# Patient Record
Sex: FEMALE | Race: WHITE | Employment: OTHER | URBAN - METROPOLITAN AREA
[De-identification: names, ages, dates, MRNs, and addresses within clinical notes are randomized per-mention and may not be internally consistent; named-entity substitution may affect disease eponyms.]

---

## 2018-04-06 ENCOUNTER — TELEPHONE (OUTPATIENT)
Dept: INTERNAL MEDICINE CLINIC | Age: 68
End: 2018-04-06

## 2018-04-06 NOTE — TELEPHONE ENCOUNTER
----- Message from Kevan Mejia sent at 4/6/2018  2:23 PM EDT -----  Regarding: /Telephone   Dina Friday, sister is requesting a call back in reference to a conversation with \"Dr. Cooper Kothari" regarding scheduling a sooner appt.  P [phone]

## 2018-04-14 ENCOUNTER — HOSPITAL ENCOUNTER (EMERGENCY)
Age: 68
Discharge: HOME | End: 2018-04-14
Attending: EMERGENCY MEDICINE
Payer: MEDICARE

## 2018-04-14 ENCOUNTER — APPOINTMENT (OUTPATIENT)
Dept: CT IMAGING | Age: 68
End: 2018-04-14
Attending: EMERGENCY MEDICINE
Payer: MEDICARE

## 2018-04-14 VITALS
WEIGHT: 240 LBS | TEMPERATURE: 98.1 F | DIASTOLIC BLOOD PRESSURE: 51 MMHG | SYSTOLIC BLOOD PRESSURE: 150 MMHG | HEIGHT: 64 IN | RESPIRATION RATE: 17 BRPM | BODY MASS INDEX: 40.97 KG/M2 | HEART RATE: 77 BPM | OXYGEN SATURATION: 94 %

## 2018-04-14 DIAGNOSIS — F43.81 GRIEF REACTION WITH PROLONGED BEREAVEMENT: Primary | ICD-10-CM

## 2018-04-14 LAB
ALBUMIN SERPL-MCNC: 3.5 G/DL (ref 3.5–5)
ALBUMIN/GLOB SERPL: 0.9 {RATIO} (ref 1.1–2.2)
ALP SERPL-CCNC: 99 U/L (ref 45–117)
ALT SERPL-CCNC: 24 U/L (ref 12–78)
ANION GAP SERPL CALC-SCNC: 7 MMOL/L (ref 5–15)
AST SERPL-CCNC: 15 U/L (ref 15–37)
BASOPHILS # BLD: 0 K/UL (ref 0–0.1)
BASOPHILS NFR BLD: 0 % (ref 0–1)
BILIRUB SERPL-MCNC: 0.6 MG/DL (ref 0.2–1)
BUN SERPL-MCNC: 10 MG/DL (ref 6–20)
BUN/CREAT SERPL: 12 (ref 12–20)
CALCIUM SERPL-MCNC: 9.8 MG/DL (ref 8.5–10.1)
CHLORIDE SERPL-SCNC: 104 MMOL/L (ref 97–108)
CO2 SERPL-SCNC: 29 MMOL/L (ref 21–32)
CREAT SERPL-MCNC: 0.82 MG/DL (ref 0.55–1.02)
DIFFERENTIAL METHOD BLD: ABNORMAL
EOSINOPHIL # BLD: 0.5 K/UL (ref 0–0.4)
EOSINOPHIL NFR BLD: 6 % (ref 0–7)
ERYTHROCYTE [DISTWIDTH] IN BLOOD BY AUTOMATED COUNT: 12.3 % (ref 11.5–14.5)
GLOBULIN SER CALC-MCNC: 3.8 G/DL (ref 2–4)
GLUCOSE SERPL-MCNC: 98 MG/DL (ref 65–100)
HCT VFR BLD AUTO: 42.1 % (ref 35–47)
HGB BLD-MCNC: 14.3 G/DL (ref 11.5–16)
IMM GRANULOCYTES # BLD: 0 K/UL (ref 0–0.04)
IMM GRANULOCYTES NFR BLD AUTO: 0 % (ref 0–0.5)
LYMPHOCYTES # BLD: 3.2 K/UL (ref 0.8–3.5)
LYMPHOCYTES NFR BLD: 34 % (ref 12–49)
MCH RBC QN AUTO: 32.4 PG (ref 26–34)
MCHC RBC AUTO-ENTMCNC: 34 G/DL (ref 30–36.5)
MCV RBC AUTO: 95.2 FL (ref 80–99)
MONOCYTES # BLD: 0.7 K/UL (ref 0–1)
MONOCYTES NFR BLD: 8 % (ref 5–13)
NEUTS SEG # BLD: 5 K/UL (ref 1.8–8)
NEUTS SEG NFR BLD: 52 % (ref 32–75)
NRBC # BLD: 0 K/UL (ref 0–0.01)
NRBC BLD-RTO: 0 PER 100 WBC
PLATELET # BLD AUTO: 249 K/UL (ref 150–400)
PMV BLD AUTO: 11 FL (ref 8.9–12.9)
POTASSIUM SERPL-SCNC: 4 MMOL/L (ref 3.5–5.1)
PROT SERPL-MCNC: 7.3 G/DL (ref 6.4–8.2)
RBC # BLD AUTO: 4.42 M/UL (ref 3.8–5.2)
SODIUM SERPL-SCNC: 140 MMOL/L (ref 136–145)
WBC # BLD AUTO: 9.5 K/UL (ref 3.6–11)

## 2018-04-14 PROCEDURE — 74011636320 HC RX REV CODE- 636/320: Performed by: RADIOLOGY

## 2018-04-14 PROCEDURE — 36415 COLL VENOUS BLD VENIPUNCTURE: CPT | Performed by: EMERGENCY MEDICINE

## 2018-04-14 PROCEDURE — 80053 COMPREHEN METABOLIC PANEL: CPT | Performed by: EMERGENCY MEDICINE

## 2018-04-14 PROCEDURE — 99285 EMERGENCY DEPT VISIT HI MDM: CPT

## 2018-04-14 PROCEDURE — 85025 COMPLETE CBC W/AUTO DIFF WBC: CPT | Performed by: EMERGENCY MEDICINE

## 2018-04-14 PROCEDURE — 71275 CT ANGIOGRAPHY CHEST: CPT

## 2018-04-14 RX ORDER — LEVOTHYROXINE SODIUM 112 UG/1
112 TABLET ORAL
COMMUNITY
End: 2018-07-02 | Stop reason: SDUPTHER

## 2018-04-14 RX ORDER — IBUPROFEN 200 MG
400 TABLET ORAL
COMMUNITY
End: 2018-07-02 | Stop reason: ALTCHOICE

## 2018-04-14 RX ORDER — METOPROLOL SUCCINATE 50 MG/1
50 TABLET, EXTENDED RELEASE ORAL DAILY
COMMUNITY
End: 2018-07-02 | Stop reason: SDUPTHER

## 2018-04-14 RX ADMIN — IOPAMIDOL 100 ML: 755 INJECTION, SOLUTION INTRAVENOUS at 14:26

## 2018-04-14 NOTE — DISCHARGE INSTRUCTIONS
Grief (Actual/Anticipated): Care Instructions  Your Care Instructions    Grief is your emotional reaction to a major loss. The words \"sorrow\" and \"heartache\" often are used to describe feelings of grief. You feel grief when you lose a beloved person, pet, place, or thing. It is also natural to feel grief when you lose a valued way of life, such as a job, marriage, or good health. You may begin to grieve before a loss occurs. You may grieve for a loved one who is sick and dying. Children and adults often feel the pain of loss before a big move or divorce. This type of grief helps you get ready for a loss. Grief is different for each person. There is no \"normal\" or \"expected\" period of time for grieving. Some people adjust to their loss within a couple of months. Others may take 2 years or longer, especially if their lives were changed a lot or if the loss was sudden and shocking. Grieving can cause problems such as headaches, loss of appetite, and trouble with thinking or sleeping. You may withdraw from friends and family and behave in ways that are unusual for you. Grief may cause you to question your beliefs and views about life. Grief is natural and does not require medical treatment. But if you have trouble sleeping, it may help to take sleeping pills for a short time. It may help to talk with people who have been through or are going through similar losses. You may also want to talk to a counselor about your feelings. Talking about your loss, sharing your cares and concerns, and getting support from others are important parts of healthy grieving. Follow-up care is a key part of your treatment and safety. Be sure to make and go to all appointments, and call your doctor if you are having problems. It's also a good idea to know your test results and keep a list of the medicines you take. How can you care for yourself at home? · Get enough sleep. Your mind helps make sense of your life while you sleep. Missing sleep can lead to illness and make it harder for you to deal with your grief. · Eat healthy foods. Try to avoid eating only foods that give you comfort. Ask someone to join you for a meal if you do not like eating alone. Consider taking a multivitamin every day. · Get some exercise every day. Even a walk can help you deal with your grief. Other exercises, such as yoga, can also help you manage stress. · Comfort yourself. Take time to look at photos or use special items that make you feel better. · Stay involved in your life. Do not withdraw from the activities you enjoy. People you know at work, [employer]ist, clubs, or other groups can help you get through your period of grief. · Think about joining a support group to help you deal with your grief. There are many support groups to help people recover from grief. When should you call for help? Call 911 anytime you think you may need emergency care. For example, call if:  ? · You feel you cannot stop from hurting yourself or someone else. ? Watch closely for changes in your health, and be sure to contact your doctor if:  ? · You think you may be depressed. ? · You do not get better as expected. Where can you learn more? Go to http://alexus-kunal.info/. Enter H249 in the search box to learn more about \"Grief (Actual/Anticipated): Care Instructions. \"  Current as of: September 24, 2016  Content Version: 11.4  © 0336-3471 [employer] Care instructions adapted under license by [employer] (which disclaims liability or warranty for this information). If you have questions about a medical condition or this instruction, always ask your healthcare professional. [place] [address] any warranty or liability for your use of this information. Adjustment Disorder: Care Instructions  Your Care Instructions    Adjustment disorder means that you have emotional or behavioral problems because of stress. But your response to the stress is far more severe than a normal response. It is severe enough to affect your work or social life and may cause depression and physical pains and problems. Events that may cause this response can include a divorce, money problems, or starting school or a new job. It might be anything that causes some stress. This disorder is most often a short-term problem. It happens within 3 months of the stressful event or change. If the response lasts longer than 6 months after the event ends, you may have a more serious disorder. Follow-up care is a key part of your treatment and safety. Be sure to make and go to all appointments, and call your doctor if you are having problems. It's also a good idea to know your test results and keep a list of the medicines you take. How can you care for yourself at home? · Go to all counseling sessions. Do not skip any because you are feeling better. · If your doctor prescribed medicines, take them exactly as prescribed. Call your doctor if you think you are having a problem with your medicine. You will get more details on the specific medicines your doctor prescribes. · Discuss the causes of your stress with a good friend or family member. Or you can join a support group for people with similar problems. Talking to others sometimes relieves stress. · Get at least 30 minutes of exercise on most days of the week. Walking is a good choice. You also may want to do other activities, such as running, swimming, cycling, or playing tennis or team sports. Relaxation techniques  Do relaxation exercises 10 to 20 minutes a day. You can play soothing, relaxing music while you do them, if you wish. · Tell others in your house that you are going to do your relaxation exercises. Ask them not to disturb you. · Find a comfortable, quiet place. · Lie down on your back, or sit with your back straight. · Focus on your breathing. Make it slow and steady.   · Breathe in through your nose. Breathe out through either your nose or mouth. · Breathe deeply, filling up the area between your navel and your rib cage. Breathe so that your belly goes up and down. · Do not hold your breath. · Breathe like this for 5 to 10 minutes. Notice the feeling of calmness throughout your whole body. As you continue to breathe slowly and deeply, relax by doing these next steps for another 5 to 10 minutes:  · Tighten and relax each muscle group in your body. Start at your toes, and work your way up to your head. · Imagine your muscle groups relaxing and getting heavy. · Empty your mind of all thoughts. · Let yourself relax more and more deeply. · Be aware of the state of calmness that surrounds you. · When your relaxation time is over, you can bring yourself back to alertness by moving your fingers and toes. Then move your hands and feet. And then move your entire body. Sometimes people fall asleep during relaxation. But they most often wake up soon. · Always give yourself time to return to full alertness before you drive a car. Wait to do anything that might cause an accident if you are not fully alert. Never play a relaxation tape while you drive a car. When should you call for help? Call 911 anytime you think you may need emergency care. For example, call if:  ? · You feel you cannot stop from hurting yourself or someone else. Keep the numbers for these national suicide hotlines: 0-139-439-TALK (5-277.749.8437) and 7-587-PJGIOXT (2-635.883.1849). If you or someone you know talks about suicide or feeling hopeless, get help right away. ? Watch closely for changes in your health, and be sure to contact your doctor if:  ? · You have new anxiety, or your anxiety gets worse. ? · You have been feeling sad, depressed, or hopeless or have lost interest in things that you usually enjoy. ? · You do not get better as expected. Where can you learn more?   Go to http://alfonso.info/. Enter [id number] [id number] in the search box to learn more about \"Adjustment Disorder: Care Instructions. \"  Current as of: July 26, 2016  Content Version: 11.4  © 3030-7838 [employer]wise, [facility]. Care instructions adapted under license by "[employer] (which disclaims liability or warranty for this information). If you have questions about a medical condition or this instruction, always ask your healthcare professional. [place] [address] any warranty or liability for your use of this information.

## 2018-04-14 NOTE — ED PROVIDER NOTES
HPI Comments: Pt reports she has been having difficulty getting out of bed and doing activities. She states she has been under stress d/t the passing of her  3 months ago and moving from [place] 2 weeks ago. Pt notes she fell 4 months ago while visiting her  in the hospital, had normal CT w/ normal PCP and neurology follow up, and was dx w/ concussion. She also c/o dizziness described as \"vertigo\" and nausea for 3 months She states her BP may be high because she forgot to take her medication last night. Social Hx: former smoker    Patient is a 69 y.o. female presenting with dizziness. The history is provided by the patient. Dizziness   This is a recurrent problem. The current episode started more than 1 week ago. Primary symptoms include disorientation (feels \"foggy\"). Primary symptoms comment: dizziness described as \"worsening vertigo\". Associated symptoms include shortness of breath. Pertinent negatives include no chest pain, no vomiting, no headaches and no nausea. Associated symptoms comments: Decreased activity. Associated medical issues comments: post-concussion syndrome after a fall in a hospital 12/2017. Note written by Teersa Torre, as dictated by Tia Wolf MD 1:27 PM    Past Medical History:   Diagnosis Date    Hypertension     Thyroid disease        Past Surgical History:   Procedure Laterality Date    HX CHOLECYSTECTOMY      HX KNEE ARTHROSCOPY      HX TONSIL AND ADENOIDECTOMY      HX TUBAL LIGATION           History reviewed. No pertinent family history. Social History     Social History    Marital status: N/A     Spouse name: N/A    Number of children: N/A    Years of education: N/A     Occupational History    Not on file.      Social History Main Topics    Smoking status: Former Smoker     Packs/day: 0.25     Years: 20.00    Smokeless tobacco: Never Used    Alcohol use 1.8 - 2.4 oz/week     3 - 4 Glasses of wine per week    Drug use: No    Sexual activity: Not on file     Other Topics Concern    Not on file     Social History Narrative    No narrative on file         ALLERGIES: Sulfa (sulfonamide antibiotics)    Review of Systems   Constitutional: Positive for activity change. Negative for chills and fever. HENT: Negative for ear pain and sore throat. Eyes: Negative for photophobia and pain. Respiratory: Positive for shortness of breath. Negative for chest tightness. Cardiovascular: Negative for chest pain and leg swelling. Gastrointestinal: Negative for abdominal pain, nausea and vomiting. Genitourinary: Negative for dysuria and flank pain. Musculoskeletal: Negative for back pain and neck pain. Skin: Negative for rash and wound. Neurological: Positive for dizziness. Negative for light-headedness and headaches. Psychiatric/Behavioral: The patient is nervous/anxious. All other systems reviewed and are negative. Vitals:    04/14/18 1325 04/14/18 1330 04/14/18 1338   BP: 182/56 151/83    Pulse: 76 73    Resp: 16 13    Temp: 98.1 °F (36.7 °C)     SpO2: 92% 91% 93%   Weight: 108.9 kg (240 lb)     Height: 5' 4\" (1.626 m)              Physical Exam   Constitutional: She is oriented to person, place, and time. She appears well-developed and well-nourished. No distress. HENT:   Head: Normocephalic and atraumatic. Eyes: Conjunctivae and EOM are normal. Pupils are equal, round, and reactive to light. Neck: Normal range of motion. Cardiovascular: Normal rate, regular rhythm, normal heart sounds and intact distal pulses. No murmur heard. Pulmonary/Chest: Effort normal and breath sounds normal. No stridor. No respiratory distress. She has no wheezes. Abdominal: Soft. Bowel sounds are normal. There is no tenderness. Musculoskeletal: Normal range of motion. She exhibits no edema or tenderness. Neurological: She is alert and oriented to person, place, and time. No cranial nerve deficit. Skin: Skin is warm and dry. She is not diaphoretic. Psychiatric: Her speech is not rapid and/or pressured. She is not agitated and not aggressive. She exhibits a depressed mood. She expresses no homicidal and no suicidal ideation. Tearful at times   Nursing note and vitals reviewed. MDM  Number of Diagnoses or Management Options  Grief reaction with prolonged bereavement:   Diagnosis management comments: Patient with feelings of not feeling right - hypoxia on RA - but no reason for it (CTA neg, labs neg)  Patient able to walk in the ED and the hypoxia resolved. Suspect most of patient's issues are due to prolonged grief reaction from death of her  and trauma of moving and having to quit her job       Amount and/or Complexity of Data Reviewed  Clinical lab tests: ordered and reviewed  Tests in the radiology section of CPT®: ordered and reviewed          ED Course       Procedures    CONSULT NOTE:  3:18 PM Linda Novoa MD spoke with Nona Carolina for [facility]. Discussed available diagnostic tests and clinical findings. Brenda Garcia will fax over resources for grief counseling. Results:    Labs:  Recent Results (from the past 24 hour(s))   CBC WITH AUTOMATED DIFF    Collection Time: 04/14/18  1:26 PM   Result Value Ref Range    WBC 9.5 3.6 - 11.0 K/uL    RBC 4.42 3.80 - 5.20 M/uL    HGB 14.3 11.5 - 16.0 g/dL    HCT 42.1 35.0 - 47.0 %    MCV 95.2 80.0 - 99.0 FL    MCH 32.4 26.0 - 34.0 PG    MCHC 34.0 30.0 - 36.5 g/dL    RDW 12.3 11.5 - 14.5 %    PLATELET 858 969 - 320 K/uL    MPV 11.0 8.9 - 12.9 FL    NRBC 0.0 0  WBC    ABSOLUTE NRBC 0.00 0.00 - 0.01 K/uL    NEUTROPHILS 52 32 - 75 %    LYMPHOCYTES 34 12 - 49 %    MONOCYTES 8 5 - 13 %    EOSINOPHILS 6 0 - 7 %    BASOPHILS 0 0 - 1 %    IMMATURE GRANULOCYTES 0 0.0 - 0.5 %    ABS. NEUTROPHILS 5.0 1.8 - 8.0 K/UL    ABS. LYMPHOCYTES 3.2 0.8 - 3.5 K/UL    ABS. MONOCYTES 0.7 0.0 - 1.0 K/UL    ABS. EOSINOPHILS 0.5 (H) 0.0 - 0.4 K/UL    ABS.  BASOPHILS 0.0 0.0 - 0.1 K/UL    ABS. IMM. GRANS. 0.0 0.00 - 0.04 K/UL    DF AUTOMATED     METABOLIC PANEL, COMPREHENSIVE    Collection Time: 04/14/18  1:26 PM   Result Value Ref Range    Sodium 140 136 - 145 mmol/L    Potassium 4.0 3.5 - 5.1 mmol/L    Chloride 104 97 - 108 mmol/L    CO2 29 21 - 32 mmol/L    Anion gap 7 5 - 15 mmol/L    Glucose 98 65 - 100 mg/dL    BUN 10 6 - 20 MG/DL    Creatinine 0.82 0.55 - 1.02 MG/DL    BUN/Creatinine ratio 12 12 - 20      GFR est AA >60 >60 ml/min/1.73m2    GFR est non-AA >60 >60 ml/min/1.73m2    Calcium 9.8 8.5 - 10.1 MG/DL    Bilirubin, total 0.6 0.2 - 1.0 MG/DL    ALT (SGPT) 24 12 - 78 U/L    AST (SGOT) 15 15 - 37 U/L    Alk. phosphatase 99 45 - 117 U/L    Protein, total 7.3 6.4 - 8.2 g/dL    Albumin 3.5 3.5 - 5.0 g/dL    Globulin 3.8 2.0 - 4.0 g/dL    A-G Ratio 0.9 (L) 1.1 - 2.2         Imaging:  Cta Chest W Or W Wo Cont    Result Date: 4/14/2018  EXAM: CT ANGIOGRAPHY CHEST INDICATION: Chest pain. Hypoxia and fatigue - eval for PE vs other process. COMPARISON: None. CONTRAST: 100 mL of Isovue-370. TECHNIQUE: Precontrast  images were obtained to localize the volume for acquisition. Multislice helical CT arteriography was performed from the diaphragm to the thoracic inlet during uneventful rapid bolus intravenous contrast administration. Lung and soft tissue windows were generated. Coronal and sagittal  reformatted images were also generated and 3D post processing consisting of coronal maximum intensity projection images was performed. CT dose reduction was achieved through use of a standardized protocol tailored for this examination and automatic exposure control for dose modulation. FINDINGS: There is some respiratory motion artifact on the images. LOWER NECK: The visualized portions of the thyroid and structures of the lower neck are within normal limits. LUNGS: The lungs are clear of mass, nodule, airspace disease or edema. PLEURA: There is no pleural effusion or pneumothorax. PULMONARY ARTERIES: The pulmonary arteries are well enhanced and no pulmonary emboli are identified. AORTA: The aorta enhances normally without evidence of aneurysm or dissection. MEDIASTINUM: There is no mediastinal or hilar adenopathy or mass. BONES AND SOFT TISSUES: The bones and soft tissues of the chest wall are within normal limits. UPPER ABDOMEN: There are clips in the gallbladder fossa and the gallbladder is absent. There is no biliary duct dilatation. The visualized portions of the upper abdominal organs are otherwise within normal limits. IMPRESSION: Normal CT arteriogram of the chest. No pulmonary embolus.

## 2018-04-14 NOTE — ED TRIAGE NOTES
Dizziness and nausea for 3 months. Since a fall December 23, 2017 and loss of  January this year. Per sister \"her head just isn't right, simple things she can't seem to get done\" Poor concentration and little desire to get up and move about for ADL's. Patient is tearful and concerned that something is wrong physically with her.

## 2018-04-25 ENCOUNTER — OFFICE VISIT (OUTPATIENT)
Dept: INTERNAL MEDICINE CLINIC | Age: 68
End: 2018-04-25

## 2018-04-25 VITALS
BODY MASS INDEX: 42.16 KG/M2 | TEMPERATURE: 98.5 F | RESPIRATION RATE: 18 BRPM | WEIGHT: 245.6 LBS | HEART RATE: 73 BPM | DIASTOLIC BLOOD PRESSURE: 83 MMHG | OXYGEN SATURATION: 95 % | SYSTOLIC BLOOD PRESSURE: 137 MMHG

## 2018-04-25 DIAGNOSIS — F07.81 POSTCONCUSSION SYNDROME: Primary | ICD-10-CM

## 2018-04-25 DIAGNOSIS — Z87.898 HISTORY OF SYNCOPE: ICD-10-CM

## 2018-04-25 DIAGNOSIS — I10 BENIGN ESSENTIAL HTN: ICD-10-CM

## 2018-04-25 DIAGNOSIS — F43.21 GRIEF REACTION: ICD-10-CM

## 2018-04-25 DIAGNOSIS — Z12.31 VISIT FOR SCREENING MAMMOGRAM: ICD-10-CM

## 2018-04-25 DIAGNOSIS — S09.90XS INJURY OF HEAD, SEQUELA: ICD-10-CM

## 2018-04-25 DIAGNOSIS — R41.89 COGNITIVE CHANGE: ICD-10-CM

## 2018-04-25 DIAGNOSIS — R26.9 IMPAIRED GAIT: ICD-10-CM

## 2018-04-25 DIAGNOSIS — E03.9 ACQUIRED HYPOTHYROIDISM: ICD-10-CM

## 2018-04-25 PROBLEM — E66.01 OBESITY, MORBID (HCC): Status: ACTIVE | Noted: 2018-04-25

## 2018-04-25 NOTE — MR AVS SNAPSHOT
[facility] 
 
 
 [address] [place] [address] [facility] 
[phone] Patient: Naa Higginbotham MRN: [id number] UWL:[id number] Visit Information Date & Time Provider Department Dept. Phone Encounter #  
 4/25/2018  3:30 PM Sarahi Lamb MD [facility] [address] [facility] [id number] Upcoming Health Maintenance Date Due Hepatitis C Screening 1950 DTaP/Tdap/Td series (1 - Tdap) 11/20/1969 BREAST CANCER SCRN MAMMOGRAM 11/20/1998 FOBT Q 1 YEAR AGE 50-75 11/20/1998 ZOSTER VACCINE AGE 60> 9/20/2008 GLAUCOMA SCREENING Q2Y 11/20/2013 Bone Densitometry (Dexa) Screening 11/20/2013 Pneumococcal 65+ Low/Medium Risk (1 of 2 - PCV13) 11/20/2013 Influenza Age 5 to Adult 8/1/2017 MEDICARE YEARLY EXAM 4/14/2018 Allergies as of 4/25/2018  Review Complete On: 4/14/2018 By: Swati Love, RT, R Severity Noted Reaction Type Reactions Sulfa (Sulfonamide Antibiotics)  04/14/2018   Intolerance Hives Not allergic at all. Anup Hillman \"yeast infection\" Current Immunizations  Never Reviewed No immunizations on file. Not reviewed this visit You Were Diagnosed With   
  
 Codes Comments Postconcussion syndrome    -  Primary ICD-10-CM: F07.81 ICD-9-CM: 310.2 Injury of head, sequela     ICD-10-CM: S09.90XS ICD-9-CM: 908.9 History of syncope     ICD-10-CM: Z87.898 ICD-9-CM: V15.89 Cognitive change     ICD-10-CM: R41.89 ICD-9-CM: 799.59 Grief reaction     ICD-10-CM: F43.20 ICD-9-CM: 309.0 Impaired gait     ICD-10-CM: R26.9 ICD-9-CM: 150. 2 Acquired hypothyroidism     ICD-10-CM: E03.9 ICD-9-CM: 244.9 Benign essential HTN     ICD-10-CM: I10 
ICD-9-CM: 401.1 Visit for screening mammogram     ICD-10-CM: Z12.31 
ICD-9-CM: V76.12 Vitals  BP Pulse Temp Resp Weight(growth percentile) SpO2  
 137/83 (BP 1 Location: Left arm, BP Patient Position: Sitting) 73 98.5 °F (36.9 °C) (Oral) 18 245 lb 9.6 oz (111.4 kg) 95% BMI OB Status Smoking Status 42.16 kg/m2 Postmenopausal Former Smoker BMI and BSA Data Body Mass Index Body Surface Area  
 42.16 kg/m 2 2.24 m 2 Your Updated Medication List  
  
   
This list is accurate as of 4/25/18  4:34 PM.  Always use your most recent med list.  
  
  
  
  
 ibuprofen 200 mg tablet Commonly known as:  MOTRIN Take 400 mg by mouth every six (6) hours as needed for Pain. Indications: Pain  
  
 levothyroxine 112 mcg tablet Commonly known as:  SYNTHROID Take 112 mcg by mouth Daily (before breakfast). metoprolol succinate 50 mg XL tablet Commonly known as:  TOPROL-XL Take 50 mg by mouth daily. We Performed the Following REFERRAL TO BEHAVIORAL HEALTH [REF15 Custom] To-Do List   
 Around 04/25/2018 Imaging:  [facility] MAMMO BI SCREENING INCL CAD Referral Information Referral ID Referred By Referred To  
  
 [id number] Cintia PATTERSON [address] [facility] [address] [place] [address] Phone: [phone] Fax: [phone] Visits Status Start Date End Date 1 Authorized 4/25/18 4/25/19 If your referral has a status of pending review or denied, additional information will be sent to support the outcome of this decision. Introducing hospitals & [facility] SERVICES! [facility] introduces [employer]ge patient portal. Now you can access parts of your medical record, email your doctor's office, and request medication refills online. 1. In your internet browser, go to [email or web address] 2. Click on the First Time User? Click Here link in the Sign In box. You will see the New Member Sign Up page. 3. Enter your Pl[employer]ge Access Code exactly as it appears below. You will not need to use this code after youve completed the sign-up process.  If you do not sign up before the expiration date, you must request a new code. · [employer] Access Code: [email or web address] Expires: 7/13/2018  1:15 PM 
 
4. Enter the last four digits of your Social Security Number (xxxx) and Date of Birth (mm/dd/yyyy) as indicated and click Submit. You will be taken to the next sign-up page. 5. Create a [employer] ID. This will be your [employer] login ID and cannot be changed, so think of one that is secure and easy to remember. 6. Create a [employer] password. You can change your password at any time. 7. Enter your Password Reset Question and Answer. This can be used at a later time if you forget your password. 8. Enter your e-mail address. You will receive e-mail notification when new information is available in [address] 9. Click Sign Up. You can now view and download portions of your medical record. 10. Click the Download Summary menu link to download a portable copy of your medical information. If you have questions, please visit the Frequently Asked Questions section of the [employer] website. Remember, [employer] is NOT to be used for urgent needs. For medical emergencies, dial 911. Now available from your iPhone and Android! Please provide this summary of care documentation to your next provider. Your primary care clinician is listed as Luis Romano. If you have any questions after today's visit, please call [phone].
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
## 2018-05-04 ENCOUNTER — HOSPITAL ENCOUNTER (OUTPATIENT)
Dept: MAMMOGRAPHY | Age: 68
Discharge: HOME | End: 2018-05-04
Attending: INTERNAL MEDICINE
Payer: MEDICARE

## 2018-05-04 DIAGNOSIS — Z12.31 VISIT FOR SCREENING MAMMOGRAM: ICD-10-CM

## 2018-05-04 PROCEDURE — 77067 SCR MAMMO BI INCL CAD: CPT

## 2018-05-07 ENCOUNTER — OFFICE VISIT (OUTPATIENT)
Dept: INTERNAL MEDICINE CLINIC | Age: 68
End: 2018-05-07

## 2018-05-07 DIAGNOSIS — F43.23 ADJUSTMENT DISORDER WITH MIXED ANXIETY AND DEPRESSED MOOD: Primary | ICD-10-CM

## 2018-05-23 NOTE — PROGRESS NOTES
Behavioral Health Note    This patient was referred to the [address] [place] [address] by Dr. Armand Vang for help with management of grief and depression. Met with pt. for initial session of 60 minutes to establish contact, to assess symptoms and mental status, identify health behavior goals, and develop plan of care based on readiness to change. Diagnosis: Adjustment Disorder with Anxiety and Depressed Mood    Symptoms and mental status: Pt appeared stated age, dressed appropriately, and was pleasant and cooperative throughout the interview. Pt made good eye contact; thoughts were clear and goal-oriented and there was no evidence of disturbances in thought process or content or perceptual disturbances. Mood and affect were mildly depressed/anxious. Insight and judgment were good. Rachael Clock was tearful on and off throughout the session as she discussed the death of her  earlier this year. He had been ill for several years with cirrhosis. She also experienced a concussion this year and has struggled with some cognitive changes since then. She reports that she had focused so much on her  over the past few years that she had not taken care of her own health. She has begun to do this; had a mammogram done, established with a PCP and has a colonoscopy set up. The scores on the Pt. Stress Questionnaire were: PHQ-9: 18 (significant depressive sx); ANNA-7: 9(moderate anxiety); AUDIT: 5 (no abuse of alcohol); and 2/4 on the PTSD scale (related to her s death). She stated that she was not interested in taking medications and will work on making lifestyle changes to lessen sx. She rated her current pain level 4/10 (knee pain). Health Behavior Goals: To take better care of herself    Social Support: Her sister, cats, and a grief support group     Intervention:   The following CBT interventions were reviewed: self-monitoring of self-defeating thoughts, relaxation training, and increasing physical activity as tolerated. Plan: Pt. agreed to follow the treatment plan outlined above and will return in 4 weeks. [facility] will coordinate with PCP for plan of care.

## 2018-05-29 ENCOUNTER — OFFICE VISIT (OUTPATIENT)
Dept: INTERNAL MEDICINE CLINIC | Age: 68
End: 2018-05-29

## 2018-05-29 DIAGNOSIS — F43.23 ADJUSTMENT DISORDER WITH MIXED ANXIETY AND DEPRESSED MOOD: Primary | ICD-10-CM

## 2018-06-04 ENCOUNTER — HOSPITAL ENCOUNTER (OUTPATIENT)
Dept: MAMMOGRAPHY | Age: 68
Discharge: HOME | End: 2018-06-04
Attending: INTERNAL MEDICINE

## 2018-06-04 DIAGNOSIS — R92.8 ABNORMAL MAMMOGRAM: ICD-10-CM

## 2018-06-26 ENCOUNTER — OFFICE VISIT (OUTPATIENT)
Dept: INTERNAL MEDICINE CLINIC | Age: 68
End: 2018-06-26

## 2018-06-26 DIAGNOSIS — F43.23 ADJUSTMENT DISORDER WITH MIXED ANXIETY AND DEPRESSED MOOD: Primary | ICD-10-CM

## 2018-06-28 ENCOUNTER — TELEPHONE (OUTPATIENT)
Dept: INTERNAL MEDICINE CLINIC | Age: 68
End: 2018-06-28

## 2018-06-28 NOTE — TELEPHONE ENCOUNTER
Sandra lawrence/ Dr Kincaid Dry office request a return call regarding if patient was referred  to them by Dr ELIUD VELASQUEZ. Patient scheduled an appointment with them but what she scheduled herself for they do not do so Sandra Johansen needs to verify if Dr [facility] recommended their services for the patient.  Dr Merritt Shows contact Sandra Johansen [phone]

## 2018-06-29 NOTE — PROGRESS NOTES
Behavioral Health Progress Note    [facility] 11:45[facility] for a total of 45 minutes. Psychotropic medication changes: None    Diagnosis: Adjustment Disorder with Anxiety and Depressed Mood    Description of session/progress/interventions: Eugene Massey reported that she continues to feel as if she is doing better every day. She still has occasional periods of tearfulness, which she was reassured was a normal part of grieving at this point. She feels she is struggling with procrastination and has enlisted her sister to help with this. She has been a bit more involved in caring for her mother and brother. She was given an NSAID for arthritis and says that it has helped her sleep better, so she did not start taking Melatonin. She feels her fogginess is lifting some. She plans on getting involved with an exercise group at [employer] and was given information on [facility]. Assessment/Plan: Symptoms continue to improve. Working to establish additional support for herself and increase her self-care. To return in about a month.

## 2018-07-02 ENCOUNTER — OFFICE VISIT (OUTPATIENT)
Dept: INTERNAL MEDICINE CLINIC | Age: 68
End: 2018-07-02

## 2018-07-02 VITALS
HEART RATE: 70 BPM | HEIGHT: 64 IN | WEIGHT: 248 LBS | SYSTOLIC BLOOD PRESSURE: 128 MMHG | RESPIRATION RATE: 18 BRPM | DIASTOLIC BLOOD PRESSURE: 70 MMHG | BODY MASS INDEX: 42.34 KG/M2 | OXYGEN SATURATION: 95 % | TEMPERATURE: 98.2 F

## 2018-07-02 DIAGNOSIS — F07.81 POSTCONCUSSION SYNDROME: ICD-10-CM

## 2018-07-02 DIAGNOSIS — Z00.00 MEDICARE ANNUAL WELLNESS VISIT, SUBSEQUENT: ICD-10-CM

## 2018-07-02 DIAGNOSIS — I10 BENIGN ESSENTIAL HTN: ICD-10-CM

## 2018-07-02 DIAGNOSIS — Z71.89 ADVANCED CARE PLANNING/COUNSELING DISCUSSION: ICD-10-CM

## 2018-07-02 DIAGNOSIS — E66.01 OBESITY, MORBID (HCC): ICD-10-CM

## 2018-07-02 DIAGNOSIS — Z00.00 MEDICARE ANNUAL WELLNESS VISIT, INITIAL: Primary | ICD-10-CM

## 2018-07-02 DIAGNOSIS — Z11.59 ENCOUNTER FOR HEPATITIS C SCREENING TEST FOR LOW RISK PATIENT: ICD-10-CM

## 2018-07-02 DIAGNOSIS — Z12.11 COLON CANCER SCREENING: ICD-10-CM

## 2018-07-02 DIAGNOSIS — E03.9 ACQUIRED HYPOTHYROIDISM: ICD-10-CM

## 2018-07-02 DIAGNOSIS — Z78.0 ASYMPTOMATIC MENOPAUSAL STATE: ICD-10-CM

## 2018-07-02 RX ORDER — LEVOTHYROXINE SODIUM 112 UG/1
112 TABLET ORAL
Qty: 30 TAB | Refills: 3 | Status: SHIPPED | OUTPATIENT
Start: 2018-07-02 | End: 2018-07-15 | Stop reason: SDUPTHER

## 2018-07-02 RX ORDER — LEVOTHYROXINE SODIUM 112 UG/1
112 TABLET ORAL
Qty: 30 TAB | Refills: 0 | Status: SHIPPED | OUTPATIENT
Start: 2018-07-02 | End: 2018-07-02 | Stop reason: SDUPTHER

## 2018-07-02 RX ORDER — MELOXICAM 15 MG/1
15 TABLET ORAL DAILY
Status: ON HOLD | COMMUNITY
Start: 2018-06-12 | End: 2018-09-19

## 2018-07-02 RX ORDER — METOPROLOL SUCCINATE 50 MG/1
50 TABLET, EXTENDED RELEASE ORAL DAILY
Qty: 30 TAB | Refills: 3 | Status: SHIPPED | OUTPATIENT
Start: 2018-07-02 | End: 2018-10-29 | Stop reason: SDUPTHER

## 2018-07-02 RX ORDER — METOPROLOL SUCCINATE 50 MG/1
50 TABLET, EXTENDED RELEASE ORAL DAILY
Qty: 30 TAB | Refills: 0 | Status: SHIPPED | OUTPATIENT
Start: 2018-07-02 | End: 2018-07-02 | Stop reason: SDUPTHER

## 2018-07-02 NOTE — PROGRESS NOTES
HISTORY OF PRESENT ILLNESS    Chief Complaint   Patient presents with    Medication Refill    Labs     Requesting labs d/t time since last set of lab    Weight Management     Unable to successfully lose weight     Annual Wellness Visit       Presents for follow-up. She is with her sister, Job Bethea.      Post-concussion syndrome. Grief reaction. She feels ongoing \"fogginess\" at times. Feels mentally fatigued easily  She is driving locally. Melanie Shaw for counseling which she found very helpful. Saw Dr. Danley Gaucher for right knee. Given mobic. She declined injection since he did not warn her. Pain is improving some. She wants to see Dr. Nirav Bradford now. Reports some GERD at night. Mild. Hypertension  Hypertension ROS: taking medications as instructed, no medication side effects noted, no TIA's, no chest pain on exertion, no dyspnea on exertion, no swelling of ankles     reports that she has quit smoking. She has a 5.00 pack-year smoking history. She has never used smokeless tobacco.    reports that she drinks about 1.8 - 2.4 oz of alcohol per week    BP Readings from Last 2 Encounters:   07/02/18 143/66   04/25/18 137/83     Hypothyroidism follow-up  Reports fatigue  denies heat/cold intolerance, bowel/skin changes or CVS symptoms, losing hair, feeling excessive energy, tremulousness, palpitations. Thyroid medication has been unchanged since last medication check and labs. No results found for: TSH, TSH2, TSH3, TSHP, TSHELE, TT3, T3U, T3UP, FRT3, FT3, FT4, FT4P, T4, T4P, FT4T, TT7, TSHEXT  Wt Readings from Last 3 Encounters:   07/02/18 248 lb (112.5 kg)   04/25/18 245 lb 9.6 oz (111.4 kg)   04/14/18 240 lb (108.9 kg)         Review of Systems   All other systems reviewed and are negative, except as noted in HPI    Past Medical and Surgical History   has a past medical history of Benign essential HTN; Grief reaction; Head injury; History of panic attacks; Hypothyroidism; Impaired gait;  Postconcussion syndrome (02/2018); and Right knee pain. has a past surgical history that includes hx cholecystectomy; hx tonsil and adenoidectomy; hx knee arthroscopy (Right, 1978); hx tubal ligation; and hx colonoscopy (11/24/2009). reports that she has quit smoking. She has a 5.00 pack-year smoking history. She has never used smokeless tobacco. She reports that she drinks about 1.8 - 2.4 oz of alcohol per week  She reports that she does not use illicit drugs. family history is not on file. Physical Exam   Nursing note and vitals reviewed. Blood pressure 143/66, pulse 70, temperature 98.2 °F (36.8 °C), resp. rate 18, height 5' 4\" (1.626 m), weight 248 lb (112.5 kg), SpO2 95 %. Constitutional:  No distress. Eyes: Conjunctivae are normal.   Ears:  Hearing grossly intact  Cardiovascular: Normal rate. regular rhythm, no murmurs or gallops  No edema  Pulmonary/Chest: Effort normal.   CTAB  Musculoskeletal: moves all 4 extremities   Neurological: Alert and oriented to person, place, and time. Skin: No rash noted. Psychiatric: Normal mood and affect. Behavior is normal.     ASSESSMENT and PLAN  Diagnoses and all orders for this visit:    1. Medicare annual wellness visit, subsequent  Physician Addendum  I personally saw and examined the patient. I have discussed and reviewed note with Nurse Navigator and agree with the Nurse Navigator's findings and recommendations for this Wellness Exam.  I was present during the key portions of separately billed procedures. Orders written and signed by me as per chart. Abram Koch MD    2. Asymptomatic menopausal state  -     DEXA BONE DENSITY STUDY AXIAL; Future    3. Advanced care planning/counseling discussion    4. Colon cancer screening overdue for 3 year colonoscopy follow-up. Asymptomatic-   -     [facility]    5. Encounter for hepatitis C screening test for low risk patient  -     HCV AB W/RFLX TO ULICES    6. Acquired hypothyroidism  Difficulty losing weight.   Check thyroid. Increase dose if at all possible. Stress is also contributing to weight gain.  -     TSH 3RD GENERATION  -     T4, FREE  -     levothyroxine (SYNTHROID) 112 mcg tablet; Take 1 Tab by mouth Daily (before breakfast). 7. Benign essential HTN- Controlled on current regimen. Continue current medications as written in chart.  -     LIPID PANEL  -     CBC WITH AUTOMATED DIFF  -     METABOLIC PANEL, COMPREHENSIVE  -     metoprolol succinate (TOPROL-XL) 50 mg XL tablet; Take 1 Tab by mouth daily. 8. Obesity, morbid ([address]  The patient is asked to make an attempt to improve diet and exercise patterns to aid in medical management of this problem. 9.  Postconcussive syndrome. At this point, symptoms appear to be relatively mild and manageable. She is not doing well with higher level executive function and her confidence is not ideal, but she is overall improving. Appreciate counseling recommendations. Remain off of medication for now. Mood and anxiety component may benefit from an SSRI in my opinion. She is also dealing with her mother with progressive dementia. There are no Patient Instructions on file for this visit.    lab results and schedule of future lab studies reviewed with patient  reviewed medications and side effects in detail    Return to clinic for further evaluation if new symptoms develop    Follow-up Disposition: Not on File    Current Outpatient Prescriptions   Medication Sig    meloxicam (MOBIC) 15 mg tablet Take 15 mg by mouth daily.  metoprolol succinate (TOPROL-XL) 50 mg XL tablet Take 1 Tab by mouth daily.  levothyroxine (SYNTHROID) 112 mcg tablet Take 1 Tab by mouth Daily (before breakfast). No current facility-administered medications for this visit.

## 2018-07-02 NOTE — ACP (ADVANCE CARE PLANNING)
Advance Care Planning (ACP) Provider Note - Comprehensive     Date of ACP Conversation: 07/02/18  Persons included in Conversation:  patient  Length of ACP Conversation in minutes:  <16 minutes (Non-Billable)    Authorized Decision Maker (if patient is incapable of making informed decisions): This person is:  Healthcare Agent/Medical Power of  under Advance Directive          General ACP for ALL Patients with Decision Making Capacity:   Importance of advance care planning, including choosing a healthcare agent to communicate patient's healthcare decisions if patient lost the ability to make decisions, such as after a sudden illness or accident  Understanding of the healthcare agent role was assessed and information provided  Exploration of values, goals, and preferences if recovery is not expected, even with continued medical treatment in the event of: Imminent death  Severe, permanent brain injury    Review of Existing Advance Directive:  not availble     For Serious or Chronic Illness:  Understanding of medical condition    Understanding of CPR, goals and expected outcomes, benefits and burdens discussed. Information on CPR success rates provided (e.g. for CPR in hospital, survival to d/c at two weeks is 22%, for chronically ill or elderly/frail survival is less than 3%); Individual asked to communicate understanding of information in his/her own words.   Explored fears and concerns regarding CPR or possible outcomes    Interventions Provided:  Recommended completion of Advance Directive form after review of ACP materials and conversation with prospective healthcare agent   Recommended communicating the plan and making copies for the healthcare agent, personal physician, and others as appropriate (e.g., health system)

## 2018-07-02 NOTE — MR AVS SNAPSHOT
[address] [facility] [address] 
 
 
 [address] [facility] [facility] 
[phone] Patient: Katrina Toledo MRN: [id number] Visit Information Date & Time Provider Department Dept. Phone Encounter #  
 7/2/2018  3:45 PM Tyler Mc MD [facility] [address] [facility] [id number] Your Appointments 1/14/2019  8:40 AM  
New Patient with John Paul Hill PsyD [address] [facility] ([facility] Appt Note: new pt  ref by Dr Abby Gutierrez her psychiatrist  [phone], or (c) [phone] , for for post concussion after fall 12/23/2017, vs grief, with memory issues, [facility] [address] [facility] [address] [id number] [phone]  
  
   
 [facility] [address] [id number] [address] [place] Upcoming Health Maintenance Date Due Hepatitis C Screening 1950 DTaP/Tdap/Td series (1 - Tdap) 11/20/1969 ZOSTER VACCINE AGE 60> 9/20/2008 COLONOSCOPY 11/24/2012 GLAUCOMA SCREENING Q2Y 11/20/2013 Bone Densitometry (Dexa) Screening 11/20/2013 Pneumococcal 65+ Low/Medium Risk (1 of 2 - PCV13) 11/20/2013 Influenza Age 5 to Adult 8/1/2018 MEDICARE YEARLY EXAM 7/3/2019 BREAST CANCER SCRN MAMMOGRAM 5/4/2020 Allergies as of 7/2/2018  Review Complete On: 7/2/2018 By: Tyler cM MD  
  
 Severity Noted Reaction Type Reactions Sulfa (Sulfonamide Antibiotics)  04/14/2018   Intolerance Hives Not allergic at all. Johana Figueroa \"yeast infection\" Current Immunizations  Reviewed on 7/2/2018 No immunizations on file. Reviewed by Tyler Mc MD on 7/2/2018 at  4:42 PM  
You Were Diagnosed With   
  
 Codes Comments Asymptomatic menopausal state    -  Primary ICD-10-CM: Z78.0 ICD-9-CM: V49.81 Advanced care planning/counseling discussion     ICD-10-CM: Z71.89 ICD-9-CM: V65.49 Colon cancer screening     ICD-10-CM: Z12.11 ICD-9-CM: V76.51 Encounter for hepatitis C screening test for low risk patient     ICD-10-CM: Z11.59 
ICD-9-CM: V73.89 Acquired hypothyroidism     ICD-10-CM: E03.9 ICD-9-CM: 244.9 Benign essential HTN     ICD-10-CM: I10 
ICD-9-CM: 401.1 Obesity, morbid ([address]     ICD-10-CM: E66.01 
ICD-9-CM: 278.01 Medicare annual wellness visit, subsequent     ICD-10-CM: Z00.00 ICD-9-CM: V70.0 Vitals BP Pulse Temp Resp Height(growth percentile) Weight(growth percentile) 128/70 (BP 1 Location: Left arm, BP Patient Position: Sitting) 70 98.2 °F (36.8 °C) 18 5' 4\" (1.626 m) 248 lb (112.5 kg) SpO2 BMI OB Status Smoking Status 95% 42.57 kg/m2 Postmenopausal Former Smoker Vitals History BMI and BSA Data Body Mass Index Body Surface Area 42.57 kg/m 2 2.25 m 2 Preferred Pharmacy Pharmacy Name Phone [id number], [address] ([place]) [phone] Your Updated Medication List  
  
   
This list is accurate as of 7/2/18  4:51 PM.  Always use your most recent med list.  
  
  
  
  
 levothyroxine 112 mcg tablet Commonly known as:  SYNTHROID Take 1 Tab by mouth Daily (before breakfast). meloxicam 15 mg tablet Commonly known as:  MOBIC Take 15 mg by mouth daily. metoprolol succinate 50 mg XL tablet Commonly known as:  TOPROL-XL Take 1 Tab by mouth daily. Prescriptions Sent to Pharmacy Refills  
 metoprolol succinate (TOPROL-XL) 50 mg XL tablet 3 Sig: Take 1 Tab by mouth daily. Class: Normal  
 Pharmacy: [id number] [address] , [address] ([place]) Ph #: [phone] Route: Oral  
 levothyroxine (SYNTHROID) 112 mcg tablet 3 Sig: Take 1 Tab by mouth Daily (before breakfast).   
 Class: Normal  
 Pharmacy: [id number]- [address], [address] ([place]) Ph #: [phone] Route: Oral  
  
We Performed the Following CBC WITH AUTOMATED DIFF [13452 CPT(R)] HCV AB W/RFLX TO ULICES [02064 CPT(R)] LIPID PANEL [29976 CPT(R)] METABOLIC PANEL, COMPREHENSIVE [43879 CPT(R)] REFERRAL TO GASTROENTEROLOGY [ARO70 Custom] T4, FREE [id number] CPT(R)] TSH 3RD GENERATION [29882 CPT(R)] To-Do List   
 07/02/2018 Imaging:  DEXA BONE DENSITY STUDY AXIAL Referral Information Referral ID Referred By Referred To  
  
 [id number] [address]  [place], [address] [facility] Visits Status Start Date End Date 1 New Request 7/2/18 7/2/19 If your referral has a status of pending review or denied, additional information will be sent to support the outcome of this decision. Introducing [facility]! Dear Ron Snider: 
Thank you for requesting a [facility] account. Our records indicate that you already have an active [employer] account. You can access your account anytime at [email or web address] Did you know that you can access your hospital and ER discharge instructions at any time in [employer]? You can also review all of your test results from your hospital stay or ER visit. Additional Information If you have questions, please visit the Frequently Asked Questions section of the [employer] website at [email or web address] Remember, [employer] is NOT to be used for urgent needs. For medical emergencies, dial 911. Now available from your iPhone and Android! Please provide this summary of care documentation to your next provider. Your primary care clinician is listed as Iban Chandra. If you have any questions after today's visit, please call [phone].
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
## 2018-07-03 NOTE — PATIENT INSTRUCTIONS
Medicare Part B Preventive Services Guidelines/Limitations Date last completed and Frequency Due Date   Bone Mass Measurement  (age 72 & older, biennial) Requires diagnosis related to osteoporosis or estrogen deficiency. Biennial benefit unless patient has history of long-term glucocorticoid tx or baseline is needed because initial test was by other method Completed greater than 5 years ago    Recommended every 2 years As recommended by your PCP or Specialist     Cardiovascular Screening Blood Tests (every 5 years)  Total cholesterol, HDL, Triglycerides Order as a panel if possible As recommended by your PCP or Specialist    As recommended by your PCP As recommended by your PCP or Specialist   Colorectal Cancer Screening  -Fecal occult blood test (annual)  -Flexible sigmoidoscopy (5y)  -Screening colonoscopy (10y)  -Barium Enema Age 49-80; After age [de-identified] if history of abnormal results Completed 11/24/2009     Recommended every 5 to 10 years  As recommended by your PCP or Specialist     Counseling to Prevent Tobacco Use (up to 8 sessions per year)  - Counseling greater than 3 and up to 10 minutes  - Counseling greater than 10 minutes Patients must be asymptomatic of tobacco-related conditions to receive as preventive service N/A N/A   Diabetes Screening Tests (at least every 3 years, Medicare covers annually or at 6-month intervals for prediabetic patients)    Fasting blood sugar (FBS) or glucose tolerance test (GTT) Patient must be diagnosed with one of the following:  -Hypertension, Dyslipidemia, obesity, previous impaired FBS or GTT  Or any two of the following: overweight, FH of diabetes, age ? 72, history of gestational diabetes, birth of baby weighing more than 9 pounds Completed 4/2018    Recommended every 3 years for non-diabetics     As recommended by your PCP or Specialist     Glaucoma Screening (no USPSTF recommendation) Diabetes mellitus, family history, , age 48 or over,  American, age 72 or over Completed a few years ago    Recommended annually As recommended by your PCP or Specialist   Seasonal Influenza Vaccination (annually)  Completed 10/2017    Recommended Annually Due Fall 2018   TDAP Vaccination  As recommended by your PCP or Specialist    Recommended every 10 years As recommended by your PCP or Specialist   Zoster (Shingles) Vaccination Covered by Medicare Part D through the pharmacy- PCP provides prescription As recommended by your PCP or Specialist    Recommended once over age 48  As recommended by your PCP or Specialist   Pneumococcal Vaccination (once after 72)  Pneumo 23- 7/2014  Recommended once over the age of 72    Prevnar 15- 2/2018 Recommended once over the age of 72 Complete        Complete   Screening Mammography (biennial age 54-69) Annually (age 36 or over) Completed 5/2018   As recommended by your PCP or Specialist     Screening Pap Tests and Pelvic Examination (up to age 79 and after 79 if unknown history or abnormal study last 8 years) Every 25 months except high risk As recommended by your PCP or Specialist   As recommended by your PCP or Specialist     Ultrasound Screening for Abdominal Aortic Aneurysm (AAA) (once) Patient must be referred through IPPE and not have had a screening for abdominal aortic aneurysm before under Medicare. Limited to patients who meet one of the following criteria:  - Men who are 73-68 years old and have smoked more than 100 cigarettes in their lifetime.  -Anyone with a FH of AAA  -Anyone recommended for screening by USPSTF Not indicated unless recommended by PCP   Not indicated unless recommended by PCP     Family Practice Management 2011    Please bring a copy of your completed advance medical directive to the office so it may be added to your medical record. Thank you. If you have any questions or concerns please feel free to contact me at [phone].   It was a pleasure meeting you today and participating in your healthcare. Jamal Freed RN        Medicare Wellness Visit, Female    The best way to live healthy is to have a lifestyle where you eat a well-balanced diet, exercise regularly, limit alcohol use, and quit all forms of tobacco/nicotine, if applicable. Regular preventive services are another way to keep healthy. Preventive services (vaccines, screening tests, monitoring & exams) can help personalize your care plan, which helps you manage your own care. Screening tests can find health problems at the earliest stages, when they are easiest to treat. [address] [facility] follows the current, evidence-based guidelines published by the [facility] ([facility]) when recommending preventive services for our patients. Because we follow these guidelines, sometimes recommendations change over time as research supports it. (For example, mammograms used to be recommended annually. Even though Medicare will still pay for an annual mammogram, the newer guidelines recommend a mammogram every two years for women of average risk.)    Of course, you and your provider may decide to screen more often for some diseases, based on your risk and co-morbidities (chronic disease you are already diagnosed with). Preventive services for you include:    - Medicare offers their members a free annual wellness visit, which is time for you and your primary care provider to discuss and plan for your preventive service needs. Take advantage of this benefit every year!    -All people over age 72 should receive the recommended pneumonia vaccines. Current USPSTF guidelines recommend a series of two vaccines for the best pneumonia protection.     -All adults should have a yearly flu vaccine and a tetanus vaccine every 10 years. All adults age 61 years should receive a shingles vaccine once in their lifetime.      -A bone mass density test is recommended when a woman turns 65 to screen for osteoporosis. This test is only recommended once as a screening. Some providers will use this same test as a disease monitoring tool if you already have osteoporosis. -All adults age 38-68 years who are overweight should have a diabetes screening test once every three years.     -Other screening tests & preventive services for persons with diabetes include: an eye exam to screen for diabetic retinopathy, a kidney function test, a foot exam, and stricter control over your cholesterol.     -Cardiovascular screening for adults with routine risk involves an electrocardiogram (ECG) at intervals determined by the provider.     -Colorectal cancer screenings should be done for adults age 54-65 years with normal risk. There are a number of acceptable methods of screening for this type of cancer. Each test has its own benefits and drawbacks. Discuss with your provider what is most appropriate for you during your annual wellness visit. The different tests include: colonoscopy (considered the best screening method), a fecal occult blood test, a fecal DNA test, and sigmoidoscopy. -Breast cancer screenings are recommended every other year for women of normal risk age 54-69 years.     -Cervical cancer screenings for women over age 72 are only recommended with certain risk factors.     -All adults born between [facility] should be screened once for Hepatitis C.      Here is a list of your current Health Maintenance items (your personalized list of preventive services) with a due date:  Health Maintenance Due   Topic Date Due    Hepatitis C Test  1950    DTaP/Tdap/Td  (1 - Tdap) 11/20/1969    Shingles Vaccine  09/20/2008    Colonoscopy  11/24/2012    Glaucoma Screening   11/20/2013    Bone Mineral Density   11/20/2013

## 2018-07-14 LAB
ALBUMIN SERPL-MCNC: 4.1 G/DL (ref 3.6–4.8)
ALBUMIN/GLOB SERPL: 1.7 {RATIO} (ref 1.2–2.2)
ALP SERPL-CCNC: 95 IU/L (ref 39–117)
ALT SERPL-CCNC: 19 IU/L (ref 0–32)
AST SERPL-CCNC: 19 IU/L (ref 0–40)
BASOPHILS # BLD AUTO: 0 X10E3/UL (ref 0–0.2)
BASOPHILS NFR BLD AUTO: 0 %
BILIRUB SERPL-MCNC: 0.6 MG/DL (ref 0–1.2)
BUN SERPL-MCNC: 12 MG/DL (ref 8–27)
BUN/CREAT SERPL: 16 (ref 12–28)
CALCIUM SERPL-MCNC: 9.6 MG/DL (ref 8.7–10.3)
CHLORIDE SERPL-SCNC: 104 MMOL/L (ref 96–106)
CHOLEST SERPL-MCNC: 167 MG/DL (ref 100–199)
CO2 SERPL-SCNC: 25 MMOL/L (ref 20–29)
CREAT SERPL-MCNC: 0.77 MG/DL (ref 0.57–1)
EOSINOPHIL # BLD AUTO: 0.5 X10E3/UL (ref 0–0.4)
EOSINOPHIL NFR BLD AUTO: 7 %
ERYTHROCYTE [DISTWIDTH] IN BLOOD BY AUTOMATED COUNT: 13.2 % (ref 12.3–15.4)
GLOBULIN SER CALC-MCNC: 2.4 G/DL (ref 1.5–4.5)
GLUCOSE SERPL-MCNC: 107 MG/DL (ref 65–99)
HCT VFR BLD AUTO: 42.9 % (ref 34–46.6)
HCV AB S/CO SERPL IA: <0.1 S/CO RATIO (ref 0–0.9)
HCV AB SERPL QL IA: NORMAL
HDLC SERPL-MCNC: 29 MG/DL
HGB BLD-MCNC: 14.6 G/DL (ref 11.1–15.9)
IMM GRANULOCYTES # BLD: 0 X10E3/UL (ref 0–0.1)
IMM GRANULOCYTES NFR BLD: 0 %
INTERPRETATION, 910389: NORMAL
LDLC SERPL CALC-MCNC: 115 MG/DL (ref 0–99)
LYMPHOCYTES # BLD AUTO: 2.3 X10E3/UL (ref 0.7–3.1)
LYMPHOCYTES NFR BLD AUTO: 31 %
MCH RBC QN AUTO: 32.7 PG (ref 26.6–33)
MCHC RBC AUTO-ENTMCNC: 34 G/DL (ref 31.5–35.7)
MCV RBC AUTO: 96 FL (ref 79–97)
MONOCYTES # BLD AUTO: 0.4 X10E3/UL (ref 0.1–0.9)
MONOCYTES NFR BLD AUTO: 6 %
NEUTROPHILS # BLD AUTO: 4.2 X10E3/UL (ref 1.4–7)
NEUTROPHILS NFR BLD AUTO: 56 %
PLATELET # BLD AUTO: 245 X10E3/UL (ref 150–379)
POTASSIUM SERPL-SCNC: 4.4 MMOL/L (ref 3.5–5.2)
PROT SERPL-MCNC: 6.5 G/DL (ref 6–8.5)
RBC # BLD AUTO: 4.46 X10E6/UL (ref 3.77–5.28)
SODIUM SERPL-SCNC: 142 MMOL/L (ref 134–144)
T4 FREE SERPL-MCNC: 1.19 NG/DL (ref 0.82–1.77)
TRIGL SERPL-MCNC: 115 MG/DL (ref 0–149)
TSH SERPL DL<=0.005 MIU/L-ACNC: 8.85 UIU/ML (ref 0.45–4.5)
VLDLC SERPL CALC-MCNC: 23 MG/DL (ref 5–40)
WBC # BLD AUTO: 7.4 X10E3/UL (ref 3.4–10.8)

## 2018-07-15 DIAGNOSIS — E03.9 ACQUIRED HYPOTHYROIDISM: ICD-10-CM

## 2018-07-15 RX ORDER — LEVOTHYROXINE SODIUM 125 UG/1
125 TABLET ORAL
Qty: 30 TAB | Refills: 3 | Status: SHIPPED | OUTPATIENT
Start: 2018-07-15 | End: 2018-10-29 | Stop reason: SDUPTHER

## 2018-07-24 ENCOUNTER — OFFICE VISIT (OUTPATIENT)
Dept: INTERNAL MEDICINE CLINIC | Age: 68
End: 2018-07-24

## 2018-07-24 DIAGNOSIS — F43.23 ADJUSTMENT DISORDER WITH MIXED ANXIETY AND DEPRESSED MOOD: Primary | ICD-10-CM

## 2018-07-24 NOTE — PROGRESS NOTES
Behavioral Health Progress Note    This patient was seen by the [facility] from 11am to 11:45am for a total of 45 minutes. Psychotropic medication changes: None    Diagnosis: Adjustment Disorder with Anxiety and Depressed Mood    Description of session/progress/interventions: Mariann Kumar reported that she was feeling much better, that she was not crying nearly as often and that she is making some very slow progress on sorting through the boxes in her apartment. She still has concerns about lacking motivation, having difficulty prioritizing and making decisions, and trouble starting and finishing tasks in a timely manner (paying bills). She is worried that she will not return to her previous level of functioning and does not know if this is due to grief, post-concussion syndrome, or the beginning of dementia (her biggest fear, as her mother has dementia). She has made an appt with a neuropsychologist, as suggested, for assessment of these concerns. She has had more connections with friends (mostly over the phone) and still helps to care for her mother and goes out with her sister. She reports sleeping well. Assessment/Plan: Mood is improving some as is her interest in doing things but she still struggles in other areas. We discussed simple strategies to help her stay focused and on task. She does not want to follow-up with a grief support group at this time. To return in about a month.

## 2018-08-15 ENCOUNTER — TELEPHONE (OUTPATIENT)
Dept: INTERNAL MEDICINE CLINIC | Age: 68
End: 2018-08-15

## 2018-08-15 NOTE — TELEPHONE ENCOUNTER
I spoke with pt's pharmacy, per the pharmacy tech as of now only [address] [facility] is being recalled which is [facility]'t use that company for the medication, they use [employer]. As of now, Josetahmina Fields is not being recalled. Pt was informed and was thankful for the call.

## 2018-08-15 NOTE — TELEPHONE ENCOUNTER
Patient needs to speak with nurse regarding the recall on her levothyroxine (SYNTHROID) 125 mcg tablet.  Was just told on the news this am. Her no is [phone]

## 2018-08-28 ENCOUNTER — OFFICE VISIT (OUTPATIENT)
Dept: INTERNAL MEDICINE CLINIC | Age: 68
End: 2018-08-28

## 2018-08-28 DIAGNOSIS — F43.23 ADJUSTMENT DISORDER WITH MIXED ANXIETY AND DEPRESSED MOOD: Primary | ICD-10-CM

## 2018-08-28 NOTE — PROGRESS NOTES
Behavioral Health Progress Note    This patient was seen by the [facility] from 11am to 11:45am for a total of 45 minutes. Psychotropic medication changes: None    Diagnosis: Adjustment disorder with anxiety and depressed mood    Description of session/progress/interventions: Mauricio Cline reported that she was feeling a bit better regarding her grief. She did become tearful during the session as she talked about how Mayelin Bal was starting to deteriorate more rapidly this time last year and that the next 5 months will bring back a lot of hard memories. She has continued to stay pretty motivated (going through more boxes) and her mood is generally good. She is still having trouble with feeling like her mind is clouded or not as sharp as she used to be and is looking forward to getting the psych testing done to help give her more information about her cognitive functioning. She stated that she plans to begin going to the exercise room in her apartment building. She was interested in looking into [facility] and was given information about groups near her. Assessment/Plan: Mood improved; still anxious about cognitive changes. She will plan on returning in about a month. [facility] will coordinate care with PCP.

## 2018-09-13 RX ORDER — DICLOFENAC POTASSIUM 50 MG/1
50 TABLET, FILM COATED ORAL 3 TIMES DAILY
COMMUNITY
End: 2019-01-28

## 2018-09-13 NOTE — PERIOP NOTES
[address]  Endoscopy Preprocedure Instructions      1. On the day of your surgery, please report to registration located on the 2nd floor of the  [facility]. yes    2. You must have a responsible adult to drive you to the hospital, stay at the hospital during your procedure and drive you home. If they leave your procedure will not be started (no exceptions). yes    3. Do not have anything to eat or drink (including water, gum, mints, coffee, and juice) after midnight. This does not apply to the medications you were instructed to take by your physician. yesIf you are currently taking Plavix, Coumadin, Aspirin, or other blood-thinning agents, contact your physician for special instructions. no,    4. If you are having a procedure that requires bowel prep: We recommend that you have only clear liquids the day before your procedure and begin your bowel prep by 5:00 pm.  You may continue to drink clear liquids until midnight. If for any reason you are not able to complete your prep please notify your physician immediately. yes    5. Have a list of all current medications, including vitamins, herbal supplements and any other over the counter medications. yes    6. If you wear glasses, contacts, dentures and/or hearing aids, they may be removed prior to procedure, please bring a case to store them in. yes    7. You should understand that if you do not follow these instructions your procedure may be cancelled. If your physical condition changes (I.e. fever, cold or flu) please contact your doctor as soon as possible. 8. It is important that you be on time.   If for any reason you are unable to keep your appointment please call )- the day of or your physicians office prior to your scheduled procedure
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
## 2018-09-19 ENCOUNTER — HOSPITAL ENCOUNTER (OUTPATIENT)
Age: 68
Setting detail: OUTPATIENT SURGERY
Discharge: HOME | End: 2018-09-19
Attending: INTERNAL MEDICINE | Admitting: INTERNAL MEDICINE
Payer: MEDICARE

## 2018-09-19 ENCOUNTER — ANESTHESIA EVENT (OUTPATIENT)
Dept: ENDOSCOPY | Age: 68
End: 2018-09-19
Payer: MEDICARE

## 2018-09-19 ENCOUNTER — ANESTHESIA (OUTPATIENT)
Dept: ENDOSCOPY | Age: 68
End: 2018-09-19
Payer: MEDICARE

## 2018-09-19 VITALS
HEIGHT: 64 IN | DIASTOLIC BLOOD PRESSURE: 63 MMHG | TEMPERATURE: 97.5 F | WEIGHT: 240 LBS | RESPIRATION RATE: 18 BRPM | BODY MASS INDEX: 40.97 KG/M2 | HEART RATE: 72 BPM | OXYGEN SATURATION: 97 % | SYSTOLIC BLOOD PRESSURE: 132 MMHG

## 2018-09-19 PROCEDURE — 76040000019: Performed by: INTERNAL MEDICINE

## 2018-09-19 PROCEDURE — 76060000031 HC ANESTHESIA FIRST 0.5 HR: Performed by: INTERNAL MEDICINE

## 2018-09-19 PROCEDURE — 74011250636 HC RX REV CODE- 250/636

## 2018-09-19 RX ORDER — SODIUM CHLORIDE 9 MG/ML
50 INJECTION, SOLUTION INTRAVENOUS CONTINUOUS
Status: DISCONTINUED | OUTPATIENT
Start: 2018-09-19 | End: 2018-09-19 | Stop reason: HOSPADM

## 2018-09-19 RX ORDER — MIDAZOLAM HYDROCHLORIDE 1 MG/ML
.25-5 INJECTION, SOLUTION INTRAMUSCULAR; INTRAVENOUS
Status: DISCONTINUED | OUTPATIENT
Start: 2018-09-19 | End: 2018-09-19 | Stop reason: HOSPADM

## 2018-09-19 RX ORDER — LIDOCAINE HYDROCHLORIDE 20 MG/ML
INJECTION, SOLUTION EPIDURAL; INFILTRATION; INTRACAUDAL; PERINEURAL AS NEEDED
Status: DISCONTINUED | OUTPATIENT
Start: 2018-09-19 | End: 2018-09-19 | Stop reason: HOSPADM

## 2018-09-19 RX ORDER — SODIUM CHLORIDE 9 MG/ML
INJECTION, SOLUTION INTRAVENOUS
Status: DISCONTINUED | OUTPATIENT
Start: 2018-09-19 | End: 2018-09-19 | Stop reason: HOSPADM

## 2018-09-19 RX ORDER — ATROPINE SULFATE 0.1 MG/ML
0.5 INJECTION INTRAVENOUS
Status: DISCONTINUED | OUTPATIENT
Start: 2018-09-19 | End: 2018-09-19 | Stop reason: HOSPADM

## 2018-09-19 RX ORDER — DEXTROMETHORPHAN/PSEUDOEPHED 2.5-7.5/.8
1.2 DROPS ORAL
Status: DISCONTINUED | OUTPATIENT
Start: 2018-09-19 | End: 2018-09-19 | Stop reason: HOSPADM

## 2018-09-19 RX ORDER — PROPOFOL 10 MG/ML
INJECTION, EMULSION INTRAVENOUS
Status: DISCONTINUED | OUTPATIENT
Start: 2018-09-19 | End: 2018-09-19 | Stop reason: HOSPADM

## 2018-09-19 RX ORDER — FLUMAZENIL 0.1 MG/ML
0.2 INJECTION INTRAVENOUS
Status: DISCONTINUED | OUTPATIENT
Start: 2018-09-19 | End: 2018-09-19 | Stop reason: HOSPADM

## 2018-09-19 RX ORDER — NALOXONE HYDROCHLORIDE 0.4 MG/ML
0.4 INJECTION, SOLUTION INTRAMUSCULAR; INTRAVENOUS; SUBCUTANEOUS
Status: DISCONTINUED | OUTPATIENT
Start: 2018-09-19 | End: 2018-09-19 | Stop reason: HOSPADM

## 2018-09-19 RX ORDER — PROPOFOL 10 MG/ML
INJECTION, EMULSION INTRAVENOUS AS NEEDED
Status: DISCONTINUED | OUTPATIENT
Start: 2018-09-19 | End: 2018-09-19 | Stop reason: HOSPADM

## 2018-09-19 RX ORDER — EPINEPHRINE 0.1 MG/ML
1 INJECTION INTRACARDIAC; INTRAVENOUS
Status: DISCONTINUED | OUTPATIENT
Start: 2018-09-19 | End: 2018-09-19 | Stop reason: HOSPADM

## 2018-09-19 RX ADMIN — SODIUM CHLORIDE: 9 INJECTION, SOLUTION INTRAVENOUS at 08:00

## 2018-09-19 RX ADMIN — PROPOFOL 60 MG: 10 INJECTION, EMULSION INTRAVENOUS at 08:19

## 2018-09-19 RX ADMIN — LIDOCAINE HYDROCHLORIDE 40 MG: 20 INJECTION, SOLUTION EPIDURAL; INFILTRATION; INTRACAUDAL; PERINEURAL at 08:19

## 2018-09-19 RX ADMIN — PROPOFOL: 10 INJECTION, EMULSION INTRAVENOUS at 08:30

## 2018-09-19 RX ADMIN — PROPOFOL 110 MCG/KG/MIN: 10 INJECTION, EMULSION INTRAVENOUS at 08:19

## 2018-09-19 NOTE — PROCEDURES
[address] [facility]  [address], [address] [facility]  [phone]                   Colonoscopy Operative Report      Indications:    Personal history of colon polyps (screening only)     :  Kana Garnett MD    Referring Provider: Doris Nichols MD    Sedation:  MAC anesthesia Propofol    Procedure Details:  After informed consent was obtained with all risks and benefits of procedure explained and preoperative exam completed, the patient was taken to the endoscopy suite and placed in the left lateral decubitus position. Upon sequential sedation as per above, a digital rectal exam was performed  And was normal.  The Olympus videocolonoscope  was inserted in the rectum and carefully advanced to the cecum, which was identified by the ileocecal valve and appendiceal orifice, terminal ileum. The quality of preparation was excellent. The colonoscope was slowly withdrawn with careful evaluation between folds. Retroflexion in the rectum was performed and was normal..     Findings:   Rectum: Grade 1 internal hemorrhoid(s); Sigmoid:     - Diverticulosis  Descending Colon:     - Diverticulosis  Transverse Colon: normal  Ascending Colon: normal  Cecum: normal  Terminal Ileum: normal    Interventions:  none    Specimen Removed:  none    Complications: None. EBL:  None. Recommendations:   -Repeat colonoscopy in 5 years.  -High fiber diet.  -Follow up with primary care physician.     -Resume normal medication(s)     Discharge Disposition:  Home in the company of a  when able to ambulate.     Kana Garnett MD  9/19/2018  8:39 AM

## 2018-09-19 NOTE — ANESTHESIA PREPROCEDURE EVALUATION
Anesthetic History No history of anesthetic complications Review of Systems / Medical History Patient summary reviewed, nursing notes reviewed and pertinent labs reviewed Pulmonary Within defined limits Neuro/Psych Within defined limits Cardiovascular Within defined limits Hypertension Exercise tolerance: >4 METS 
  
GI/Hepatic/Renal 
Within defined limits Endo/Other Within defined limits Hypothyroidism Morbid obesity Other Findings Physical Exam 
 
Airway Mallampati: II 
 
Neck ROM: normal range of motion Mouth opening: Normal 
 
 Cardiovascular Regular rate and rhythm,  S1 and S2 normal,  no murmur, click, rub, or gallop Rhythm: regular Rate: normal 
 
 
 
 Dental 
No notable dental hx Pulmonary Breath sounds clear to auscultation Abdominal 
GI exam deferred Other Findings Anesthetic Plan ASA: 3 Anesthesia type: MAC Induction: Intravenous Anesthetic plan and risks discussed with: Patient

## 2018-09-19 NOTE — IP AVS SNAPSHOT
Yamile Romeo 
 
 
 [address] 
[phone] Patient: Edith Stephens MRN: [id number] CZ About your hospitalization You were admitted on:  2018 You last received care in the:  [facility] ENDOSCOPY You were discharged on:  2018 Why you were hospitalized Your primary diagnosis was:  Not on File Follow-up Information None Your Scheduled Appointments 2018  1:00 PM EDT COUNSELING with Sravan Durbin, CNS [facility] ([facility]) [address] [place] [address] 100[address] [facility]  
[phone] Discharge Orders None A check eleazar indicates which time of day the medication should be taken. My Medications CONTINUE taking these medications Instructions Each Dose to Equal  
 Morning Noon Evening Bedtime  
 diclofenac potassium 50 mg tablet Commonly known as:  CATAFLAM  
   
Your last dose was: Your next dose is: Take 50 mg by mouth three (3) times daily. 50 mg  
    
   
   
   
  
 levothyroxine 125 mcg tablet Commonly known as:  SYNTHROID Your last dose was: Your next dose is: Take 1 Tab by mouth Daily (before breakfast). 125 mcg  
    
   
   
   
  
 metoprolol succinate 50 mg XL tablet Commonly known as:  TOPROL-XL Your last dose was: Your next dose is: Take 1 Tab by mouth daily. 50 mg Discharge Instructions [address] [facility] [address] 
[phone] Edith Stephens [id number] 
1950 COLON DISCHARGE INSTRUCTIONS DISCOMFORT: 
Redness at IV site- apply warm compress to area; if redness or soreness persist- contact your physician There may be a slight amount of blood passed from the rectum Gaseous discomfort- walking, belching will help relieve any discomfort You may not operate a vehicle for 12 hours You may not  engage in an occupation involving machinery or appliances for rest of today You may not  drink alcoholic beverages for at least 12 hours Avoid making any critical decisions for at least 24 hour DIET: 
 High fiber diet.  however -  remember your colon is empty and a heavy meal will produce gas. Avoid these foods:  vegetables, fried / greasy foods, carbonated drinks for today ACTIVITY: It is recommended that you spend the remainder of the day resting -  avoid any strenuous activity. CALL M.D. ANY SIGN OF: Increasing pain, nausea, vomiting Abdominal distension (swelling) New increased bleeding (oral or rectal) Fever (chills) Pain in chest area Bloody discharge from nose or mouth Shortness of breath You may resume your medications Post procedure diagnosis: diverticulosis( left side) and hemorrhoids Follow-up Instructions: 
 
If a specimen was collected, you will receive a letter with the result by mail within two  weeks. Depending on the result this letter will specify your follow up colonoscopy date. Please call us for any questions or concerns Courtney Contreras [id number] 
1950 DISCHARGE SUMMARY from Nurse The following personal items collected during your admission are returned to you:  
Dental Appliance: Dental Appliances: None Vision: Visual Aid: None Hearing Aid:   
Jewelry:   
Clothing:   
Other Valuables:   
Valuables sent to safe:   
 
ACO Transitions of Care [facility] offers a voluntary care coordination program to provide high quality service and care to [facility] fee-for-service beneficiaries.   
 
Imani Celeste was designed to help you enhance your health and well-being through the following services: ? Transitions of Care  support for individuals who are transitioning from one care setting to another (example: Hospital to home). ? Chronic and Complex Care Coordination  support for individuals and caregivers of those with serious or chronic illnesses or with more than one chronic (ongoing) condition and those who take a number of different medications. If you meet specific medical criteria, a [facility] [address] may call you directly to coordinate your care with your primary care physician and your other care providers. For questions about the [facility] programs, please, contact your physicians office. For general questions or additional information about Accountable Care Organizations: 
Please visit www.medicare.gov/acos. html or call 1-800-MEDICARE (8-900.552.3574) TTY users should call 3-986.622.3357. Introducing [facility] & HEALTH SERVICES! Dear Fan Ortega: 
Thank you for requesting a Folloyu account. Our records indicate that you already have an active Folloyu account. You can access your account anytime at [email or web address] Did you know that you can access your hospital and ER discharge instructions at any time in Folloyu? You can also review all of your test results from your hospital stay or ER visit. Additional Information If you have questions, please visit the Frequently Asked Questions section of the Folloyu website at [email or web address] Remember, Folloyu is NOT to be used for urgent needs. For medical emergencies, dial 911. Now available from your iPhone and Android! Introducing Zane Maddox As a [employer] patient, I wanted to make you aware of our electronic visit tool called Zane Maddox. [employer] 24/7 allows you to connect within minutes with a medical provider 24 hours a day, seven days a week via a mobile device or tablet or logging into a secure website from your computer. You can access Broadcast.mobi from anywhere in the [place]. A virtual visit might be right for you when you have a simple condition and feel like you just dont want to get out of bed, or cant get away from work for an appointment, when your regular [facility] provider is not available (evenings, weekends or holidays), or when youre out of town and need minor care. Electronic visits cost only $49 and if the JoséPENRITH 24/24h00 provider determines a prescription is needed to treat your condition, one can be electronically transmitted to a nearby pharmacy*. Please take a moment to enroll today if you have not already done so. The enrollment process is free and takes just a few minutes. To enroll, please download the ShopAdvisor christa to your tablet or phone, or visit [email or web address] to enroll on your computer. And, as an [address] [place] [address] patient with a Newslines account, the results of your visits will be scanned into your electronic medical record and your primary care provider will be able to view the scanned results. We urge you to continue to see your regular [facility] provider for your ongoing medical care. And while your primary care provider may not be the one available when you seek a Zane Sqor Sportslalitfin virtual visit, the peace of mind you get from getting a real diagnosis real time can be priceless. For more information on Halobandlalitfin, view our Frequently Asked Questions (FAQs) at [email or web address]. Sincerely, 
 
Hema Kraft MD 
Chief Medical Officer Mamadou Castillo *:  certain medications cannot be prescribed via Halobandnifin Providers Seen During Your Hospitalization Provider Specialty Primary office phone Mandy Damian MD Gastroenterology [phone] Your Primary Care Physician (PCP) Primary Care Physician Office Phone Office Fax Beverly Dior [phone] You are allergic to the following Allergen Reactions Sulfa (Sulfonamide Antibiotics) Hives Not allergic at all. Vickie Barrio Vickie Barrio \"yeast infection\" Recent Documentation Height Weight BMI OB Status Smoking Status 1.626 m 108.9 kg 41.2 kg/m2 Postmenopausal Former Smoker Emergency Contacts Name Discharge Info Relation Home Work Mobile [facility] ALLIANCE DISCHARGE CAREGIVER [3] Sister [23] [phone] Cici Zhao CAREGIVER [3] Daughter [21] [phone] Patient Belongings The following personal items are in your possession at time of discharge: 
  Dental Appliances: None  Visual Aid: None Please provide this summary of care documentation to your next provider. Signatures-by signing, you are acknowledging that this After Visit Summary has been reviewed with you and you have received a copy. Patient Signature:  ____________________________________________________________ Date:  ____________________________________________________________  
  
[facility] Provider Signature:  ____________________________________________________________ Date:  ____________________________________________________________

## 2018-09-19 NOTE — ROUTINE PROCESS
Noemi Mcburney  1950  [id number]    Situation:  Verbal report received from: Graham kirby  Procedure: Procedure(s):  COLONOSCOPY    Background:    Preoperative diagnosis: SCREENING   Postoperative diagnosis: Diverticulosis    :  Dr. Gala Portillo  Assistant(s): Endoscopy Technician-1: Poornima Dukes  Endoscopy RN-1: Nathalia Osborn RN    Specimens: * No specimens in log *  H. Pylori  no    Assessment:  Intra-procedure medications     Anesthesia gave intra-procedure sedation and medications, see anesthesia flow sheet yes    Intravenous fluids: NS@ KVO     Vital signs stable     Abdominal assessment: round and soft     Recommendation:  Discharge patient per MD order.   Family or Friend   Permission to share finding with family or friend yes

## 2018-09-19 NOTE — DISCHARGE INSTRUCTIONS
[address] [facility] [address]  [address] [facility], [address] [facility]  [phone]                   Noemi Montgomerympestela  [id number]  1950    COLON DISCHARGE INSTRUCTIONS    DISCOMFORT:  Redness at IV site- apply warm compress to area; if redness or soreness persist- contact your physician  There may be a slight amount of blood passed from the rectum  Gaseous discomfort- walking, belching will help relieve any discomfort  You may not operate a vehicle for 12 hours  You may not  engage in an occupation involving machinery or appliances for rest of today  You may not  drink alcoholic beverages for at least 12 hours  Avoid making any critical decisions for at least 24 hour    DIET:   High fiber diet. - however -  remember your colon is empty and a heavy meal will produce gas. Avoid these foods:  vegetables, fried / greasy foods, carbonated drinks for today     ACTIVITY:  It is recommended that you spend the remainder of the day resting -  avoid any strenuous activity. CALL M.D. ANY SIGN OF:   Increasing pain, nausea, vomiting  Abdominal distension (swelling)  New increased bleeding (oral or rectal)  Fever (chills)  Pain in chest area  Bloody discharge from nose or mouth  Shortness of breath    You may resume your medications    Post procedure diagnosis: diverticulosis( left side) and hemorrhoids    Follow-up Instructions:    If a specimen was collected, you will receive a letter with the result by mail within two  weeks. Depending on the result this letter will specify your follow up colonoscopy date.       Please call us for any questions or concerns                     Noemi Mcclarence  [id number]  1950        DISCHARGE SUMMARY from Nurse    The following personal items collected during your admission are returned to you:   Dental Appliance: Dental Appliances: None  Vision: Visual Aid: None  Hearing Aid:    Jewelry:    Clothing:    Other Valuables:    Valuables sent to safe:

## 2018-09-19 NOTE — PERIOP NOTES
Patient tolerated procedure without problems. Abdomen soft and patient arousable and voices no complaints Report received from CRNA, see anesthesia note. Patient transported to endoscopy recovery area.   Endoscope was pre-cleaned at bedside immediately following procedure by Juana Oakes

## 2018-09-25 ENCOUNTER — OFFICE VISIT (OUTPATIENT)
Dept: INTERNAL MEDICINE CLINIC | Age: 68
End: 2018-09-25

## 2018-09-25 DIAGNOSIS — F43.23 ADJUSTMENT DISORDER WITH MIXED ANXIETY AND DEPRESSED MOOD: Primary | ICD-10-CM

## 2018-09-26 RX ORDER — OMEPRAZOLE 20 MG/1
20 CAPSULE, DELAYED RELEASE ORAL DAILY
Qty: 30 CAP | Refills: 3 | Status: SHIPPED | OUTPATIENT
Start: 2018-09-26 | End: 2018-12-26 | Stop reason: SDUPTHER

## 2018-10-01 ENCOUNTER — HOSPITAL ENCOUNTER (OUTPATIENT)
Dept: MAMMOGRAPHY | Age: 68
Discharge: HOME | End: 2018-10-01
Attending: INTERNAL MEDICINE
Payer: MEDICARE

## 2018-10-01 DIAGNOSIS — Z78.0 ASYMPTOMATIC MENOPAUSAL STATE: ICD-10-CM

## 2018-10-01 PROCEDURE — 77080 DXA BONE DENSITY AXIAL: CPT

## 2018-10-16 ENCOUNTER — HOSPITAL ENCOUNTER (OUTPATIENT)
Dept: LAB | Age: 68
Discharge: HOME | End: 2018-10-16
Payer: MEDICARE

## 2018-10-16 ENCOUNTER — OFFICE VISIT (OUTPATIENT)
Dept: INTERNAL MEDICINE CLINIC | Age: 68
End: 2018-10-16

## 2018-10-16 VITALS
WEIGHT: 249 LBS | TEMPERATURE: 98.4 F | DIASTOLIC BLOOD PRESSURE: 70 MMHG | HEIGHT: 64 IN | BODY MASS INDEX: 42.51 KG/M2 | OXYGEN SATURATION: 95 % | RESPIRATION RATE: 18 BRPM | SYSTOLIC BLOOD PRESSURE: 128 MMHG | HEART RATE: 72 BPM

## 2018-10-16 DIAGNOSIS — Z23 ENCOUNTER FOR IMMUNIZATION: ICD-10-CM

## 2018-10-16 DIAGNOSIS — M85.831 OSTEOPENIA OF RIGHT FOREARM: ICD-10-CM

## 2018-10-16 DIAGNOSIS — E03.9 ACQUIRED HYPOTHYROIDISM: Primary | ICD-10-CM

## 2018-10-16 DIAGNOSIS — F40.243 FEAR OF FLYING: ICD-10-CM

## 2018-10-16 DIAGNOSIS — I10 BENIGN ESSENTIAL HTN: ICD-10-CM

## 2018-10-16 PROBLEM — M85.80 OSTEOPENIA: Status: ACTIVE | Noted: 2018-10-01

## 2018-10-16 PROCEDURE — 84443 ASSAY THYROID STIM HORMONE: CPT

## 2018-10-16 PROCEDURE — 84439 ASSAY OF FREE THYROXINE: CPT

## 2018-10-16 PROCEDURE — 36415 COLL VENOUS BLD VENIPUNCTURE: CPT

## 2018-10-16 RX ORDER — ALPRAZOLAM 0.25 MG/1
0.25 TABLET ORAL
Qty: 12 TAB | Refills: 0 | Status: SHIPPED | OUTPATIENT
Start: 2018-10-16 | End: 2022-01-14 | Stop reason: ALTCHOICE
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
## 2018-10-16 NOTE — PROGRESS NOTES
HISTORY OF PRESENT ILLNESS Chief Complaint Patient presents with  Thyroid Problem Presents for follow-up Reports mild sore throat and cough. She sold her  's car yesterday and this was sad for her to do. Was tearful last night. Is caring for her mother with dementia, along with helping her sister with this. Feels somewhat depressed, but she is doing somewhat better. Declines taking any medicines for depression and anxiety. Has neuropsych testing scheduled in 2 weeks. She is still concerned about if she has early dementia, but admits memory and thoughts are improving some since her head injury 2017 Seeing Kar Resendez for counseling and is grateful. Hypothyroidism follow-up Reports fatigue 
denies heat/cold intolerance, bowel/skin changes or CVS symptoms, losing hair, feeling excessive energy, tremulousness, palpitations. Thyroid medication has been increased since last medication check and labs. Lab Results Component Value Date/Time TSH 8.850 (H) 2018 09:27 AM  
 T4, Free 1.19 2018 09:27 AM  
 
Wt Readings from Last 3 Encounters:  
10/16/18 249 lb (112.9 kg) 18 240 lb (108.9 kg) 18 248 lb (112.5 kg) Hypertension Hypertension ROS: taking medications as instructed, no medication side effects noted, no TIA's, no chest pain on exertion, no dyspnea on exertion, no swelling of ankles     reports that she has quit smoking. She has a 5.00 pack-year smoking history. She has never used smokeless tobacco.    reports that she drinks about 1.8 - 2.4 oz of alcohol per week BP Readings from Last 2 Encounters:  
10/16/18 128/70  
18 132/63 Review of Systems All other systems reviewed and are negative, except as noted in HPI Past Medical and Surgical History 
 has a past medical history of Benign essential HTN; Grief reaction; Head injury; History of panic attacks; Hypothyroidism;  Impaired gait; Postconcussion syndrome (02/2018); and Right knee pain. has a past surgical history that includes hx cholecystectomy; hx tonsil and adenoidectomy; hx knee arthroscopy (Right, 1978); hx tubal ligation; hx colonoscopy (11/24/2009); colonoscopy,diagnostic (9/19/2018); and colonoscopy (N/A, 9/19/2018). reports that she has quit smoking. She has a 5.00 pack-year smoking history. She has never used smokeless tobacco. She reports that she drinks about 1.8 - 2.4 oz of alcohol per week  She reports that she does not use illicit drugs. family history is not on file. Physical Exam  
Nursing note and vitals reviewed. Blood pressure 151/78, pulse 72, temperature 98.4 °F (36.9 °C), temperature source Oral, resp. rate 18, height 5' 4\" (1.626 m), weight 249 lb (112.9 kg), SpO2 95 %. Constitutional:  No distress. Eyes: Conjunctivae are normal.  
Ears:  Hearing grossly intact Cardiovascular: Normal rate. regular rhythm, no murmurs or gallops No edema Pulmonary/Chest: Effort normal.   CTAB Musculoskeletal: moves all 4 extremities Neurological: Alert and oriented to person, place, and time. Skin: No rash noted. Psychiatric: Normal mood and affect. Behavior is normal.  
 
ASSESSMENT and PLAN Diagnoses and all orders for this visit: 
 
1. Acquired hypothyroidism Unclear control. On increased dose. Has gained weight, but may be unrelated. -     T4, FREE 
-     TSH 3RD GENERATION 2. Benign essential HTN Controlled on current regimen. Continue current medications as written in chart. 3. Encounter for immunization -     [place] IMMUNIZ ADMIN,1 SINGLE/COMB VAC/TOXOID 
-     Influenza Vaccine Inactivated (IIV) (FLUAD), Subunit, Adjuvanted, IM (36219) 4. Osteopenia of right forearm Reviewed her DEXA from this month. Other sites are normal.  Continue vitamin D. Repeat in a couple of years. 5. Fear of flying -     ALPRAZolam (XANAX) 0.25 mg tablet;  Take 1 Tab by mouth two (2) times daily as needed (for flying). Max Daily Amount: 0.5 mg. 
 
 
 
lab results and schedule of future lab studies reviewed with patient 
reviewed medications and side effects in detail Return to clinic for further evaluation if new symptoms develop Follow-up Disposition: Not on File Current Outpatient Prescriptions Medication Sig  ALPRAZolam (XANAX) 0.25 mg tablet Take 1 Tab by mouth two (2) times daily as needed (for flying). Max Daily Amount: 0.5 mg.  
 omeprazole (PRILOSEC) 20 mg capsule Take 1 Cap by mouth daily.  diclofenac potassium (CATAFLAM) 50 mg tablet Take 50 mg by mouth three (3) times daily.  levothyroxine (SYNTHROID) 125 mcg tablet Take 1 Tab by mouth Daily (before breakfast).  metoprolol succinate (TOPROL-XL) 50 mg XL tablet Take 1 Tab by mouth daily. No current facility-administered medications for this visit.

## 2018-10-17 ENCOUNTER — TELEPHONE (OUTPATIENT)
Dept: INTERNAL MEDICINE CLINIC | Age: 68
End: 2018-10-17

## 2018-10-17 LAB
T4 FREE SERPL-MCNC: 1.73 NG/DL (ref 0.82–1.77)
TSH SERPL DL<=0.005 MIU/L-ACNC: 0.69 UIU/ML (ref 0.45–4.5)

## 2018-10-17 NOTE — TELEPHONE ENCOUNTER
Pt was in yesterday and received flu shot for over 65 and she calling to ask if there are side effects as she has urinated all night long and now has headache.   Please call her at [phone]

## 2018-10-17 NOTE — TELEPHONE ENCOUNTER
Advised pt urinating all night has nothing to do with Flu Vaccine, Headche maybe, advised ttylenol, if frequency continues may need to test Urine, no other symptoms.

## 2018-10-30 ENCOUNTER — OFFICE VISIT (OUTPATIENT)
Dept: INTERNAL MEDICINE CLINIC | Age: 68
End: 2018-10-30

## 2018-10-30 DIAGNOSIS — F43.23 ADJUSTMENT DISORDER WITH MIXED ANXIETY AND DEPRESSED MOOD: Primary | ICD-10-CM

## 2018-11-01 ENCOUNTER — DOCUMENTATION ONLY (OUTPATIENT)
Dept: INTERNAL MEDICINE CLINIC | Age: 68
End: 2018-11-01

## 2018-11-01 ENCOUNTER — OFFICE VISIT (OUTPATIENT)
Dept: INTERNAL MEDICINE CLINIC | Age: 68
End: 2018-11-01

## 2018-11-01 VITALS
HEIGHT: 64 IN | WEIGHT: 247.8 LBS | RESPIRATION RATE: 14 BRPM | HEART RATE: 75 BPM | DIASTOLIC BLOOD PRESSURE: 71 MMHG | TEMPERATURE: 98.3 F | SYSTOLIC BLOOD PRESSURE: 130 MMHG | OXYGEN SATURATION: 96 % | BODY MASS INDEX: 42.3 KG/M2

## 2018-11-01 DIAGNOSIS — H65.02 ACUTE SEROUS OTITIS MEDIA OF LEFT EAR, RECURRENCE NOT SPECIFIED: ICD-10-CM

## 2018-11-01 DIAGNOSIS — R05.9 COUGH: ICD-10-CM

## 2018-11-01 DIAGNOSIS — J01.00 ACUTE MAXILLARY SINUSITIS, RECURRENCE NOT SPECIFIED: Primary | ICD-10-CM

## 2018-11-01 RX ORDER — GUAIFENESIN 600 MG/1
600 TABLET, EXTENDED RELEASE ORAL 2 TIMES DAILY
Qty: 20 TAB | Refills: 0
Start: 2018-11-01 | End: 2018-11-13 | Stop reason: ALTCHOICE

## 2018-11-01 RX ORDER — AZITHROMYCIN 250 MG/1
250 TABLET, FILM COATED ORAL SEE ADMIN INSTRUCTIONS
Qty: 6 TAB | Refills: 0 | Status: SHIPPED | OUTPATIENT
Start: 2018-11-01 | End: 2018-11-06

## 2018-11-01 RX ORDER — BENZONATATE 200 MG/1
200 CAPSULE ORAL
Qty: 30 CAP | Refills: 0 | Status: SHIPPED | OUTPATIENT
Start: 2018-11-01 | End: 2018-11-08

## 2018-11-01 NOTE — PATIENT INSTRUCTIONS
Middle Ear Fluid: Care Instructions  Your Care Instructions    Fluid often builds up inside the ear during a cold or allergies. Usually the fluid drains away, but sometimes a small tube in the ear, called the eustachian tube, stays blocked for months. Symptoms of fluid buildup may include:  · Popping, ringing, or a feeling of fullness or pressure in the ear. · Trouble hearing. · Balance problems and dizziness. In most cases, you can treat yourself at home. Follow-up care is a key part of your treatment and safety. Be sure to make and go to all appointments, and call your doctor if you are having problems. It's also a good idea to know your test results and keep a list of the medicines you take. How can you care for yourself at home? · In most cases, the fluid clears up within a few months without treatment. You may need more tests if the fluid does not clear up after 3 months. · If your doctor prescribed antibiotics, take them as directed. Do not stop taking them just because you feel better. You need to take the full course of antibiotics. When should you call for help? Call your doctor now or seek immediate medical care if:    · You have symptoms of infection, such as:  ? Increased pain, swelling, warmth, or redness. ? Pus draining from the area. ? A fever.    Watch closely for changes in your health, and be sure to contact your doctor if:    · You notice changes in hearing.     · You do not get better as expected. Where can you learn more? Go to http://alexus-kunal.info/. Enter C129 in the search box to learn more about \"Middle Ear Fluid: Care Instructions. \"  Current as of: March 28, 2018  Content Version: 11.8  © 1521-7849 [employer]. Care instructions adapted under license by [employer] (which disclaims liability or warranty for this information).  If you have questions about a medical condition or this instruction, always ask your healthcare prince. [address] any warranty or liability for your use of this information. Sinusitis: Care Instructions  Your Care Instructions    Sinusitis is an infection of the lining of the sinus cavities in your head. Sinusitis often follows a cold. It causes pain and pressure in your head and face. In most cases, sinusitis gets better on its own in 1 to 2 weeks. But some mild symptoms may last for several weeks. Sometimes antibiotics are needed. Follow-up care is a key part of your treatment and safety. Be sure to make and go to all appointments, and call your doctor if you are having problems. It's also a good idea to know your test results and keep a list of the medicines you take. How can you care for yourself at home? · Take an over-the-counter pain medicine, such as acetaminophen (Tylenol), ibuprofen (Advil, Motrin), or naproxen (Aleve). Read and follow all instructions on the label. · If the doctor prescribed antibiotics, take them as directed. Do not stop taking them just because you feel better. You need to take the full course of antibiotics. · Be careful when taking over-the-counter cold or flu medicines and Tylenol at the same time. Many of these medicines have acetaminophen, which is Tylenol. Read the labels to make sure that you are not taking more than the recommended dose. Too much acetaminophen (Tylenol) can be harmful. · Breathe warm, moist air from a steamy shower, a hot bath, or a sink filled with hot water. Avoid cold, dry air. Using a humidifier in your home may help. Follow the directions for cleaning the machine. · Use saline (saltwater) nasal washes to help keep your nasal passages open and wash out mucus and bacteria. You can buy saline nose drops at a grocery store or drugstore. Or you can make your own at home by adding 1 teaspoon of salt and 1 teaspoon of baking soda to 2 cups of distilled water.  If you make your own, fill a bulb syringe with the solution, insert the tip into your nostril, and squeeze gently. [place] your nose. · Put a hot, wet towel or a warm gel pack on your face 3 or 4 times a day for 5 to 10 minutes each time. · Try a decongestant nasal spray like oxymetazoline (Afrin). Do not use it for more than 3 days in a row. Using it for more than 3 days can make your congestion worse. When should you call for help? Call your doctor now or seek immediate medical care if:    · You have new or worse swelling or redness in your face or around your eyes.     · You have a new or higher fever.    Watch closely for changes in your health, and be sure to contact your doctor if:    · You have new or worse facial pain.     · The mucus from your nose becomes thicker (like pus) or has new blood in it.     · You are not getting better as expected. Where can you learn more? Go to http://alexus-kunal.info/. Enter S018 in the search box to learn more about \"Sinusitis: Care Instructions. \"  Current as of: March 28, 2018  Content Version: 11.8  © 4350-7226 [employer]. Care instructions adapted under license by [employer] (which disclaims liability or warranty for this information). If you have questions about a medical condition or this instruction, always ask your healthcare professional. [address] any warranty or liability for your use of this information.

## 2018-11-01 NOTE — PROGRESS NOTES
HISTORY OF PRESENT ILLNESS  Maciej Powell is a 69 y.o. female. HPI  Upper respiratory illness:  Maciej Powell presents with complaints of congestion, sore throat, post nasal drip, dry cough, headache, generalized sinus pain, bilateral ear fullness, hot and cold spells and yellow nasal discharge for 2 weeks. no nausea and no vomiting . she has not had  myalgias. Symptoms are moderate. Over-the-counter remedies including cough drops   has been used with poor relief of symptoms. Drinking plenty of fluids: yes  Asthma?:  no  non-smoker  Contacts with similar infections: yes         Review of Systems   Constitutional: Positive for fever and malaise/fatigue. Negative for chills. HENT: Positive for congestion, ear pain, sinus pain and sore throat. Respiratory: Positive for cough. Negative for sputum production and shortness of breath. Cardiovascular: Negative for chest pain and palpitations. Gastrointestinal: Negative for nausea and vomiting. Genitourinary: Negative for dysuria and frequency. Musculoskeletal: Negative for myalgias. /71 (BP 1 Location: Left arm, BP Patient Position: Sitting)   Pulse 75   Temp 98.3 °F (36.8 °C) (Oral)   Resp 14   Ht 5' 4\" (1.626 m)   Wt 247 lb 12.8 oz (112.4 kg) Comment: per pt  SpO2 96%   BMI 42.53 kg/m²   Physical Exam   Constitutional: She is oriented to person, place, and time. She appears well-developed and well-nourished. HENT:   Head: Normocephalic and atraumatic. Right Ear: External ear normal. Tympanic membrane is injected. A middle ear effusion is present. Left Ear: External ear normal.   Nose: Mucosal edema present. Right sinus exhibits maxillary sinus tenderness. Left sinus exhibits maxillary sinus tenderness. Mouth/Throat: Posterior oropharyngeal erythema present. No posterior oropharyngeal edema. Neck: Normal range of motion. Neck supple. No thyromegaly present. Cardiovascular: Normal rate and regular rhythm. Pulmonary/Chest: Effort normal and breath sounds normal. She has no wheezes. She has no rales. Lymphadenopathy:     She has no cervical adenopathy. Neurological: She is alert and oriented to person, place, and time. Psychiatric: She has a normal mood and affect. Her behavior is normal.   Nursing note and vitals reviewed. ASSESSMENT and PLAN  Diagnoses and all orders for this visit:    1. Acute maxillary sinusitis, recurrence not specified  -     azithromycin (ZITHROMAX) 250 mg tablet; Take 1 Tab by mouth See Admin Instructions for 5 days.  -     guaiFENesin ER (MUCINEX) 600 mg ER tablet; Take 1 Tab by mouth two (2) times a day. 2. Acute serous otitis media of left ear, recurrence not specified  -     azithromycin (ZITHROMAX) 250 mg tablet; Take 1 Tab by mouth See Admin Instructions for 5 days.  -     guaiFENesin ER (MUCINEX) 600 mg ER tablet; Take 1 Tab by mouth two (2) times a day. 3. Cough  -     benzonatate (TESSALON) 200 mg capsule; Take 1 Cap by mouth three (3) times daily as needed for Cough for up to 7 days.       reviewed diet, exercise and weight control  reviewed medications and side effects in detail

## 2018-11-01 NOTE — PROGRESS NOTES
Pt states pharmacy hasn't received her medications yet, verbal orders have been sent to the pharmacy.

## 2018-11-13 ENCOUNTER — OFFICE VISIT (OUTPATIENT)
Dept: OBGYN CLINIC | Age: 68
End: 2018-11-13

## 2018-11-13 VITALS — BODY MASS INDEX: 42.51 KG/M2 | HEIGHT: 64 IN | WEIGHT: 249 LBS

## 2018-11-13 DIAGNOSIS — Z12.4 ROUTINE CERVICAL SMEAR: ICD-10-CM

## 2018-11-13 DIAGNOSIS — Z01.419 ENCOUNTER FOR GYNECOLOGICAL EXAMINATION (GENERAL) (ROUTINE) WITHOUT ABNORMAL FINDINGS: Primary | ICD-10-CM
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
## 2018-11-13 NOTE — PATIENT INSTRUCTIONS

## 2018-11-15 LAB
CYTOLOGIST CVX/VAG CYTO: ABNORMAL
CYTOLOGY CVX/VAG DOC THIN PREP: ABNORMAL
CYTOLOGY HISTORY:: ABNORMAL
DX ICD CODE: ABNORMAL
DX ICD CODE: ABNORMAL
LABCORP, 190119: ABNORMAL
Lab: ABNORMAL
OTHER STN SPEC: ABNORMAL
PATH REPORT.FINAL DX SPEC: ABNORMAL
PATHOLOGIST CVX/VAG CYTO: ABNORMAL
STAT OF ADQ CVX/VAG CYTO-IMP: ABNORMAL

## 2018-11-16 NOTE — PROGRESS NOTES
Patient advised of MD reviewed labs and recommendations and verbalized understanding. Patient placed on the schedule to be seen for the colposcopy on 11/26/18 at 2:20PM    Patient states she is having to take xanax to fly on airplane. Patient states she can get xanax from pcp to take prior to the procedure. Patient advised to go to our website for additional information regarding the procedure. Patient verbalized understanding.

## 2018-11-21 LAB
HPV I/H RISK 1 DNA CVX QL PROBE+SIG AMP: NEGATIVE
SPECIMEN STATUS REPORT, ROLRST: NORMAL

## 2018-11-26 ENCOUNTER — OFFICE VISIT (OUTPATIENT)
Dept: OBGYN CLINIC | Age: 68
End: 2018-11-26

## 2018-11-26 ENCOUNTER — HOSPITAL ENCOUNTER (OUTPATIENT)
Dept: LAB | Age: 68
Discharge: HOME | End: 2018-11-26

## 2018-11-26 VITALS — WEIGHT: 252.4 LBS | HEIGHT: 64 IN | BODY MASS INDEX: 43.09 KG/M2

## 2018-11-26 DIAGNOSIS — R87.610 ATYPICAL SQUAMOUS CELLS OF UNDETERMINED SIGNIFICANCE (ASCUS) ON PAPANICOLAOU SMEAR OF CERVIX: Primary | ICD-10-CM

## 2018-11-26 NOTE — PROGRESS NOTES
[facility]UL[facility]  OFFICE PROCEDURE PROGRESS NOTE        Chart reviewed for the following:   Ana Luisa Max LPN, have reviewed the History, Physical and updated the Allergic reactions for [address] performed immediately prior to start of procedure:   Shari RECIO LPN, have performed the following reviews on Vickie Murry prior to the start of the procedure:            * Patient was identified by name and date of birth   * Agreement on procedure being performed was verified  * Risks and Benefits explained to the patient  * Procedure site verified and marked as necessary  * Patient was positioned for comfort  * Consent was signed and verified     Time: 2:29 PM        Date of procedure: 11/26/2018    Procedure performed by:  Elina Aranda MD    How tolerated by patient: tolerated the procedure well with no complications    Post Procedural Pain Scale: 2 - Hurts Little Bit    Comments: none    Procedure Note: Colposcopy    Vickie Murry is a No obstetric history on file. ,  77 y.o. female [facility] in [place] whose No LMP recorded. Patient is postmenopausal.  was on . She presents for colposcopy for evaluation of a cervical abnormality with a pap smear abnormality consisting of ASCUS, can't rule out HGSIL. HPV is negative. After being presented with the risks, benefits and alternatives has she signed a consent for the procedure. She states that she understands the need for the procedure and has no further questions. She was informed that she may experience discomfort. Procedure:  She was positioned in the dorsal lithotomy position and a speculum was inserted into the vagina. Dilute acetic acid was applied to the cervix. The colposcope was used to visualize the cervix. Procedure: The transformation zone was completely visualized. Findings: This colposcopy was satisfactory. Biopsies were taken from the cervix in all 4 quadrants. Monsels was applied to the cervix. Endocervical currettage: An endocervical curettage was performed. Findings:The procedure was notable for normal epithelium on the cervix. Post Procedure Status: The patient tolerated the procedure well with minimal discomfort.      A: ASCUS cannot r/o HG, HPV neg  P: needs LEEP in OR if indicated - very anxious and does not tolerate procedure well

## 2018-11-26 NOTE — PATIENT INSTRUCTIONS
Colposcopy: What to Expect at [address] may feel some soreness in your vagina for a day or two if you had a biopsy. Some vaginal bleeding or discharge is normal for up to a week after a biopsy. The discharge may be dark-colored if a solution was put on your cervix. You can use a sanitary pad for the bleeding. It may take a week or two for you to get the test results. This care sheet gives you a general idea about how long it will take for you to recover. But each person recovers at a different pace. Follow the steps below to feel better as quickly as possible. How can you care for yourself at home? Activity    · You can return to work and most daily activities right after the test.   Exercise    · Do not exercise for 1 day after the test.   Medicines    · Your doctor will tell you if and when you can restart your medicines. He or she will also give you instructions about taking any new medicines.     · If you take blood thinners, such as warfarin (Coumadin), clopidogrel (Plavix), or aspirin, be sure to talk to your doctor. He or she will tell you if and when to start taking those medicines again. Make sure that you understand exactly what your doctor wants you to do.     · Take an over-the-counter pain medicine, such as acetaminophen (Tylenol), ibuprofen (Advil, Motrin), or naproxen (Aleve). Be safe with medicines. Read and follow all instructions on the label. Do not take two or more pain medicines at the same time unless the doctor told you to. Many pain medicines have acetaminophen, which is Tylenol. Too much acetaminophen (Tylenol) can be harmful. Other instructions    · Use a pad if you have some bleeding.     · Do not douche, have sexual intercourse, or use tampons for 1 week if you had a biopsy. This will allow time for your cervix to heal.     · You can take a bath or shower anytime after the test.   Follow-up care is a key part of your treatment and safety.  Be sure to make and go to all appointments, and call your doctor if you are having problems. It's also a good idea to know your test results and keep a list of the medicines you take. When should you call for help? Call your doctor now or seek immediate medical care if:    · You have severe vaginal bleeding. This means that you are soaking through your usual pads or tampons each hour for 2 or more hours.     · You have pain that does not get better after you take pain medicine.     · You have signs of infection, such as:  ? Increased pain. ? Bad-smelling vaginal discharge. ? A fever.    Watch closely for any changes in your health, and be sure to contact your doctor if:    · You have questions or concerns. Where can you learn more? Go to http://alexus-kunal.info/. Enter M523 in the search box to learn more about \"Colposcopy: What to Expect at Home. \"  Current as of: March 28, 2018  Content Version: 11.8  © 0941-8222 [employer]. Care instructions adapted under license by [employer] (which disclaims liability or warranty for this information). If you have questions about a medical condition or this instruction, always ask your healthcare professional. [address] any warranty or liability for your use of this information.

## 2018-11-27 ENCOUNTER — OFFICE VISIT (OUTPATIENT)
Dept: INTERNAL MEDICINE CLINIC | Age: 68
End: 2018-11-27

## 2018-11-27 DIAGNOSIS — F43.23 ADJUSTMENT DISORDER WITH MIXED ANXIETY AND DEPRESSED MOOD: Primary | ICD-10-CM

## 2018-12-08 ENCOUNTER — APPOINTMENT (OUTPATIENT)
Dept: CT IMAGING | Age: 68
End: 2018-12-08
Attending: EMERGENCY MEDICINE
Payer: MEDICARE

## 2018-12-08 ENCOUNTER — HOSPITAL ENCOUNTER (EMERGENCY)
Age: 68
Discharge: HOME | End: 2018-12-08
Attending: EMERGENCY MEDICINE
Payer: MEDICARE

## 2018-12-08 VITALS
HEART RATE: 70 BPM | TEMPERATURE: 97.8 F | OXYGEN SATURATION: 94 % | RESPIRATION RATE: 22 BRPM | BODY MASS INDEX: 43.02 KG/M2 | SYSTOLIC BLOOD PRESSURE: 157 MMHG | HEIGHT: 64 IN | WEIGHT: 252 LBS | DIASTOLIC BLOOD PRESSURE: 71 MMHG

## 2018-12-08 DIAGNOSIS — R19.7 DIARRHEA, UNSPECIFIED TYPE: ICD-10-CM

## 2018-12-08 DIAGNOSIS — N20.0 KIDNEY STONE: ICD-10-CM

## 2018-12-08 DIAGNOSIS — D25.9 UTERINE LEIOMYOMA, UNSPECIFIED LOCATION: ICD-10-CM

## 2018-12-08 DIAGNOSIS — R10.2 PELVIC PAIN: Primary | ICD-10-CM

## 2018-12-08 LAB
ALBUMIN SERPL-MCNC: 3.1 G/DL (ref 3.5–5)
ALBUMIN/GLOB SERPL: 0.9 {RATIO} (ref 1.1–2.2)
ALP SERPL-CCNC: 101 U/L (ref 45–117)
ALT SERPL-CCNC: 31 U/L (ref 12–78)
ANION GAP SERPL CALC-SCNC: 9 MMOL/L (ref 5–15)
APPEARANCE UR: ABNORMAL
AST SERPL-CCNC: 21 U/L (ref 15–37)
BACTERIA URNS QL MICRO: ABNORMAL /HPF
BASOPHILS # BLD: 0 K/UL (ref 0–0.1)
BASOPHILS NFR BLD: 0 % (ref 0–1)
BILIRUB SERPL-MCNC: 0.6 MG/DL (ref 0.2–1)
BILIRUB UR QL: NEGATIVE
BUN SERPL-MCNC: 18 MG/DL (ref 6–20)
BUN/CREAT SERPL: 18 (ref 12–20)
CALCIUM SERPL-MCNC: 9 MG/DL (ref 8.5–10.1)
CHLORIDE SERPL-SCNC: 104 MMOL/L (ref 97–108)
CO2 SERPL-SCNC: 28 MMOL/L (ref 21–32)
COLOR UR: ABNORMAL
CREAT SERPL-MCNC: 1 MG/DL (ref 0.55–1.02)
DIFFERENTIAL METHOD BLD: ABNORMAL
EOSINOPHIL # BLD: 0.5 K/UL (ref 0–0.4)
EOSINOPHIL NFR BLD: 7 % (ref 0–7)
EPITH CASTS URNS QL MICRO: ABNORMAL /LPF
ERYTHROCYTE [DISTWIDTH] IN BLOOD BY AUTOMATED COUNT: 12.6 % (ref 11.5–14.5)
GLOBULIN SER CALC-MCNC: 3.5 G/DL (ref 2–4)
GLUCOSE SERPL-MCNC: 133 MG/DL (ref 65–100)
GLUCOSE UR STRIP.AUTO-MCNC: NEGATIVE MG/DL
HCT VFR BLD AUTO: 38.9 % (ref 35–47)
HGB BLD-MCNC: 13.1 G/DL (ref 11.5–16)
HGB UR QL STRIP: ABNORMAL
HYALINE CASTS URNS QL MICRO: ABNORMAL /LPF (ref 0–5)
IMM GRANULOCYTES # BLD: 0 K/UL (ref 0–0.04)
IMM GRANULOCYTES NFR BLD AUTO: 0 % (ref 0–0.5)
KETONES UR QL STRIP.AUTO: NEGATIVE MG/DL
LEUKOCYTE ESTERASE UR QL STRIP.AUTO: ABNORMAL
LYMPHOCYTES # BLD: 1.3 K/UL (ref 0.8–3.5)
LYMPHOCYTES NFR BLD: 19 % (ref 12–49)
MCH RBC QN AUTO: 32.3 PG (ref 26–34)
MCHC RBC AUTO-ENTMCNC: 33.7 G/DL (ref 30–36.5)
MCV RBC AUTO: 96 FL (ref 80–99)
MONOCYTES # BLD: 0.5 K/UL (ref 0–1)
MONOCYTES NFR BLD: 7 % (ref 5–13)
MUCOUS THREADS URNS QL MICRO: ABNORMAL /LPF
NEUTS SEG # BLD: 4.8 K/UL (ref 1.8–8)
NEUTS SEG NFR BLD: 67 % (ref 32–75)
NITRITE UR QL STRIP.AUTO: NEGATIVE
NRBC # BLD: 0 K/UL (ref 0–0.01)
NRBC BLD-RTO: 0 PER 100 WBC
PH UR STRIP: 5 [PH] (ref 5–8)
PLATELET # BLD AUTO: 220 K/UL (ref 150–400)
PMV BLD AUTO: 10.8 FL (ref 8.9–12.9)
POTASSIUM SERPL-SCNC: 3.6 MMOL/L (ref 3.5–5.1)
PROT SERPL-MCNC: 6.6 G/DL (ref 6.4–8.2)
PROT UR STRIP-MCNC: NEGATIVE MG/DL
RBC # BLD AUTO: 4.05 M/UL (ref 3.8–5.2)
RBC #/AREA URNS HPF: ABNORMAL /HPF (ref 0–5)
SODIUM SERPL-SCNC: 141 MMOL/L (ref 136–145)
SP GR UR REFRACTOMETRY: 1.03 (ref 1–1.03)
UR CULT HOLD, URHOLD: NORMAL
UROBILINOGEN UR QL STRIP.AUTO: 1 EU/DL (ref 0.2–1)
WBC # BLD AUTO: 7.1 K/UL (ref 3.6–11)
WBC URNS QL MICRO: ABNORMAL /HPF (ref 0–4)

## 2018-12-08 PROCEDURE — 74011250637 HC RX REV CODE- 250/637: Performed by: EMERGENCY MEDICINE

## 2018-12-08 PROCEDURE — 74011636320 HC RX REV CODE- 636/320: Performed by: RADIOLOGY

## 2018-12-08 PROCEDURE — 36415 COLL VENOUS BLD VENIPUNCTURE: CPT

## 2018-12-08 PROCEDURE — 87086 URINE CULTURE/COLONY COUNT: CPT

## 2018-12-08 PROCEDURE — 74011250636 HC RX REV CODE- 250/636: Performed by: EMERGENCY MEDICINE

## 2018-12-08 PROCEDURE — 81001 URINALYSIS AUTO W/SCOPE: CPT

## 2018-12-08 PROCEDURE — 80053 COMPREHEN METABOLIC PANEL: CPT

## 2018-12-08 PROCEDURE — 96360 HYDRATION IV INFUSION INIT: CPT

## 2018-12-08 PROCEDURE — 85025 COMPLETE CBC W/AUTO DIFF WBC: CPT

## 2018-12-08 PROCEDURE — 74177 CT ABD & PELVIS W/CONTRAST: CPT

## 2018-12-08 PROCEDURE — 99284 EMERGENCY DEPT VISIT MOD MDM: CPT

## 2018-12-08 RX ORDER — DICYCLOMINE HYDROCHLORIDE 20 MG/1
20 TABLET ORAL
Status: COMPLETED | OUTPATIENT
Start: 2018-12-08 | End: 2018-12-08

## 2018-12-08 RX ORDER — DICYCLOMINE HYDROCHLORIDE 20 MG/1
20 TABLET ORAL
Qty: 12 TAB | Refills: 0 | Status: SHIPPED | OUTPATIENT
Start: 2018-12-08 | End: 2019-01-28

## 2018-12-08 RX ORDER — DICLOFENAC SODIUM 25 MG/1
50 TABLET, DELAYED RELEASE ORAL 2 TIMES DAILY
Status: DISCONTINUED | OUTPATIENT
Start: 2018-12-08 | End: 2018-12-08 | Stop reason: HOSPADM

## 2018-12-08 RX ADMIN — SODIUM CHLORIDE 500 ML: 900 INJECTION, SOLUTION INTRAVENOUS at 07:24

## 2018-12-08 RX ADMIN — IOPAMIDOL 100 ML: 755 INJECTION, SOLUTION INTRAVENOUS at 08:06

## 2018-12-08 RX ADMIN — DICLOFENAC SODIUM 50 MG: 25 TABLET, DELAYED RELEASE ORAL at 09:39

## 2018-12-08 RX ADMIN — DICYCLOMINE HYDROCHLORIDE 20 MG: 20 TABLET ORAL at 07:57

## 2018-12-08 NOTE — ED NOTES
Bedside shift change report given to Raisa Gamez  (oncoming nurse) by Alize Dixon  (offgoing nurse). Report included the following information ED Summary.

## 2018-12-08 NOTE — ED TRIAGE NOTES
Pt arrives via EMS c/o severe lower abdominal pain that began after a bowel movement at 0500. Pt denies N/V.

## 2018-12-08 NOTE — ED NOTES
The patient was given discharge instructions and questions were answered. Patient is in no apparent distress at time of discharge. Wheeled to [place] area by [facility] and assisted into vehicle. Sister present to drive her home.
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
## 2018-12-08 NOTE — DISCHARGE INSTRUCTIONS
We hope that we have addressed all of your medical concerns. The examination and treatment you received in the Emergency Department were for an emergent problem and were not intended as complete care. It is important that you follow up with your healthcare provider(s) for ongoing care. If your symptoms worsen or do not improve as expected, and you are unable to reach your usual health care provider(s), you should return to the Emergency Department. Today's healthcare is undergoing tremendous change, and patient satisfaction surveys are one of the many tools to assess the quality of medical care. You may receive a survey from the [employer] regarding your experience in the Emergency Department. I hope that your experience has been completely positive, particularly the medical care that I provided. As such, please participate in the survey; anything less than excellent does not meet my expectations or intentions. [facility] and 8 [facility] participate in nationally recognized quality of care measures. If your blood pressure is greater than 120/80, as reported below, we urge that you seek medical care to address the potential of high blood pressure, commonly known as hypertension. Hypertension can be hereditary or can be caused by certain medical conditions, pain, stress, or \"white coat syndrome. \"       Please make an appointment with your health care provider(s) for follow up of your Emergency Department visit. VITALS:   Patient Vitals for the past 8 hrs:   Temp Pulse Resp BP SpO2   12/08/18 0745 -- -- -- -- 94 %   12/08/18 0730 -- -- -- -- 95 %   12/08/18 0716 -- -- -- -- 97 %   12/08/18 0715 -- -- -- 157/71 --   12/08/18 0704 -- -- -- 176/69 97 %   12/08/18 0701 97.8 °F (36.6 °C) 70 22 (!) 202/74 96 %          Thank you for allowing us to provide you with medical care today.   We realize that you have many choices for your emergency care needs. Please choose us in the future for any continued health care needs. Bhupendra Mitchell MD    [facility], [facility].   Office: [phone]            Recent Results (from the past 24 hour(s))   CBC WITH AUTOMATED DIFF    Collection Time: 12/08/18  7:25 AM   Result Value Ref Range    WBC 7.1 3.6 - 11.0 K/uL    RBC 4.05 3.80 - 5.20 M/uL    HGB 13.1 11.5 - 16.0 g/dL    HCT 38.9 35.0 - 47.0 %    MCV 96.0 80.0 - 99.0 FL    MCH 32.3 26.0 - 34.0 PG    MCHC 33.7 30.0 - 36.5 g/dL    RDW 12.6 11.5 - 14.5 %    PLATELET 560 131 - 487 K/uL    MPV 10.8 8.9 - 12.9 FL    NRBC 0.0 0  WBC    ABSOLUTE NRBC 0.00 0.00 - 0.01 K/uL    NEUTROPHILS 67 32 - 75 %    LYMPHOCYTES 19 12 - 49 %    MONOCYTES 7 5 - 13 %    EOSINOPHILS 7 0 - 7 %    BASOPHILS 0 0 - 1 %    IMMATURE GRANULOCYTES 0 0.0 - 0.5 %    ABS. NEUTROPHILS 4.8 1.8 - 8.0 K/UL    ABS. LYMPHOCYTES 1.3 0.8 - 3.5 K/UL    ABS. MONOCYTES 0.5 0.0 - 1.0 K/UL    ABS. EOSINOPHILS 0.5 (H) 0.0 - 0.4 K/UL    ABS. BASOPHILS 0.0 0.0 - 0.1 K/UL    ABS. IMM. GRANS. 0.0 0.00 - 0.04 K/UL    DF AUTOMATED     METABOLIC PANEL, COMPREHENSIVE    Collection Time: 12/08/18  7:25 AM   Result Value Ref Range    Sodium 141 136 - 145 mmol/L    Potassium 3.6 3.5 - 5.1 mmol/L    Chloride 104 97 - 108 mmol/L    CO2 28 21 - 32 mmol/L    Anion gap 9 5 - 15 mmol/L    Glucose 133 (H) 65 - 100 mg/dL    BUN 18 6 - 20 MG/DL    Creatinine 1.00 0.55 - 1.02 MG/DL    BUN/Creatinine ratio 18 12 - 20      GFR est AA >60 >60 ml/min/1.73m2    GFR est non-AA 55 (L) >60 ml/min/1.73m2    Calcium 9.0 8.5 - 10.1 MG/DL    Bilirubin, total 0.6 0.2 - 1.0 MG/DL    ALT (SGPT) 31 12 - 78 U/L    AST (SGOT) 21 15 - 37 U/L    Alk.  phosphatase 101 45 - 117 U/L    Protein, total 6.6 6.4 - 8.2 g/dL    Albumin 3.1 (L) 3.5 - 5.0 g/dL    Globulin 3.5 2.0 - 4.0 g/dL    A-G Ratio 0.9 (L) 1.1 - 2.2     URINALYSIS W/MICROSCOPIC    Collection Time: 12/08/18  7:25 AM   Result Value Ref Range Color DARK YELLOW      Appearance CLOUDY (A) CLEAR      Specific gravity 1.026 1.003 - 1.030      pH (UA) 5.0 5.0 - 8.0      Protein NEGATIVE  NEG mg/dL    Glucose NEGATIVE  NEG mg/dL    Ketone NEGATIVE  NEG mg/dL    Bilirubin NEGATIVE  NEG      Blood LARGE (A) NEG      Urobilinogen 1.0 0.2 - 1.0 EU/dL    Nitrites NEGATIVE  NEG      Leukocyte Esterase SMALL (A) NEG      WBC 5-10 0 - 4 /hpf    RBC 20-50 0 - 5 /hpf    Epithelial cells MANY (A) FEW /lpf    Bacteria 1+ (A) NEG /hpf    Mucus 2+ (A) NEG /lpf    Hyaline cast 0-2 0 - 5 /lpf   URINE CULTURE HOLD SAMPLE    Collection Time: 12/08/18  7:25 AM   Result Value Ref Range    Urine culture hold        URINE ON HOLD IN MICROBIOLOGY DEPT FOR 3 DAYS. IF UNPRESERVED URINE IS SUBMITTED, IT CANNOT BE USED FOR ADDITIONAL TESTING AFTER 24 HRS, RECOLLECTION WILL BE REQUIRED. Ct Abd Pelv W Cont    Result Date: 12/8/2018  EXAM:  CT ABD PELV W CONT INDICATION: lower abd pain recent colposcopy some diarrhea patient states pain began after a bowel movement at 0500 hours. COMPARISON: CT angiography chest 4/14/2018 CONTRAST:  100 mL of Isovue-370. TECHNIQUE: Following the uneventful intravenous administration of contrast, thin axial images were obtained through the abdomen and pelvis. Coronal and sagittal reconstructions were generated. Oral contrast was not administered. CT dose reduction was achieved through use of a standardized protocol tailored for this examination and automatic exposure control for dose modulation. FINDINGS: LUNG BASES: Minimal lingular and bilateral lower lobe atelectasis or scar. Tiny patchy opacity less than 5 mm in the right lower lobe of uncertain if any significance. It is stable since 4/14/2018. INCIDENTALLY IMAGED HEART AND MEDIASTINUM: Unremarkable. LIVER: No mass or biliary dilatation. The liver is hypodense. GALLBLADDER: Surgically absent. SPLEEN: No mass. PANCREAS: No mass or ductal dilatation. ADRENALS: Unremarkable.  KIDNEYS: Single nonobstructing tiny intrarenal calculus on the left. Small left renal cortical cyst. STOMACH: Unremarkable. SMALL BOWEL: No dilatation or wall thickening. COLON: Innumerable diverticula, heaviest in the sigmoid colon, without associated acute inflammatory change. APPENDIX: Unremarkable. PERITONEUM: No ascites or pneumoperitoneum. RETROPERITONEUM: No lymphadenopathy or aortic aneurysm. REPRODUCTIVE ORGANS: Mild heterogeneity of enhancement suggesting leiomyomata. URINARY BLADDER: Not well assessed, completely decompressed. BONES: No destructive bone lesion. ADDITIONAL COMMENTS: Umbilical hernia, small, containing fat only. IMPRESSION: 1. No acute abdominal or pelvic abnormality. 2. Hepatic steatosis or other diffuse process. 3. Tiny patchy opacity in the right lower lobe, stable since prior study 4/14/2018. 4. Nonobstructing left intrarenal calculus. 5. Diverticulosis without diverticulitis. 6. Other incidental and postoperative changes. Kidney Stone: Care Instructions  Your Care Instructions    Kidney stones are formed when salts, minerals, and other substances normally found in the urine clump together. They can be as small as grains of sand or, rarely, as large as golf balls. While the stone is traveling through the ureter, which is the tube that carries urine from the kidney to the bladder, you will probably feel pain. The pain may be mild or very severe. You may also have some blood in your urine. As soon as the stone reaches the bladder, any intense pain should go away. If a stone is too large to pass on its own, you may need a medical procedure to help you pass the stone. The doctor has checked you carefully, but problems can develop later. If you notice any problems or new symptoms, get medical treatment right away. Follow-up care is a key part of your treatment and safety. Be sure to make and go to all appointments, and call your doctor if you are having problems.  It's also a good idea to know your test results and keep a list of the medicines you take. How can you care for yourself at home? · Drink plenty of fluids, enough so that your urine is light yellow or clear like water. If you have kidney, heart, or liver disease and have to limit fluids, talk with your doctor before you increase the amount of fluids you drink. · Take pain medicines exactly as directed. Call your doctor if you think you are having a problem with your medicine. ? If the doctor gave you a prescription medicine for pain, take it as prescribed. ? If you are not taking a prescription pain medicine, ask your doctor if you can take an over-the-counter medicine. Read and follow all instructions on the label. · Your doctor may ask you to strain your urine so that you can collect your kidney stone when it passes. You can use a kitchen strainer or a tea strainer to catch the stone. Store it in a plastic bag until you see your doctor again. Preventing future kidney stones  Some changes in your diet may help prevent kidney stones. Depending on the cause of your stones, your doctor may recommend that you:  · Drink plenty of fluids, enough so that your urine is light yellow or clear like water. If you have kidney, heart, or liver disease and have to limit fluids, talk with your doctor before you increase the amount of fluids you drink. · Limit coffee, tea, and alcohol. Also avoid grapefruit juice. · Do not take more than the recommended daily dose of vitamins C and D.  · Avoid antacids such as Gaviscon, Maalox, Mylanta, or Tums. · Limit the amount of salt (sodium) in your diet. · Eat a balanced diet that is not too high in protein. · Limit foods that are high in a substance called oxalate, which can cause kidney stones. These foods include dark green vegetables, rhubarb, chocolate, wheat bran, nuts, cranberries, and beans. When should you call for help?   Call your doctor now or seek immediate medical care if:    · You cannot keep down fluids.     · Your pain gets worse.     · You have a fever or chills.     · You have new or worse pain in your back just below your rib cage (the flank area).     · You have new or more blood in your urine.    Watch closely for changes in your health, and be sure to contact your doctor if:    · You do not get better as expected. Where can you learn more? Go to http://alexus-kunal.info/. Enter H765 in the search box to learn more about \"Kidney Stone: Care Instructions. \"  Current as of: March 15, 2018  Content Version: 11.8  © 7012-7434 [employer]. Care instructions adapted under license by [employer] (which disclaims liability or warranty for this information). If you have questions about a medical condition or this instruction, always ask your healthcare professional. [address] any warranty or liability for your use of this information. Pelvic Pain: Care Instructions  Your Care Instructions    Pelvic pain, or pain in the lower belly, can have many causes. Often pelvic pain is not serious and gets better in a few days. If your pain continues or gets worse, you may need tests and treatment. Tell your doctor about any new symptoms. These may be signs of a serious problem. Follow-up care is a key part of your treatment and safety. Be sure to make and go to all appointments, and call your doctor if you are having problems. It's also a good idea to know your test results and keep a list of the medicines you take. How can you care for yourself at home? · Rest until you feel better. Lie down, and raise your legs by placing a pillow under your knees. · Drink plenty of fluids. You may find that small, frequent sips are easier on your stomach than if you drink a lot at once. Avoid drinks with carbonation or caffeine, such as soda pop, tea, or coffee. · Try eating several small meals instead of 2 or 3 large ones.  Eat mild foods, such as rice, dry toast or crackers, bananas, and applesauce. Avoid fatty and spicy foods, other fruits, and alcohol until 48 hours after your symptoms have gone away. · Take an over-the-counter pain medicine, such as acetaminophen (Tylenol), ibuprofen (Advil, Motrin), or naproxen (Aleve). Read and follow all instructions on the label. · Do not take two or more pain medicines at the same time unless the doctor told you to. Many pain medicines have acetaminophen, which is Tylenol. Too much acetaminophen (Tylenol) can be harmful. · You can put a heating pad, a warm cloth, or moist heat on your belly to relieve pain. When should you call for help? Call your doctor now or seek immediate medical care if:    · You have a new or higher fever.     · You have unusual vaginal bleeding.     · You have new or worse belly or pelvic pain.     · You have vaginal discharge that has increased in amount or smells bad.    Watch closely for changes in your health, and be sure to contact your doctor if:    · You do not get better as expected. Where can you learn more? Go to http://alexus-kunal.info/. Enter [phone] in the search box to learn more about \"Pelvic Pain: Care Instructions. \"  Current as of: May 15, 2018  Content Version: 11.8  © 4054-1807 [employer]. Care instructions adapted under license by [employer] (which disclaims liability or warranty for this information). If you have questions about a medical condition or this instruction, always ask your healthcare professional. [place] [address] any warranty or liability for your use of this information. Uterine Fibroids: Care Instructions  Your Care Instructions    Uterine fibroids are growths in the uterus. Fibroids aren't cancer. Doctors don't know what causes fibroids. Fibroids are very common in women during their childbearing years.   Fibroids can grow on the inside of the uterus, in the muscle wall of the uterus, or near the outside wall of the uterus. In some women, fibroids cause painful cramps and heavy periods. In these cases, taking anti-inflammatory medicines, birth control pills, or using an intrauterine device (IUD) often helps decrease symptoms. Sometimes surgery is needed to treat fibroids. But if you are near menopause, you may want to wait and see if your symptoms get better. Most fibroids shrink and go away after menopause, when your menstrual periods stop completely. Follow-up care is a key part of your treatment and safety. Be sure to make and go to all appointments, and call your doctor if you are having problems. It's also a good idea to know your test results and keep a list of the medicines you take. How can you care for yourself at home? · If your doctor gave you medicine, take it as exactly as prescribed. Be safe with medicines. Call your doctor if you think you are having a problem with your medicine. · Take anti-inflammatory medicines for pain. These include ibuprofen (Advil, Motrin) and naproxen (Aleve). Read and follow all instructions on the label. · Use heat, such as a hot water bottle or a heating pad set on low, or a warm bath to relax tense muscles and relieve cramping. Put a thin cloth between the heating pad and your skin. Never go to sleep with a heating pad on. · Lie down and put a pillow under your knees. Or, lie on your side and bring your knees up to your chest. These positions may help relieve belly pain or pressure. · Keep track of how many sanitary pads or tampons you use each day. · Get at least 30 minutes of exercise on most days of the week. Walking is a good choice. You also may want to do other activities, such as running, swimming, cycling, or playing tennis or team sports. · If you bleed longer than usual or have heavy bleeding, take a daily multivitamin with iron. When should you call for help?   Call your doctor now or seek immediate medical care if:    · You have severe vaginal bleeding.     · You have new or worse belly or pelvic pain.    Watch closely for changes in your health, and be sure to contact your doctor if:    · You have unusual vaginal bleeding.     · You do not get better as expected. Where can you learn more? Go to http://alexus-kunal.info/. Enter B121 in the search box to learn more about \"Uterine Fibroids: Care Instructions. \"  Current as of: May 15, 2018  Content Version: 11.8  © 4914-3399 [employer]. Care instructions adapted under license by [employer] (which disclaims liability or warranty for this information). If you have questions about a medical condition or this instruction, always ask your healthcare professional. [address] any warranty or liability for your use of this information. Diarrhea: Care Instructions  Your Care Instructions    Diarrhea is loose, watery stools (bowel movements). The exact cause is often hard to find. Sometimes diarrhea is your body's way of getting rid of what caused an upset stomach. Viruses, food poisoning, and many medicines can cause diarrhea. Some people get diarrhea in response to emotional stress, anxiety, or certain foods. Almost everyone has diarrhea now and then. It usually isn't serious, and your stools will return to normal soon. The important thing to do is replace the fluids you have lost, so you can prevent dehydration. The doctor has checked you carefully, but problems can develop later. If you notice any problems or new symptoms, get medical treatment right away. Follow-up care is a key part of your treatment and safety. Be sure to make and go to all appointments, and call your doctor if you are having problems. It's also a good idea to know your test results and keep a list of the medicines you take. How can you care for yourself at home?   · Watch for signs of dehydration, which means your body has lost too much water. Dehydration is a serious condition and should be treated right away. Signs of dehydration are:  ? Increasing thirst and dry eyes and mouth. ? Feeling faint or lightheaded. ? Darker urine, and a smaller amount of urine than normal.  · To prevent dehydration, drink plenty of fluids, enough so that your urine is light yellow or clear like water. Choose water and other caffeine-free clear liquids until you feel better. If you have kidney, heart, or liver disease and have to limit fluids, talk with your doctor before you increase the amount of fluids you drink. · Begin eating small amounts of mild foods the next day, if you feel like it. ? Try yogurt that has live cultures of Lactobacillus. (Check the label.)  ? Avoid spicy foods, fruits, alcohol, and caffeine until 48 hours after all symptoms are gone. ? Avoid chewing gum that contains sorbitol. ? Avoid dairy products (except for yogurt with Lactobacillus) while you have diarrhea and for 3 days after symptoms are gone. · The doctor may recommend that you take over-the-counter medicine, such as loperamide (Imodium), if you still have diarrhea after 6 hours. Read and follow all instructions on the label. Do not use this medicine if you have bloody diarrhea, a high fever, or other signs of serious illness. Call your doctor if you think you are having a problem with your medicine. When should you call for help? Call 911 anytime you think you may need emergency care. For example, call if:    · You passed out (lost consciousness).     · Your stools are maroon or very bloody.    Call your doctor now or seek immediate medical care if:    · You are dizzy or lightheaded, or you feel like you may faint.     · Your stools are black and look like tar, or they have streaks of blood.     · You have new or worse belly pain.     · You have symptoms of dehydration, such as:  ? Dry eyes and a dry mouth. ? Passing only a little dark urine. ?  Feeling thirstier than usual.     · You have a new or higher fever.    Watch closely for changes in your health, and be sure to contact your doctor if:    · Your diarrhea is getting worse.     · You see pus in the diarrhea.     · You are not getting better after 2 days (48 hours). Where can you learn more? Go to http://alexus-kunal.info/. Enter G300 in the search box to learn more about \"Diarrhea: Care Instructions. \"  Current as of: November 20, 2017  Content Version: 11.8  © 9198-0791 [employer]. Care instructions adapted under license by [employer] (which disclaims liability or warranty for this information). If you have questions about a medical condition or this instruction, always ask your healthcare professional. [address] any warranty or liability for your use of this information.

## 2018-12-09 LAB
BACTERIA SPEC CULT: NORMAL
CC UR VC: NORMAL
SERVICE CMNT-IMP: NORMAL

## 2018-12-10 NOTE — PROGRESS NOTES
Pt notified of colpo results through [employer]. Updated psh with results and tickled pap and colpo.

## 2018-12-21 NOTE — PROGRESS NOTES
11/27/18   Behavioral Health Progress Note    This patient was seen by the JAKOB ROBINS [facility] from 2pm to 2:30pm for a total of 30 minutes. Psychotropic medication changes: None    Diagnosis: Adjustment Disorder with Anxiety and Depressed Mood    Description of session/progress/interventions: Quique Jimenez discussed getting the results of her neuropsych testing and having mixed feelings. She was encouraged to learn that it does not look like she has any cognitive deficits related to her concussion and that her memory and processing concerns are likely more related to depression and anxiety. She was tearful off and on as she discussed the loss of her  and getting through his birthday. She stated that she is not crying as often as she had been and realizes that she is just lonely. She discussed going to the fitness center in her building, which she was encouraged to do and she committed to going there this week with her sister to make sure she can safely use the stationary bike. She is still overeating but is also working on this. Assessment/Plan: Cont to be actively grieving although distress is lessening. She has good support here and is staying involved with family. Will follow up to support improved self-care (physical activity and improved diet).

## 2019-01-07 ENCOUNTER — OFFICE VISIT (OUTPATIENT)
Dept: OBGYN CLINIC | Age: 69
End: 2019-01-07

## 2019-01-07 VITALS
WEIGHT: 248 LBS | HEIGHT: 64 IN | DIASTOLIC BLOOD PRESSURE: 80 MMHG | BODY MASS INDEX: 42.34 KG/M2 | SYSTOLIC BLOOD PRESSURE: 120 MMHG

## 2019-01-07 DIAGNOSIS — R10.2 PELVIC PAIN: Primary | ICD-10-CM

## 2019-01-07 DIAGNOSIS — N95.0 POSTMENOPAUSAL BLEEDING: ICD-10-CM
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
## 2019-01-08 ENCOUNTER — OFFICE VISIT (OUTPATIENT)
Dept: INTERNAL MEDICINE CLINIC | Age: 69
End: 2019-01-08

## 2019-01-08 DIAGNOSIS — N95.0 POSTMENOPAUSAL BLEEDING: Primary | ICD-10-CM

## 2019-01-08 DIAGNOSIS — F43.23 ADJUSTMENT DISORDER WITH MIXED ANXIETY AND DEPRESSED MOOD: Primary | ICD-10-CM

## 2019-01-28 ENCOUNTER — HOSPITAL ENCOUNTER (OUTPATIENT)
Dept: PREADMISSION TESTING | Age: 69
Discharge: HOME | End: 2019-01-28
Payer: MEDICARE

## 2019-01-28 VITALS
HEIGHT: 64 IN | DIASTOLIC BLOOD PRESSURE: 77 MMHG | HEART RATE: 70 BPM | TEMPERATURE: 98 F | WEIGHT: 250.6 LBS | OXYGEN SATURATION: 95 % | SYSTOLIC BLOOD PRESSURE: 121 MMHG | RESPIRATION RATE: 16 BRPM | BODY MASS INDEX: 42.78 KG/M2

## 2019-01-28 DIAGNOSIS — N95.0 POSTMENOPAUSAL BLEEDING: ICD-10-CM

## 2019-01-28 LAB
ABO + RH BLD: NORMAL
ATRIAL RATE: 70 BPM
BLOOD GROUP ANTIBODIES SERPL: NORMAL
CALCULATED P AXIS, ECG09: 58 DEGREES
CALCULATED R AXIS, ECG10: 58 DEGREES
CALCULATED T AXIS, ECG11: 55 DEGREES
DIAGNOSIS, 93000: NORMAL
ERYTHROCYTE [DISTWIDTH] IN BLOOD BY AUTOMATED COUNT: 12.8 % (ref 11.5–14.5)
HCT VFR BLD AUTO: 41.2 % (ref 35–47)
HGB BLD-MCNC: 13.9 G/DL (ref 11.5–16)
MCH RBC QN AUTO: 32.3 PG (ref 26–34)
MCHC RBC AUTO-ENTMCNC: 33.7 G/DL (ref 30–36.5)
MCV RBC AUTO: 95.8 FL (ref 80–99)
NRBC # BLD: 0 K/UL (ref 0–0.01)
NRBC BLD-RTO: 0 PER 100 WBC
P-R INTERVAL, ECG05: 188 MS
PLATELET # BLD AUTO: 220 K/UL (ref 150–400)
PMV BLD AUTO: 11.4 FL (ref 8.9–12.9)
Q-T INTERVAL, ECG07: 404 MS
QRS DURATION, ECG06: 84 MS
QTC CALCULATION (BEZET), ECG08: 436 MS
RBC # BLD AUTO: 4.3 M/UL (ref 3.8–5.2)
SPECIMEN EXP DATE BLD: NORMAL
VENTRICULAR RATE, ECG03: 70 BPM
WBC # BLD AUTO: 7.8 K/UL (ref 3.6–11)

## 2019-01-28 PROCEDURE — 85027 COMPLETE CBC AUTOMATED: CPT

## 2019-01-28 PROCEDURE — 93005 ELECTROCARDIOGRAM TRACING: CPT

## 2019-01-28 PROCEDURE — 36415 COLL VENOUS BLD VENIPUNCTURE: CPT

## 2019-01-28 PROCEDURE — 86900 BLOOD TYPING SEROLOGIC ABO: CPT

## 2019-01-28 RX ORDER — IBUPROFEN 200 MG
400 TABLET ORAL AS NEEDED
COMMUNITY
End: 2021-09-23

## 2019-01-28 RX ORDER — DICLOFENAC POTASSIUM 50 MG/1
50 TABLET, FILM COATED ORAL
COMMUNITY
End: 2021-09-23 | Stop reason: ALTCHOICE

## 2019-01-28 NOTE — PERIOP NOTES
[address] [facility] [address], [address] [facility]                            MAIN OR                                  [phone]   MAIN PRE OP                          [phone]                                                                                AMBULATORY PRE OP          [phone]  PRE-ADMISSION TESTING    [phone]     Surgery Date:   2/4/2019 Monday      Is surgery arrival time given by surgeon? YES  If NO, [facility] staff will call you between 3 and 7pm the day before your surgery with your arrival time. (If your surgery is on a Monday, we will call you the Friday before.)    Call [phone] after 7pm Monday-Friday if you did not receive your arrival time. INSTRUCTIONS BEFORE YOUR SURGERY   When You  Arrive   Arrive at the [address] [facility] on the day of your surgery  Have your insurance card, photo ID, and any copayment (if needed)     Food   and   Drink   NO food or drink after midnight the night before surgery    This means NO water, gum, mints, coffee, juice, etc.  No alcohol (beer, wine, liquor) 24 hours before and after surgery     Medications to   TAKE   Morning of Surgery   MEDICATIONS TO TAKE THE MORNING OF SURGERY WITH A SIP OF WATER:    Levothyroxine, Omeprazole     Medications  To  STOP      7 days before surgery    Non-Steroidal anti-inflammatory Drugs (NSAID's): for example, Ibuprofen (Advil, Motrin), Naproxen (Aleve)   Aspirin, if taking for pain    Herbal supplements, vitamins, and fish oil  Other: Diclofenac    (Pain medications not listed above, including Tylenol may be taken)   Blood  Thinners    If you take  Aspirin, Plavix, Coumadin, or any blood-thinning or anti-blood clot medicine, talk to the doctor who prescribed the medications for pre-operative instructions.      Bathing Clothing  Jewelry  Valuables       If you shower the morning of surgery, please do not apply anything to your skin (lotions, powders, deodorant, or makeup, especially mascara)   Follow all special bath instructions (for total joint replacement, spine and bowel surgeries)   Do not shave or trim anywhere 24 hours before surgery   Wear your hair loose or down; no pony-tails, buns, or metal hair clips   Wear loose, comfortable, clean clothes   Wear glasses instead of contacts   Leave money, valuables, and jewelry, including body piercings, at home     Going Home       or Spending the Night    SAME-DAY SURGERY: You must have a responsible adult drive you home and stay with you 24 hours after surgery   ADMITS: If your doctor is keeping you into the hospital after surgery, leave personal belongings/luggage in your car until you have a hospital room number. Hospital discharge time is 12 noon  Drivers must be here before 12 noon unless you are told differently   Special Instructions Free  parking 7am-5pm         Follow all instructions so your surgery wont be cancelled. Please, be on time. If a situation occurs and you are delayed the day of surgery, call [phone]. If your physical condition changes (like a fever, cold, flu, etc.) call your surgeon. The patient was contacted  in person. The patient verbalizes understanding of all instructions and does not  need reinforcement.

## 2019-02-01 ENCOUNTER — ANESTHESIA EVENT (OUTPATIENT)
Dept: SURGERY | Age: 69
End: 2019-02-01
Payer: MEDICARE

## 2019-02-04 ENCOUNTER — HOSPITAL ENCOUNTER (OUTPATIENT)
Age: 69
Setting detail: OUTPATIENT SURGERY
Discharge: HOME | End: 2019-02-04
Attending: OBSTETRICS & GYNECOLOGY | Admitting: OBSTETRICS & GYNECOLOGY
Payer: MEDICARE

## 2019-02-04 ENCOUNTER — ANESTHESIA (OUTPATIENT)
Dept: SURGERY | Age: 69
End: 2019-02-04
Payer: MEDICARE

## 2019-02-04 VITALS
HEIGHT: 64 IN | RESPIRATION RATE: 14 BRPM | WEIGHT: 250.66 LBS | DIASTOLIC BLOOD PRESSURE: 65 MMHG | OXYGEN SATURATION: 92 % | BODY MASS INDEX: 42.79 KG/M2 | SYSTOLIC BLOOD PRESSURE: 166 MMHG | HEART RATE: 85 BPM | TEMPERATURE: 97.8 F

## 2019-02-04 DIAGNOSIS — N95.0 POSTMENOPAUSAL BLEEDING: ICD-10-CM

## 2019-02-04 PROCEDURE — 74011250636 HC RX REV CODE- 250/636

## 2019-02-04 PROCEDURE — 77030026438 HC STYL ET INTUB CARD -A: Performed by: ANESTHESIOLOGY

## 2019-02-04 PROCEDURE — 88305 TISSUE EXAM BY PATHOLOGIST: CPT

## 2019-02-04 PROCEDURE — 77030032490 HC SLV COMPR SCD KNE COVD -B: Performed by: OBSTETRICS & GYNECOLOGY

## 2019-02-04 PROCEDURE — 76210000024 HC REC RM PH II 2.5 TO 3 HR: Performed by: OBSTETRICS & GYNECOLOGY

## 2019-02-04 PROCEDURE — 74011000250 HC RX REV CODE- 250

## 2019-02-04 PROCEDURE — 76010000149 HC OR TIME 1 TO 1.5 HR: Performed by: OBSTETRICS & GYNECOLOGY

## 2019-02-04 PROCEDURE — 77030013079 HC BLNKT BAIR HGGR 3M -A: Performed by: ANESTHESIOLOGY

## 2019-02-04 PROCEDURE — 77030033136 HC TBNG INFLO AQUILEX ST HOLO -C: Performed by: OBSTETRICS & GYNECOLOGY

## 2019-02-04 PROCEDURE — 77030033137 HC TBNG OUTFLO AQUILEX ST HOLO -B: Performed by: OBSTETRICS & GYNECOLOGY

## 2019-02-04 PROCEDURE — 77030005537 HC CATH URETH BARD -A: Performed by: OBSTETRICS & GYNECOLOGY

## 2019-02-04 PROCEDURE — 74011250636 HC RX REV CODE- 250/636: Performed by: ANESTHESIOLOGY

## 2019-02-04 PROCEDURE — 77030018836 HC SOL IRR NACL ICUM -A: Performed by: OBSTETRICS & GYNECOLOGY

## 2019-02-04 PROCEDURE — 76060000033 HC ANESTHESIA 1 TO 1.5 HR: Performed by: OBSTETRICS & GYNECOLOGY

## 2019-02-04 PROCEDURE — 77030033650 HC DEV TISS RMVL MYOSUR HOLO -F: Performed by: OBSTETRICS & GYNECOLOGY

## 2019-02-04 PROCEDURE — 77030020782 HC GWN BAIR PAWS FLX 3M -B

## 2019-02-04 PROCEDURE — 77030008684 HC TU ET CUF COVD -B: Performed by: ANESTHESIOLOGY

## 2019-02-04 PROCEDURE — 76210000016 HC OR PH I REC 1 TO 1.5 HR: Performed by: OBSTETRICS & GYNECOLOGY

## 2019-02-04 RX ORDER — ONDANSETRON 2 MG/ML
INJECTION INTRAMUSCULAR; INTRAVENOUS AS NEEDED
Status: DISCONTINUED | OUTPATIENT
Start: 2019-02-04 | End: 2019-02-04 | Stop reason: HOSPADM

## 2019-02-04 RX ORDER — DIPHENHYDRAMINE HYDROCHLORIDE 50 MG/ML
12.5 INJECTION, SOLUTION INTRAMUSCULAR; INTRAVENOUS AS NEEDED
Status: DISCONTINUED | OUTPATIENT
Start: 2019-02-04 | End: 2019-02-04 | Stop reason: HOSPADM

## 2019-02-04 RX ORDER — ONDANSETRON 2 MG/ML
4 INJECTION INTRAMUSCULAR; INTRAVENOUS AS NEEDED
Status: DISCONTINUED | OUTPATIENT
Start: 2019-02-04 | End: 2019-02-04 | Stop reason: HOSPADM

## 2019-02-04 RX ORDER — SODIUM CHLORIDE, SODIUM LACTATE, POTASSIUM CHLORIDE, CALCIUM CHLORIDE 600; 310; 30; 20 MG/100ML; MG/100ML; MG/100ML; MG/100ML
125 INJECTION, SOLUTION INTRAVENOUS CONTINUOUS
Status: DISCONTINUED | OUTPATIENT
Start: 2019-02-04 | End: 2019-02-04 | Stop reason: HOSPADM

## 2019-02-04 RX ORDER — FENTANYL CITRATE 50 UG/ML
INJECTION, SOLUTION INTRAMUSCULAR; INTRAVENOUS AS NEEDED
Status: DISCONTINUED | OUTPATIENT
Start: 2019-02-04 | End: 2019-02-04 | Stop reason: HOSPADM

## 2019-02-04 RX ORDER — LIDOCAINE HYDROCHLORIDE 10 MG/ML
0.1 INJECTION, SOLUTION EPIDURAL; INFILTRATION; INTRACAUDAL; PERINEURAL AS NEEDED
Status: DISCONTINUED | OUTPATIENT
Start: 2019-02-04 | End: 2019-02-04 | Stop reason: HOSPADM

## 2019-02-04 RX ORDER — DEXAMETHASONE SODIUM PHOSPHATE 4 MG/ML
INJECTION, SOLUTION INTRA-ARTICULAR; INTRALESIONAL; INTRAMUSCULAR; INTRAVENOUS; SOFT TISSUE AS NEEDED
Status: DISCONTINUED | OUTPATIENT
Start: 2019-02-04 | End: 2019-02-04 | Stop reason: HOSPADM

## 2019-02-04 RX ORDER — MIDAZOLAM HYDROCHLORIDE 1 MG/ML
INJECTION, SOLUTION INTRAMUSCULAR; INTRAVENOUS AS NEEDED
Status: DISCONTINUED | OUTPATIENT
Start: 2019-02-04 | End: 2019-02-04 | Stop reason: HOSPADM

## 2019-02-04 RX ORDER — ROCURONIUM BROMIDE 10 MG/ML
INJECTION, SOLUTION INTRAVENOUS AS NEEDED
Status: DISCONTINUED | OUTPATIENT
Start: 2019-02-04 | End: 2019-02-04 | Stop reason: HOSPADM

## 2019-02-04 RX ORDER — SUCCINYLCHOLINE CHLORIDE 20 MG/ML
INJECTION INTRAMUSCULAR; INTRAVENOUS AS NEEDED
Status: DISCONTINUED | OUTPATIENT
Start: 2019-02-04 | End: 2019-02-04 | Stop reason: HOSPADM

## 2019-02-04 RX ORDER — SODIUM CHLORIDE, SODIUM LACTATE, POTASSIUM CHLORIDE, CALCIUM CHLORIDE 600; 310; 30; 20 MG/100ML; MG/100ML; MG/100ML; MG/100ML
150 INJECTION, SOLUTION INTRAVENOUS CONTINUOUS
Status: DISCONTINUED | OUTPATIENT
Start: 2019-02-04 | End: 2019-02-04 | Stop reason: HOSPADM

## 2019-02-04 RX ORDER — PROPOFOL 10 MG/ML
INJECTION, EMULSION INTRAVENOUS AS NEEDED
Status: DISCONTINUED | OUTPATIENT
Start: 2019-02-04 | End: 2019-02-04 | Stop reason: HOSPADM

## 2019-02-04 RX ORDER — LIDOCAINE HYDROCHLORIDE 20 MG/ML
INJECTION, SOLUTION EPIDURAL; INFILTRATION; INTRACAUDAL; PERINEURAL AS NEEDED
Status: DISCONTINUED | OUTPATIENT
Start: 2019-02-04 | End: 2019-02-04 | Stop reason: HOSPADM

## 2019-02-04 RX ORDER — HYDROMORPHONE HYDROCHLORIDE 2 MG/ML
INJECTION, SOLUTION INTRAMUSCULAR; INTRAVENOUS; SUBCUTANEOUS AS NEEDED
Status: DISCONTINUED | OUTPATIENT
Start: 2019-02-04 | End: 2019-02-04 | Stop reason: HOSPADM

## 2019-02-04 RX ORDER — HYDROMORPHONE HYDROCHLORIDE 2 MG/ML
0.5 INJECTION, SOLUTION INTRAMUSCULAR; INTRAVENOUS; SUBCUTANEOUS
Status: DISPENSED | OUTPATIENT
Start: 2019-02-04 | End: 2019-02-04

## 2019-02-04 RX ORDER — EPHEDRINE SULFATE 50 MG/ML
INJECTION, SOLUTION INTRAVENOUS AS NEEDED
Status: DISCONTINUED | OUTPATIENT
Start: 2019-02-04 | End: 2019-02-04 | Stop reason: HOSPADM

## 2019-02-04 RX ORDER — NALOXONE HYDROCHLORIDE 0.4 MG/ML
0.04 INJECTION, SOLUTION INTRAMUSCULAR; INTRAVENOUS; SUBCUTANEOUS
Status: COMPLETED | OUTPATIENT
Start: 2019-02-04 | End: 2019-02-04

## 2019-02-04 RX ORDER — KETOROLAC TROMETHAMINE 30 MG/ML
INJECTION, SOLUTION INTRAMUSCULAR; INTRAVENOUS AS NEEDED
Status: DISCONTINUED | OUTPATIENT
Start: 2019-02-04 | End: 2019-02-04 | Stop reason: HOSPADM

## 2019-02-04 RX ORDER — HYDROMORPHONE HYDROCHLORIDE 1 MG/ML
INJECTION, SOLUTION INTRAMUSCULAR; INTRAVENOUS; SUBCUTANEOUS
Status: DISCONTINUED
Start: 2019-02-04 | End: 2019-02-04 | Stop reason: HOSPADM

## 2019-02-04 RX ORDER — ALBUTEROL SULFATE 0.83 MG/ML
2.5 SOLUTION RESPIRATORY (INHALATION) AS NEEDED
Status: DISCONTINUED | OUTPATIENT
Start: 2019-02-04 | End: 2019-02-04 | Stop reason: HOSPADM

## 2019-02-04 RX ADMIN — ONDANSETRON 4 MG: 2 INJECTION INTRAMUSCULAR; INTRAVENOUS at 12:40

## 2019-02-04 RX ADMIN — HYDROMORPHONE HYDROCHLORIDE 0.5 MG: 2 INJECTION INTRAMUSCULAR; INTRAVENOUS; SUBCUTANEOUS at 10:37

## 2019-02-04 RX ADMIN — ROCURONIUM BROMIDE 5 MG: 10 INJECTION, SOLUTION INTRAVENOUS at 09:14

## 2019-02-04 RX ADMIN — PROPOFOL 150 MG: 10 INJECTION, EMULSION INTRAVENOUS at 09:14

## 2019-02-04 RX ADMIN — EPHEDRINE SULFATE 5 MG: 50 INJECTION, SOLUTION INTRAVENOUS at 09:41

## 2019-02-04 RX ADMIN — NALOXONE HYDROCHLORIDE 0.04 MG: 0.4 INJECTION, SOLUTION INTRAMUSCULAR; INTRAVENOUS; SUBCUTANEOUS at 13:27

## 2019-02-04 RX ADMIN — LIDOCAINE HYDROCHLORIDE 50 MG: 20 INJECTION, SOLUTION EPIDURAL; INFILTRATION; INTRACAUDAL; PERINEURAL at 09:14

## 2019-02-04 RX ADMIN — FENTANYL CITRATE 25 MCG: 50 INJECTION, SOLUTION INTRAMUSCULAR; INTRAVENOUS at 09:21

## 2019-02-04 RX ADMIN — FENTANYL CITRATE 75 MCG: 50 INJECTION, SOLUTION INTRAMUSCULAR; INTRAVENOUS at 09:14

## 2019-02-04 RX ADMIN — NALOXONE HYDROCHLORIDE 0.04 MG: 0.4 INJECTION, SOLUTION INTRAMUSCULAR; INTRAVENOUS; SUBCUTANEOUS at 13:42

## 2019-02-04 RX ADMIN — MIDAZOLAM HYDROCHLORIDE 2 MG: 1 INJECTION, SOLUTION INTRAMUSCULAR; INTRAVENOUS at 09:07

## 2019-02-04 RX ADMIN — ONDANSETRON 4 MG: 2 INJECTION INTRAMUSCULAR; INTRAVENOUS at 09:23

## 2019-02-04 RX ADMIN — HYDROMORPHONE HYDROCHLORIDE 0.5 MG: 2 INJECTION INTRAMUSCULAR; INTRAVENOUS; SUBCUTANEOUS at 10:31

## 2019-02-04 RX ADMIN — HYDROMORPHONE HYDROCHLORIDE 1 MG: 2 INJECTION, SOLUTION INTRAMUSCULAR; INTRAVENOUS; SUBCUTANEOUS at 10:17

## 2019-02-04 RX ADMIN — SODIUM CHLORIDE, SODIUM LACTATE, POTASSIUM CHLORIDE, AND CALCIUM CHLORIDE 150 ML/HR: 600; 310; 30; 20 INJECTION, SOLUTION INTRAVENOUS at 07:44

## 2019-02-04 RX ADMIN — KETOROLAC TROMETHAMINE 30 MG: 30 INJECTION, SOLUTION INTRAMUSCULAR; INTRAVENOUS at 09:58

## 2019-02-04 RX ADMIN — SUCCINYLCHOLINE CHLORIDE 100 MG: 20 INJECTION INTRAMUSCULAR; INTRAVENOUS at 09:14

## 2019-02-04 RX ADMIN — SODIUM CHLORIDE, SODIUM LACTATE, POTASSIUM CHLORIDE, AND CALCIUM CHLORIDE 125 ML/HR: 600; 310; 30; 20 INJECTION, SOLUTION INTRAVENOUS at 11:50

## 2019-02-04 RX ADMIN — EPHEDRINE SULFATE 10 MG: 50 INJECTION, SOLUTION INTRAVENOUS at 09:56

## 2019-02-04 RX ADMIN — DEXAMETHASONE SODIUM PHOSPHATE 4 MG: 4 INJECTION, SOLUTION INTRA-ARTICULAR; INTRALESIONAL; INTRAMUSCULAR; INTRAVENOUS; SOFT TISSUE at 09:22

## 2019-02-04 RX ADMIN — EPHEDRINE SULFATE 5 MG: 50 INJECTION, SOLUTION INTRAVENOUS at 09:32

## 2019-02-04 RX ADMIN — HYDROMORPHONE HYDROCHLORIDE 0.5 MG: 2 INJECTION INTRAMUSCULAR; INTRAVENOUS; SUBCUTANEOUS at 10:42

## 2019-02-04 NOTE — OP NOTES
[address] [facility] REPORT    Citlali Murray  MR#: [id number]  : 1950  ACCOUNT #: [de-identified]   DATE OF SERVICE: 2019    PROCEDURES PERFORMED:  Hysteroscopy, dilatation and curettage and a cervical curettage using MyoSure. PREOPERATIVE DIAGNOSES:  Postmenopausal bleeding with thickened endometrium. POSTOPERATIVE DIAGNOSES:  Abnormal endometrial cavity with abundant tissue. SURGEON:  Mario Morfin MD    ANESTHESIA:  General.    ESTIMATED BLOOD LOSS:  50 mL    SPECIMENS REMOVED:  Endometrial and endocervical curettings    COMPLICATIONS:  None. IMPLANTS:  None. ASSISTANT:  None. PROCEDURE IN DETAIL:  After proper patient identification and consent were obtained, the patient was taken to the operating room where after such induction of general anesthesia, she was placed in the dorsal lithotomy position, prepped and draped in the usual sterile fashion. Examination revealed a normal uterus. A speculum was placed in the vagina. The anterior lip of the cervix was grasped with single tooth tenaculum. The cervix was dilated with a Hegar dilator in order to admit the hysteroscope. The cervix was hydrodissected with normal saline until the endometrial cavity was entered. Distention of the cavity revealed an abnormal cavity with an abundance of irregular spider web looking tissue throughout the cavity. Both tubal ostia were identified. The MyoSure device was then introduced and all of the tissue was removed with the Reach device. The hysteroscope and the monitor were then removed and endocervical curettage was performed and sent for pathology. At the end of the procedure, pad, needle and sponge counts were correct x2. Hemostasis was noted. The patient was awakened from anesthesia and went to recovery in stable condition.       MD NIEVES Tejada / RAUDEL  D: 2019 10:49     T: 2019 16:24  JOB #: [id number]

## 2019-02-04 NOTE — ANESTHESIA PREPROCEDURE EVALUATION
Anesthetic History No history of anesthetic complications Review of Systems / Medical History Patient summary reviewed, nursing notes reviewed and pertinent labs reviewed Pulmonary Within defined limits Neuro/Psych Psychiatric history (anxiety) Comments: Fall 1 year ago causing some memory problems Cardiovascular Within defined limits Hypertension: well controlled Exercise tolerance: >4 METS 
  
GI/Hepatic/Renal 
  
GERD: well controlled Endo/Other Hypothyroidism: well controlled Morbid obesity and arthritis Other Findings Physical Exam 
 
Airway Mallampati: I Neck ROM: normal range of motion Mouth opening: Normal 
 
 Cardiovascular Regular rate and rhythm,  S1 and S2 normal,  no murmur, click, rub, or gallop Rhythm: regular Rate: normal 
 
 
 
 Dental 
 
Dentition: Caps/crowns Pulmonary Breath sounds clear to auscultation Abdominal 
GI exam deferred Other Findings Anesthetic Plan ASA: 3 Anesthesia type: general 
 
 
 
 
Induction: Intravenous Anesthetic plan and risks discussed with: Patient
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
## 2019-02-04 NOTE — ANESTHESIA POSTPROCEDURE EVALUATION
Procedure(s): HYSTEROSCOPY, DILATATION AND CURETTAGE WITH MYOSURE. Anesthesia Post Evaluation Multimodal analgesia: multimodal analgesia used between 6 hours prior to anesthesia start to PACU discharge Patient location during evaluation: bedside Patient participation: complete - patient participated Level of consciousness: awake Pain management: adequate Airway patency: patent Anesthetic complications: no 
Cardiovascular status: acceptable Respiratory status: acceptable Hydration status: acceptable Visit Vitals /78 Pulse 88 Temp 36.4 °C (97.5 °F) Resp 11 Ht 5' 4\" (1.626 m) Wt 113.7 kg (250 lb 10.6 oz) SpO2 93% BMI 43.03 kg/m²

## 2019-02-04 NOTE — DISCHARGE INSTRUCTIONS
POSTOPERATIVE INSTRUCTIONS  FOR D&C, HYSTEROSCOPY & NOVASURE      A D&C is a minor procedure. Your recovery from each one of these procedures should be relatively quick and uneventful. There are a few points that we ask you to remember:       1. Absolutely no intercourse, douching or tampon use for two weeks. 2. You can expect to have some vaginal bleeding or bloody vaginal discharge for the    next 2 to 4 weeks. 3. You may take tub baths after one week and showers at any time. 4. You may resume normal everyday activities as you feel able and return to work    within 2-5 days after surgery. 5. Notify us if you experience:     a.  heavy vaginal bleeding or foul vaginal discharge    b.  temperature of 101° or greater    c.  severe pelvic or vaginal pain      6. Please call the office today at [phone] to schedule your checkup appointment    in 4 weeks. Above all, please notify us of a problem if it arises before we see you again. In an emergency, you may contact a doctor 24 hours a day at [phone]. DISCHARGE SUMMARY from your Nurse    The following personal items collected during your admission are returned to you:   Dental Appliance: Dental Appliances: None  Vision: Visual Aid: Glasses(reading- not with patient )  Hearing Aid:    Jewelry: Jewelry: None  Clothing: Clothing: Other (comment)(street clothing placed in bag and into locker)  Other Valuables: Other Valuables: Cane(cane given to pt sister)  Valuables sent to safe:      PATIENT INSTRUCTIONS:    After general anesthesia or intravenous sedation, for 24 hours or while taking prescription Narcotics:  · Limit your activities  · Do not drive and operate hazardous machinery  · Do not make important personal or business decisions  · Do  not drink alcoholic beverages  · If you have not urinated within 8 hours after discharge, please contact your surgeon on call.     Report the following to your surgeon:  · Excessive pain, swelling, redness or odor of or around the surgical area  · Temperature over 100.5  · Nausea and vomiting lasting longer than 4 hours or if unable to take medications  · Any signs of decreased circulation or nerve impairment to extremity: change in color, persistent  numbness, tingling, coldness or increase pain  · Any questions    COUGH AND DEEP BREATHE    Breathing deep and coughing are very important exercises to do after surgery. Deep breathing and coughing open the little air tubes and air sacks in your lungs. You take deep breaths every day. You may not even notice - it is just something you do when you sigh or yawn. It is a natural exercise you do to keep these air passages open. After surgery, take deep breaths and cough, on purpose. Coughing and deep breathing help prevent bronchitis and pneumonia after surgery. If you had chest or belly surgery, use a pillow as a \"hug chandrakant\" and hold it tightly to your chest or belly when you cough. DIRECTIONS:  6. Take 10 to 15 slow deep breaths every hour while awake. 7. Breathe in deeply, and hold it for 2 seconds. 8. Exhale slowly through puckered lips, like blowing up a balloon. 9. After every 4th or 5th deep breath, hug your pillow to your chest or belly and give a hard, deep cough. Yes, it will probably hurt. But doing this exercise is very important part of healing after surgery. Take your pain medicine to help you do this exercise without too much pain. IF YOU HAVE BEEN DIAGNOSED WITH SLEEP APNEA, PLEASE USE YOUR SLEEP APNEA DEVICE OR CPAP MACHINE WHEN YOU INTEND TO NAP AFTER TAKING PAIN MEDICATION. Ankle Pumps    Ankle pumps increase the circulation of oxygenated blood to your lower extremities and decrease your risk for circulation problems such as blood clots.  They also stretch the muscles, tendons and ligaments in your foot and ankle, and prevent joint contracture in the ankle and foot, especially after surgeries on the legs. It is important to do ankle pump exercises regularly after surgery because immobility increases your risk for developing a blood clot. Your doctor may also have you take an Aspirin for the next few days as well. If your doctor did not ask you to take an Aspirin, consult with him before starting Aspirin therapy on your own. Slowly point your foot forward, feeling the muscles on the top of your lower leg stretch, and hold this position for 5 seconds. Next, pull your foot back toward you as far as possible, stretching the calf muscles, and hold that position for 5 seconds. Repeat with the other foot. Perform 10 repetitions every hour while awake for both ankles if possible (down and then up with the foot once is one repetition). You should feel gentle stretching of the muscles in your lower leg when doing this exercise. If you feel pain, or your range of motion is limited, don't  Push too hard. Only go the limit your joint and muscles will let you go. If you have increasing pain, progressively worsening leg warmth or swelling, STOP the exercise and call your doctor. Below is information about the medications your doctor is prescribing after your visit:    Other information in your discharge envelope:  []     PRESCRIPTIONS  []     PHYSICAL THERAPY PRESCRIPTION  []     APPOINTMENT CARDS  []     Regional Anesthesia Pamphlet for block or block with On-Q Catheter from Anesthesia Service  []     Medical device information sheets/pamphlets from their    []     School/work excuse note. []     /parent work excuse note. These are general instructions for a healthy lifestyle:    *  Please give a list of your current medications to your Primary Care Provider.   *  Please update this list whenever your medications are discontinued, doses are      changed, or new medications (including over-the-counter products) are added.  *  Please carry medication information at all times in case of emergency situations. About Smoking  No smoking / No tobacco products / Avoid exposure to second hand smoke    Surgeon General's Warning:  Quitting smoking now greatly reduces serious risk to your health. Obesity, smoking, and sedentary lifestyle greatly increases your risk for illness and disease. A healthy diet, regular physical exercise & weight monitoring are important for maintaining a healthy lifestyle. Congestive Heart Failure  You may be retaining fluid if you have a history of heart failure or if you experience any of the following symptoms:  Weight gain of 3 pounds or more overnight or 5 pounds in a week, increased swelling in our hands or feet or shortness of breath while lying flat in bed. Please call your doctor as soon as you notice any of these symptoms; do not wait until your next office visit. Recognize signs and symptoms of STROKE:  F - face looks uneven  A - arms unable to move or move even  S - speech slurred or non-existent  T - time-call 911 as soon as signs and symptoms begin-DO NOT go         Back to bed or wait to see if you get better-TIME IS BRAIN. Warning signs of HEART ATTACK  Call 911 if you have these symptoms    · Chest discomfort. Most heart attacks involve discomfort in the center of the chest that lasts more than a few minutes, or that goes away and comes back. It can feel like uncomfortable pressure, squeezing, fullness, or pain. · Discomfort in other areas of the upper body. Symptoms can include pain or discomfort in one or both        Arms, the back, neck, jaw, or stomach. ·  Shortness of breath with or without chest discomfort. · Other signs may include breaking out in a cold sweat, nausea, or lightheadedness    Don't wait more than five minutes to call 911 - MINUTES MATTER! Fast action can save your life.   Calling 911 is almost always the fastest way to get lifesaving treatment. Emergency Medical Services staff can begin treatment when they arrive - up to an hour sooner than if someone gets to the hospital by car.

## 2019-02-04 NOTE — PERIOP NOTES
Patients oxygen saturation 84 on RA- placed patient on 4 Liters Oxygen, patients O2 93-94 on 4 Liters Oxygen. Placed patient on LR. Patient very sleepy. Will continue to monitor patient in phase 2.  
 
1311: Patient very drowsy. On 2.5 Liters Oxygen with oxygen saturation 92-94%. 1318: Sister in to see patient. Dr. Cheryle Venegas in to evaluate patient [phone]. Orders for Narcan to be given. Will continue to monitor patient. 1331: 0.04 mg Narcan given as ordered 1339:  Patient very drowsy still. Dr. Cheryle Venegas in to see patient. Orders to give second dose of Narcan. 1343:  Second dose 0.04mg Narcan given 1410:  Patient awake and up to the bathroom. Patient safe to be discharged.

## 2019-02-04 NOTE — BRIEF OP NOTE
BRIEF OPERATIVE NOTE Date of Procedure: 2/4/2019 Preoperative Diagnosis: POST MENOPAUSAL BLEEDING Postoperative Diagnosis: POST MENOPAUSAL BLEEDING Procedure(s): HYSTEROSCOPY, DILATATION AND CURETTAGE WITH Sanna Vang Surgeon(s) and Role: 
   Julieth Guan MD - Primary Surgical Assistant: none Surgical Staff: 
Circ-1: Juventino Acuña RN Scrub Tech-1: [facility] Time In Time Out Incision Start 1710 Incision Close 1000 Anesthesia: General  
Estimated Blood Loss: 50cc Specimens:  
ID Type Source Tests Collected by Time Destination 1 : endometrial currettings Preservative Endometrial  Sunny Peter MD 2/4/2019 8423 Pathology 2 : Endocervical currettings Preservative Cervical  Sunny Peter MD 2/4/2019 6011 Pathology Findings: abnormal cavity with weblike tissue throughout Complications: none Implants: * No implants in log *

## 2019-02-07 ENCOUNTER — TELEPHONE (OUTPATIENT)
Dept: OBGYN CLINIC | Age: 69
End: 2019-02-07

## 2019-02-07 NOTE — TELEPHONE ENCOUNTER
Patient of [address] [place] [address], just had hysteroscopy D&C w/Myosure on 2/4/19. She is calling because she got the results of her pathology and had a question about how long to wait for her follow-up. They told her at post op 4 weeks but you did make a note that a medication would be needed due to significant overgrowth.

## 2019-02-12 ENCOUNTER — OFFICE VISIT (OUTPATIENT)
Dept: INTERNAL MEDICINE CLINIC | Age: 69
End: 2019-02-12

## 2019-02-12 DIAGNOSIS — F43.23 ADJUSTMENT DISORDER WITH MIXED ANXIETY AND DEPRESSED MOOD: Primary | ICD-10-CM

## 2019-03-11 ENCOUNTER — OFFICE VISIT (OUTPATIENT)
Dept: OBGYN CLINIC | Age: 69
End: 2019-03-11

## 2019-03-11 VITALS
SYSTOLIC BLOOD PRESSURE: 118 MMHG | BODY MASS INDEX: 42.51 KG/M2 | DIASTOLIC BLOOD PRESSURE: 68 MMHG | HEIGHT: 64 IN | WEIGHT: 249 LBS

## 2019-03-11 DIAGNOSIS — Z91.89 AT HIGH RISK FOR PAIN FROM PROCEDURE: ICD-10-CM

## 2019-03-11 DIAGNOSIS — N85.01 COMPLEX ENDOMETRIAL HYPERPLASIA WITHOUT ATYPIA: Primary | ICD-10-CM

## 2019-03-11 RX ORDER — OXYCODONE AND ACETAMINOPHEN 5; 325 MG/1; MG/1
1 TABLET ORAL ONCE
Qty: 1 TAB | Refills: 0 | Status: SHIPPED | OUTPATIENT
Start: 2019-03-11 | End: 2019-03-11

## 2019-03-11 RX ORDER — MEGESTROL ACETATE 40 MG/1
40 TABLET ORAL DAILY
Qty: 90 TAB | Refills: 1 | Status: SHIPPED | OUTPATIENT
Start: 2019-03-11 | End: 2019-10-11

## 2019-03-11 NOTE — PROGRESS NOTES
Postop Evaluation  Ryan Morel is a 77 y.o. female returns for a routine post-operative follow-up visit after undergoing the following: H/S, D&C, & ECC which was done 4 weeks ago. Her pathology results revealed:  1. Endometrium, curettage:   Focal complex hyperplasia without atypia arising in a background of disordered proliferative pattern endometrium. Fragments of endometrial polyp. 2. Endocervix, curettage:   Fragments of unremarkable endocervical mucosa. Since the patient's surgery, she has had typical postoperative discomfort but no significant symptoms or problems since the surgery. The patient's incision is healing well with no significant drainage. She states since the procedure, she has returned to full daily activities, ambulating, and not lifting or exercising.   PHYSICAL EXAMINATION    Gastrointestinal  · Abdominal Examination: abdomen non-tender to palpation, incision/s healing well, normal bowel sounds, no masses present  · Liver and spleen: no hepatomegaly present, spleen not palpable  · Hernias: no hernias identified    Genitourinary  · External Genitalia: normal appearance for age, no discharge present, no tenderness present, no inflammatory lesions present, no masses present, no atrophy present  · Vagina: normal vaginal vault without central or paravaginal defects, no discharge present, no inflammatory lesions present, no masses present  · Bladder: non-tender to palpation  · Urethra: appears normal  · Cervix: normal   · Uterus: normal size, shape and consistency  · Adnexa: no adnexal tenderness present, no adnexal masses present  · Perineum: perineum within normal limits, no evidence of trauma, no rashes or skin lesions present  Skin  · General Inspection: no rash, no lesions identified    Neurologic/Psychiatric  · Mental Status:  · Orientation: grossly oriented to person, place and time  · Mood and Affect: mood normal, affect appropriate    Assessment:  Complex hyperplasia without atypia    Plan:  Repeat bx in 6 months  Percocet x 1 prior to procedure  Megace 40mg daily

## 2019-03-11 NOTE — PROGRESS NOTES
Behavioral Health Progress Note    This patient was seen by the [facility] from 2pm to 2:45pm for a total of 45 minutes. Psychotropic medication changes: None    Diagnosis: Adjustment Disorder with Anxiety and Depressed Mood    Description of session/progress/interventions: Rachael Marcelino reported that she was in the ED last month for possible kidney stones and then she went out to [facility] for 2 weeks to visit with her daughter. She had a very good time and felt that her mood was good while out there although she feels like she still struggled with memory issues. She is following through with medical appointments and trying to do a better job with her diet. She has not yet started using the exercise bike in her apartment complex. She cont to struggle with sadness related to her s death and with getting his possessions sold. Her sister is still very supportive. Assessment/Plan: Mood about the same; will cont to provide support prn.

## 2019-03-12 ENCOUNTER — OFFICE VISIT (OUTPATIENT)
Dept: INTERNAL MEDICINE CLINIC | Age: 69
End: 2019-03-12

## 2019-03-12 DIAGNOSIS — F43.23 ADJUSTMENT DISORDER WITH MIXED ANXIETY AND DEPRESSED MOOD: Primary | ICD-10-CM

## 2019-03-16 NOTE — PROGRESS NOTES
Behavioral Health Progress Note    This patient was seen by the [facility] from 2pm to 2:45pm for a total of 45 minutes. Psychotropic medication changes: None    Diagnosis: Adjustment Disorder with Anxiety and Depressed Mood    Description of session/progress/interventions: Rob Heard reported that it had been a hard month in that the anniversary of her s death has passed. She has cried more this past month but reports feeling better overall. She feels that the fogginess she has complained of over the past year has improved and that she feels [employer]. Her daughter and granddaughter visited, which was also something that boosted her mood. She has not started exercising and we discussed how doing so would help her mood and her cognitive function. She stated that she would ask her sister to help her with this. Assessment/Plan: Mood and cognitive functioning improving slowly. Will con to offer support and coordinate with PCP. She plans to go back to Dr. Migel Holly for a follow- up neuropsych eval to compare to her first one. Will return in about a month.

## 2019-03-26 RX ORDER — OMEPRAZOLE 20 MG/1
CAPSULE, DELAYED RELEASE ORAL
Qty: 90 CAP | Refills: 0 | Status: SHIPPED | OUTPATIENT
Start: 2019-03-26 | End: 2019-06-22 | Stop reason: SDUPTHER

## 2019-04-09 ENCOUNTER — OFFICE VISIT (OUTPATIENT)
Dept: INTERNAL MEDICINE CLINIC | Age: 69
End: 2019-04-09

## 2019-04-09 DIAGNOSIS — F43.23 ADJUSTMENT DISORDER WITH MIXED ANXIETY AND DEPRESSED MOOD: Primary | ICD-10-CM

## 2019-04-16 NOTE — PROGRESS NOTES
Behavioral Health Progress Note   This patient was seen by the [facility] from 2pm to 2:45pm for a total of 45 minutes. Psychotropic medication changes: None    Diagnosis: Adjustment Disorder with Anxiety and Depressed Mood    Description of session/progress/interventions: Severiano Villafuerte was more tearful during the session. She stated that she started some new medication and wasnt sure if her lower mood was due to this if or if she was feeling more depressed. We discussed that she has been feeling down for a while but she is adamant about not wanting to take an antidepressant. We discussed that it might be useful for her to start seeing a therapist in the community that she can see more frequently and she agreed. She has made some progress in cleaning her house but is still struggling with motivation. She is feeling lonely but is struggling with how to meet people. She stated that she used to enjoy [employer] but did not want anything too [id number] [facility]; we discussed the [facility] in [facility] and she said that she would read up on it and consider attending. She was given the names and contact information for a couple of therapists and she said she would call them. Assessment/Plan: Severiano Villafuerte will see another therapist and will return to see [facility] to wrap up. Will coordinate with PCP.

## 2019-04-22 ENCOUNTER — TELEPHONE (OUTPATIENT)
Dept: OBGYN CLINIC | Age: 69
End: 2019-04-22

## 2019-04-22 NOTE — TELEPHONE ENCOUNTER
Call received at 520am    77year old patient last seen in the office on 3/11/19  H/S, D&C, & ECC       Patient has been having light bleeding with only one pad per day. Patient reports light bleeding since the procedure. Patient reports cramping yesterday at 5 on the pain scale of 1-10 and no discomfort today. Patient reports the spotting continues. Patient is scheduled to be seen on 4/25/19  Patient wondering if she should stop the Megace thinking that might be causing the bleeding.     Please advise

## 2019-04-25 ENCOUNTER — OFFICE VISIT (OUTPATIENT)
Dept: OBGYN CLINIC | Age: 69
End: 2019-04-25

## 2019-04-25 VITALS
DIASTOLIC BLOOD PRESSURE: 80 MMHG | WEIGHT: 249 LBS | SYSTOLIC BLOOD PRESSURE: 142 MMHG | BODY MASS INDEX: 42.51 KG/M2 | HEIGHT: 64 IN

## 2019-04-25 DIAGNOSIS — N95.0 POSTMENOPAUSAL BLEEDING: Primary | ICD-10-CM
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
## 2019-04-25 NOTE — PATIENT INSTRUCTIONS

## 2019-04-26 ENCOUNTER — APPOINTMENT (OUTPATIENT)
Dept: CT IMAGING | Age: 69
End: 2019-04-26
Attending: STUDENT IN AN ORGANIZED HEALTH CARE EDUCATION/TRAINING PROGRAM
Payer: MEDICARE

## 2019-04-26 ENCOUNTER — HOSPITAL ENCOUNTER (EMERGENCY)
Age: 69
Discharge: HOME | End: 2019-04-26
Attending: STUDENT IN AN ORGANIZED HEALTH CARE EDUCATION/TRAINING PROGRAM
Payer: MEDICARE

## 2019-04-26 VITALS
BODY MASS INDEX: 42.51 KG/M2 | WEIGHT: 249 LBS | OXYGEN SATURATION: 96 % | TEMPERATURE: 98.2 F | HEIGHT: 64 IN | SYSTOLIC BLOOD PRESSURE: 175 MMHG | HEART RATE: 88 BPM | DIASTOLIC BLOOD PRESSURE: 86 MMHG | RESPIRATION RATE: 18 BRPM

## 2019-04-26 DIAGNOSIS — N20.0 KIDNEY STONE: Primary | ICD-10-CM

## 2019-04-26 LAB
ALBUMIN SERPL-MCNC: 3.5 G/DL (ref 3.5–5)
ALBUMIN/GLOB SERPL: 1 {RATIO} (ref 1.1–2.2)
ALP SERPL-CCNC: 96 U/L (ref 45–117)
ALT SERPL-CCNC: 20 U/L (ref 12–78)
ANION GAP SERPL CALC-SCNC: 6 MMOL/L (ref 5–15)
APPEARANCE UR: ABNORMAL
AST SERPL-CCNC: 10 U/L (ref 15–37)
BACTERIA URNS QL MICRO: ABNORMAL /HPF
BASOPHILS # BLD: 0 K/UL (ref 0–0.1)
BASOPHILS NFR BLD: 0 % (ref 0–1)
BILIRUB SERPL-MCNC: 0.6 MG/DL (ref 0.2–1)
BILIRUB UR QL: NEGATIVE
BUN SERPL-MCNC: 12 MG/DL (ref 6–20)
BUN/CREAT SERPL: 14 (ref 12–20)
CALCIUM SERPL-MCNC: 9.2 MG/DL (ref 8.5–10.1)
CHLORIDE SERPL-SCNC: 110 MMOL/L (ref 97–108)
CO2 SERPL-SCNC: 25 MMOL/L (ref 21–32)
COLOR UR: ABNORMAL
CREAT SERPL-MCNC: 0.85 MG/DL (ref 0.55–1.02)
DIFFERENTIAL METHOD BLD: NORMAL
EOSINOPHIL # BLD: 0.4 K/UL (ref 0–0.4)
EOSINOPHIL NFR BLD: 5 % (ref 0–7)
EPITH CASTS URNS QL MICRO: ABNORMAL /LPF
ERYTHROCYTE [DISTWIDTH] IN BLOOD BY AUTOMATED COUNT: 12 % (ref 11.5–14.5)
GLOBULIN SER CALC-MCNC: 3.5 G/DL (ref 2–4)
GLUCOSE SERPL-MCNC: 119 MG/DL (ref 65–100)
GLUCOSE UR STRIP.AUTO-MCNC: NEGATIVE MG/DL
HCT VFR BLD AUTO: 41.6 % (ref 35–47)
HGB BLD-MCNC: 14.4 G/DL (ref 11.5–16)
HGB UR QL STRIP: ABNORMAL
IMM GRANULOCYTES # BLD AUTO: 0 K/UL (ref 0–0.04)
IMM GRANULOCYTES NFR BLD AUTO: 0 % (ref 0–0.5)
KETONES UR QL STRIP.AUTO: NEGATIVE MG/DL
LEUKOCYTE ESTERASE UR QL STRIP.AUTO: ABNORMAL
LYMPHOCYTES # BLD: 2.9 K/UL (ref 0.8–3.5)
LYMPHOCYTES NFR BLD: 36 % (ref 12–49)
MCH RBC QN AUTO: 32.7 PG (ref 26–34)
MCHC RBC AUTO-ENTMCNC: 34.6 G/DL (ref 30–36.5)
MCV RBC AUTO: 94.3 FL (ref 80–99)
MONOCYTES # BLD: 0.5 K/UL (ref 0–1)
MONOCYTES NFR BLD: 7 % (ref 5–13)
MUCOUS THREADS URNS QL MICRO: ABNORMAL /LPF
NEUTS SEG # BLD: 4.1 K/UL (ref 1.8–8)
NEUTS SEG NFR BLD: 52 % (ref 32–75)
NITRITE UR QL STRIP.AUTO: NEGATIVE
NRBC # BLD: 0 K/UL (ref 0–0.01)
NRBC BLD-RTO: 0 PER 100 WBC
PH UR STRIP: 5 [PH] (ref 5–8)
PLATELET # BLD AUTO: 237 K/UL (ref 150–400)
PMV BLD AUTO: 10.9 FL (ref 8.9–12.9)
POTASSIUM SERPL-SCNC: 4 MMOL/L (ref 3.5–5.1)
PROT SERPL-MCNC: 7 G/DL (ref 6.4–8.2)
PROT UR STRIP-MCNC: NEGATIVE MG/DL
RBC # BLD AUTO: 4.41 M/UL (ref 3.8–5.2)
RBC #/AREA URNS HPF: ABNORMAL /HPF (ref 0–5)
SODIUM SERPL-SCNC: 141 MMOL/L (ref 136–145)
SP GR UR REFRACTOMETRY: 1.02 (ref 1–1.03)
UR CULT HOLD, URHOLD: NORMAL
UROBILINOGEN UR QL STRIP.AUTO: 0.2 EU/DL (ref 0.2–1)
WBC # BLD AUTO: 8 K/UL (ref 3.6–11)
WBC URNS QL MICRO: ABNORMAL /HPF (ref 0–4)

## 2019-04-26 PROCEDURE — 36415 COLL VENOUS BLD VENIPUNCTURE: CPT

## 2019-04-26 PROCEDURE — 81001 URINALYSIS AUTO W/SCOPE: CPT

## 2019-04-26 PROCEDURE — 80053 COMPREHEN METABOLIC PANEL: CPT

## 2019-04-26 PROCEDURE — 74176 CT ABD & PELVIS W/O CONTRAST: CPT

## 2019-04-26 PROCEDURE — 85025 COMPLETE CBC W/AUTO DIFF WBC: CPT

## 2019-04-26 PROCEDURE — 99283 EMERGENCY DEPT VISIT LOW MDM: CPT

## 2019-04-26 RX ORDER — KETOROLAC TROMETHAMINE 10 MG/1
10 TABLET, FILM COATED ORAL
Qty: 12 TAB | Refills: 0 | Status: SHIPPED | OUTPATIENT
Start: 2019-04-26 | End: 2019-04-29

## 2019-04-26 RX ORDER — ONDANSETRON 4 MG/1
4 TABLET, ORALLY DISINTEGRATING ORAL
Qty: 12 TAB | Refills: 0 | Status: SHIPPED | OUTPATIENT
Start: 2019-04-26 | End: 2019-04-29

## 2019-04-26 NOTE — DISCHARGE INSTRUCTIONS
Patient Education        Kidney Stone: Care Instructions  Your Care Instructions    Kidney stones are formed when salts, minerals, and other substances normally found in the urine clump together. They can be as small as grains of sand or, rarely, as large as golf balls. While the stone is traveling through the ureter, which is the tube that carries urine from the kidney to the bladder, you will probably feel pain. The pain may be mild or very severe. You may also have some blood in your urine. As soon as the stone reaches the bladder, any intense pain should go away. If a stone is too large to pass on its own, you may need a medical procedure to help you pass the stone. The doctor has checked you carefully, but problems can develop later. If you notice any problems or new symptoms, get medical treatment right away. Follow-up care is a key part of your treatment and safety. Be sure to make and go to all appointments, and call your doctor if you are having problems. It's also a good idea to know your test results and keep a list of the medicines you take. How can you care for yourself at home? · Drink plenty of fluids, enough so that your urine is light yellow or clear like water. If you have kidney, heart, or liver disease and have to limit fluids, talk with your doctor before you increase the amount of fluids you drink. · Take pain medicines exactly as directed. Call your doctor if you think you are having a problem with your medicine. ? If the doctor gave you a prescription medicine for pain, take it as prescribed. ? If you are not taking a prescription pain medicine, ask your doctor if you can take an over-the-counter medicine. Read and follow all instructions on the label. · Your doctor may ask you to strain your urine so that you can collect your kidney stone when it passes. You can use a kitchen strainer or a tea strainer to catch the stone.  Store it in a plastic bag until you see your doctor again.  Preventing future kidney stones  Some changes in your diet may help prevent kidney stones. Depending on the cause of your stones, your doctor may recommend that you:  · Drink plenty of fluids, enough so that your urine is light yellow or clear like water. If you have kidney, heart, or liver disease and have to limit fluids, talk with your doctor before you increase the amount of fluids you drink. · Limit coffee, tea, and alcohol. Also avoid grapefruit juice. · Do not take more than the recommended daily dose of vitamins C and D.  · Avoid antacids such as Gaviscon, Maalox, Mylanta, or Tums. · Limit the amount of salt (sodium) in your diet. · Eat a balanced diet that is not too high in protein. · Limit foods that are high in a substance called oxalate, which can cause kidney stones. These foods include dark green vegetables, rhubarb, chocolate, wheat bran, nuts, cranberries, and beans. When should you call for help? Call your doctor now or seek immediate medical care if:    · You cannot keep down fluids.     · Your pain gets worse.     · You have a fever or chills.     · You have new or worse pain in your back just below your rib cage (the flank area).     · You have new or more blood in your urine.    Watch closely for changes in your health, and be sure to contact your doctor if:    · You do not get better as expected. Where can you learn more? Go to http://alexus-kunal.info/. Enter V193 in the search box to learn more about \"Kidney Stone: Care Instructions. \"  Current as of: March 14, 2018  Content Version: 11.9  © 7520-4453 [employer]. Care instructions adapted under license by [employer] (which disclaims liability or warranty for this information).  If you have questions about a medical condition or this instruction, always ask your healthcare professional. [address] any warranty or liability for your use of this information.
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
## 2019-04-26 NOTE — ED TRIAGE NOTES
Patient c/o sudden pain to left \" ovary\" area. Patient having vaginal bleeding, had been taking medication \" to stop it\".

## 2019-05-08 ENCOUNTER — HOSPITAL ENCOUNTER (OUTPATIENT)
Dept: CT IMAGING | Age: 69
Discharge: HOME | End: 2019-05-08
Attending: ORTHOPAEDIC SURGERY
Payer: MEDICARE

## 2019-05-08 DIAGNOSIS — M17.11 OSTEOARTHRITIS OF RIGHT KNEE: ICD-10-CM

## 2019-05-08 DIAGNOSIS — M25.561 RIGHT KNEE PAIN: ICD-10-CM

## 2019-05-08 DIAGNOSIS — I10 BENIGN ESSENTIAL HTN: ICD-10-CM

## 2019-05-08 DIAGNOSIS — E03.9 ACQUIRED HYPOTHYROIDISM: ICD-10-CM

## 2019-05-08 PROCEDURE — 73700 CT LOWER EXTREMITY W/O DYE: CPT

## 2019-05-08 RX ORDER — METOPROLOL SUCCINATE 50 MG/1
TABLET, EXTENDED RELEASE ORAL
Qty: 30 TAB | Refills: 5 | Status: SHIPPED | OUTPATIENT
Start: 2019-05-08 | End: 2019-10-11

## 2019-05-08 RX ORDER — LEVOTHYROXINE SODIUM 125 UG/1
TABLET ORAL
Qty: 30 TAB | Refills: 5 | Status: SHIPPED | OUTPATIENT
Start: 2019-05-08 | End: 2019-11-09 | Stop reason: SDUPTHER

## 2019-05-14 ENCOUNTER — OFFICE VISIT (OUTPATIENT)
Dept: INTERNAL MEDICINE CLINIC | Age: 69
End: 2019-05-14

## 2019-05-14 DIAGNOSIS — F43.23 ADJUSTMENT DISORDER WITH MIXED ANXIETY AND DEPRESSED MOOD: Primary | ICD-10-CM

## 2019-06-11 ENCOUNTER — OFFICE VISIT (OUTPATIENT)
Dept: INTERNAL MEDICINE CLINIC | Age: 69
End: 2019-06-11

## 2019-06-11 DIAGNOSIS — F43.23 ADJUSTMENT DISORDER WITH MIXED ANXIETY AND DEPRESSED MOOD: Primary | ICD-10-CM

## 2019-07-01 ENCOUNTER — OFFICE VISIT (OUTPATIENT)
Dept: INTERNAL MEDICINE CLINIC | Age: 69
End: 2019-07-01

## 2019-07-01 VITALS
OXYGEN SATURATION: 96 % | TEMPERATURE: 98.2 F | BODY MASS INDEX: 42.55 KG/M2 | HEIGHT: 64 IN | WEIGHT: 249.2 LBS | HEART RATE: 71 BPM | DIASTOLIC BLOOD PRESSURE: 82 MMHG | RESPIRATION RATE: 18 BRPM | SYSTOLIC BLOOD PRESSURE: 135 MMHG

## 2019-07-01 DIAGNOSIS — F43.21 GRIEF REACTION: ICD-10-CM

## 2019-07-01 DIAGNOSIS — E66.01 OBESITY, MORBID (HCC): ICD-10-CM

## 2019-07-01 DIAGNOSIS — Z87.828 HISTORY OF HEAD INJURY: ICD-10-CM

## 2019-07-01 DIAGNOSIS — R51.9 FRONTAL HEADACHE: Primary | ICD-10-CM

## 2019-07-01 DIAGNOSIS — Z86.69 HISTORY OF MIGRAINE: ICD-10-CM

## 2019-07-01 DIAGNOSIS — I10 BENIGN ESSENTIAL HTN: ICD-10-CM

## 2019-07-01 NOTE — PROGRESS NOTES
HISTORY OF PRESENT ILLNESS    Chief Complaint   Patient presents with    Headache     behind the eyes, often/ from fall?  Sweats     night time? from uterine lining? Presents for follow-up    Reports headaches behind right eye/right frontal region for over 2-3 months. Reports they feel like ocular migraines. Mild associated nausea. She fell 11/2017 and had head injury with concussion. She hit the left side of her head. Depression/anxiety. Saw Jolly Ibrahim for counseling,last 1 month ago. Saw Dr. Mukund Enriquez for neuropsych testing. Night sweats. Taking megace for endometrial issues per gyn. Review of Systems   All other systems reviewed and are negative, except as noted in HPI    Past Medical and Surgical History   has a past medical history of Abnormal Pap smear of cervix (11/2018), Benign essential HTN, Complex endometrial hyperplasia without atypia (02/2019), Grief reaction, Head injury (12/23/2017), History of panic attacks, Hypothyroidism, Impaired gait, Osteopenia (10/2018), Postconcussion syndrome (02/2018), and Right knee pain. has a past surgical history that includes hx cholecystectomy (1991); hx tonsil and adenoidectomy (1955); hx knee arthroscopy (Right, 1978); hx tubal ligation (1984); hx colonoscopy (11/24/2009); colonoscopy (N/A, 9/19/2018); hx colposcopy (11/26/2018); and hx dilation and curettage (02/2019). reports that she quit smoking about 9 years ago. She has a 5.00 pack-year smoking history. She has never used smokeless tobacco. She reports that she drinks about 4.2 oz of alcohol per week. She reports that she does not use drugs. family history includes Diabetes in her mother and sister; Heart Disease in her sister; Thyroid Disease in her mother and sister. Physical Exam   Nursing note and vitals reviewed. Blood pressure 135/82, pulse 71, temperature 98.2 °F (36.8 °C), temperature source Oral, resp.  rate 18, height 5' 4\" (1.626 m), weight 249 lb 3.2 oz (113 kg), SpO2 96 %. Constitutional:  No distress. Eyes: Conjunctivae are normal.   Ears:  Hearing grossly intact  Cardiovascular: Normal rate. regular rhythm, no murmurs or gallops  No edema  Pulmonary/Chest: Effort normal.   CTAB  Musculoskeletal: moves all 4 extremities   Neurological: Alert and oriented to person, place, and time. Skin: No rash noted. Psychiatric: Normal mood and affect. Behavior is normal.     ASSESSMENT and PLAN  Diagnoses and all orders for this visit:    1. Frontal headache- new daily R frontal/posterior orbital headaches. May be ocular migraine, but need to r/o mass/hematoma  -     CT HEAD W CONT; Future    2. History of migraine  -     CT HEAD W CONT; Future    3. Benign essential HTN- Controlled on current regimen. Continue current medications as written in chart. 4. Obesity, morbid ([address]  The patient is asked to make an attempt to improve diet and exercise patterns to aid in medical management of this problem    5. Grief reaction    6. History of head injury  -     CT HEAD W CONT; Future            lab results and schedule of future lab studies reviewed with patient  reviewed medications and side effects in detail    Return to clinic for further evaluation if new symptoms develop        Current Outpatient Medications   Medication Sig    omeprazole (PRILOSEC) 20 mg capsule TAKE 1 CAPSULE BY MOUTH EVERY DAY    metoprolol succinate (TOPROL-XL) 50 mg XL tablet TAKE 1 TABLET BY MOUTH EVERY DAY    levothyroxine (SYNTHROID) 125 mcg tablet TAKE 1 TABLET BY MOUTH EVERY DAY BEFORE BREAKFAST    megestrol (MEGACE) 40 mg tablet Take 1 Tab by mouth daily.  diclofenac potassium (CATAFLAM) 50 mg tablet Take 50 mg by mouth two (2) times a day.  MULTIVITAMIN PO Take 1 Tab by mouth daily.  ibuprofen (MOTRIN) 200 mg tablet Take 400 mg by mouth as needed for Pain.  ALPRAZolam (XANAX) 0.25 mg tablet Take 1 Tab by mouth two (2) times daily as needed (for flying).  Max Daily Amount: 0.5 mg. No current facility-administered medications for this visit. Discussed the patient's BMI with her. The BMI follow up plan is as follows:     dietary management education, guidance, and counseling  encourage exercise  monitor weight  prescribed dietary intake    An After Visit Summary was printed and given to the patient.

## 2019-07-01 NOTE — PATIENT INSTRUCTIONS
Body Mass Index: Care Instructions Your Care Instructions Body mass index (BMI) can help you see if your weight is raising your risk for health problems. It uses a formula to compare how much you weigh with how tall you are. · A BMI lower than 18.5 is considered underweight. · A BMI between 18.5 and 24.9 is considered healthy. · A BMI between 25 and 29.9 is considered overweight. A BMI of 30 or higher is considered obese. If your BMI is in the normal range, it means that you have a lower risk for weight-related health problems. If your BMI is in the overweight or obese range, you may be at increased risk for weight-related health problems, such as high blood pressure, heart disease, stroke, arthritis or joint pain, and diabetes. If your BMI is in the underweight range, you may be at increased risk for health problems such as fatigue, lower protection (immunity) against illness, muscle loss, bone loss, hair loss, and hormone problems. BMI is just one measure of your risk for weight-related health problems. You may be at higher risk for health problems if you are not active, you eat an unhealthy diet, or you drink too much alcohol or use tobacco products. Follow-up care is a key part of your treatment and safety. Be sure to make and go to all appointments, and call your doctor if you are having problems. It's also a good idea to know your test results and keep a list of the medicines you take. How can you care for yourself at home? · Practice healthy eating habits. This includes eating plenty of fruits, vegetables, whole grains, lean protein, and low-fat dairy. · If your doctor recommends it, get more exercise. Walking is a good choice. Bit by bit, increase the amount you walk every day. Try for at least 30 minutes on most days of the week. · Do not smoke. Smoking can increase your risk for health problems.  If you need help quitting, talk to your doctor about stop-smoking programs and medicines. These can increase your chances of quitting for good. · Limit alcohol to 2 drinks a day for men and 1 drink a day for women. Too much alcohol can cause health problems. If you have a BMI higher than 25 · Your doctor may do other tests to check your risk for weight-related health problems. This may include measuring the distance around your waist. A waist measurement of more than 40 inches in men or 35 inches in women can increase the risk of weight-related health problems. · Talk with your doctor about steps you can take to stay healthy or improve your health. You may need to make lifestyle changes to lose weight and stay healthy, such as changing your diet and getting regular exercise. If you have a BMI lower than 18.5 · Your doctor may do other tests to check your risk for health problems. · Talk with your doctor about steps you can take to stay healthy or improve your health. You may need to make lifestyle changes to gain or maintain weight and stay healthy, such as getting more healthy foods in your diet and doing exercises to build muscle. Where can you learn more? Go to http://alexus-kunal.info/. Enter S176 in the search box to learn more about \"Body Mass Index: Care Instructions. \" Current as of: October 13, 2016 Content Version: 11.4 © 5628-3201 [employer]. Care instructions adapted under license by [employer] (which disclaims liability or warranty for this information). If you have questions about a medical condition or this instruction, always ask your healthcare professional. [address] any warranty or liability for your use of this information.

## 2019-07-11 ENCOUNTER — HOSPITAL ENCOUNTER (OUTPATIENT)
Dept: CT IMAGING | Age: 69
Discharge: HOME | End: 2019-07-11
Attending: INTERNAL MEDICINE
Payer: MEDICARE

## 2019-07-11 DIAGNOSIS — R51.9 FRONTAL HEADACHE: ICD-10-CM

## 2019-07-11 DIAGNOSIS — Z86.69 HISTORY OF MIGRAINE: ICD-10-CM

## 2019-07-11 DIAGNOSIS — Z87.828 HISTORY OF HEAD INJURY: ICD-10-CM

## 2019-07-11 PROCEDURE — 70450 CT HEAD/BRAIN W/O DYE: CPT

## 2019-07-18 ENCOUNTER — HOSPITAL ENCOUNTER (OUTPATIENT)
Dept: MAMMOGRAPHY | Age: 69
Discharge: HOME | End: 2019-07-18
Attending: INTERNAL MEDICINE
Payer: MEDICARE

## 2019-07-18 DIAGNOSIS — Z12.39 BREAST SCREENING, UNSPECIFIED: ICD-10-CM

## 2019-07-18 PROCEDURE — 77067 SCR MAMMO BI INCL CAD: CPT

## 2019-07-19 NOTE — ED NOTES
Patient given resource list for grief counseling, bereavement groups available through [facility] by Dr. Gracia Stanton. Patient already has an appointment set up to get established with a PCP. Patient has the phone number for [facility] and voices understanding she can call [phone] and or  return to the ER if she has symptoms that are of concern to her. Dr. Gracia Stanton is preparing discharge instructions.
Room air saturations documented. Ambulation to bathroom using cane and standby. Pulse ox 92-94% afterward. No shortness of breath reported or observed.
no

## 2019-09-03 ENCOUNTER — TELEPHONE (OUTPATIENT)
Dept: OBGYN CLINIC | Age: 69
End: 2019-09-03

## 2019-09-03 DIAGNOSIS — F41.9 ANXIETY: Primary | ICD-10-CM

## 2019-09-03 NOTE — TELEPHONE ENCOUNTER
Message left at 12:06PM     77year old patient last seen in the office on 4/25/19. Patient seen in by her urologist and is being referred back to Gyn due to mass noted on CT. Patient is requesting an appointment to discuss next steps. Patient provided with office fax number to be able to fax recent CT results. Patient placed on the schedule to be seen on 9/5/19 at 1:00PM.    ?  Does patient need vaginal ultrasound      Please advise

## 2019-09-04 RX ORDER — DIAZEPAM 5 MG/1
TABLET ORAL
Qty: 1 TAB | Refills: 0 | OUTPATIENT
Start: 2019-09-04 | End: 2019-09-05

## 2019-09-04 NOTE — TELEPHONE ENCOUNTER
Patient advised of MD recommendations and and was placed on the schedule for SIS on 9/10/19 at 2:30PM    Prescription for valium 5 mg take as leaving the house prior to SIS on 9/10/19. At 2:30PM phoned in as per verbal order of Dr. Malina Putnam. Patient advised that she will need a . Patient still would like to keep the appointment tomorrow to discuss concerns with  MD. Patient verbalized understanding that she will still need to have the procedure.

## 2019-09-05 ENCOUNTER — OFFICE VISIT (OUTPATIENT)
Dept: OBGYN CLINIC | Age: 69
End: 2019-09-05

## 2019-09-05 VITALS
HEIGHT: 64 IN | DIASTOLIC BLOOD PRESSURE: 69 MMHG | WEIGHT: 250 LBS | BODY MASS INDEX: 42.68 KG/M2 | SYSTOLIC BLOOD PRESSURE: 145 MMHG

## 2019-09-05 DIAGNOSIS — N85.8 UTERINE MASS: Primary | ICD-10-CM

## 2019-09-05 RX ORDER — DIAZEPAM 10 MG/1
10 TABLET ORAL ONCE
Qty: 1 TAB | Refills: 0 | Status: SHIPPED | OUTPATIENT
Start: 2019-09-05 | End: 2019-09-05

## 2019-09-10 ENCOUNTER — OFFICE VISIT (OUTPATIENT)
Dept: OBGYN CLINIC | Age: 69
End: 2019-09-10

## 2019-09-10 ENCOUNTER — HOSPITAL ENCOUNTER (OUTPATIENT)
Dept: LAB | Age: 69
Discharge: HOME | End: 2019-09-10

## 2019-09-10 DIAGNOSIS — R93.89 THICKENED ENDOMETRIUM: Primary | ICD-10-CM

## 2019-09-10 NOTE — PROGRESS NOTES
[facility]  OFFICE PROCEDURE PROGRESS NOTE        Chart reviewed for the following:   Briana Zafar LPN, have reviewed the History, Physical and updated the Allergic reactions for [address] performed immediately prior to start of procedure:   Fernando RECIO LPN, have performed the following reviews on Fabrice Denson prior to the start of the procedure:            * Patient was identified by name and date of birth   * Agreement on procedure being performed was verified  * Risks and Benefits explained to the patient  * Procedure site verified and marked as necessary  * Patient was positioned for comfort  * Consent was signed and verified     Time: 1500      Date of procedure: 9/10/2019    Procedure performed by:  Nilo Alonzo MD    How tolerated by patient: tolerated the procedure well with no complications    Post Procedural Pain Scale: 2 - Hurts Little Bit    Comments: none    SONOHYSTEROGRAPHY    Fabrice Denson is a No obstetric history on file. ,  77 y.o. female [facility] whose No LMP recorded. Patient is postmenopausal.  was on . , presents for a sonohysterography. The indications for this procedure were reviewed with the patient. The procedure was explained in detail and all questions were answered. Procedure: The patient was placed in the lithotomy position. A graves speculum was introduced into the vagina and the cervix was visualized. The cervix was prepped with iodine solution. A Cook's Hysterography catheter was then introduced into the uterine cavity and the speculum was removed. Sterile sonohysterography with 3D Reconstruction was performed. The endometrial cavity was distended with sterile saline. The findings are as follows: abnormal cavity with multiple areas of irregularity  The patient tolerated the procedure well without complication, and was discharged to home.      Procedure note: Endometrial biopsy    Fabrice Denson is a No obstetric history on file. ,  77 y.o. female [facility] No LMP recorded. Patient is postmenopausal.  The patient has a history of The encounter diagnosis was PMB (postmenopausal bleeding). and presents for an endometrial biopsy. Indications:   After the indications, risks, benefits, and alternatives to performing an endometrial biopsy were explained to the patient, her questions were answered and informed consent was obtained. Procedure: The patient was placed on the table in the dorsal lithotomy position. A bimanual exam showed the uterus to be anterior. The uterus was not enlarged. A speculum was placed in the vagina. The cervix was visualized and prepped with zephrin. A tenaculum was  placed on the anterior lip of the cervix for traction. It was not necessary to dilate the cervix. A pipelle was passed through the endocervical canal without difficulty. The uterus was sounded to 8 cm's. A moderate amount of tissue was returned. This tissue was placed in formalin and sent to pathology. It was felt that an adequate sample was obtained. The patient tolerated the procedure well and she reported mild cramping. The tenaculum and speculum were removed. Post Procedural Status: The patient was observed for 10 minutes after the procedure. She had mild cramping at the time of discharge. There were no complications. The patient was discharged in stable condition. A: abnormal SIS  On megace  S/p hyst D&C with myosure 2/2019   Path:    Endometrium, curettage:   Focal complex hyperplasia without atypia arising in a background of disordered proliferative pattern endometrium. Fragments of endometrial polyp. 2. Endocervix, curettage:   Fragments of unremarkable endocervical mucosa. P: await path - likely needs gyn onc referral  Would be unusual to develop cancer 6 months after Myosure D&C on Megace.

## 2019-09-16 ENCOUNTER — TELEPHONE (OUTPATIENT)
Dept: OBGYN CLINIC | Age: 69
End: 2019-09-16

## 2019-09-16 DIAGNOSIS — N95.0 POST-MENOPAUSAL BLEEDING: Primary | ICD-10-CM

## 2019-09-16 DIAGNOSIS — R93.89 THICKENED ENDOMETRIUM: ICD-10-CM

## 2019-09-16 NOTE — TELEPHONE ENCOUNTER
Patient of [address] [place] [address] Rd. Asking to speak with Karl Woods to discuss visit she is supposed to be setting up with Dr. Valentina Callejas.

## 2019-09-18 NOTE — PROGRESS NOTES
Added to chart. Referred to . Appointment scheduled for 9/25. Pt notified of lab results and appointment information. She verbalized understanding.

## 2019-09-25 ENCOUNTER — OFFICE VISIT (OUTPATIENT)
Dept: GYNECOLOGY | Age: 69
End: 2019-09-25

## 2019-09-25 VITALS
BODY MASS INDEX: 42.72 KG/M2 | WEIGHT: 250.2 LBS | HEART RATE: 74 BPM | SYSTOLIC BLOOD PRESSURE: 159 MMHG | HEIGHT: 64 IN | DIASTOLIC BLOOD PRESSURE: 97 MMHG

## 2019-09-25 DIAGNOSIS — R93.89 THICKENED ENDOMETRIUM: ICD-10-CM

## 2019-09-25 DIAGNOSIS — N85.8 UTERINE MASS: Primary | ICD-10-CM

## 2019-09-25 DIAGNOSIS — I10 BENIGN ESSENTIAL HTN: ICD-10-CM

## 2019-09-25 DIAGNOSIS — S09.90XS INJURY OF HEAD, SEQUELA: ICD-10-CM

## 2019-09-25 DIAGNOSIS — R61 DIAPHORESIS: ICD-10-CM

## 2019-09-25 DIAGNOSIS — E66.01 OBESITY, MORBID (HCC): ICD-10-CM

## 2019-09-25 RX ORDER — LORAZEPAM 2 MG/1
2 TABLET ORAL
Qty: 1 TAB | Refills: 0 | Status: SHIPPED | OUTPATIENT
Start: 2019-09-25 | End: 2019-09-26

## 2019-09-25 NOTE — LETTER
9/29/2019 7:36 PM 
 
Patient:  India Duron YOB: 1950 Date of Visit: 9/25/2019 Miki Emanuel MD 
[address] [facility] [address] [facility] [address] [id number] VIA In Basket Dear Miki Emanuel MD, Thank you for referring Ms. Jos Lynn to [address] [place] [address] for evaluation and treatment. Below are the relevant portions of my assessment and plan of care. Ms. India Duron is a  77 y.o. female with a history of PMB and complex hyperplasia without atypia. As you know she has been on Megace with treatment effect; however, she recently had a CT with [facility] which demonstrates concerns for a uterine mass. Unfortunately her CT was non-contrasted which is poor at evaluating gynecologic structures. See my note for my full discussion with the patient. In short, I have recommended a pelvic MRI for further evaluation. As well, I have recommended Cardiology referral and clearance prior to any surgical intervention as she has some concerning symptoms of diaphoresis, dizziness, and lightheadedness. She will return to our office after her MRI. Thank you very much for your referral of Ms. Jos Lynn. If you have questions, please do not hesitate to call me. I look forward to following Ms. Ro along with you and will keep you updated as to her progress.   
 
 
 
 
Sincerely, 
 
 
Maeve Whitman MD

## 2019-09-25 NOTE — PROGRESS NOTES
New Patient, Referred by Dr. Kar Newman, thickened endometrium, pt states she had a small amount of spotting yesterday along with some lower abdominal cramping    Initial blood pressure reading 165/82, repeat blood pressure reading 159/97      1. Have you been to the ER, urgent care clinic since your last visit? Hospitalized since your last visit?  no    2. Have you seen or consulted any other health care providers outside of the [address] [place] [address] since your last visit? Include any pap smears or colon screening.    no

## 2019-09-25 NOTE — Clinical Note
9/29/19 Patient: Ashley Medina YOB: 1950 Date of Visit: 9/25/2019 No Recipients Dear No Recipients, Thank you for referring Ms. Arturo Schwab to [facility] for evaluation. My notes for this consultation are attached. If you have questions, please do not hesitate to call me. I look forward to following your patient along with you.  
 
 
Sincerely, 
 
Anup Sanchez MD

## 2019-09-27 ENCOUNTER — HOSPITAL ENCOUNTER (OUTPATIENT)
Dept: MRI IMAGING | Age: 69
Discharge: HOME | End: 2019-09-27
Attending: OBSTETRICS & GYNECOLOGY
Payer: MEDICARE

## 2019-09-27 VITALS — WEIGHT: 250 LBS | BODY MASS INDEX: 42.91 KG/M2

## 2019-09-27 DIAGNOSIS — N85.8 UTERINE MASS: ICD-10-CM

## 2019-09-27 PROCEDURE — A9575 INJ GADOTERATE MEGLUMI 0.1ML: HCPCS | Performed by: RADIOLOGY

## 2019-09-27 PROCEDURE — 72197 MRI PELVIS W/O & W/DYE: CPT

## 2019-09-27 PROCEDURE — 74011250636 HC RX REV CODE- 250/636: Performed by: RADIOLOGY

## 2019-09-27 RX ORDER — GADOTERATE MEGLUMINE 376.9 MG/ML
20 INJECTION INTRAVENOUS
Status: COMPLETED | OUTPATIENT
Start: 2019-09-27 | End: 2019-09-27

## 2019-09-27 RX ADMIN — GADOTERATE MEGLUMINE 20 ML: 376.9 INJECTION INTRAVENOUS at 16:09

## 2019-09-29 PROBLEM — R93.89 THICKENED ENDOMETRIUM: Status: ACTIVE | Noted: 2019-09-29
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
## 2019-10-04 ENCOUNTER — OFFICE VISIT (OUTPATIENT)
Dept: CARDIOLOGY CLINIC | Age: 69
End: 2019-10-04

## 2019-10-04 VITALS
DIASTOLIC BLOOD PRESSURE: 60 MMHG | BODY MASS INDEX: 42.85 KG/M2 | OXYGEN SATURATION: 96 % | HEIGHT: 64 IN | HEART RATE: 73 BPM | SYSTOLIC BLOOD PRESSURE: 138 MMHG | WEIGHT: 251 LBS

## 2019-10-04 DIAGNOSIS — R53.1 WEAKNESS: Primary | ICD-10-CM

## 2019-10-04 DIAGNOSIS — R42 DIZZINESS: ICD-10-CM

## 2019-10-04 DIAGNOSIS — Z01.810 PRE-OPERATIVE CARDIOVASCULAR EXAMINATION: ICD-10-CM

## 2019-10-04 DIAGNOSIS — Z00.00 ANNUAL PHYSICAL EXAM: ICD-10-CM
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
## 2019-10-04 NOTE — PROGRESS NOTES
Chief Complaint   Patient presents with    Palpitations    Hypertension    Dizziness    New Patient     Visit Vitals  /60 (BP 1 Location: Right arm, BP Patient Position: Sitting)   Pulse 73   Ht 5' 4\" (1.626 m)   Wt 251 lb (113.9 kg)   SpO2 96%   BMI 43.08 kg/m²     Chest pain denied  SOB see below  Dizziness denied  Swelling denied  Falls in past year denied  Recent hospital visit denied  Refills denied    Had spotting last year; then kidney stones. May or may not be mass. Cardiac clearance for surgery  Problems with sweating, panic attacks hx;   Does get cold, clammy sweat at times. Post concussion syndrome. Weakness reported.

## 2019-10-07 ENCOUNTER — OFFICE VISIT (OUTPATIENT)
Dept: INTERNAL MEDICINE CLINIC | Age: 69
End: 2019-10-07

## 2019-10-07 ENCOUNTER — HOSPITAL ENCOUNTER (OUTPATIENT)
Dept: LAB | Age: 69
Discharge: HOME | End: 2019-10-07
Payer: MEDICARE

## 2019-10-07 VITALS
WEIGHT: 253.8 LBS | RESPIRATION RATE: 14 BRPM | DIASTOLIC BLOOD PRESSURE: 81 MMHG | HEART RATE: 80 BPM | TEMPERATURE: 98.3 F | HEIGHT: 64 IN | BODY MASS INDEX: 43.33 KG/M2 | SYSTOLIC BLOOD PRESSURE: 142 MMHG | OXYGEN SATURATION: 96 %

## 2019-10-07 DIAGNOSIS — R30.0 DYSURIA: ICD-10-CM

## 2019-10-07 DIAGNOSIS — R35.0 URINARY FREQUENCY: Primary | ICD-10-CM

## 2019-10-07 DIAGNOSIS — N85.8 UTERINE MASS: ICD-10-CM

## 2019-10-07 LAB
BILIRUB UR QL STRIP: ABNORMAL
GLUCOSE UR-MCNC: NEGATIVE MG/DL
KETONES P FAST UR STRIP-MCNC: ABNORMAL MG/DL
PH UR STRIP: 6 [PH] (ref 4.6–8)
PROT UR QL STRIP: NEGATIVE
SP GR UR STRIP: 1.03 (ref 1–1.03)
UA UROBILINOGEN AMB POC: ABNORMAL (ref 0.2–1)
URINALYSIS CLARITY POC: CLEAR
URINALYSIS COLOR POC: YELLOW
URINE BLOOD POC: ABNORMAL
URINE LEUKOCYTES POC: NEGATIVE
URINE NITRITES POC: POSITIVE

## 2019-10-07 PROCEDURE — 87186 SC STD MICRODIL/AGAR DIL: CPT

## 2019-10-07 PROCEDURE — 87086 URINE CULTURE/COLONY COUNT: CPT

## 2019-10-07 RX ORDER — LORAZEPAM 2 MG/1
TABLET ORAL
Refills: 0 | COMMUNITY
Start: 2019-09-26 | End: 2019-10-11

## 2019-10-07 RX ORDER — NITROFURANTOIN 25; 75 MG/1; MG/1
100 CAPSULE ORAL 2 TIMES DAILY
Qty: 14 CAP | Refills: 0 | Status: SHIPPED | OUTPATIENT
Start: 2019-10-07 | End: 2019-11-15 | Stop reason: ALTCHOICE

## 2019-10-07 NOTE — PROGRESS NOTES
HISTORY OF PRESENT ILLNESS  Lucas Barron is a 77 y.o. female. HPI  Subjective:     Lucas Barron is a 77 y.o. female who complains of dysuria, frequency, foul smelling urine, burning with urination for 3 days. Patient also complains of lower pelvic pressure and some lower back pain. Patient denies fever and vaginal discharge. Patient does not have a history of recurrent UTI. Patient does not have a history of pyelonephritis. Has had recent urology work up for microscopic hematuria and Abd/Pelvis CT in 9/2019 was negative for renal/ureteral calculi but showed large mass in uterus. She is being worked up by [employer] and [facility] and is the process of having Cardiac clearance for possible upcoming GYN surgery. Patient Active Problem List   Diagnosis Code    Obesity, morbid ([facility] [address] E66.01    Postconcussion syndrome F07.81    Impaired gait R26.9    Hypothyroidism E03.9    Benign essential HTN I10    Grief reaction F43.21    Head injury S09.90XA    Advanced care planning/counseling discussion Z71.89    Osteopenia M85.80    Thickened endometrium R93.89     Past Surgical History:   Procedure Laterality Date    COLONOSCOPY N/A 9/19/2018    COLONOSCOPY performed by Lissa Robins MD at [address] [facility] HX COLONOSCOPY  11/24/2009    normal.  hx of polyps. rec 3 year f/u    HX COLPOSCOPY  11/26/2018    neg path    HX DILATION AND CURETTAGE  02/2019    H/S, D+C and ECC==path showed focal complex hyperplasia without atypia.   Dr. Anne Salas ARTHROSCOPY Right 1978    HX TONSIL AND ADENOIDECTOMY  1955    HX TUBAL LIGATION  1984     Social History     Socioeconomic History    Marital status: SINGLE     Spouse name: Not on file    Number of children: 1    Years of education: Not on file    Highest education level: Not on file   Occupational History    Not on file   Social Needs    Financial resource strain: Not on file    Food insecurity:     Worry: Not on file     Inability: Not on file    Transportation needs:     Medical: Not on file     Non-medical: Not on file   Tobacco Use    Smoking status: Former Smoker     Packs/day: 0.25     Years: 20.00     Pack years: 5.00     Last attempt to quit: 2010     Years since quittin.7    Smokeless tobacco: Never Used    Tobacco comment: Patient reports smoking socially-not daily   Substance and Sexual Activity    Alcohol use: Yes     Alcohol/week: 7.0 standard drinks     Types: 7 Glasses of wine per week    Drug use: No    Sexual activity: Not Currently     Partners: Male     Birth control/protection: None   Lifestyle    Physical activity:     Days per week: Not on file     Minutes per session: Not on file    Stress: Not on file   Relationships    Social connections:     Talks on phone: Not on file     Gets together: Not on file     Attends [employer] service: Not on file     Active member of club or organization: Not on file     Attends meetings of clubs or organizations: Not on file     Relationship status: Not on file    Intimate partner violence:     Fear of current or ex partner: Not on file     Emotionally abused: Not on file     Physically abused: Not on file     Forced sexual activity: Not on file   Other Topics Concern    Not on file   Social History Narrative    1 daughter, 2 grandkids in [facility]     Family History   Problem Relation Age of Onset    Diabetes Mother     Thyroid Disease Mother     Diabetes Sister     Heart Disease Sister     Thyroid Disease Sister      Current Outpatient Medications   Medication Sig    nitrofurantoin, macrocrystal-monohydrate, (MACROBID) 100 mg capsule Take 1 Cap by mouth two (2) times a day.     omeprazole (PRILOSEC) 20 mg capsule TAKE 1 CAPSULE BY MOUTH EVERY DAY    metoprolol succinate (TOPROL-XL) 50 mg XL tablet TAKE 1 TABLET BY MOUTH EVERY DAY    levothyroxine (SYNTHROID) 125 mcg tablet TAKE 1 TABLET BY MOUTH EVERY DAY BEFORE BREAKFAST    megestrol (MEGACE) 40 mg tablet Take 1 Tab by mouth daily.  diclofenac potassium (CATAFLAM) 50 mg tablet Take 50 mg by mouth daily.  ibuprofen (MOTRIN) 200 mg tablet Take 400 mg by mouth as needed for Pain.  LORazepam (ATIVAN) 2 mg tablet TAKE 1 TABLET BY MOUTH EVERY 6 HOURS AS NEEDED FOR ANXIETY FOR UP TO 1 DAY. MAX DAILY AMOUNT: 8 MG    MULTIVITAMIN PO Take 1 Tab by mouth daily.  ALPRAZolam (XANAX) 0.25 mg tablet Take 1 Tab by mouth two (2) times daily as needed (for flying). Max Daily Amount: 0.5 mg. No current facility-administered medications for this visit. Allergies   Allergen Reactions    Sulfa (Sulfonamide Antibiotics) Hives     Not allergic at all. Hieu Rook Hieu Rook \"yeast infection\"      Immunization History   Administered Date(s) Administered    Influenza High Dose Vaccine PF 10/15/2017    Influenza Vaccine (Tri) Adjuvanted 10/16/2018    Pneumococcal Conjugate (PCV-13) 02/09/2018    Pneumococcal Polysaccharide (PPSV-23) 07/28/2014         Review of Systems   Constitutional: Positive for malaise/fatigue. Negative for chills and fever. HENT: Negative for congestion and sore throat. Respiratory: Negative for cough. Cardiovascular: Negative for chest pain. Gastrointestinal: Positive for abdominal pain. Negative for constipation, diarrhea, nausea and vomiting. Genitourinary: Positive for dysuria, frequency and urgency. Negative for flank pain and hematuria. Musculoskeletal: Positive for back pain. Neurological: Negative for dizziness and headaches. Psychiatric/Behavioral: The patient is nervous/anxious. Physical Exam   Constitutional: She is oriented to person, place, and time. She appears well-developed and well-nourished. HENT:   Head: Normocephalic and atraumatic. Neck: Normal range of motion. Neck supple. No thyromegaly present. Cardiovascular: Normal rate and regular rhythm. Pulmonary/Chest: Effort normal and breath sounds normal.   Abdominal: Soft.  There is tenderness in the suprapubic area. There is no rebound, no guarding and no CVA tenderness. Lymphadenopathy:     She has no cervical adenopathy. Neurological: She is alert and oriented to person, place, and time. Psychiatric: Her speech is normal. Her mood appears anxious. Nursing note and vitals reviewed. Results for orders placed or performed in visit on 10/07/19   AMB POC URINALYSIS DIP STICK AUTO W/O MICRO     Status: Abnormal   Result Value Ref Range Status    Color (UA POC) Yellow  Final    Clarity (UA POC) Clear  Final    Glucose (UA POC) Negative Negative Final    Bilirubin (UA POC) 1+ Negative Final    Ketones (UA POC) Trace Negative Final    Specific gravity (UA POC) 1.030 1.001 - 1.035 Final    Blood (UA POC) 3+ Negative Final    pH (UA POC) 6.0 4.6 - 8.0 Final    Protein (UA POC) Negative Negative Final    Urobilinogen (UA POC) 0.2 mg/dL 0.2 - 1 Final    Nitrites (UA POC) Positive Negative Final    Leukocyte esterase (UA POC) Negative Negative Final       ASSESSMENT and PLAN  Diagnoses and all orders for this visit:    1. Urinary frequency  -     AMB POC URINALYSIS DIP STICK AUTO W/O MICRO  -     CULTURE, URINE    2. Dysuria  -     CULTURE, URINE  -     nitrofurantoin, macrocrystal-monohydrate, (MACROBID) 100 mg capsule; Take 1 Cap by mouth two (2) times a day. 3. Uterine mass - currently undergoing work up for evaluation of mass; she is anticipating surgery and is undergoing cardiac testing for risk assessment.     4. Microscopic hematuria -- has had recent work up with Urology without significant urological findings        lab results and schedule of future lab studies reviewed with patient  reviewed diet, exercise and weight control  reviewed medications and side effects in detail

## 2019-10-07 NOTE — PATIENT INSTRUCTIONS
Painful Urination (Dysuria): Care Instructions  Your Care Instructions  Burning pain with urination (dysuria) is a common symptom of a urinary tract infection or other urinary problems. The bladder may become inflamed. This can cause pain when the bladder fills and empties. You may also feel pain if the tube that carries urine from the bladder to the outside of the body (urethra) gets irritated or infected. Sexually transmitted infections (STIs) also may cause pain when you urinate. Sometimes the pain can be caused by things other than an infection. The urethra can be irritated by soaps, perfumes, or foreign objects in the urethra. Kidney stones can cause pain when they pass through the urethra. The cause may be hard to find. You may need tests. Treatment for painful urination depends on the cause. Follow-up care is a key part of your treatment and safety. Be sure to make and go to all appointments, and call your doctor if you are having problems. It's also a good idea to know your test results and keep a list of the medicines you take. How can you care for yourself at home? · Drink extra water for the next day or two. This will help make the urine less concentrated. (If you have kidney, heart, or liver disease and have to limit fluids, talk with your doctor before you increase the amount of fluids you drink.)  · Avoid drinks that are carbonated or have caffeine. They can irritate the bladder. · Urinate often. Try to empty your bladder each time. For women:  · Urinate right after you have sex. · After going to the bathroom, wipe from front to back. · Avoid douches, bubble baths, and feminine hygiene sprays. And avoid other feminine hygiene products that have deodorants. When should you call for help? Call your doctor now or seek immediate medical care if:    · You have new symptoms, such as fever, nausea, or vomiting.     · You have new or worse symptoms of a urinary problem. For example:  ?  You have blood or pus in your urine. ? You have chills or body aches. ? It hurts worse to urinate. ? You have groin or belly pain. ? You have pain in your back just below your rib cage (the flank area).    Watch closely for changes in your health, and be sure to contact your doctor if you have any problems. Where can you learn more? Go to http://alexus-kunal.info/. Enter T242 in the search box to learn more about \"Painful Urination (Dysuria): Care Instructions. \"  Current as of: December 19, 2018  Content Version: 12.2  © 8794-8686 [employer]. Care instructions adapted under license by [employer] (which disclaims liability or warranty for this information). If you have questions about a medical condition or this instruction, always ask your healthcare professional. [place] [address] any warranty or liability for your use of this information. Urinary Tract Infection in Women: Care Instructions  Your Care Instructions    A urinary tract infection, or UTI, is a general term for an infection anywhere between the kidneys and the urethra (where urine comes out). Most UTIs are bladder infections. They often cause pain or burning when you urinate. UTIs are caused by bacteria and can be cured with antibiotics. Be sure to complete your treatment so that the infection goes away. Follow-up care is a key part of your treatment and safety. Be sure to make and go to all appointments, and call your doctor if you are having problems. It's also a good idea to know your test results and keep a list of the medicines you take. How can you care for yourself at home? · Take your antibiotics as directed. Do not stop taking them just because you feel better. You need to take the full course of antibiotics. · Drink extra water and other fluids for the next day or two. This may help wash out the bacteria that are causing the infection.  (If you have kidney, heart, or liver disease and have to limit fluids, talk with your doctor before you increase your fluid intake.)  · Avoid drinks that are carbonated or have caffeine. They can irritate the bladder. · Urinate often. Try to empty your bladder each time. · To relieve pain, take a hot bath or lay a heating pad set on low over your lower belly or genital area. Never go to sleep with a heating pad in place. To prevent UTIs  · Drink plenty of water each day. This helps you urinate often, which clears bacteria from your system. (If you have kidney, heart, or liver disease and have to limit fluids, talk with your doctor before you increase your fluid intake.)  · Urinate when you need to. · Urinate right after you have sex. · Change sanitary pads often. · Avoid douches, bubble baths, feminine hygiene sprays, and other feminine hygiene products that have deodorants. · After going to the bathroom, wipe from front to back. When should you call for help? Call your doctor now or seek immediate medical care if:    · Symptoms such as fever, chills, nausea, or vomiting get worse or appear for the first time.     · You have new pain in your back just below your rib cage. This is called flank pain.     · There is new blood or pus in your urine.     · You have any problems with your antibiotic medicine.    Watch closely for changes in your health, and be sure to contact your doctor if:    · You are not getting better after taking an antibiotic for 2 days.     · Your symptoms go away but then come back. Where can you learn more? Go to http://alexus-kunal.info/. Enter A223 in the search box to learn more about \"Urinary Tract Infection in Women: Care Instructions. \"  Current as of: December 19, 2018  Content Version: 12.2  © 0549-2737 [employer]. Care instructions adapted under license by [employer] (which disclaims liability or warranty for this information).  If you have questions about a medical condition or this instruction, always ask your healthcare professional. [place] [address] any warranty or liability for your use of this information.

## 2019-10-09 ENCOUNTER — TELEPHONE (OUTPATIENT)
Dept: CARDIOLOGY CLINIC | Age: 69
End: 2019-10-09

## 2019-10-09 ENCOUNTER — OFFICE VISIT (OUTPATIENT)
Dept: GYNECOLOGY | Age: 69
End: 2019-10-09

## 2019-10-09 ENCOUNTER — HOSPITAL ENCOUNTER (OUTPATIENT)
Dept: NUCLEAR MEDICINE | Age: 69
Discharge: HOME | End: 2019-10-09
Attending: SPECIALIST
Payer: MEDICARE

## 2019-10-09 ENCOUNTER — HOSPITAL ENCOUNTER (OUTPATIENT)
Dept: NON INVASIVE DIAGNOSTICS | Age: 69
Discharge: HOME | End: 2019-10-09
Attending: SPECIALIST
Payer: MEDICARE

## 2019-10-09 VITALS
HEART RATE: 75 BPM | DIASTOLIC BLOOD PRESSURE: 71 MMHG | SYSTOLIC BLOOD PRESSURE: 110 MMHG | BODY MASS INDEX: 42.85 KG/M2 | HEIGHT: 64 IN | WEIGHT: 251 LBS

## 2019-10-09 VITALS
HEIGHT: 64 IN | SYSTOLIC BLOOD PRESSURE: 157 MMHG | WEIGHT: 253 LBS | DIASTOLIC BLOOD PRESSURE: 95 MMHG | BODY MASS INDEX: 43.19 KG/M2

## 2019-10-09 DIAGNOSIS — E66.01 OBESITY, MORBID (HCC): ICD-10-CM

## 2019-10-09 DIAGNOSIS — N94.89 ENDOMETRIAL MASS: ICD-10-CM

## 2019-10-09 DIAGNOSIS — R93.89 THICKENED ENDOMETRIUM: Primary | ICD-10-CM

## 2019-10-09 DIAGNOSIS — I10 BENIGN ESSENTIAL HTN: ICD-10-CM

## 2019-10-09 DIAGNOSIS — Z01.810 PRE-OPERATIVE CARDIOVASCULAR EXAMINATION: ICD-10-CM

## 2019-10-09 DIAGNOSIS — R42 DIZZINESS: ICD-10-CM

## 2019-10-09 LAB
STRESS BASELINE DIAS BP: 75 MMHG
STRESS BASELINE HR: 68 BPM
STRESS BASELINE SYS BP: 179 MMHG
STRESS ESTIMATED WORKLOAD: 1 METS
STRESS EXERCISE DUR MIN: NORMAL
STRESS PEAK DIAS BP: 75 MMHG
STRESS PEAK SYS BP: 179 MMHG
STRESS PERCENT HR ACHIEVED: 58 %
STRESS POST PEAK HR: 87 BPM
STRESS RATE PRESSURE PRODUCT: NORMAL BPM*MMHG
STRESS ST DEPRESSION: 0 MM
STRESS ST ELEVATION: 0 MM
STRESS TARGET HR: 150 BPM

## 2019-10-09 PROCEDURE — 74011250636 HC RX REV CODE- 250/636: Performed by: INTERNAL MEDICINE

## 2019-10-09 PROCEDURE — 93017 CV STRESS TEST TRACING ONLY: CPT

## 2019-10-09 RX ADMIN — REGADENOSON 0.4 MG: 0.08 INJECTION, SOLUTION INTRAVENOUS at 09:00

## 2019-10-09 NOTE — PROGRESS NOTES
MRI results, pt report lower abdominal pressure, she was diagnosed with UTI and treated with Macrobid, nuclear test done today and results are in CC    1. Have you been to the ER, urgent care clinic since your last visit? Hospitalized since your last visit?  no    2. Have you seen or consulted any other health care providers outside of the [address] [place] [address] since your last visit? Include any pap smears or colon screening.    no
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
## 2019-10-09 NOTE — LETTER
10/10/19 Patient: Vidhya Araujo YOB: 1950 Date of Visit: 10/9/2019 Andrew Lazo MD 
[facility] [address] [facility] [id number] VIA In Basket Dear Andrew Lazo MD, Thank you for referring Ms. Ines Harris to [facility] for evaluation. My notes for this consultation are attached. I wanted to send you an update. I ordered an MRI and she does indeed have an endometrial mass. I have scheduled her for definitive hysterectomy with frozen pathology and staging if indicated. I have posted her case on October 17th, 2019. If you have questions, please do not hesitate to call me. I look forward to following your patient along with you.  
 
 
Sincerely, 
 
Miquel Edge MD

## 2019-10-10 PROBLEM — N94.89 ENDOMETRIAL MASS: Status: ACTIVE | Noted: 2019-10-10

## 2019-10-10 LAB — BACTERIA UR CULT: ABNORMAL

## 2019-10-11 ENCOUNTER — HOSPITAL ENCOUNTER (OUTPATIENT)
Dept: PREADMISSION TESTING | Age: 69
Discharge: HOME | End: 2019-10-11
Payer: MEDICARE

## 2019-10-11 ENCOUNTER — HOSPITAL ENCOUNTER (OUTPATIENT)
Dept: GENERAL RADIOLOGY | Age: 69
Discharge: HOME | End: 2019-10-11
Attending: OBSTETRICS & GYNECOLOGY
Payer: MEDICARE

## 2019-10-11 VITALS
WEIGHT: 251.38 LBS | HEART RATE: 74 BPM | DIASTOLIC BLOOD PRESSURE: 75 MMHG | BODY MASS INDEX: 42.91 KG/M2 | TEMPERATURE: 97.9 F | HEIGHT: 64 IN | SYSTOLIC BLOOD PRESSURE: 112 MMHG | RESPIRATION RATE: 18 BRPM

## 2019-10-11 LAB
ABO + RH BLD: NORMAL
ALBUMIN SERPL-MCNC: 3.8 G/DL (ref 3.5–5)
ALBUMIN/GLOB SERPL: 1.3 {RATIO} (ref 1.1–2.2)
ALP SERPL-CCNC: 96 U/L (ref 45–117)
ALT SERPL-CCNC: 27 U/L (ref 12–78)
ANION GAP SERPL CALC-SCNC: 8 MMOL/L (ref 5–15)
AST SERPL-CCNC: 17 U/L (ref 15–37)
BASOPHILS # BLD: 0 K/UL (ref 0–0.1)
BASOPHILS NFR BLD: 0 % (ref 0–1)
BILIRUB SERPL-MCNC: 0.9 MG/DL (ref 0.2–1)
BLOOD GROUP ANTIBODIES SERPL: NORMAL
BUN SERPL-MCNC: 13 MG/DL (ref 6–20)
BUN/CREAT SERPL: 12 (ref 12–20)
CALCIUM SERPL-MCNC: 9.6 MG/DL (ref 8.5–10.1)
CHLORIDE SERPL-SCNC: 108 MMOL/L (ref 97–108)
CO2 SERPL-SCNC: 25 MMOL/L (ref 21–32)
CREAT SERPL-MCNC: 1.12 MG/DL (ref 0.55–1.02)
DIFFERENTIAL METHOD BLD: ABNORMAL
EOSINOPHIL # BLD: 0.3 K/UL (ref 0–0.4)
EOSINOPHIL NFR BLD: 4 % (ref 0–7)
ERYTHROCYTE [DISTWIDTH] IN BLOOD BY AUTOMATED COUNT: 12.1 % (ref 11.5–14.5)
EST. AVERAGE GLUCOSE BLD GHB EST-MCNC: 108 MG/DL
GLOBULIN SER CALC-MCNC: 3 G/DL (ref 2–4)
GLUCOSE SERPL-MCNC: 93 MG/DL (ref 65–100)
HBA1C MFR BLD: 5.4 % (ref 4.2–6.3)
HCT VFR BLD AUTO: 43 % (ref 35–47)
HGB BLD-MCNC: 14.5 G/DL (ref 11.5–16)
IMM GRANULOCYTES # BLD AUTO: 0 K/UL (ref 0–0.04)
IMM GRANULOCYTES NFR BLD AUTO: 0 % (ref 0–0.5)
LYMPHOCYTES # BLD: 2.2 K/UL (ref 0.8–3.5)
LYMPHOCYTES NFR BLD: 31 % (ref 12–49)
MCH RBC QN AUTO: 33.5 PG (ref 26–34)
MCHC RBC AUTO-ENTMCNC: 33.7 G/DL (ref 30–36.5)
MCV RBC AUTO: 99.3 FL (ref 80–99)
MONOCYTES # BLD: 0.5 K/UL (ref 0–1)
MONOCYTES NFR BLD: 8 % (ref 5–13)
NEUTS SEG # BLD: 3.9 K/UL (ref 1.8–8)
NEUTS SEG NFR BLD: 57 % (ref 32–75)
NRBC # BLD: 0 K/UL (ref 0–0.01)
NRBC BLD-RTO: 0 PER 100 WBC
PLATELET # BLD AUTO: 238 K/UL (ref 150–400)
PMV BLD AUTO: 11.6 FL (ref 8.9–12.9)
POTASSIUM SERPL-SCNC: 4.2 MMOL/L (ref 3.5–5.1)
PROT SERPL-MCNC: 6.8 G/DL (ref 6.4–8.2)
RBC # BLD AUTO: 4.33 M/UL (ref 3.8–5.2)
SODIUM SERPL-SCNC: 141 MMOL/L (ref 136–145)
SPECIMEN EXP DATE BLD: NORMAL
WBC # BLD AUTO: 6.9 K/UL (ref 3.6–11)

## 2019-10-11 PROCEDURE — 85025 COMPLETE CBC W/AUTO DIFF WBC: CPT

## 2019-10-11 PROCEDURE — 71046 X-RAY EXAM CHEST 2 VIEWS: CPT

## 2019-10-11 PROCEDURE — 83036 HEMOGLOBIN GLYCOSYLATED A1C: CPT

## 2019-10-11 PROCEDURE — 80053 COMPREHEN METABOLIC PANEL: CPT

## 2019-10-11 PROCEDURE — 86900 BLOOD TYPING SEROLOGIC ABO: CPT

## 2019-10-11 PROCEDURE — 36415 COLL VENOUS BLD VENIPUNCTURE: CPT

## 2019-10-11 RX ORDER — METOPROLOL SUCCINATE 50 MG/1
50 TABLET, EXTENDED RELEASE ORAL
COMMUNITY
End: 2020-05-08 | Stop reason: SDUPTHER

## 2019-10-11 RX ORDER — BISMUTH SUBSALICYLATE 262 MG
1 TABLET,CHEWABLE ORAL
COMMUNITY
End: 2022-08-12 | Stop reason: ALTCHOICE

## 2019-10-11 NOTE — PERIOP NOTES
Preoperative instructions reviewed with patient. Patient given  2 bottles of CHG soap. Instructions reviewed on use of CHG soap. Patient given SSI infection FAQS sheet,   Patient was given the opportunity to ask questions on the information provided. Information given to have chest x-ray completed.

## 2019-10-14 ENCOUNTER — TELEPHONE (OUTPATIENT)
Dept: GYNECOLOGY | Age: 69
End: 2019-10-14

## 2019-10-16 ENCOUNTER — ANESTHESIA EVENT (OUTPATIENT)
Dept: SURGERY | Age: 69
End: 2019-10-16
Payer: MEDICARE

## 2019-10-17 ENCOUNTER — ANESTHESIA (OUTPATIENT)
Dept: SURGERY | Age: 69
End: 2019-10-17
Payer: MEDICARE

## 2019-10-17 ENCOUNTER — HOSPITAL ENCOUNTER (OUTPATIENT)
Age: 69
Setting detail: OBSERVATION
Discharge: HOME | End: 2019-10-18
Attending: OBSTETRICS & GYNECOLOGY | Admitting: OBSTETRICS & GYNECOLOGY
Payer: MEDICARE

## 2019-10-17 DIAGNOSIS — G89.18 POSTOPERATIVE PAIN: Primary | ICD-10-CM

## 2019-10-17 PROBLEM — N85.8 UTERINE MASS: Status: ACTIVE | Noted: 2019-10-17

## 2019-10-17 PROCEDURE — 74011250637 HC RX REV CODE- 250/637: Performed by: ANESTHESIOLOGY

## 2019-10-17 PROCEDURE — 77030018778 HC MANIP UTER VCAR CNMD -B: Performed by: OBSTETRICS & GYNECOLOGY

## 2019-10-17 PROCEDURE — 77030040361 HC SLV COMPR DVT MDII -B: Performed by: OBSTETRICS & GYNECOLOGY

## 2019-10-17 PROCEDURE — 88307 TISSUE EXAM BY PATHOLOGIST: CPT

## 2019-10-17 PROCEDURE — 77030031492 HC PRT ACC BLNT AIRSEAL CNMD -B: Performed by: OBSTETRICS & GYNECOLOGY

## 2019-10-17 PROCEDURE — 77030013079 HC BLNKT BAIR HGGR 3M -A: Performed by: ANESTHESIOLOGY

## 2019-10-17 PROCEDURE — 77030018836 HC SOL IRR NACL ICUM -A: Performed by: OBSTETRICS & GYNECOLOGY

## 2019-10-17 PROCEDURE — 77030020703 HC SEAL CANN DISP INTU -B: Performed by: OBSTETRICS & GYNECOLOGY

## 2019-10-17 PROCEDURE — 74011250636 HC RX REV CODE- 250/636: Performed by: NURSE ANESTHETIST, CERTIFIED REGISTERED

## 2019-10-17 PROCEDURE — 74011000254 HC RX REV CODE- 254: Performed by: OBSTETRICS & GYNECOLOGY

## 2019-10-17 PROCEDURE — 77030020263 HC SOL INJ SOD CL0.9% LFCR 1000ML: Performed by: OBSTETRICS & GYNECOLOGY

## 2019-10-17 PROCEDURE — 74011000250 HC RX REV CODE- 250: Performed by: OBSTETRICS & GYNECOLOGY

## 2019-10-17 PROCEDURE — 76060000037 HC ANESTHESIA 3 TO 3.5 HR: Performed by: OBSTETRICS & GYNECOLOGY

## 2019-10-17 PROCEDURE — 76210000016 HC OR PH I REC 1 TO 1.5 HR: Performed by: OBSTETRICS & GYNECOLOGY

## 2019-10-17 PROCEDURE — 99218 HC RM OBSERVATION: CPT

## 2019-10-17 PROCEDURE — 77030016151 HC PROTCTR LNS DFOG COVD -B: Performed by: OBSTETRICS & GYNECOLOGY

## 2019-10-17 PROCEDURE — 74011000258 HC RX REV CODE- 258: Performed by: OBSTETRICS & GYNECOLOGY

## 2019-10-17 PROCEDURE — 77030026438 HC STYL ET INTUB CARD -A: Performed by: ANESTHESIOLOGY

## 2019-10-17 PROCEDURE — 74011250637 HC RX REV CODE- 250/637: Performed by: OBSTETRICS & GYNECOLOGY

## 2019-10-17 PROCEDURE — 77030022704 HC SUT VLOC COVD -B: Performed by: OBSTETRICS & GYNECOLOGY

## 2019-10-17 PROCEDURE — 77030002933 HC SUT MCRYL J&J -A: Performed by: OBSTETRICS & GYNECOLOGY

## 2019-10-17 PROCEDURE — 77030039266 HC ADH SKN EXOFIN S2SG -A: Performed by: OBSTETRICS & GYNECOLOGY

## 2019-10-17 PROCEDURE — 74011000250 HC RX REV CODE- 250: Performed by: NURSE ANESTHETIST, CERTIFIED REGISTERED

## 2019-10-17 PROCEDURE — 77030032060 HC PWDR HEMSTAT ARISTA ASRB 3GM BARD -C: Performed by: OBSTETRICS & GYNECOLOGY

## 2019-10-17 PROCEDURE — 77030008684 HC TU ET CUF COVD -B: Performed by: ANESTHESIOLOGY

## 2019-10-17 PROCEDURE — 77030008756 HC TU IRR SUC STRY -B: Performed by: OBSTETRICS & GYNECOLOGY

## 2019-10-17 PROCEDURE — 88342 IMHCHEM/IMCYTCHM 1ST ANTB: CPT

## 2019-10-17 PROCEDURE — 77030018846 HC SOL IRR STRL H20 ICUM -A: Performed by: OBSTETRICS & GYNECOLOGY

## 2019-10-17 PROCEDURE — 77030003666 HC NDL SPINAL BD -A: Performed by: OBSTETRICS & GYNECOLOGY

## 2019-10-17 PROCEDURE — 74011250636 HC RX REV CODE- 250/636: Performed by: ANESTHESIOLOGY

## 2019-10-17 PROCEDURE — 88331 PATH CONSLTJ SURG 1 BLK 1SPC: CPT

## 2019-10-17 PROCEDURE — 76010000877 HC OR TIME 2.5 TO 3HR INTENSV - TIER 2: Performed by: OBSTETRICS & GYNECOLOGY

## 2019-10-17 PROCEDURE — 88112 CYTOPATH CELL ENHANCE TECH: CPT

## 2019-10-17 PROCEDURE — 77030040830 HC CATH URETH FOL MDII -A: Performed by: OBSTETRICS & GYNECOLOGY

## 2019-10-17 PROCEDURE — 77030035277 HC OBTRTR BLDELSS DISP INTU -B: Performed by: OBSTETRICS & GYNECOLOGY

## 2019-10-17 PROCEDURE — 74011250636 HC RX REV CODE- 250/636: Performed by: OBSTETRICS & GYNECOLOGY

## 2019-10-17 PROCEDURE — 77030011640 HC PAD GRND REM COVD -A: Performed by: OBSTETRICS & GYNECOLOGY

## 2019-10-17 RX ORDER — DIPHENHYDRAMINE HYDROCHLORIDE 50 MG/ML
12.5 INJECTION, SOLUTION INTRAMUSCULAR; INTRAVENOUS
Status: DISCONTINUED | OUTPATIENT
Start: 2019-10-17 | End: 2019-10-18 | Stop reason: HOSPADM

## 2019-10-17 RX ORDER — GLYCOPYRROLATE 0.2 MG/ML
INJECTION INTRAMUSCULAR; INTRAVENOUS AS NEEDED
Status: DISCONTINUED | OUTPATIENT
Start: 2019-10-17 | End: 2019-10-17 | Stop reason: HOSPADM

## 2019-10-17 RX ORDER — SODIUM CHLORIDE 0.9 % (FLUSH) 0.9 %
5-40 SYRINGE (ML) INJECTION EVERY 8 HOURS
Status: DISCONTINUED | OUTPATIENT
Start: 2019-10-17 | End: 2019-10-17 | Stop reason: HOSPADM

## 2019-10-17 RX ORDER — SODIUM CHLORIDE 9 MG/ML
75 INJECTION, SOLUTION INTRAVENOUS CONTINUOUS
Status: DISCONTINUED | OUTPATIENT
Start: 2019-10-17 | End: 2019-10-18 | Stop reason: HOSPADM

## 2019-10-17 RX ORDER — OXYCODONE AND ACETAMINOPHEN 5; 325 MG/1; MG/1
1 TABLET ORAL AS NEEDED
Status: DISCONTINUED | OUTPATIENT
Start: 2019-10-17 | End: 2019-10-17 | Stop reason: HOSPADM

## 2019-10-17 RX ORDER — FAMOTIDINE 10 MG/ML
20 INJECTION INTRAVENOUS EVERY 12 HOURS
Status: DISCONTINUED | OUTPATIENT
Start: 2019-10-17 | End: 2019-10-18 | Stop reason: HOSPADM

## 2019-10-17 RX ORDER — SODIUM CHLORIDE 9 MG/ML
25 INJECTION, SOLUTION INTRAVENOUS CONTINUOUS
Status: DISCONTINUED | OUTPATIENT
Start: 2019-10-17 | End: 2019-10-17 | Stop reason: HOSPADM

## 2019-10-17 RX ORDER — PROPOFOL 10 MG/ML
INJECTION, EMULSION INTRAVENOUS AS NEEDED
Status: DISCONTINUED | OUTPATIENT
Start: 2019-10-17 | End: 2019-10-17 | Stop reason: HOSPADM

## 2019-10-17 RX ORDER — SODIUM CHLORIDE, SODIUM LACTATE, POTASSIUM CHLORIDE, CALCIUM CHLORIDE 600; 310; 30; 20 MG/100ML; MG/100ML; MG/100ML; MG/100ML
INJECTION, SOLUTION INTRAVENOUS
Status: DISCONTINUED | OUTPATIENT
Start: 2019-10-17 | End: 2019-10-17 | Stop reason: HOSPADM

## 2019-10-17 RX ORDER — FENTANYL CITRATE 50 UG/ML
INJECTION, SOLUTION INTRAMUSCULAR; INTRAVENOUS AS NEEDED
Status: DISCONTINUED | OUTPATIENT
Start: 2019-10-17 | End: 2019-10-17 | Stop reason: HOSPADM

## 2019-10-17 RX ORDER — HYDROMORPHONE HYDROCHLORIDE 2 MG/ML
INJECTION, SOLUTION INTRAMUSCULAR; INTRAVENOUS; SUBCUTANEOUS AS NEEDED
Status: DISCONTINUED | OUTPATIENT
Start: 2019-10-17 | End: 2019-10-17 | Stop reason: HOSPADM

## 2019-10-17 RX ORDER — SODIUM CHLORIDE 0.9 % (FLUSH) 0.9 %
5-40 SYRINGE (ML) INJECTION AS NEEDED
Status: DISCONTINUED | OUTPATIENT
Start: 2019-10-17 | End: 2019-10-17 | Stop reason: HOSPADM

## 2019-10-17 RX ORDER — ACETAMINOPHEN 325 MG/1
650 TABLET ORAL ONCE
Status: COMPLETED | OUTPATIENT
Start: 2019-10-17 | End: 2019-10-17

## 2019-10-17 RX ORDER — ONDANSETRON 2 MG/ML
4 INJECTION INTRAMUSCULAR; INTRAVENOUS
Status: DISCONTINUED | OUTPATIENT
Start: 2019-10-17 | End: 2019-10-18 | Stop reason: HOSPADM

## 2019-10-17 RX ORDER — DOCUSATE SODIUM 100 MG/1
100 CAPSULE, LIQUID FILLED ORAL DAILY
Status: DISCONTINUED | OUTPATIENT
Start: 2019-10-18 | End: 2019-10-18 | Stop reason: HOSPADM

## 2019-10-17 RX ORDER — NEOSTIGMINE METHYLSULFATE 1 MG/ML
INJECTION INTRAVENOUS AS NEEDED
Status: DISCONTINUED | OUTPATIENT
Start: 2019-10-17 | End: 2019-10-17 | Stop reason: HOSPADM

## 2019-10-17 RX ORDER — SILVER NITRATE 38.21; 12.74 MG/1; MG/1
STICK TOPICAL AS NEEDED
Status: DISCONTINUED | OUTPATIENT
Start: 2019-10-17 | End: 2019-10-17 | Stop reason: HOSPADM

## 2019-10-17 RX ORDER — LEVOTHYROXINE SODIUM 125 UG/1
125 TABLET ORAL
Status: DISCONTINUED | OUTPATIENT
Start: 2019-10-18 | End: 2019-10-18 | Stop reason: HOSPADM

## 2019-10-17 RX ORDER — MIDAZOLAM HYDROCHLORIDE 1 MG/ML
INJECTION, SOLUTION INTRAMUSCULAR; INTRAVENOUS AS NEEDED
Status: DISCONTINUED | OUTPATIENT
Start: 2019-10-17 | End: 2019-10-17 | Stop reason: HOSPADM

## 2019-10-17 RX ORDER — SUCCINYLCHOLINE CHLORIDE 20 MG/ML
INJECTION INTRAMUSCULAR; INTRAVENOUS AS NEEDED
Status: DISCONTINUED | OUTPATIENT
Start: 2019-10-17 | End: 2019-10-17 | Stop reason: HOSPADM

## 2019-10-17 RX ORDER — INDOCYANINE GREEN AND WATER 25 MG
KIT INJECTION AS NEEDED
Status: DISCONTINUED | OUTPATIENT
Start: 2019-10-17 | End: 2019-10-17 | Stop reason: HOSPADM

## 2019-10-17 RX ORDER — FENTANYL CITRATE 50 UG/ML
25 INJECTION, SOLUTION INTRAMUSCULAR; INTRAVENOUS
Status: DISCONTINUED | OUTPATIENT
Start: 2019-10-17 | End: 2019-10-17 | Stop reason: HOSPADM

## 2019-10-17 RX ORDER — LORAZEPAM 2 MG/ML
1 INJECTION INTRAMUSCULAR
Status: DISCONTINUED | OUTPATIENT
Start: 2019-10-17 | End: 2019-10-18 | Stop reason: HOSPADM

## 2019-10-17 RX ORDER — KETAMINE HYDROCHLORIDE 10 MG/ML
INJECTION, SOLUTION INTRAMUSCULAR; INTRAVENOUS AS NEEDED
Status: DISCONTINUED | OUTPATIENT
Start: 2019-10-17 | End: 2019-10-17 | Stop reason: HOSPADM

## 2019-10-17 RX ORDER — HYDROMORPHONE HYDROCHLORIDE 1 MG/ML
0.2 INJECTION, SOLUTION INTRAMUSCULAR; INTRAVENOUS; SUBCUTANEOUS
Status: DISCONTINUED | OUTPATIENT
Start: 2019-10-17 | End: 2019-10-17 | Stop reason: HOSPADM

## 2019-10-17 RX ORDER — SODIUM CHLORIDE, SODIUM LACTATE, POTASSIUM CHLORIDE, CALCIUM CHLORIDE 600; 310; 30; 20 MG/100ML; MG/100ML; MG/100ML; MG/100ML
125 INJECTION, SOLUTION INTRAVENOUS CONTINUOUS
Status: DISCONTINUED | OUTPATIENT
Start: 2019-10-17 | End: 2019-10-17 | Stop reason: HOSPADM

## 2019-10-17 RX ORDER — FENTANYL CITRATE 50 UG/ML
50 INJECTION, SOLUTION INTRAMUSCULAR; INTRAVENOUS AS NEEDED
Status: DISCONTINUED | OUTPATIENT
Start: 2019-10-17 | End: 2019-10-17 | Stop reason: HOSPADM

## 2019-10-17 RX ORDER — KETOROLAC TROMETHAMINE 30 MG/ML
INJECTION, SOLUTION INTRAMUSCULAR; INTRAVENOUS AS NEEDED
Status: DISCONTINUED | OUTPATIENT
Start: 2019-10-17 | End: 2019-10-17 | Stop reason: HOSPADM

## 2019-10-17 RX ORDER — DIPHENHYDRAMINE HYDROCHLORIDE 50 MG/ML
12.5 INJECTION, SOLUTION INTRAMUSCULAR; INTRAVENOUS AS NEEDED
Status: DISCONTINUED | OUTPATIENT
Start: 2019-10-17 | End: 2019-10-17 | Stop reason: HOSPADM

## 2019-10-17 RX ORDER — HYDROMORPHONE HYDROCHLORIDE 1 MG/ML
1 INJECTION, SOLUTION INTRAMUSCULAR; INTRAVENOUS; SUBCUTANEOUS
Status: DISCONTINUED | OUTPATIENT
Start: 2019-10-17 | End: 2019-10-18 | Stop reason: HOSPADM

## 2019-10-17 RX ORDER — METOPROLOL SUCCINATE 50 MG/1
50 TABLET, EXTENDED RELEASE ORAL
Status: DISCONTINUED | OUTPATIENT
Start: 2019-10-17 | End: 2019-10-18 | Stop reason: HOSPADM

## 2019-10-17 RX ORDER — ONDANSETRON 2 MG/ML
INJECTION INTRAMUSCULAR; INTRAVENOUS AS NEEDED
Status: DISCONTINUED | OUTPATIENT
Start: 2019-10-17 | End: 2019-10-17 | Stop reason: HOSPADM

## 2019-10-17 RX ORDER — HEPARIN SODIUM 5000 [USP'U]/ML
5000 INJECTION, SOLUTION INTRAVENOUS; SUBCUTANEOUS ONCE
Status: COMPLETED | OUTPATIENT
Start: 2019-10-17 | End: 2019-10-17

## 2019-10-17 RX ORDER — MORPHINE SULFATE 10 MG/ML
2 INJECTION, SOLUTION INTRAMUSCULAR; INTRAVENOUS
Status: DISCONTINUED | OUTPATIENT
Start: 2019-10-17 | End: 2019-10-17 | Stop reason: HOSPADM

## 2019-10-17 RX ORDER — SODIUM CHLORIDE 0.9 % (FLUSH) 0.9 %
5-40 SYRINGE (ML) INJECTION AS NEEDED
Status: DISCONTINUED | OUTPATIENT
Start: 2019-10-17 | End: 2019-10-18 | Stop reason: HOSPADM

## 2019-10-17 RX ORDER — SODIUM CHLORIDE 0.9 % (FLUSH) 0.9 %
5-40 SYRINGE (ML) INJECTION EVERY 8 HOURS
Status: DISCONTINUED | OUTPATIENT
Start: 2019-10-17 | End: 2019-10-18 | Stop reason: HOSPADM

## 2019-10-17 RX ORDER — ACETAMINOPHEN 325 MG/1
650 TABLET ORAL
Status: DISCONTINUED | OUTPATIENT
Start: 2019-10-17 | End: 2019-10-18 | Stop reason: HOSPADM

## 2019-10-17 RX ORDER — DEXAMETHASONE SODIUM PHOSPHATE 4 MG/ML
INJECTION, SOLUTION INTRA-ARTICULAR; INTRALESIONAL; INTRAMUSCULAR; INTRAVENOUS; SOFT TISSUE AS NEEDED
Status: DISCONTINUED | OUTPATIENT
Start: 2019-10-17 | End: 2019-10-17 | Stop reason: HOSPADM

## 2019-10-17 RX ORDER — LIDOCAINE HYDROCHLORIDE 20 MG/ML
INJECTION, SOLUTION EPIDURAL; INFILTRATION; INTRACAUDAL; PERINEURAL AS NEEDED
Status: DISCONTINUED | OUTPATIENT
Start: 2019-10-17 | End: 2019-10-17 | Stop reason: HOSPADM

## 2019-10-17 RX ORDER — MIDAZOLAM HYDROCHLORIDE 1 MG/ML
0.5 INJECTION, SOLUTION INTRAMUSCULAR; INTRAVENOUS
Status: DISCONTINUED | OUTPATIENT
Start: 2019-10-17 | End: 2019-10-17 | Stop reason: HOSPADM

## 2019-10-17 RX ORDER — ONDANSETRON 2 MG/ML
4 INJECTION INTRAMUSCULAR; INTRAVENOUS AS NEEDED
Status: DISCONTINUED | OUTPATIENT
Start: 2019-10-17 | End: 2019-10-17 | Stop reason: HOSPADM

## 2019-10-17 RX ORDER — LABETALOL HYDROCHLORIDE 5 MG/ML
INJECTION, SOLUTION INTRAVENOUS AS NEEDED
Status: DISCONTINUED | OUTPATIENT
Start: 2019-10-17 | End: 2019-10-17 | Stop reason: HOSPADM

## 2019-10-17 RX ORDER — MIDAZOLAM HYDROCHLORIDE 1 MG/ML
1 INJECTION, SOLUTION INTRAMUSCULAR; INTRAVENOUS AS NEEDED
Status: DISCONTINUED | OUTPATIENT
Start: 2019-10-17 | End: 2019-10-17 | Stop reason: HOSPADM

## 2019-10-17 RX ORDER — BUPIVACAINE HYDROCHLORIDE 5 MG/ML
INJECTION, SOLUTION EPIDURAL; INTRACAUDAL AS NEEDED
Status: DISCONTINUED | OUTPATIENT
Start: 2019-10-17 | End: 2019-10-17 | Stop reason: HOSPADM

## 2019-10-17 RX ORDER — LIDOCAINE HYDROCHLORIDE 10 MG/ML
0.1 INJECTION, SOLUTION EPIDURAL; INFILTRATION; INTRACAUDAL; PERINEURAL AS NEEDED
Status: DISCONTINUED | OUTPATIENT
Start: 2019-10-17 | End: 2019-10-17 | Stop reason: HOSPADM

## 2019-10-17 RX ORDER — OXYCODONE HYDROCHLORIDE 5 MG/1
5 TABLET ORAL
Status: DISCONTINUED | OUTPATIENT
Start: 2019-10-17 | End: 2019-10-18 | Stop reason: HOSPADM

## 2019-10-17 RX ORDER — ROCURONIUM BROMIDE 10 MG/ML
INJECTION, SOLUTION INTRAVENOUS AS NEEDED
Status: DISCONTINUED | OUTPATIENT
Start: 2019-10-17 | End: 2019-10-17 | Stop reason: HOSPADM

## 2019-10-17 RX ADMIN — FENTANYL CITRATE 50 MCG: 50 INJECTION, SOLUTION INTRAMUSCULAR; INTRAVENOUS at 09:18

## 2019-10-17 RX ADMIN — GLYCOPYRROLATE 0.4 MG: 0.2 INJECTION, SOLUTION INTRAMUSCULAR; INTRAVENOUS at 10:12

## 2019-10-17 RX ADMIN — PROPOFOL 150 MG: 10 INJECTION, EMULSION INTRAVENOUS at 08:04

## 2019-10-17 RX ADMIN — ONDANSETRON 4 MG: 2 INJECTION INTRAMUSCULAR; INTRAVENOUS at 11:14

## 2019-10-17 RX ADMIN — MIDAZOLAM 1 MG: 1 INJECTION INTRAMUSCULAR; INTRAVENOUS at 07:53

## 2019-10-17 RX ADMIN — HYDROMORPHONE HYDROCHLORIDE 1 MG: 1 INJECTION, SOLUTION INTRAMUSCULAR; INTRAVENOUS; SUBCUTANEOUS at 12:50

## 2019-10-17 RX ADMIN — ACETAMINOPHEN 650 MG: 325 TABLET, FILM COATED ORAL at 07:39

## 2019-10-17 RX ADMIN — HEPARIN SODIUM 5000 UNITS: 5000 INJECTION INTRAVENOUS; SUBCUTANEOUS at 07:38

## 2019-10-17 RX ADMIN — FENTANYL CITRATE 25 MCG: 50 INJECTION, SOLUTION INTRAMUSCULAR; INTRAVENOUS at 11:14

## 2019-10-17 RX ADMIN — FENTANYL CITRATE 50 MCG: 50 INJECTION, SOLUTION INTRAMUSCULAR; INTRAVENOUS at 08:04

## 2019-10-17 RX ADMIN — Medication 10 ML: at 14:00

## 2019-10-17 RX ADMIN — ROCURONIUM BROMIDE 10 MG: 10 SOLUTION INTRAVENOUS at 09:00

## 2019-10-17 RX ADMIN — FENTANYL CITRATE 50 MCG: 50 INJECTION, SOLUTION INTRAMUSCULAR; INTRAVENOUS at 10:29

## 2019-10-17 RX ADMIN — FAMOTIDINE 20 MG: 10 INJECTION, SOLUTION INTRAVENOUS at 21:14

## 2019-10-17 RX ADMIN — MEPERIDINE HYDROCHLORIDE 12.5 MG: 25 INJECTION INTRAMUSCULAR; INTRAVENOUS; SUBCUTANEOUS at 11:14

## 2019-10-17 RX ADMIN — FENTANYL CITRATE 25 MCG: 50 INJECTION, SOLUTION INTRAMUSCULAR; INTRAVENOUS at 10:54

## 2019-10-17 RX ADMIN — SUCCINYLCHOLINE CHLORIDE 140 MG: 20 INJECTION, SOLUTION INTRAMUSCULAR; INTRAVENOUS at 08:05

## 2019-10-17 RX ADMIN — HYDROMORPHONE HYDROCHLORIDE 0.25 MG: 2 INJECTION, SOLUTION INTRAMUSCULAR; INTRAVENOUS; SUBCUTANEOUS at 10:09

## 2019-10-17 RX ADMIN — LIDOCAINE HYDROCHLORIDE 100 MG: 20 INJECTION, SOLUTION EPIDURAL; INFILTRATION; INTRACAUDAL; PERINEURAL at 08:04

## 2019-10-17 RX ADMIN — KETOROLAC TROMETHAMINE 30 MG: 30 INJECTION, SOLUTION INTRAMUSCULAR; INTRAVENOUS at 10:19

## 2019-10-17 RX ADMIN — LABETALOL HYDROCHLORIDE 5 MG: 5 INJECTION INTRAVENOUS at 10:45

## 2019-10-17 RX ADMIN — FENTANYL CITRATE 25 MCG: 50 INJECTION, SOLUTION INTRAMUSCULAR; INTRAVENOUS at 10:57

## 2019-10-17 RX ADMIN — CEFOTETAN DISODIUM 2 G: 2 INJECTION, POWDER, FOR SOLUTION INTRAMUSCULAR; INTRAVENOUS at 08:09

## 2019-10-17 RX ADMIN — LABETALOL HYDROCHLORIDE 5 MG: 5 INJECTION INTRAVENOUS at 10:55

## 2019-10-17 RX ADMIN — FENTANYL CITRATE 25 MCG: 50 INJECTION, SOLUTION INTRAMUSCULAR; INTRAVENOUS at 10:51

## 2019-10-17 RX ADMIN — HYDROMORPHONE HYDROCHLORIDE 0.25 MG: 2 INJECTION, SOLUTION INTRAMUSCULAR; INTRAVENOUS; SUBCUTANEOUS at 10:18

## 2019-10-17 RX ADMIN — Medication 10 ML: at 21:14

## 2019-10-17 RX ADMIN — ROCURONIUM BROMIDE 25 MG: 10 SOLUTION INTRAVENOUS at 08:18

## 2019-10-17 RX ADMIN — FAMOTIDINE 20 MG: 10 INJECTION, SOLUTION INTRAVENOUS at 15:06

## 2019-10-17 RX ADMIN — FENTANYL CITRATE 25 MCG: 50 INJECTION, SOLUTION INTRAMUSCULAR; INTRAVENOUS at 11:48

## 2019-10-17 RX ADMIN — DEXAMETHASONE SODIUM PHOSPHATE 6 MG: 4 INJECTION, SOLUTION INTRAMUSCULAR; INTRAVENOUS at 08:14

## 2019-10-17 RX ADMIN — FENTANYL CITRATE 50 MCG: 50 INJECTION, SOLUTION INTRAMUSCULAR; INTRAVENOUS at 09:50

## 2019-10-17 RX ADMIN — KETAMINE HYDROCHLORIDE 25 MG: 10 INJECTION, SOLUTION INTRAMUSCULAR; INTRAVENOUS at 08:26

## 2019-10-17 RX ADMIN — HYDROMORPHONE HYDROCHLORIDE 0.25 MG: 2 INJECTION, SOLUTION INTRAMUSCULAR; INTRAVENOUS; SUBCUTANEOUS at 09:18

## 2019-10-17 RX ADMIN — ONDANSETRON HYDROCHLORIDE 4 MG: 2 INJECTION, SOLUTION INTRAMUSCULAR; INTRAVENOUS at 09:55

## 2019-10-17 RX ADMIN — FENTANYL CITRATE 50 MCG: 50 INJECTION, SOLUTION INTRAMUSCULAR; INTRAVENOUS at 09:01

## 2019-10-17 RX ADMIN — SODIUM CHLORIDE, POTASSIUM CHLORIDE, SODIUM LACTATE AND CALCIUM CHLORIDE: 600; 310; 30; 20 INJECTION, SOLUTION INTRAVENOUS at 10:21

## 2019-10-17 RX ADMIN — HYDROMORPHONE HYDROCHLORIDE 0.25 MG: 2 INJECTION, SOLUTION INTRAMUSCULAR; INTRAVENOUS; SUBCUTANEOUS at 08:34

## 2019-10-17 RX ADMIN — NEOSTIGMINE METHYLSULFATE 3 MG: 1 INJECTION, SOLUTION INTRAMUSCULAR; INTRAVENOUS; SUBCUTANEOUS at 10:13

## 2019-10-17 RX ADMIN — LABETALOL HYDROCHLORIDE 5 MG: 5 INJECTION INTRAVENOUS at 10:51

## 2019-10-17 RX ADMIN — FENTANYL CITRATE 25 MCG: 50 INJECTION, SOLUTION INTRAMUSCULAR; INTRAVENOUS at 10:47

## 2019-10-17 RX ADMIN — METOPROLOL SUCCINATE 50 MG: 50 TABLET, EXTENDED RELEASE ORAL at 21:14

## 2019-10-17 RX ADMIN — ROCURONIUM BROMIDE 5 MG: 10 SOLUTION INTRAVENOUS at 08:04

## 2019-10-17 RX ADMIN — LABETALOL HYDROCHLORIDE 5 MG: 5 INJECTION INTRAVENOUS at 10:42

## 2019-10-17 RX ADMIN — SODIUM CHLORIDE 75 ML/HR: 900 INJECTION, SOLUTION INTRAVENOUS at 11:15

## 2019-10-17 RX ADMIN — OXYCODONE HYDROCHLORIDE 5 MG: 5 TABLET ORAL at 21:14

## 2019-10-17 RX ADMIN — SODIUM CHLORIDE, SODIUM LACTATE, POTASSIUM CHLORIDE, AND CALCIUM CHLORIDE 125 ML/HR: 600; 310; 30; 20 INJECTION, SOLUTION INTRAVENOUS at 07:50

## 2019-10-17 RX ADMIN — SODIUM CHLORIDE, POTASSIUM CHLORIDE, SODIUM LACTATE AND CALCIUM CHLORIDE: 600; 310; 30; 20 INJECTION, SOLUTION INTRAVENOUS at 07:51

## 2019-10-17 RX ADMIN — MIDAZOLAM 1 MG: 1 INJECTION INTRAMUSCULAR; INTRAVENOUS at 08:00

## 2019-10-17 NOTE — OP NOTES
Gynecologic Oncology Operative Report    Corinna Atkinson  10/17/2019    Pre-operative dx:  1) Uterine mass; 2) Morbid obesity, BMI 43.1; 3) PMB     Post-operative dx:  1) Uterine mass; 2) Morbid obesity, BMI 43.1; 3) PMB     Procedure:    Robotic-assisted total laparoscopic hysterectomy,   Resection of mass,  Bilateral salpingo-oophorectomy,   Bilateral pelvic sentinel lymph node mapping and biopsy     Surgeon:  Richar Orozco MD     Assistant:  Floyd Garcia     Anesthesia:  GETA     EBL:  <25cc    Antibiotics: Cefotetan 2grams IV    VTE Prophylaxis: SCDs     Complications:  None    Implants: None     Specimens:  uterus, cervix, bilateral fallopian tubes and ovaries, uterine mass, pelvic washings, bilateral sentinel pelvic lymph nodes      Operative indications:  77 y.o. female with 4.5cm uterine mass and postmenopausal bleeding. Operative findings: On laparoscopic survey, the uterus and bilateral tubes and ovaries are normal appearing. Prior tubal ligation. Some thin adhesions of the rectosigmoid colon to the left pelvic sidewall and para-colic gutter. Some evidence of diverticular disease. Normal appearing posterior culdesac, liver edge, diaphragm, omentum, and small bowel. Significant adiposity throughout abdomen. Right sentinel lymph node mapping developed towards a lymph node along the external iliac vessels, but did not involve the actual node. Left sentinel lymph node was identified along the proximal external iliac vessels. Frozen pathology of the uterine mass consistent with progesterone effect and no evidence of tumor. Operative report: After the risks, benefits, indications, and alternatives of the procedure were discussed with the patient and informed consent was obtained, the patient was taken to the operating room. She was positively identified, administered general anesthesia, and then placed in the dorsal lithotomy position in [address] [place] [address]. An exam under anesthesia was performed.  She was then prepped and draped in the usual fashion. A zhang catheter was inserted. An open-sided speculum was used to visualize the cervix. The cervix was grasped with a single-tooth tenaculum and then progressively dilated using cervical dilators. Indocyanine green was then injected at 3 and 9 o'clock on each side of the cervix in preparation for sentinel lymph node mapping. I then placed a V-Care uterine manipulator without difficulty. Attention was then turned to laparoscopy. A horizontal 8-mm skin incision was made at Gonzalez's point in the left upper quadrant. Using an Optiview technique, an 8-mm AirSeal port was placed using a 5-mm laparoscopic scope. Insufflation was applied and intraperitoneal placement was confirmed via direct visualization with the laparoscopic. The abdomen was then insufflated to a pressure of 15 mm Hg. Attention was then turned to placement of the robotic trocars. Under direct visualization, 8-mm DaVinci trocars were placed: one above the umbilicus, one each in both the right and left lateral quadrants, and one in the right upper quadrant. These were placed approximately 8 cm apart. Positioning of the trocars in the abdominal wall were then adjusted to locate the point of articulation at the level of the fascia. The patient was then placed in steep Trendelenberg position and the bowel was displaced into the upper abdomen. The camera port was then docked and the camera was inserted into the #2 port and advanced. The targeting mechanism was then used to allow for better localization of the other three arms. These trocars were then docked with the [employer]. A fenestrated bipolar grasper was placed in arm #1, monopolar scissors placed in arm #3, and a Prograsp placed in arm #4. Pelvic washings were obtained. Attention was then turned to the robotic console and proceeding with hysterectomy and pelvic lymph node mapping.  Bilateral round ligaments were cauterized and divided with the monopolar scissors and the bilateral pelvic side-wall retroperitoneum entered and developed. Bilateral ureters were identified and preserved. Bilateral iliac vessels, superior vesicle artery, and obturator nerve were identified and preserved. [place] lymph node mapping identified the above sentinel lymph nodes. These were marked for removal latera. Bilateral infundibulopelvic ligaments were skeletonized, then sealed with bipolar electrocautery and divided with monopolar scissors. Thin adhesions of the rectosigmoid colon to the left pelvic side-wall were taken down sharply with robotic scissors. Dissection was continued inferiorly along the broad ligament and the bladder dissected off the lower uterine segment and cervix. Bilateral uterine arteries were skeletonized, then cauterized with bipolar electrocautery and divided using monopolar scissors. A circumferential colpotomy was then carried along the V-care. The specimen of uterus, cervix, and bilateral tubes/ovaries was then removed via the vagina and sent for frozen pathology. The vaginal colpotomy was closed with 0-Vicryl V-lock suture using a david-suture cut in arm #3. Frozen pathology demonstrated a uterine mass with progesterone effect with evidence of malignancy. However, given the presence and the mass, I still had concerns for a possible underlying endometrial malignancy. Upon further thought, I decided to remove the sentinel lymph nodes just in case final pathology demonstrated a possible malignancy. Bilateral sentinel lymph nodes were skeletonized and removed and sent for permanent pathology. The pelvis was irrigated and made hemostatic. All planes of dissection were confirmed hemostatic. Jagjit was placed along all dissection planes. The insufflation was released prior to removal of all port sites. All skin incisions were closed with 4-0 monocryl subcuticular stitch. Skin glue was applied to all skin incisions.  The vagina was then inspected and the vaginal cuff was intact and there were no lacerations along the vaginal canal. There was a 1st degree laceration at the vaginal introitus at 6 o'clock, for which silver nitrate was applied. The patient was taken out of stirrups, awakened from anesthesia, and taken to the recovery room in stable condition. The patient tolerated the procedure well. All instrument, sponge, and needle counts were correct.         Marli Carvajal MD  10/17/2019  10:26 AM

## 2019-10-17 NOTE — ROUTINE PROCESS
Patient: Clara Varghese MRN: [id number]  SSN: [id number]   YOB: 1950  Age: 77 y.o. Sex: female     Patient is status post Procedure(s):  ROBOTIC ASSISTED TOTAL LAPAROSCOPIC HYSTERECTOMY, BILATERAL SALPINGO OOPHORECTOMY, RESECTION OF MASS, PELVIC SENTINEL LYMPH NODE MAPPING AND BIOPSY. Surgeon(s) and Role:     * Melba Victoria MD - Primary    Local/Dose/Irrigation:  SEE MAR                  Peripheral IV 10/17/19 Left Antecubital (Active)   Site Assessment Clean, dry, & intact 10/17/2019  7:50 AM   Phlebitis Assessment 0 10/17/2019  7:50 AM   Infiltration Assessment 0 10/17/2019  7:50 AM   Dressing Status Clean, dry, & intact; New 10/17/2019  7:50 AM   Dressing Type Transparent;Tape 10/17/2019  7:50 AM   Hub Color/Line Status Pink 10/17/2019  7:50 AM            Airway - Endotracheal Tube 10/17/19 Oral (Active)                   Dressing/Packing:  Wound Abdomen 5 TROCAR SITES-Dressing Type: Topical skin adhesive/glue (10/17/19 1013)  Wound Vagina-Dressing Type: Arelis-pad (10/17/19 1013)    Splint/Cast:  ]    Other:  NA

## 2019-10-17 NOTE — DISCHARGE INSTRUCTIONS
[address] [facility] [address], [address]  P [phone]  F [phone]     [facility]      Dear MsKimberly Bella Young,      Please review your instructions with your nurse and ask any questions so you have all the information you need to recover well at home. If you do not feel you have everything you need to succeed and be safe after you leave the hospital, please discuss these concerns with your nurse. As always, call for any questions at home. Your doctor: Taylor Rock MD   Diagnosis: ENDOMETRIAL MASS  Procedure: Procedure(s):  ROBOTIC ASSISTED TOTAL LAPAROSCOPIC HYSTERECTOMY, BILATERAL SALPINGO OOPHORECTOMY, RESECTION OF MASS, PELVIC SENTINEL LYMPH NODE MAPPING AND BIOPSY  Date of Discharge: 10/18/19     Take Home Medications     See Discharge Medication Review provided to you by your nurse. If you did not receive one, request this prior to your discharge. · It is important that you take your medications as they are prescribed. · Keep your medications in the bottles provided by the pharmacist and keep a list of the medication names, dosages, times to be taken and what they are for in your wallet. · Do not take other medications without consulting your doctor. · If you are prescribed enoxaparin (Lovenox) and are taking a baby aspirin daily, please hold this medication until your course of enoxaparin is completed. · If you no longer need your prescribed pain medication, you may take Tylenol or Aleve alone. · You should take a daily gentle stool softener such as a colace pill or dulcolax suppository for constipation as this is not uncommon after surgery and/or while on pain medication. If not prescribed, this can be found over the counter. If constipation persists for >24 hours you should take a dose of Milk of Magnesia. Call if your constipation continues. Diet    · Stay hydrated and drink fluids such as gatorade and water. This will also help prevent constipation and dehydration. Limit somewhat any usual caffeine intake of beverages such as soda, tea and coffee as this may serve to dehydrate you. · For the first 2-3 days keep a low fiber diet avoiding raw vegetables or fruits with skin. A diet consisting of soup, cereal, yogurt, eggs, fish, Boost/Ensure. Avoid fatty/greasy foods. · If nauseated, keep your diet limited to liquids and call if this persists. Activity    · If possible, have someone with you at all times until you feel stable. · Gradually increase your activity each day. There are generally no restrictions on walking, climbing stairs or riding in a car. Try to walk at least 4 times per day. · Showers are okay. If you have an incision, no tub bathing/swimming for two weeks. · No driving for 2 week and/or while on pain medication. · There are no lifting restrictions if you did not have surgery. · If you had surgery, the following restrictions are in place:  1. If you had a laparoscopic procedure, no lifting greater than 25 lbs for 3 weeks. 2. If you had an open procedure, no heavy lifting greater than 15 lbs for 8 weeks. 3. If you had a hysterectomy, nothing per vagina for 6-8 weeks. 4. If you had any type of vaginal procedure, you may experience vaginal spotting. Should the bleeding require more that 4 pads a day please call our office. Incision    · You should expect some discomfort in the area of your incision, particularly as you increase your activity. If you notice an area of increasing redness or new drainage, please call your doctor. · Staples are generally removed in 10-14 days. · Many incisions will have buried absorbable sutures, which do not need to be removed and are covered by protective glue. This will come off over time.       Causes For Concern    If any of the following occur, please call our office and speak with the Nurse/aid who will help you with your problem or ask the doctor to call you.  Problems with the incision, including increasing pain, swelling, redness or drainage.  Inability to pass urine    Increasing abdominal pain despite medication   Persisting nausea or vomiting.  Fever or chills and a temperature >101F   Constipation (no bowel movement for three days).  Diarrhea (more than three watery stools within 24 hours).  Excessive vaginal or wound bleeding.  If after hours and you cannot reach an on-call physician, call 911. Follow-Up    Call [phone] to schedule an appointment with Marli Carvajal MD  in 6 weeks. Information obtained by :  I understand that if any problems occur once I am at home I am to contact my physician. I understand and acknowledge receipt of the instructions indicated above.                                                                                                                                            Physician's or R.N.'s Signature                                                                  Date/Time                                                                                                                                              Patient or Representative Signature                                                          Date/Time

## 2019-10-17 NOTE — PROGRESS NOTES
TRANSFER - IN REPORT:    Verbal report received from [facility] [address] RN(name) on [employer]  being received from [employer]) for routine post - op      Report consisted of patients Situation, Background, Assessment and   Recommendations(SBAR). Information from the following report(s) SBAR, Kardex, OR Summary, Procedure Summary, Intake/Output, MAR, Recent Results and Med Rec Status was reviewed with the receiving nurse. Opportunity for questions and clarification was provided. Assessment completed upon patients arrival to unit and care assumed.

## 2019-10-17 NOTE — INTERVAL H&P NOTE
H&P Update:  Corinna Atkinson was seen and examined. History and physical has been reviewed. The patient has been examined.  There have been no significant clinical changes since the completion of the originally dated History and Physical.    Minh Levin MD

## 2019-10-17 NOTE — PROGRESS NOTES
DR. David Franco into see pt. Doctor was told of out put for the last 4 hours no new orders received.

## 2019-10-17 NOTE — ANESTHESIA POSTPROCEDURE EVALUATION
Procedure(s):  ROBOTIC ASSISTED TOTAL LAPAROSCOPIC HYSTERECTOMY, BILATERAL SALPINGO OOPHORECTOMY, RESECTION OF MASS, PELVIC SENTINEL LYMPH NODE MAPPING AND BIOPSY. general    Anesthesia Post Evaluation        Patient location during evaluation: PACU  Patient participation: complete - patient participated  Level of consciousness: awake  Pain management: adequate  Airway patency: patent  Anesthetic complications: no  Cardiovascular status: hemodynamically stable  Respiratory status: acceptable  Hydration status: acceptable  Comments: I have seen and evaluated the patient. The patient is ready for PACU discharge. [address], DO                         Vitals Value Taken Time   /85 10/17/2019 11:15 AM   Temp 36.5 °C (97.7 °F) 10/17/2019 11:03 AM   Pulse 83 10/17/2019 11:21 AM   Resp 11 10/17/2019 11:21 AM   SpO2 96 % 10/17/2019 11:21 AM   Vitals shown include unvalidated device data.

## 2019-10-17 NOTE — BRIEF OP NOTE
BRIEF OPERATIVE NOTE    Date of Procedure: 10/17/2019   Preoperative Diagnosis: ENDOMETRIAL MASS  Postoperative Diagnosis: ENDOMETRIAL MASS    Procedure(s):  ROBOTIC ASSISTED TOTAL LAPAROSCOPIC HYSTERECTOMY, BILATERAL SALPINGO OOPHORECTOMY, RESECTION OF MASS, PELVIC SENTINEL LYMPH NODE MAPPING AND BIOPSY  Surgeon(s) and Role:     * Arely Cadena MD - Primary         Surgical Assistant: Floyd Garcia    Surgical Staff:  Circ-1: Samantha Pompa RN  Scrub Tech-1: Judie Vaz  Surg Asst-1: Min LARSON  Event Time In Time Out   Incision Start 0461    Incision Close 1020      Anesthesia: General   Estimated Blood Loss: <25cc  Specimens:   ID Type Source Tests Collected by Time Destination   1 : UTERUS, CERVIX, BILATERAL FALLOPIAN TUBES AND OVARIES Frozen Section Uterus  Arely Cadena MD 10/17/2019 4250 Pathology   2 : RIGHT SENTINEL PELVIC LYMPH NODE Fresh Lymph Node  Arely Cadena MD 10/17/2019 7014 Pathology   3 : RIGHT SENTINEL PELVIC LYMPH NODE Fresh Lymph Node  Arely Cadena MD 10/17/2019 0844 Pathology   1 : pelvic washings Fresh Pelvis  Arely Cadena MD 10/17/2019 0857 Cytology      Findings: On laparoscopic survey, the uterus and bilateral tubes and ovaries are normal appearing. Prior tubal ligation. Some thin adhesions of the rectosigmoid colon to the left pelvic sidewall and para-colic gutter. Some evidence of diverticular disease. Normal appearing posterior culdesac, liver edge, diaphragm, omentum, and small bowel. Significant adiposity throughout abdomen. Right sentinel lymph node mapping developed towards a lymph node along the external iliac vessels, but did not involve the actual node. Left sentinel lymph node was identified along the proximal external iliac vessels. Frozen pathology of the uterine mass consistent with progesterone effect and no evidence of tumor.     Complications: none  Implants: * No implants in log *      Minh Levin MD

## 2019-10-18 VITALS
SYSTOLIC BLOOD PRESSURE: 139 MMHG | TEMPERATURE: 98.2 F | HEIGHT: 64 IN | DIASTOLIC BLOOD PRESSURE: 70 MMHG | OXYGEN SATURATION: 92 % | BODY MASS INDEX: 42.91 KG/M2 | WEIGHT: 251.38 LBS | HEART RATE: 79 BPM | RESPIRATION RATE: 18 BRPM

## 2019-10-18 LAB
ANION GAP SERPL CALC-SCNC: 8 MMOL/L (ref 5–15)
BUN SERPL-MCNC: 10 MG/DL (ref 6–20)
BUN/CREAT SERPL: 9 (ref 12–20)
CALCIUM SERPL-MCNC: 8.8 MG/DL (ref 8.5–10.1)
CHLORIDE SERPL-SCNC: 108 MMOL/L (ref 97–108)
CO2 SERPL-SCNC: 22 MMOL/L (ref 21–32)
CREAT SERPL-MCNC: 1.11 MG/DL (ref 0.55–1.02)
ERYTHROCYTE [DISTWIDTH] IN BLOOD BY AUTOMATED COUNT: 12 % (ref 11.5–14.5)
GLUCOSE SERPL-MCNC: 150 MG/DL (ref 65–100)
HCT VFR BLD AUTO: 39.3 % (ref 35–47)
HGB BLD-MCNC: 13.2 G/DL (ref 11.5–16)
MCH RBC QN AUTO: 32.7 PG (ref 26–34)
MCHC RBC AUTO-ENTMCNC: 33.6 G/DL (ref 30–36.5)
MCV RBC AUTO: 97.3 FL (ref 80–99)
NRBC # BLD: 0 K/UL (ref 0–0.01)
NRBC BLD-RTO: 0 PER 100 WBC
PLATELET # BLD AUTO: 237 K/UL (ref 150–400)
PMV BLD AUTO: 11.3 FL (ref 8.9–12.9)
POTASSIUM SERPL-SCNC: 4.3 MMOL/L (ref 3.5–5.1)
RBC # BLD AUTO: 4.04 M/UL (ref 3.8–5.2)
SODIUM SERPL-SCNC: 138 MMOL/L (ref 136–145)
WBC # BLD AUTO: 12.2 K/UL (ref 3.6–11)

## 2019-10-18 PROCEDURE — 85027 COMPLETE CBC AUTOMATED: CPT

## 2019-10-18 PROCEDURE — 74011250636 HC RX REV CODE- 250/636: Performed by: OBSTETRICS & GYNECOLOGY

## 2019-10-18 PROCEDURE — 99218 HC RM OBSERVATION: CPT

## 2019-10-18 PROCEDURE — 90686 IIV4 VACC NO PRSV 0.5 ML IM: CPT | Performed by: OBSTETRICS & GYNECOLOGY

## 2019-10-18 PROCEDURE — 90471 IMMUNIZATION ADMIN: CPT

## 2019-10-18 PROCEDURE — 80048 BASIC METABOLIC PNL TOTAL CA: CPT

## 2019-10-18 PROCEDURE — 36415 COLL VENOUS BLD VENIPUNCTURE: CPT

## 2019-10-18 PROCEDURE — 74011250637 HC RX REV CODE- 250/637: Performed by: OBSTETRICS & GYNECOLOGY

## 2019-10-18 RX ORDER — ONDANSETRON HYDROCHLORIDE 8 MG/1
8 TABLET, FILM COATED ORAL
Qty: 10 TAB | Refills: 0 | Status: SHIPPED | OUTPATIENT
Start: 2019-10-18 | End: 2019-11-15 | Stop reason: ALTCHOICE

## 2019-10-18 RX ORDER — OXYCODONE HYDROCHLORIDE 5 MG/1
5 TABLET ORAL
Qty: 15 TAB | Refills: 0 | Status: SHIPPED | OUTPATIENT
Start: 2019-10-18 | End: 2019-10-25

## 2019-10-18 RX ORDER — DOCUSATE SODIUM 100 MG/1
100 CAPSULE, LIQUID FILLED ORAL 2 TIMES DAILY
Qty: 60 CAP | Refills: 0 | Status: SHIPPED | OUTPATIENT
Start: 2019-10-18 | End: 2020-01-16 | Stop reason: ALTCHOICE

## 2019-10-18 RX ADMIN — INFLUENZA VIRUS VACCINE 0.5 ML: 15; 15; 15; 15 SUSPENSION INTRAMUSCULAR at 09:31

## 2019-10-18 RX ADMIN — Medication 10 ML: at 06:17

## 2019-10-18 RX ADMIN — DOCUSATE SODIUM 100 MG: 100 CAPSULE, LIQUID FILLED ORAL at 08:58

## 2019-10-18 RX ADMIN — LEVOTHYROXINE SODIUM 125 MCG: 125 TABLET ORAL at 06:17

## 2019-10-18 RX ADMIN — ACETAMINOPHEN 650 MG: 325 TABLET, FILM COATED ORAL at 05:57

## 2019-10-18 NOTE — ROUTINE PROCESS
The following appointments have been successfully scheduled:    Date/time Friday, October 25, 2019 11:20 AM  Patient  Rina Paredes 1950 73[address] [facility] #[id number]  Department [facility] OFFICE-[place]  Appointment type Transitional Care  Provider Lokesh Mcallister

## 2019-10-18 NOTE — PROGRESS NOTES
Bedside and Verbal shift change report given to Yaquelin Rodrigues RN (oncoming nurse) by Wendy Villafuerte  (offgoing nurse). Report included the following information SBAR, Kardex, Procedure Summary, Intake/Output, MAR and Recent Results. MD aware pt. Pulled IV out on accident, ok with leaving out. Pt to be discharged today. 9089- I have reviewed discharge instructions with the patient and sister. The patient and sister verbalized understanding. Patient given copy of discharge instructions and prescriptions. Denies any further questions at this time.

## 2019-10-18 NOTE — PROGRESS NOTES
Bedside and Verbal shift change report given to LEANN Frank RN (oncoming nurse) by Hannah Gannon RN (offgoing nurse). Report included the following information SBAR, Kardex, Procedure Summary, Intake/Output, MAR, Accordion and Recent Results.

## 2019-10-18 NOTE — PROGRESS NOTES
[facility]  [address] [facility], [address], [address]  P [phone]  F [phone]       Patient Name: Christian Benites Date: 10/17/2019   OR Date: 10/17/2019       Visit Date: 10/18/2019        SUBJECTIVE:    POD #1 s/p RA-TLH/BSO/SLND. Doing well. Forte out, awaiting void. Ambulating    OBJECTIVE:    Patient Vitals for the past 24 hrs:   Temp Pulse Resp BP SpO2   10/18/19 0400 98.2 °F (36.8 °C) 90 18 149/72 97 %   10/18/19 0000 97.7 °F (36.5 °C) 96 18 168/68 96 %   10/17/19 2000 97.7 °F (36.5 °C) 96 16 169/68 96 %   10/17/19 1540 98.1 °F (36.7 °C) 88 16 147/73 95 %   10/17/19 1438 98 °F (36.7 °C) 93 16 162/80 96 %   10/17/19 1337 97.5 °F (36.4 °C) 90 16 166/86 98 %   10/17/19 1235 97.7 °F (36.5 °C) 87 14 (!) 170/93 96 %   10/17/19 1200 -- 88 13 (!) 171/92 100 %   10/17/19 1150 98 °F (36.7 °C) -- -- -- --   10/17/19 1145 -- 82 13 129/76 99 %   10/17/19 1120 -- 83 12 -- 97 %   10/17/19 1115 -- 85 16 175/85 97 %   10/17/19 1110 -- 88 13 -- 98 %   10/17/19 1105 -- 82 12 168/69 100 %   10/17/19 1103 97.7 °F (36.5 °C) 81 12 157/82 99 %   10/17/19 1100 -- 82 11 173/74 99 %   10/17/19 1058 -- -- -- 157/82 --       Date 10/17/19 0700 - 10/18/19 0659 10/18/19 0700 - 10/19/19 0659   Shift 0700-1859 1900-0659 24 Hour Total 8536-9176 3553-4604 24 Hour Total   INTAKE   P.O.  720 720        P. O.  720 720      I. V.(mL/kg/hr) 1300(1) 1256.3(0.9) 2556.3(0.9)        Volume (lactated Ringers infusion) 1300  1300        Volume (0.9% sodium chloride infusion)  1256.3 1256.3      Shift Total(mL/kg) 1300(11.4) 1976.3(17.3) 3276. 3(28.7)      OUTPUT   Urine(mL/kg/hr) 155(0.1) 1000(0.7) 1155(0.4)        Urine Output 30  30        Urine Output (mL) ([REMOVED] Urinary Catheter 10/17/19) 125 1000 1125      Blood 20  20        Estimated Blood Loss 20  20      Shift Total(mL/kg) 175(1.5) 1000(8.8) 1175(10.3)      NET 1125 976.3 2101.3      Weight (kg) 114 114 114 114 114 114       Physical Exam General:  alert, cooperative, no distress     Cardiac:  Regular rate and rhythm        Lungs:  clear to auscultation bilaterally  Abdomen:  soft, non-tender, without masses or organomegaly       Wound:  clean, dry, intact with skin glue   Extremity: extremities normal, atraumatic, no cyanosis or edema    Date Review  Lab Results   Component Value Date/Time    WBC 12.2 (H) 10/18/2019 06:32 AM    ABS. NEUTROPHILS 3.9 10/11/2019 03:16 PM    HGB 13.2 10/18/2019 06:32 AM    HCT 39.3 10/18/2019 06:32 AM    MCV 97.3 10/18/2019 06:32 AM    MCH 32.7 10/18/2019 06:32 AM    PLATELET 836 22/37/5696 06:32 AM     Lab Results   Component Value Date/Time    Sodium 138 10/18/2019 06:32 AM    Potassium 4.3 10/18/2019 06:32 AM    Chloride 108 10/18/2019 06:32 AM    CO2 22 10/18/2019 06:32 AM    Glucose 150 (H) 10/18/2019 06:32 AM    BUN 10 10/18/2019 06:32 AM    Creatinine 1.11 (H) 10/18/2019 06:32 AM    Calcium 8.8 10/18/2019 06:32 AM    Albumin 3.8 10/11/2019 03:16 PM    Bilirubin, total 0.9 10/11/2019 03:16 PM    AST (SGOT) 17 10/11/2019 03:16 PM    ALT (SGPT) 27 10/11/2019 03:16 PM    Alk. phosphatase 96 10/11/2019 03:16 PM     IMPRESSION/PLAN:    1 Day Post-Op Procedure(s):  ROBOTIC ASSISTED TOTAL LAPAROSCOPIC HYSTERECTOMY, BILATERAL SALPINGO OOPHORECTOMY, RESECTION OF MASS, PELVIC SENTINEL LYMPH NODE MAPPING AND BIOPSY for ENDOMETRIAL MASS    Oncologic:  Frozen pathology benign. Will follow-up final pathology given presence of mass   Heme/CV:  CBC appropriate. VSS WNL   Renal:  BMP appropriate. Good UOP. Forte out. Awaiting void       FEN/GI:  ADAT   ID/Wound:  C/D/I   PPX:  SCDs, IS, ambulation   Dispostion:  Doing well postoperatively. Awaiting void trial. Otherwise meeting goals for discharge. F/u in 6 weeks for postoperative visit.          Costa Pineda MD

## 2019-10-24 ENCOUNTER — TELEPHONE (OUTPATIENT)
Dept: GYNECOLOGY | Age: 69
End: 2019-10-24

## 2019-10-24 NOTE — TELEPHONE ENCOUNTER
I called Ms. Esquivel Brightly to discuss her surgical pathology as per below. No evidence of pre-malignant or malignant conditions. Patient is recovering well from surgery. She will make a follow-up appointment in 4-6 weeks. She was thankful for our call. Sebas Diaz MD          * * *FINAL PATHOLOGIC DIAGNOSIS* * *     1. Uterus, cervix, bilateral ovaries and fallopian tubes, hysterectomy and  salpingo-oophorectomy:       Cervix:       Chronic endocervicitis  Uterus:       Adenomyosis       Abundant endometrial tissue with atrophic glands and  pseudo-decidualized stroma, consistent with      progestin effect       Focal features suggestive of endometrial polyp       Negative for hyperplasia or malignancy  Ovaries:       Physiologic changes  Fallopian tubes:       No specific pathologic diagnosis    2. [place] lymph node, right pelvic, excision:       One lymph node, negative for metastatic carcinoma (0/1)  Three levels and a cytokeratin stain are negative    3.  [place] lymph node, left pelvic, excision:       One lymph node, negative for metastatic carcinoma (0/1)  Three levels and a cytokeratin stain are negative

## 2019-10-29 DIAGNOSIS — I10 BENIGN ESSENTIAL HTN: ICD-10-CM

## 2019-10-29 RX ORDER — METOPROLOL SUCCINATE 50 MG/1
50 TABLET, EXTENDED RELEASE ORAL DAILY
Qty: 30 TAB | Refills: 5 | Status: SHIPPED | OUTPATIENT
Start: 2019-10-29 | End: 2019-11-15 | Stop reason: ALTCHOICE

## 2019-10-30 ENCOUNTER — TELEPHONE (OUTPATIENT)
Dept: OBGYN CLINIC | Age: 69
End: 2019-10-30

## 2019-10-30 NOTE — TELEPHONE ENCOUNTER
77year old patient last seen in the office on 9/10/19. Patient had surgery on   10/17/19 Procedure:      Robotic-assisted total laparoscopic hysterectomy,   Resection of mass,  Bilateral salpingo-oophorectomy,   Bilateral pelvic sentinel lymph node mapping and biopsy     ? Ok to refuse requested medication ?  Need for post surgery    Please advise

## 2019-10-31 RX ORDER — MEGESTROL ACETATE 40 MG/1
TABLET ORAL
Qty: 30 TAB | Refills: 0 | OUTPATIENT
Start: 2019-10-31

## 2019-11-07 ENCOUNTER — TELEPHONE (OUTPATIENT)
Dept: GYNECOLOGY | Age: 69
End: 2019-11-07

## 2019-11-07 NOTE — TELEPHONE ENCOUNTER
Pt , she states she is having \"pressure and burping\", she had surgery on 10/17/19. She is having regular bowel movements, no nausea or vomiting, no fever or chills.   Recommended to try Beano or gas-x, avoid gas forming foods such as broccoli and beans, it not any better by tomorrow call me back, pt verbalized understanding

## 2019-11-09 DIAGNOSIS — E03.9 ACQUIRED HYPOTHYROIDISM: ICD-10-CM

## 2019-11-10 RX ORDER — LEVOTHYROXINE SODIUM 125 UG/1
TABLET ORAL
Qty: 30 TAB | Refills: 5 | Status: SHIPPED | OUTPATIENT
Start: 2019-11-10 | End: 2020-03-09 | Stop reason: SDUPTHER

## 2019-11-15 ENCOUNTER — OFFICE VISIT (OUTPATIENT)
Dept: INTERNAL MEDICINE CLINIC | Age: 69
End: 2019-11-15

## 2019-11-15 ENCOUNTER — HOSPITAL ENCOUNTER (OUTPATIENT)
Dept: LAB | Age: 69
Discharge: HOME | End: 2019-11-15

## 2019-11-15 VITALS
HEIGHT: 64 IN | TEMPERATURE: 97.9 F | WEIGHT: 251 LBS | BODY MASS INDEX: 42.85 KG/M2 | RESPIRATION RATE: 18 BRPM | OXYGEN SATURATION: 97 % | DIASTOLIC BLOOD PRESSURE: 84 MMHG | SYSTOLIC BLOOD PRESSURE: 135 MMHG | HEART RATE: 79 BPM

## 2019-11-15 DIAGNOSIS — Z90.79 S/P TAH-BSO: ICD-10-CM

## 2019-11-15 DIAGNOSIS — Z13.0 SCREENING FOR IRON DEFICIENCY ANEMIA: ICD-10-CM

## 2019-11-15 DIAGNOSIS — Z90.710 S/P TAH-BSO: ICD-10-CM

## 2019-11-15 DIAGNOSIS — Z90.722 S/P TAH-BSO: ICD-10-CM

## 2019-11-15 DIAGNOSIS — E03.9 ACQUIRED HYPOTHYROIDISM: ICD-10-CM

## 2019-11-15 DIAGNOSIS — L65.9 HAIR LOSS DISORDER: ICD-10-CM

## 2019-11-15 DIAGNOSIS — F41.9 ANXIETY: ICD-10-CM

## 2019-11-15 DIAGNOSIS — Z00.00 MEDICARE ANNUAL WELLNESS VISIT, SUBSEQUENT: Primary | ICD-10-CM

## 2019-11-15 DIAGNOSIS — I10 BENIGN ESSENTIAL HTN: ICD-10-CM

## 2019-11-15 DIAGNOSIS — Z00.00 MEDICARE ANNUAL WELLNESS VISIT, SUBSEQUENT: ICD-10-CM

## 2019-11-15 PROBLEM — R93.89 THICKENED ENDOMETRIUM: Status: ACTIVE | Noted: 2019-09-29

## 2019-11-15 LAB
CHOLEST SERPL-MCNC: 176 MG/DL
ERYTHROCYTE [DISTWIDTH] IN BLOOD BY AUTOMATED COUNT: 12.2 % (ref 11.5–14.5)
FERRITIN SERPL-MCNC: 273 NG/ML (ref 26–388)
HCT VFR BLD AUTO: 43.3 % (ref 35–47)
HDLC SERPL-MCNC: 21 MG/DL
HDLC SERPL: 8.4 {RATIO} (ref 0–5)
HGB BLD-MCNC: 14.4 G/DL (ref 11.5–16)
IRON SATN MFR SERPL: 28 % (ref 20–50)
IRON SERPL-MCNC: 87 UG/DL (ref 35–150)
LDLC SERPL CALC-MCNC: 114.4 MG/DL (ref 0–100)
LIPID PROFILE,FLP: ABNORMAL
MCH RBC QN AUTO: 32.7 PG (ref 26–34)
MCHC RBC AUTO-ENTMCNC: 33.3 G/DL (ref 30–36.5)
MCV RBC AUTO: 98.4 FL (ref 80–99)
NRBC # BLD: 0 K/UL (ref 0–0.01)
NRBC BLD-RTO: 0 PER 100 WBC
PLATELET # BLD AUTO: 242 K/UL (ref 150–400)
PMV BLD AUTO: 11.7 FL (ref 8.9–12.9)
RBC # BLD AUTO: 4.4 M/UL (ref 3.8–5.2)
T4 FREE SERPL-MCNC: 1.4 NG/DL (ref 0.8–1.5)
TIBC SERPL-MCNC: 314 UG/DL (ref 250–450)
TRIGL SERPL-MCNC: 203 MG/DL (ref ?–150)
TSH SERPL DL<=0.05 MIU/L-ACNC: 0.34 UIU/ML (ref 0.36–3.74)
VLDLC SERPL CALC-MCNC: 40.6 MG/DL
WBC # BLD AUTO: 8.7 K/UL (ref 3.6–11)

## 2019-11-27 ENCOUNTER — OFFICE VISIT (OUTPATIENT)
Dept: GYNECOLOGY | Age: 69
End: 2019-11-27

## 2019-11-27 VITALS
HEART RATE: 75 BPM | SYSTOLIC BLOOD PRESSURE: 131 MMHG | BODY MASS INDEX: 42.61 KG/M2 | WEIGHT: 249.6 LBS | DIASTOLIC BLOOD PRESSURE: 73 MMHG | HEIGHT: 64 IN

## 2019-11-27 DIAGNOSIS — Z09 POSTOPERATIVE EXAMINATION: ICD-10-CM

## 2019-11-27 DIAGNOSIS — Z90.721 S/P HYSTERECTOMY WITH OOPHORECTOMY: Primary | ICD-10-CM

## 2019-11-27 DIAGNOSIS — Z90.710 S/P HYSTERECTOMY WITH OOPHORECTOMY: Primary | ICD-10-CM

## 2019-11-27 NOTE — PROGRESS NOTES
Post op Visit, surgery was on 10/17/19, Initial blood pressure reading 146/111, repeat blood pressure reading 131/73    1. Have you been to the ER, urgent care clinic since your last visit? Hospitalized since your last visit?  no    2. Have you seen or consulted any other health care providers outside of the [facility] since your last visit? Include any pap smears or colon screening.    no
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
## 2019-11-29 NOTE — PROGRESS NOTES
Behavioral Health Progress Note    This patient was seen by the [facility] from 1pm to 1:45pm for a total of 45 minutes. Psychotropic medication changes: None    Diagnosis: Adjustment Disorder with Anxiety and Depressed Mood    Description of session/progress/interventions: Karissa France reported that she feels she is doing better, that she feels as if she has turned a corner. She does not feel stuck in her grief and has a bit more energy; she has cleaned up her house and put things in storage. Her mothers dmentia is worsening and she cont to help her sister with her mothers care. Assessment/Plan: She plans to follow up with a therapist in the community if she feels she needs ongoing support. She will cont to see her PCP regularly and to focus on losing weight through improved nutrition and increase in physical activity.
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
## 2019-12-16 RX ORDER — OMEPRAZOLE 20 MG/1
CAPSULE, DELAYED RELEASE ORAL
Qty: 90 CAP | Refills: 1 | Status: SHIPPED | OUTPATIENT
Start: 2019-12-16 | End: 2020-06-21

## 2020-01-03 ENCOUNTER — OFFICE VISIT (OUTPATIENT)
Dept: INTERNAL MEDICINE CLINIC | Age: 70
End: 2020-01-03

## 2020-01-03 VITALS
HEART RATE: 73 BPM | RESPIRATION RATE: 18 BRPM | SYSTOLIC BLOOD PRESSURE: 139 MMHG | WEIGHT: 252.13 LBS | HEIGHT: 64 IN | OXYGEN SATURATION: 96 % | DIASTOLIC BLOOD PRESSURE: 85 MMHG | TEMPERATURE: 98.6 F | BODY MASS INDEX: 43.04 KG/M2

## 2020-01-03 DIAGNOSIS — J06.9 VIRAL UPPER RESPIRATORY TRACT INFECTION: Primary | ICD-10-CM

## 2020-01-03 NOTE — PROGRESS NOTES
Assessment and Plan   Diagnoses and all orders for this visit:    1. Viral upper respiratory tract infection  Symptoms improving over the last 24 hours. Expect continued improvement. Instructed to give us a call if she seems to be worsening. Benefits, risks, possible drug interactions, and side effects of all new medications were reviewed with the patient. Pt verbalized understanding. Return to clinic: As needed    Joselyn Mao MD  [facility]  1/3/2020    No future appointments. Subjective   Chief Complaint   Aleisha Burgos is a 68 y.o. female     Presents to clinic for malaise and fatigue since Thanksgiving. She had a hysterectomy back in October and started having malaise and fatigue around Thanksgiving. Had an episode of vomiting on Christmas. Has had some sinus pressure and congestion, sore throat, feeling like her eyes are tired. Also felt like her tongue was swollen with some whiteness. Reports minimal cough. Denies any shortness of breath or chest pain. Denies current fever or chills. Reports however that her symptoms have been improving over the past day but wanted to keep her appointment just to make sure everything was okay. Review of Systems   Constitutional: Negative for chills and fever. Respiratory: Negative for cough and shortness of breath. Cardiovascular: Negative for chest pain. Objective   Vitals:       Visit Vitals  /85 (BP 1 Location: Left arm, BP Patient Position: Sitting)   Pulse 73   Temp 98.6 °F (37 °C) (Oral)   Resp 18   Ht 5' 4\" (1.626 m)   Wt 252 lb 2 oz (114.4 kg)   SpO2 96%   BMI 43.28 kg/m²        Physical Exam  Constitutional:       Appearance: Normal appearance.    HENT:      Right Ear: Tympanic membrane, ear canal and external ear normal.      Left Ear: Tympanic membrane, ear canal and external ear normal.      Nose: Nose normal.      Mouth/Throat:      Mouth: Mucous membranes are moist.      Pharynx: No posterior oropharyngeal erythema. Eyes:      Conjunctiva/sclera: Conjunctivae normal.   Cardiovascular:      Rate and Rhythm: Normal rate. Heart sounds: No murmur. No friction rub. No gallop. Pulmonary:      Effort: Pulmonary effort is normal. No respiratory distress. Breath sounds: No wheezing, rhonchi or rales. Lymphadenopathy:      Cervical: No cervical adenopathy. Neurological:      Mental Status: She is alert.           Voice recognition software is utilized and this note may contain transcription errors

## 2020-01-16 ENCOUNTER — OFFICE VISIT (OUTPATIENT)
Dept: INTERNAL MEDICINE CLINIC | Age: 70
End: 2020-01-16

## 2020-01-16 VITALS
HEART RATE: 85 BPM | DIASTOLIC BLOOD PRESSURE: 80 MMHG | RESPIRATION RATE: 14 BRPM | HEIGHT: 64 IN | BODY MASS INDEX: 42.75 KG/M2 | SYSTOLIC BLOOD PRESSURE: 132 MMHG | OXYGEN SATURATION: 96 % | TEMPERATURE: 98.7 F | WEIGHT: 250.4 LBS

## 2020-01-16 DIAGNOSIS — R41.89 COGNITIVE CHANGE: ICD-10-CM

## 2020-01-16 DIAGNOSIS — F43.23 ADJUSTMENT DISORDER WITH MIXED ANXIETY AND DEPRESSED MOOD: ICD-10-CM

## 2020-01-16 DIAGNOSIS — H93.8X2 EAR FULLNESS, LEFT: ICD-10-CM

## 2020-01-16 DIAGNOSIS — F07.81 POSTCONCUSSION SYNDROME: Primary | ICD-10-CM

## 2020-01-18 NOTE — PROGRESS NOTES
HISTORY OF PRESENT ILLNESS    Chief Complaint   Patient presents with    Memory Loss    Concussion     Dec 2017    Ear Fullness     left ear        Presents for follow-up  History of concussion from head injury December 2017 after falling. Then had significant severe depression and adjustment reaction symptoms after the loss of her  December 2018. Varney Merlin for counseling. Claribel Be for neuropsych testing over 1 year ago. She requests repeat testing. Reports that she is intermittently forgetting things still and is concerning to her. Feels that her mood is reasonably  stable but still remains somewhat anxious a lot of the time. She is taking minimal medication with very rare use of Xanax. Hypothyroidism follow-up  Reports  fatigue  denies heat/cold intolerance, bowel/skin changes or CVS symptoms, losing hair, feeling excessive energy, tremulousness, palpitations. Thyroid medication has been unchanged since last medication check and labs. 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    has a past surgical history that includes hx tonsil and adenoidectomy (1955); hx colonoscopy (11/24/2009); colonoscopy (N/A, 9/19/2018); hx colposcopy (11/26/2018); hx tubal ligation (1984); hx dilation and curettage (02/2019); hx cholecystectomy (1991); hx tonsillectomy; hx cataract removal (Bilateral); hx knee arthroscopy (Right, 1978); hx above knee amputation; and hx haroldo and bso (10/13/2019). reports that she quit smoking about 10 years ago. She has a 5.00 pack-year smoking history. She has never used smokeless tobacco. She reports current alcohol use of about 7.0 standard drinks of alcohol per week. She reports that she does not use drugs. family history includes Dementia in her mother; Diabetes in her mother and sister; Heart Disease in her sister; No Known Problems in her daughter; Panic disorder in her brother and sister; Thyroid Disease in her mother and sister. Physical Exam   Nursing note and vitals reviewed. Blood pressure 132/80, pulse 85, temperature 98.7 °F (37.1 °C), temperature source Oral, resp. rate 14, height 5' 4\" (1.626 m), weight 250 lb 6.4 oz (113.6 kg), SpO2 96 %. Constitutional:  No distress. Eyes: Conjunctivae are normal.   Ears:  Hearing grossly intact  Cardiovascular: Normal rate. regular rhythm, no murmurs or gallops  No edema  Pulmonary/Chest: Effort normal.   CTAB  Musculoskeletal: moves all 4 extremities   Neurological: Alert and oriented to person, place, and time. Skin: No rash noted. Psychiatric: Normal mood and affect. Behavior is normal.     Diagnoses and all orders for this visit:    1. Postconcussion syndrome  2. Cognitive change  Symptoms are relatively stable, but persistent. She has concerns about dementia given family history. Would benefit from repeat testing with Dr. Joe Moore. -     REFERRAL TO NEUROPSYCHOLOGY    3. Ear fullness, left  Likely some degree of mild eustachian tube dysfunction.   Exam is normal.    4. Adjustment disorder with mixed anxiety and depressed mood  Stable, has supportive family                  lab results and schedule of future lab studies reviewed with patient  reviewed medications and side effects in detail    Return to clinic for further evaluation if new symptoms develop        Current Outpatient Medications   Medication Sig    omeprazole (PRILOSEC) 20 mg capsule TAKE 1 CAPSULE BY MOUTH EVERY DAY    levothyroxine (SYNTHROID) 125 mcg tablet TAKE 1 TABLET BY MOUTH EVERY DAY BEFORE BREAKFAST    multivitamin (ONE A DAY) tablet Take 1 Tab by mouth every morning.  metoprolol succinate (TOPROL-XL) 50 mg XL tablet Take 50 mg by mouth nightly.  diclofenac potassium (CATAFLAM) 50 mg tablet Take 50 mg by mouth every morning.  ibuprofen (MOTRIN) 200 mg tablet Take 400 mg by mouth as needed for Pain.  ALPRAZolam (XANAX) 0.25 mg tablet Take 1 Tab by mouth two (2) times daily as needed (for flying). Max Daily Amount: 0.5 mg. No current facility-administered medications for this visit. Discussed the patient's BMI with her. The BMI follow up plan is as follows:     dietary management education, guidance, and counseling  encourage exercise  monitor weight  prescribed dietary intake    An After Visit Summary was printed and given to the patient.

## 2020-03-09 ENCOUNTER — TELEPHONE (OUTPATIENT)
Dept: INTERNAL MEDICINE CLINIC | Age: 70
End: 2020-03-09

## 2020-03-09 DIAGNOSIS — E03.9 ACQUIRED HYPOTHYROIDISM: ICD-10-CM

## 2020-03-09 RX ORDER — LEVOTHYROXINE SODIUM 125 UG/1
TABLET ORAL
Qty: 90 TAB | Refills: 1 | Status: SHIPPED | OUTPATIENT
Start: 2020-03-09 | End: 2020-08-10

## 2020-03-09 NOTE — TELEPHONE ENCOUNTER
Pt states all her meds are a 90-day supply except for Levothyroxine which is a 30-day supply. She would like to have a 90-day supply of Levothyroxine moving forward. Thanks.

## 2020-06-21 RX ORDER — OMEPRAZOLE 20 MG/1
CAPSULE, DELAYED RELEASE ORAL
Qty: 90 CAP | Refills: 1 | Status: SHIPPED | OUTPATIENT
Start: 2020-06-21 | End: 2020-12-30

## 2020-08-10 DIAGNOSIS — E03.9 ACQUIRED HYPOTHYROIDISM: ICD-10-CM

## 2020-08-10 RX ORDER — LEVOTHYROXINE SODIUM 125 UG/1
TABLET ORAL
Qty: 90 TAB | Refills: 1 | Status: SHIPPED | OUTPATIENT
Start: 2020-08-10 | End: 2021-03-18

## 2020-09-14 ENCOUNTER — VIRTUAL VISIT (OUTPATIENT)
Dept: INTERNAL MEDICINE CLINIC | Age: 70
End: 2020-09-14
Payer: MEDICARE

## 2020-09-14 DIAGNOSIS — I10 BENIGN ESSENTIAL HTN: Primary | ICD-10-CM

## 2020-09-14 DIAGNOSIS — E03.9 ACQUIRED HYPOTHYROIDISM: ICD-10-CM

## 2020-09-14 DIAGNOSIS — N39.0 URINARY TRACT INFECTION WITHOUT HEMATURIA, SITE UNSPECIFIED: ICD-10-CM

## 2020-09-14 PROCEDURE — G9899 SCRN MAM PERF RSLTS DOC: HCPCS | Performed by: INTERNAL MEDICINE

## 2020-09-14 PROCEDURE — 1101F PT FALLS ASSESS-DOCD LE1/YR: CPT | Performed by: INTERNAL MEDICINE

## 2020-09-14 PROCEDURE — G8536 NO DOC ELDER MAL SCRN: HCPCS | Performed by: INTERNAL MEDICINE

## 2020-09-14 PROCEDURE — G8399 PT W/DXA RESULTS DOCUMENT: HCPCS | Performed by: INTERNAL MEDICINE

## 2020-09-14 PROCEDURE — 99214 OFFICE O/P EST MOD 30 MIN: CPT | Performed by: INTERNAL MEDICINE

## 2020-09-14 PROCEDURE — 1090F PRES/ABSN URINE INCON ASSESS: CPT | Performed by: INTERNAL MEDICINE

## 2020-09-14 PROCEDURE — G8417 CALC BMI ABV UP PARAM F/U: HCPCS | Performed by: INTERNAL MEDICINE

## 2020-09-14 PROCEDURE — G8756 NO BP MEASURE DOC: HCPCS | Performed by: INTERNAL MEDICINE

## 2020-09-14 PROCEDURE — G8432 DEP SCR NOT DOC, RNG: HCPCS | Performed by: INTERNAL MEDICINE

## 2020-09-14 PROCEDURE — G8427 DOCREV CUR MEDS BY ELIG CLIN: HCPCS | Performed by: INTERNAL MEDICINE

## 2020-09-14 PROCEDURE — 3017F COLORECTAL CA SCREEN DOC REV: CPT | Performed by: INTERNAL MEDICINE

## 2020-09-14 RX ORDER — NITROFURANTOIN 25; 75 MG/1; MG/1
100 CAPSULE ORAL 2 TIMES DAILY
Qty: 14 CAP | Refills: 0 | Status: SHIPPED | OUTPATIENT
Start: 2020-09-14 | End: 2020-12-10 | Stop reason: ALTCHOICE

## 2020-09-14 NOTE — PROGRESS NOTES
HISTORY OF PRESENT ILLNESS  Tessa Cooper is a 68 y.o. female who is present, aware, and consenting for real-time synchronous virtual video visit through [employer]. HPI  UTI: Pt reports that starting on Friday she began experiencing urgency and dysuria. Pt notes that she started drinking a lot of fluids. She states that she feels the need to wear a pad due to her urinary urgency. Denies abd pain or diarrhea. Confirms fullness and pressure in lower abd. Denies vomiting or fever. Hypertension ROS: taking medications as instructed, no medication side effects noted, no TIA's, no chest pain on exertion, no dyspnea on exertion, no swelling of ankles. New concerns: Continues on metoprolol. hypothyroidism. Lab Results   Component Value Date/Time    TSH 0.34 (L) 11/15/2019 04:14 PM     Thyroid ROS: denies fatigue, weight changes, heat/cold intolerance, bowel/skin changes or CVS symptoms. Continues on Synthroid. Review of Systems   Genitourinary: Positive for dysuria and urgency. All other systems reviewed and are negative. Physical Exam  Vitals signs reviewed. Constitutional:       General: She is not in acute distress. Appearance: Normal appearance. She is not ill-appearing, toxic-appearing or diaphoretic. HENT:      Right Ear: Hearing normal.      Left Ear: Hearing normal.      Nose: Nose normal.      Mouth/Throat:      Mouth: Mucous membranes are moist.      Pharynx: Oropharynx is clear. Eyes:      Conjunctiva/sclera: Conjunctivae normal.   Neck:      Musculoskeletal: Normal range of motion. Pulmonary:      Effort: No respiratory distress. Breath sounds: Normal air entry. Musculoskeletal: Normal range of motion. Skin:     General: Skin is warm and dry. Neurological:      General: No focal deficit present. Mental Status: She is alert and oriented to person, place, and time. Mental status is at baseline.    Psychiatric:         Mood and Affect: Mood normal.         Behavior: Behavior normal.         Thought Content: Thought content normal.         Judgment: Judgment normal.         ASSESSMENT and PLAN  Diagnoses and all orders for this visit:    1. Benign essential HTN  BP is at goal. I do not recommend any change in medications. 2. Urinary tract infection without hematuria, site unspecified  Pt presents with dysuria and urgency. Rx Macrobid and directed pt to comply BID for 7 days. Informed pt that if her sxs worsen, then I want to see her in the office. Will continue to monitor for improvements or changes. 3. Acquired hypothyroidism  Thyroid stable. I do not recommend a change in medications. Lab results and schedule of future lab studies reviewed with patient. Reviewed diet, exercise and weight control. Written by Gautam Cullen, as dictated by Tad Kirby MD.     Current diagnosis and concerns discussed with pt at length. Understands risks and benefits or current treatment plan and medications and accepts the treatment and medication with any possible risks. Pt asks appropriate questions which were answered. Pt instructed to call with any concerns or problems.

## 2020-09-29 ENCOUNTER — HOSPITAL ENCOUNTER (OUTPATIENT)
Dept: MAMMOGRAPHY | Age: 70
Discharge: HOME | End: 2020-09-29
Attending: OBSTETRICS & GYNECOLOGY
Payer: MEDICARE

## 2020-09-29 DIAGNOSIS — Z12.31 SCREENING MAMMOGRAM FOR HIGH-RISK PATIENT: ICD-10-CM

## 2020-09-29 PROCEDURE — 77067 SCR MAMMO BI INCL CAD: CPT

## 2020-10-20 ENCOUNTER — TELEPHONE (OUTPATIENT)
Dept: INTERNAL MEDICINE CLINIC | Age: 70
End: 2020-10-20

## 2020-10-20 DIAGNOSIS — H91.93 BILATERAL HEARING LOSS, UNSPECIFIED HEARING LOSS TYPE: Primary | ICD-10-CM

## 2020-10-20 NOTE — TELEPHONE ENCOUNTER
Per patient, she has a hearing test scheduled 11/11 @ 945 am with Dayanara Brower , Dr Le Reynoso and needs an order sent to their office, fx # [phone] atn : Dean Pedraza , manager

## 2020-10-22 NOTE — TELEPHONE ENCOUNTER
Called patient to let her know that a referral for her hearing test was sent over via fax to location patient provided.

## 2020-11-08 RX ORDER — METOPROLOL SUCCINATE 50 MG/1
TABLET, EXTENDED RELEASE ORAL
Qty: 90 TAB | Refills: 1 | Status: SHIPPED | OUTPATIENT
Start: 2020-11-08 | End: 2021-05-04

## 2020-12-10 ENCOUNTER — OFFICE VISIT (OUTPATIENT)
Dept: INTERNAL MEDICINE CLINIC | Age: 70
End: 2020-12-10
Payer: MEDICARE

## 2020-12-10 VITALS
HEART RATE: 69 BPM | SYSTOLIC BLOOD PRESSURE: 130 MMHG | BODY MASS INDEX: 43.57 KG/M2 | TEMPERATURE: 97.8 F | WEIGHT: 255.2 LBS | HEIGHT: 64 IN | OXYGEN SATURATION: 94 % | DIASTOLIC BLOOD PRESSURE: 78 MMHG | RESPIRATION RATE: 14 BRPM

## 2020-12-10 DIAGNOSIS — Z00.00 MEDICARE ANNUAL WELLNESS VISIT, SUBSEQUENT: Primary | ICD-10-CM

## 2020-12-10 DIAGNOSIS — I10 BENIGN ESSENTIAL HTN: ICD-10-CM

## 2020-12-10 DIAGNOSIS — E03.9 ACQUIRED HYPOTHYROIDISM: ICD-10-CM

## 2020-12-10 PROCEDURE — G0463 HOSPITAL OUTPT CLINIC VISIT: HCPCS | Performed by: INTERNAL MEDICINE

## 2020-12-10 PROCEDURE — 1101F PT FALLS ASSESS-DOCD LE1/YR: CPT | Performed by: INTERNAL MEDICINE

## 2020-12-10 PROCEDURE — G8417 CALC BMI ABV UP PARAM F/U: HCPCS | Performed by: INTERNAL MEDICINE

## 2020-12-10 PROCEDURE — G9899 SCRN MAM PERF RSLTS DOC: HCPCS | Performed by: INTERNAL MEDICINE

## 2020-12-10 PROCEDURE — G8752 SYS BP LESS 140: HCPCS | Performed by: INTERNAL MEDICINE

## 2020-12-10 PROCEDURE — G0439 PPPS, SUBSEQ VISIT: HCPCS | Performed by: INTERNAL MEDICINE

## 2020-12-10 PROCEDURE — 99214 OFFICE O/P EST MOD 30 MIN: CPT | Performed by: INTERNAL MEDICINE

## 2020-12-10 PROCEDURE — 1090F PRES/ABSN URINE INCON ASSESS: CPT | Performed by: INTERNAL MEDICINE

## 2020-12-10 PROCEDURE — G8536 NO DOC ELDER MAL SCRN: HCPCS | Performed by: INTERNAL MEDICINE

## 2020-12-10 PROCEDURE — G8432 DEP SCR NOT DOC, RNG: HCPCS | Performed by: INTERNAL MEDICINE

## 2020-12-10 PROCEDURE — G8754 DIAS BP LESS 90: HCPCS | Performed by: INTERNAL MEDICINE

## 2020-12-10 PROCEDURE — 3017F COLORECTAL CA SCREEN DOC REV: CPT | Performed by: INTERNAL MEDICINE

## 2020-12-10 PROCEDURE — G8427 DOCREV CUR MEDS BY ELIG CLIN: HCPCS | Performed by: INTERNAL MEDICINE

## 2020-12-10 PROCEDURE — G8399 PT W/DXA RESULTS DOCUMENT: HCPCS | Performed by: INTERNAL MEDICINE

## 2020-12-10 RX ORDER — KETOCONAZOLE 20 MG/G
CREAM TOPICAL AS NEEDED
COMMUNITY
Start: 2020-10-05 | End: 2022-08-12 | Stop reason: ALTCHOICE

## 2020-12-10 RX ORDER — DICLOFENAC SODIUM 75 MG/1
TABLET, DELAYED RELEASE ORAL
COMMUNITY
Start: 2020-10-11 | End: 2020-12-10 | Stop reason: ALTCHOICE

## 2020-12-10 RX ORDER — ZOSTER VACCINE RECOMBINANT, ADJUVANTED 50 MCG/0.5
0.5 KIT INTRAMUSCULAR ONCE
Qty: 0.5 ML | Refills: 1
Start: 2020-12-10 | End: 2020-12-10
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
## 2020-12-11 LAB
ALBUMIN SERPL-MCNC: 4 G/DL (ref 3.5–5)
ALBUMIN/GLOB SERPL: 1.2 {RATIO} (ref 1.1–2.2)
ALP SERPL-CCNC: 133 U/L (ref 45–117)
ALT SERPL-CCNC: 33 U/L (ref 12–78)
ANION GAP SERPL CALC-SCNC: 7 MMOL/L (ref 5–15)
AST SERPL-CCNC: 20 U/L (ref 15–37)
BILIRUB SERPL-MCNC: 0.9 MG/DL (ref 0.2–1)
BUN SERPL-MCNC: 22 MG/DL (ref 6–20)
BUN/CREAT SERPL: 21 (ref 12–20)
CALCIUM SERPL-MCNC: 9.8 MG/DL (ref 8.5–10.1)
CHLORIDE SERPL-SCNC: 104 MMOL/L (ref 97–108)
CHOLEST SERPL-MCNC: 193 MG/DL
CO2 SERPL-SCNC: 29 MMOL/L (ref 21–32)
CREAT SERPL-MCNC: 1.03 MG/DL (ref 0.55–1.02)
ERYTHROCYTE [DISTWIDTH] IN BLOOD BY AUTOMATED COUNT: 12.7 % (ref 11.5–14.5)
GLOBULIN SER CALC-MCNC: 3.3 G/DL (ref 2–4)
GLUCOSE SERPL-MCNC: 109 MG/DL (ref 65–100)
HCT VFR BLD AUTO: 48.9 % (ref 35–47)
HDLC SERPL-MCNC: 36 MG/DL
HDLC SERPL: 5.4 {RATIO} (ref 0–5)
HGB BLD-MCNC: 15.8 G/DL (ref 11.5–16)
LDLC SERPL CALC-MCNC: 136 MG/DL (ref 0–100)
LIPID PROFILE,FLP: ABNORMAL
MCH RBC QN AUTO: 32.5 PG (ref 26–34)
MCHC RBC AUTO-ENTMCNC: 32.3 G/DL (ref 30–36.5)
MCV RBC AUTO: 100.6 FL (ref 80–99)
NRBC # BLD: 0 K/UL (ref 0–0.01)
NRBC BLD-RTO: 0 PER 100 WBC
PLATELET # BLD AUTO: 296 K/UL (ref 150–400)
PMV BLD AUTO: 11.3 FL (ref 8.9–12.9)
POTASSIUM SERPL-SCNC: 4.4 MMOL/L (ref 3.5–5.1)
PROT SERPL-MCNC: 7.3 G/DL (ref 6.4–8.2)
RBC # BLD AUTO: 4.86 M/UL (ref 3.8–5.2)
SODIUM SERPL-SCNC: 140 MMOL/L (ref 136–145)
T4 FREE SERPL-MCNC: 1.5 NG/DL (ref 0.8–1.5)
TRIGL SERPL-MCNC: 105 MG/DL (ref ?–150)
TSH SERPL DL<=0.05 MIU/L-ACNC: 0.92 UIU/ML (ref 0.36–3.74)
VLDLC SERPL CALC-MCNC: 21 MG/DL
WBC # BLD AUTO: 10.7 K/UL (ref 3.6–11)

## 2020-12-22 ENCOUNTER — OFFICE VISIT (OUTPATIENT)
Dept: INTERNAL MEDICINE CLINIC | Age: 70
End: 2020-12-22
Payer: MEDICARE

## 2020-12-22 VITALS
HEIGHT: 64 IN | WEIGHT: 256.6 LBS | SYSTOLIC BLOOD PRESSURE: 142 MMHG | BODY MASS INDEX: 43.81 KG/M2 | RESPIRATION RATE: 16 BRPM | OXYGEN SATURATION: 96 % | HEART RATE: 68 BPM | TEMPERATURE: 98 F | DIASTOLIC BLOOD PRESSURE: 79 MMHG

## 2020-12-22 DIAGNOSIS — R30.0 DYSURIA: Primary | ICD-10-CM

## 2020-12-22 DIAGNOSIS — I10 BENIGN ESSENTIAL HTN: ICD-10-CM

## 2020-12-22 LAB
BILIRUB UR QL STRIP: NEGATIVE
GLUCOSE UR-MCNC: NEGATIVE MG/DL
KETONES P FAST UR STRIP-MCNC: NEGATIVE MG/DL
PH UR STRIP: 5.5 [PH] (ref 4.6–8)
PROT UR QL STRIP: NEGATIVE
SP GR UR STRIP: 1.03 (ref 1–1.03)
UA UROBILINOGEN AMB POC: NORMAL (ref 0.2–1)
URINALYSIS CLARITY POC: CLEAR
URINALYSIS COLOR POC: YELLOW
URINE BLOOD POC: NEGATIVE
URINE LEUKOCYTES POC: NORMAL
URINE NITRITES POC: POSITIVE

## 2020-12-22 PROCEDURE — 1090F PRES/ABSN URINE INCON ASSESS: CPT | Performed by: INTERNAL MEDICINE

## 2020-12-22 PROCEDURE — G8427 DOCREV CUR MEDS BY ELIG CLIN: HCPCS | Performed by: INTERNAL MEDICINE

## 2020-12-22 PROCEDURE — 99214 OFFICE O/P EST MOD 30 MIN: CPT | Performed by: INTERNAL MEDICINE

## 2020-12-22 PROCEDURE — G8753 SYS BP > OR = 140: HCPCS | Performed by: INTERNAL MEDICINE

## 2020-12-22 PROCEDURE — 1101F PT FALLS ASSESS-DOCD LE1/YR: CPT | Performed by: INTERNAL MEDICINE

## 2020-12-22 PROCEDURE — G0463 HOSPITAL OUTPT CLINIC VISIT: HCPCS | Performed by: INTERNAL MEDICINE

## 2020-12-22 PROCEDURE — 81001 URINALYSIS AUTO W/SCOPE: CPT | Performed by: INTERNAL MEDICINE

## 2020-12-22 PROCEDURE — G8417 CALC BMI ABV UP PARAM F/U: HCPCS | Performed by: INTERNAL MEDICINE

## 2020-12-22 PROCEDURE — G8536 NO DOC ELDER MAL SCRN: HCPCS | Performed by: INTERNAL MEDICINE

## 2020-12-22 PROCEDURE — 3017F COLORECTAL CA SCREEN DOC REV: CPT | Performed by: INTERNAL MEDICINE

## 2020-12-22 PROCEDURE — G8399 PT W/DXA RESULTS DOCUMENT: HCPCS | Performed by: INTERNAL MEDICINE

## 2020-12-22 PROCEDURE — G8754 DIAS BP LESS 90: HCPCS | Performed by: INTERNAL MEDICINE

## 2020-12-22 PROCEDURE — G9899 SCRN MAM PERF RSLTS DOC: HCPCS | Performed by: INTERNAL MEDICINE

## 2020-12-22 PROCEDURE — G8432 DEP SCR NOT DOC, RNG: HCPCS | Performed by: INTERNAL MEDICINE

## 2020-12-22 RX ORDER — NITROFURANTOIN 25; 75 MG/1; MG/1
100 CAPSULE ORAL 2 TIMES DAILY
Qty: 14 CAP | Refills: 0 | Status: SHIPPED | OUTPATIENT
Start: 2020-12-22 | End: 2021-05-17 | Stop reason: ALTCHOICE

## 2020-12-22 NOTE — PROGRESS NOTES
HISTORY OF PRESENT ILLNESS  Max Mckeon is a 65 y.o. female. HPI  UTI: Patient reports that 3 days ago she started experiencing urgency and dysuria. She has been having to wear a pad due to urgency. I last saw her on 09/14/2020 for a virtual visit where the patient was c/o similar symptoms and was prescribed Macrobid. She notes that the [address] worked well in resolving her UTI. Hypertension ROS: taking medications as instructed, no medication side effects noted, no TIA's, no chest pain on exertion, no dyspnea on exertion, no swelling of ankles. New concerns: BP in office today is 142/79. She continues on metoprolol. Review of Systems   Genitourinary: Positive for dysuria and urgency. All other systems reviewed and are negative. Physical Exam  Vitals signs and nursing note reviewed. Constitutional:       Appearance: Normal appearance. She is normal weight. HENT:      Head: Normocephalic and atraumatic. Right Ear: Tympanic membrane, ear canal and external ear normal.      Left Ear: Tympanic membrane, ear canal and external ear normal.      Nose: Nose normal.      Mouth/Throat:      Mouth: Mucous membranes are dry. Pharynx: Oropharynx is clear. Neck:      Musculoskeletal: Normal range of motion and neck supple. Cardiovascular:      Rate and Rhythm: Normal rate and regular rhythm. Pulses: Normal pulses. Heart sounds: Normal heart sounds. Pulmonary:      Effort: Pulmonary effort is normal.      Breath sounds: Normal breath sounds. Abdominal:      General: Abdomen is flat. Palpations: Abdomen is soft. Musculoskeletal: Normal range of motion. Comments: Ambulates with a cane   Skin:     General: Skin is warm and dry. Neurological:      General: No focal deficit present. Mental Status: She is alert and oriented to person, place, and time. Mental status is at baseline.    Psychiatric:         Mood and Affect: Mood normal.         Behavior: Behavior normal.         Thought Content: Thought content normal.         Judgment: Judgment normal.       ASSESSMENT and PLAN  Diagnoses and all orders for this visit:    1. Dysuria  Patient presents today c/o urinary urgency and dysuria x3 days. In office urinalysis is positive for nitrites and leukocytes indicative of a UTI. Prescribed Macrobid 100mg. Will continue to monitor for improvements or changes. -     AMB POC URINALYSIS DIP STICK AUTO W/ MICRO  -     CULTURE, URINE; Future  -     nitrofurantoin, macrocrystal-monohydrate, (MACROBID) 100 mg capsule; Take 1 Cap by mouth two (2) times a day. 2. Benign essential HTN  BP is at goal. I do not recommend any change in medications. Lab results and schedule of future lab studies reviewed with patient. Reviewed diet, exercise and weight control. Written by Meryle Sluder, as dictated by Sera Ennis MD.     Current diagnosis and concerns discussed with pt at length. Understands risks and benefits or current treatment plan and medications and accepts the treatment and medication with any possible risks. Pt asks appropriate questions which were answered. Pt instructed to call with any concerns or problems.

## 2020-12-25 LAB
BACTERIA SPEC CULT: ABNORMAL
CC UR VC: ABNORMAL
SERVICE CMNT-IMP: ABNORMAL

## 2020-12-30 RX ORDER — OMEPRAZOLE 20 MG/1
CAPSULE, DELAYED RELEASE ORAL
Qty: 90 CAP | Refills: 1 | Status: SHIPPED | OUTPATIENT
Start: 2020-12-30 | End: 2021-06-29

## 2021-03-18 DIAGNOSIS — E03.9 ACQUIRED HYPOTHYROIDISM: ICD-10-CM

## 2021-03-18 RX ORDER — LEVOTHYROXINE SODIUM 125 UG/1
TABLET ORAL
Qty: 90 TAB | Refills: 1 | Status: SHIPPED | OUTPATIENT
Start: 2021-03-18 | End: 2021-09-26

## 2021-05-04 RX ORDER — METOPROLOL SUCCINATE 50 MG/1
TABLET, EXTENDED RELEASE ORAL
Qty: 90 TAB | Refills: 1 | Status: SHIPPED | OUTPATIENT
Start: 2021-05-04 | End: 2021-11-03

## 2021-05-17 ENCOUNTER — OFFICE VISIT (OUTPATIENT)
Dept: INTERNAL MEDICINE CLINIC | Age: 71
End: 2021-05-17
Payer: MEDICARE

## 2021-05-17 VITALS
WEIGHT: 265.6 LBS | SYSTOLIC BLOOD PRESSURE: 152 MMHG | BODY MASS INDEX: 45.34 KG/M2 | HEART RATE: 81 BPM | DIASTOLIC BLOOD PRESSURE: 80 MMHG | HEIGHT: 64 IN | RESPIRATION RATE: 14 BRPM | TEMPERATURE: 97 F | OXYGEN SATURATION: 98 %

## 2021-05-17 DIAGNOSIS — R74.8 ELEVATED SERUM ALKALINE PHOSPHATASE LEVEL: ICD-10-CM

## 2021-05-17 DIAGNOSIS — E03.9 ACQUIRED HYPOTHYROIDISM: ICD-10-CM

## 2021-05-17 DIAGNOSIS — F43.21 GRIEF REACTION: ICD-10-CM

## 2021-05-17 DIAGNOSIS — F43.23 ADJUSTMENT DISORDER WITH MIXED ANXIETY AND DEPRESSED MOOD: ICD-10-CM

## 2021-05-17 DIAGNOSIS — M25.50 ARTHRALGIA, UNSPECIFIED JOINT: ICD-10-CM

## 2021-05-17 DIAGNOSIS — R35.0 URINARY FREQUENCY: ICD-10-CM

## 2021-05-17 DIAGNOSIS — R53.83 FATIGUE, UNSPECIFIED TYPE: Primary | ICD-10-CM

## 2021-05-17 LAB
COMMENT, HOLDF: NORMAL
SAMPLES BEING HELD,HOLD: NORMAL

## 2021-05-17 PROCEDURE — 3017F COLORECTAL CA SCREEN DOC REV: CPT | Performed by: NURSE PRACTITIONER

## 2021-05-17 PROCEDURE — G9899 SCRN MAM PERF RSLTS DOC: HCPCS | Performed by: NURSE PRACTITIONER

## 2021-05-17 PROCEDURE — G8432 DEP SCR NOT DOC, RNG: HCPCS | Performed by: NURSE PRACTITIONER

## 2021-05-17 PROCEDURE — G8427 DOCREV CUR MEDS BY ELIG CLIN: HCPCS | Performed by: NURSE PRACTITIONER

## 2021-05-17 PROCEDURE — 1101F PT FALLS ASSESS-DOCD LE1/YR: CPT | Performed by: NURSE PRACTITIONER

## 2021-05-17 PROCEDURE — G8753 SYS BP > OR = 140: HCPCS | Performed by: NURSE PRACTITIONER

## 2021-05-17 PROCEDURE — 99214 OFFICE O/P EST MOD 30 MIN: CPT | Performed by: NURSE PRACTITIONER

## 2021-05-17 PROCEDURE — G8399 PT W/DXA RESULTS DOCUMENT: HCPCS | Performed by: NURSE PRACTITIONER

## 2021-05-17 PROCEDURE — G8417 CALC BMI ABV UP PARAM F/U: HCPCS | Performed by: NURSE PRACTITIONER

## 2021-05-17 PROCEDURE — G0463 HOSPITAL OUTPT CLINIC VISIT: HCPCS | Performed by: NURSE PRACTITIONER

## 2021-05-17 PROCEDURE — 1090F PRES/ABSN URINE INCON ASSESS: CPT | Performed by: NURSE PRACTITIONER

## 2021-05-17 PROCEDURE — G8536 NO DOC ELDER MAL SCRN: HCPCS | Performed by: NURSE PRACTITIONER

## 2021-05-17 PROCEDURE — G8754 DIAS BP LESS 90: HCPCS | Performed by: NURSE PRACTITIONER

## 2021-05-17 RX ORDER — FLUOXETINE 10 MG/1
10 CAPSULE ORAL DAILY
Qty: 30 CAP | Refills: 2 | Status: SHIPPED | OUTPATIENT
Start: 2021-05-17 | End: 2021-06-07

## 2021-05-17 NOTE — PATIENT INSTRUCTIONS
Fatigue: Care Instructions Your Care Instructions Fatigue is a feeling of tiredness, exhaustion, or lack of energy. You may feel fatigue because of too much or not enough activity. It can also come from stress, lack of sleep, boredom, and poor diet. Many medical problems, such as viral infections, can cause fatigue. Emotional problems, especially depression, are often the cause of fatigue. Fatigue is most often a symptom of another problem. Treatment for fatigue depends on the cause. For example, if you have fatigue because you have a certain health problem, treating this problem also treats your fatigue. If depression or anxiety is the cause, treatment may help. Follow-up care is a key part of your treatment and safety. Be sure to make and go to all appointments, and call your doctor if you are having problems. It's also a good idea to know your test results and keep a list of the medicines you take. How can you care for yourself at home? · Get regular exercise. But don't overdo it. Go back and forth between rest and exercise. · Get plenty of rest. 
· Eat a healthy diet. Do not skip meals, especially breakfast. 
· Reduce your use of caffeine, tobacco, and alcohol. Caffeine is most often found in coffee, tea, cola drinks, and chocolate. · Limit medicines that can cause fatigue. This includes tranquilizers and cold and allergy medicines. When should you call for help? Watch closely for changes in your health, and be sure to contact your doctor if: 
  · You have new symptoms such as fever or a rash.  
  · Your fatigue gets worse.  
  · You have been feeling down, depressed, or hopeless. Or you may have lost interest in things that you usually enjoy.  
  · You are not getting better as expected. Where can you learn more? Go to http://www.gray.com/ Enter H262 in the search box to learn more about \"Fatigue: Care Instructions. \" Current as of: February 26, 2020               Content Version: 12.8 © 2006-2021 [employer]. Care instructions adapted under license by [employer] (which disclaims liability or warranty for this information). If you have questions about a medical condition or this instruction, always ask your healthcare professional. [address] any warranty or liability for your use of this information. Grief (Actual/Anticipated): Care Instructions Your Care Instructions Grief is your emotional reaction to a major loss. The words \"sorrow\" and \"heartache\" often are used to describe feelings of grief. You feel grief when you lose a beloved person, pet, place, or thing. It is also natural to feel grief when you lose a valued way of life, such as a job, marriage, or good health. You may begin to grieve before a loss occurs. You may grieve for a loved one who is sick and dying. Children and adults often feel the pain of loss before a big move or divorce. This type of grief helps you get ready for a loss. Grief is different for each person. There is no \"normal\" or \"expected\" period of time for grieving. Some people adjust to their loss within a couple of months. Others may take 2 years or longer, especially if their lives were changed a lot or if the loss was sudden and shocking. Grieving can cause problems such as headaches, loss of appetite, and trouble with thinking or sleeping. You may withdraw from friends and family and behave in ways that are unusual for you. Grief may cause you to question your beliefs and views about life. Grief is natural and does not require medical treatment. But if you have trouble sleeping, it may help to take sleeping pills for a short time. It may help to talk with people who have been through or are going through similar losses. You may also want to talk to a counselor about your feelings.  Talking about your loss, sharing your cares and concerns, and getting support from others are important parts of healthy grieving. Follow-up care is a key part of your treatment and safety. Be sure to make and go to all appointments, and call your doctor if you are having problems. It's also a good idea to know your test results and keep a list of the medicines you take. How can you care for yourself at home? · Get enough sleep. Your mind helps make sense of your life while you sleep. Missing sleep can lead to illness and make it harder for you to deal with your grief. · Eat healthy foods. Try to avoid eating only foods that give you comfort. Ask someone to join you for a meal if you do not like eating alone. Consider taking a multivitamin every day. · Get some exercise every day. Even a walk can help you deal with your grief. Other exercises, such as yoga, can also help you manage stress. · Comfort yourself. Take time to look at photos or use special items that make you feel better. · Stay involved in your life. Do not withdraw from the activities you enjoy. People you know at work, Shinto, clubs, or other groups can help you get through your period of grief. · Think about joining a support group to help you deal with your grief. There are many support groups to help people recover from grief. When should you call for help? Call 911 anytime you think you may need emergency care. For example, call if: 
  · You feel you cannot stop from hurting yourself or someone else. Watch closely for changes in your health, and be sure to contact your doctor if: 
  · You think you may be depressed.  
  · You do not get better as expected. Where can you learn more? Go to http://www.gray.com/ Enter H249 in the search box to learn more about \"Grief (Actual/Anticipated): Care Instructions. \" Current as of: July 17, 2020               Content Version: 12.8 © 1853-3449 [employer].   
Care instructions adapted under license by [employer] (which disclaims liability or warranty for this information). If you have questions about a medical condition or this instruction, always ask your healthcare professional. [address] any warranty or liability for your use of this information. Adjustment Disorder: Care Instructions Your Care Instructions Adjustment disorder means that you have emotional or behavioral problems because of stress. But your response to the stress is far more severe than a normal response. It is severe enough to affect your work or social life and may cause depression and physical pains and problems. Events that may cause this response can include a divorce, money problems, or starting school or a new job. It might be anything that causes some stress. This disorder is most often a short-term problem. It happens within 3 months of the stressful event or change. If the response lasts longer than 6 months after the event ends, you may have a more serious disorder. Follow-up care is a key part of your treatment and safety. Be sure to make and go to all appointments, and call your doctor if you are having problems. It's also a good idea to know your test results and keep a list of the medicines you take. How can you care for yourself at home? · Go to all counseling sessions. Do not skip any because you are feeling better. · If your doctor prescribed medicines, take them exactly as prescribed. Call your doctor if you think you are having a problem with your medicine. You will get more details on the specific medicines your doctor prescribes. · Discuss the causes of your stress with a good friend or family member. Or you can join a support group for people with similar problems. Talking to others sometimes relieves stress. · Get at least 30 minutes of exercise on most days of the week. Walking is a good choice.  You also may want to do other activities, such as running, swimming, cycling, or playing tennis or team sports. Relaxation techniques Do relaxation exercises 10 to 20 minutes a day. You can play soothing, relaxing music while you do them, if you wish. · Tell others in your house that you are going to do your relaxation exercises. Ask them not to disturb you. · Find a comfortable, quiet place. · Lie down on your back, or sit with your back straight. · Focus on your breathing. Make it slow and steady. · Breathe in through your nose. Breathe out through either your nose or mouth. · Breathe deeply, filling up the area between your navel and your rib cage. Breathe so that your belly goes up and down. · Do not hold your breath. · Breathe like this for 5 to 10 minutes. Notice the feeling of calmness throughout your whole body. As you continue to breathe slowly and deeply, relax by doing these next steps for another 5 to 10 minutes: · Tighten and relax each muscle group in your body. Start at your toes, and work your way up to your head. · Imagine your muscle groups relaxing and getting heavy. · Empty your mind of all thoughts. · Let yourself relax more and more deeply. · Be aware of the state of calmness that surrounds you. · When your relaxation time is over, you can bring yourself back to alertness by moving your fingers and toes. Then move your hands and feet. And then move your entire body. Sometimes people fall asleep during relaxation. But they most often wake up soon. · Always give yourself time to return to full alertness before you drive a car. Wait to do anything that might cause an accident if you are not fully alert. Never play a relaxation tape while you drive a car. When should you call for help? Call 911 anytime you think you may need emergency care. For example, call if: 
  · You feel you cannot stop from hurting yourself or someone else. Keep the numbers for these national suicide hotlines: 5-382-499-TALK (1-108.808.9544) and 3-463-ZJCLWPA (3-132.287.4017).  If you or someone you know talks about suicide or feeling hopeless, get help right away. Watch closely for changes in your health, and be sure to contact your doctor if: 
  · You have new anxiety, or your anxiety gets worse.  
  · You have been feeling sad, depressed, or hopeless or have lost interest in things that you usually enjoy.  
  · You do not get better as expected. Where can you learn more? Go to http://www.gray.com/ Enter [id number] [id number] in the search box to learn more about \"Adjustment Disorder: Care Instructions. \" Current as of: August 31, 2020               Content Version: 12.8 © 1363-8754 [employer]. Care instructions adapted under license by [employer] (which disclaims liability or warranty for this information). If you have questions about a medical condition or this instruction, always ask your healthcare professional. [address] any warranty or liability for your use of this information.

## 2021-05-18 ENCOUNTER — DOCUMENTATION ONLY (OUTPATIENT)
Dept: INTERNAL MEDICINE CLINIC | Age: 71
End: 2021-05-18

## 2021-05-18 LAB
ALBUMIN SERPL-MCNC: 3.7 G/DL (ref 3.5–5)
ALBUMIN/GLOB SERPL: 1 {RATIO} (ref 1.1–2.2)
ALP SERPL-CCNC: 127 U/L (ref 45–117)
ALT SERPL-CCNC: 31 U/L (ref 12–78)
AMORPH CRY URNS QL MICRO: ABNORMAL
ANION GAP SERPL CALC-SCNC: 6 MMOL/L (ref 5–15)
APPEARANCE UR: ABNORMAL
AST SERPL-CCNC: 19 U/L (ref 15–37)
BACTERIA URNS QL MICRO: ABNORMAL /HPF
BASOPHILS # BLD: 0 K/UL (ref 0–0.1)
BASOPHILS NFR BLD: 0 % (ref 0–1)
BILIRUB SERPL-MCNC: 0.8 MG/DL (ref 0.2–1)
BILIRUB UR QL: NEGATIVE
BUN SERPL-MCNC: 13 MG/DL (ref 6–20)
BUN/CREAT SERPL: 16 (ref 12–20)
CALCIUM SERPL-MCNC: 9.9 MG/DL (ref 8.5–10.1)
CHLORIDE SERPL-SCNC: 104 MMOL/L (ref 97–108)
CK SERPL-CCNC: 40 U/L (ref 26–192)
CO2 SERPL-SCNC: 29 MMOL/L (ref 21–32)
COLOR UR: ABNORMAL
CREAT SERPL-MCNC: 0.81 MG/DL (ref 0.55–1.02)
CRP SERPL-MCNC: 2.77 MG/DL (ref 0–0.6)
DIFFERENTIAL METHOD BLD: NORMAL
EOSINOPHIL # BLD: 0.3 K/UL (ref 0–0.4)
EOSINOPHIL NFR BLD: 3 % (ref 0–7)
EPITH CASTS URNS QL MICRO: ABNORMAL /LPF
ERYTHROCYTE [DISTWIDTH] IN BLOOD BY AUTOMATED COUNT: 12.1 % (ref 11.5–14.5)
ERYTHROCYTE [SEDIMENTATION RATE] IN BLOOD: 27 MM/HR (ref 0–30)
GLOBULIN SER CALC-MCNC: 3.7 G/DL (ref 2–4)
GLUCOSE SERPL-MCNC: 105 MG/DL (ref 65–100)
GLUCOSE UR STRIP.AUTO-MCNC: NEGATIVE MG/DL
HCT VFR BLD AUTO: 46.1 % (ref 35–47)
HGB BLD-MCNC: 15.4 G/DL (ref 11.5–16)
HGB UR QL STRIP: NEGATIVE
IMM GRANULOCYTES # BLD AUTO: 0 K/UL (ref 0–0.04)
IMM GRANULOCYTES NFR BLD AUTO: 0 % (ref 0–0.5)
KETONES UR QL STRIP.AUTO: NEGATIVE MG/DL
LEUKOCYTE ESTERASE UR QL STRIP.AUTO: NEGATIVE
LYMPHOCYTES # BLD: 2.1 K/UL (ref 0.8–3.5)
LYMPHOCYTES NFR BLD: 22 % (ref 12–49)
MCH RBC QN AUTO: 33 PG (ref 26–34)
MCHC RBC AUTO-ENTMCNC: 33.4 G/DL (ref 30–36.5)
MCV RBC AUTO: 98.7 FL (ref 80–99)
MONOCYTES # BLD: 0.7 K/UL (ref 0–1)
MONOCYTES NFR BLD: 8 % (ref 5–13)
NEUTS SEG # BLD: 6.1 K/UL (ref 1.8–8)
NEUTS SEG NFR BLD: 67 % (ref 32–75)
NITRITE UR QL STRIP.AUTO: NEGATIVE
NRBC # BLD: 0 K/UL (ref 0–0.01)
NRBC BLD-RTO: 0 PER 100 WBC
PH UR STRIP: 5 [PH] (ref 5–8)
PLATELET # BLD AUTO: 269 K/UL (ref 150–400)
PMV BLD AUTO: 12 FL (ref 8.9–12.9)
POTASSIUM SERPL-SCNC: 4.3 MMOL/L (ref 3.5–5.1)
PROT SERPL-MCNC: 7.4 G/DL (ref 6.4–8.2)
PROT UR STRIP-MCNC: NEGATIVE MG/DL
RBC # BLD AUTO: 4.67 M/UL (ref 3.8–5.2)
RBC #/AREA URNS HPF: ABNORMAL /HPF (ref 0–5)
SODIUM SERPL-SCNC: 139 MMOL/L (ref 136–145)
SP GR UR REFRACTOMETRY: >1.03 (ref 1–1.03)
T4 FREE SERPL-MCNC: 1.4 NG/DL (ref 0.8–1.5)
TSH SERPL DL<=0.05 MIU/L-ACNC: 1.01 UIU/ML (ref 0.36–3.74)
UA: UC IF INDICATED,UAUC: ABNORMAL
UROBILINOGEN UR QL STRIP.AUTO: 0.2 EU/DL (ref 0.2–1)
WBC # BLD AUTO: 9.2 K/UL (ref 3.6–11)
WBC URNS QL MICRO: ABNORMAL /HPF (ref 0–4)

## 2021-05-18 NOTE — PROGRESS NOTES
VIIS Covid Vaccine Official Report printed & sent to central scanning. Covid Vaccine administration dates 2/28/21 & 3/21/21 [facility] verified.

## 2021-05-18 NOTE — PROGRESS NOTES
Scarlett Herrera (: 1950) is a 65 y.o. female, established patient, here for evaluation of the following chief complaint(s):  Fatigue (Has been going on for two weeks - Elbows and knees are feeling achey - loss of energy )       ASSESSMENT/PLAN:  Below is the assessment and plan developed based on review of pertinent history, physical exam, labs, studies, and medications. 1. Fatigue, unspecified type  -     CBC WITH AUTOMATED DIFF; Future  -     METABOLIC PANEL, COMPREHENSIVE; Future    2. Arthralgia, unspecified joint -- stiffness in knees and elbows that improves with movement; most likely osteoarthritis but will rule out inflammatory issues. -     CK; Future  -     SED RATE (ESR); Future  -     C REACTIVE PROTEIN, QT; Future  -     RA + CCP ABS; Future    3. Adjustment disorder with mixed anxiety and depressed mood -- discussed relation of stress and depression to physical symptoms. Will start low dose Prozac 10 mg daily and assess response in 1 month. -     FLUoxetine (PROzac) 10 mg capsule; Take 1 Cap by mouth daily. , Normal, Disp-30 Cap, R-2    4. Grief reaction  -     FLUoxetine (PROzac) 10 mg capsule; Take 1 Cap by mouth daily. , Normal, Disp-30 Cap, R-2    5. Urinary frequency -- will check for infection  -     URINALYSIS W/ REFLEX CULTURE; Future    6. Acquired hypothyroidism   -     TSH 3RD GENERATION; Future  -     T4, FREE; Future    7. Elevated serum alkaline phosphatase level -- recheck levels  -     METABOLIC PANEL, COMPREHENSIVE; Future        SUBJECTIVE/OBJECTIVE:  HPI    Patient of Dr ELIUD VELASQUEZ who presents accompanied by her sister, Margaux Bhakta with complaints of persistent fatigue, urinary frequency, stiffness in bilateral elbows and knees that has been ongoing for the past several weeks. Has been having frequent crying spells after loss of her mother on 3/21 and notes that she has not been able to sleep and when she does, she has bad dreams. Has been needing to nap during daytime. Notes some watery diarrhea on occasion as well. Her sister Sandy Crenshaw has noted that she has been more isolated and seems to be depressed ever since she lost her  several years ago. She has been resistant to taking medication for depression due to fear of becoming dependent on medication. Denies harmful thoughts towards self or others. Review of Systems   Constitutional: Positive for activity change, appetite change and fatigue. Negative for chills and fever. Respiratory: Negative for cough and shortness of breath. Cardiovascular: Negative for chest pain and leg swelling. Gastrointestinal: Positive for abdominal pain and diarrhea. Negative for blood in stool and vomiting. Genitourinary: Positive for frequency. Negative for dysuria and pelvic pain. Musculoskeletal: Positive for arthralgias. Back pain: bilateral elbows, bilateral knees. Skin: Negative for rash and wound. Neurological: Negative for syncope, weakness and headaches. Psychiatric/Behavioral: Positive for decreased concentration, dysphoric mood and sleep disturbance. Negative for suicidal ideas. The patient is nervous/anxious. BP (!) 152/80 (BP 1 Location: Left upper arm, BP Patient Position: Sitting, BP Cuff Size: Adult)   Pulse 81   Temp 97 °F (36.1 °C) (Oral)   Resp 14   Ht 5' 4\" (1.626 m)   Wt 265 lb 9.6 oz (120.5 kg)   SpO2 98%   BMI 45.59 kg/m²   Physical Exam  Vitals signs and nursing note reviewed. Constitutional:       General: She is not in acute distress. Appearance: Normal appearance. She is obese. HENT:      Head: Normocephalic and atraumatic. Right Ear: Tympanic membrane, ear canal and external ear normal.      Left Ear: Tympanic membrane, ear canal and external ear normal.      Nose: Nose normal.      Mouth/Throat:      Mouth: Mucous membranes are moist.      Pharynx: Oropharynx is clear. Eyes:      Extraocular Movements: Extraocular movements intact.       Conjunctiva/sclera: Conjunctivae normal.      Pupils: Pupils are equal, round, and reactive to light. Neck:      Musculoskeletal: Normal range of motion and neck supple. Cardiovascular:      Rate and Rhythm: Normal rate and regular rhythm. Pulses: Normal pulses. Heart sounds: Normal heart sounds. Pulmonary:      Effort: Pulmonary effort is normal.      Breath sounds: Normal breath sounds. Abdominal:      General: Bowel sounds are normal. There is distension. Palpations: Abdomen is soft. Tenderness: There is no abdominal tenderness. Musculoskeletal:      Right elbow: She exhibits normal range of motion and no swelling. Tenderness found. Left elbow: She exhibits normal range of motion, no swelling and no effusion. Tenderness found. Right knee: She exhibits bony tenderness. She exhibits no swelling and no erythema. Tenderness found. Left knee: She exhibits bony tenderness. She exhibits no swelling and no erythema. Skin:     General: Skin is warm and dry. Findings: No rash. Neurological:      General: No focal deficit present. Mental Status: She is alert and oriented to person, place, and time. Psychiatric:         Attention and Perception: Attention and perception normal.         Mood and Affect: Mood is depressed. Affect is tearful. Speech: Speech normal.         Behavior: Behavior is cooperative. Cognition and Memory: Cognition and memory normal.           On this date 05/17/2021 I have spent 35 minutes reviewing previous notes, test results and face to face with the patient discussing the diagnosis and importance of compliance with the treatment plan as well as documenting on the day of the visit. An electronic signature was used to authenticate this note.   -- Blanca Miguel NP

## 2021-05-19 LAB
ANA SER QL: NEGATIVE
CCP IGA+IGG SERPL IA-ACNC: 3 UNITS (ref 0–19)
RHEUMATOID FACT SERPL-ACNC: <10 IU/ML (ref 0–13.9)

## 2021-05-20 ENCOUNTER — PATIENT MESSAGE (OUTPATIENT)
Dept: INTERNAL MEDICINE CLINIC | Age: 71
End: 2021-05-20

## 2021-05-20 NOTE — TELEPHONE ENCOUNTER
From: Harry Raymundo NP  To: Jennifer Mittal  Sent: 5/20/2021 8:25 AM EDT  Subject: labs    The testing for Rheumatoid arthritis and autoimmune disorders is negative. The C reactive protein that was slightly elevated is very non-specific and it is not very high so not worrisome at this time. Dr [facility] reviewed your labs as well and there is nothing to be alarmed about. Please email or call if you have any questions.   Take care,  Moni Crowe

## 2021-06-07 ENCOUNTER — OFFICE VISIT (OUTPATIENT)
Dept: INTERNAL MEDICINE CLINIC | Age: 71
End: 2021-06-07
Payer: MEDICARE

## 2021-06-07 ENCOUNTER — TELEPHONE (OUTPATIENT)
Dept: INTERNAL MEDICINE CLINIC | Age: 71
End: 2021-06-07

## 2021-06-07 VITALS
RESPIRATION RATE: 13 BRPM | BODY MASS INDEX: 45.07 KG/M2 | WEIGHT: 264 LBS | HEIGHT: 64 IN | TEMPERATURE: 98.1 F | OXYGEN SATURATION: 96 % | SYSTOLIC BLOOD PRESSURE: 142 MMHG | HEART RATE: 71 BPM | DIASTOLIC BLOOD PRESSURE: 81 MMHG

## 2021-06-07 DIAGNOSIS — F43.21 GRIEF REACTION: ICD-10-CM

## 2021-06-07 DIAGNOSIS — F43.23 ADJUSTMENT DISORDER WITH MIXED ANXIETY AND DEPRESSED MOOD: Primary | ICD-10-CM

## 2021-06-07 DIAGNOSIS — R53.82 CHRONIC FATIGUE: ICD-10-CM

## 2021-06-07 PROCEDURE — G8427 DOCREV CUR MEDS BY ELIG CLIN: HCPCS | Performed by: INTERNAL MEDICINE

## 2021-06-07 PROCEDURE — G9899 SCRN MAM PERF RSLTS DOC: HCPCS | Performed by: INTERNAL MEDICINE

## 2021-06-07 PROCEDURE — 1090F PRES/ABSN URINE INCON ASSESS: CPT | Performed by: INTERNAL MEDICINE

## 2021-06-07 PROCEDURE — G8510 SCR DEP NEG, NO PLAN REQD: HCPCS | Performed by: INTERNAL MEDICINE

## 2021-06-07 PROCEDURE — 3017F COLORECTAL CA SCREEN DOC REV: CPT | Performed by: INTERNAL MEDICINE

## 2021-06-07 PROCEDURE — G8754 DIAS BP LESS 90: HCPCS | Performed by: INTERNAL MEDICINE

## 2021-06-07 PROCEDURE — G8399 PT W/DXA RESULTS DOCUMENT: HCPCS | Performed by: INTERNAL MEDICINE

## 2021-06-07 PROCEDURE — G8417 CALC BMI ABV UP PARAM F/U: HCPCS | Performed by: INTERNAL MEDICINE

## 2021-06-07 PROCEDURE — G8753 SYS BP > OR = 140: HCPCS | Performed by: INTERNAL MEDICINE

## 2021-06-07 PROCEDURE — 99213 OFFICE O/P EST LOW 20 MIN: CPT | Performed by: INTERNAL MEDICINE

## 2021-06-07 PROCEDURE — 1101F PT FALLS ASSESS-DOCD LE1/YR: CPT | Performed by: INTERNAL MEDICINE

## 2021-06-07 PROCEDURE — G8536 NO DOC ELDER MAL SCRN: HCPCS | Performed by: INTERNAL MEDICINE

## 2021-06-07 PROCEDURE — G0463 HOSPITAL OUTPT CLINIC VISIT: HCPCS | Performed by: INTERNAL MEDICINE

## 2021-06-07 RX ORDER — FLUOXETINE HYDROCHLORIDE 20 MG/1
20 CAPSULE ORAL DAILY
Qty: 30 CAPSULE | Refills: 1 | Status: SHIPPED | OUTPATIENT
Start: 2021-06-07 | End: 2021-09-23 | Stop reason: ALTCHOICE

## 2021-06-07 NOTE — TELEPHONE ENCOUNTER
----- Message from [facility] sent at 6/7/2021  9:37 AM EDT -----  Regarding: Dr. Almas Cheema Message/Vendor Calls    Caller's first and last name: Pt      Reason for call: Pt needing to reschedule for this week after coorespondence with  [facility], but there are no slots available , can pt be squeezed in anywhere since pt needs to have her sister with her and her sister goes out of town next week?       Callback required yes/no and why: Yes      Best contact number(s): [phone]      Details to clarify the request: [address] [place] [address]

## 2021-06-07 NOTE — TELEPHONE ENCOUNTER
PSR returned patient's call, appointment with PCP scheduled for today 6.7.2021. Patient was very thankful for return call and same day office visit with PCP.       Hannah Power MRN: [id number]    Date: 6/7/2021 Status: [facility]    Time: 3:20 PM Length: 20 [id number]   Visit Type: ROUTINE CARE [[id number]] Copay: $0.00    Provider: Kit Garcia MD Department: [facility]    Referral #:   Referral Status:      Referring Provider:   Patient Type:      Notes: medication eval Ov - tt  R/S 6.7.2021, okay per nurse

## 2021-06-08 NOTE — PROGRESS NOTES
HISTORY OF PRESENT ILLNESS    Chief Complaint   Patient presents with    Medication Evaluation    Hypertension    LOW BACK PAIN     No issues with urination. Presents for follow-up  She was seen on May 17 by Debbryan Cuevas for multiple symptoms. She is seen again today with her sister, Alejandro Perales.    Patient has reported a 6-8 week history of variable pains of bilateral elbows, lower back, intermittent urinary urgency, intermittent loose stools, ongoing fatigue. Feels generally weak. Extensive blood work was performed on May 17 including LIU which was negative, normal CBC, normal electrolytes, normal renal function and hepatic function, normal thyroid functions, normal urinalysis, CK normal, sed rate normal 27, C-reactive protein mildly elevated 2.77, rheumatoid antibodies normal.    Patient reports depression. She has been significantly depressed and was tearful at her visit on May 17. She has been living in [place] but her daughter and grandkids in [facility] will be visiting soon. Sadly, her mother passed away on March 21. Her  passed away on January 22, 2018. Does not always sleep well. Appetite is poor. She was started on Prozac 10 mg daily 3 weeks ago. She was very reluctant to take medication due to fear of medication side effects and says that she is very sensitive to medication. Since starting Prozac, she has been less tearful and a bit less anxious. That said, she says that her fatigue is relatively severe at times. Moved medication to nighttime and it may help some. Feels that mood may overall be a bit better. She denies any suicidality. She is trying to remain close with her sister.     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 has a past surgical history that includes hx tonsil and adenoidectomy (1955); hx colonoscopy (11/24/2009); colonoscopy (N/A, 9/19/2018); hx colposcopy (11/26/2018); hx tubal ligation (1984); hx dilation and curettage (02/2019); hx cholecystectomy (1991); hx tonsillectomy; hx cataract removal (Bilateral); hx knee arthroscopy (Right, 1978); hx above knee amputation; and hx haroldo and bso (10/13/2019). reports that she quit smoking about 11 years ago. She has a 5.00 pack-year smoking history. She has never used smokeless tobacco. She reports current alcohol use of about 7.0 standard drinks of alcohol per week. She reports that she does not use drugs. family history includes Dementia in her mother; Diabetes in her mother and sister; Heart Disease in her sister; No Known Problems in her daughter; Panic disorder in her brother and sister; Thyroid Disease in her mother and sister. Physical Exam   Nursing note and vitals reviewed. Blood pressure (!) 142/81, pulse 71, temperature 98.1 °F (36.7 °C), temperature source Oral, resp. rate 13, height 5' 4\" (1.626 m), weight 264 lb (119.7 kg), SpO2 96 %. Constitutional:  No distress. Eyes: Conjunctivae are normal.   Ears:  Hearing grossly intact  Cardiovascular: Normal rate. regular rhythm, no murmurs or gallops  No edema  Pulmonary/Chest: Effort normal.   CTAB  Musculoskeletal: moves all 4 extremities   Neurological: Alert and oriented to person, place, and time. Skin: No visible rash noted. Psychiatric: Normal mood and affect. Behavior is normal.     ASSESSMENT and PLAN  Diagnoses and all orders for this visit:    1. Adjustment disorder with mixed anxiety and depressed mood  2. Chronic fatigue  3. Grief reaction  -     FLUoxetine (PROzac) 20 mg capsule; Take 1 Capsule by mouth daily. Increased 6/16/21  Significant depressive symptoms, fatigue, decreased appetite, severe anxiety. Overall she is doing a bit better on Prozac for 3 weeks. She reports some fatigue, but this was present prior to starting Prozac. Her sister and I counseled her that she is grieving the loss of her mother and still grieving the loss of her . Needs social support. Depression and anxiety are likely causing some somatic symptoms although she does have likely some degree of arthritis and intermittent loose stools or IBS which are certainly not psychologic issues. Recommend increasing Prozac to a more therapeutic dose when refill is due next week. Side effects discussed. Could consider adding Wellbutrin as another option. Cymbalta also may be a good option given somatization. Target symptoms are anxiety, panic, worry, depressed mood. lab results and schedule of future lab studies reviewed with patient  reviewed medications and side effects in detail    Return to clinic for further evaluation if new symptoms develop        Current Outpatient Medications   Medication Sig    FLUoxetine (PROzac) 20 mg capsule Take 1 Capsule by mouth daily. Increased 6/16/21    metoprolol succinate (TOPROL-XL) 50 mg XL tablet TAKE 1 TABLET BY MOUTH EVERY DAY AT NIGHT    levothyroxine (SYNTHROID) 125 mcg tablet TAKE 1 TABLET BY MOUTH EVERY DAY BEFORE BREAKFAST    omeprazole (PRILOSEC) 20 mg capsule TAKE 1 CAPSULE BY MOUTH EVERY DAY    ketoconazole (NIZORAL) 2 % topical cream APPLY TO AFFECTED AREA TWICE A DAY EXTERNALLY 14 DAYS    multivitamin (ONE A DAY) tablet Take 1 Tab by mouth every morning.  diclofenac potassium (CATAFLAM) 50 mg tablet Take 50 mg by mouth every morning.  ibuprofen (MOTRIN) 200 mg tablet Take 400 mg by mouth as needed for Pain.     ALPRAZolam (XANAX) 0.25 mg tablet Take 1 Tab by mouth two (2) times daily as needed (for flying). Max Daily Amount: 0.5 mg. No current facility-administered medications for this visit.

## 2021-06-29 RX ORDER — OMEPRAZOLE 20 MG/1
CAPSULE, DELAYED RELEASE ORAL
Qty: 90 CAPSULE | Refills: 1 | Status: SHIPPED | OUTPATIENT
Start: 2021-06-29 | End: 2021-12-23

## 2021-09-17 ENCOUNTER — TELEPHONE (OUTPATIENT)
Dept: INTERNAL MEDICINE CLINIC | Age: 71
End: 2021-09-17

## 2021-09-17 NOTE — TELEPHONE ENCOUNTER
Spoke with pt and advised her to try and avoid a cough suppressant due to coughing helping with getting extra mucus out of the body in hopes to not progress to pneumonia. I advised she take something like mucinex to help dry her out. Also informed her to quarantine for up to 14 days and then get a repeat covid pcr test to insure she isn't still positive before going back to normal daily routines. Informed pt to go to the ER only if her symptoms become worse and to monitor her O2, pt has pulse ox at home. Pt states understanding and has VV with Dr Tj Acevedo next week.

## 2021-09-17 NOTE — TELEPHONE ENCOUNTER
Patient called to say she was diagnosed with COVID and was wondering if we could call in those cough pills she has had them before she thinks prescribed from Raf[facility]was. Please call patient back when you have a chance.

## 2021-09-23 ENCOUNTER — VIRTUAL VISIT (OUTPATIENT)
Dept: INTERNAL MEDICINE CLINIC | Age: 71
End: 2021-09-23
Payer: MEDICARE

## 2021-09-23 DIAGNOSIS — R52 PAIN: ICD-10-CM

## 2021-09-23 DIAGNOSIS — E66.01 MORBID OBESITY WITH BMI OF 45.0-49.9, ADULT (HCC): ICD-10-CM

## 2021-09-23 DIAGNOSIS — U07.1 COVID-19: Primary | ICD-10-CM

## 2021-09-23 DIAGNOSIS — R63.0 DECREASED APPETITE: ICD-10-CM

## 2021-09-23 DIAGNOSIS — R19.5 LOOSE STOOLS: ICD-10-CM

## 2021-09-23 PROCEDURE — G0463 HOSPITAL OUTPT CLINIC VISIT: HCPCS | Performed by: INTERNAL MEDICINE

## 2021-09-23 PROCEDURE — G8399 PT W/DXA RESULTS DOCUMENT: HCPCS | Performed by: INTERNAL MEDICINE

## 2021-09-23 PROCEDURE — 1101F PT FALLS ASSESS-DOCD LE1/YR: CPT | Performed by: INTERNAL MEDICINE

## 2021-09-23 PROCEDURE — G9899 SCRN MAM PERF RSLTS DOC: HCPCS | Performed by: INTERNAL MEDICINE

## 2021-09-23 PROCEDURE — G8510 SCR DEP NEG, NO PLAN REQD: HCPCS | Performed by: INTERNAL MEDICINE

## 2021-09-23 PROCEDURE — G8536 NO DOC ELDER MAL SCRN: HCPCS | Performed by: INTERNAL MEDICINE

## 2021-09-23 PROCEDURE — G8427 DOCREV CUR MEDS BY ELIG CLIN: HCPCS | Performed by: INTERNAL MEDICINE

## 2021-09-23 PROCEDURE — G8756 NO BP MEASURE DOC: HCPCS | Performed by: INTERNAL MEDICINE

## 2021-09-23 PROCEDURE — G8417 CALC BMI ABV UP PARAM F/U: HCPCS | Performed by: INTERNAL MEDICINE

## 2021-09-23 PROCEDURE — 99213 OFFICE O/P EST LOW 20 MIN: CPT | Performed by: INTERNAL MEDICINE

## 2021-09-23 PROCEDURE — 1090F PRES/ABSN URINE INCON ASSESS: CPT | Performed by: INTERNAL MEDICINE

## 2021-09-23 PROCEDURE — 3017F COLORECTAL CA SCREEN DOC REV: CPT | Performed by: INTERNAL MEDICINE

## 2021-09-23 RX ORDER — IBUPROFEN 200 MG
600 TABLET ORAL
Qty: 30 TABLET | Refills: 1
Start: 2021-09-23 | End: 2022-02-21 | Stop reason: ALTCHOICE

## 2021-09-23 RX ORDER — IBUPROFEN 200 MG
400 TABLET ORAL
Qty: 60 TABLET | Refills: 2
Start: 2021-09-23 | End: 2021-09-23 | Stop reason: ALTCHOICE

## 2021-09-23 RX ORDER — DICLOFENAC SODIUM 50 MG/1
50 TABLET, DELAYED RELEASE ORAL
Qty: 30 TABLET | Refills: 0
Start: 2021-09-23 | End: 2022-08-12 | Stop reason: ALTCHOICE

## 2021-09-23 RX ORDER — ACETAMINOPHEN 500 MG
500 TABLET ORAL
Qty: 90 TABLET | Refills: 2
Start: 2021-09-23 | End: 2021-11-12 | Stop reason: ALTCHOICE
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
## 2021-09-24 DIAGNOSIS — E03.9 ACQUIRED HYPOTHYROIDISM: ICD-10-CM

## 2021-09-26 RX ORDER — LEVOTHYROXINE SODIUM 125 UG/1
TABLET ORAL
Qty: 90 TABLET | Refills: 1 | Status: SHIPPED | OUTPATIENT
Start: 2021-09-26 | End: 2022-02-22

## 2021-11-03 RX ORDER — METOPROLOL SUCCINATE 50 MG/1
TABLET, EXTENDED RELEASE ORAL
Qty: 90 TABLET | Refills: 1 | Status: SHIPPED | OUTPATIENT
Start: 2021-11-03 | End: 2022-04-13

## 2021-11-08 ENCOUNTER — TELEPHONE (OUTPATIENT)
Dept: INTERNAL MEDICINE CLINIC | Age: 71
End: 2021-11-08

## 2021-11-08 ENCOUNTER — NURSE TRIAGE (OUTPATIENT)
Dept: OTHER | Facility: CLINIC | Age: 71
End: 2021-11-08

## 2021-11-08 NOTE — TELEPHONE ENCOUNTER
Received call from Hank Lee at [facility] with [employer] Complaint. Brief description of triage: Reports feeling tired and taking naps since COVID diagnosis in September, states COVID symptoms are gone except feeling tired and taking naps, which is unusual for her. Triage indicates for patient to be seen in the office within 3 days. Triage RN advised patient that if they cannot be seen in the office within 3 days to go to an [facility] [address] Care advice provided, patient verbalizes understanding; denies any other questions or concerns; instructed to call back for any new or worsening symptoms. Writer provided warm transfer to [place] at [facility] for appointment scheduling. Attention Provider: Thank you for allowing me to participate in the care of your patient. The patient was connected to triage in response to information provided to the [facility]. Please do not respond through this encounter as the response is not directed to a shared pool. Reason for Disposition   Fatigue (i.e., tires easily, decreased energy) and persists > 1 week    Answer Assessment - Initial Assessment Questions  1. DESCRIPTION: \"Describe how you are feeling. \"      Tired, taking naps which is unusual for her    2. SEVERITY: \"How bad is it? \"  \"Can you stand and walk? \"    - MILD - Feels weak or tired, but does not interfere with work, school or normal activities    - [facility] to stand and walk; weakness interferes with work, school, or normal activities    - SEVERE - Unable to stand or walk   Mild    3. ONSET:  \"When did the weakness begin? \"      September    4. CAUSE: \"What do you think is causing the weakness? \"  COVID diagnosis in September 2021    5. MEDICINES: Jenean Laura you recently started a new medicine or had a change in the amount of a medicine? \"      Denies    6. OTHER SYMPTOMS: \"Do you have any other symptoms? \" (e.g., chest pain, fever, cough, SOB, vomiting, diarrhea, bleeding, other areas of pain)  Nausea    7. PREGNANCY: \"Is there any chance you are pregnant? \" \"When was your last menstrual period? \"      n/a    Protocols used: WEAKNESS (GENERALIZED) AND FATIGUE-ADULT-[place]

## 2021-11-08 NOTE — TELEPHONE ENCOUNTER
----- Message from Park Karyn sent at 11/8/2021  2:55 PM EST -----  Subject: Appointment Request    Reason for Call: Semi-Urgent Return from RN Triage    QUESTIONS  Type of Appointment? Established Patient  Reason for appointment request? Available appointments did not meet   patient need  Additional Information for Provider? pt nurse triaged to be seen inoffice   in 3 days for fatigue   ---------------------------------------------------------------------------  --------------  CALL BACK INFO  What is the best way for the office to contact you? OK to leave message on   voicemail  Preferred Call Back Phone Number? [phone]  ---------------------------------------------------------------------------  --------------  SCRIPT ANSWERS  Patient needs to be seen within 3 days? Yes   Nurse Name? Gaurav Collins  Have you been diagnosed with, awaiting test results for, or told that you   are suspected of having COVID-19 (Coronavirus)? (If patient has tested   negative or was tested as a requirement for work, school, or travel and   not based on symptoms, answer no)? Yes  Did your symptoms begin within the past 14 days or was your positive test   result within the past 14 days? No  Within the past two weeks have you developed any of the following symptoms   (answer no if symptoms have been present longer than 2 weeks or began   more than 2 weeks ago)? Fever or Chills, Cough, Shortness of breath or   difficulty breathing, Loss of taste or smell, Sore throat, Nasal   congestion, Sneezing or runny nose, Fatigue or generalized body aches   (answer no if pain is specific to a body part e.g. back pain), Diarrhea,   Headache? No  Have you had close contact with someone with COVID-19 in the last 14 days? No  (Service Expert  click yes below to proceed with [employer] As Usual   Scheduling)?  Yes

## 2021-11-08 NOTE — TELEPHONE ENCOUNTER
----- Message from Dee Solomon sent at 11/8/2021  2:55 PM EST -----  Subject: Appointment Request    Reason for Call: Semi-Urgent Return from RN Triage    QUESTIONS  Type of Appointment? Established Patient  Reason for appointment request? Available appointments did not meet   patient need  Additional Information for Provider? pt nurse triaged to be seen inoffice   in 3 days for fatigue   ---------------------------------------------------------------------------  --------------  CALL BACK INFO  What is the best way for the office to contact you? OK to leave message on   voicemail  Preferred Call Back Phone Number? [phone]  ---------------------------------------------------------------------------  --------------  SCRIPT ANSWERS  Patient needs to be seen within 3 days? Yes   Nurse Name? Lilliana Montana  Have you been diagnosed with, awaiting test results for, or told that you   are suspected of having COVID-19 (Coronavirus)? (If patient has tested   negative or was tested as a requirement for work, school, or travel and   not based on symptoms, answer no)? Yes  Did your symptoms begin within the past 14 days or was your positive test   result within the past 14 days? No  Within the past two weeks have you developed any of the following symptoms   (answer no if symptoms have been present longer than 2 weeks or began   more than 2 weeks ago)? Fever or Chills, Cough, Shortness of breath or   difficulty breathing, Loss of taste or smell, Sore throat, Nasal   congestion, Sneezing or runny nose, Fatigue or generalized body aches   (answer no if pain is specific to a body part e.g. back pain), Diarrhea,   Headache? No  Have you had close contact with someone with COVID-19 in the last 14 days? No  (Service Expert  click yes below to proceed with [employer] As Usual   Scheduling)?  Yes

## 2021-11-12 ENCOUNTER — OFFICE VISIT (OUTPATIENT)
Dept: INTERNAL MEDICINE CLINIC | Age: 71
End: 2021-11-12
Payer: MEDICARE

## 2021-11-12 VITALS
RESPIRATION RATE: 16 BRPM | OXYGEN SATURATION: 95 % | SYSTOLIC BLOOD PRESSURE: 139 MMHG | DIASTOLIC BLOOD PRESSURE: 79 MMHG | HEART RATE: 83 BPM | TEMPERATURE: 97 F | BODY MASS INDEX: 43.57 KG/M2 | WEIGHT: 255.2 LBS | HEIGHT: 64 IN

## 2021-11-12 DIAGNOSIS — Z86.16 HISTORY OF COVID-19: ICD-10-CM

## 2021-11-12 DIAGNOSIS — R53.83 FATIGUE, UNSPECIFIED TYPE: Primary | ICD-10-CM

## 2021-11-12 DIAGNOSIS — M26.609 TMJ DYSFUNCTION: ICD-10-CM

## 2021-11-12 DIAGNOSIS — R53.1 WEAKNESS: ICD-10-CM

## 2021-11-12 DIAGNOSIS — E03.9 ACQUIRED HYPOTHYROIDISM: ICD-10-CM

## 2021-11-12 PROCEDURE — G8427 DOCREV CUR MEDS BY ELIG CLIN: HCPCS | Performed by: NURSE PRACTITIONER

## 2021-11-12 PROCEDURE — G8432 DEP SCR NOT DOC, RNG: HCPCS | Performed by: NURSE PRACTITIONER

## 2021-11-12 PROCEDURE — G9899 SCRN MAM PERF RSLTS DOC: HCPCS | Performed by: NURSE PRACTITIONER

## 2021-11-12 PROCEDURE — 1101F PT FALLS ASSESS-DOCD LE1/YR: CPT | Performed by: NURSE PRACTITIONER

## 2021-11-12 PROCEDURE — 99214 OFFICE O/P EST MOD 30 MIN: CPT | Performed by: NURSE PRACTITIONER

## 2021-11-12 PROCEDURE — G8752 SYS BP LESS 140: HCPCS | Performed by: NURSE PRACTITIONER

## 2021-11-12 PROCEDURE — G8417 CALC BMI ABV UP PARAM F/U: HCPCS | Performed by: NURSE PRACTITIONER

## 2021-11-12 PROCEDURE — G8536 NO DOC ELDER MAL SCRN: HCPCS | Performed by: NURSE PRACTITIONER

## 2021-11-12 PROCEDURE — G8399 PT W/DXA RESULTS DOCUMENT: HCPCS | Performed by: NURSE PRACTITIONER

## 2021-11-12 PROCEDURE — G0463 HOSPITAL OUTPT CLINIC VISIT: HCPCS | Performed by: NURSE PRACTITIONER

## 2021-11-12 PROCEDURE — G8754 DIAS BP LESS 90: HCPCS | Performed by: NURSE PRACTITIONER

## 2021-11-12 PROCEDURE — 3017F COLORECTAL CA SCREEN DOC REV: CPT | Performed by: NURSE PRACTITIONER

## 2021-11-12 PROCEDURE — 1090F PRES/ABSN URINE INCON ASSESS: CPT | Performed by: NURSE PRACTITIONER

## 2021-11-12 NOTE — PATIENT INSTRUCTIONS
Fatigue: Care Instructions  Your Care Instructions     Fatigue is a feeling of tiredness, exhaustion, or lack of energy. You may feel fatigue because of too much or not enough activity. It can also come from stress, lack of sleep, boredom, and poor diet. Many medical problems, such as viral infections, can cause fatigue. Emotional problems, especially depression, are often the cause of fatigue. Fatigue is most often a symptom of another problem. Treatment for fatigue depends on the cause. For example, if you have fatigue because you have a certain health problem, treating this problem also treats your fatigue. If depression or anxiety is the cause, treatment may help. Follow-up care is a key part of your treatment and safety. Be sure to make and go to all appointments, and call your doctor if you are having problems. It's also a good idea to know your test results and keep a list of the medicines you take. How can you care for yourself at home? · Get regular exercise. But don't overdo it. Go back and forth between rest and exercise. · Get plenty of rest.  · Eat a healthy diet. Do not skip meals, especially breakfast.  · Reduce your use of caffeine, tobacco, and alcohol. Caffeine is most often found in coffee, tea, cola drinks, and chocolate. · Limit medicines that can cause fatigue. This includes tranquilizers and cold and allergy medicines. When should you call for help? Watch closely for changes in your health, and be sure to contact your doctor if:    · You have new symptoms such as fever or a rash.     · Your fatigue gets worse.     · You have been feeling down, depressed, or hopeless. Or you may have lost interest in things that you usually enjoy.     · You are not getting better as expected. Where can you learn more? Go to http://www.gray.com/  Enter [id number] in the search box to learn more about \"Fatigue: Care Instructions. \"  Current as of: July 1, 2021               Content Version: 13.0  © 2006-2021 [employer]. Care instructions adapted under license by [employer] (which disclaims liability or warranty for this information). If you have questions about a medical condition or this instruction, always ask your healthcare professional. [address] any warranty or liability for your use of this information. Weakness: Care Instructions  Your Care Instructions     Weakness is a lack of physical or muscle strength. You may feel that you need to make extra effort to move your arms, legs, or other muscles. Generalized weakness means that you feel weak in most areas of your body. Another type of weakness may affect just one muscle or group of muscles. You may feel weak and tired after you have done too much activity, such as taking an extra-long hike. This is not a serious problem. It often goes away on its own. Feeling weak can also be caused by medical conditions like thyroid problems, depression, or a virus. Sometimes the cause can be serious. Your doctor may want to do more tests to try to find the cause of the weakness. The doctor has checked you carefully, but problems can develop later. If you notice any problems or new symptoms, get medical treatment right away. Follow-up care is a key part of your treatment and safety. Be sure to make and go to all appointments, and call your doctor if you are having problems. It's also a good idea to know your test results and keep a list of the medicines you take. How can you care for yourself at home? · Rest when you feel tired. · Be safe with medicines. If your doctor prescribed medicine, take it exactly as prescribed. Call your doctor if you think you are having a problem with your medicine. You will get more details on the specific medicines your doctor prescribes. · Do not skip meals. Eating a balanced diet may increase your energy level.   · Get some physical activity every day, but do not get too tired. When should you call for help? Call your doctor now or seek immediate medical care if:    · You have new or worse weakness.     · You are dizzy or lightheaded, or you feel like you may faint. Watch closely for changes in your health, and be sure to contact your doctor if:    · You do not get better as expected. Where can you learn more? Go to http://www.gray.com/  Enter V492 in the search box to learn more about \"Weakness: Care Instructions. \"  Current as of: July 1, 2021               Content Version: 13.0  © 4501-6161 [employer]. Care instructions adapted under license by [employer] (which disclaims liability or warranty for this information). If you have questions about a medical condition or this instruction, always ask your healthcare professional. [address] any warranty or liability for your use of this information. Hypothyroidism: Care Instructions  Your Care Instructions     When you have hypothyroidism, your body doesn't make enough thyroid hormone. This hormone helps your body use energy. If your thyroid level is low, you may feel tired, be constipated, have an increase in your blood pressure, or have dry skin or memory problems. You may also get cold easily, even when it is warm. Women with low thyroid levels may have heavy menstrual periods. A blood test to find your thyroid-stimulating hormone (TSH) level is used to check for hypothyroidism. A high TSH level may mean that you have it. The treatment for hypothyroidism is thyroid hormone pills. You should start to feel better in 1 to 2 weeks. Most people need treatment for the rest of their lives. You will need regular visits with your doctor to make sure you are doing well and that you have the right dose of medicine. Follow-up care is a key part of your treatment and safety.  Be sure to make and go to all appointments, and call your doctor if you are having problems. It's also a good idea to know your test results and keep a list of the medicines you take. How can you care for yourself at home? · Take your thyroid hormone medicine exactly as prescribed. Call your doctor if you think you are having a problem with your medicine. Most people do not have side effects if they take the right amount of medicine regularly. ? Take the medicine 30 minutes before breakfast, and do not take it with calcium, vitamins, or iron. ? Do not take extra doses of your thyroid medicine. It will not help you get better any faster, and it may cause side effects. ? If you forget to take a dose, do NOT take a double dose of medicine. Take your usual dose the next day. · Tell your doctor about all prescription, herbal, or over-the-counter products you take. · Take care of yourself. Eat a healthy diet, get enough sleep, and get regular exercise. When should you call for help? Call 911 anytime you think you may need emergency care. For example, call if:    · You passed out (lost consciousness).     · You have severe trouble breathing.     · You have a very slow heartbeat (less than 60 beats a minute).     · You have a low body temperature (95°F or below). Call your doctor now or seek immediate medical care if:    · You feel tired, sluggish, or weak.     · You have trouble remembering things or concentrating.     · You do not begin to feel better 2 weeks after starting your medicine. Watch closely for changes in your health, and be sure to contact your doctor if you have any problems. Where can you learn more? Go to http://www.gray.com/  Enter A802 in the search box to learn more about \"Hypothyroidism: Care Instructions. \"  Current as of: December 2, 2020               Content Version: 13.0  © 5892-5647 [employer].    Care instructions adapted under license by [employer] (which disclaims liability or warranty for this information). If you have questions about a medical condition or this instruction, always ask your healthcare professional. [address] any warranty or liability for your use of this information.

## 2021-11-13 LAB
ALBUMIN SERPL-MCNC: 3.5 G/DL (ref 3.5–5)
ALBUMIN/GLOB SERPL: 1 {RATIO} (ref 1.1–2.2)
ALP SERPL-CCNC: 109 U/L (ref 45–117)
ALT SERPL-CCNC: 37 U/L (ref 12–78)
ANION GAP SERPL CALC-SCNC: 3 MMOL/L (ref 5–15)
AST SERPL-CCNC: 25 U/L (ref 15–37)
BASOPHILS # BLD: 0 K/UL (ref 0–0.1)
BASOPHILS NFR BLD: 0 % (ref 0–1)
BILIRUB SERPL-MCNC: 0.8 MG/DL (ref 0.2–1)
BUN SERPL-MCNC: 15 MG/DL (ref 6–20)
BUN/CREAT SERPL: 17 (ref 12–20)
CALCIUM SERPL-MCNC: 9.8 MG/DL (ref 8.5–10.1)
CHLORIDE SERPL-SCNC: 107 MMOL/L (ref 97–108)
CO2 SERPL-SCNC: 29 MMOL/L (ref 21–32)
CREAT SERPL-MCNC: 0.9 MG/DL (ref 0.55–1.02)
DIFFERENTIAL METHOD BLD: ABNORMAL
EOSINOPHIL # BLD: 0.5 K/UL (ref 0–0.4)
EOSINOPHIL NFR BLD: 6 % (ref 0–7)
ERYTHROCYTE [DISTWIDTH] IN BLOOD BY AUTOMATED COUNT: 13.3 % (ref 11.5–14.5)
GLOBULIN SER CALC-MCNC: 3.4 G/DL (ref 2–4)
GLUCOSE SERPL-MCNC: 103 MG/DL (ref 65–100)
HCT VFR BLD AUTO: 43.4 % (ref 35–47)
HGB BLD-MCNC: 14.3 G/DL (ref 11.5–16)
IMM GRANULOCYTES # BLD AUTO: 0 K/UL (ref 0–0.04)
IMM GRANULOCYTES NFR BLD AUTO: 0 % (ref 0–0.5)
LYMPHOCYTES # BLD: 2.3 K/UL (ref 0.8–3.5)
LYMPHOCYTES NFR BLD: 29 % (ref 12–49)
MAGNESIUM SERPL-MCNC: 2.2 MG/DL (ref 1.6–2.4)
MCH RBC QN AUTO: 33.1 PG (ref 26–34)
MCHC RBC AUTO-ENTMCNC: 32.9 G/DL (ref 30–36.5)
MCV RBC AUTO: 100.5 FL (ref 80–99)
MONOCYTES # BLD: 0.6 K/UL (ref 0–1)
MONOCYTES NFR BLD: 7 % (ref 5–13)
NEUTS SEG # BLD: 4.5 K/UL (ref 1.8–8)
NEUTS SEG NFR BLD: 58 % (ref 32–75)
NRBC # BLD: 0 K/UL (ref 0–0.01)
NRBC BLD-RTO: 0 PER 100 WBC
PLATELET # BLD AUTO: 258 K/UL (ref 150–400)
PMV BLD AUTO: 11.9 FL (ref 8.9–12.9)
POTASSIUM SERPL-SCNC: 4.4 MMOL/L (ref 3.5–5.1)
PROT SERPL-MCNC: 6.9 G/DL (ref 6.4–8.2)
RBC # BLD AUTO: 4.32 M/UL (ref 3.8–5.2)
SODIUM SERPL-SCNC: 139 MMOL/L (ref 136–145)
T4 FREE SERPL-MCNC: 1.5 NG/DL (ref 0.8–1.5)
TSH SERPL DL<=0.05 MIU/L-ACNC: 0.61 UIU/ML (ref 0.36–3.74)
WBC # BLD AUTO: 7.9 K/UL (ref 3.6–11)

## 2021-11-15 NOTE — PROGRESS NOTES
Petros Higgins (: 1950) is a 65 y.o. female, established patient, here for evaluation of the following chief complaint(s):  Follow-up (fatigue, weakness)       ASSESSMENT/PLAN:  Below is the assessment and plan developed based on review of pertinent history, physical exam, labs, studies, and medications. 1. Fatigue, unspecified type -- suspect that her fatigue, weakness is most likely due to her recent bout with Covid-19 infection in September.  -     CBC WITH AUTOMATED DIFF; Future  -     METABOLIC PANEL, COMPREHENSIVE; Future  -     TSH 3RD GENERATION; Future  -     T4, FREE; Future  -     MAGNESIUM; Future    2. Acquired hypothyroidism -- will check levels  -     TSH 3RD GENERATION; Future  -     T4, FREE; Future    3. Weakness  -     MAGNESIUM; Future    4. History of COVID-19  -- discussion with patient and her sister regarding prolonged recovery period in some patients with Covid 19 infection. Encouraged to stay well hydrated and have frequent rest periods. 5. TMJ dysfunction -- encouraged her to see her dentist for a possible bite block or she can see TMJ specialist at [place] [address] TMJ      SUBJECTIVE/OBJECTIVE:  HPI    Patient of Dr Delaney Wilcox who is accompanied by her sister, Rey Oliveira presents with complaints of overall fatigue, weakness for the past few months. She was very ill with Covid-19 infection in 2021 and feels like it has taken her a long time to recover from this. Her sister, brother-in-law were both ill at that time as well. Finds that she is exhausted after doing small tasks at home and has to stop and rest.  Denies chest discomfort or shortness of breath and has a O2 sat monitor at home which has been reading normal.  Denies any further fevers, chills, night sweats, nausea, vomiting, diarrhea. Her appetite has gradually returned to more normal levels. She has lost 9 lbs since her last visit in 2021. SUBJECTIVE: Petros Higgins is a 65 y.o. female here for follow up of hypothyroidism. Lab Results   Component Value Date/Time    TSH 0.61 11/12/2021 11:57 AM     Thyroid ROS: fatigue, feeling slow and weight loss. Reports she has tightness and tenderness to bilateral TMJ areas and believes that she most likely grinds her teeth; she is aware of clenching her jaw when anxious. Has concerns about Temporal arteritis which her mother had but has not noted any pain, warmth, prominent blood vessels at temple area. Patient Active Problem List   Diagnosis Code    Obesity, morbid ([facility] [address] E66.01    Postconcussion syndrome F07.81    Impaired gait R26.9    Hypothyroidism E03.9    Benign essential HTN I10    Grief reaction F43.21    Head injury S09.90XA    Advanced care planning/counseling discussion Z71.89    Osteopenia M85.80    Thickened endometrium R93.89    S/P hysterectomy with oophorectomy Z90.710, Z90.721     Past Surgical History:   Procedure Laterality Date    COLONOSCOPY N/A 9/19/2018    COLONOSCOPY performed by Sheri Pan MD at [address] [place] [address]      HX CATARACT REMOVAL Bilateral     HX CHOLECYSTECTOMY  1991    HX COLONOSCOPY  11/24/2009    normal.  hx of polyps. rec 3 year f/u    HX COLPOSCOPY  11/26/2018    neg path    HX DILATION AND CURETTAGE  02/2019    H/S, D+C and ECC==path showed focal complex hyperplasia without atypia. Dr. Juanjo Liriano HX NURYS AND BSO  10/13/2019    Dr. Laury Davidson.   benign    HX TONSIL AND ADENOIDECTOMY  1955    HX TONSILLECTOMY      HX TUBAL LIGATION  1984     Social History     Socioeconomic History    Marital status:      Spouse name: Not on file    Number of children: 1    Years of education: Not on file    Highest education level: Not on file   Occupational History    Not on file   Tobacco Use    Smoking status: Former Smoker     Packs/day: 0.25     Years: 20.00     Pack years: 5.00     Quit date: 2010     Years since quittin.8    Smokeless tobacco: Never Used    Tobacco comment: Patient reports smoking socially-not daily   Substance and Sexual Activity    Alcohol use: Yes     Alcohol/week: 7.0 standard drinks     Types: 7 Glasses of wine per week    Drug use: No    Sexual activity: Not Currently     Partners: Male     Birth control/protection: None   Other Topics Concern    Not on file   Social History Narrative    1 daughter, 2 grandkids in [address] Strain:     Difficulty of Paying Living Expenses: Not on file   Food Insecurity:     Worried About Running Out of Food in the Last Year: Not on file    Alia of Food in the Last Year: Not on file   Transportation Needs:     Lack of Transportation (Medical): Not on file    Lack of Transportation (Non-Medical):  Not on file   Physical Activity:     Days of Exercise per Week: Not on file    Minutes of Exercise per Session: Not on file   Stress:     Feeling of Stress : Not on file   Social Connections:     Frequency of Communication with Friends and Family: Not on file    Frequency of Social Gatherings with Friends and Family: Not on file    Attends Nondenominational Services: Not on file    Active Member of Clubs or Organizations: Not on file    Attends Club or Organization Meetings: Not on file    Marital Status: Not on file   Intimate Partner Violence:     Fear of Current or Ex-Partner: Not on file    Emotionally Abused: Not on file    Physically Abused: Not on file    Sexually Abused: Not on file   Housing Stability:     Unable to Pay for Housing in the Last Year: Not on file    Number of [place] in the Last Year: Not on file    Unstable Housing in the Last Year: Not on file     Family History   Problem Relation Age of Onset    Diabetes Mother     Thyroid Disease Mother     Dementia Mother     Diabetes Sister     Heart Disease Sister     Thyroid Disease Sister     Panic disorder Sister    Wanda Panic disorder Brother     No Known Problems Daughter     Anesth Problems Neg Hx      Current Outpatient Medications   Medication Sig    metoprolol succinate (TOPROL-XL) 50 mg XL tablet TAKE 1 TABLET BY MOUTH EVERY DAY AT NIGHT    levothyroxine (SYNTHROID) 125 mcg tablet TAKE 1 TABLET BY MOUTH EVERY DAY BEFORE BREAKFAST    ibuprofen (MOTRIN) 200 mg tablet Take 3 Tablets by mouth nightly.  diclofenac EC (VOLTAREN) 50 mg EC tablet Take 1 Tablet by mouth every morning.  omeprazole (PRILOSEC) 20 mg capsule TAKE 1 CAPSULE BY MOUTH EVERY DAY    multivitamin (ONE A DAY) tablet Take 1 Tab by mouth every morning.  ALPRAZolam (XANAX) 0.25 mg tablet Take 1 Tab by mouth two (2) times daily as needed (for flying). Max Daily Amount: 0.5 mg.    ketoconazole (NIZORAL) 2 % topical cream APPLY TO AFFECTED AREA TWICE A DAY EXTERNALLY 14 DAYS (Patient not taking: Reported on 11/12/2021)     No current facility-administered medications for this visit. Allergies   Allergen Reactions    Sulfa (Sulfonamide Antibiotics) Hives     Not allergic at all. [place] \"yeast infection\"      Immunization History   Administered Date(s) Administered    COVID-19, PFIZER, MRNA, LNP-S, PF, 30MCG/0.3ML DOSE 02/28/2021, 03/21/2021    Influenza High Dose Vaccine PF 10/15/2017, 11/01/2021    Influenza Vaccine [facility]) PF (>6 Mo Flulaval, Fluarix, and >3 Yrs Afluria, Fluzone 24159) 10/18/2019    Influenza Vaccine (Tri) Adjuvanted (>65 Yrs FLUAD TRI 41051) 10/16/2018    Pneumococcal Conjugate (PCV-13) 02/09/2018    Pneumococcal Polysaccharide (PPSV-23) 07/28/2014    Zoster Recombinant 12/10/2020, 05/17/2021       Review of Systems   Constitutional: Positive for fatigue. Negative for chills and fever. HENT: Negative for congestion and postnasal drip. Respiratory: Negative for cough and shortness of breath. Cardiovascular: Negative for chest pain. Gastrointestinal: Negative for abdominal pain.    Genitourinary: Negative for frequency and urgency. Skin: Negative for rash. Neurological: Positive for weakness. Negative for syncope, speech difficulty, light-headedness and headaches. Psychiatric/Behavioral: The patient is nervous/anxious. /79 (BP 1 Location: Left upper arm, BP Patient Position: Sitting, BP Cuff Size: Adult)   Pulse 83   Temp 97 °F (36.1 °C) (Temporal)   Resp 16   Ht 5' 4\" (1.626 m)   Wt 255 lb 3.2 oz (115.8 kg)   SpO2 95%   BMI 43.80 kg/m²   Physical Exam  Vitals and nursing note reviewed. Constitutional:       General: She is not in acute distress. Appearance: Normal appearance. She is obese. HENT:      Head: Normocephalic and atraumatic. Right Ear: Tympanic membrane, ear canal and external ear normal.      Left Ear: Tympanic membrane, ear canal and external ear normal.      Nose: Nose normal.      Mouth/Throat:      Mouth: Mucous membranes are moist.      Pharynx: Oropharynx is clear. Cardiovascular:      Rate and Rhythm: Normal rate and regular rhythm. Pulmonary:      Effort: Pulmonary effort is normal.      Breath sounds: Normal breath sounds. No wheezing or rhonchi. Abdominal:      General: Bowel sounds are normal.      Palpations: Abdomen is soft. Tenderness: There is no abdominal tenderness. Musculoskeletal:      Cervical back: Normal range of motion and neck supple. Skin:     General: Skin is warm and dry. Neurological:      General: No focal deficit present. Mental Status: She is alert and oriented to person, place, and time. Psychiatric:         Attention and Perception: Attention and perception normal.         Mood and Affect: Mood is anxious. Speech: Speech normal.           On this date 11/12/2021 I have spent 35 minutes reviewing previous notes, test results and face to face with the patient discussing the diagnosis and importance of compliance with the treatment plan as well as documenting on the day of the visit.     An electronic signature was used to authenticate this note.   -- Becky Montalvo NP

## 2021-11-19 ENCOUNTER — TRANSCRIBE ORDER (OUTPATIENT)
Dept: SCHEDULING | Age: 71
End: 2021-11-19

## 2021-11-19 DIAGNOSIS — Z12.31 VISIT FOR SCREENING MAMMOGRAM: Primary | ICD-10-CM

## 2021-12-23 RX ORDER — OMEPRAZOLE 20 MG/1
CAPSULE, DELAYED RELEASE ORAL
Qty: 90 CAPSULE | Refills: 1 | Status: SHIPPED | OUTPATIENT
Start: 2021-12-23 | End: 2022-04-28 | Stop reason: SDUPTHER

## 2022-01-14 DIAGNOSIS — R05.9 COUGH: Primary | ICD-10-CM

## 2022-01-14 RX ORDER — CODEINE PHOSPHATE AND GUAIFENESIN 10; 100 MG/5ML; MG/5ML
5 SOLUTION ORAL
Qty: 110 ML | Refills: 0 | Status: SHIPPED | OUTPATIENT
Start: 2022-01-14 | End: 2022-01-21

## 2022-02-21 ENCOUNTER — OFFICE VISIT (OUTPATIENT)
Dept: INTERNAL MEDICINE CLINIC | Age: 72
End: 2022-02-21
Payer: MEDICARE

## 2022-02-21 VITALS
BODY MASS INDEX: 43.29 KG/M2 | TEMPERATURE: 98 F | SYSTOLIC BLOOD PRESSURE: 138 MMHG | WEIGHT: 253.6 LBS | RESPIRATION RATE: 14 BRPM | OXYGEN SATURATION: 96 % | DIASTOLIC BLOOD PRESSURE: 81 MMHG | HEART RATE: 72 BPM | HEIGHT: 64 IN

## 2022-02-21 DIAGNOSIS — R25.1 EPISODES OF TREMBLING: ICD-10-CM

## 2022-02-21 DIAGNOSIS — N32.81 OAB (OVERACTIVE BLADDER): ICD-10-CM

## 2022-02-21 DIAGNOSIS — Z00.00 MEDICARE ANNUAL WELLNESS VISIT, SUBSEQUENT: Primary | ICD-10-CM

## 2022-02-21 DIAGNOSIS — E03.9 ACQUIRED HYPOTHYROIDISM: ICD-10-CM

## 2022-02-21 DIAGNOSIS — R35.0 URINARY FREQUENCY: ICD-10-CM

## 2022-02-21 DIAGNOSIS — R73.01 IFG (IMPAIRED FASTING GLUCOSE): ICD-10-CM

## 2022-02-21 DIAGNOSIS — I10 BENIGN ESSENTIAL HTN: ICD-10-CM

## 2022-02-21 DIAGNOSIS — E66.01 OBESITY, CLASS III, BMI 40-49.9 (MORBID OBESITY) (HCC): ICD-10-CM

## 2022-02-21 DIAGNOSIS — M17.10 KNEE ARTHROPATHY: ICD-10-CM

## 2022-02-21 LAB
ANION GAP SERPL CALC-SCNC: 5 MMOL/L (ref 5–15)
BILIRUB UR QL STRIP: NEGATIVE
BUN SERPL-MCNC: 13 MG/DL (ref 6–20)
BUN/CREAT SERPL: 15 (ref 12–20)
CALCIUM SERPL-MCNC: 9.7 MG/DL (ref 8.5–10.1)
CHLORIDE SERPL-SCNC: 107 MMOL/L (ref 97–108)
CO2 SERPL-SCNC: 29 MMOL/L (ref 21–32)
CREAT SERPL-MCNC: 0.84 MG/DL (ref 0.55–1.02)
EST. AVERAGE GLUCOSE BLD GHB EST-MCNC: 111 MG/DL
GLUCOSE SERPL-MCNC: 123 MG/DL (ref 65–100)
GLUCOSE UR-MCNC: NEGATIVE MG/DL
HBA1C MFR BLD: 5.5 % (ref 4–5.6)
KETONES P FAST UR STRIP-MCNC: NEGATIVE MG/DL
PH UR STRIP: 5.5 [PH] (ref 4.6–8)
POTASSIUM SERPL-SCNC: 4.3 MMOL/L (ref 3.5–5.1)
PROT UR QL STRIP: NEGATIVE
SODIUM SERPL-SCNC: 141 MMOL/L (ref 136–145)
SP GR UR STRIP: 1.03 (ref 1–1.03)
T4 FREE SERPL-MCNC: 1.5 NG/DL (ref 0.8–1.5)
TSH SERPL DL<=0.05 MIU/L-ACNC: 0.5 UIU/ML (ref 0.36–3.74)
UA UROBILINOGEN AMB POC: NORMAL (ref 0.2–1)
URINALYSIS CLARITY POC: NORMAL
URINALYSIS COLOR POC: NORMAL
URINE BLOOD POC: NEGATIVE
URINE LEUKOCYTES POC: NEGATIVE
URINE NITRITES POC: NEGATIVE

## 2022-02-21 PROCEDURE — 81003 URINALYSIS AUTO W/O SCOPE: CPT | Performed by: INTERNAL MEDICINE

## 2022-02-21 PROCEDURE — G0463 HOSPITAL OUTPT CLINIC VISIT: HCPCS | Performed by: INTERNAL MEDICINE

## 2022-02-21 PROCEDURE — 99214 OFFICE O/P EST MOD 30 MIN: CPT | Performed by: INTERNAL MEDICINE

## 2022-02-21 PROCEDURE — G8536 NO DOC ELDER MAL SCRN: HCPCS | Performed by: INTERNAL MEDICINE

## 2022-02-21 PROCEDURE — G8427 DOCREV CUR MEDS BY ELIG CLIN: HCPCS | Performed by: INTERNAL MEDICINE

## 2022-02-21 PROCEDURE — G8510 SCR DEP NEG, NO PLAN REQD: HCPCS | Performed by: INTERNAL MEDICINE

## 2022-02-21 PROCEDURE — 1101F PT FALLS ASSESS-DOCD LE1/YR: CPT | Performed by: INTERNAL MEDICINE

## 2022-02-21 PROCEDURE — 3017F COLORECTAL CA SCREEN DOC REV: CPT | Performed by: INTERNAL MEDICINE

## 2022-02-21 PROCEDURE — G0439 PPPS, SUBSEQ VISIT: HCPCS | Performed by: INTERNAL MEDICINE

## 2022-02-21 PROCEDURE — G8754 DIAS BP LESS 90: HCPCS | Performed by: INTERNAL MEDICINE

## 2022-02-21 PROCEDURE — 1090F PRES/ABSN URINE INCON ASSESS: CPT | Performed by: INTERNAL MEDICINE

## 2022-02-21 PROCEDURE — G8752 SYS BP LESS 140: HCPCS | Performed by: INTERNAL MEDICINE

## 2022-02-21 PROCEDURE — G8417 CALC BMI ABV UP PARAM F/U: HCPCS | Performed by: INTERNAL MEDICINE

## 2022-02-21 PROCEDURE — G8399 PT W/DXA RESULTS DOCUMENT: HCPCS | Performed by: INTERNAL MEDICINE

## 2022-02-21 PROCEDURE — G9899 SCRN MAM PERF RSLTS DOC: HCPCS | Performed by: INTERNAL MEDICINE

## 2022-02-21 RX ORDER — IBUPROFEN 200 MG
400 TABLET ORAL AS NEEDED
COMMUNITY
End: 2022-08-12 | Stop reason: ALTCHOICE

## 2022-02-21 RX ORDER — TOLTERODINE TARTRATE 1 MG/1
1 TABLET, EXTENDED RELEASE ORAL 2 TIMES DAILY
Qty: 60 TABLET | Refills: 2 | Status: SHIPPED | OUTPATIENT
Start: 2022-02-21 | End: 2022-08-03
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
## 2022-02-22 DIAGNOSIS — E03.9 ACQUIRED HYPOTHYROIDISM: ICD-10-CM

## 2022-02-22 RX ORDER — LEVOTHYROXINE SODIUM 125 UG/1
TABLET ORAL
Qty: 90 TABLET | Refills: 1
Start: 2022-02-22 | End: 2022-03-24

## 2022-02-23 LAB
BACTERIA SPEC CULT: NORMAL
SERVICE CMNT-IMP: NORMAL

## 2022-03-18 PROBLEM — M85.80 OSTEOPENIA: Status: ACTIVE | Noted: 2018-10-01

## 2022-03-18 PROBLEM — R93.89 THICKENED ENDOMETRIUM: Status: ACTIVE | Noted: 2019-09-29

## 2022-03-19 PROBLEM — Z71.89 ADVANCED CARE PLANNING/COUNSELING DISCUSSION: Status: ACTIVE | Noted: 2018-07-02

## 2022-03-19 PROBLEM — Z90.721 S/P HYSTERECTOMY WITH OOPHORECTOMY: Status: ACTIVE | Noted: 2019-11-27

## 2022-03-19 PROBLEM — E66.01 OBESITY, MORBID (HCC): Status: ACTIVE | Noted: 2018-04-25

## 2022-03-19 PROBLEM — Z90.710 S/P HYSTERECTOMY WITH OOPHORECTOMY: Status: ACTIVE | Noted: 2019-11-27

## 2022-03-20 PROBLEM — F07.81 POSTCONCUSSION SYNDROME: Status: ACTIVE | Noted: 2018-02-01

## 2022-03-24 DIAGNOSIS — E03.9 ACQUIRED HYPOTHYROIDISM: ICD-10-CM

## 2022-03-24 RX ORDER — LEVOTHYROXINE SODIUM 125 UG/1
TABLET ORAL
Qty: 90 TABLET | Refills: 1 | Status: SHIPPED | OUTPATIENT
Start: 2022-03-24 | End: 2022-04-28 | Stop reason: SDUPTHER

## 2022-03-25 ENCOUNTER — HOSPITAL ENCOUNTER (OUTPATIENT)
Dept: MAMMOGRAPHY | Age: 72
Discharge: HOME | End: 2022-03-25
Attending: OBSTETRICS & GYNECOLOGY
Payer: MEDICARE

## 2022-03-25 DIAGNOSIS — Z12.31 VISIT FOR SCREENING MAMMOGRAM: ICD-10-CM

## 2022-03-25 PROCEDURE — 77067 SCR MAMMO BI INCL CAD: CPT

## 2022-03-28 ENCOUNTER — TELEPHONE (OUTPATIENT)
Dept: INTERNAL MEDICINE CLINIC | Age: 72
End: 2022-03-28

## 2022-04-13 RX ORDER — METOPROLOL SUCCINATE 50 MG/1
TABLET, EXTENDED RELEASE ORAL
Qty: 90 TABLET | Refills: 1 | Status: SHIPPED | OUTPATIENT
Start: 2022-04-13 | End: 2022-04-28 | Stop reason: SDUPTHER

## 2022-04-13 NOTE — PROGRESS NOTES
Behavioral Health Progress Note    This patient was seen by the [facility] from 2pm to 2:45pm for a total of 45 minutes. Psychotropic medication changes: None    Diagnosis: Adjustment Disorder with Anxiety and Depressed Mood    Description of session/progress/interventions: Leora Coyle stated that she felt that her grief was cont to improve and that she feels her sadness is temporary and not out of the ordinary. She had neuropsych testing done and is anxious for the results. She stated that she worries that she didnt do really well as she had a bad cold at the time. She is making slow progress on unpacking boxes but at times feels overwhelmed. We discussed again the importance of being more physically active to help with both physical and mental health. [facility] apt. Her sister cont to provide support. Assessment/Plan: Moving through grief process; anxious about results of psych testing; will review with her when copy of results is received. To return in about a month. 

## 2022-04-28 DIAGNOSIS — I10 BENIGN ESSENTIAL HTN: Primary | ICD-10-CM

## 2022-04-28 DIAGNOSIS — E03.9 ACQUIRED HYPOTHYROIDISM: ICD-10-CM

## 2022-04-28 RX ORDER — LEVOTHYROXINE SODIUM 125 UG/1
TABLET ORAL
Qty: 90 TABLET | Refills: 1 | Status: SHIPPED | OUTPATIENT
Start: 2022-04-28 | End: 2022-05-03 | Stop reason: SDUPTHER

## 2022-04-28 RX ORDER — OMEPRAZOLE 20 MG/1
20 CAPSULE, DELAYED RELEASE ORAL DAILY
Qty: 90 CAPSULE | Refills: 1 | Status: SHIPPED | OUTPATIENT
Start: 2022-04-28 | End: 2022-05-03 | Stop reason: SDUPTHER

## 2022-04-28 RX ORDER — METOPROLOL SUCCINATE 50 MG/1
50 TABLET, EXTENDED RELEASE ORAL
Qty: 90 TABLET | Refills: 1 | Status: SHIPPED | OUTPATIENT
Start: 2022-04-28 | End: 2022-05-03 | Stop reason: SDUPTHER

## 2022-04-28 NOTE — TELEPHONE ENCOUNTER
Spoke with patient and she requests that the 3 medications be sent to [facility] order pharmacy. Grateful for the call.

## 2022-05-03 DIAGNOSIS — E03.9 ACQUIRED HYPOTHYROIDISM: ICD-10-CM

## 2022-05-03 DIAGNOSIS — I10 BENIGN ESSENTIAL HTN: ICD-10-CM

## 2022-05-03 RX ORDER — LEVOTHYROXINE SODIUM 125 UG/1
TABLET ORAL
Qty: 90 TABLET | Refills: 1 | Status: SHIPPED | OUTPATIENT
Start: 2022-05-03

## 2022-05-03 RX ORDER — METOPROLOL SUCCINATE 50 MG/1
50 TABLET, EXTENDED RELEASE ORAL
Qty: 90 TABLET | Refills: 1 | Status: SHIPPED | OUTPATIENT
Start: 2022-05-03 | End: 2022-08-03

## 2022-05-03 RX ORDER — OMEPRAZOLE 20 MG/1
20 CAPSULE, DELAYED RELEASE ORAL DAILY
Qty: 90 CAPSULE | Refills: 1 | Status: SHIPPED | OUTPATIENT
Start: 2022-05-03 | End: 2022-06-19

## 2022-06-19 RX ORDER — OMEPRAZOLE 20 MG/1
CAPSULE, DELAYED RELEASE ORAL
Qty: 90 CAPSULE | Refills: 1 | Status: SHIPPED | OUTPATIENT
Start: 2022-06-19 | End: 2022-09-22 | Stop reason: ALTCHOICE

## 2022-07-07 ENCOUNTER — TELEPHONE (OUTPATIENT)
Dept: INTERNAL MEDICINE CLINIC | Age: 72
End: 2022-07-07

## 2022-07-07 DIAGNOSIS — K60.4 RECTAL FISTULA: Primary | ICD-10-CM

## 2022-07-07 NOTE — TELEPHONE ENCOUNTER
Typically urologists choose a colorectal specialist that they like to work with for the sort of surgeries. I am surprised that I am being asked, but I put in a referral for Dr. Alfredo Malhotra at [facility]. There are multiple other good doctors in that practice as well.

## 2022-07-07 NOTE — TELEPHONE ENCOUNTER
Per patient, Urologist Dr. Amber Nichols found a fistula and states she needs to be referred to a colon and rectal surgeon for surgery    Patient would like to discuss surgery recommendation with PCP or the nurse [phone]

## 2022-07-08 NOTE — TELEPHONE ENCOUNTER
T/C to patient to update on referral for Dr. Caryle Hauser Surgeon for a rectal fistula. Patient can call to schedule appt.  LVM          Dr. Merle Leon, [address] [facility]  [facility]  [facility] [address] [id number]  [phone]  [phone]

## 2022-07-19 ENCOUNTER — TELEPHONE (OUTPATIENT)
Dept: INTERNAL MEDICINE CLINIC | Age: 72
End: 2022-07-19

## 2022-07-19 NOTE — TELEPHONE ENCOUNTER
Spoke with patient and scheduled for a pre-op appt for upcoming robotic surgery on 8/16/22 with Dr. John Paul Napoles for 8/12/22 at 1:20 PM.  Advised to bring an updated list of all her current medications. Grateful for the call.

## 2022-08-03 ENCOUNTER — HOSPITAL ENCOUNTER (OUTPATIENT)
Dept: GENERAL RADIOLOGY | Age: 72
Discharge: HOME | End: 2022-08-03
Attending: COLON & RECTAL SURGERY
Payer: MEDICARE

## 2022-08-03 ENCOUNTER — HOSPITAL ENCOUNTER (OUTPATIENT)
Dept: PREADMISSION TESTING | Age: 72
Discharge: HOME | End: 2022-08-03
Payer: MEDICARE

## 2022-08-03 VITALS
DIASTOLIC BLOOD PRESSURE: 72 MMHG | BODY MASS INDEX: 44.53 KG/M2 | RESPIRATION RATE: 16 BRPM | HEIGHT: 63 IN | HEART RATE: 79 BPM | OXYGEN SATURATION: 95 % | TEMPERATURE: 96.6 F | SYSTOLIC BLOOD PRESSURE: 140 MMHG | WEIGHT: 251.3 LBS

## 2022-08-03 LAB
ABO + RH BLD: NORMAL
ALBUMIN SERPL-MCNC: 3.7 G/DL (ref 3.5–5)
ALBUMIN/GLOB SERPL: 0.9 {RATIO} (ref 1.1–2.2)
ALP SERPL-CCNC: 124 U/L (ref 45–117)
ALT SERPL-CCNC: 26 U/L (ref 12–78)
ANION GAP SERPL CALC-SCNC: 6 MMOL/L (ref 5–15)
AST SERPL-CCNC: 20 U/L (ref 15–37)
BILIRUB SERPL-MCNC: 0.7 MG/DL (ref 0.2–1)
BLOOD GROUP ANTIBODIES SERPL: NORMAL
BUN SERPL-MCNC: 15 MG/DL (ref 6–20)
BUN/CREAT SERPL: 16 (ref 12–20)
CALCIUM SERPL-MCNC: 10 MG/DL (ref 8.5–10.1)
CHLORIDE SERPL-SCNC: 105 MMOL/L (ref 97–108)
CO2 SERPL-SCNC: 30 MMOL/L (ref 21–32)
CREAT SERPL-MCNC: 0.94 MG/DL (ref 0.55–1.02)
ERYTHROCYTE [DISTWIDTH] IN BLOOD BY AUTOMATED COUNT: 12.4 % (ref 11.5–14.5)
EST. AVERAGE GLUCOSE BLD GHB EST-MCNC: 114 MG/DL
GLOBULIN SER CALC-MCNC: 4 G/DL (ref 2–4)
GLUCOSE SERPL-MCNC: 103 MG/DL (ref 65–100)
HBA1C MFR BLD: 5.6 % (ref 4–5.6)
HCT VFR BLD AUTO: 44.9 % (ref 35–47)
HGB BLD-MCNC: 15.3 G/DL (ref 11.5–16)
MCH RBC QN AUTO: 33 PG (ref 26–34)
MCHC RBC AUTO-ENTMCNC: 34.1 G/DL (ref 30–36.5)
MCV RBC AUTO: 97 FL (ref 80–99)
NRBC # BLD: 0 K/UL (ref 0–0.01)
NRBC BLD-RTO: 0 PER 100 WBC
PLATELET # BLD AUTO: 278 K/UL (ref 150–400)
PMV BLD AUTO: 11.3 FL (ref 8.9–12.9)
POTASSIUM SERPL-SCNC: 4.6 MMOL/L (ref 3.5–5.1)
PROT SERPL-MCNC: 7.7 G/DL (ref 6.4–8.2)
RBC # BLD AUTO: 4.63 M/UL (ref 3.8–5.2)
SODIUM SERPL-SCNC: 141 MMOL/L (ref 136–145)
SPECIMEN EXP DATE BLD: NORMAL
WBC # BLD AUTO: 8.1 K/UL (ref 3.6–11)

## 2022-08-03 PROCEDURE — 83036 HEMOGLOBIN GLYCOSYLATED A1C: CPT

## 2022-08-03 PROCEDURE — 36415 COLL VENOUS BLD VENIPUNCTURE: CPT

## 2022-08-03 PROCEDURE — 80053 COMPREHEN METABOLIC PANEL: CPT

## 2022-08-03 PROCEDURE — 93005 ELECTROCARDIOGRAM TRACING: CPT

## 2022-08-03 PROCEDURE — 85027 COMPLETE CBC AUTOMATED: CPT

## 2022-08-03 PROCEDURE — 71046 X-RAY EXAM CHEST 2 VIEWS: CPT

## 2022-08-03 PROCEDURE — 86900 BLOOD TYPING SEROLOGIC ABO: CPT

## 2022-08-03 RX ORDER — VIBEGRON 75 MG/1
TABLET, FILM COATED ORAL EVERY MORNING
COMMUNITY

## 2022-08-03 RX ORDER — CIPROFLOXACIN 500 MG/1
500 TABLET ORAL 2 TIMES DAILY
COMMUNITY
End: 2022-08-30

## 2022-08-03 RX ORDER — ESTRADIOL 0.1 MG/G
2 CREAM VAGINAL 2 TIMES WEEKLY
COMMUNITY
End: 2022-08-12 | Stop reason: ALTCHOICE

## 2022-08-03 RX ORDER — METOPROLOL SUCCINATE 50 MG/1
50 TABLET, EXTENDED RELEASE ORAL EVERY MORNING
COMMUNITY

## 2022-08-03 NOTE — PERIOP NOTES
[address] [address] [facility]   MAIN OR                                  [phone]   MAIN PRE OP                          [phone]                                                                                AMBULATORY PRE OP          [phone]  PRE-ADMISSION TESTING    [phone]     Surgery Date:  8/16 Tuesday       Is surgery arrival time given by surgeon? NO  If NO, [facility] staff will call you between 3 and 7pm the day before your surgery with your arrival time. (If your surgery is on a Monday, we will call you the Friday before.)    Call [phone] after 7pm Monday-Friday if you did not receive this call. INSTRUCTIONS BEFORE YOUR SURGERY   When You  Arrive Arrive at the [address]nd [address] [facility] on the day of your surgery  Have your insurance card, photo ID, and any copayment (if needed)   Food   and   Drink NO food or drink after midnight the night before surgery    This means NO water, gum, mints, coffee, juice, etc.  No alcohol (beer, wine, liquor) 24 hours before and after surgery   Medications to   TAKE   Morning of Surgery MEDICATIONS TO TAKE THE MORNING OF SURGERY WITH A SIP OF WATER:   Synthroid   Metoprolol  Omeprazole    Medications  To  STOP      7 days before surgery Non-Steroidal anti-inflammatory Drugs (NSAID's): for example, Ibuprofen (Advil, Motrin), Naproxen (Aleve)  Aspirin, if taking for pain   Herbal supplements, vitamins, and fish oil  Other: Diclofenac, Ibuprofen, Multivitamin   (Pain medications not listed above, including Tylenol may be taken)   Blood  Thinners If you take  Aspirin, Plavix, Coumadin, or any blood-thinning or anti-blood clot medicine, talk to the doctor who prescribed the medications for pre-operative instructions.    Bathing Clothing  Jewelry  Valuables     If you shower the morning of surgery, please do not apply anything to your skin (lotions, powders, deodorant, or makeup, especially maddy)  Follow Chlorhex[employer] body wash instructions provided to you during PAT appointment. Begin 3 days prior to surgery. Do not shave or trim anywhere 24 hours before surgery  Wear your hair loose or down; no pony-tails, buns, or metal hair clips  Wear loose, comfortable, clean clothes  Wear glasses instead of contacts  Leave money, valuables, and jewelry, including body piercings, at home  If you were given an [employer], bring it on day of surgery. Going Home - or Spending the Night SAME-DAY SURGERY: You must have a responsible adult drive you home and stay with you 24 hours after surgery  ADMITS: If your doctor is keeping you in the hospital after surgery, leave personal belongings/luggage in your car until you have a hospital room number. Hospital discharge time is 12 noon  Drivers must be here before 12 noon unless you are told differently   Special Instructions        Follow all instructions so your surgery wont be cancelled. Please, be on time. If a situation occurs and you are delayed the day of surgery, call [phone]     If your physical condition changes (like a fever, cold, flu, etc.) call your surgeon. Home medication(s) reviewed and verified verbally with list during PAT appointment. The patient was contacted in person. The patient verbalizes understanding of all instructions and does not need reinforcement.

## 2022-08-03 NOTE — PERIOP NOTES
ERAS protocol and handbooks reviewed with patient. Handbooks given. Bowel prep reviewed. Supplements reviewed. All questions answered. Pre-Operative Nutrition Score        Has the patient had an unplanned weight loss of 10%  of body weight or more in the last 6 months? No = 0    Has the patient shala eating less than 50% of their normal diet in the preceding week? No = 0    Patient instructed on Non-Diabetic pre-operative nutrition plan to start 5 days prior to surgery. Patient given 10 shakes and 3 pre-surgery drinks. Opportunity given for questions and all questions answered.

## 2022-08-04 NOTE — PERIOP NOTES
Patient seen yesterday for Pre-Admission Testing for upcoming surgery with Dr. Bridget Carolina. Orders received from Dr. Bridget Carolina for PAT state cardiac clearance will need to be obtained prior to surgery. Patient told her primary nurse Belkys she did not have a cardiologist and does not have an appointment for cardiac clearance. Message left with Dr. Francisco Andrews office notifying them of this. DOS: 8/16/2022.    1035: Spoke with Darlene Kathleen in Dr. Francisco Andrews office and cardiac clearance is not needed.

## 2022-08-04 NOTE — PERIOP NOTES
Preoperative Nutrition Screen (GLENROY)   Patient's Age: 66 y.o. Patient's BMI: Estimated body mass index is 44.52 kg/m² as calculated from the following:    Height as of this encounter: 5' 3\" (1.6 m). Weight as of this encounter: 114 kg (251 lb 4.8 oz). If the answer to any of the following is Yes, then recommend prescribe Oral Nutrition Supplements (ONS) for at least 7 days prior to surgery and/or order referral to dietitian for further assessment and nutrition therapy. 1. Does the patient have a documented serum albumin less than 3.0 within the last 90 days? No = 0        2. Is patient's BMI less than 18.5 (or less than 20 if age over 72)? No = 0      3. Has the patient had an unplanned weight loss of 10% of body weight or more in the last 6 months? No = 0   4. Has the patient been eating less than 50% of their normal diet in the preceding week? No = 0   GLENROY Score (number of Yes responses), 0-4 0     Plan:   No changes made to patient's pre-op nutrition plan.     Electronically signed by Wallis Holstein, RN on 8/4/22 at 11:51 AM

## 2022-08-05 LAB
ATRIAL RATE: 75 BPM
CALCULATED P AXIS, ECG09: 55 DEGREES
CALCULATED R AXIS, ECG10: 53 DEGREES
CALCULATED T AXIS, ECG11: 54 DEGREES
DIAGNOSIS, 93000: NORMAL
P-R INTERVAL, ECG05: 190 MS
Q-T INTERVAL, ECG07: 390 MS
QRS DURATION, ECG06: 88 MS
QTC CALCULATION (BEZET), ECG08: 435 MS
VENTRICULAR RATE, ECG03: 75 BPM

## 2022-08-12 ENCOUNTER — OFFICE VISIT (OUTPATIENT)
Dept: INTERNAL MEDICINE CLINIC | Age: 72
End: 2022-08-12
Payer: MEDICARE

## 2022-08-12 VITALS
HEART RATE: 79 BPM | DIASTOLIC BLOOD PRESSURE: 88 MMHG | HEIGHT: 63 IN | OXYGEN SATURATION: 94 % | WEIGHT: 151 LBS | TEMPERATURE: 98.1 F | RESPIRATION RATE: 14 BRPM | SYSTOLIC BLOOD PRESSURE: 117 MMHG | BODY MASS INDEX: 26.75 KG/M2

## 2022-08-12 DIAGNOSIS — N32.1 COLOVESICAL FISTULA: Primary | ICD-10-CM

## 2022-08-12 DIAGNOSIS — I10 BENIGN ESSENTIAL HTN: ICD-10-CM

## 2022-08-12 DIAGNOSIS — N39.0 RECURRENT UTI: ICD-10-CM

## 2022-08-12 PROCEDURE — G8536 NO DOC ELDER MAL SCRN: HCPCS | Performed by: INTERNAL MEDICINE

## 2022-08-12 PROCEDURE — G8752 SYS BP LESS 140: HCPCS | Performed by: INTERNAL MEDICINE

## 2022-08-12 PROCEDURE — 3017F COLORECTAL CA SCREEN DOC REV: CPT | Performed by: INTERNAL MEDICINE

## 2022-08-12 PROCEDURE — G9899 SCRN MAM PERF RSLTS DOC: HCPCS | Performed by: INTERNAL MEDICINE

## 2022-08-12 PROCEDURE — G8417 CALC BMI ABV UP PARAM F/U: HCPCS | Performed by: INTERNAL MEDICINE

## 2022-08-12 PROCEDURE — 99213 OFFICE O/P EST LOW 20 MIN: CPT | Performed by: INTERNAL MEDICINE

## 2022-08-12 PROCEDURE — G8399 PT W/DXA RESULTS DOCUMENT: HCPCS | Performed by: INTERNAL MEDICINE

## 2022-08-12 PROCEDURE — 1101F PT FALLS ASSESS-DOCD LE1/YR: CPT | Performed by: INTERNAL MEDICINE

## 2022-08-12 PROCEDURE — G8427 DOCREV CUR MEDS BY ELIG CLIN: HCPCS | Performed by: INTERNAL MEDICINE

## 2022-08-12 PROCEDURE — G8754 DIAS BP LESS 90: HCPCS | Performed by: INTERNAL MEDICINE

## 2022-08-12 PROCEDURE — G8510 SCR DEP NEG, NO PLAN REQD: HCPCS | Performed by: INTERNAL MEDICINE

## 2022-08-12 PROCEDURE — 1090F PRES/ABSN URINE INCON ASSESS: CPT | Performed by: INTERNAL MEDICINE

## 2022-08-12 PROCEDURE — G0463 HOSPITAL OUTPT CLINIC VISIT: HCPCS | Performed by: INTERNAL MEDICINE
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
## 2022-08-15 ENCOUNTER — ANESTHESIA EVENT (OUTPATIENT)
Dept: SURGERY | Age: 72
DRG: 329 | End: 2022-08-15
Payer: MEDICARE

## 2022-08-16 ENCOUNTER — HOSPITAL ENCOUNTER (INPATIENT)
Age: 72
LOS: 14 days | Discharge: NURSING, REHAB OR LONG-TERM CARE | DRG: 329 | End: 2022-08-30
Attending: COLON & RECTAL SURGERY | Admitting: COLON & RECTAL SURGERY
Payer: MEDICARE

## 2022-08-16 ENCOUNTER — ANESTHESIA (OUTPATIENT)
Dept: SURGERY | Age: 72
DRG: 329 | End: 2022-08-16
Payer: MEDICARE

## 2022-08-16 PROBLEM — N32.1 COLOVESICAL FISTULA: Status: ACTIVE | Noted: 2022-08-16

## 2022-08-16 LAB
APPEARANCE UR: ABNORMAL
BACTERIA URNS QL MICRO: NEGATIVE /HPF
BILIRUB UR QL: NEGATIVE
COLOR UR: ABNORMAL
EPITH CASTS URNS QL MICRO: ABNORMAL /LPF
GLUCOSE UR STRIP.AUTO-MCNC: NEGATIVE MG/DL
HGB UR QL STRIP: ABNORMAL
KETONES UR QL STRIP.AUTO: 15 MG/DL
LEUKOCYTE ESTERASE UR QL STRIP.AUTO: ABNORMAL
NITRITE UR QL STRIP.AUTO: NEGATIVE
PH UR STRIP: 5 [PH] (ref 5–8)
PROT UR STRIP-MCNC: 30 MG/DL
RBC #/AREA URNS HPF: ABNORMAL /HPF (ref 0–5)
SP GR UR REFRACTOMETRY: 1.02 (ref 1–1.03)
UROBILINOGEN UR QL STRIP.AUTO: 0.2 EU/DL (ref 0.2–1)
WBC URNS QL MICRO: ABNORMAL /HPF (ref 0–4)

## 2022-08-16 PROCEDURE — 74011250636 HC RX REV CODE- 250/636: Performed by: ANESTHESIOLOGY

## 2022-08-16 PROCEDURE — 77030036731 HC STPLR ENDOSC J&J -F: Performed by: COLON & RECTAL SURGERY

## 2022-08-16 PROCEDURE — C1765 ADHESION BARRIER: HCPCS | Performed by: COLON & RECTAL SURGERY

## 2022-08-16 PROCEDURE — 77030002986 HC SUT PROL J&J -A: Performed by: COLON & RECTAL SURGERY

## 2022-08-16 PROCEDURE — 76010000888 HC OR TIME 8 TO 8.5HR INTENSV - TIER 2: Performed by: COLON & RECTAL SURGERY

## 2022-08-16 PROCEDURE — 77030035277 HC OBTRTR BLDELSS DISP INTU -B: Performed by: COLON & RECTAL SURGERY

## 2022-08-16 PROCEDURE — 77030003028 HC SUT VCRL J&J -A: Performed by: COLON & RECTAL SURGERY

## 2022-08-16 PROCEDURE — BT1D1ZZ FLUOROSCOPY OF RIGHT KIDNEY, URETER AND BLADDER USING LOW OSMOLAR CONTRAST: ICD-10-PCS | Performed by: UROLOGY

## 2022-08-16 PROCEDURE — 2709999900 HC NON-CHARGEABLE SUPPLY: Performed by: COLON & RECTAL SURGERY

## 2022-08-16 PROCEDURE — 77030032523 HC RELD STPL PK ENDORS INTU -C: Performed by: COLON & RECTAL SURGERY

## 2022-08-16 PROCEDURE — 77030010507 HC ADH SKN DERMBND J&J -B: Performed by: COLON & RECTAL SURGERY

## 2022-08-16 PROCEDURE — 77030026438 HC STYL ET INTUB CARD -A: Performed by: ANESTHESIOLOGY

## 2022-08-16 PROCEDURE — 77030002996 HC SUT SLK J&J -A: Performed by: COLON & RECTAL SURGERY

## 2022-08-16 PROCEDURE — 77030011264 HC ELECTRD BLD EXT COVD -A: Performed by: COLON & RECTAL SURGERY

## 2022-08-16 PROCEDURE — 77030041523 HC SEALNT FIBRN VITASEAL J&J -E: Performed by: COLON & RECTAL SURGERY

## 2022-08-16 PROCEDURE — 74011000250 HC RX REV CODE- 250

## 2022-08-16 PROCEDURE — 77030009978 HC RELD STPLR TCR J&J -B: Performed by: COLON & RECTAL SURGERY

## 2022-08-16 PROCEDURE — 77030008602 HC TRCR ENDOSC EPATH J&J -B: Performed by: COLON & RECTAL SURGERY

## 2022-08-16 PROCEDURE — 74011000250 HC RX REV CODE- 250: Performed by: COLON & RECTAL SURGERY

## 2022-08-16 PROCEDURE — 74011250636 HC RX REV CODE- 250/636

## 2022-08-16 PROCEDURE — P9045 ALBUMIN (HUMAN), 5%, 250 ML: HCPCS

## 2022-08-16 PROCEDURE — 8E0W0CZ ROBOTIC ASSISTED PROCEDURE OF TRUNK REGION, OPEN APPROACH: ICD-10-PCS | Performed by: COLON & RECTAL SURGERY

## 2022-08-16 PROCEDURE — 77030008684 HC TU ET CUF COVD -B: Performed by: ANESTHESIOLOGY

## 2022-08-16 PROCEDURE — 77030009527 HC GEL PRT SYS AMR -E: Performed by: COLON & RECTAL SURGERY

## 2022-08-16 PROCEDURE — 77030040923 HC STPLR ENDOSC ECHELON J&J -E: Performed by: COLON & RECTAL SURGERY

## 2022-08-16 PROCEDURE — 77030007866 HC KT SPN ANES BBMI -B

## 2022-08-16 PROCEDURE — 77030002933 HC SUT MCRYL J&J -A: Performed by: COLON & RECTAL SURGERY

## 2022-08-16 PROCEDURE — 77030040922 HC BLNKT HYPOTHRM STRY -A

## 2022-08-16 PROCEDURE — 77030035489 HC REDUCR CANN ENDOWR INTU -C: Performed by: COLON & RECTAL SURGERY

## 2022-08-16 PROCEDURE — C1769 GUIDE WIRE: HCPCS | Performed by: COLON & RECTAL SURGERY

## 2022-08-16 PROCEDURE — 77030008771 HC TU NG SALEM SUMP -A: Performed by: ANESTHESIOLOGY

## 2022-08-16 PROCEDURE — 77030035278 HC STPLR SEAL ENDOWR INTU -B: Performed by: COLON & RECTAL SURGERY

## 2022-08-16 PROCEDURE — 88307 TISSUE EXAM BY PATHOLOGIST: CPT

## 2022-08-16 PROCEDURE — 0T788DZ DILATION OF BILATERAL URETERS WITH INTRALUMINAL DEVICE, VIA NATURAL OR ARTIFICIAL OPENING ENDOSCOPIC: ICD-10-PCS | Performed by: UROLOGY

## 2022-08-16 PROCEDURE — 77030031492 HC PRT ACC BLNT AIRSEAL CNMD -B: Performed by: COLON & RECTAL SURGERY

## 2022-08-16 PROCEDURE — 77030031139 HC SUT VCRL2 J&J -A: Performed by: COLON & RECTAL SURGERY

## 2022-08-16 PROCEDURE — 77030032522 HC SHT STPL PK ENDOWR INTU -B: Performed by: COLON & RECTAL SURGERY

## 2022-08-16 PROCEDURE — 77030013076 HC PCH OST BAG COLO -A: Performed by: COLON & RECTAL SURGERY

## 2022-08-16 PROCEDURE — 77030025625 HC DEV TISS SEAL J&J -F: Performed by: COLON & RECTAL SURGERY

## 2022-08-16 PROCEDURE — 76210000006 HC OR PH I REC 0.5 TO 1 HR: Performed by: COLON & RECTAL SURGERY

## 2022-08-16 PROCEDURE — 74011250636 HC RX REV CODE- 250/636: Performed by: COLON & RECTAL SURGERY

## 2022-08-16 PROCEDURE — 77030040830 HC CATH URETH FOL MDII -A: Performed by: COLON & RECTAL SURGERY

## 2022-08-16 PROCEDURE — 87086 URINE CULTURE/COLONY COUNT: CPT

## 2022-08-16 PROCEDURE — 0DTN0ZZ RESECTION OF SIGMOID COLON, OPEN APPROACH: ICD-10-PCS | Performed by: COLON & RECTAL SURGERY

## 2022-08-16 PROCEDURE — 74011250637 HC RX REV CODE- 250/637: Performed by: COLON & RECTAL SURGERY

## 2022-08-16 PROCEDURE — 74011000250 HC RX REV CODE- 250: Performed by: ANESTHESIOLOGY

## 2022-08-16 PROCEDURE — 77030037032 HC INSRT SCIS CLICKLLINE DISP STOR -B: Performed by: COLON & RECTAL SURGERY

## 2022-08-16 PROCEDURE — 76060000047 HC ANESTHESIA 8 TO 8.5 HR: Performed by: COLON & RECTAL SURGERY

## 2022-08-16 PROCEDURE — C1758 CATHETER, URETERAL: HCPCS | Performed by: COLON & RECTAL SURGERY

## 2022-08-16 PROCEDURE — 77030018673: Performed by: COLON & RECTAL SURGERY

## 2022-08-16 PROCEDURE — 77030002916 HC SUT ETHLN J&J -A: Performed by: COLON & RECTAL SURGERY

## 2022-08-16 PROCEDURE — 77030035279 HC SEAL VSL ENDOWR XI INTU -I2: Performed by: COLON & RECTAL SURGERY

## 2022-08-16 PROCEDURE — 77030034667 HC ACC PLATFRM ENDO GELPNT AMR -E: Performed by: COLON & RECTAL SURGERY

## 2022-08-16 PROCEDURE — 77030016151 HC PROTCTR LNS DFOG COVD -B: Performed by: COLON & RECTAL SURGERY

## 2022-08-16 PROCEDURE — 77030035236 HC SUT PDS STRATFX BARB J&J -B: Performed by: COLON & RECTAL SURGERY

## 2022-08-16 PROCEDURE — 74011000258 HC RX REV CODE- 258: Performed by: COLON & RECTAL SURGERY

## 2022-08-16 PROCEDURE — 77030020061 HC IV BLD WRMR ADMIN SET 3M -B: Performed by: ANESTHESIOLOGY

## 2022-08-16 PROCEDURE — 74011000254 HC RX REV CODE- 254: Performed by: COLON & RECTAL SURGERY

## 2022-08-16 PROCEDURE — 81001 URINALYSIS AUTO W/SCOPE: CPT

## 2022-08-16 PROCEDURE — 0DTG0ZZ RESECTION OF LEFT LARGE INTESTINE, OPEN APPROACH: ICD-10-PCS | Performed by: COLON & RECTAL SURGERY

## 2022-08-16 PROCEDURE — 77030040361 HC SLV COMPR DVT MDII -B

## 2022-08-16 PROCEDURE — 77030020703 HC SEAL CANN DISP INTU -B: Performed by: COLON & RECTAL SURGERY

## 2022-08-16 PROCEDURE — 0D1B4Z4 BYPASS ILEUM TO CUTANEOUS, PERCUTANEOUS ENDOSCOPIC APPROACH: ICD-10-PCS | Performed by: COLON & RECTAL SURGERY

## 2022-08-16 PROCEDURE — 65270000029 HC RM PRIVATE

## 2022-08-16 PROCEDURE — 77030013079 HC BLNKT BAIR HGGR 3M -A: Performed by: ANESTHESIOLOGY

## 2022-08-16 RX ORDER — ACETAMINOPHEN 500 MG
1000 TABLET ORAL EVERY 6 HOURS
Status: DISCONTINUED | OUTPATIENT
Start: 2022-08-16 | End: 2022-08-22

## 2022-08-16 RX ORDER — SODIUM CHLORIDE, SODIUM LACTATE, POTASSIUM CHLORIDE, CALCIUM CHLORIDE 600; 310; 30; 20 MG/100ML; MG/100ML; MG/100ML; MG/100ML
INJECTION, SOLUTION INTRAVENOUS
Status: DISCONTINUED | OUTPATIENT
Start: 2022-08-16 | End: 2022-08-16 | Stop reason: HOSPADM

## 2022-08-16 RX ORDER — EPHEDRINE SULFATE/0.9% NACL/PF 50 MG/5 ML
SYRINGE (ML) INTRAVENOUS AS NEEDED
Status: DISCONTINUED | OUTPATIENT
Start: 2022-08-16 | End: 2022-08-16 | Stop reason: HOSPADM

## 2022-08-16 RX ORDER — MAG HYDROX/ALUMINUM HYD/SIMETH 200-200-20
15 SUSPENSION, ORAL (FINAL DOSE FORM) ORAL
Status: DISCONTINUED | OUTPATIENT
Start: 2022-08-16 | End: 2022-08-30 | Stop reason: HOSPADM

## 2022-08-16 RX ORDER — DEXAMETHASONE SODIUM PHOSPHATE 4 MG/ML
INJECTION, SOLUTION INTRA-ARTICULAR; INTRALESIONAL; INTRAMUSCULAR; INTRAVENOUS; SOFT TISSUE AS NEEDED
Status: DISCONTINUED | OUTPATIENT
Start: 2022-08-16 | End: 2022-08-16 | Stop reason: HOSPADM

## 2022-08-16 RX ORDER — HYDRALAZINE HYDROCHLORIDE 20 MG/ML
10 INJECTION INTRAMUSCULAR; INTRAVENOUS
Status: DISCONTINUED | OUTPATIENT
Start: 2022-08-16 | End: 2022-08-30 | Stop reason: HOSPADM

## 2022-08-16 RX ORDER — DEXTROSE, SODIUM CHLORIDE, AND POTASSIUM CHLORIDE 5; .45; .15 G/100ML; G/100ML; G/100ML
75 INJECTION INTRAVENOUS CONTINUOUS
Status: DISPENSED | OUTPATIENT
Start: 2022-08-16 | End: 2022-08-23

## 2022-08-16 RX ORDER — IBUPROFEN 200 MG
200 TABLET ORAL
COMMUNITY
End: 2022-09-22 | Stop reason: ALTCHOICE

## 2022-08-16 RX ORDER — FLUMAZENIL 0.1 MG/ML
0.2 INJECTION INTRAVENOUS
Status: DISCONTINUED | OUTPATIENT
Start: 2022-08-16 | End: 2022-08-16

## 2022-08-16 RX ORDER — ONDANSETRON 2 MG/ML
4 INJECTION INTRAMUSCULAR; INTRAVENOUS
Status: DISCONTINUED | OUTPATIENT
Start: 2022-08-16 | End: 2022-08-17

## 2022-08-16 RX ORDER — MIDAZOLAM HYDROCHLORIDE 1 MG/ML
INJECTION, SOLUTION INTRAMUSCULAR; INTRAVENOUS AS NEEDED
Status: DISCONTINUED | OUTPATIENT
Start: 2022-08-16 | End: 2022-08-16 | Stop reason: HOSPADM

## 2022-08-16 RX ORDER — ALBUMIN HUMAN 50 G/1000ML
SOLUTION INTRAVENOUS
Status: DISCONTINUED | OUTPATIENT
Start: 2022-08-16 | End: 2022-08-16 | Stop reason: HOSPADM

## 2022-08-16 RX ORDER — ONDANSETRON 2 MG/ML
INJECTION INTRAMUSCULAR; INTRAVENOUS AS NEEDED
Status: DISCONTINUED | OUTPATIENT
Start: 2022-08-16 | End: 2022-08-16 | Stop reason: HOSPADM

## 2022-08-16 RX ORDER — DIAZEPAM 10 MG/2ML
5 INJECTION INTRAMUSCULAR
Status: DISCONTINUED | OUTPATIENT
Start: 2022-08-16 | End: 2022-08-17

## 2022-08-16 RX ORDER — BUPIVACAINE HYDROCHLORIDE 5 MG/ML
INJECTION, SOLUTION EPIDURAL; INTRACAUDAL AS NEEDED
Status: DISCONTINUED | OUTPATIENT
Start: 2022-08-16 | End: 2022-08-16

## 2022-08-16 RX ORDER — BUPIVACAINE HYDROCHLORIDE 5 MG/ML
INJECTION, SOLUTION EPIDURAL; INTRACAUDAL AS NEEDED
Status: DISCONTINUED | OUTPATIENT
Start: 2022-08-16 | End: 2022-08-16 | Stop reason: HOSPADM

## 2022-08-16 RX ORDER — KETOCONAZOLE 20 MG/G
CREAM TOPICAL AS NEEDED
COMMUNITY

## 2022-08-16 RX ORDER — LIDOCAINE HYDROCHLORIDE 10 MG/ML
0.1 INJECTION, SOLUTION EPIDURAL; INFILTRATION; INTRACAUDAL; PERINEURAL AS NEEDED
Status: DISCONTINUED | OUTPATIENT
Start: 2022-08-16 | End: 2022-08-16

## 2022-08-16 RX ORDER — OXYCODONE HYDROCHLORIDE 5 MG/1
10 TABLET ORAL
Status: DISCONTINUED | OUTPATIENT
Start: 2022-08-16 | End: 2022-08-17

## 2022-08-16 RX ORDER — LIDOCAINE HYDROCHLORIDE ANHYDROUS AND DEXTROSE MONOHYDRATE .8; 5 G/100ML; G/100ML
INJECTION, SOLUTION INTRAVENOUS
Status: DISCONTINUED | OUTPATIENT
Start: 2022-08-16 | End: 2022-08-16 | Stop reason: HOSPADM

## 2022-08-16 RX ORDER — METOPROLOL SUCCINATE 50 MG/1
50 TABLET, EXTENDED RELEASE ORAL EVERY MORNING
Status: DISCONTINUED | OUTPATIENT
Start: 2022-08-17 | End: 2022-08-26

## 2022-08-16 RX ORDER — KETAMINE HYDROCHLORIDE 10 MG/ML
INJECTION, SOLUTION INTRAMUSCULAR; INTRAVENOUS AS NEEDED
Status: DISCONTINUED | OUTPATIENT
Start: 2022-08-16 | End: 2022-08-16 | Stop reason: HOSPADM

## 2022-08-16 RX ORDER — SODIUM CHLORIDE 0.9 % (FLUSH) 0.9 %
5-40 SYRINGE (ML) INJECTION AS NEEDED
Status: DISCONTINUED | OUTPATIENT
Start: 2022-08-16 | End: 2022-08-30 | Stop reason: HOSPADM

## 2022-08-16 RX ORDER — HYDROMORPHONE HYDROCHLORIDE 1 MG/ML
.25-1 INJECTION, SOLUTION INTRAMUSCULAR; INTRAVENOUS; SUBCUTANEOUS
Status: DISCONTINUED | OUTPATIENT
Start: 2022-08-16 | End: 2022-08-16

## 2022-08-16 RX ORDER — FENTANYL CITRATE 50 UG/ML
INJECTION, SOLUTION INTRAMUSCULAR; INTRAVENOUS AS NEEDED
Status: DISCONTINUED | OUTPATIENT
Start: 2022-08-16 | End: 2022-08-16

## 2022-08-16 RX ORDER — ACETAMINOPHEN 500 MG
TABLET ORAL
Status: DISPENSED
Start: 2022-08-16 | End: 2022-08-17

## 2022-08-16 RX ORDER — DEXTROSE, SODIUM CHLORIDE, AND POTASSIUM CHLORIDE 5; .45; .15 G/100ML; G/100ML; G/100ML
INJECTION INTRAVENOUS
Status: DISPENSED
Start: 2022-08-16 | End: 2022-08-17

## 2022-08-16 RX ORDER — BISMUTH SUBSALICYLATE 262 MG
1 TABLET,CHEWABLE ORAL DAILY
COMMUNITY

## 2022-08-16 RX ORDER — ONDANSETRON 4 MG/1
4 TABLET, ORALLY DISINTEGRATING ORAL
Status: DISCONTINUED | OUTPATIENT
Start: 2022-08-16 | End: 2022-08-17

## 2022-08-16 RX ORDER — DIPHENHYDRAMINE HYDROCHLORIDE 50 MG/ML
12.5 INJECTION, SOLUTION INTRAMUSCULAR; INTRAVENOUS AS NEEDED
Status: DISCONTINUED | OUTPATIENT
Start: 2022-08-16 | End: 2022-08-16

## 2022-08-16 RX ORDER — OXYCODONE HYDROCHLORIDE 5 MG/1
5 TABLET ORAL
Status: DISCONTINUED | OUTPATIENT
Start: 2022-08-16 | End: 2022-08-17

## 2022-08-16 RX ORDER — LIDOCAINE HYDROCHLORIDE 20 MG/ML
INJECTION, SOLUTION EPIDURAL; INFILTRATION; INTRACAUDAL; PERINEURAL AS NEEDED
Status: DISCONTINUED | OUTPATIENT
Start: 2022-08-16 | End: 2022-08-16 | Stop reason: HOSPADM

## 2022-08-16 RX ORDER — MAGNESIUM SULFATE HEPTAHYDRATE 40 MG/ML
INJECTION, SOLUTION INTRAVENOUS AS NEEDED
Status: DISCONTINUED | OUTPATIENT
Start: 2022-08-16 | End: 2022-08-16 | Stop reason: HOSPADM

## 2022-08-16 RX ORDER — DEXMEDETOMIDINE HYDROCHLORIDE 100 UG/ML
INJECTION, SOLUTION INTRAVENOUS AS NEEDED
Status: DISCONTINUED | OUTPATIENT
Start: 2022-08-16 | End: 2022-08-16 | Stop reason: HOSPADM

## 2022-08-16 RX ORDER — ESTRADIOL 0.1 MG/G
2 CREAM VAGINAL DAILY
COMMUNITY

## 2022-08-16 RX ORDER — SUCCINYLCHOLINE CHLORIDE 20 MG/ML
INJECTION INTRAMUSCULAR; INTRAVENOUS AS NEEDED
Status: DISCONTINUED | OUTPATIENT
Start: 2022-08-16 | End: 2022-08-16 | Stop reason: HOSPADM

## 2022-08-16 RX ORDER — MORPHINE SULFATE 1 MG/ML
INJECTION, SOLUTION EPIDURAL; INTRATHECAL; INTRAVENOUS
Status: SHIPPED | OUTPATIENT
Start: 2022-08-16 | End: 2022-08-16

## 2022-08-16 RX ORDER — FAMOTIDINE 10 MG/ML
INJECTION INTRAVENOUS
Status: DISPENSED
Start: 2022-08-16 | End: 2022-08-17

## 2022-08-16 RX ORDER — SODIUM CHLORIDE, SODIUM LACTATE, POTASSIUM CHLORIDE, CALCIUM CHLORIDE 600; 310; 30; 20 MG/100ML; MG/100ML; MG/100ML; MG/100ML
125 INJECTION, SOLUTION INTRAVENOUS CONTINUOUS
Status: DISCONTINUED | OUTPATIENT
Start: 2022-08-16 | End: 2022-08-16

## 2022-08-16 RX ORDER — LEVOTHYROXINE SODIUM 125 UG/1
125 TABLET ORAL
Status: DISCONTINUED | OUTPATIENT
Start: 2022-08-17 | End: 2022-08-22

## 2022-08-16 RX ORDER — ROCURONIUM BROMIDE 10 MG/ML
INJECTION, SOLUTION INTRAVENOUS AS NEEDED
Status: DISCONTINUED | OUTPATIENT
Start: 2022-08-16 | End: 2022-08-16 | Stop reason: HOSPADM

## 2022-08-16 RX ORDER — PIPERACILLIN SODIUM, TAZOBACTAM SODIUM 3; .375 G/15ML; G/15ML
INJECTION, POWDER, LYOPHILIZED, FOR SOLUTION INTRAVENOUS
Status: DISPENSED
Start: 2022-08-16 | End: 2022-08-17

## 2022-08-16 RX ORDER — FENTANYL CITRATE 50 UG/ML
INJECTION, SOLUTION INTRAMUSCULAR; INTRAVENOUS AS NEEDED
Status: DISCONTINUED | OUTPATIENT
Start: 2022-08-16 | End: 2022-08-16 | Stop reason: HOSPADM

## 2022-08-16 RX ORDER — NALOXONE HYDROCHLORIDE 0.4 MG/ML
0.2 INJECTION, SOLUTION INTRAMUSCULAR; INTRAVENOUS; SUBCUTANEOUS
Status: DISCONTINUED | OUTPATIENT
Start: 2022-08-16 | End: 2022-08-30 | Stop reason: HOSPADM

## 2022-08-16 RX ORDER — SODIUM CHLORIDE 0.9 % (FLUSH) 0.9 %
5-40 SYRINGE (ML) INJECTION EVERY 8 HOURS
Status: DISCONTINUED | OUTPATIENT
Start: 2022-08-16 | End: 2022-08-30 | Stop reason: HOSPADM

## 2022-08-16 RX ORDER — DICLOFENAC SODIUM 50 MG/1
50 TABLET, DELAYED RELEASE ORAL 2 TIMES DAILY
COMMUNITY
End: 2022-09-22 | Stop reason: ALTCHOICE

## 2022-08-16 RX ORDER — ACETAMINOPHEN 500 MG
1000 TABLET ORAL ONCE
Status: COMPLETED | OUTPATIENT
Start: 2022-08-16 | End: 2022-08-16

## 2022-08-16 RX ORDER — LIDOCAINE HYDROCHLORIDE ANHYDROUS AND DEXTROSE MONOHYDRATE .8; 5 G/100ML; G/100ML
INJECTION, SOLUTION INTRAVENOUS
Status: DISCONTINUED | OUTPATIENT
Start: 2022-08-16 | End: 2022-08-16

## 2022-08-16 RX ORDER — HYDROMORPHONE HYDROCHLORIDE 1 MG/ML
0.5 INJECTION, SOLUTION INTRAMUSCULAR; INTRAVENOUS; SUBCUTANEOUS
Status: DISCONTINUED | OUTPATIENT
Start: 2022-08-16 | End: 2022-08-17

## 2022-08-16 RX ORDER — INDOCYANINE GREEN AND WATER 25 MG
KIT INJECTION AS NEEDED
Status: DISCONTINUED | OUTPATIENT
Start: 2022-08-16 | End: 2022-08-16 | Stop reason: HOSPADM

## 2022-08-16 RX ORDER — ENOXAPARIN SODIUM 100 MG/ML
40 INJECTION SUBCUTANEOUS DAILY
Status: DISCONTINUED | OUTPATIENT
Start: 2022-08-17 | End: 2022-08-19

## 2022-08-16 RX ORDER — PROPOFOL 10 MG/ML
INJECTION, EMULSION INTRAVENOUS AS NEEDED
Status: DISCONTINUED | OUTPATIENT
Start: 2022-08-16 | End: 2022-08-16 | Stop reason: HOSPADM

## 2022-08-16 RX ADMIN — DEXAMETHASONE SODIUM PHOSPHATE 8 MG: 4 INJECTION, SOLUTION INTRAMUSCULAR; INTRAVENOUS at 08:48

## 2022-08-16 RX ADMIN — Medication 10 MG: at 08:44

## 2022-08-16 RX ADMIN — SODIUM CHLORIDE, PRESERVATIVE FREE 20 MG: 5 INJECTION INTRAVENOUS at 18:40

## 2022-08-16 RX ADMIN — SUGAMMADEX 50 MG: 100 INJECTION, SOLUTION INTRAVENOUS at 15:45

## 2022-08-16 RX ADMIN — ROCURONIUM BROMIDE 10 MG: 10 INJECTION INTRAVENOUS at 13:08

## 2022-08-16 RX ADMIN — FENTANYL CITRATE 50 MCG: 50 INJECTION, SOLUTION INTRAMUSCULAR; INTRAVENOUS at 07:57

## 2022-08-16 RX ADMIN — NALOXEGOL OXALATE 25 MG: 12.5 TABLET, FILM COATED ORAL at 07:32

## 2022-08-16 RX ADMIN — ROCURONIUM BROMIDE 10 MG: 10 INJECTION INTRAVENOUS at 14:35

## 2022-08-16 RX ADMIN — PHENYLEPHRINE HYDROCHLORIDE 80 MCG: 10 INJECTION INTRAVENOUS at 14:58

## 2022-08-16 RX ADMIN — SODIUM CHLORIDE, POTASSIUM CHLORIDE, SODIUM LACTATE AND CALCIUM CHLORIDE: 600; 310; 30; 20 INJECTION, SOLUTION INTRAVENOUS at 07:44

## 2022-08-16 RX ADMIN — MIDAZOLAM HYDROCHLORIDE 0.5 MG: 2 INJECTION, SOLUTION INTRAMUSCULAR; INTRAVENOUS at 07:44

## 2022-08-16 RX ADMIN — SODIUM CHLORIDE, POTASSIUM CHLORIDE, SODIUM LACTATE AND CALCIUM CHLORIDE 125 ML/HR: 600; 310; 30; 20 INJECTION, SOLUTION INTRAVENOUS at 07:32

## 2022-08-16 RX ADMIN — LIDOCAINE HYDROCHLORIDE 120 MG: 20 INJECTION, SOLUTION EPIDURAL; INFILTRATION; INTRACAUDAL; PERINEURAL at 07:57

## 2022-08-16 RX ADMIN — PROPOFOL 30 MG: 10 INJECTION, EMULSION INTRAVENOUS at 10:02

## 2022-08-16 RX ADMIN — MORPHINE SULFATE 0.2 MG: 1 INJECTION, SOLUTION EPIDURAL; INTRATHECAL; INTRAVENOUS at 07:48

## 2022-08-16 RX ADMIN — Medication 10 MG: at 12:56

## 2022-08-16 RX ADMIN — CEFOXITIN SODIUM 2 G: 2 POWDER, FOR SOLUTION INTRAVENOUS at 10:01

## 2022-08-16 RX ADMIN — PROPOFOL 120 MG: 10 INJECTION, EMULSION INTRAVENOUS at 07:57

## 2022-08-16 RX ADMIN — MAGNESIUM SULFATE 2 G: 2 INJECTION INTRAVENOUS at 08:54

## 2022-08-16 RX ADMIN — INDOCYANINE GREEN AND WATER 7.5 MG: KIT at 12:54

## 2022-08-16 RX ADMIN — PIPERACILLIN AND TAZOBACTAM 3.38 G: 3; .375 INJECTION, POWDER, FOR SOLUTION INTRAVENOUS at 18:13

## 2022-08-16 RX ADMIN — DEXMEDETOMIDINE HCL 4 MCG: 100 INJECTION INTRAVENOUS at 11:02

## 2022-08-16 RX ADMIN — ROCURONIUM BROMIDE 40 MG: 10 INJECTION INTRAVENOUS at 08:30

## 2022-08-16 RX ADMIN — ALBUMIN (HUMAN) 250 ML: 12.5 SOLUTION INTRAVENOUS at 12:03

## 2022-08-16 RX ADMIN — ALBUMIN (HUMAN) 250 ML: 12.5 SOLUTION INTRAVENOUS at 10:27

## 2022-08-16 RX ADMIN — ACETAMINOPHEN 1000 MG: 500 TABLET ORAL at 07:32

## 2022-08-16 RX ADMIN — CEFOXITIN SODIUM 2 G: 2 POWDER, FOR SOLUTION INTRAVENOUS at 14:01

## 2022-08-16 RX ADMIN — PHENYLEPHRINE HYDROCHLORIDE 80 MCG: 10 INJECTION INTRAVENOUS at 08:14

## 2022-08-16 RX ADMIN — FENTANYL CITRATE 50 MCG: 50 INJECTION, SOLUTION INTRAMUSCULAR; INTRAVENOUS at 07:41

## 2022-08-16 RX ADMIN — SODIUM CHLORIDE, PRESERVATIVE FREE 10 ML: 5 INJECTION INTRAVENOUS at 22:00

## 2022-08-16 RX ADMIN — SODIUM CHLORIDE, SODIUM LACTATE, POTASSIUM CHLORIDE, CALCIUM CHLORIDE: 600; 310; 30; 20 INJECTION, SOLUTION INTRAVENOUS at 08:15

## 2022-08-16 RX ADMIN — HYDROMORPHONE HYDROCHLORIDE 1 MG: 1 INJECTION, SOLUTION INTRAMUSCULAR; INTRAVENOUS; SUBCUTANEOUS at 16:23

## 2022-08-16 RX ADMIN — PHENYLEPHRINE HYDROCHLORIDE 80 MCG: 10 INJECTION INTRAVENOUS at 08:09

## 2022-08-16 RX ADMIN — ROCURONIUM BROMIDE 10 MG: 10 INJECTION INTRAVENOUS at 11:16

## 2022-08-16 RX ADMIN — MIDAZOLAM HYDROCHLORIDE 0.5 MG: 2 INJECTION, SOLUTION INTRAMUSCULAR; INTRAVENOUS at 10:15

## 2022-08-16 RX ADMIN — SUCCINYLCHOLINE CHLORIDE 100 MG: 20 INJECTION, SOLUTION INTRAMUSCULAR; INTRAVENOUS at 07:57

## 2022-08-16 RX ADMIN — ONDANSETRON HYDROCHLORIDE 4 MG: 2 SOLUTION INTRAMUSCULAR; INTRAVENOUS at 08:48

## 2022-08-16 RX ADMIN — KETAMINE HYDROCHLORIDE 50 MG: 10 INJECTION INTRAMUSCULAR; INTRAVENOUS at 09:04

## 2022-08-16 RX ADMIN — POTASSIUM CHLORIDE, DEXTROSE MONOHYDRATE AND SODIUM CHLORIDE 100 ML/HR: 150; 5; 450 INJECTION, SOLUTION INTRAVENOUS at 18:13

## 2022-08-16 RX ADMIN — BUPIVACAINE HYDROCHLORIDE 0.5 ML: 5 INJECTION, SOLUTION EPIDURAL; INTRACAUDAL; PERINEURAL at 07:48

## 2022-08-16 RX ADMIN — MIDAZOLAM HYDROCHLORIDE 1 MG: 2 INJECTION, SOLUTION INTRAMUSCULAR; INTRAVENOUS at 07:41

## 2022-08-16 RX ADMIN — ONDANSETRON HYDROCHLORIDE 4 MG: 2 SOLUTION INTRAMUSCULAR; INTRAVENOUS at 15:14

## 2022-08-16 RX ADMIN — CEFOXITIN SODIUM 2 G: 2 POWDER, FOR SOLUTION INTRAVENOUS at 08:05

## 2022-08-16 RX ADMIN — LIDOCAINE HYDROCHLORIDE ANHYDROUS AND DEXTROSE MONOHYDRATE 2 MG/KG/HR: .8; 5 INJECTION, SOLUTION INTRAVENOUS at 08:04

## 2022-08-16 RX ADMIN — SUGAMMADEX 100 MG: 100 INJECTION, SOLUTION INTRAVENOUS at 15:54

## 2022-08-16 RX ADMIN — SUGAMMADEX 50 MG: 100 INJECTION, SOLUTION INTRAVENOUS at 15:42

## 2022-08-16 RX ADMIN — ROCURONIUM BROMIDE 10 MG: 10 INJECTION INTRAVENOUS at 12:22

## 2022-08-16 RX ADMIN — ACETAMINOPHEN 1000 MG: 500 TABLET ORAL at 18:13

## 2022-08-16 RX ADMIN — ROCURONIUM BROMIDE 10 MG: 10 INJECTION INTRAVENOUS at 07:57

## 2022-08-16 RX ADMIN — INDOCYANINE GREEN AND WATER 7.5 MG: KIT at 13:17

## 2022-08-16 RX ADMIN — DEXMEDETOMIDINE HCL 8 MCG: 100 INJECTION INTRAVENOUS at 10:04

## 2022-08-16 RX ADMIN — ROCURONIUM BROMIDE 10 MG: 10 INJECTION INTRAVENOUS at 10:01

## 2022-08-16 RX ADMIN — CEFOXITIN SODIUM 2 G: 2 POWDER, FOR SOLUTION INTRAVENOUS at 12:01

## 2022-08-16 RX ADMIN — OXYCODONE 5 MG: 5 TABLET ORAL at 21:19

## 2022-08-16 RX ADMIN — ALBUMIN (HUMAN) 250 ML: 12.5 SOLUTION INTRAVENOUS at 13:54

## 2022-08-16 NOTE — PROGRESS NOTES
LM and received call back from contact, surgeon and assist are closing, and surgeon will be out to speak with family in 30-45min.

## 2022-08-16 NOTE — OP NOTES
[facility]  OPERATIVE REPORT    Name:  Fernando Hopkins  MR#:  [id number]  :  1950  ACCOUNT #:  [de-identified]  DATE OF SERVICE:  2022    PREOPERATIVE DIAGNOSES:  Colovesical fistula, diverticulitis. POSTOPERATIVE DIAGNOSES:  Colovesical fistula, diverticulitis. PROCEDURES PERFORMED:  Cystoscopy, placement of bilateral ureteral stents. SURGEON:  Monalisa Young MD    ASSISTANT:  None. ANESTHESIA:  General.    COMPLICATIONS:  None. SPECIMENS REMOVED:  None. IMPLANTS:  None. ESTIMATED BLOOD LOSS:  None. PROCEDURE:  The patient was given anesthesia, prepped and draped in lithotomy. The 21 cystoscope was passed into the urethra. There was free floating debris within the bladder which was diffusely inflamed. The exact site of the fistula was not readily apparent. Both ureteral orifices were normal in position, and they were gently cannulated with open-ended catheters blindly. The ureteral catheters were then anchored to a 20-[place] Forte catheter with 0 silk ties and then contrast material was injected as per instructions, 3 mL bilaterally. The syringes were left connected so that more contrast can be delivered. The patient was then transferred over to colorectal care.       Khushi Wen MD      [id number]  D:  2022 8:29  T:  2022 13:40  JOB #:  [id number]

## 2022-08-16 NOTE — BRIEF OP NOTE
Brief Postoperative Note    Patient: Saida Shetty  YOB: 1950  MRN: [id number]    Date of Procedure: 8/16/2022     Pre-Op Diagnosis: COLOVESICAL FISTULA    Post-Op Diagnosis: same, sigmoid diverticulitis with colovesical fistula and abscess    Procedure(s):  ROBOTIC SPLENIC FLEXURE MOBILIZATION, ROBOTIC CONVERTED TO LAPAROSCOPY ASSIST, EXTENDED LEFT COLECTOMY WITH COLORECTAL ANASTOMOSIS AND DIVERTING LOOP ILEOSTOMY  CYSTOSCOPY WITH INSERTION BILATERAL URETERAL STENTS    Surgeon(s):  MD Corbin Guerra MD    Surgical Assistant: Surg Asst-1: Rosa Sizer    Anesthesia: General     Estimated Blood Loss (mL): 922FU    Complications: None    Specimens:   ID Type Source Tests Collected by Time Destination   1 : SIGMOID AND DESCENDING COLON WITH ANASTOMOTIC DONUTS Preservative Sigmoid  Corbin Cobos MD 8/16/2022 1307 Pathology   1 : URINE Urine Cystoscopic Urine URINE CX / Mandeep Lees MD 8/16/2022 8801 Microbiology        Implants: * No implants in log *    Drains:   Olvin-Kenny Drain 08/16/22 Abdomen (Active)   Site Assessment Clean, dry, & intact 08/16/22 1500   Dressing Status Clean, dry, & intact 08/16/22 1500   Status Charged;Draining 08/16/22 1500   Drainage Color Sanguinous 08/16/22 1500       Orogastric Tube 08/16/22 (Active)       Findings: inflamed sigmoid colon with abscess (pus pocket) associated with fistula    Electronically Signed by Larissa Sousa MD on 8/16/2022 at 3:50 PM

## 2022-08-16 NOTE — ANESTHESIA PREPROCEDURE EVALUATION
Anesthetic History   No history of anesthetic complications            Review of Systems / Medical History  Patient summary reviewed and pertinent labs reviewed    Pulmonary  Within defined limits                 Neuro/Psych         Psychiatric history (anxiety)    Comments: h/o memory problems Cardiovascular  Within defined limits  Hypertension              Exercise tolerance: >4 METS     GI/Hepatic/Renal     GERD: well controlled          Comments: Colovesical fistula from diverticulitis.  Had chronic UTI Endo/Other      Hypothyroidism: well controlled  Morbid obesity and arthritis     Other Findings            Physical Exam    Airway  Mallampati: I    Neck ROM: normal range of motion   Mouth opening: Normal     Cardiovascular  Regular rate and rhythm,  S1 and S2 normal,  no murmur, click, rub, or gallop  Rhythm: regular  Rate: normal         Dental    Dentition: Caps/crowns     Pulmonary  Breath sounds clear to auscultation               Abdominal  GI exam deferred       Other Findings            Anesthetic Plan    ASA: 3  Anesthesia type: general  ERAS; duramorph spinal    Post-op pain plan if not by surgeon: intrathecal opiates    Induction: Intravenous  Anesthetic plan and risks discussed with: Patient

## 2022-08-16 NOTE — PERIOP NOTES
TRANSFER - OUT REPORT:    Verbal report given to Merlene(name) on Peder Poisson  being transferred to [address] [place] [address](unit) for routine post - op       Report consisted of patients Situation, Background, Assessment and   Recommendations(SBAR). Information from the following report(s) SBAR, OR Summary, and MAR was reviewed with the receiving nurse. Lines:   Peripheral IV 08/16/22 Right Antecubital (Active)   Site Assessment Clean, dry, & intact 08/16/22 0708   Phlebitis Assessment 0 08/16/22 0708   Infiltration Assessment 0 08/16/22 0708   Dressing Status Clean, dry, & intact 08/16/22 0708   Dressing Type Transparent;Tape 08/16/22 0708   Hub Color/Line Status Pink 08/16/22 0708   Action Taken Open ports on tubing capped 08/16/22 0708   Alcohol Cap Used Yes 08/16/22 0708       Peripheral IV 08/16/22 Left Antecubital (Active)        Opportunity for questions and clarification was provided.       Patient transported with:   O2 @ 2 liters  Registered Nurse
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
## 2022-08-16 NOTE — PROGRESS NOTES
Per surgeon's instruction, spoke to patient's contact and let her know surgery with Dr. Fredrica Phalen is starting.

## 2022-08-16 NOTE — ANESTHESIA PROCEDURE NOTES
Spinal Block    Start time: 8/16/2022 7:41 AM  End time: 8/16/2022 7:48 AM  Performed by: Servando Richey CRNA  Authorized by: Priscilla Mckeon MD     Pre-procedure:   Indications: at surgeon's request and primary anesthetic  Preanesthetic Checklist: patient identified, risks and benefits discussed, anesthesia consent, site marked, patient being monitored, timeout performed and fire risk safety assessment completed and verbalized    Timeout Time: 07:41 EDT      Spinal Block:   Patient Position:  Seated  Prep Region:  Lumbar  Prep: chlorhexidine      Location:  L3-4  Technique:  Single shot  Local: morphine (PF) 1 mg/mL Intrathecal - Intrathecal   0.2 mg - 8/16/2022 7:48:00 AM    Med Admin Time: 8/16/2022 7:48 AM    Needle:   Needle Type:  Pencan  Needle Gauge:  25 G  Attempts:  1      Events: CSF confirmed, no blood with aspiration and no paresthesia        Assessment:  Insertion:  Uncomplicated  Patient tolerance:  Patient tolerated the procedure well with no immediate complications

## 2022-08-17 LAB
ALBUMIN SERPL-MCNC: 3.1 G/DL (ref 3.5–5)
ALBUMIN/GLOB SERPL: 0.9 {RATIO} (ref 1.1–2.2)
ALP SERPL-CCNC: 66 U/L (ref 45–117)
ALT SERPL-CCNC: 22 U/L (ref 12–78)
ANION GAP SERPL CALC-SCNC: 6 MMOL/L (ref 5–15)
AST SERPL-CCNC: 26 U/L (ref 15–37)
BACTERIA SPEC CULT: NORMAL
BASOPHILS # BLD: 0 K/UL (ref 0–0.1)
BASOPHILS NFR BLD: 0 % (ref 0–1)
BILIRUB SERPL-MCNC: 1.2 MG/DL (ref 0.2–1)
BUN SERPL-MCNC: 9 MG/DL (ref 6–20)
BUN/CREAT SERPL: 9 (ref 12–20)
CALCIUM SERPL-MCNC: 8.6 MG/DL (ref 8.5–10.1)
CHLORIDE SERPL-SCNC: 110 MMOL/L (ref 97–108)
CO2 SERPL-SCNC: 23 MMOL/L (ref 21–32)
COMMENT, HOLDF: NORMAL
CREAT SERPL-MCNC: 1.05 MG/DL (ref 0.55–1.02)
DIFFERENTIAL METHOD BLD: ABNORMAL
EOSINOPHIL # BLD: 0 K/UL (ref 0–0.4)
EOSINOPHIL NFR BLD: 0 % (ref 0–7)
ERYTHROCYTE [DISTWIDTH] IN BLOOD BY AUTOMATED COUNT: 12.2 % (ref 11.5–14.5)
GLOBULIN SER CALC-MCNC: 3.3 G/DL (ref 2–4)
GLUCOSE SERPL-MCNC: 157 MG/DL (ref 65–100)
HCT VFR BLD AUTO: 37 % (ref 35–47)
HGB BLD-MCNC: 12.4 G/DL (ref 11.5–16)
IMM GRANULOCYTES # BLD AUTO: 0.1 K/UL (ref 0–0.04)
IMM GRANULOCYTES NFR BLD AUTO: 0 % (ref 0–0.5)
LYMPHOCYTES # BLD: 1.1 K/UL (ref 0.8–3.5)
LYMPHOCYTES NFR BLD: 7 % (ref 12–49)
MAGNESIUM SERPL-MCNC: 2.5 MG/DL (ref 1.6–2.4)
MCH RBC QN AUTO: 32.6 PG (ref 26–34)
MCHC RBC AUTO-ENTMCNC: 33.5 G/DL (ref 30–36.5)
MCV RBC AUTO: 97.4 FL (ref 80–99)
MONOCYTES # BLD: 0.9 K/UL (ref 0–1)
MONOCYTES NFR BLD: 6 % (ref 5–13)
NEUTS SEG # BLD: 12.8 K/UL (ref 1.8–8)
NEUTS SEG NFR BLD: 86 % (ref 32–75)
NRBC # BLD: 0 K/UL (ref 0–0.01)
NRBC BLD-RTO: 0 PER 100 WBC
PHOSPHATE SERPL-MCNC: 1.8 MG/DL (ref 2.6–4.7)
PLATELET # BLD AUTO: 248 K/UL (ref 150–400)
PMV BLD AUTO: 11.5 FL (ref 8.9–12.9)
POTASSIUM SERPL-SCNC: 4.1 MMOL/L (ref 3.5–5.1)
PROT SERPL-MCNC: 6.4 G/DL (ref 6.4–8.2)
RBC # BLD AUTO: 3.8 M/UL (ref 3.8–5.2)
SAMPLES BEING HELD,HOLD: NORMAL
SERVICE CMNT-IMP: NORMAL
SODIUM SERPL-SCNC: 139 MMOL/L (ref 136–145)
WBC # BLD AUTO: 14.8 K/UL (ref 3.6–11)

## 2022-08-17 PROCEDURE — 74011250636 HC RX REV CODE- 250/636: Performed by: NURSE PRACTITIONER

## 2022-08-17 PROCEDURE — 74011000258 HC RX REV CODE- 258: Performed by: COLON & RECTAL SURGERY

## 2022-08-17 PROCEDURE — 74011250637 HC RX REV CODE- 250/637: Performed by: NURSE PRACTITIONER

## 2022-08-17 PROCEDURE — 74011250636 HC RX REV CODE- 250/636: Performed by: COLON & RECTAL SURGERY

## 2022-08-17 PROCEDURE — 65270000029 HC RM PRIVATE

## 2022-08-17 PROCEDURE — 80053 COMPREHEN METABOLIC PANEL: CPT

## 2022-08-17 PROCEDURE — 83735 ASSAY OF MAGNESIUM: CPT

## 2022-08-17 PROCEDURE — 2709999900 HC NON-CHARGEABLE SUPPLY

## 2022-08-17 PROCEDURE — 85025 COMPLETE CBC W/AUTO DIFF WBC: CPT

## 2022-08-17 PROCEDURE — 74011250637 HC RX REV CODE- 250/637: Performed by: COLON & RECTAL SURGERY

## 2022-08-17 PROCEDURE — 74011250637 HC RX REV CODE- 250/637: Performed by: HOSPITALIST

## 2022-08-17 PROCEDURE — 74011000250 HC RX REV CODE- 250: Performed by: NURSE PRACTITIONER

## 2022-08-17 PROCEDURE — 74011000250 HC RX REV CODE- 250: Performed by: COLON & RECTAL SURGERY

## 2022-08-17 PROCEDURE — 84100 ASSAY OF PHOSPHORUS: CPT

## 2022-08-17 RX ORDER — TRAMADOL HYDROCHLORIDE 50 MG/1
50 TABLET ORAL
Status: DISCONTINUED | OUTPATIENT
Start: 2022-08-17 | End: 2022-08-18

## 2022-08-17 RX ORDER — ONDANSETRON 2 MG/ML
4 INJECTION INTRAMUSCULAR; INTRAVENOUS EVERY 6 HOURS
Status: DISCONTINUED | OUTPATIENT
Start: 2022-08-17 | End: 2022-08-29

## 2022-08-17 RX ORDER — LORAZEPAM 2 MG/ML
0.5 INJECTION, SOLUTION INTRAMUSCULAR; INTRAVENOUS
Status: DISCONTINUED | OUTPATIENT
Start: 2022-08-17 | End: 2022-08-19

## 2022-08-17 RX ORDER — HYDROMORPHONE HYDROCHLORIDE 1 MG/ML
0.2 INJECTION, SOLUTION INTRAMUSCULAR; INTRAVENOUS; SUBCUTANEOUS
Status: DISCONTINUED | OUTPATIENT
Start: 2022-08-17 | End: 2022-08-19

## 2022-08-17 RX ORDER — GUAIFENESIN/DEXTROMETHORPHAN 100-10MG/5
10 SYRUP ORAL
Status: DISCONTINUED | OUTPATIENT
Start: 2022-08-17 | End: 2022-08-19

## 2022-08-17 RX ADMIN — PIPERACILLIN AND TAZOBACTAM 3.38 G: 3; .375 INJECTION, POWDER, FOR SOLUTION INTRAVENOUS at 10:19

## 2022-08-17 RX ADMIN — PIPERACILLIN AND TAZOBACTAM 3.38 G: 3; .375 INJECTION, POWDER, FOR SOLUTION INTRAVENOUS at 19:12

## 2022-08-17 RX ADMIN — HYDROMORPHONE HYDROCHLORIDE 0.5 MG: 1 INJECTION, SOLUTION INTRAMUSCULAR; INTRAVENOUS; SUBCUTANEOUS at 09:10

## 2022-08-17 RX ADMIN — HYDROMORPHONE HYDROCHLORIDE 0.2 MG: 1 INJECTION, SOLUTION INTRAMUSCULAR; INTRAVENOUS; SUBCUTANEOUS at 17:44

## 2022-08-17 RX ADMIN — ENOXAPARIN SODIUM 40 MG: 100 INJECTION SUBCUTANEOUS at 08:30

## 2022-08-17 RX ADMIN — ACETAMINOPHEN 1000 MG: 500 TABLET ORAL at 05:56

## 2022-08-17 RX ADMIN — ACETAMINOPHEN 1000 MG: 500 TABLET ORAL at 01:50

## 2022-08-17 RX ADMIN — POTASSIUM CHLORIDE, DEXTROSE MONOHYDRATE AND SODIUM CHLORIDE 125 ML/HR: 150; 5; 450 INJECTION, SOLUTION INTRAVENOUS at 20:18

## 2022-08-17 RX ADMIN — POTASSIUM PHOSPHATE, MONOBASIC POTASSIUM PHOSPHATE, DIBASIC: 224; 236 INJECTION, SOLUTION, CONCENTRATE INTRAVENOUS at 15:00

## 2022-08-17 RX ADMIN — LORAZEPAM 0.5 MG: 2 INJECTION, SOLUTION INTRAMUSCULAR; INTRAVENOUS at 15:36

## 2022-08-17 RX ADMIN — SODIUM CHLORIDE, PRESERVATIVE FREE 10 ML: 5 INJECTION INTRAVENOUS at 06:01

## 2022-08-17 RX ADMIN — NALOXEGOL OXALATE 25 MG: 25 TABLET, FILM COATED ORAL at 08:31

## 2022-08-17 RX ADMIN — GUAIFENESIN AND DEXTROMETHORPHAN 10 ML: 100; 10 SYRUP ORAL at 02:24

## 2022-08-17 RX ADMIN — ONDANSETRON HYDROCHLORIDE 4 MG: 2 SOLUTION INTRAMUSCULAR; INTRAVENOUS at 12:44

## 2022-08-17 RX ADMIN — HYDROMORPHONE HYDROCHLORIDE 0.2 MG: 1 INJECTION, SOLUTION INTRAMUSCULAR; INTRAVENOUS; SUBCUTANEOUS at 21:42

## 2022-08-17 RX ADMIN — ONDANSETRON 4 MG: 2 INJECTION INTRAMUSCULAR; INTRAVENOUS at 06:04

## 2022-08-17 RX ADMIN — OXYCODONE 5 MG: 5 TABLET ORAL at 05:58

## 2022-08-17 RX ADMIN — LORAZEPAM 0.5 MG: 2 INJECTION, SOLUTION INTRAMUSCULAR; INTRAVENOUS at 21:41

## 2022-08-17 RX ADMIN — SODIUM CHLORIDE 500 ML: 900 INJECTION, SOLUTION INTRAVENOUS at 15:00

## 2022-08-17 RX ADMIN — METOPROLOL SUCCINATE 50 MG: 50 TABLET, EXTENDED RELEASE ORAL at 08:31

## 2022-08-17 RX ADMIN — PIPERACILLIN AND TAZOBACTAM 3.38 G: 3; .375 INJECTION, POWDER, FOR SOLUTION INTRAVENOUS at 02:24

## 2022-08-17 RX ADMIN — ONDANSETRON HYDROCHLORIDE 4 MG: 2 SOLUTION INTRAMUSCULAR; INTRAVENOUS at 19:13

## 2022-08-17 RX ADMIN — SODIUM CHLORIDE, PRESERVATIVE FREE 10 ML: 5 INJECTION INTRAVENOUS at 21:43

## 2022-08-17 RX ADMIN — SODIUM CHLORIDE, PRESERVATIVE FREE 20 MG: 5 INJECTION INTRAVENOUS at 08:31

## 2022-08-17 RX ADMIN — TRAMADOL HYDROCHLORIDE 50 MG: 50 TABLET, COATED ORAL at 14:34

## 2022-08-17 RX ADMIN — LEVOTHYROXINE SODIUM 125 MCG: 0.12 TABLET ORAL at 05:58

## 2022-08-17 RX ADMIN — POTASSIUM CHLORIDE, DEXTROSE MONOHYDRATE AND SODIUM CHLORIDE 100 ML/HR: 150; 5; 450 INJECTION, SOLUTION INTRAVENOUS at 10:19

## 2022-08-17 NOTE — WOUND CARE
Ostomy Progress Note:  First day postoperative visit. Type of Ostomy;loop ileostomy  Location:[facility]  Date of Surgery:8/16  Surgeon: Claudell Mock    Treatments:  Pouch removed, stoma and peristomal skin cleaned and assessed, new pouch prepared and applied. Findings:  Current appliance:2 piece, some leakage, changed to 1 piece with ring  Stoma size:1 inch oval  Stoma color:dark red  Characteristics:flush to skin, with red rubber catheter  Peristomal skin:clean, no redness  Abdomen:distended, firm  Output:liquid green. Ioana [place] with vomiting    Teaching/subjects covered today:  Encouraged patient and sister to review handout given to patient/family and ask questions regarding material on next ostomy nurse visit. General anatomy and expectations of output  Normal and abnormal stoma characteristics & changes over time. Normal abnormal peristomal characteristics. When/how to clean stoma & peristomal skin. When/how to empty pouch  When/how to change appliance   When to notify nurse/Physician  Manipulated/practiced with clean pouch and pouch closure  Sister will be care giver at home, watched appliance change and will review materials given, will stay over night with sister and be here in AM for additional teaching  Recommendations:  Empty appliance when 1/3 full and PRN. Encourage patient/family to notify nurse and assist w/ pouch emptying to promote self care. Change appliance twice weekly and PRN for leaking ASAP. DO NOT REINFORCE LEAKS to avoid skin breakdown. Transition of Care:  Ostomy nurse to return and continue teaching tomorrow  Patient has learned vital skills but will likely need home health care for further teaching after discharge.     Fior Castillo RN  Wound

## 2022-08-17 NOTE — PROGRESS NOTES
Problem: Falls - Risk of  Goal: *Absence of Falls  Description: Document Leafgustavo Pink Fall Risk and appropriate interventions in the flowsheet.   Outcome: Progressing Towards Goal  Note: Fall Risk Interventions:  Mobility Interventions: Bed/chair exit alarm, Patient to call before getting OOB, Strengthening exercises (ROM-active/passive)         Medication Interventions: Bed/chair exit alarm, Patient to call before getting OOB, Teach patient to arise slowly    Elimination Interventions: Bed/chair exit alarm, Call light in reach, Patient to call for help with toileting needs              Problem: Patient Education: Go to Patient Education Activity  Goal: Patient/Family Education  Outcome: Progressing Towards Goal     Problem: Pain  Goal: *Control of Pain  Outcome: Progressing Towards Goal

## 2022-08-17 NOTE — PROGRESS NOTES
Bedside and Verbal shift change report given to Dahlia Cruz LPN (oncoming nurse) by Vlad Valdes RN (offgoing nurse). Report included the following information SBAR, Kardex, ED Summary, Intake/Output, MAR, and Recent Results.

## 2022-08-17 NOTE — PROGRESS NOTES
Patient reported coughing with inability expectorating and requested cough medicine, MD made aware and order received for guaifenesin, same given and patient stated same helped. Patient had one episode of projectile vomiting after complaining of pain, same greenish fluid. Hygienic needs met and patient made comfortable in bed.

## 2022-08-17 NOTE — PROGRESS NOTES
General Surgery Daily Progress Note    Patient: Shaji Whipple MRN: [id number]  SSN: [id number]    YOB: 1950  Age: 66 y.o. Sex: female      Admit Date: 8/16/2022    POD 1 Day Post-Op    Procedure: Procedure(s):  ROBOTIC SPLENIC FLEXURE MOBILIZATION, ROBOTIC CONVERTED TO LAPAROSCOPY ASSIST, EXTENDED LEFT COLECTOMY WITH COLORECTAL ANASTOMOSIS AND DIVERTING LOOP ILEOSTOMY  CYSTOSCOPY WITH INSERTION BILATERAL URETERAL STENTS    Subjective:     Pt sleeping/sedated in bed from pain medication. Arouses to stimulus, but immediately falls back asleep and cannot partake in meaningful conversation. Per RN and sister- pt has been n/v green bilious fluid since surgery. Sister states Claribel Remlap not working and pt having sig anxiety attacks. RN gave IV dilaudid dose earlier to help calm pt, n/v, pain. Pt has been unable to participate in care since due to drowsiness, but still having hiccups, belching, nausea. Has not been OOB since surgery. Unable to tolerate clears. Pt anxious about drains and zhang.       Current Facility-Administered Medications   Medication Dose Route Frequency    guaiFENesin-dextromethorphan (ROBITUSSIN DM) 100-10 mg/5 mL syrup 10 mL  10 mL Oral Q6H PRN    LORazepam (ATIVAN) 2 mg/mL injection 0.5 mg  0.5 mg IntraVENous Q4H PRN    HYDROmorphone (DILAUDID) syringe 0.2 mg  0.2 mg IntraVENous Q4H PRN    ondansetron (ZOFRAN) injection 4 mg  4 mg IntraVENous Q6H    naloxone (NARCAN) injection 0.2 mg  0.2 mg IntraVENous EVERY 2 MINUTES AS NEEDED    metoprolol succinate (TOPROL-XL) XL tablet 50 mg  50 mg Oral QAM    dextrose 5% - 0.45% NaCl with KCl 20 mEq/L infusion  100 mL/hr IntraVENous CONTINUOUS    sodium chloride (NS) flush 5-40 mL  5-40 mL IntraVENous Q8H    sodium chloride (NS) flush 5-40 mL  5-40 mL IntraVENous PRN    enoxaparin (LOVENOX) injection 40 mg  40 mg SubCUTAneous DAILY    naloxegoL (MOVANTIK) tablet 25 mg  25 mg Oral DAILY    acetaminophen (TYLENOL) tablet 1,000 mg  1,000 mg Oral Q6H    oxyCODONE IR (ROXICODONE) tablet 5 mg  5 mg Oral Q4H PRN    famotidine (PF) (PEPCID) 20 mg in 0.9% sodium chloride 10 mL injection  20 mg IntraVENous BID    alum-mag hydroxide-simeth (MYLANTA) oral suspension 15 mL  15 mL Oral Q6H PRN    hydrALAZINE (APRESOLINE) 20 mg/mL injection 10 mg  10 mg IntraVENous Q6H PRN    levothyroxine (SYNTHROID) tablet 125 mcg  125 mcg Oral 6am    piperacillin-tazobactam (ZOSYN) 3.375 g in 0.9% sodium chloride (MBP/ADV) 100 mL MBP  3.375 g IntraVENous Q8H        Objective:   No intake/output data recorded. 08/15 1901 - 08/17 0700  In: 3740 [I.V.:3590]  Out: 2200 [Urine:1500; Drains:455]  Patient Vitals for the past 8 hrs:   BP Temp Pulse Resp SpO2   08/17/22 1128 (!) 164/88 97.5 °F (36.4 °C) 81 16 94 %   08/17/22 0754 135/80 98.3 °F (36.8 °C) 93 16 94 %       Physical Exam:  General:  drowsy/sedated but arousable  Lungs: Unlabored  Heart:  RRR  Abdomen: Soft, obese appearing abdomen, non-tender, distended.  Incisions c/d/I, ANDRESSA drain serosang, ostomy with small amnt of liquid stool in bag, red rubber catheter visible in pink stoma yet stoma appears to be flush with skin   Extremities: Warm, moves all, no edema  Skin:  Warm and dry, no rash  Urology:           Zhang bag with tea colored urine- sediment present    Labs:   Recent Labs     08/17/22  0308   WBC 14.8*   HGB 12.4   HCT 37.0        Recent Labs     08/17/22  0308      K 4.1   *   CO2 23   *   BUN 9   CREA 1.05*   CA 8.6   MG 2.5*   PHOS 1.8*   ALB 3.1*   TBILI 1.2*   ALT 22       Assessment / Plan:       Procedure: Procedure(s):  ROBOTIC SPLENIC FLEXURE MOBILIZATION, ROBOTIC CONVERTED TO LAPAROSCOPY ASSIST, EXTENDED LEFT COLECTOMY WITH COLORECTAL ANASTOMOSIS AND DIVERTING LOOP ILEOSTOMY  CYSTOSCOPY WITH INSERTION BILATERAL URETERAL STENTS       PONV- will schedule zofran, dc valium and add prn ativan for PONV breakthrough and anxiety  NPO  Replete K Derick  Cr is up a bit- watch zhang closely- do not remove zhang  Continue IVF  Cont IV antibx  Pain- pt pain stable but cannot tolerate dose of iv dilaudid given. Will decrease dose and frequency. As soon as pt less sedated from pain medication she needs to be up and OOB into chair and walking. She must do IS hourly  Cont to work with ostomy RN for ostomy care and education        Starr Porter NP        Addendum:  Spoke with Case Management about [facility]- ostomy zhang care. Will give 500cc bolus of NS as well and inc IVF to 125/hr for suspected dehydration  DC oral oxy and add tramadol PO when able to tolerate  DC pepcid to avoid altered mental status    Discussed with Dr Alka Perez.   His addendum to follow  Starr Porter NP

## 2022-08-17 NOTE — ANESTHESIA POSTPROCEDURE EVALUATION
Procedure(s):  ROBOTIC SPLENIC FLEXURE MOBILIZATION, ROBOTIC CONVERTED TO LAPAROSCOPY ASSIST, EXTENDED LEFT COLECTOMY WITH COLORECTAL ANASTOMOSIS AND DIVERTING LOOP ILEOSTOMY  CYSTOSCOPY WITH INSERTION BILATERAL URETERAL STENTS.    general    Anesthesia Post Evaluation      Multimodal analgesia: multimodal analgesia not used between 6 hours prior to anesthesia start to PACU discharge  Patient location during evaluation: PACU  Patient participation: complete - patient participated  Level of consciousness: awake  Pain management: adequate  Airway patency: patent  Anesthetic complications: no  Cardiovascular status: acceptable, blood pressure returned to baseline and hemodynamically stable  Respiratory status: acceptable  Hydration status: acceptable  Post anesthesia nausea and vomiting:  controlled  Final Post Anesthesia Temperature Assessment:  Normothermia (36.0-37.5 degrees C)      INITIAL Post-op Vital signs:   Vitals Value Taken Time   /88 08/17/22 1128   Temp 36.4 °C (97.5 °F) 08/17/22 1128   Pulse 81 08/17/22 1128   Resp 16 08/17/22 1128   SpO2 94 % 08/17/22 1128

## 2022-08-17 NOTE — PROGRESS NOTES
8/17/2022  Case Management Note    3:51 PM  [facility] cannot staff the case but [facility] can accept patient. Orders sent and placed on AVS.    DORCAS Stewart    12:33 PM  Per Sonia CAMARENA patient will need [facility] for new ostomy care. Orders written with permission and referral sent to [facility] for review. Full assessment to follow as time allows.      DORCAS Stewart

## 2022-08-17 NOTE — OP NOTES
[facility] 77  OPERATIVE REPORT    Name:  Palak Cardenas  MR#:  [id number]  :  1950  ACCOUNT #:  [de-identified]  DATE OF SERVICE:  2022    PREOPERATIVE DIAGNOSIS:  Colovesical fistula secondary to diverticular disease. POSTOPERATIVE DIAGNOSES:  1. Colovesical fistula secondary to diverticular disease. 2.  Acute-on-chronic diverticulitis with colovesical fistula and abscess. PROCEDURES PERFORMED:  1.  Robotic splenic flexure mobilization. 2.  Robotic converted to laparoscopic extended left colectomy with transverse colon to rectal anastomosis and diverting loop ileostomy. SURGEON:  Baylee Edwards MD    ASSISTANT:  see record    ANESTHESIA:  GETA. COMPLICATIONS: none. SPECIMENS REMOVED:  1. Sigmoid colon. 2.  Descending colon with additional length of colon. 3.  Anastomotic doughnuts. IMPLANTS:  none. ESTIMATED BLOOD LOSS:  150. URINE OUTPUT:  Please see Anesthesia record. FLUIDS:  Please see Anesthesia record. INDICATION:  A very pleasant 68-year-old female with a history of recurrent urinary tract infections. She began to experience hematuria. She was seen by her [facility]. CT IVP showed findings consistent with a colovesical fistula. Therefore, she was kept on antibiotics and scheduled for surgery. FINDINGS:  There was an obvious colovesical fistula to the dome of the bladder. There was an acute abscess as I came across the pus pocket interposed between the colon and the bladder. Given how inflamed the bladder was, this precluded any reapproximation. Due to perfusion and length issues, I had it converted to laparoscopic hand-assist.  Ultimately, I was able to perform an anastomosis between the very distal transverse colon and upper rectum.   Due to the quality of the tissue, the acute findings of an abscess with a fistula, the fact that I could not close the bladder side of the fistula, I decided to protect this anastomosis with a diverting loop ileostomy. PROCEDURE:  The patient was brought to operating room and a standard pause was observed by the entire staff. The patient's name and procedure were clearly identified by all. Next, the patient was placed in the lithotomy position and padded according to standard protocol. She was prepped and draped in surgical fashion. A surgical pause was repeated. Please note that an Reche Banker was used as part of the prepping and draping process. Next, a small mini GelPort incision was made along the midline in the suprapubic area. Dissection proceeded down to the subcutaneous tissue until the fascia was encountered. The fascia was then incised. The peritoneum was then grasped between two clamps and incised. Appreciating no adhesions, small mini GelPort was then placed through this site. A trocar was placed through the small mini GelPort. Pneumoperitoneum was established. Next, robotic trocars were placed underneath camera visualization. The first robotic trocar was placed at the anterior axillary line on the right side at the level of the umbilicus. A second robotic trocar was placed at the mid rectus sheath in the right upper quadrant. A third robotic trocar was placed in left upper quadrant mid rectus sheath and a fourth robotic trocar was placed along the left anterior axillary line. An assistant port was then placed in the right upper quadrant using an 8-mm AirSeal device. Next, the patient was placed in Trendelenburg left side up position. The robot was then docked according to standard fashion. Next, I inspected the sigmoid colon. The sigmoid colon was draped over the dome of the bladder consistent with the fistula. The mesentery was thickened and foreshortened. A window was then created at the presacral space using the hot duane energy device. Next, I was able to identify the left ureter quite easily as it had ICG in it.   I sincerely appreciated Dr. Grossman placing bilateral ureteral stents prior to prepping and draping for my procedure. Please see his operative note for further details. Next, a window was created around the ELISA. Once more, I verified that the left ureter was away from the field of dissection. Using a robotic stapling vascular load, the ELISA was divided. Next, I mobilized a plane between the underlying left retroperitoneum and overlying left mesentery working my way over the left kidney. Next, I divided the lateral attachments to the left colon using the hot duane device. I mobilized the omentum off of the distal transverse colon. Renal colic ligament had already been divided by the medial to lateral dissection. The remaining splenocolic ligaments were divided using a combination of vessel sealer device and hot duane. When I felt like I had mobilized proximally, attention was now placed at the sigmoid colon. The remaining lateral attachments to the sigmoid colon working my way up towards the area of the fistula. There was a broad-based area where the sigmoid colon was draped over the bladder. The fistula and the overlying colon were then freed up from the overlying bladder using a combination of energy device and a mechanical dissection. I came across the pus pockets. The colon was also draped over the upper rectum. Eventually, I was able to free up the colon off the rectum. Next, a window was created around the upper rectum. Using a green load of the robotic stapler, I divided the upper rectum. The remaining mesentery was then divided using two loads of the vascular load of the robotic stapler. At this point, the robot was undocked. I prepared to deliver the specimen out through the mini GelPort. However, the specimen was so thickened and foreshortened, I had to extend this incision. Eventually, I was able to free the colon through this site. Next, a proximal limit of resection was identified.   However, this was at the descending colon. I was concerned regarding the perfusion at this section of colon. Therefore, I divided and the remaining mesentery was divided using Enseal energy device. In this manner, a sigmoid colectomy was performed. Next, I decided that I was going to have to mobilize further. At this point, I decided to convert to hand-assist laparoscopic. Incisions had already been extended to deliver the specimen as it was so thickened and inflamed. Therefore, it made sense to simply displace the mini GelPort to the site which what I did. At this point, a 5-mm trocar site was then placed underneath the camera visualization in the left lower quadrant. I began to mobilize the splenic flexure further and the transverse colon further by dividing the omental attachments. Also, I mobilized the dissection all the way up to the ligament of Treitz at the retroperitoneum. At this point, I felt like there was sufficient mobility. I delivered this up through the [employer] [address] [employer] [address] [employer] [address]. Prior to doing so, I performed Firefly. This showed decent perfusion to the mid descending colon. Therefore, I delivered this out through the [employer] [address]. I inspected the immobilized left colon. However, it appeared that there was poor quality of the remaining descending colon. Furthermore, there appeared to be a rent in the descending colon. Therefore, I resected above this rent. A 75-mm AMILCAR blue cartridge was used to amputate this additional length of colon. This will be sent with the first specimen which was sigmoid colon. At this point, it was quite obvious that I would not going to be able to reach the upper rectum. Therefore, I further mobilized laparoscopically using Enseal energy device. Additional omentum was freed up. Additional attachments to the left colon were freed up. I divided the IMV using the Enseal energy device. The Firefly was repeated. I was able to verify good perfusion.   The length of this transverse colon appeared to reach the pelvic brim without difficulty. At this point, I delivered the specimen out through the [facility]. In extracorporeal fashion, a colotomy was created on the antimesenteric border. An anvil with a spike attached to 29-mm anvil was delivered through this colotomy and advanced upstream.  Using a 75 mL AMILCAR stapler, blue cartridge, I divided the proximal descending colon/distal transverse colon. This will be sent with the other specimens. Next, two 2-0 Prolene pursestring sutures were placed around the anvil. Next, an operative assistant was placed between the patient's legs. Gloves were changed. The anvil was delivered into the abdominal cavity after removing the spike. I proceeded with the laparoscopic intracorporeal anastomosis. sizers were advanced in the end of the rectal stump. I did mobilize some adhesions around the rectal stump as it appeared that this would give the rectum a little bit more length and to offset any tension on the anastomosis. Next, a 29-mm stapler was then advanced to the end of the rectal stump staple line. This was then opened and  to the anvil, ensuring no twisting. I also made sure that there was no internal herniation of small bowel underneath this. The stapler was then secured shut and fired according to standard fashion. This was removed without difficulty. There were two intact doughnuts identified in the back table. I inspected anastomosis. This appeared to not have too much tension on it. Next, I inspected both limbs. The bowel appeared to be pink and well-perfused. An air leak test was then performed. I occluded upstream from the anastomosis. The air was instilled through a rigid proctoscope transanally. The anastomosis was distended. Saline was placed. I saw no air leak. This air was vented up. The air leak tests were repeated. The air was vented once more. The stapler was then removed.     Next, I inspected the bladder side of the fistula. This was a rather broad-based area. It was quite apparent that there, I was not going to be able to reapproximate this. Therefore, VISTASEAL was placed over this. A 19-Spanish drain was then placed through the 5 mm left lower quadrant trocar into the pelvis. Next, I had planned to bring up a diverting loop ileostomy given the degree of inflammation, quality of the bowel tissue, and the difficulty of the case. I identified the terminal ileum. Next, at the site in the right lower quadrant, a disc of skin and subcu tissue was excised over the rectus muscle. A plane of dissection was developed down to the rectus sheath. A cruciate incision was made in the anterior rectus sheath. Muscle-splitting dissection was developed into the abdominal cavity. Cruciate incision was made on the posterior sheath and peritoneum. The loop of small bowel was then identified to serve as the loop ileostomy. Prior to bringing up the loop ileostomy, I closed the 12-mm trocar site with robotic stapling ports from within the abdomen using 0 Vicryl figure-of-eight. Next, the loop of the terminal ileum was then delivered out through the right abdominal wall. A red rubber was placed around the loop ileostomy to prevent this from retracting back into the abdominal cavity. Next, all sponge counts and instrument counts were correct. All gown and gloves were changed. We closed using a separate back table. A six-pack of Seprafilm was placed in the viscera and around the loop ileostomy. The peritoneum was then closed using a 0 Vicryl running locking stitch. The fascia at the GelPort site was then closed using a #1 Stratafix. Subcu tissue was irrigated with saline. Subcu tissue was reapproximated with 3-0 Vicryl interrupted sutures. Irrigation was performed at all incisions. Next, all skin incisions were closed using a 4-0 Monocryl subcuticular stitch followed by Dermabond.   A 2-0 nylon drain stitch with Biopatch and Tegaderm were then placed over the drain. Last, loop ileostomy matured in appropriate fashion using 3-0 Vicryl.       Bertha Dunn MD      [id number]  D:  08/16/2022 15:20  T:  08/16/2022 22:11  JOB #:  [id number]  CC:  Patric Marshall MD

## 2022-08-18 ENCOUNTER — APPOINTMENT (OUTPATIENT)
Dept: GENERAL RADIOLOGY | Age: 72
DRG: 329 | End: 2022-08-18
Attending: NURSE PRACTITIONER
Payer: MEDICARE

## 2022-08-18 ENCOUNTER — APPOINTMENT (OUTPATIENT)
Dept: VASCULAR SURGERY | Age: 72
DRG: 329 | End: 2022-08-18
Attending: INTERNAL MEDICINE
Payer: MEDICARE

## 2022-08-18 ENCOUNTER — APPOINTMENT (OUTPATIENT)
Dept: CT IMAGING | Age: 72
DRG: 329 | End: 2022-08-18
Attending: INTERNAL MEDICINE
Payer: MEDICARE

## 2022-08-18 LAB
ALBUMIN SERPL-MCNC: 2.9 G/DL (ref 3.5–5)
ALBUMIN/GLOB SERPL: 0.7 {RATIO} (ref 1.1–2.2)
ALP SERPL-CCNC: 85 U/L (ref 45–117)
ALT SERPL-CCNC: 24 U/L (ref 12–78)
AMMONIA PLAS-SCNC: 14 UMOL/L
ANION GAP SERPL CALC-SCNC: 5 MMOL/L (ref 5–15)
ARTERIAL PATENCY WRIST A: YES
AST SERPL-CCNC: 25 U/L (ref 15–37)
BASE EXCESS BLDA CALC-SCNC: 0.4 MMOL/L
BASOPHILS # BLD: 0 K/UL (ref 0–0.1)
BASOPHILS NFR BLD: 0 % (ref 0–1)
BDY SITE: ABNORMAL
BILIRUB SERPL-MCNC: 1.5 MG/DL (ref 0.2–1)
BUN SERPL-MCNC: 10 MG/DL (ref 6–20)
BUN/CREAT SERPL: 8 (ref 12–20)
CALCIUM SERPL-MCNC: 8.8 MG/DL (ref 8.5–10.1)
CHLORIDE SERPL-SCNC: 107 MMOL/L (ref 97–108)
CO2 SERPL-SCNC: 25 MMOL/L (ref 21–32)
CREAT SERPL-MCNC: 1.27 MG/DL (ref 0.55–1.02)
DIFFERENTIAL METHOD BLD: ABNORMAL
EOSINOPHIL # BLD: 0 K/UL (ref 0–0.4)
EOSINOPHIL NFR BLD: 0 % (ref 0–7)
ERYTHROCYTE [DISTWIDTH] IN BLOOD BY AUTOMATED COUNT: 12.3 % (ref 11.5–14.5)
FIO2 ON VENT: 21 %
GAS FLOW.O2 O2 DELIVERY SYS: 0 L/MIN
GLOBULIN SER CALC-MCNC: 3.9 G/DL (ref 2–4)
GLUCOSE SERPL-MCNC: 126 MG/DL (ref 65–100)
HCO3 BLDA-SCNC: 23 MMOL/L (ref 22–26)
HCT VFR BLD AUTO: 38.2 % (ref 35–47)
HGB BLD-MCNC: 12.8 G/DL (ref 11.5–16)
IMM GRANULOCYTES # BLD AUTO: 0.1 K/UL (ref 0–0.04)
IMM GRANULOCYTES NFR BLD AUTO: 1 % (ref 0–0.5)
LYMPHOCYTES # BLD: 1.5 K/UL (ref 0.8–3.5)
LYMPHOCYTES NFR BLD: 9 % (ref 12–49)
MCH RBC QN AUTO: 33 PG (ref 26–34)
MCHC RBC AUTO-ENTMCNC: 33.5 G/DL (ref 30–36.5)
MCV RBC AUTO: 98.5 FL (ref 80–99)
MONOCYTES # BLD: 1 K/UL (ref 0–1)
MONOCYTES NFR BLD: 6 % (ref 5–13)
NEUTS SEG # BLD: 14.3 K/UL (ref 1.8–8)
NEUTS SEG NFR BLD: 84 % (ref 32–75)
NRBC # BLD: 0 K/UL (ref 0–0.01)
NRBC BLD-RTO: 0 PER 100 WBC
PCO2 BLDA: 33 MMHG (ref 35–45)
PH BLDA: 7.46 [PH] (ref 7.35–7.45)
PLATELET # BLD AUTO: 213 K/UL (ref 150–400)
PMV BLD AUTO: 11.3 FL (ref 8.9–12.9)
PO2 BLDA: 55 MMHG (ref 80–100)
POTASSIUM SERPL-SCNC: 4.4 MMOL/L (ref 3.5–5.1)
PROT SERPL-MCNC: 6.8 G/DL (ref 6.4–8.2)
RBC # BLD AUTO: 3.88 M/UL (ref 3.8–5.2)
SAO2 % BLD: 91 % (ref 92–97)
SAO2% DEVICE SAO2% SENSOR NAME: ABNORMAL
SODIUM SERPL-SCNC: 137 MMOL/L (ref 136–145)
SPECIMEN SITE: ABNORMAL
TSH SERPL DL<=0.05 MIU/L-ACNC: 1.6 UIU/ML (ref 0.36–3.74)
WBC # BLD AUTO: 16.9 K/UL (ref 3.6–11)

## 2022-08-18 PROCEDURE — 70450 CT HEAD/BRAIN W/O DYE: CPT

## 2022-08-18 PROCEDURE — 74011250636 HC RX REV CODE- 250/636: Performed by: NURSE PRACTITIONER

## 2022-08-18 PROCEDURE — 74011250637 HC RX REV CODE- 250/637: Performed by: INTERNAL MEDICINE

## 2022-08-18 PROCEDURE — C9113 INJ PANTOPRAZOLE SODIUM, VIA: HCPCS | Performed by: INTERNAL MEDICINE

## 2022-08-18 PROCEDURE — 74011000250 HC RX REV CODE- 250: Performed by: INTERNAL MEDICINE

## 2022-08-18 PROCEDURE — 74011000250 HC RX REV CODE- 250: Performed by: COLON & RECTAL SURGERY

## 2022-08-18 PROCEDURE — 85025 COMPLETE CBC W/AUTO DIFF WBC: CPT

## 2022-08-18 PROCEDURE — 80053 COMPREHEN METABOLIC PANEL: CPT

## 2022-08-18 PROCEDURE — 86738 MYCOPLASMA ANTIBODY: CPT

## 2022-08-18 PROCEDURE — 74011250636 HC RX REV CODE- 250/636: Performed by: COLON & RECTAL SURGERY

## 2022-08-18 PROCEDURE — 82140 ASSAY OF AMMONIA: CPT

## 2022-08-18 PROCEDURE — 71045 X-RAY EXAM CHEST 1 VIEW: CPT

## 2022-08-18 PROCEDURE — 74011250636 HC RX REV CODE- 250/636: Performed by: INTERNAL MEDICINE

## 2022-08-18 PROCEDURE — 36600 WITHDRAWAL OF ARTERIAL BLOOD: CPT

## 2022-08-18 PROCEDURE — 36415 COLL VENOUS BLD VENIPUNCTURE: CPT

## 2022-08-18 PROCEDURE — 65270000029 HC RM PRIVATE

## 2022-08-18 PROCEDURE — 82803 BLOOD GASES ANY COMBINATION: CPT

## 2022-08-18 PROCEDURE — 84443 ASSAY THYROID STIM HORMONE: CPT

## 2022-08-18 PROCEDURE — 74018 RADEX ABDOMEN 1 VIEW: CPT

## 2022-08-18 PROCEDURE — 77010033678 HC OXYGEN DAILY

## 2022-08-18 PROCEDURE — 74011000258 HC RX REV CODE- 258: Performed by: COLON & RECTAL SURGERY

## 2022-08-18 RX ORDER — CLONIDINE 0.1 MG/24H
1 PATCH, EXTENDED RELEASE TRANSDERMAL
Status: COMPLETED | OUTPATIENT
Start: 2022-08-18 | End: 2022-08-26

## 2022-08-18 RX ORDER — NALOXONE HYDROCHLORIDE 0.4 MG/ML
0.4 INJECTION, SOLUTION INTRAMUSCULAR; INTRAVENOUS; SUBCUTANEOUS ONCE
Status: ACTIVE | OUTPATIENT
Start: 2022-08-18 | End: 2022-08-18

## 2022-08-18 RX ORDER — IPRATROPIUM BROMIDE AND ALBUTEROL SULFATE 2.5; .5 MG/3ML; MG/3ML
3 SOLUTION RESPIRATORY (INHALATION)
Status: DISCONTINUED | OUTPATIENT
Start: 2022-08-18 | End: 2022-08-30 | Stop reason: HOSPADM

## 2022-08-18 RX ADMIN — HYDROMORPHONE HYDROCHLORIDE 0.2 MG: 1 INJECTION, SOLUTION INTRAMUSCULAR; INTRAVENOUS; SUBCUTANEOUS at 01:03

## 2022-08-18 RX ADMIN — ONDANSETRON HYDROCHLORIDE 4 MG: 2 SOLUTION INTRAMUSCULAR; INTRAVENOUS at 00:07

## 2022-08-18 RX ADMIN — HYDRALAZINE HYDROCHLORIDE 10 MG: 20 INJECTION INTRAMUSCULAR; INTRAVENOUS at 22:15

## 2022-08-18 RX ADMIN — ONDANSETRON HYDROCHLORIDE 4 MG: 2 SOLUTION INTRAMUSCULAR; INTRAVENOUS at 20:26

## 2022-08-18 RX ADMIN — PIPERACILLIN AND TAZOBACTAM 3.38 G: 3; .375 INJECTION, POWDER, FOR SOLUTION INTRAVENOUS at 20:26

## 2022-08-18 RX ADMIN — PIPERACILLIN AND TAZOBACTAM 3.38 G: 3; .375 INJECTION, POWDER, FOR SOLUTION INTRAVENOUS at 01:07

## 2022-08-18 RX ADMIN — SODIUM CHLORIDE, PRESERVATIVE FREE 40 MG: 5 INJECTION INTRAVENOUS at 16:50

## 2022-08-18 RX ADMIN — HYDROMORPHONE HYDROCHLORIDE 0.2 MG: 1 INJECTION, SOLUTION INTRAMUSCULAR; INTRAVENOUS; SUBCUTANEOUS at 17:26

## 2022-08-18 RX ADMIN — HYDROMORPHONE HYDROCHLORIDE 0.2 MG: 1 INJECTION, SOLUTION INTRAMUSCULAR; INTRAVENOUS; SUBCUTANEOUS at 06:12

## 2022-08-18 RX ADMIN — LORAZEPAM 0.5 MG: 2 INJECTION, SOLUTION INTRAMUSCULAR; INTRAVENOUS at 06:12

## 2022-08-18 RX ADMIN — LORAZEPAM 0.5 MG: 2 INJECTION, SOLUTION INTRAMUSCULAR; INTRAVENOUS at 01:03

## 2022-08-18 RX ADMIN — PIPERACILLIN AND TAZOBACTAM 3.38 G: 3; .375 INJECTION, POWDER, FOR SOLUTION INTRAVENOUS at 11:48

## 2022-08-18 RX ADMIN — SODIUM CHLORIDE, PRESERVATIVE FREE 10 ML: 5 INJECTION INTRAVENOUS at 06:12

## 2022-08-18 RX ADMIN — POTASSIUM CHLORIDE, DEXTROSE MONOHYDRATE AND SODIUM CHLORIDE 125 ML/HR: 150; 5; 450 INJECTION, SOLUTION INTRAVENOUS at 04:33

## 2022-08-18 RX ADMIN — SODIUM CHLORIDE, POTASSIUM CHLORIDE, SODIUM LACTATE AND CALCIUM CHLORIDE 1000 ML: 600; 310; 30; 20 INJECTION, SOLUTION INTRAVENOUS at 17:02

## 2022-08-18 RX ADMIN — NALOXONE HYDROCHLORIDE 0.2 MG: 0.4 INJECTION, SOLUTION INTRAMUSCULAR; INTRAVENOUS; SUBCUTANEOUS at 09:34

## 2022-08-18 RX ADMIN — SODIUM CHLORIDE, PRESERVATIVE FREE 40 MG: 5 INJECTION INTRAVENOUS at 22:03

## 2022-08-18 RX ADMIN — ONDANSETRON HYDROCHLORIDE 4 MG: 2 SOLUTION INTRAMUSCULAR; INTRAVENOUS at 06:12

## 2022-08-18 RX ADMIN — SODIUM CHLORIDE, PRESERVATIVE FREE 10 ML: 5 INJECTION INTRAVENOUS at 22:03

## 2022-08-18 NOTE — ROUTINE PROCESS
Bedside and Verbal shift change report given to ELIDA Romero (oncoming nurse) by Ramirez Luque (offgoing nurse). Report included the following information SBAR and Kardex. severe headache with aphasia

## 2022-08-18 NOTE — PROGRESS NOTES
Went in the room to assess patient and drop NG Tube. Patient was slouched in bed. Assisted with getting patient cleaned up and siting up in high fowlers position. Patient and sister are discussing NG Tube. Patient at this time doesn't want NG Tube again.  Informed Mali Stewart NP.

## 2022-08-18 NOTE — PROGRESS NOTES
CRS Note    Pt with multiple issues this morning - confused and sedated, likely a result of the ativan and dilaudid - she vomited multiple times and appeared to have aspirated based on xray. NGT was placed. ABx were started to cover aspiration pneumonia. She is conversant this afternoon - slightly confused.     PE  Abd soft, distended  Ileostomy pink and patent - some stool in the bag  Incisions c/d/I    PLAN  - abx for aspiration pneumonia  - NGT for decompression  - IVF  - stop ativan and decrease dilaudid  - recheck labs in AM

## 2022-08-18 NOTE — PROGRESS NOTES
2126- RRT called for profuse vomiting and respiratory distress. Primary RN placed pt on nasal cannula with improvement in work of breathing. NP Jeana at bedside, ordered CXR, KUB, and NGT placement. NGT placement unsuccessful x5 d/t pt emotional distress. 0.2 Narcan given d/t AMS after multiple doses of medications overnight with slight improvement in cognition. Plan to attempt NGT placement later.

## 2022-08-18 NOTE — PROGRESS NOTES
8/18/2022  12:56 PM    Pt discussed in IDR is continuing  to require medical management for POD # 2 ROBOTIC SPLENIC FLEXURE MOBILIZATION, ROBOTIC CONVERTED TO LAPAROSCOPY ASSIST, EXTENDED LEFT COLECTOMY WITH COLORECTAL ANASTOMOSIS AND DIVERTING LOOP ILEOSTOMY  CYSTOSCOPY WITH INSERTION BILATERAL URETERAL STENTS  Transition of Care Plan:                    RUR 8% Low Risk of Readmission/Green  LOS 2 Days   1. Medical management continues, RRT called for confusion/agitation, pt refusing NGT  2. POD#2 ROBOTIC SPLENIC FLEXURE MOBILIZATION, ROBOTIC CONVERTED TO LAPAROSCOPY ASSIST, EXTENDED LEFT COLECTOMY WITH COLORECTAL ANASTOMOSIS AND DIVERTING LOOP ILEOSTOMY  3. CYSTOSCOPY WITH INSERTION BILATERAL URETERAL STENTS  4. Wound Care following, pt accepted by [facility] for [facility] services, likely to need SNF at DC   5. Pt will need PT/OT evals aimee stable as likely to require SNF at DC   6. DC when stable dispo pending progress   7. Outpatient follow up Surgery, PCP  8.  Transport TBD pending progress  ANGELIKA Suarez

## 2022-08-18 NOTE — PROGRESS NOTES
Problem: Falls - Risk of  Goal: *Absence of Falls  8/17/2022 2359 by Ruby Montes De Oca RN  Outcome: Progressing Towards Goal  Note: Fall Risk Interventions:  Mobility Interventions: Bed/chair exit alarm, Communicate number of staff needed for ambulation/transfer, Patient to call before getting OOB         Medication Interventions: Bed/chair exit alarm    Elimination Interventions: Bed/chair exit alarm, Call light in reach           8/17/2022 2358 by Mack Ling RN  Outcome: Progressing Towards Goal  Note: Fall Risk Interventions:  Mobility Interventions: Bed/chair exit alarm, Communicate number of staff needed for ambulation/transfer, Patient to call before getting OOB         Medication Interventions: Bed/chair exit alarm    Elimination Interventions: Bed/chair exit alarm, Call light in reach              Problem: Patient Education: Go to Patient Education Activity  Goal: Patient/Family Education  8/17/2022 2359 by Mack Ling RN  Outcome: Progressing Towards Goal  8/17/2022 2358 by Mack Ling RN  Outcome: Progressing Towards Goal     Problem: Pain  Goal: *Control of Pain  8/17/2022 2359 by Mack Ling RN  Outcome: Progressing Towards Goal  8/17/2022 2358 by Mack Ling RN  Outcome: Progressing Towards Goal     Problem: Patient Education: Go to Patient Education Activity  Goal: Patient/Family Education  8/17/2022 2359 by Mack Ling RN  Outcome: Progressing Towards Goal  8/17/2022 2358 by Mack Ling RN  Outcome: Progressing Towards Goal     Problem: Patient Education: Go to Patient Education Activity  Goal: Patient/Family Education  Outcome: Progressing Towards Goal     Problem: Impaired Skin Integrity/Pressure Injury Treatment  Goal: *Improvement of Existing Pressure Injury  Outcome: Progressing Towards Goal  Goal: *Prevention of pressure injury  Outcome: Progressing Towards Goal  Note: Pressure Injury Interventions:  Sensory Interventions: Assess need for specialty bed    Moisture Interventions: Absorbent underpads, Apply protective barrier, creams and emollients, Assess need for specialty bed    Activity Interventions: Increase time out of bed, Pressure redistribution bed/mattress(bed type)    Mobility Interventions: HOB 30 degrees or less, Pressure redistribution bed/mattress (bed type)    Nutrition Interventions: Document food/fluid/supplement intake    Friction and Shear Interventions: HOB 30 degrees or less, Minimize layers, Sit at 90-degree angle, Foam dressings/transparent film/skin sealants                Problem: Anxiety  Goal: *Alleviation of anxiety  Outcome: Progressing Towards Goal  Goal: *Alleviation of anxiety (Palliative Care)  Outcome: Progressing Towards Goal     Problem: Patient Education: Go to Patient Education Activity  Goal: Patient/Family Education  Outcome: Progressing Towards Goal

## 2022-08-18 NOTE — PROGRESS NOTES
CRS Progress Note:    Chart checking pt in am prior to in person assessment  Noted pt was receiving IV ativan and IV dilaudid together over night  Discussed with am RN to not admin together. Discussed with CCL and RN manager as well. Went to pt room to check on pt and pt with AMS as well as upper resp crackles and gurgles, belching, lips stained with bile, abd sig distended, drain in place with serosang output, zhang in place. Sister at bedside states pt is \"out of it. \"       Stat KUB and Chest xray  Labs placed  RRT called  XRAY and RRT in room at same time. Films obtained. Quick read/impression by myself was sig gastric distention. While awaiting final CXR and KUB,  RRT Attempted multiple times to insert NGT with no success and sig vomiting during insertion as well as prior- pt still uncooperative and AMS. Narcan given  Chest Xray final read consistent with presentation in room- Worsening widespread interstitial and scattered patchy airspace opacities, most confluent in the right lung base. Findings may reflect edema, atypical  infection, and/or aspiration. Hospitalist consulted for help with management. Was in contact with Dr Tessie Nunn throughout entire process thus far. Will continue to closely monitor pt.   Indio Kim NP

## 2022-08-18 NOTE — PROGRESS NOTES
Checked back in on pt approx 1050    Pt slumped in bed  Care Tech and RN Manager assisted pt to sit upright  Aspiration precautions placed    Pt still somewhat confused and agitated. Argues frequently with sister who has been at bedside helping.     RN manager to help assist in another NGT attempt  Pt refuses  Sister tries to talk to pt and deescalate  Pt still refusing NGT    Awaiting labs and placement of cardiac monitor (remote tele)      Umer Florez NP

## 2022-08-18 NOTE — PROGRESS NOTES
Spiritual Care Assessment/Progress Note  [address] [place] [address] Dr      NAME: Syed Kim      MRN: [id number]  AGE: 66 y.o. SEX: female  Christian Affiliation: Advent   Language: English     8/18/2022     Total Time (in minutes): 48     Spiritual Assessment begun in [facility] 5M1 MED SURG 1 through conversation with:         [x]Patient        [x] Family    [] Friend(s)        Reason for Consult: Initial/Spiritual assessment, patient floor, Request by staff     Spiritual beliefs: (Please include comment if needed)     [x] Identifies with a darren tradition: Advent        [] Supported by a darren community:            [] Claims no spiritual orientation:           [] Seeking spiritual identity:                [] Adheres to an individual form of spirituality:           [] Not able to assess:                           Identified resources for coping:      [x] Prayer                               [] Music                  [] Guided Imagery     [x] Family/friends                 [] Pet visits     [] Devotional reading                         [] Unknown     [] Other:                                               Interventions offered during this visit: (See comments for more details)    Patient Interventions: Affirmation of emotions/emotional suffering, Coping skills reviewed/reinforced, Guided imagery, Initial/Spiritual assessment, patient floor, Life review/legacy, Normalization of emotional/spiritual concerns, Prayer (assurance of), Prayer (actual), Affirmation of darren     Family/Friend(s):  Affirmation of emotions/emotional suffering, Coping skills reviewed/reinforced, Normalization of emotional/spiritual concerns, Life review/legacy, Affirmation of darren     Plan of Care:     [x] Support spiritual and/or cultural needs    [] Support AMD and/or advance care planning process      [] Support grieving process   [] Coordinate Rites and/or Rituals    [] Coordination with community clergy   [] No spiritual needs identified at this time   [] Detailed Plan of Care below (See Comments)  [] Make referral to Music Therapy  [] Make referral to Pet Therapy     [] Make referral to Addiction services  [] Make referral to [facility]  [] Make referral to Spiritual Care Partner  [] No future visits requested        [] Contact Spiritual Care for further referrals     Comments:  collaborated with attending RN who requested visit. Visit for spiritual assessment in 505. Vincent Laguerre was lying in bed. She appeared to be having a lot of discomfort and pain. Her sister Portillo Yee was present and she shared that Vincent Laguerre need to take some test so she could not have any meds.  immediately began an anxiety containment exercise through guided imagery and deep breathing exercises which appeared to help Vincent Laguerre relax and ease her discomfort. Assessment was conducted with Vincent Laguerre sister Portillo Yee who shared about her sister being a  of 4 years and the recent death of their mother last year. Keya talked about how she felt her sister is still grieving both. And how recent health issues have not helped her situation. Vincent Laguerre has 1 child and 2 grandchildren. She identifies as Jew, but is not affiliated with a darren community. Chart reviewed. Provided anxiety containment through guided imagery and breathing exercises, empathic listening, ministry of presence, explored spiritual and emotional needs; facilitated life review/storytelling; and cultivated a relationship of care, compassion and support. Assured of continued chaplaincy support. Visited by: Larisa Romero.  Brenda Giordano, [address] [place] [address] Road paging Service [phone] ([phone])

## 2022-08-18 NOTE — WOUND CARE
Ostomy visit: in to check on Kike Bhakta post op day  # 2 ROBOTIC SPLENIC FLEXURE MOBILIZATION, ROBOTIC CONVERTED TO LAPAROSCOPY ASSIST, EXTENDED LEFT COLECTOMY WITH COLORECTAL ANASTOMOSIS AND DIVERTING LOOP ILEOSTOMY  CYSTOSCOPY WITH INSERTION BILATERAL URETERAL STENTS. Her sister is at beside and we have planned to work together tomorrow at 1300 to change appliance/continue teaching. Kike Bhakta is nauseated, has ileus with NG and green bilious output. Ostomy appliance is intact, scant amount of bilious output in appliance. Will continue teaching with patient and family tomorrow.   Ines Oliver, RN,CWON

## 2022-08-18 NOTE — PROGRESS NOTES
Bedside and Verbal shift change report given to miriam (oncoming nurse) by Wei Monson (offgoing nurse). Report included the following information SBAR, Kardex, Procedure Summary, Intake/Output, MAR, and Recent Results.
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
## 2022-08-18 NOTE — PROGRESS NOTES
Spiritual Care Assessment/Progress Note  [address]      NAME: Deborah Pierre      MRN: [id number]  AGE: 66 y.o. SEX: female  Rastafarian Affiliation: Shinto   Language: English     8/18/2022     Total Time (in minutes): 23     Spiritual Assessment begun in [facility] 5M1 MED SURG 1 through conversation with:         []Patient        [] Family    [] Friend(s)        Reason for Consult: Rapid response team     Spiritual beliefs: (Please include comment if needed)     [] Identifies with a darren tradition:         [] Supported by a darren community:            [] Claims no spiritual orientation:           [] Seeking spiritual identity:                [] Adheres to an individual form of spirituality:           [x] Not able to assess:                           Identified resources for coping:      [] Prayer                               [] Music                  [] Guided Imagery     [] Family/friends                 [] Pet visits     [] Devotional reading                         [x] Unknown     [] Other:                                                 Interventions offered during this visit: (See comments for more details)                Plan of Care:     [] Support spiritual and/or cultural needs    [] Support AMD and/or advance care planning process      [] Support grieving process   [] Coordinate Rites and/or Rituals    [] Coordination with community clergy   [] No spiritual needs identified at this time   [] Detailed Plan of Care below (See Comments)  [] Make referral to Music Therapy  [] Make referral to Pet Therapy     [] Make referral to Addiction services  [] Make referral to [facility]  [] Make referral to Spiritual Care Partner  [] No future visits requested        [] Contact Spiritual Care for further referrals     Comments: Pager Alert Response - Rapid Response in 505. No family present. Chart reviewed. Provided ministry of presence and collaborated with and supported staff.  Please contact Spiritual Care for any further referrals. Visited by: Ernst Dowd.  Cheryl Francisco, [address] [place] [address] paging Service [phone] ([phone])

## 2022-08-19 ENCOUNTER — APPOINTMENT (OUTPATIENT)
Dept: CT IMAGING | Age: 72
DRG: 329 | End: 2022-08-19
Attending: FAMILY MEDICINE
Payer: MEDICARE

## 2022-08-19 ENCOUNTER — APPOINTMENT (OUTPATIENT)
Dept: GENERAL RADIOLOGY | Age: 72
DRG: 329 | End: 2022-08-19
Attending: NURSE PRACTITIONER
Payer: MEDICARE

## 2022-08-19 ENCOUNTER — APPOINTMENT (OUTPATIENT)
Dept: VASCULAR SURGERY | Age: 72
DRG: 329 | End: 2022-08-19
Attending: INTERNAL MEDICINE
Payer: MEDICARE

## 2022-08-19 LAB
ALBUMIN SERPL-MCNC: 2.5 G/DL (ref 3.5–5)
ALBUMIN/GLOB SERPL: 0.6 {RATIO} (ref 1.1–2.2)
ALP SERPL-CCNC: 79 U/L (ref 45–117)
ALT SERPL-CCNC: 27 U/L (ref 12–78)
ANION GAP SERPL CALC-SCNC: 5 MMOL/L (ref 5–15)
AST SERPL-CCNC: 34 U/L (ref 15–37)
BASOPHILS # BLD: 0 K/UL (ref 0–0.1)
BASOPHILS NFR BLD: 0 % (ref 0–1)
BILIRUB SERPL-MCNC: 2 MG/DL (ref 0.2–1)
BUN SERPL-MCNC: 10 MG/DL (ref 6–20)
BUN/CREAT SERPL: 8 (ref 12–20)
CALCIUM SERPL-MCNC: 8.6 MG/DL (ref 8.5–10.1)
CHLORIDE SERPL-SCNC: 105 MMOL/L (ref 97–108)
CO2 SERPL-SCNC: 24 MMOL/L (ref 21–32)
CREAT SERPL-MCNC: 1.28 MG/DL (ref 0.55–1.02)
D DIMER PPP FEU-MCNC: 4.18 MG/L FEU (ref 0–0.65)
DIFFERENTIAL METHOD BLD: ABNORMAL
EOSINOPHIL # BLD: 0.2 K/UL (ref 0–0.4)
EOSINOPHIL NFR BLD: 1 % (ref 0–7)
ERYTHROCYTE [DISTWIDTH] IN BLOOD BY AUTOMATED COUNT: 12.3 % (ref 11.5–14.5)
GLOBULIN SER CALC-MCNC: 3.9 G/DL (ref 2–4)
GLUCOSE SERPL-MCNC: 132 MG/DL (ref 65–100)
HCT VFR BLD AUTO: 35 % (ref 35–47)
HGB BLD-MCNC: 12.2 G/DL (ref 11.5–16)
IMM GRANULOCYTES # BLD AUTO: 0.1 K/UL (ref 0–0.04)
IMM GRANULOCYTES NFR BLD AUTO: 1 % (ref 0–0.5)
LYMPHOCYTES # BLD: 1.2 K/UL (ref 0.8–3.5)
LYMPHOCYTES NFR BLD: 8 % (ref 12–49)
MCH RBC QN AUTO: 32.9 PG (ref 26–34)
MCHC RBC AUTO-ENTMCNC: 34.9 G/DL (ref 30–36.5)
MCV RBC AUTO: 94.3 FL (ref 80–99)
MONOCYTES # BLD: 0.9 K/UL (ref 0–1)
MONOCYTES NFR BLD: 6 % (ref 5–13)
NEUTS SEG # BLD: 11.8 K/UL (ref 1.8–8)
NEUTS SEG NFR BLD: 84 % (ref 32–75)
NRBC # BLD: 0 K/UL (ref 0–0.01)
NRBC BLD-RTO: 0 PER 100 WBC
PHOSPHATE SERPL-MCNC: 1.1 MG/DL (ref 2.6–4.7)
PLATELET # BLD AUTO: 207 K/UL (ref 150–400)
PMV BLD AUTO: 10.7 FL (ref 8.9–12.9)
POTASSIUM SERPL-SCNC: 4.3 MMOL/L (ref 3.5–5.1)
PROT SERPL-MCNC: 6.4 G/DL (ref 6.4–8.2)
RBC # BLD AUTO: 3.71 M/UL (ref 3.8–5.2)
SODIUM SERPL-SCNC: 134 MMOL/L (ref 136–145)
WBC # BLD AUTO: 14.1 K/UL (ref 3.6–11)

## 2022-08-19 PROCEDURE — 74011250636 HC RX REV CODE- 250/636: Performed by: INTERNAL MEDICINE

## 2022-08-19 PROCEDURE — 74011250637 HC RX REV CODE- 250/637: Performed by: NURSE PRACTITIONER

## 2022-08-19 PROCEDURE — 80053 COMPREHEN METABOLIC PANEL: CPT

## 2022-08-19 PROCEDURE — 74011000250 HC RX REV CODE- 250: Performed by: COLON & RECTAL SURGERY

## 2022-08-19 PROCEDURE — 2709999900 HC NON-CHARGEABLE SUPPLY

## 2022-08-19 PROCEDURE — 84100 ASSAY OF PHOSPHORUS: CPT

## 2022-08-19 PROCEDURE — 77010033678 HC OXYGEN DAILY

## 2022-08-19 PROCEDURE — C9113 INJ PANTOPRAZOLE SODIUM, VIA: HCPCS | Performed by: INTERNAL MEDICINE

## 2022-08-19 PROCEDURE — 74011250636 HC RX REV CODE- 250/636: Performed by: NURSE PRACTITIONER

## 2022-08-19 PROCEDURE — 93970 EXTREMITY STUDY: CPT

## 2022-08-19 PROCEDURE — 74176 CT ABD & PELVIS W/O CONTRAST: CPT

## 2022-08-19 PROCEDURE — 65270000029 HC RM PRIVATE

## 2022-08-19 PROCEDURE — 74011000250 HC RX REV CODE- 250: Performed by: INTERNAL MEDICINE

## 2022-08-19 PROCEDURE — 36415 COLL VENOUS BLD VENIPUNCTURE: CPT

## 2022-08-19 PROCEDURE — 74011000250 HC RX REV CODE- 250: Performed by: NURSE PRACTITIONER

## 2022-08-19 PROCEDURE — 85025 COMPLETE CBC W/AUTO DIFF WBC: CPT

## 2022-08-19 PROCEDURE — 74018 RADEX ABDOMEN 1 VIEW: CPT

## 2022-08-19 PROCEDURE — 94761 N-INVAS EAR/PLS OXIMETRY MLT: CPT

## 2022-08-19 PROCEDURE — 77030008771 HC TU NG SALEM SUMP -A

## 2022-08-19 PROCEDURE — 74011000258 HC RX REV CODE- 258: Performed by: COLON & RECTAL SURGERY

## 2022-08-19 PROCEDURE — 74011250636 HC RX REV CODE- 250/636: Performed by: COLON & RECTAL SURGERY

## 2022-08-19 PROCEDURE — 74011000636 HC RX REV CODE- 636: Performed by: RADIOLOGY

## 2022-08-19 PROCEDURE — 85379 FIBRIN DEGRADATION QUANT: CPT

## 2022-08-19 RX ORDER — LIDOCAINE 4 G/100G
2 PATCH TOPICAL EVERY 24 HOURS
Status: DISCONTINUED | OUTPATIENT
Start: 2022-08-19 | End: 2022-08-30 | Stop reason: HOSPADM

## 2022-08-19 RX ORDER — HYDROMORPHONE HYDROCHLORIDE 1 MG/ML
0.5 INJECTION, SOLUTION INTRAMUSCULAR; INTRAVENOUS; SUBCUTANEOUS ONCE
Status: DISCONTINUED | OUTPATIENT
Start: 2022-08-19 | End: 2022-08-19

## 2022-08-19 RX ORDER — HYDROMORPHONE HYDROCHLORIDE 1 MG/ML
0.2 INJECTION, SOLUTION INTRAMUSCULAR; INTRAVENOUS; SUBCUTANEOUS ONCE
Status: COMPLETED | OUTPATIENT
Start: 2022-08-19 | End: 2022-08-19

## 2022-08-19 RX ORDER — HYDROMORPHONE HYDROCHLORIDE 1 MG/ML
0.5 INJECTION, SOLUTION INTRAMUSCULAR; INTRAVENOUS; SUBCUTANEOUS
Status: DISCONTINUED | OUTPATIENT
Start: 2022-08-19 | End: 2022-08-19

## 2022-08-19 RX ORDER — HYDROMORPHONE HYDROCHLORIDE 1 MG/ML
0.5 INJECTION, SOLUTION INTRAMUSCULAR; INTRAVENOUS; SUBCUTANEOUS
Status: DISCONTINUED | OUTPATIENT
Start: 2022-08-19 | End: 2022-08-19 | Stop reason: SDUPTHER

## 2022-08-19 RX ORDER — HYDROMORPHONE HYDROCHLORIDE 1 MG/ML
0.2 INJECTION, SOLUTION INTRAMUSCULAR; INTRAVENOUS; SUBCUTANEOUS
Status: DISCONTINUED | OUTPATIENT
Start: 2022-08-19 | End: 2022-08-19

## 2022-08-19 RX ORDER — ENOXAPARIN SODIUM 100 MG/ML
30 INJECTION SUBCUTANEOUS EVERY 12 HOURS
Status: DISCONTINUED | OUTPATIENT
Start: 2022-08-19 | End: 2022-08-30 | Stop reason: HOSPADM

## 2022-08-19 RX ORDER — HYDROMORPHONE HYDROCHLORIDE 1 MG/ML
0.2 INJECTION, SOLUTION INTRAMUSCULAR; INTRAVENOUS; SUBCUTANEOUS
Status: DISCONTINUED | OUTPATIENT
Start: 2022-08-19 | End: 2022-08-21

## 2022-08-19 RX ORDER — FENTANYL CITRATE 50 UG/ML
50 INJECTION, SOLUTION INTRAMUSCULAR; INTRAVENOUS ONCE
Status: DISCONTINUED | OUTPATIENT
Start: 2022-08-19 | End: 2022-08-19

## 2022-08-19 RX ORDER — HYDROMORPHONE HYDROCHLORIDE 1 MG/ML
0.1 INJECTION, SOLUTION INTRAMUSCULAR; INTRAVENOUS; SUBCUTANEOUS
Status: DISCONTINUED | OUTPATIENT
Start: 2022-08-19 | End: 2022-08-19

## 2022-08-19 RX ADMIN — DIATRIZOATE MEGLUMINE AND DIATRIZOATE SODIUM 30 ML: 660; 100 LIQUID ORAL; RECTAL at 15:20

## 2022-08-19 RX ADMIN — Medication 1 SPRAY: at 23:23

## 2022-08-19 RX ADMIN — HYDROMORPHONE HYDROCHLORIDE 0.2 MG: 1 INJECTION, SOLUTION INTRAMUSCULAR; INTRAVENOUS; SUBCUTANEOUS at 13:21

## 2022-08-19 RX ADMIN — SODIUM CHLORIDE, PRESERVATIVE FREE 40 MG: 5 INJECTION INTRAVENOUS at 21:24

## 2022-08-19 RX ADMIN — HYDROMORPHONE HYDROCHLORIDE 0.2 MG: 1 INJECTION, SOLUTION INTRAMUSCULAR; INTRAVENOUS; SUBCUTANEOUS at 17:16

## 2022-08-19 RX ADMIN — ENOXAPARIN SODIUM 30 MG: 100 INJECTION SUBCUTANEOUS at 10:02

## 2022-08-19 RX ADMIN — SODIUM CHLORIDE, PRESERVATIVE FREE 40 MG: 5 INJECTION INTRAVENOUS at 10:02

## 2022-08-19 RX ADMIN — HYDROMORPHONE HYDROCHLORIDE 0.2 MG: 1 INJECTION, SOLUTION INTRAMUSCULAR; INTRAVENOUS; SUBCUTANEOUS at 03:14

## 2022-08-19 RX ADMIN — POTASSIUM CHLORIDE, DEXTROSE MONOHYDRATE AND SODIUM CHLORIDE 125 ML/HR: 150; 5; 450 INJECTION, SOLUTION INTRAVENOUS at 00:53

## 2022-08-19 RX ADMIN — ONDANSETRON HYDROCHLORIDE 4 MG: 2 SOLUTION INTRAMUSCULAR; INTRAVENOUS at 05:49

## 2022-08-19 RX ADMIN — ONDANSETRON HYDROCHLORIDE 4 MG: 2 SOLUTION INTRAMUSCULAR; INTRAVENOUS at 23:23

## 2022-08-19 RX ADMIN — HYDRALAZINE HYDROCHLORIDE 10 MG: 20 INJECTION INTRAMUSCULAR; INTRAVENOUS at 22:56

## 2022-08-19 RX ADMIN — LORAZEPAM 0.5 MG: 2 INJECTION, SOLUTION INTRAMUSCULAR; INTRAVENOUS at 06:33

## 2022-08-19 RX ADMIN — SODIUM CHLORIDE, PRESERVATIVE FREE 10 ML: 5 INJECTION INTRAVENOUS at 21:21

## 2022-08-19 RX ADMIN — SODIUM CHLORIDE, PRESERVATIVE FREE 10 ML: 5 INJECTION INTRAVENOUS at 05:49

## 2022-08-19 RX ADMIN — ENOXAPARIN SODIUM 30 MG: 100 INJECTION SUBCUTANEOUS at 21:24

## 2022-08-19 RX ADMIN — PIPERACILLIN AND TAZOBACTAM 3.38 G: 3; .375 INJECTION, POWDER, FOR SOLUTION INTRAVENOUS at 17:16

## 2022-08-19 RX ADMIN — PIPERACILLIN AND TAZOBACTAM 3.38 G: 3; .375 INJECTION, POWDER, FOR SOLUTION INTRAVENOUS at 10:01

## 2022-08-19 RX ADMIN — HYDROMORPHONE HYDROCHLORIDE 0.2 MG: 1 INJECTION, SOLUTION INTRAMUSCULAR; INTRAVENOUS; SUBCUTANEOUS at 21:21

## 2022-08-19 RX ADMIN — ONDANSETRON HYDROCHLORIDE 4 MG: 2 SOLUTION INTRAMUSCULAR; INTRAVENOUS at 00:55

## 2022-08-19 RX ADMIN — POTASSIUM CHLORIDE, DEXTROSE MONOHYDRATE AND SODIUM CHLORIDE 125 ML/HR: 150; 5; 450 INJECTION, SOLUTION INTRAVENOUS at 10:01

## 2022-08-19 RX ADMIN — ONDANSETRON HYDROCHLORIDE 4 MG: 2 SOLUTION INTRAMUSCULAR; INTRAVENOUS at 11:39

## 2022-08-19 RX ADMIN — ONDANSETRON HYDROCHLORIDE 4 MG: 2 SOLUTION INTRAMUSCULAR; INTRAVENOUS at 17:16

## 2022-08-19 RX ADMIN — PIPERACILLIN AND TAZOBACTAM 3.38 G: 3; .375 INJECTION, POWDER, FOR SOLUTION INTRAVENOUS at 02:04

## 2022-08-19 NOTE — PROGRESS NOTES
Bedside and Verbal shift change report given to miriam (oncoming nurse) by Yanet Munoz (offgoing nurse). Report included the following information SBAR, Kardex, Procedure Summary, Intake/Output, MAR, and Recent Results.

## 2022-08-19 NOTE — WOUND CARE
Ostomy visit: patient's sister called me earlier this afternoon to report that patient is having vomiting, going for CT and generally not herself. NGT has been re-inserted and patient has sitter. In to check on stoma - differing teaching. Assessment:  RLQ ileostomy - moist red stoma at skin level with red rubber tube fashioned as support shanta; no separation noted, dark green sticky output; no surrounding redness in skin; ileus with most bilious output from NGT. Treatment:  Placed new one piece appliance with cohesive seal.  Plan: Will continue to follow for teaching/care. Encourage turning and repositioning, float heels.   Brett Borden, RN,CWON

## 2022-08-19 NOTE — PROGRESS NOTES
200- RN alerted me that she has given pain meds, but pt still screaming out about sharp abdominal pains  Sister still at bedside. [address] Chen Peters and discussed plan. Due to Cr- not ideal for IV contrast and due to vomiting with previous pulling of NGTs not ideal for oral.  If able to obtain sitter or restraints we could place NGT- give oral contrast down NGT and obtain CT. Increase dilaudid in interim. [address] just saw pt and pt still complaining of pain after small 0.1mg dose of dilaudid. Together we discussed plan with pt. Added another 0.2mg dose for now. 1255- Pt projectile vomiting dark green liquid, visible tachypnea but will not wear O2 (keeps removing), states she is in so much pain. O.2mg of dilaudid one time dose given and NGT placed immediately. >1L out in canister and ~250 out on gown. Pt completely sedated from total of 0.3mg dilaudid in 1 hour. On cardiac and resp monitor. Sitter now in room. Will obtain film to eval NGT placement. If satisfactory will initiate contrast into NGT for ab CT with oral contrast only. CT aware of plan and has ordered contrast in preparation.           Radha Sanchez NP

## 2022-08-19 NOTE — PROGRESS NOTES
2000  Upon primary RN's arrival in pt's room, pt's NG tube was already out. 200cc output from NG tube noted. Spoke to Dr. Kpi Juan from colon and rectal surgery and said to leave it out for now, keep HOB elevated and to monitor overnight. Primary RN made aware.

## 2022-08-19 NOTE — PROGRESS NOTES
CRS Note    Pt continues to be somnolent with periods of agitation. Still likely to be from pain and anxiety medicine. Sister at bedside. NGT pulled out yesterday- no vomiting since yesterday, but continues to belch  At time of assessment pt having LE dopplers  She vomited multiple times over last 48 hours and appears to have aspirated based on xray-  ABx were started to cover aspiration pneumonia. Pt has been adamant about wanting to get out of bed today.       PE  Abd soft, distended  Ileostomy pink and patent with red rubber sitting flush with skin- scant stool in the bag  Incisions c/d/I  ANDRESSA serosang    PLAN  - Await labs  - WBC trending downward- low threshold to obtain CT  - NGT for decompression if any more vomiting  - IVF  - stop ativan and decrease dilaudid  - lidocaine patches for back pain  - Daily Labs  - PT/OT ordered  - Appreciate hospitalist input and assistance with pneumonia and AMS      Pt discussed with Dr Lucia Bruce, NP

## 2022-08-19 NOTE — PROGRESS NOTES
[facility] [place] [facility]  [address] [facility], [address] [place] [address]  [phone] [address] Adult  Hospitalist Group                                                                                          Hospitalist Progress Note  Valentin Allen MD        Date of Service:  2022  NAME:  Brittani Hand  :  1950  MRN:  [id number]      Admission Summary:   Hospitalist consulted for AHRF and aspiration pna    Interval history / Subjective:     Patient complaining of severe pain and nausea. Assessment & Plan: Altered mental status: Due to narcotics and benzos. CT head, ABG, TSH, ammonia level all unremarkable. Recent UA with no signs of infection. Acute hypoxic respiratory failure: Possible aspiration given events of nausea vomiting. Continue nasal cannula. Will place on continuous O2 monitoring given concern for respiratory status and narcotics. Continue IV Zosyn. DuoNebs as needed. Incentive spirometry     Intractable nausea vomiting: X-ray noting possible ileus or early obstruction. Plan for replacing NGT and getting CT with oral contrast. Patient pulled out previous NGT and will need a sitter to ensure it remains in. Colectomy with colorectal anastomosis/ diverting loop ileostomy/ bilateral ureteral stents: Management per surgery. Elevated blood pressure: History of hypertension. We will start clonidine patch given NPO. Thyroidism: TSH wnl . Resume Synthroid when able to tolerate p.o. History of head injury with memory loss: Supportive care.     Code status: full  DVT prophylaxis: [facility] Problems  Date Reviewed: 2022            Codes Class Noted POA    Colovesical fistula ICD-10-CM: N32.1  ICD-9-CM: 596.1  2022 Unknown        Hypothyroidism ICD-10-CM: E03.9  ICD-9-CM: 244.9  Unknown Yes        Benign essential HTN ICD-10-CM: I10  ICD-9-CM: 401.1  Unknown Yes             Review of Systems:   A comprehensive review of systems was negative except for that written in the HPI. Vital Signs:    Last 24hrs VS reviewed since prior progress note. Most recent are:  Visit Vitals  BP (!) 181/97 (BP 1 Location: Left upper arm, BP Patient Position: At rest)   Pulse 85   Temp 97.9 °F (36.6 °C)   Resp 18   Ht 5' 4\" (1.626 m)   Wt 112.6 kg (248 lb 5.6 oz)   SpO2 92%   BMI 42.63 kg/m²         Intake/Output Summary (Last 24 hours) at 8/19/2022 1624  Last data filed at 8/19/2022 1536  Gross per 24 hour   Intake 1350 ml   Output 3055 ml   Net -1705 ml        Physical Examination:             Constitutional:  Moderate pain, alert and awake and oriented   ENT:  Oral mucosa moist, oropharynx benign. Resp:  Course sounds bilaterally   CV:  Regular rhythm, normal rate, no murmurs, gallops, rubs    GI:  Soft, non distended, diffuse nonspecific tenderness     Musculoskeletal:  No edema, warm, 2+ pulses throughout    Neurologic:  Moves all extremities. AAOx3, CN II-XII reviewed     Psych:  anxious. Poor insight       Data Review:    Review and/or order of clinical lab test      Labs:     Recent Labs     08/19/22  0856 08/18/22  1149   WBC 14.1* 16.9*   HGB 12.2 12.8   HCT 35.0 38.2    213     Recent Labs     08/19/22  0231 08/18/22  1149 08/17/22  0308   * 137 139   K 4.3 4.4 4.1    107 110*   CO2 24 25 23   BUN 10 10 9   CREA 1.28* 1.27* 1.05*   * 126* 157*   CA 8.6 8.8 8.6   MG  --   --  2.5*   PHOS 1.1*  --  1.8*     Recent Labs     08/19/22  0231 08/18/22  1149 08/17/22  0308   ALT 27 24 22   AP 79 85 66   TBILI 2.0* 1.5* 1.2*   TP 6.4 6.8 6.4   ALB 2.5* 2.9* 3.1*   GLOB 3.9 3.9 3.3     No results for input(s): INR, PTP, APTT, INREXT in the last 72 hours. No results for input(s): FE, TIBC, PSAT, FERR in the last 72 hours. No results found for: FOL, RBCF   Recent Labs     08/18/22  1355   PH 7.46*   PCO2 33*   PO2 55*     No results for input(s): CPK, CKNDX, TROIQ in the last 72 hours.     No lab exists for component: CPKMB  Lab Results   Component Value Date/Time    Cholesterol, total 193 12/10/2020 02:41 PM    HDL Cholesterol 36 12/10/2020 02:41 PM    LDL, calculated 136 (H) 12/10/2020 02:41 PM    Triglyceride 105 12/10/2020 02:41 PM    CHOL/HDL Ratio 5.4 (H) 12/10/2020 02:41 PM     No results found for: [facility]  Lab Results   Component Value Date/Time    Color YELLOW/STRAW 08/16/2022 08:16 AM    Appearance CLOUDY (A) 08/16/2022 08:16 AM    Specific gravity 1.018 08/16/2022 08:16 AM    pH (UA) 5.0 08/16/2022 08:16 AM    Protein 30 (A) 08/16/2022 08:16 AM    Glucose Negative 08/16/2022 08:16 AM    Ketone 15 (A) 08/16/2022 08:16 AM    Bilirubin Negative 08/16/2022 08:16 AM    Urobilinogen 0.2 08/16/2022 08:16 AM    Nitrites Negative 08/16/2022 08:16 AM    Leukocyte Esterase MODERATE (A) 08/16/2022 08:16 AM    Epithelial cells MODERATE (A) 08/16/2022 08:16 AM    Bacteria Negative 08/16/2022 08:16 AM    WBC 20-50 08/16/2022 08:16 AM    RBC 10-20 08/16/2022 08:16 AM         Medications Reviewed:     Current Facility-Administered Medications   Medication Dose Route Frequency    enoxaparin (LOVENOX) injection 30 mg  30 mg SubCUTAneous Q12H    lidocaine 4 % patch 2 Patch  2 Patch TransDERmal Q24H    HYDROmorphone (DILAUDID) syringe 0.2 mg  0.2 mg IntraVENous Q4H PRN    cloNIDine (CATAPRES) 0.1 mg/24 hr patch 1 Patch  1 Patch TransDERmal Q7D    phenol throat spray (CHLORASEPTIC) 1 Spray  1 Spray Oral PRN    albuterol-ipratropium (DUO-NEB) 2.5 MG-0.5 MG/3 ML  3 mL Nebulization Q6H PRN    pantoprazole (PROTONIX) 40 mg in 0.9% sodium chloride 10 mL injection  40 mg IntraVENous Q12H    ondansetron (ZOFRAN) injection 4 mg  4 mg IntraVENous Q6H    naloxone (NARCAN) injection 0.2 mg  0.2 mg IntraVENous EVERY 2 MINUTES AS NEEDED    [Held by provider] metoprolol succinate (TOPROL-XL) XL tablet 50 mg  50 mg Oral QAM    dextrose 5% - 0.45% NaCl with KCl 20 mEq/L infusion  125 mL/hr IntraVENous CONTINUOUS    sodium chloride (NS) flush 5-40 mL  5-40 mL IntraVENous Q8H    sodium chloride (NS) flush 5-40 mL  5-40 mL IntraVENous PRN    [Held by provider] naloxegoL (MOVANTIK) tablet 25 mg  25 mg Oral DAILY    [Held by provider] acetaminophen (TYLENOL) tablet 1,000 mg  1,000 mg Oral Q6H    alum-mag hydroxide-simeth (MYLANTA) oral suspension 15 mL  15 mL Oral Q6H PRN    hydrALAZINE (APRESOLINE) 20 mg/mL injection 10 mg  10 mg IntraVENous Q6H PRN    [Held by provider] levothyroxine (SYNTHROID) tablet 125 mcg  125 mcg Oral 6am    piperacillin-tazobactam (ZOSYN) 3.375 g in 0.9% sodium chloride (MBP/ADV) 100 mL MBP  3.375 g IntraVENous Q8H     ______________________________________________________________________  EXPECTED LENGTH OF STAY: 5d 16h  ACTUAL LENGTH OF STAY:          3                 Maria Victoria Agudelo MD

## 2022-08-19 NOTE — ROUTINE PROCESS
Bedside and Verbal shift change report given to Elier Mckinney (oncoming nurse) by Servando Molina (offgoing nurse). Report included the following information SBAR and Kardex.

## 2022-08-19 NOTE — PROGRESS NOTES
Problem: Falls - Risk of  Goal: *Absence of Falls  Outcome: Progressing Towards Goal  Note: Fall Risk Interventions:  Mobility Interventions: Bed/chair exit alarm         Medication Interventions: Evaluate medications/consider consulting pharmacy    Elimination Interventions: Bed/chair exit alarm              Problem: Patient Education: Go to Patient Education Activity  Goal: Patient/Family Education  Outcome: Progressing Towards Goal     Problem: Pain  Goal: *Control of Pain  Outcome: Progressing Towards Goal     Problem: Patient Education: Go to Patient Education Activity  Goal: Patient/Family Education  Outcome: Progressing Towards Goal     Problem: Patient Education: Go to Patient Education Activity  Goal: Patient/Family Education  Outcome: Progressing Towards Goal     Problem: Impaired Skin Integrity/Pressure Injury Treatment  Goal: *Improvement of Existing Pressure Injury  Outcome: Progressing Towards Goal  Goal: *Prevention of pressure injury  Outcome: Progressing Towards Goal  Note: Pressure Injury Interventions:  Sensory Interventions: Assess changes in LOC, Assess need for specialty bed    Moisture Interventions: Absorbent underpads, Apply protective barrier, creams and emollients    Activity Interventions: Increase time out of bed    Mobility Interventions: HOB 30 degrees or less    Nutrition Interventions: Document food/fluid/supplement intake    Friction and Shear Interventions: HOB 30 degrees or less, Minimize layers                Problem: Patient Education: Go to Patient Education Activity  Goal: Patient/Family Education  Outcome: Progressing Towards Goal     Problem: Anxiety  Goal: *Alleviation of anxiety  Outcome: Progressing Towards Goal  Goal: *Alleviation of anxiety (Palliative Care)  Outcome: Progressing Towards Goal     Problem: Patient Education: Go to Patient Education Activity  Goal: Patient/Family Education  Outcome: Progressing Towards Goal     Problem: Pressure Injury - Risk of  Goal: *Prevention of pressure injury  Outcome: Progressing Towards Goal  Note: Pressure Injury Interventions:  Sensory Interventions: Assess changes in LOC, Assess need for specialty bed    Moisture Interventions: Absorbent underpads, Apply protective barrier, creams and emollients    Activity Interventions: Increase time out of bed    Mobility Interventions: HOB 30 degrees or less    Nutrition Interventions: Document food/fluid/supplement intake    Friction and Shear Interventions: HOB 30 degrees or less, Minimize layers                Problem: Patient Education: Go to Patient Education Activity  Goal: Patient/Family Education  Outcome: Progressing Towards Goal

## 2022-08-19 NOTE — PROGRESS NOTES
8/19/2022  9:14 AM  Update via chart review, Pt is continuing  to require medical management for POD # 3 ROBOTIC SPLENIC FLEXURE MOBILIZATION, ROBOTIC CONVERTED TO LAPAROSCOPY ASSIST, EXTENDED LEFT COLECTOMY WITH COLORECTAL ANASTOMOSIS AND DIVERTING LOOP ILEOSTOMY  CYSTOSCOPY WITH INSERTION BILATERAL URETERAL STENTS    Transition of Care Plan:                    RUR 8% Low Risk of Readmission/Green  LOS 3 Days   1. Medical management continues, RRT 8/18 for confusion/agitation, pt on 2L O2 NC, wean as tolerated, CM to follow for home O2 needs  2. POD#3 Colectomy with colorectal anastomosis/ diverting loop ileostomy  3. cystoscopy w/ insertion of B/L ureteral stents   4. Wound Care following,new colostomy,  HH [facility], however, likely to need SNF at DC   5. Pt will need PT/OT evals when stable as likely to require SNF at DC   6. Pt lives w/ sister and brother-in-law and was ambulatory w/ a rollator, independently performed her ADLs  7. DC when stable dispo pending progress   8.  Outpatient follow up Surgery, PCP  9 Transport TBD pending progress  Sergei Vidal, ANGELIKA

## 2022-08-19 NOTE — PROGRESS NOTES
[facility] Lovenox Dosing 08/19/22  Lovenox dose change per protocol  Physician: Dr Katie Maldonado    Estimated Creatinine Clearance: 48.1 mL/min (A) (based on SCr of 1.28 mg/dL (H)).   platelets    Protocol  Enoxaparin prophylaxis dosing (medically ill, surgical patients)   Patient Weight (kg)     50 and below 51 - 100 101 - 150 151 - 174 175 or greater         Estimated CrCl  (ml/min) 30 or greater   30 mg SUBQ daily   40 mg SUBQ daily 30 mg SUBQ BID  40 mg SUBQ BID 60mg SUBQ BID      15-29 UFH 5000 units SUBQ BID   30 mg SUBQ daily 30 mg SUBQ daily 40 mg SUBQ daily   60 mg SUBQ daily      Less than 15 or Dialysis UFH 5000 units SUBQ BID   UFH 5000 units SUBQ TID UFH 7500 units SUBQ TID       Current dose: Lovenox 40 mg SC daily  Recommendation: Lovenox 30 mg SC q12hr    Thank you  Sharrell Burkitt, PharmD  [phone]

## 2022-08-20 ENCOUNTER — ANESTHESIA (OUTPATIENT)
Dept: SURGERY | Age: 72
DRG: 329 | End: 2022-08-20
Payer: MEDICARE

## 2022-08-20 ENCOUNTER — ANESTHESIA EVENT (OUTPATIENT)
Dept: SURGERY | Age: 72
DRG: 329 | End: 2022-08-20
Payer: MEDICARE

## 2022-08-20 ENCOUNTER — APPOINTMENT (OUTPATIENT)
Dept: GENERAL RADIOLOGY | Age: 72
DRG: 329 | End: 2022-08-20
Attending: COLON & RECTAL SURGERY
Payer: MEDICARE

## 2022-08-20 LAB
ALBUMIN SERPL-MCNC: 2.4 G/DL (ref 3.5–5)
ALBUMIN/GLOB SERPL: 0.6 {RATIO} (ref 1.1–2.2)
ALP SERPL-CCNC: 98 U/L (ref 45–117)
ALT SERPL-CCNC: 40 U/L (ref 12–78)
ANION GAP SERPL CALC-SCNC: 5 MMOL/L (ref 5–15)
AST SERPL-CCNC: 34 U/L (ref 15–37)
BASOPHILS # BLD: 0 K/UL (ref 0–0.1)
BASOPHILS NFR BLD: 0 % (ref 0–1)
BILIRUB SERPL-MCNC: 2 MG/DL (ref 0.2–1)
BUN SERPL-MCNC: 10 MG/DL (ref 6–20)
BUN/CREAT SERPL: 7 (ref 12–20)
CALCIUM SERPL-MCNC: 8.6 MG/DL (ref 8.5–10.1)
CHLORIDE SERPL-SCNC: 103 MMOL/L (ref 97–108)
CO2 SERPL-SCNC: 25 MMOL/L (ref 21–32)
CREAT SERPL-MCNC: 1.36 MG/DL (ref 0.55–1.02)
DIFFERENTIAL METHOD BLD: ABNORMAL
EOSINOPHIL # BLD: 0.2 K/UL (ref 0–0.4)
EOSINOPHIL NFR BLD: 2 % (ref 0–7)
ERYTHROCYTE [DISTWIDTH] IN BLOOD BY AUTOMATED COUNT: 12.2 % (ref 11.5–14.5)
GLOBULIN SER CALC-MCNC: 3.8 G/DL (ref 2–4)
GLUCOSE SERPL-MCNC: 144 MG/DL (ref 65–100)
HCT VFR BLD AUTO: 35.2 % (ref 35–47)
HGB BLD-MCNC: 11.9 G/DL (ref 11.5–16)
IMM GRANULOCYTES # BLD AUTO: 0.1 K/UL (ref 0–0.04)
IMM GRANULOCYTES NFR BLD AUTO: 1 % (ref 0–0.5)
LYMPHOCYTES # BLD: 1.2 K/UL (ref 0.8–3.5)
LYMPHOCYTES NFR BLD: 11 % (ref 12–49)
M PNEUMO IGG SER IA-ACNC: 340 U/ML (ref 0–99)
M PNEUMO IGM SER IA-ACNC: <770 U/ML (ref 0–769)
MCH RBC QN AUTO: 33.1 PG (ref 26–34)
MCHC RBC AUTO-ENTMCNC: 33.8 G/DL (ref 30–36.5)
MCV RBC AUTO: 98.1 FL (ref 80–99)
MONOCYTES # BLD: 0.9 K/UL (ref 0–1)
MONOCYTES NFR BLD: 8 % (ref 5–13)
NEUTS SEG # BLD: 8.4 K/UL (ref 1.8–8)
NEUTS SEG NFR BLD: 78 % (ref 32–75)
NRBC # BLD: 0 K/UL (ref 0–0.01)
NRBC BLD-RTO: 0 PER 100 WBC
PHOSPHATE SERPL-MCNC: 1.2 MG/DL (ref 2.6–4.7)
PLATELET # BLD AUTO: 207 K/UL (ref 150–400)
PMV BLD AUTO: 11.1 FL (ref 8.9–12.9)
POTASSIUM SERPL-SCNC: 4.1 MMOL/L (ref 3.5–5.1)
PROT SERPL-MCNC: 6.2 G/DL (ref 6.4–8.2)
RBC # BLD AUTO: 3.59 M/UL (ref 3.8–5.2)
SODIUM SERPL-SCNC: 133 MMOL/L (ref 136–145)
WBC # BLD AUTO: 10.7 K/UL (ref 3.6–11)

## 2022-08-20 PROCEDURE — 76210000016 HC OR PH I REC 1 TO 1.5 HR: Performed by: STUDENT IN AN ORGANIZED HEALTH CARE EDUCATION/TRAINING PROGRAM

## 2022-08-20 PROCEDURE — 74011250636 HC RX REV CODE- 250/636: Performed by: STUDENT IN AN ORGANIZED HEALTH CARE EDUCATION/TRAINING PROGRAM

## 2022-08-20 PROCEDURE — C1769 GUIDE WIRE: HCPCS | Performed by: STUDENT IN AN ORGANIZED HEALTH CARE EDUCATION/TRAINING PROGRAM

## 2022-08-20 PROCEDURE — 74011000250 HC RX REV CODE- 250: Performed by: NURSE ANESTHETIST, CERTIFIED REGISTERED

## 2022-08-20 PROCEDURE — 74011000258 HC RX REV CODE- 258: Performed by: STUDENT IN AN ORGANIZED HEALTH CARE EDUCATION/TRAINING PROGRAM

## 2022-08-20 PROCEDURE — 94761 N-INVAS EAR/PLS OXIMETRY MLT: CPT

## 2022-08-20 PROCEDURE — 74011000250 HC RX REV CODE- 250: Performed by: STUDENT IN AN ORGANIZED HEALTH CARE EDUCATION/TRAINING PROGRAM

## 2022-08-20 PROCEDURE — 74011250636 HC RX REV CODE- 250/636: Performed by: NURSE ANESTHETIST, CERTIFIED REGISTERED

## 2022-08-20 PROCEDURE — 77010033678 HC OXYGEN DAILY

## 2022-08-20 PROCEDURE — 76060000032 HC ANESTHESIA 0.5 TO 1 HR: Performed by: STUDENT IN AN ORGANIZED HEALTH CARE EDUCATION/TRAINING PROGRAM

## 2022-08-20 PROCEDURE — 74011250636 HC RX REV CODE- 250/636: Performed by: NURSE PRACTITIONER

## 2022-08-20 PROCEDURE — 74011250636 HC RX REV CODE- 250/636: Performed by: INTERNAL MEDICINE

## 2022-08-20 PROCEDURE — 74420 UROGRAPHY RTRGR +-KUB: CPT

## 2022-08-20 PROCEDURE — 85025 COMPLETE CBC W/AUTO DIFF WBC: CPT

## 2022-08-20 PROCEDURE — 80053 COMPREHEN METABOLIC PANEL: CPT

## 2022-08-20 PROCEDURE — C9113 INJ PANTOPRAZOLE SODIUM, VIA: HCPCS | Performed by: INTERNAL MEDICINE

## 2022-08-20 PROCEDURE — 97110 THERAPEUTIC EXERCISES: CPT

## 2022-08-20 PROCEDURE — C9113 INJ PANTOPRAZOLE SODIUM, VIA: HCPCS | Performed by: STUDENT IN AN ORGANIZED HEALTH CARE EDUCATION/TRAINING PROGRAM

## 2022-08-20 PROCEDURE — 74011000258 HC RX REV CODE- 258: Performed by: COLON & RECTAL SURGERY

## 2022-08-20 PROCEDURE — 65270000029 HC RM PRIVATE

## 2022-08-20 PROCEDURE — C1758 CATHETER, URETERAL: HCPCS | Performed by: STUDENT IN AN ORGANIZED HEALTH CARE EDUCATION/TRAINING PROGRAM

## 2022-08-20 PROCEDURE — 74011000250 HC RX REV CODE- 250: Performed by: COLON & RECTAL SURGERY

## 2022-08-20 PROCEDURE — C2617 STENT, NON-COR, TEM W/O DEL: HCPCS | Performed by: STUDENT IN AN ORGANIZED HEALTH CARE EDUCATION/TRAINING PROGRAM

## 2022-08-20 PROCEDURE — 74011000250 HC RX REV CODE- 250: Performed by: INTERNAL MEDICINE

## 2022-08-20 PROCEDURE — 2709999900 HC NON-CHARGEABLE SUPPLY

## 2022-08-20 PROCEDURE — 76010000138 HC OR TIME 0.5 TO 1 HR: Performed by: STUDENT IN AN ORGANIZED HEALTH CARE EDUCATION/TRAINING PROGRAM

## 2022-08-20 PROCEDURE — 2709999900 HC NON-CHARGEABLE SUPPLY: Performed by: STUDENT IN AN ORGANIZED HEALTH CARE EDUCATION/TRAINING PROGRAM

## 2022-08-20 PROCEDURE — 36415 COLL VENOUS BLD VENIPUNCTURE: CPT

## 2022-08-20 PROCEDURE — 74011000250 HC RX REV CODE- 250: Performed by: NURSE PRACTITIONER

## 2022-08-20 PROCEDURE — 84100 ASSAY OF PHOSPHORUS: CPT

## 2022-08-20 PROCEDURE — 77030026438 HC STYL ET INTUB CARD -A: Performed by: ANESTHESIOLOGY

## 2022-08-20 PROCEDURE — 74011250636 HC RX REV CODE- 250/636: Performed by: COLON & RECTAL SURGERY

## 2022-08-20 PROCEDURE — 97163 PT EVAL HIGH COMPLEX 45 MIN: CPT

## 2022-08-20 PROCEDURE — 0T768DZ DILATION OF RIGHT URETER WITH INTRALUMINAL DEVICE, VIA NATURAL OR ARTIFICIAL OPENING ENDOSCOPIC: ICD-10-PCS | Performed by: STUDENT IN AN ORGANIZED HEALTH CARE EDUCATION/TRAINING PROGRAM

## 2022-08-20 PROCEDURE — 97165 OT EVAL LOW COMPLEX 30 MIN: CPT | Performed by: OCCUPATIONAL THERAPIST

## 2022-08-20 PROCEDURE — 97110 THERAPEUTIC EXERCISES: CPT | Performed by: OCCUPATIONAL THERAPIST

## 2022-08-20 PROCEDURE — 74011250636 HC RX REV CODE- 250/636: Performed by: ANESTHESIOLOGY

## 2022-08-20 PROCEDURE — 97530 THERAPEUTIC ACTIVITIES: CPT

## 2022-08-20 PROCEDURE — 77030008684 HC TU ET CUF COVD -B: Performed by: ANESTHESIOLOGY

## 2022-08-20 DEVICE — URETERAL STENT
Type: IMPLANTABLE DEVICE | Site: URETER | Status: FUNCTIONAL
Brand: CONTOUR™

## 2022-08-20 RX ORDER — ROCURONIUM BROMIDE 10 MG/ML
INJECTION, SOLUTION INTRAVENOUS AS NEEDED
Status: DISCONTINUED | OUTPATIENT
Start: 2022-08-20 | End: 2022-08-20 | Stop reason: HOSPADM

## 2022-08-20 RX ORDER — DEXAMETHASONE SODIUM PHOSPHATE 4 MG/ML
INJECTION, SOLUTION INTRA-ARTICULAR; INTRALESIONAL; INTRAMUSCULAR; INTRAVENOUS; SOFT TISSUE AS NEEDED
Status: DISCONTINUED | OUTPATIENT
Start: 2022-08-20 | End: 2022-08-20 | Stop reason: HOSPADM

## 2022-08-20 RX ORDER — ONDANSETRON 2 MG/ML
4 INJECTION INTRAMUSCULAR; INTRAVENOUS AS NEEDED
Status: DISCONTINUED | OUTPATIENT
Start: 2022-08-20 | End: 2022-08-20 | Stop reason: HOSPADM

## 2022-08-20 RX ORDER — MIDAZOLAM HYDROCHLORIDE 1 MG/ML
INJECTION, SOLUTION INTRAMUSCULAR; INTRAVENOUS AS NEEDED
Status: DISCONTINUED | OUTPATIENT
Start: 2022-08-20 | End: 2022-08-20 | Stop reason: HOSPADM

## 2022-08-20 RX ORDER — FENTANYL CITRATE 50 UG/ML
INJECTION, SOLUTION INTRAMUSCULAR; INTRAVENOUS AS NEEDED
Status: DISCONTINUED | OUTPATIENT
Start: 2022-08-20 | End: 2022-08-20 | Stop reason: HOSPADM

## 2022-08-20 RX ORDER — HYDROMORPHONE HYDROCHLORIDE 1 MG/ML
.25-1 INJECTION, SOLUTION INTRAMUSCULAR; INTRAVENOUS; SUBCUTANEOUS
Status: DISCONTINUED | OUTPATIENT
Start: 2022-08-20 | End: 2022-08-20 | Stop reason: HOSPADM

## 2022-08-20 RX ORDER — SODIUM CHLORIDE, SODIUM LACTATE, POTASSIUM CHLORIDE, CALCIUM CHLORIDE 600; 310; 30; 20 MG/100ML; MG/100ML; MG/100ML; MG/100ML
125 INJECTION, SOLUTION INTRAVENOUS CONTINUOUS
Status: DISCONTINUED | OUTPATIENT
Start: 2022-08-20 | End: 2022-08-20 | Stop reason: HOSPADM

## 2022-08-20 RX ORDER — LIDOCAINE HYDROCHLORIDE 10 MG/ML
0.1 INJECTION, SOLUTION EPIDURAL; INFILTRATION; INTRACAUDAL; PERINEURAL AS NEEDED
Status: DISCONTINUED | OUTPATIENT
Start: 2022-08-20 | End: 2022-08-30 | Stop reason: HOSPADM

## 2022-08-20 RX ORDER — DIPHENHYDRAMINE HYDROCHLORIDE 50 MG/ML
12.5 INJECTION, SOLUTION INTRAMUSCULAR; INTRAVENOUS AS NEEDED
Status: DISCONTINUED | OUTPATIENT
Start: 2022-08-20 | End: 2022-08-20 | Stop reason: HOSPADM

## 2022-08-20 RX ORDER — SUCCINYLCHOLINE CHLORIDE 20 MG/ML
INJECTION INTRAMUSCULAR; INTRAVENOUS AS NEEDED
Status: DISCONTINUED | OUTPATIENT
Start: 2022-08-20 | End: 2022-08-20 | Stop reason: HOSPADM

## 2022-08-20 RX ORDER — LIDOCAINE HYDROCHLORIDE 20 MG/ML
INJECTION, SOLUTION EPIDURAL; INFILTRATION; INTRACAUDAL; PERINEURAL AS NEEDED
Status: DISCONTINUED | OUTPATIENT
Start: 2022-08-20 | End: 2022-08-20 | Stop reason: HOSPADM

## 2022-08-20 RX ORDER — SODIUM CHLORIDE, SODIUM LACTATE, POTASSIUM CHLORIDE, CALCIUM CHLORIDE 600; 310; 30; 20 MG/100ML; MG/100ML; MG/100ML; MG/100ML
INJECTION, SOLUTION INTRAVENOUS
Status: DISCONTINUED | OUTPATIENT
Start: 2022-08-20 | End: 2022-08-20 | Stop reason: HOSPADM

## 2022-08-20 RX ORDER — PROPOFOL 10 MG/ML
INJECTION, EMULSION INTRAVENOUS AS NEEDED
Status: DISCONTINUED | OUTPATIENT
Start: 2022-08-20 | End: 2022-08-20 | Stop reason: HOSPADM

## 2022-08-20 RX ORDER — SODIUM CHLORIDE, SODIUM LACTATE, POTASSIUM CHLORIDE, CALCIUM CHLORIDE 600; 310; 30; 20 MG/100ML; MG/100ML; MG/100ML; MG/100ML
75 INJECTION, SOLUTION INTRAVENOUS CONTINUOUS
Status: DISCONTINUED | OUTPATIENT
Start: 2022-08-20 | End: 2022-08-20 | Stop reason: CLARIF

## 2022-08-20 RX ADMIN — HYDROMORPHONE HYDROCHLORIDE 0.2 MG: 1 INJECTION, SOLUTION INTRAMUSCULAR; INTRAVENOUS; SUBCUTANEOUS at 01:05

## 2022-08-20 RX ADMIN — SODIUM CHLORIDE, PRESERVATIVE FREE 10 ML: 5 INJECTION INTRAVENOUS at 05:02

## 2022-08-20 RX ADMIN — HYDRALAZINE HYDROCHLORIDE 10 MG: 20 INJECTION INTRAMUSCULAR; INTRAVENOUS at 08:37

## 2022-08-20 RX ADMIN — POTASSIUM CHLORIDE, DEXTROSE MONOHYDRATE AND SODIUM CHLORIDE 125 ML/HR: 150; 5; 450 INJECTION, SOLUTION INTRAVENOUS at 23:22

## 2022-08-20 RX ADMIN — FENTANYL CITRATE 25 MCG: 50 INJECTION, SOLUTION INTRAMUSCULAR; INTRAVENOUS at 14:54

## 2022-08-20 RX ADMIN — SODIUM CHLORIDE, PRESERVATIVE FREE 40 MG: 5 INJECTION INTRAVENOUS at 21:56

## 2022-08-20 RX ADMIN — HYDROMORPHONE HYDROCHLORIDE 1 MG: 1 INJECTION, SOLUTION INTRAMUSCULAR; INTRAVENOUS; SUBCUTANEOUS at 15:59

## 2022-08-20 RX ADMIN — SODIUM CHLORIDE, POTASSIUM CHLORIDE, SODIUM LACTATE AND CALCIUM CHLORIDE: 600; 310; 30; 20 INJECTION, SOLUTION INTRAVENOUS at 14:48

## 2022-08-20 RX ADMIN — SODIUM CHLORIDE, PRESERVATIVE FREE 40 MG: 5 INJECTION INTRAVENOUS at 08:37

## 2022-08-20 RX ADMIN — ROCURONIUM BROMIDE 5 MG: 10 INJECTION INTRAVENOUS at 14:55

## 2022-08-20 RX ADMIN — ONDANSETRON HYDROCHLORIDE 4 MG: 2 SOLUTION INTRAMUSCULAR; INTRAVENOUS at 05:01

## 2022-08-20 RX ADMIN — SODIUM CHLORIDE, PRESERVATIVE FREE 10 ML: 5 INJECTION INTRAVENOUS at 23:22

## 2022-08-20 RX ADMIN — PIPERACILLIN AND TAZOBACTAM 3.38 G: 3; .375 INJECTION, POWDER, FOR SOLUTION INTRAVENOUS at 01:10

## 2022-08-20 RX ADMIN — ONDANSETRON HYDROCHLORIDE 4 MG: 2 SOLUTION INTRAMUSCULAR; INTRAVENOUS at 17:30

## 2022-08-20 RX ADMIN — PIPERACILLIN AND TAZOBACTAM 3.38 G: 3; .375 INJECTION, POWDER, FOR SOLUTION INTRAVENOUS at 17:31

## 2022-08-20 RX ADMIN — DEXAMETHASONE SODIUM PHOSPHATE 4 MG: 4 INJECTION, SOLUTION INTRAMUSCULAR; INTRAVENOUS at 15:12

## 2022-08-20 RX ADMIN — POTASSIUM CHLORIDE, DEXTROSE MONOHYDRATE AND SODIUM CHLORIDE 125 ML/HR: 150; 5; 450 INJECTION, SOLUTION INTRAVENOUS at 05:12

## 2022-08-20 RX ADMIN — SODIUM CHLORIDE, PRESERVATIVE FREE 10 ML: 5 INJECTION INTRAVENOUS at 21:56

## 2022-08-20 RX ADMIN — HYDROMORPHONE HYDROCHLORIDE 0.2 MG: 1 INJECTION, SOLUTION INTRAMUSCULAR; INTRAVENOUS; SUBCUTANEOUS at 21:56

## 2022-08-20 RX ADMIN — FENTANYL CITRATE 25 MCG: 50 INJECTION, SOLUTION INTRAMUSCULAR; INTRAVENOUS at 14:51

## 2022-08-20 RX ADMIN — HYDROMORPHONE HYDROCHLORIDE 0.2 MG: 1 INJECTION, SOLUTION INTRAMUSCULAR; INTRAVENOUS; SUBCUTANEOUS at 09:45

## 2022-08-20 RX ADMIN — ENOXAPARIN SODIUM 30 MG: 100 INJECTION SUBCUTANEOUS at 12:24

## 2022-08-20 RX ADMIN — HYDROMORPHONE HYDROCHLORIDE 0.2 MG: 1 INJECTION, SOLUTION INTRAMUSCULAR; INTRAVENOUS; SUBCUTANEOUS at 05:22

## 2022-08-20 RX ADMIN — ENOXAPARIN SODIUM 30 MG: 100 INJECTION SUBCUTANEOUS at 21:56

## 2022-08-20 RX ADMIN — HYDROMORPHONE HYDROCHLORIDE 0.2 MG: 1 INJECTION, SOLUTION INTRAMUSCULAR; INTRAVENOUS; SUBCUTANEOUS at 17:30

## 2022-08-20 RX ADMIN — LIDOCAINE HYDROCHLORIDE 40 MG: 20 INJECTION, SOLUTION EPIDURAL; INFILTRATION; INTRACAUDAL; PERINEURAL at 14:55

## 2022-08-20 RX ADMIN — PIPERACILLIN AND TAZOBACTAM 3.38 G: 3; .375 INJECTION, POWDER, FOR SOLUTION INTRAVENOUS at 12:24

## 2022-08-20 RX ADMIN — ONDANSETRON HYDROCHLORIDE 4 MG: 2 SOLUTION INTRAMUSCULAR; INTRAVENOUS at 23:22

## 2022-08-20 RX ADMIN — MIDAZOLAM HYDROCHLORIDE 0.5 MG: 2 INJECTION, SOLUTION INTRAMUSCULAR; INTRAVENOUS at 14:52

## 2022-08-20 RX ADMIN — PROPOFOL 100 MG: 10 INJECTION, EMULSION INTRAVENOUS at 14:57

## 2022-08-20 RX ADMIN — ONDANSETRON HYDROCHLORIDE 4 MG: 2 SOLUTION INTRAMUSCULAR; INTRAVENOUS at 12:24

## 2022-08-20 RX ADMIN — SUCCINYLCHOLINE CHLORIDE 180 MG: 20 INJECTION, SOLUTION INTRAMUSCULAR; INTRAVENOUS at 14:57

## 2022-08-20 RX ADMIN — HYDROMORPHONE HYDROCHLORIDE 0.2 MG: 1 INJECTION, SOLUTION INTRAMUSCULAR; INTRAVENOUS; SUBCUTANEOUS at 13:55

## 2022-08-20 NOTE — PROGRESS NOTES
[address] MD regarding pt complaining of sharp pain 10/10 on LLQ near ANDRESSA site. ANDRESSA is site in intact with no redness or swelling noted. ANDRESSA tubing has no clots and is draining. Pt received Dilaudid 0.2mg at 0520.     0710  Informed Pt that pain medication is due at 0900. Pt agreed to wait for pain medication. Bedside shift change report given to [address] [place]  (oncoming nurse) by Tony Hartley  (offgoing nurse). Report included the following information SBAR, Kardex, ED Summary, MAR, Accordion, and Recent Results.

## 2022-08-20 NOTE — PROGRESS NOTES
Problem: Falls - Risk of  Goal: *Absence of Falls  Description: Document So Blood Fall Risk and appropriate interventions in the flowsheet.   Outcome: Progressing Towards Goal  Note: Fall Risk Interventions:  Mobility Interventions: Assess mobility with egress test, Bed/chair exit alarm, Patient to call before getting OOB         Medication Interventions: Bed/chair exit alarm, Teach patient to arise slowly, Patient to call before getting OOB    Elimination Interventions: Bed/chair exit alarm, Call light in reach, Toileting schedule/hourly rounds              Problem: Pain  Goal: *Control of Pain  8/20/2022 0244 by Hollie Ann RN  Outcome: Progressing Towards Goal  8/20/2022 0243 by Hollie Ann RN  Outcome: Progressing Towards Goal

## 2022-08-20 NOTE — OP NOTES
Operative Note    Patient: Shaji Whipple  YOB: 1950  MRN: [id number]    Date of Procedure: 8/20/2022     Pre-Op Diagnosis: Right hydronephrosis    Post-Op Diagnosis: Same as preoperative diagnosis. Procedure(s):  CYSTOSCOPY, RIGHT URETERAL STENT INSERTION, RIGHT RETROGRADE PYELOGRAM    Surgeon(s):  Bren Rivera MD    Surgical Assistant: None    Anesthesia: General     Estimated Blood Loss (mL):  Minimal    Complications: None    Specimens: * No specimens in log *     Implants:   Implant Name Type Inv. Item Serial No.  Lot No. LRB No. Used Action   STENT URET 6FR L24CM PERCFLX HYDR+ SFT PGTL TAPR TIP W/O - SN/A  STENT URET 6FR L24CM PERCFLX HYDR+ SFT PGTL TAPR TIP W/O N/A [employer] [id number] Right 1 Implanted       Drains:   Olvin-Kenny Drain 08/16/22 Abdomen (Active)   Site Assessment Clean, dry, & intact 08/20/22 0608   Dressing Status Clean, dry, & intact 08/20/22 0608   Status Patent;Draining; Charged 08/20/22 0608   Drainage Color Serosanguinous; Yellow 08/20/22 8296   Output (ml) 15 ml 08/20/22 0608       Nasogastric Tube 08/19/22 (Active)   Site Assessment Clean, dry, & intact 08/20/22 0116   Securement Device Adhesive-based k[place];Tape 08/20/22 0116   G Port Status Intermittent Suction 08/20/22 0116   External Insertion Pedrito (cms) 74 cms 08/20/22 0116   Action Taken Placement verified (comment) 08/20/22 0116   Drainage Description Green 08/20/22 0116   Output (ml) 300 ml 08/20/22 0608       [REMOVED] Orogastric Tube 08/16/22 (Removed)   Output (ml) 600 ml 08/18/22 1414       [REMOVED] Orogastric Tube 08/18/22 (Removed)     Indication: She is a 47-year-old woman with a history of a colovesical fistula status post laparoscopic colectomy on 8/16. She has had persistent abdominal pain and a slight rise in her creatinine since surgery. CT yesterday revealed moderate right hydroureteronephrosis to the level of the pelvis.   After discussion of the management options, shows to proceed with the procedures listed above    Findings: Moderate hydronephrosis on retrograde pyelogram.  No extravasation. Turbid urine drained from the ureteral stent. Detailed Description of Procedure:   She was correct identified in the preoperative holding area and her sister who is POA signed informed consent. She was brought to the operating room and positioned supine on the operating table. General anesthesia was administered. She was then moved to dorsolithotomy position. All pressure points were padded appropriately. She was prepped and draped in the usual sterile fashion. A timeout was performed to confirm correct patient, site, and procedure. Cystourethroscopy was performed with a 21 Chadian sheath and 30 degrees. The urethra normal.  The bladder had diffuse cystitis and focal area of erythema at the left dome consistent with healing site of colovesical fistula closure. Care was taken not to over distend the bladder. Ureteral orifices were orthotopic. The right ureteral orifice did appear somewhat edematous. I passed a 5 Western Yl catheter into the distal ureter and gently instilled contrast which demonstrated moderate hydroureteronephrosis to the level of the distal ureter without filling defect or extravasation. A sensor wire was advanced through the ureteral catheter and passed easily into the right renal pelvis. Ureteral catheter was advanced further over this and the wire was removed. Hydronephrotic drip was observed. Gentle contrast was instilled which demonstrated moderate hydronephrosis of the collecting system. The wire was replaced and the ureteral catheter offloaded. Over the wire, a 6 x 24 double-J stent was placed with a short string left attached to the stent in the bladder. Turbid urine was seen draining alongside the stent.   Delayed images of the ureter were obtained showing prompt drainage of contrast.  Again, no extravasation of contrast was visualized. Given her recent cystorrhaphy, I elected not to perform diagnostic ureteroscopy given risks of running pressurized irrigant with a recently repaired bladder and turbid urine coming from the ureteral orifice. 25 Pashto Forte catheter placed. 10 cc added to balloon. Disposition: Return to inpatient bed. She will need outpatient follow-up and secondary ureteroscopy to evaluate the ureter at time of stent removal.  Forte catheter can be removed at the discretion of primary team usually about 7 to 10 days following cystorrhaphy.     Electronically Signed by Catherine Villareal MD on 8/20/2022 at 3:23 PM

## 2022-08-20 NOTE — PROGRESS NOTES
Bedside and Verbal shift change report given to Garett Barnett (oncoming nurse) by Candido Borrero RN (offgoing nurse). Report included the following information SBAR, Kardex, Procedure Summary, Intake/Output, MAR, Accordion, Recent Results, and Quality Measures.

## 2022-08-20 NOTE — PROGRESS NOTES
Ezequiel Ca [facility] [place] [address]  [address] [facility], [place], [address] [place] [address]  [phone] [address] Adult  Hospitalist Group                                                                                          Hospitalist Progress Note  Jose Rodriguez MD        Date of Service:  2022  NAME:  Eugene Ornelas  :  1950  MRN:  [id number]      Admission Summary:   Hospitalist consulted for AHRF and aspiration pna    Interval history / Subjective:   Patient somnolent but arousable. Feels better than yesterday but still \"not good\"     Assessment & Plan: Altered mental status: Due to narcotics and benzos. CT head, ABG, TSH, ammonia level all unremarkable. Recent UA with no signs of infection. Acute hypoxic respiratory failure: Possible aspiration given events of nausea vomiting. Continue nasal cannula. Continue IV Zosyn. DuoNebs as needed. Incentive spirometry     Intractable nausea vomiting: X-ray noting possible ileus or early obstruction. NGT replaced and patient now has a sitter. CT abd with oral contrast without leaking. Management per surgery. Hydroureteronephrosis : seen on CT. Consult urology. Colectomy with colorectal anastomosis/ diverting loop ileostomy/ bilateral ureteral stents: Management per surgery. Elevated blood pressure: History of hypertension. We will start clonidine patch given NPO. Thyroidism: TSH wnl . Resume Synthroid when able to tolerate p.o. History of head injury with memory loss: Supportive care.     Code status: full  DVT prophylaxis: [facility] Problems  Date Reviewed: 2022            Codes Class Noted POA    Colovesical fistula ICD-10-CM: N32.1  ICD-9-CM: 596.1  2022 Unknown        Hypothyroidism ICD-10-CM: E03.9  ICD-9-CM: 244.9  Unknown Yes        Benign essential HTN ICD-10-CM: I10  ICD-9-CM: 401.1  Unknown Yes           Review of Systems:   A comprehensive review of systems was negative except for that written in the HPI. Vital Signs:    Last 24hrs VS reviewed since prior progress note. Most recent are:  Visit Vitals  BP (!) 150/77 (BP 1 Location: Left lower arm, BP Patient Position: At rest)   Pulse 94   Temp 97.5 °F (36.4 °C)   Resp 18   Ht 5' 4\" (1.626 m)   Wt 112.6 kg (248 lb 5.6 oz)   SpO2 95%   BMI 42.63 kg/m²         Intake/Output Summary (Last 24 hours) at 8/20/2022 1354  Last data filed at 8/20/2022 5893  Gross per 24 hour   Intake --   Output 2555 ml   Net -2555 ml          Physical Examination:             Constitutional:  Moderate pain, alert and awake and oriented   ENT:  Oral mucosa moist, oropharynx benign. Resp:  Course sounds bilaterally   CV:  Regular rhythm, normal rate, no murmurs, gallops, rubs    GI:  Soft, non distended, diffuse nonspecific tenderness     Musculoskeletal:  No edema, warm, 2+ pulses throughout    Neurologic:  Moves all extremities. AAOx3, CN II-XII reviewed     Psych:  anxious. Poor insight       Data Review:    Review and/or order of clinical lab test      Labs:     Recent Labs     08/20/22  0340 08/19/22  0856   WBC 10.7 14.1*   HGB 11.9 12.2   HCT 35.2 35.0    207       Recent Labs     08/20/22  0340 08/19/22  0231 08/18/22  1149   * 134* 137   K 4.1 4.3 4.4    105 107   CO2 25 24 25   BUN 10 10 10   CREA 1.36* 1.28* 1.27*   * 132* 126*   CA 8.6 8.6 8.8   PHOS 1.2* 1.1*  --        Recent Labs     08/20/22  0340 08/19/22  0231 08/18/22  1149   ALT 40 27 24   AP 98 79 85   TBILI 2.0* 2.0* 1.5*   TP 6.2* 6.4 6.8   ALB 2.4* 2.5* 2.9*   GLOB 3.8 3.9 3.9       No results for input(s): INR, PTP, APTT, INREXT, INREXT in the last 72 hours. No results for input(s): FE, TIBC, PSAT, FERR in the last 72 hours. No results found for: FOL, RBCF   Recent Labs     08/18/22  1355   PH 7.46*   PCO2 33*   PO2 55*       No results for input(s): CPK, CKNDX, TROIQ in the last 72 hours.     No lab exists for component: CPKMB  Lab Results Component Value Date/Time    Cholesterol, total 193 12/10/2020 02:41 PM    HDL Cholesterol 36 12/10/2020 02:41 PM    LDL, calculated 136 (H) 12/10/2020 02:41 PM    Triglyceride 105 12/10/2020 02:41 PM    CHOL/HDL Ratio 5.4 (H) 12/10/2020 02:41 PM     No results found for: [facility]  Lab Results   Component Value Date/Time    Color YELLOW/STRAW 08/16/2022 08:16 AM    Appearance CLOUDY (A) 08/16/2022 08:16 AM    Specific gravity 1.018 08/16/2022 08:16 AM    pH (UA) 5.0 08/16/2022 08:16 AM    Protein 30 (A) 08/16/2022 08:16 AM    Glucose Negative 08/16/2022 08:16 AM    Ketone 15 (A) 08/16/2022 08:16 AM    Bilirubin Negative 08/16/2022 08:16 AM    Urobilinogen 0.2 08/16/2022 08:16 AM    Nitrites Negative 08/16/2022 08:16 AM    Leukocyte Esterase MODERATE (A) 08/16/2022 08:16 AM    Epithelial cells MODERATE (A) 08/16/2022 08:16 AM    Bacteria Negative 08/16/2022 08:16 AM    WBC 20-50 08/16/2022 08:16 AM    RBC 10-20 08/16/2022 08:16 AM         Medications Reviewed:     Current Facility-Administered Medications   Medication Dose Route Frequency    enoxaparin (LOVENOX) injection 30 mg  30 mg SubCUTAneous Q12H    lidocaine 4 % patch 2 Patch  2 Patch TransDERmal Q24H    HYDROmorphone (DILAUDID) syringe 0.2 mg  0.2 mg IntraVENous Q4H PRN    cloNIDine (CATAPRES) 0.1 mg/24 hr patch 1 Patch  1 Patch TransDERmal Q7D    phenol throat spray (CHLORASEPTIC) 1 Spray  1 Spray Oral PRN    albuterol-ipratropium (DUO-NEB) 2.5 MG-0.5 MG/3 ML  3 mL Nebulization Q6H PRN    pantoprazole (PROTONIX) 40 mg in 0.9% sodium chloride 10 mL injection  40 mg IntraVENous Q12H    ondansetron (ZOFRAN) injection 4 mg  4 mg IntraVENous Q6H    naloxone (NARCAN) injection 0.2 mg  0.2 mg IntraVENous EVERY 2 MINUTES AS NEEDED    [Held by provider] metoprolol succinate (TOPROL-XL) XL tablet 50 mg  50 mg Oral QAM    dextrose 5% - 0.45% NaCl with KCl 20 mEq/L infusion  125 mL/hr IntraVENous CONTINUOUS    sodium chloride (NS) flush 5-40 mL  5-40 mL IntraVENous Q8H sodium chloride (NS) flush 5-40 mL  5-40 mL IntraVENous PRN    [Held by provider] naloxegoL (MOVANTIK) tablet 25 mg  25 mg Oral DAILY    [Held by provider] acetaminophen (TYLENOL) tablet 1,000 mg  1,000 mg Oral Q6H    alum-mag hydroxide-simeth (MYLANTA) oral suspension 15 mL  15 mL Oral Q6H PRN    hydrALAZINE (APRESOLINE) 20 mg/mL injection 10 mg  10 mg IntraVENous Q6H PRN    [Held by provider] levothyroxine (SYNTHROID) tablet 125 mcg  125 mcg Oral 6am    piperacillin-tazobactam (ZOSYN) 3.375 g in 0.9% sodium chloride (MBP/ADV) 100 mL MBP  3.375 g IntraVENous Q8H     ______________________________________________________________________  EXPECTED LENGTH OF STAY: 5d 16h  ACTUAL LENGTH OF STAY:          4                 Melissa Baca MD
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
## 2022-08-20 NOTE — PROGRESS NOTES
No complaints at the moment. Had some pain last night. Visit Vitals  BP (!) 161/85   Pulse (!) 106   Temp 96.8 °F (36 °C)   Resp 16   Ht 5' 4\" (1.626 m)   Wt 112.6 kg (248 lb 5.6 oz)   SpO2 96%   BMI 42.63 kg/m²     Date 08/19/22 0700 - 08/20/22 0659 08/20/22 0700 - 08/21/22 0659   Shift [id number] 1900-0659 24 Hour Total 1900-0268 6232-2279 24 Hour Total   INTAKE   I.V.(mL/kg/hr)    250  250     Volume (lactated Ringers infusion)    250  250   Shift Total(mL/kg)    250(2.2)  250(2.2)   OUTPUT   Urine(mL/kg/hr) 550(0.4) 850(0.6) 1400(0.5) 125  125     Urine Voided  0 0        Urine Occurrence(s)  0 x 0 x        Urine Output (mL) (Urinary Catheter 08/16/22 Forte)  125  125   Emesis/NG output 950 1050 2000        Emesis  0 0        Emesis Occurrence(s)  0 x 0 x        Output (ml) (Nasogastric Tube 08/19/22) 950 1050 2000      Drains  55 55 20  20     Output (ml) (Olvin-Kenny Drain 08/16/22 Abdomen)  55 55 20  20   Other  0 0        Other Output  0 0      Stool  0 0 62  62     Stool Occurrence(s)  0 x 0 x        Stool  0 0        Output (ml) (Colostomy 08/16/22 Right ; Lower  Abdomen)    62  62   Blood  0 0        Estimated Blood Loss  0 0        Quantitative Blood Loss  0 0        Blood  0 0      Shift Total(mL/kg) 1500(13.3) 2942(17.5) 3455(30.7) 207(1.8)  207(1.8)   NET -1500 -1955 -3455 43  43   Weight (kg) 112.6 112.6 112.6 112.6 112.6 112.6     Abd soft  Stoma with stool in bag    A/p continue ngt for now  Pain meds  Appreciate everyone's help with management of this patient.

## 2022-08-20 NOTE — ANESTHESIA PREPROCEDURE EVALUATION
Relevant Problems   No relevant active problems       Anesthetic History   No history of anesthetic complications            Review of Systems / Medical History  Patient summary reviewed, nursing notes reviewed and pertinent labs reviewed    Pulmonary  Within defined limits                 Neuro/Psych   Within defined limits           Cardiovascular    Hypertension                   GI/Hepatic/Renal         Renal disease (right hydronephrosis)       Endo/Other      Hypothyroidism  Morbid obesity and arthritis     Other Findings   Comments: Abdominal surgery for colovisical fistula 4 days ago           Physical Exam    Airway  Mallampati: III  TM Distance: 4 - 6 cm  Neck ROM: normal range of motion   Mouth opening: Normal     Cardiovascular    Rhythm: regular  Rate: normal         Dental    Dentition: Lower dentition intact and Upper dentition intact     Pulmonary  Breath sounds clear to auscultation               Abdominal  GI exam deferred       Other Findings            Anesthetic Plan    ASA: 3, emergent  Anesthesia type: general          Induction: Intravenous  Anesthetic plan and risks discussed with: Patient
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
## 2022-08-20 NOTE — PERIOP NOTES
No mention of Covid in hopitalist or doctors notes and pt not on isolation on floor.  Kathy Swenson RN verified not isolation for covid

## 2022-08-20 NOTE — ANESTHESIA POSTPROCEDURE EVALUATION
Procedure(s):  CYSTOSCOPY, RIGHT URETERAL STENT INSERTION, RIGHT RETROGRADE PYLOGRAM.    general    Anesthesia Post Evaluation        Patient location during evaluation: PACU  Level of consciousness: awake  Pain management: adequate  Airway patency: patent  Anesthetic complications: no  Cardiovascular status: acceptable  Respiratory status: acceptable  Hydration status: acceptable  Post anesthesia nausea and vomiting:  none      INITIAL Post-op Vital signs:   Vitals Value Taken Time   /89 08/20/22 1615   Temp 37.1 °C (98.7 °F) 08/20/22 1536   Pulse 96 08/20/22 1620   Resp 15 08/20/22 1620   SpO2 94 % 08/20/22 1620   Vitals shown include unvalidated device data.

## 2022-08-21 LAB
ALBUMIN SERPL-MCNC: 2.3 G/DL (ref 3.5–5)
ALBUMIN/GLOB SERPL: 0.6 {RATIO} (ref 1.1–2.2)
ALP SERPL-CCNC: 104 U/L (ref 45–117)
ALT SERPL-CCNC: 49 U/L (ref 12–78)
ANION GAP SERPL CALC-SCNC: 4 MMOL/L (ref 5–15)
AST SERPL-CCNC: 32 U/L (ref 15–37)
BASOPHILS # BLD: 0 K/UL (ref 0–0.1)
BASOPHILS NFR BLD: 0 % (ref 0–1)
BILIRUB SERPL-MCNC: 1.2 MG/DL (ref 0.2–1)
BUN SERPL-MCNC: 8 MG/DL (ref 6–20)
BUN/CREAT SERPL: 10 (ref 12–20)
CALCIUM SERPL-MCNC: 8.9 MG/DL (ref 8.5–10.1)
CHLORIDE SERPL-SCNC: 106 MMOL/L (ref 97–108)
CO2 SERPL-SCNC: 26 MMOL/L (ref 21–32)
CREAT SERPL-MCNC: 0.79 MG/DL (ref 0.55–1.02)
DIFFERENTIAL METHOD BLD: ABNORMAL
EOSINOPHIL # BLD: 0 K/UL (ref 0–0.4)
EOSINOPHIL NFR BLD: 0 % (ref 0–7)
ERYTHROCYTE [DISTWIDTH] IN BLOOD BY AUTOMATED COUNT: 12.4 % (ref 11.5–14.5)
GLOBULIN SER CALC-MCNC: 3.8 G/DL (ref 2–4)
GLUCOSE SERPL-MCNC: 144 MG/DL (ref 65–100)
HCT VFR BLD AUTO: 34.2 % (ref 35–47)
HGB BLD-MCNC: 11.3 G/DL (ref 11.5–16)
IMM GRANULOCYTES # BLD AUTO: 0 K/UL (ref 0–0.04)
IMM GRANULOCYTES NFR BLD AUTO: 0 % (ref 0–0.5)
LYMPHOCYTES # BLD: 0.8 K/UL (ref 0.8–3.5)
LYMPHOCYTES NFR BLD: 11 % (ref 12–49)
MCH RBC QN AUTO: 32.8 PG (ref 26–34)
MCHC RBC AUTO-ENTMCNC: 33 G/DL (ref 30–36.5)
MCV RBC AUTO: 99.1 FL (ref 80–99)
MONOCYTES # BLD: 0.5 K/UL (ref 0–1)
MONOCYTES NFR BLD: 7 % (ref 5–13)
NEUTS SEG # BLD: 6.2 K/UL (ref 1.8–8)
NEUTS SEG NFR BLD: 82 % (ref 32–75)
NRBC # BLD: 0 K/UL (ref 0–0.01)
NRBC BLD-RTO: 0 PER 100 WBC
PHOSPHATE SERPL-MCNC: 1.9 MG/DL (ref 2.6–4.7)
PLATELET # BLD AUTO: 214 K/UL (ref 150–400)
PMV BLD AUTO: 11 FL (ref 8.9–12.9)
POTASSIUM SERPL-SCNC: 4.6 MMOL/L (ref 3.5–5.1)
PROT SERPL-MCNC: 6.1 G/DL (ref 6.4–8.2)
RBC # BLD AUTO: 3.45 M/UL (ref 3.8–5.2)
SODIUM SERPL-SCNC: 136 MMOL/L (ref 136–145)
WBC # BLD AUTO: 7.5 K/UL (ref 3.6–11)

## 2022-08-21 PROCEDURE — 94761 N-INVAS EAR/PLS OXIMETRY MLT: CPT

## 2022-08-21 PROCEDURE — 74011250636 HC RX REV CODE- 250/636: Performed by: FAMILY MEDICINE

## 2022-08-21 PROCEDURE — 74011250636 HC RX REV CODE- 250/636: Performed by: STUDENT IN AN ORGANIZED HEALTH CARE EDUCATION/TRAINING PROGRAM

## 2022-08-21 PROCEDURE — 74011000250 HC RX REV CODE- 250: Performed by: STUDENT IN AN ORGANIZED HEALTH CARE EDUCATION/TRAINING PROGRAM

## 2022-08-21 PROCEDURE — 65270000029 HC RM PRIVATE

## 2022-08-21 PROCEDURE — 97530 THERAPEUTIC ACTIVITIES: CPT

## 2022-08-21 PROCEDURE — C9113 INJ PANTOPRAZOLE SODIUM, VIA: HCPCS | Performed by: STUDENT IN AN ORGANIZED HEALTH CARE EDUCATION/TRAINING PROGRAM

## 2022-08-21 PROCEDURE — 74011000258 HC RX REV CODE- 258: Performed by: STUDENT IN AN ORGANIZED HEALTH CARE EDUCATION/TRAINING PROGRAM

## 2022-08-21 PROCEDURE — 84100 ASSAY OF PHOSPHORUS: CPT

## 2022-08-21 PROCEDURE — 80053 COMPREHEN METABOLIC PANEL: CPT

## 2022-08-21 PROCEDURE — 74011250636 HC RX REV CODE- 250/636: Performed by: COLON & RECTAL SURGERY

## 2022-08-21 PROCEDURE — 85025 COMPLETE CBC W/AUTO DIFF WBC: CPT

## 2022-08-21 PROCEDURE — 77010033678 HC OXYGEN DAILY

## 2022-08-21 PROCEDURE — 36415 COLL VENOUS BLD VENIPUNCTURE: CPT

## 2022-08-21 RX ORDER — HYDROMORPHONE HYDROCHLORIDE 1 MG/ML
0.5 INJECTION, SOLUTION INTRAMUSCULAR; INTRAVENOUS; SUBCUTANEOUS
Status: DISCONTINUED | OUTPATIENT
Start: 2022-08-21 | End: 2022-08-22

## 2022-08-21 RX ORDER — HYDROMORPHONE HYDROCHLORIDE 1 MG/ML
0.2 INJECTION, SOLUTION INTRAMUSCULAR; INTRAVENOUS; SUBCUTANEOUS
Status: DISCONTINUED | OUTPATIENT
Start: 2022-08-21 | End: 2022-08-21

## 2022-08-21 RX ORDER — HYDROMORPHONE HYDROCHLORIDE 1 MG/ML
0.1 INJECTION, SOLUTION INTRAMUSCULAR; INTRAVENOUS; SUBCUTANEOUS ONCE
Status: COMPLETED | OUTPATIENT
Start: 2022-08-21 | End: 2022-08-21

## 2022-08-21 RX ORDER — HYDROMORPHONE HYDROCHLORIDE 1 MG/ML
0.5 INJECTION, SOLUTION INTRAMUSCULAR; INTRAVENOUS; SUBCUTANEOUS ONCE
Status: COMPLETED | OUTPATIENT
Start: 2022-08-21 | End: 2022-08-21

## 2022-08-21 RX ADMIN — HYDROMORPHONE HYDROCHLORIDE 0.2 MG: 1 INJECTION, SOLUTION INTRAMUSCULAR; INTRAVENOUS; SUBCUTANEOUS at 06:15

## 2022-08-21 RX ADMIN — SODIUM CHLORIDE, PRESERVATIVE FREE 10 ML: 5 INJECTION INTRAVENOUS at 15:20

## 2022-08-21 RX ADMIN — SODIUM CHLORIDE, PRESERVATIVE FREE 40 MG: 5 INJECTION INTRAVENOUS at 22:05

## 2022-08-21 RX ADMIN — POTASSIUM CHLORIDE, DEXTROSE MONOHYDRATE AND SODIUM CHLORIDE 125 ML/HR: 150; 5; 450 INJECTION, SOLUTION INTRAVENOUS at 07:44

## 2022-08-21 RX ADMIN — ONDANSETRON HYDROCHLORIDE 4 MG: 2 SOLUTION INTRAMUSCULAR; INTRAVENOUS at 06:15

## 2022-08-21 RX ADMIN — HYDROMORPHONE HYDROCHLORIDE 0.5 MG: 1 INJECTION, SOLUTION INTRAMUSCULAR; INTRAVENOUS; SUBCUTANEOUS at 18:17

## 2022-08-21 RX ADMIN — HYDROMORPHONE HYDROCHLORIDE 0.5 MG: 1 INJECTION, SOLUTION INTRAMUSCULAR; INTRAVENOUS; SUBCUTANEOUS at 22:04

## 2022-08-21 RX ADMIN — Medication 1 SPRAY: at 01:57

## 2022-08-21 RX ADMIN — SODIUM CHLORIDE, PRESERVATIVE FREE 40 MG: 5 INJECTION INTRAVENOUS at 10:42

## 2022-08-21 RX ADMIN — ENOXAPARIN SODIUM 30 MG: 100 INJECTION SUBCUTANEOUS at 22:04

## 2022-08-21 RX ADMIN — SODIUM CHLORIDE, PRESERVATIVE FREE 10 ML: 5 INJECTION INTRAVENOUS at 22:03

## 2022-08-21 RX ADMIN — HYDROMORPHONE HYDROCHLORIDE 0.2 MG: 1 INJECTION, SOLUTION INTRAMUSCULAR; INTRAVENOUS; SUBCUTANEOUS at 13:15

## 2022-08-21 RX ADMIN — HYDROMORPHONE HYDROCHLORIDE 0.2 MG: 1 INJECTION, SOLUTION INTRAMUSCULAR; INTRAVENOUS; SUBCUTANEOUS at 17:18

## 2022-08-21 RX ADMIN — ONDANSETRON HYDROCHLORIDE 4 MG: 2 SOLUTION INTRAMUSCULAR; INTRAVENOUS at 13:15

## 2022-08-21 RX ADMIN — HYDROMORPHONE HYDROCHLORIDE 0.1 MG: 1 INJECTION, SOLUTION INTRAMUSCULAR; INTRAVENOUS; SUBCUTANEOUS at 17:53

## 2022-08-21 RX ADMIN — SODIUM CHLORIDE, PRESERVATIVE FREE 10 ML: 5 INJECTION INTRAVENOUS at 06:16

## 2022-08-21 RX ADMIN — HYDROMORPHONE HYDROCHLORIDE 0.2 MG: 1 INJECTION, SOLUTION INTRAMUSCULAR; INTRAVENOUS; SUBCUTANEOUS at 10:43

## 2022-08-21 RX ADMIN — ONDANSETRON HYDROCHLORIDE 4 MG: 2 SOLUTION INTRAMUSCULAR; INTRAVENOUS at 23:27

## 2022-08-21 RX ADMIN — PIPERACILLIN AND TAZOBACTAM 3.38 G: 3; .375 INJECTION, POWDER, FOR SOLUTION INTRAVENOUS at 17:18

## 2022-08-21 RX ADMIN — PIPERACILLIN AND TAZOBACTAM 3.38 G: 3; .375 INJECTION, POWDER, FOR SOLUTION INTRAVENOUS at 10:42

## 2022-08-21 RX ADMIN — ENOXAPARIN SODIUM 30 MG: 100 INJECTION SUBCUTANEOUS at 10:44

## 2022-08-21 RX ADMIN — SODIUM CHLORIDE, PRESERVATIVE FREE 10 ML: 5 INJECTION INTRAVENOUS at 01:58

## 2022-08-21 RX ADMIN — HYDROMORPHONE HYDROCHLORIDE 0.2 MG: 1 INJECTION, SOLUTION INTRAMUSCULAR; INTRAVENOUS; SUBCUTANEOUS at 01:57

## 2022-08-21 RX ADMIN — ONDANSETRON HYDROCHLORIDE 4 MG: 2 SOLUTION INTRAMUSCULAR; INTRAVENOUS at 17:18

## 2022-08-21 RX ADMIN — SODIUM CHLORIDE, PRESERVATIVE FREE 10 ML: 5 INJECTION INTRAVENOUS at 23:28

## 2022-08-21 RX ADMIN — POTASSIUM CHLORIDE, DEXTROSE MONOHYDRATE AND SODIUM CHLORIDE 125 ML/HR: 150; 5; 450 INJECTION, SOLUTION INTRAVENOUS at 15:19

## 2022-08-21 RX ADMIN — PIPERACILLIN AND TAZOBACTAM 3.38 G: 3; .375 INJECTION, POWDER, FOR SOLUTION INTRAVENOUS at 01:57

## 2022-08-21 NOTE — PROGRESS NOTES
Problem: Falls - Risk of  Goal: *Absence of Falls  Description: Document Karyna Cummings Fall Risk and appropriate interventions in the flowsheet. Outcome: Progressing Towards Goal  Note: Fall Risk Interventions:  Mobility Interventions: PT Consult for mobility concerns, PT Consult for assist device competence    Mentation Interventions: Adequate sleep, hydration, pain control    Medication Interventions: Bed/chair exit alarm    Elimination Interventions:  Toileting schedule/hourly rounds

## 2022-08-21 NOTE — PROGRESS NOTES
7307- patient c/o of pain continues after receiving 0.2 mg dilaudid at 0157. Patient informed next dose may be given at [id number]. 9914 Patient reported ice is effective    Bedside and Verbal shift change report given to [id number] (oncoming nurse) by Jaleel Joy RN (offgoing nurse). Report included the following information SBAR, Kardex, Procedure Summary, Intake/Output, MAR, Recent Results, Procedure Verification, and Quality Measures.

## 2022-08-21 NOTE — PROGRESS NOTES
Ezequiel [facility]  [address] [facility], [place], [id number] [place] [address]  [phone] [address] Adult  Hospitalist Group                                                                                          Hospitalist Progress Note  Kris Sylvester MD        Date of Service:  2022  NAME:  Huber Sorenson  :  1950  MRN:  [id number]      Admission Summary:   Hospitalist consulted for AHRF and aspiration pna    Interval history / Subjective:   Patient somnolent but arousable. Feels better, still anxious. Mild abdominal pain but more tolerable. Assessment & Plan: Altered mental status: Due to narcotics and benzos. CT head, ABG, TSH, ammonia level all unremarkable. Recent UA with no signs of infection. Acute hypoxic respiratory failure: Possible aspiration given events of nausea vomiting. Continue nasal cannula. Continue IV Zosyn. DuoNebs as needed. Incentive spirometry     Intractable nausea vomiting: X-ray noting possible ileus or early obstruction. NGT replaced and patient now has a sitter. CT abd with oral contrast without leaking. Management per surgery. Hydroureteronephrosis/BERNADETTE : seen on CT. Urology consulted, placed stent on . Plan to follow up outpatient. Cr now back to normal.     Colectomy with colorectal anastomosis/ diverting loop ileostomy/ bilateral ureteral stents: Management per surgery. Elevated blood pressure: History of hypertension. cont clonidine patch given NPO. Thyroidism: TSH wnl . Will give IV synthroid given NPO. History of head injury with memory loss: Supportive care.     Code status: full  DVT prophylaxis: [facility] Problems  Date Reviewed: 2022            Codes Class Noted POA    Colovesical fistula ICD-10-CM: N32.1  ICD-9-CM: 596.1  2022 Unknown        Hypothyroidism ICD-10-CM: E03.9  ICD-9-CM: 244.9  Unknown Yes        Benign essential HTN ICD-10-CM: I10  ICD-9-CM: 401.1 Unknown Yes         Review of Systems:   A comprehensive review of systems was negative except for that written in the HPI. Vital Signs:    Last 24hrs VS reviewed since prior progress note. Most recent are:  Visit Vitals  BP (!) 159/70 (BP 1 Location: Left lower arm, BP Patient Position: At rest)   Pulse 80   Temp 99.1 °F (37.3 °C)   Resp 18   Ht 5' 4\" (1.626 m)   Wt 112.6 kg (248 lb 5.6 oz)   SpO2 94%   BMI 42.63 kg/m²         Intake/Output Summary (Last 24 hours) at 8/21/2022 1258  Last data filed at 8/21/2022 0700  Gross per 24 hour   Intake 1850 ml   Output 2009 ml   Net -159 ml          Physical Examination:             Constitutional:  Moderate pain, alert and awake and oriented   ENT:  Oral mucosa moist, oropharynx benign. Resp:  Course sounds bilaterally   CV:  Regular rhythm, normal rate, no murmurs, gallops, rubs    GI:  Soft, non distended, diffuse nonspecific tenderness     Musculoskeletal:  No edema, warm, 2+ pulses throughout    Neurologic:  Moves all extremities. AAOx3, CN II-XII reviewed     Psych:  anxious. Poor insight       Data Review:    Review and/or order of clinical lab test      Labs:     Recent Labs     08/21/22 0233 08/20/22  0340   WBC 7.5 10.7   HGB 11.3* 11.9   HCT 34.2* 35.2    207       Recent Labs     08/21/22 0233 08/20/22 0340 08/19/22  0231    133* 134*   K 4.6 4.1 4.3    103 105   CO2 26 25 24   BUN 8 10 10   CREA 0.79 1.36* 1.28*   * 144* 132*   CA 8.9 8.6 8.6   PHOS 1.9* 1.2* 1.1*       Recent Labs     08/21/22 0233 08/20/22  0340 08/19/22  0231   ALT 49 40 27    98 79   TBILI 1.2* 2.0* 2.0*   TP 6.1* 6.2* 6.4   ALB 2.3* 2.4* 2.5*   GLOB 3.8 3.8 3.9       No results for input(s): INR, PTP, APTT, INREXT, INREXT in the last 72 hours. No results for input(s): FE, TIBC, PSAT, FERR in the last 72 hours.    No results found for: FOL, RBCF   Recent Labs     08/18/22  1355   PH 7.46*   PCO2 33*   PO2 55*       No results for input(s): CPK, CKNDX, TROIQ in the last 72 hours.     No lab exists for component: CPKMB  Lab Results   Component Value Date/Time    Cholesterol, total 193 12/10/2020 02:41 PM    HDL Cholesterol 36 12/10/2020 02:41 PM    LDL, calculated 136 (H) 12/10/2020 02:41 PM    Triglyceride 105 12/10/2020 02:41 PM    CHOL/HDL Ratio 5.4 (H) 12/10/2020 02:41 PM     No results found for: [facility]  Lab Results   Component Value Date/Time    Color YELLOW/STRAW 08/16/2022 08:16 AM    Appearance CLOUDY (A) 08/16/2022 08:16 AM    Specific gravity 1.018 08/16/2022 08:16 AM    pH (UA) 5.0 08/16/2022 08:16 AM    Protein 30 (A) 08/16/2022 08:16 AM    Glucose Negative 08/16/2022 08:16 AM    Ketone 15 (A) 08/16/2022 08:16 AM    Bilirubin Negative 08/16/2022 08:16 AM    Urobilinogen 0.2 08/16/2022 08:16 AM    Nitrites Negative 08/16/2022 08:16 AM    Leukocyte Esterase MODERATE (A) 08/16/2022 08:16 AM    Epithelial cells MODERATE (A) 08/16/2022 08:16 AM    Bacteria Negative 08/16/2022 08:16 AM    WBC 20-50 08/16/2022 08:16 AM    RBC 10-20 08/16/2022 08:16 AM         Medications Reviewed:     Current Facility-Administered Medications   Medication Dose Route Frequency    HYDROmorphone (DILAUDID) syringe 0.2 mg  0.2 mg IntraVENous Q3H PRN    lidocaine (PF) (XYLOCAINE) 10 mg/mL (1 %) injection 0.1 mL  0.1 mL SubCUTAneous PRN    enoxaparin (LOVENOX) injection 30 mg  30 mg SubCUTAneous Q12H    lidocaine 4 % patch 2 Patch  2 Patch TransDERmal Q24H    cloNIDine (CATAPRES) 0.1 mg/24 hr patch 1 Patch  1 Patch TransDERmal Q7D    phenol throat spray (CHLORASEPTIC) 1 Spray  1 Spray Oral PRN    albuterol-ipratropium (DUO-NEB) 2.5 MG-0.5 MG/3 ML  3 mL Nebulization Q6H PRN    pantoprazole (PROTONIX) 40 mg in 0.9% sodium chloride 10 mL injection  40 mg IntraVENous Q12H    ondansetron (ZOFRAN) injection 4 mg  4 mg IntraVENous Q6H    naloxone (NARCAN) injection 0.2 mg  0.2 mg IntraVENous EVERY 2 MINUTES AS NEEDED    [Held by provider] metoprolol succinate (TOPROL-XL) XL tablet 50 mg  50 mg Oral QAM    dextrose 5% - 0.45% NaCl with KCl 20 mEq/L infusion  125 mL/hr IntraVENous CONTINUOUS    sodium chloride (NS) flush 5-40 mL  5-40 mL IntraVENous Q8H    sodium chloride (NS) flush 5-40 mL  5-40 mL IntraVENous PRN    [Held by provider] naloxegoL (MOVANTIK) tablet 25 mg  25 mg Oral DAILY    [Held by provider] acetaminophen (TYLENOL) tablet 1,000 mg  1,000 mg Oral Q6H    alum-mag hydroxide-simeth (MYLANTA) oral suspension 15 mL  15 mL Oral Q6H PRN    hydrALAZINE (APRESOLINE) 20 mg/mL injection 10 mg  10 mg IntraVENous Q6H PRN    [Held by provider] levothyroxine (SYNTHROID) tablet 125 mcg  125 mcg Oral 6am    piperacillin-tazobactam (ZOSYN) 3.375 g in 0.9% sodium chloride (MBP/ADV) 100 mL MBP  3.375 g IntraVENous Q8H     ______________________________________________________________________  EXPECTED LENGTH OF STAY: 5d 16h  ACTUAL LENGTH OF STAY:          5                 Jose D Charlton MD

## 2022-08-21 NOTE — PROGRESS NOTES
Problem: Falls - Risk of  Goal: *Absence of Falls  Description: Document Ofelia Romano Fall Risk and appropriate interventions in the flowsheet. Outcome: Progressing Towards Goal  Note: Fall Risk Interventions:  Mobility Interventions: PT Consult for mobility concerns, PT Consult for assist device competence    Mentation Interventions: Bed/chair exit alarm, Door open when patient unattended, Family/sitter at bedside, Adequate sleep, hydration, pain control    Medication Interventions: Bed/chair exit alarm, Teach patient to arise slowly, Patient to call before getting OOB    Elimination Interventions: Call light in reach, Patient to call for help with toileting needs              Problem: Patient Education: Go to Patient Education Activity  Goal: Patient/Family Education  Outcome: Progressing Towards Goal     Problem: Pain  Goal: *Control of Pain  Outcome: Progressing Towards Goal     Problem: Patient Education: Go to Patient Education Activity  Goal: Patient/Family Education  Outcome: Progressing Towards Goal     Problem: Patient Education: Go to Patient Education Activity  Goal: Patient/Family Education  Outcome: Progressing Towards Goal     Problem: Patient Education: Go to Patient Education Activity  Goal: Patient/Family Education  Outcome: Progressing Towards Goal     Problem: Impaired Skin Integrity/Pressure Injury Treatment  Goal: *Improvement of Existing Pressure Injury  Outcome: Progressing Towards Goal  Goal: *Prevention of pressure injury  Description: Document Stef Scale and appropriate interventions in the flowsheet.   Outcome: Progressing Towards Goal  Note: Pressure Injury Interventions:  Sensory Interventions: Assess changes in LOC, Float heels, Keep linens dry and wrinkle-free, Minimize linen layers    Moisture Interventions: Internal/External urinary devices    Activity Interventions: Assess need for specialty bed, PT/OT evaluation    Mobility Interventions: PT/OT evaluation    Nutrition Interventions: Discuss nutritional consult with provider    Friction and Shear Interventions: Apply protective barrier, creams and emollients                Problem: Patient Education: Go to Patient Education Activity  Goal: Patient/Family Education  Outcome: Progressing Towards Goal     Problem: Anxiety  Goal: *Alleviation of anxiety  Outcome: Progressing Towards Goal  Goal: *Alleviation of anxiety (Palliative Care)  Outcome: Progressing Towards Goal     Problem: Patient Education: Go to Patient Education Activity  Goal: Patient/Family Education  Outcome: Progressing Towards Goal     Problem: Pressure Injury - Risk of  Goal: *Prevention of pressure injury  Description: Document Stef Scale and appropriate interventions in the flowsheet.   Outcome: Progressing Towards Goal  Note: Pressure Injury Interventions:  Sensory Interventions: Assess changes in LOC, Float heels, Keep linens dry and wrinkle-free, Minimize linen layers    Moisture Interventions: Internal/External urinary devices    Activity Interventions: Assess need for specialty bed, PT/OT evaluation    Mobility Interventions: PT/OT evaluation    Nutrition Interventions: Discuss nutritional consult with provider    Friction and Shear Interventions: Apply protective barrier, creams and emollients                Problem: Patient Education: Go to Patient Education Activity  Goal: Patient/Family Education  Outcome: Progressing Towards Goal     Problem: Patient Education: Go to Patient Education Activity  Goal: Patient/Family Education  Outcome: Progressing Towards Goal     Problem: Non-Violent Restraints  Goal: Removal from restraints as soon as assessed to be safe  Outcome: Progressing Towards Goal  Goal: No harm/injury to patient while restraints in use  Outcome: Progressing Towards Goal  Goal: Patient's dignity will be maintained  Outcome: Progressing Towards Goal  Goal: Patient Interventions  Outcome: Progressing Towards Goal

## 2022-08-21 NOTE — PROGRESS NOTES
Pt up in the chair, returned her back to bed around 1630. She was screaming out in pain, gave her 0.2 of IV dilaudid. Her pain is a 10/10. She is describing her pain as a new sharp shooting pain in the left lower abd quad. I paged Dr. Shirlene Osborn who said to give another 0.1 and to call surgery. Paged Dr. Bisi Pineda who said this is most likely gas pains and to give another 0.5 of dilaudid now and to change her PRN Dilaudid to 0.5 from 0.2.

## 2022-08-21 NOTE — PROGRESS NOTES
Problem: Mobility Impaired (Adult and Pediatric)  Goal: *Acute Goals and Plan of Care (Insert Text)  Description: FUNCTIONAL STATUS PRIOR TO ADMISSION: Pt reports mod I baseline ambulation using rollator walker, utilizing chair lift for stair negotiation, and performing ADLs independently. HOME SUPPORT PRIOR TO ADMISSION: The patient lived with sister and brother in law. Physical Therapy Goals  Initiated 8/20/2022  1. Patient will move from supine to sit and sit to supine  in bed with minimal assistance/contact guard assist within 7 day(s). 2.  Patient will transfer from bed to chair and chair to bed with minimal assistance/contact guard assist using the least restrictive device within 7 day(s). 3.  Patient will perform sit to stand with minimal assistance/contact guard assist within 7 day(s). 4.  Patient will ambulate with minimal assistance/contact guard assist for 80 feet with the least restrictive device within 7 day(s). Outcome: Progressing Towards Goal   PHYSICAL THERAPY TREATMENT  Patient: Luis Carlos Lebron (18 y.o. female)  Date: 8/21/2022  Diagnosis: Colovesical fistula [N32.1] <principal problem not specified>  Procedure(s) (LRB):  CYSTOSCOPY, RIGHT URETERAL STENT INSERTION, RIGHT RETROGRADE PYLOGRAM (Right) 1 Day Post-Op  Precautions: Fall, Skin, Aspiration  Chart, physical therapy assessment, plan of care and goals were reviewed. ASSESSMENT  Patient continues with skilled PT services and is progressing towards goals. Pt now POD 1 s/p r ureteral stent insertion and POD 5 s/p L colectomy with ileostomy and B ureteral stent placements. She demonstrates improved alertness, improving abdominal pain, and increased ability to participate today. She demonstrates improved sitting balance and requires decreased assistance for sit to stand transfer. She mobilizes out of bed to chair using lory steady with good tolerance. SpO2 98% with activity using room air.  Anticipate improved tolerance to gait trial next treatment day. Current Level of Function Impacting Discharge (mobility/balance): max A bed mobility    Other factors to consider for discharge: none additional         PLAN :  Patient continues to benefit from skilled intervention to address the above impairments. Continue treatment per established plan of care. to address goals. Recommendation for discharge: (in order for the patient to meet his/her long term goals)  Therapy up to 3 hours per day 5-7 days per week; working toward improved activity tolerance. All lines and tubes secured for pt mobility. This discharge recommendation:  Has not yet been discussed the attending provider and/or case management    IF patient discharges home will need the following DME: to be determined (TBD)       SUBJECTIVE:   Patient stated I just feel more tired today but the pain is better.       Pt received supine, agreeable to PT and cleared by RN. PCT at bedside to assist with bathing. +ANDRESSA drain secured for mobility, ostomy bag intact, NG tube to wall suction throughout encounter, PIV to BUEs, +remote telemetry, +zhang    OBJECTIVE DATA SUMMARY:   Critical Behavior:  Neurologic State: Alert  Orientation Level: Oriented to situation, Oriented to person, Oriented to place  Cognition: Follows commands  Safety/Judgement: Awareness of environment, Fall prevention, Decreased insight into deficits, Decreased awareness of environment  Functional Mobility Training:  Bed Mobility:  Rolling: Additional time;Maximum assistance;Bed Modified; Adaptive equipment  Supine to Sit: Additional time;Bed Modified; Adaptive equipment;Maximum assistance; Moderate assistance     Scooting: Moderate assistance;Maximum assistance        Transfers:  Sit to Stand: Assist x2; Additional time;Minimum assistance; pull to stand at [address]; performs static stance x 10 seconds before requiring rest.  Stand to Sit: Assist x2; Additional time;Minimum assistance                  Total A bed to chair via lory steady; PCT at bedside; RN aware of method for transfer back to bed. Balance:  Sitting: Without support  Sitting - Static: Good (unsupported)  Sitting - Dynamic: Good (unsupported)  Standing: With support  Standing - Static: Fair             Therapeutic Exercises: Ankle pumps x 10  Pain Ratin/10 abdominal pain  Offered repositioning, rest, education, encouragement    Activity Tolerance:   requires rest breaks    After treatment patient left in no apparent distress:   Sitting in chair, Call bell within reach, Bed / chair alarm activated, Caregiver / family present, and all lines/leads intact as found  Air cushion in chair    COMMUNICATION/COLLABORATION:   The patients plan of care was discussed with: Registered nurse and Rehabilitation technician.      Darian Jiemnez PT, DPT   Time Calculation: 23 mins

## 2022-08-21 NOTE — PROGRESS NOTES
Urology Progress Note    Akira Britt is 66 y.o. female admitted following lap colectomy and repair of colovesical fistula, now POD #1 R ureteral stent placement    Subjective:  Pain significantly improved, now well controlled  Sister at bedside  Cr 0.79      Objective:  Vitals:       Temp:  [96.8 °F (36 °C)-100.9 °F (38.3 °C)]   Pulse (Heart Rate):  []   BP: (120-193)/()   Resp Rate:  [14-25]   O2 Sat (%):  [90 %-100 %]   Weight: --    I/O:    Intake/Output Summary (Last 24 hours) at 8/21/2022 1015  Last data filed at 8/21/2022 0700  Gross per 24 hour   Intake 1850 ml   Output 2009 ml   Net -159 ml       Labs:    Recent Labs     08/21/22  0233 08/20/22  0340 08/19/22  0856   WBC 7.5 10.7 14.1*   HGB 11.3* 11.9 12.2   HCT 34.2* 35.2 35.0    207 207     Recent Labs     08/21/22  0233 08/20/22  0340 08/19/22  0231    133* 134*   K 4.6 4.1 4.3    103 105   CO2 26 25 24   * 144* 132*   BUN 8 10 10   CREA 0.79 1.36* 1.28*   CA 8.9 8.6 8.6   PHOS 1.9* 1.2* 1.1*     Cx:   All Micro Results       Procedure Component Value Units Date/Time    RESPIRATORY VIRUS PANEL W/COVID-19, PCR [[id number]]     Order Status: Sent Specimen: NASOPHARYNGEAL SWAB     CULTURE, RESPIRATORY/SPUTUM/BRONCH Barb Duglas STAIN [[id number]]     Order Status: Sent Specimen: Sputum     LEGIONELLA PNEUMOPHILA AG, URINE [[id number]]     Order Status: Sent Specimen: Urine     S. Orpah Prophet, UR/CSF [[id number]]     Order Status: Sent     CULTURE, URINE [[id number]] Collected: 08/16/22 0816    Order Status: Completed Specimen: Urine Updated: 08/17/22 1507     Special Requests: NO SPECIAL REQUESTS        Culture result: No growth (<1,000 CFU/ML)                 Exam:  Gen: NAD  Abdomen: soft. Stoma ppp   : zhang draining clear yellow      Assessment:  R hydronephrosis s/p stent 8/20.  Turbid urine drained and no evidence of extrav or injury on retrograde    PLAN:  -zhang per primary team  - will need outpatient follow up to discuss stent removal. Likely needs dx URS at time of stent removal to complete eval of ureter.  D/w pt and sister who agree with plan

## 2022-08-21 NOTE — PROGRESS NOTES
No complaints  Visit Vitals  BP (!) 177/82 (BP 1 Location: Left lower arm, BP Patient Position: At rest)   Pulse 81   Temp 98.7 °F (37.1 °C)   Resp 18   Ht 5' 4\" (1.626 m)   Wt 112.6 kg (248 lb 5.6 oz)   SpO2 93%   BMI 42.63 kg/m²     Date 08/20/22 0700 - 08/21/22 0659 08/21/22 0700 - 08/22/22 0659   Shift [id number] 1900-0659 24 Hour Total 2497-3237 1141-4407 24 Hour Total   INTAKE   I.V.(mL/kg/hr) 250(0.2)  250(0.1) 1600  1600     Volume (lactated Ringers infusion) 250  250        Volume (dextrose 5% - 0.45% NaCl with KCl 20 mEq/L infusion)    1500  1500     Volume (piperacillin-tazobactam (ZOSYN) 3.375 g in 0.9% sodium chloride (MBP/ADV) 100 mL MBP)    100  100   Shift Total(mL/kg) 250(2.2)  250(2.2) 1600(14.2)  1600(14.2)   OUTPUT   Urine(mL/kg/hr) 380(0.3) 1400(1) 1780(0.7)        Urine Output (mL) (Urinary Catheter 08/16/22 Zhang) 380 1400 1780      Emesis/NG output 0 150 150        Output (ml) (Nasogastric Tube 08/19/22) 0 150 150      Drains 20 20 40        Output (ml) (Olvin-Kenny Drain 08/16/22 Abdomen) 20 20 40      Stool 64 25 89        Output (ml) (Colostomy 08/16/22 Right ; Lower  Abdomen) 64 25 89      Shift Total(mL/kg) 464(4.1) 8067(79.0) 1583(43.8)      NET -214 1591 -180 1600  1600   Weight (kg) 112.6 112.6 112.6 112.6 112.6 112.6     Abd soft  Some stool in the stoma appliance    A/p continue ngt for now  Continue zhang

## 2022-08-22 LAB
ALBUMIN SERPL-MCNC: 2.3 G/DL (ref 3.5–5)
ALBUMIN/GLOB SERPL: 0.6 {RATIO} (ref 1.1–2.2)
ALP SERPL-CCNC: 113 U/L (ref 45–117)
ALT SERPL-CCNC: 56 U/L (ref 12–78)
ANION GAP SERPL CALC-SCNC: 4 MMOL/L (ref 5–15)
AST SERPL-CCNC: 38 U/L (ref 15–37)
BASOPHILS # BLD: 0 K/UL (ref 0–0.1)
BASOPHILS NFR BLD: 0 % (ref 0–1)
BILIRUB SERPL-MCNC: 1 MG/DL (ref 0.2–1)
BUN SERPL-MCNC: 6 MG/DL (ref 6–20)
BUN/CREAT SERPL: 8 (ref 12–20)
CALCIUM SERPL-MCNC: 8.5 MG/DL (ref 8.5–10.1)
CHLORIDE SERPL-SCNC: 106 MMOL/L (ref 97–108)
CO2 SERPL-SCNC: 28 MMOL/L (ref 21–32)
CREAT SERPL-MCNC: 0.78 MG/DL (ref 0.55–1.02)
DIFFERENTIAL METHOD BLD: ABNORMAL
EOSINOPHIL # BLD: 0.5 K/UL (ref 0–0.4)
EOSINOPHIL NFR BLD: 6 % (ref 0–7)
ERYTHROCYTE [DISTWIDTH] IN BLOOD BY AUTOMATED COUNT: 12.7 % (ref 11.5–14.5)
GLOBULIN SER CALC-MCNC: 3.7 G/DL (ref 2–4)
GLUCOSE SERPL-MCNC: 108 MG/DL (ref 65–100)
HCT VFR BLD AUTO: 34.6 % (ref 35–47)
HGB BLD-MCNC: 11.4 G/DL (ref 11.5–16)
IMM GRANULOCYTES # BLD AUTO: 0.1 K/UL (ref 0–0.04)
IMM GRANULOCYTES NFR BLD AUTO: 1 % (ref 0–0.5)
LYMPHOCYTES # BLD: 2 K/UL (ref 0.8–3.5)
LYMPHOCYTES NFR BLD: 24 % (ref 12–49)
MCH RBC QN AUTO: 31.9 PG (ref 26–34)
MCHC RBC AUTO-ENTMCNC: 32.9 G/DL (ref 30–36.5)
MCV RBC AUTO: 96.9 FL (ref 80–99)
MONOCYTES # BLD: 0.7 K/UL (ref 0–1)
MONOCYTES NFR BLD: 8 % (ref 5–13)
NEUTS SEG # BLD: 5 K/UL (ref 1.8–8)
NEUTS SEG NFR BLD: 61 % (ref 32–75)
NRBC # BLD: 0 K/UL (ref 0–0.01)
NRBC BLD-RTO: 0 PER 100 WBC
PHOSPHATE SERPL-MCNC: 1.9 MG/DL (ref 2.6–4.7)
PLATELET # BLD AUTO: 238 K/UL (ref 150–400)
PMV BLD AUTO: 10.7 FL (ref 8.9–12.9)
POTASSIUM SERPL-SCNC: 4.1 MMOL/L (ref 3.5–5.1)
PROT SERPL-MCNC: 6 G/DL (ref 6.4–8.2)
RBC # BLD AUTO: 3.57 M/UL (ref 3.8–5.2)
SODIUM SERPL-SCNC: 138 MMOL/L (ref 136–145)
WBC # BLD AUTO: 8.3 K/UL (ref 3.6–11)

## 2022-08-22 PROCEDURE — 97530 THERAPEUTIC ACTIVITIES: CPT

## 2022-08-22 PROCEDURE — 36415 COLL VENOUS BLD VENIPUNCTURE: CPT

## 2022-08-22 PROCEDURE — 74011000258 HC RX REV CODE- 258: Performed by: STUDENT IN AN ORGANIZED HEALTH CARE EDUCATION/TRAINING PROGRAM

## 2022-08-22 PROCEDURE — 74011250636 HC RX REV CODE- 250/636: Performed by: STUDENT IN AN ORGANIZED HEALTH CARE EDUCATION/TRAINING PROGRAM

## 2022-08-22 PROCEDURE — 74011000258 HC RX REV CODE- 258: Performed by: FAMILY MEDICINE

## 2022-08-22 PROCEDURE — 65270000029 HC RM PRIVATE

## 2022-08-22 PROCEDURE — 85025 COMPLETE CBC W/AUTO DIFF WBC: CPT

## 2022-08-22 PROCEDURE — 74011000250 HC RX REV CODE- 250: Performed by: STUDENT IN AN ORGANIZED HEALTH CARE EDUCATION/TRAINING PROGRAM

## 2022-08-22 PROCEDURE — C9113 INJ PANTOPRAZOLE SODIUM, VIA: HCPCS | Performed by: STUDENT IN AN ORGANIZED HEALTH CARE EDUCATION/TRAINING PROGRAM

## 2022-08-22 PROCEDURE — 74011000250 HC RX REV CODE- 250: Performed by: COLON & RECTAL SURGERY

## 2022-08-22 PROCEDURE — 74011250636 HC RX REV CODE- 250/636: Performed by: COLON & RECTAL SURGERY

## 2022-08-22 PROCEDURE — 97116 GAIT TRAINING THERAPY: CPT

## 2022-08-22 PROCEDURE — 80053 COMPREHEN METABOLIC PANEL: CPT

## 2022-08-22 PROCEDURE — 74011000258 HC RX REV CODE- 258: Performed by: COLON & RECTAL SURGERY

## 2022-08-22 PROCEDURE — 74011250636 HC RX REV CODE- 250/636: Performed by: FAMILY MEDICINE

## 2022-08-22 PROCEDURE — 97110 THERAPEUTIC EXERCISES: CPT

## 2022-08-22 PROCEDURE — 84100 ASSAY OF PHOSPHORUS: CPT

## 2022-08-22 PROCEDURE — 94761 N-INVAS EAR/PLS OXIMETRY MLT: CPT

## 2022-08-22 PROCEDURE — 74011250636 HC RX REV CODE- 250/636: Performed by: NURSE PRACTITIONER

## 2022-08-22 PROCEDURE — 97535 SELF CARE MNGMENT TRAINING: CPT

## 2022-08-22 RX ORDER — HYDROMORPHONE HYDROCHLORIDE 1 MG/ML
0.2 INJECTION, SOLUTION INTRAMUSCULAR; INTRAVENOUS; SUBCUTANEOUS
Status: DISCONTINUED | OUTPATIENT
Start: 2022-08-22 | End: 2022-08-30 | Stop reason: HOSPADM

## 2022-08-22 RX ORDER — KETOROLAC TROMETHAMINE 30 MG/ML
15 INJECTION, SOLUTION INTRAMUSCULAR; INTRAVENOUS EVERY 6 HOURS
Status: COMPLETED | OUTPATIENT
Start: 2022-08-22 | End: 2022-08-24

## 2022-08-22 RX ADMIN — KETOROLAC TROMETHAMINE 15 MG: 30 INJECTION, SOLUTION INTRAMUSCULAR; INTRAVENOUS at 12:04

## 2022-08-22 RX ADMIN — ONDANSETRON HYDROCHLORIDE 4 MG: 2 SOLUTION INTRAMUSCULAR; INTRAVENOUS at 18:14

## 2022-08-22 RX ADMIN — KETOROLAC TROMETHAMINE 15 MG: 30 INJECTION, SOLUTION INTRAMUSCULAR; INTRAVENOUS at 23:45

## 2022-08-22 RX ADMIN — POTASSIUM CHLORIDE, DEXTROSE MONOHYDRATE AND SODIUM CHLORIDE 125 ML/HR: 150; 5; 450 INJECTION, SOLUTION INTRAVENOUS at 01:02

## 2022-08-22 RX ADMIN — HYDROMORPHONE HYDROCHLORIDE 0.5 MG: 1 INJECTION, SOLUTION INTRAMUSCULAR; INTRAVENOUS; SUBCUTANEOUS at 02:27

## 2022-08-22 RX ADMIN — PIPERACILLIN AND TAZOBACTAM 3.38 G: 3; .375 INJECTION, POWDER, FOR SOLUTION INTRAVENOUS at 01:07

## 2022-08-22 RX ADMIN — ONDANSETRON HYDROCHLORIDE 4 MG: 2 SOLUTION INTRAMUSCULAR; INTRAVENOUS at 12:04

## 2022-08-22 RX ADMIN — SODIUM CHLORIDE, PRESERVATIVE FREE 10 ML: 5 INJECTION INTRAVENOUS at 01:07

## 2022-08-22 RX ADMIN — SODIUM CHLORIDE, PRESERVATIVE FREE 10 ML: 5 INJECTION INTRAVENOUS at 02:27

## 2022-08-22 RX ADMIN — ENOXAPARIN SODIUM 30 MG: 100 INJECTION SUBCUTANEOUS at 09:40

## 2022-08-22 RX ADMIN — POTASSIUM CHLORIDE, DEXTROSE MONOHYDRATE AND SODIUM CHLORIDE 125 ML/HR: 150; 5; 450 INJECTION, SOLUTION INTRAVENOUS at 09:42

## 2022-08-22 RX ADMIN — Medication 1 SPRAY: at 23:45

## 2022-08-22 RX ADMIN — HYDRALAZINE HYDROCHLORIDE 10 MG: 20 INJECTION INTRAMUSCULAR; INTRAVENOUS at 12:04

## 2022-08-22 RX ADMIN — HYDROMORPHONE HYDROCHLORIDE 0.5 MG: 1 INJECTION, SOLUTION INTRAMUSCULAR; INTRAVENOUS; SUBCUTANEOUS at 06:09

## 2022-08-22 RX ADMIN — HYDROMORPHONE HYDROCHLORIDE 0.5 MG: 1 INJECTION, SOLUTION INTRAMUSCULAR; INTRAVENOUS; SUBCUTANEOUS at 09:41

## 2022-08-22 RX ADMIN — Medication 1 SPRAY: at 01:02

## 2022-08-22 RX ADMIN — SODIUM CHLORIDE, PRESERVATIVE FREE 40 MG: 5 INJECTION INTRAVENOUS at 22:14

## 2022-08-22 RX ADMIN — HYDROMORPHONE HYDROCHLORIDE 0.2 MG: 1 INJECTION, SOLUTION INTRAMUSCULAR; INTRAVENOUS; SUBCUTANEOUS at 15:52

## 2022-08-22 RX ADMIN — SODIUM CHLORIDE, PRESERVATIVE FREE 10 ML: 5 INJECTION INTRAVENOUS at 22:15

## 2022-08-22 RX ADMIN — SODIUM CHLORIDE, PRESERVATIVE FREE 10 ML: 5 INJECTION INTRAVENOUS at 06:09

## 2022-08-22 RX ADMIN — SODIUM CHLORIDE, PRESERVATIVE FREE 40 MG: 5 INJECTION INTRAVENOUS at 09:42

## 2022-08-22 RX ADMIN — PIPERACILLIN AND TAZOBACTAM 3.38 G: 3; .375 INJECTION, POWDER, FOR SOLUTION INTRAVENOUS at 18:14

## 2022-08-22 RX ADMIN — KETOROLAC TROMETHAMINE 15 MG: 30 INJECTION, SOLUTION INTRAMUSCULAR; INTRAVENOUS at 18:14

## 2022-08-22 RX ADMIN — PIPERACILLIN AND TAZOBACTAM 3.38 G: 3; .375 INJECTION, POWDER, FOR SOLUTION INTRAVENOUS at 09:40

## 2022-08-22 RX ADMIN — ONDANSETRON HYDROCHLORIDE 4 MG: 2 SOLUTION INTRAMUSCULAR; INTRAVENOUS at 06:09

## 2022-08-22 RX ADMIN — ONDANSETRON HYDROCHLORIDE 4 MG: 2 SOLUTION INTRAMUSCULAR; INTRAVENOUS at 23:45

## 2022-08-22 RX ADMIN — SODIUM PHOSPHATE, MONOBASIC, MONOHYDRATE: 276; 142 INJECTION, SOLUTION INTRAVENOUS at 20:31

## 2022-08-22 RX ADMIN — ENOXAPARIN SODIUM 30 MG: 100 INJECTION SUBCUTANEOUS at 22:14

## 2022-08-22 NOTE — PROGRESS NOTES
Much more alert and aware today vs last week  Sister at bedside  Still having issues with pain, but better. NGT in place  Has been up and OOB with assistance to chair yesterday    Visit Vitals  BP (!) 180/82   Pulse 82   Temp 97.9 °F (36.6 °C)   Resp 17   Ht 5' 4\" (1.626 m)   Wt 112.6 kg (248 lb 5.6 oz)   SpO2 93%   BMI 42.63 kg/m²     Date 08/21/22 0700 - 08/22/22 0659 08/22/22 0700 - 08/23/22 0659   Shift 6520-9239 7989-5652 24 Hour Total 2120-0997 3050-9781 24 Hour Total   INTAKE   P.O. 0  0        P. O. 0  0      I. V.(mL/kg/hr) 1202.7(1)  2739.6(1) 1725  1725     Volume (dextrose 5% - 0.45% NaCl with KCl 20 mEq/L infusion) 2539.6  2539.6 1625  1625     Volume (piperacillin-tazobactam (ZOSYN) 3.375 g in 0.9% sodium chloride (MBP/ADV) 100 mL MBP) 200  200 100  100   Other 0 0 0        Irrigation Volume Input (mL) (Urinary Catheter 08/16/22 Zhang) 0 0 0      NG/GT 0  0        Intake (ml) (Nasogastric Tube 08/19/22) 0  0      Shift Total(mL/kg) 0603. 6(24.3) 0(0) 2739. 6(24.3) 1725(15.3)  1725(15.3)   OUTPUT   Urine(mL/kg/hr) 800(0.6) 1700(1.3) 2500(0.9) 1000  1000     Urine Output (mL) (Urinary Catheter 08/16/22 Zhang) 800 1700 2500 1000  1000   Emesis/NG output    700  700     Output (ml) (Nasogastric Tube 08/19/22)    700  700   Drains 30  30        Output (ml) (Olvin-Kenny Drain 08/16/22 Abdomen) 30  30      Stool 120  120        Output (ml) (Colostomy 08/16/22 Right ; Lower  Abdomen) 120  120      Shift Total(mL/kg) 950(8.4) 1700(15.1) 2650(23.5) 1700(15.1)  1700(15.1)   NET 1789.6 -1700 89.6 25  25   Weight (kg) 112.6 112.6 112.6 112.6 112.6 112.6       Abd soft  Some stool in the stoma appliance    A/p   Daily Labs  continue ngt and npo for now- if it comes out again, leave out  Add toradol (Cr now stable) for pain and decrease dosing of dilaudid due to prev sig sedation and AMS  Continue zhang (will have outpt follow up with urology for stent management)  Consult psych to help with anxiety management in setting of sig AMS/ sedation on benzos  Cont aggressive PT/OT/mobility  Cont IS  Appreciate hospitalist help with aspiration pna and med managemet       Pt discussed with Dr Jerrell Gomes, NP

## 2022-08-22 NOTE — PROGRESS NOTES
Problem: Mobility Impaired (Adult and Pediatric)  Goal: *Acute Goals and Plan of Care (Insert Text)  Description: FUNCTIONAL STATUS PRIOR TO ADMISSION: Pt reports mod I baseline ambulation using rollator walker, utilizing chair lift for stair negotiation, and performing ADLs independently. HOME SUPPORT PRIOR TO ADMISSION: The patient lived with sister and brother in law. Physical Therapy Goals  Initiated 8/20/2022  1. Patient will move from supine to sit and sit to supine  in bed with minimal assistance/contact guard assist within 7 day(s). 2.  Patient will transfer from bed to chair and chair to bed with minimal assistance/contact guard assist using the least restrictive device within 7 day(s). 3.  Patient will perform sit to stand with minimal assistance/contact guard assist within 7 day(s). 4.  Patient will ambulate with minimal assistance/contact guard assist for 80 feet with the least restrictive device within 7 day(s). Outcome: Progressing Towards Goal   PHYSICAL THERAPY TREATMENT  Patient: Chey Spivey (56 y.o. female)  Date: 8/22/2022  Diagnosis: Colovesical fistula [N32.1] <principal problem not specified>  Procedure(s) (LRB):  CYSTOSCOPY, RIGHT URETERAL STENT INSERTION, RIGHT RETROGRADE PYLOGRAM (Right) 2 Days Post-Op  Precautions:  (NG tube; iliostomy)  Chart, physical therapy assessment, plan of care and goals were reviewed. ASSESSMENT  Patient continues with skilled PT services and is progressing towards goals. Pt received supine in bed A&Ox4 with NG tube and ileostomy in place. Pt motivated to participate, yet needing guidance to recognize need for rehab due to decreased functional status since PTA. BP elevated per below needing RN intervention with IV meds. Pt educated with BLE ex supine to included quad/glut sets, ankle pumps and heel sides. Pt rolling with Min A for donning of brief. Pt needing Mod Ax2 with supine to sit with HOB elevated.   Good sitting balance on EOB with BP improvement after IV med took effect. Sit to stand with Mod Ax2 with RW. Pt able to step to recliner 4' with RW with Mod Ax2 for first time. [place] Steady used in past and may be needed for return to bed. Pt with great progress today. She appears to be an excellent candidate for IPR having been Mod I with rollator or SPC PTA. Pt with improved awareness of rehab needs at this time. CM pursuing. Patient Vitals for the past 4 hrs:   Temp Pulse Resp BP SpO2   08/22/22 1237 98 °F (36.7 °C) 86 18 137/69 93 %   08/22/22 1226 -- 95 -- (!) 151/62 sitting post activity 100 %   08/22/22 1217 -- 87 -- (!) 175/96 sitting s/p IV BP med --   08/22/22 1214 -- 87 -- (!) 233/93 supine --   08/22/22 1154 -- 83 -- (!) 207/95  supine --          Current Level of Function Impacting Discharge (mobility/balance): Mod Ax2/fair    Other factors to consider for discharge: per above         PLAN :  Patient continues to benefit from skilled intervention to address the above impairments. Continue treatment per established plan of care. to address goals. Recommendation for discharge: (in order for the patient to meet his/her long term goals)  Therapy 3 hours per day 5-7 days per week    This discharge recommendation:  Has been made in collaboration with the attending provider and/or case management    IF patient discharges home will need the following DME: has rollator and SPC       SUBJECTIVE:   Patient stated I see that I need rehab now.     OBJECTIVE DATA SUMMARY:   Critical Behavior:  Neurologic State: Alert  Orientation Level: Oriented X4  Cognition: Follows commands  Safety/Judgement: Awareness of environment, Fall prevention, Decreased insight into deficits, Decreased awareness of environment  Functional Mobility Training:  Bed Mobility:  Rolling: Moderate assistance  Supine to Sit: Moderate assistance;Assist x2; Additional time     Scooting: Contact guard assistance; Additional time        Transfers:  Sit to Stand: Moderate assistance;Assist x2; Additional time  Stand to Sit: Moderate assistance;Assist x2                             Balance:  Sitting: High guard; Intact  Sitting - Static: Good (unsupported)  Sitting - Dynamic: Fair (occasional)  Standing: Impaired  Standing - Static: Constant support; Fair  Standing - Dynamic : Poor  Ambulation/Gait Training:  Distance (ft): 4 Feet (ft)  Assistive Device: Walker, rolling;Gait belt  Ambulation - Level of Assistance: Moderate assistance;Assist x2        Gait Abnormalities: Decreased step clearance        Base of Support: Widened     Speed/Yina: Pace decreased (<100 feet/min); Slow  Step Length: Right shortened;Left shortened                       Therapeutic Exercises:   Per above  Pain Rating:  Well managed    Activity Tolerance:   Fair    After treatment patient left in no apparent distress:   Supine in bed, Heels elevated for pressure relief, Call bell within reach, and Bed / chair alarm activated    COMMUNICATION/COLLABORATION:   The patients plan of care was discussed with: Occupational therapist, Registered nurse, and Case management.      Rogerio Soares, PT   Time Calculation: 54 mins

## 2022-08-22 NOTE — WOUND CARE
Ostomy visit:  Patient being transferred to chair by PT, standing and ambulated to chair. Buttocks- no redness, skin intact. RLQ ileostomy- single appliance intact , no leakage. NGT with green drainage, patient denies nausea at this time. Will follow to continue teaching with patient and sister.   Geraldo Huerta RN

## 2022-08-22 NOTE — PROGRESS NOTES
Problem: Falls - Risk of  Goal: *Absence of Falls  Description: Document Tatiana Holman Fall Risk and appropriate interventions in the flowsheet. Outcome: Progressing Towards Goal  Note: Fall Risk Interventions:  Mobility Interventions: Bed/chair exit alarm, Communicate number of staff needed for ambulation/transfer, Patient to call before getting OOB    Mentation Interventions: Adequate sleep, hydration, pain control, Bed/chair exit alarm    Medication Interventions: Bed/chair exit alarm, Patient to call before getting OOB, Teach patient to arise slowly    Elimination Interventions: Bed/chair exit alarm, Call light in reach              Problem: Patient Education: Go to Patient Education Activity  Goal: Patient/Family Education  Outcome: Progressing Towards Goal     Problem: Pain  Goal: *Control of Pain  Outcome: Progressing Towards Goal     Problem: Patient Education: Go to Patient Education Activity  Goal: Patient/Family Education  Outcome: Progressing Towards Goal     Problem: Patient Education: Go to Patient Education Activity  Goal: Patient/Family Education  Outcome: Progressing Towards Goal     Problem: Patient Education: Go to Patient Education Activity  Goal: Patient/Family Education  Outcome: Progressing Towards Goal     Problem: Impaired Skin Integrity/Pressure Injury Treatment  Goal: *Improvement of Existing Pressure Injury  Outcome: Progressing Towards Goal  Goal: *Prevention of pressure injury  Description: Document Stef Scale and appropriate interventions in the flowsheet.   Outcome: Progressing Towards Goal  Note: Pressure Injury Interventions:  Sensory Interventions: Assess changes in LOC, Check visual cues for pain, Float heels, Keep linens dry and wrinkle-free, Minimize linen layers    Moisture Interventions: Absorbent underpads, Apply protective barrier, creams and emollients, Internal/External urinary devices    Activity Interventions: Assess need for specialty bed, Increase time out of bed, PT/OT evaluation    Mobility Interventions: Float heels, PT/OT evaluation    Nutrition Interventions: Discuss nutritional consult with provider    Friction and Shear Interventions: Apply protective barrier, creams and emollients                Problem: Patient Education: Go to Patient Education Activity  Goal: Patient/Family Education  Outcome: Progressing Towards Goal     Problem: Anxiety  Goal: *Alleviation of anxiety  Outcome: Progressing Towards Goal  Goal: *Alleviation of anxiety (Palliative Care)  Outcome: Progressing Towards Goal     Problem: Patient Education: Go to Patient Education Activity  Goal: Patient/Family Education  Outcome: Progressing Towards Goal     Problem: Pressure Injury - Risk of  Goal: *Prevention of pressure injury  Description: Document Stef Scale and appropriate interventions in the flowsheet.   Outcome: Progressing Towards Goal  Note: Pressure Injury Interventions:  Sensory Interventions: Assess changes in LOC, Check visual cues for pain, Float heels, Keep linens dry and wrinkle-free, Minimize linen layers    Moisture Interventions: Absorbent underpads, Apply protective barrier, creams and emollients, Internal/External urinary devices    Activity Interventions: Assess need for specialty bed, Increase time out of bed, PT/OT evaluation    Mobility Interventions: Float heels, PT/OT evaluation    Nutrition Interventions: Discuss nutritional consult with provider    Friction and Shear Interventions: Apply protective barrier, creams and emollients                Problem: Patient Education: Go to Patient Education Activity  Goal: Patient/Family Education  Outcome: Progressing Towards Goal     Problem: Patient Education: Go to Patient Education Activity  Goal: Patient/Family Education  Outcome: Progressing Towards Goal     Problem: Non-Violent Restraints  Goal: Removal from restraints as soon as assessed to be safe  Outcome: Progressing Towards Goal  Goal: No harm/injury to patient while restraints in use  Outcome: Progressing Towards Goal  Goal: Patient's dignity will be maintained  Outcome: Progressing Towards Goal  Goal: Patient Interventions  Outcome: Progressing Towards Goal

## 2022-08-22 NOTE — PROGRESS NOTES
8/22/2022  Case Management Progress note    3:48 PM  Attempted to speak with patient about IPR choice as per PT she is agreeable, however when I did go into the room patient only requesting pain medicine, states she's asked 3x. Requesting I leave the door open as she is feeling claustrophobic, so I did so. Will inform RN when she comes out of another patient's room. DORCAS Short    12:03 PM  Patient is 66year old female admitted 8/16 with colovesical fistula  Patient's RUR is 9% green/low risk for readmission  Covid test: none this admission  Chart reviewed--patient discussed at IDR rounds   Per rounds patient still has NGT, but pain is better controlled today. PT had seen yesterday and recommended inpatient rehab--I have sent some preliminary referrals to see if patient would be a candidate. Patient has regular Medicare and will not require an [place]. Will continue to follow and assist with discharge planning as clinical course continues.      Transition of Care Plan   Continue medical management/treatment  IPR referrals sent  Transportation tbd pending progress  CM will continue to follow    DORCAS Short
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
## 2022-08-22 NOTE — PROGRESS NOTES
Bedside and Verbal shift change report given to Morris RN (oncoming nurse) by Shea Rojas RN (offgoing nurse). Report included the following information SBAR, Kardex, OR Summary, Intake/Output, MAR, Recent Results, Procedure Verification, and Quality Measures.

## 2022-08-22 NOTE — PROGRESS NOTES
Urology Progress Note    Subjective:     Daily Progress Note: 2022 9:19 AM    Patient is a 66 y.o. F with a history of colovesical fistula s/p left colectomy, repair of colovesical fistula, and loop ileostomy 22. She had externalized ureteral stents placed intraoperatively. Postoperative course-she has had AMS, n/v managed with NGT and concern for aspiration with empiric antibiotic coverage. CT obtained shows moderate R hydro to the level of the pelvis without stone, suspected extrinsic compression or stricture. Jasmina Thomas is 2 Days Post-Op right ureteral stent placement and doing good. She reports bladder spasms yesterday evening. Spoke with night shift nurse who reports catheter bag was completley full when she assumed care of pt. After bag drained, improvement in spasms.      Cr 0.78  No leukocytosis  Good UO     Objective:     Visit Vitals  BP (!) 180/82   Pulse 82   Temp 97.9 °F (36.6 °C)   Resp 17   Ht 5' 4\" (1.626 m)   Wt 112.6 kg (248 lb 5.6 oz)   SpO2 93%   BMI 42.63 kg/m²        Temp (24hrs), Av °F (36.7 °C), Min:97.6 °F (36.4 °C), Max:99.1 °F (37.3 °C)      Intake and Output:   1901 -  0700  In: 4464.6 [I.V.:4464.6]  Out: 0945 [Urine:3900; Drains:50]   0701 -  190  In: -   Out: 700     Physical Exam:  General appearance: alert, cooperative, no distress, appears stated age  Respiratory: no distress  Abdomen: not distended, appropriately tender, NG in place   : zhang catheter in place, secured to statlock, light yvonne urine in tubing   Extremities: moves upper during conversation     Data Review:    Recent Results (from the past 24 hour(s))   PHOSPHORUS    Collection Time: 22  2:55 AM   Result Value Ref Range    Phosphorus 1.9 (L) 2.6 - 4.7 MG/DL   CBC WITH AUTOMATED DIFF    Collection Time: 22  2:55 AM   Result Value Ref Range    WBC 8.3 3.6 - 11.0 K/uL    RBC 3.57 (L) 3.80 - 5.20 M/uL    HGB 11.4 (L) 11.5 - 16.0 g/dL    HCT 34.6 (L) 35.0 - 47.0 %    MCV 96.9 80.0 - 99.0 FL    MCH 31.9 26.0 - 34.0 PG    MCHC 32.9 30.0 - 36.5 g/dL    RDW 12.7 11.5 - 14.5 %    PLATELET 034 003 - 285 K/uL    MPV 10.7 8.9 - 12.9 FL    NRBC 0.0 0  WBC    ABSOLUTE NRBC 0.00 0.00 - 0.01 K/uL    NEUTROPHILS 61 32 - 75 %    LYMPHOCYTES 24 12 - 49 %    MONOCYTES 8 5 - 13 %    EOSINOPHILS 6 0 - 7 %    BASOPHILS 0 0 - 1 %    IMMATURE GRANULOCYTES 1 (H) 0.0 - 0.5 %    ABS. NEUTROPHILS 5.0 1.8 - 8.0 K/UL    ABS. LYMPHOCYTES 2.0 0.8 - 3.5 K/UL    ABS. MONOCYTES 0.7 0.0 - 1.0 K/UL    ABS. EOSINOPHILS 0.5 (H) 0.0 - 0.4 K/UL    ABS. BASOPHILS 0.0 0.0 - 0.1 K/UL    ABS. IMM. GRANS. 0.1 (H) 0.00 - 0.04 K/UL    DF AUTOMATED     METABOLIC PANEL, COMPREHENSIVE    Collection Time: 08/22/22  2:55 AM   Result Value Ref Range    Sodium 138 136 - 145 mmol/L    Potassium 4.1 3.5 - 5.1 mmol/L    Chloride 106 97 - 108 mmol/L    CO2 28 21 - 32 mmol/L    Anion gap 4 (L) 5 - 15 mmol/L    Glucose 108 (H) 65 - 100 mg/dL    BUN 6 6 - 20 MG/DL    Creatinine 0.78 0.55 - 1.02 MG/DL    BUN/Creatinine ratio 8 (L) 12 - 20      GFR est AA >60 >60 ml/min/1.73m2    GFR est non-AA >60 >60 ml/min/1.73m2    Calcium 8.5 8.5 - 10.1 MG/DL    Bilirubin, total 1.0 0.2 - 1.0 MG/DL    ALT (SGPT) 56 12 - 78 U/L    AST (SGOT) 38 (H) 15 - 37 U/L    Alk.  phosphatase 113 45 - 117 U/L    Protein, total 6.0 (L) 6.4 - 8.2 g/dL    Albumin 2.3 (L) 3.5 - 5.0 g/dL    Globulin 3.7 2.0 - 4.0 g/dL    A-G Ratio 0.6 (L) 1.1 - 2.2         Assessment/Plan:     Active Problems:    Hypothyroidism ()      Benign essential HTN ()      Colovesical fistula (8/16/2022)        Status Post:  Procedure(s):  CYSTOSCOPY, RIGHT URETERAL STENT INSERTION, RIGHT RETROGRADE PYLOGRAM     Plan:   R hydronephrosis 2/2 suspected extrinsic compression or stricture, s/p stent 8/20.  - Forte catheter can be removed per primary team, usually about 7-10 days following cystorrhaphy  -Will arrange OP FU with VU to discuss dx URS at the time of stent removal to complete evaluation of ureter    Urology signing off. Please call with questions or concerns.      Signed By: Lina Patiño NP                         August 22, 2022

## 2022-08-22 NOTE — PROGRESS NOTES
Bedside and Verbal shift change report given to DASHAWN RN (oncoming nurse) by Aurora Antonio RN (offgoing nurse). Report included the following information SBAR, Kardex, ED Summary, Accordion, and Recent Results.

## 2022-08-22 NOTE — PROGRESS NOTES
Problem: Self Care Deficits Care Plan (Adult)  Goal: *Acute Goals and Plan of Care (Insert Text)  Description: Description: FUNCTIONAL STATUS PRIOR TO ADMISSION: Patient reports modified independent baseline ambulation using rollator walker, utilizing chair lift for stair negotiation, and performing ADLs independently. HOME SUPPORT PRIOR TO ADMISSION: The patient lived with her sister and brother in law. Occupational Therapy Goals  Initiated 8/20/2022  1. Patient will perform grooming with minimal assistance/contact guard in bed or chair within 7 day(s). 2.  Patient will don  UE's into hospital gown and sponge bathe UE's and abdomen with supervision/set-up within 7 day(s). 3.  Patient will tolerate 5 minutes functional activity or exercise without asking for pain meds within 7 day(s). 4.  Patient will stand with bilateral UE support or in lory steady > or = 1 minute with good tolerance to activity within 7 day(s). Outcome: Progressing Towards Goal   OCCUPATIONAL THERAPY TREATMENT  Patient: Siena Bose (68 y.o. female)  Date: 8/22/2022  Diagnosis: Colovesical fistula [N32.1] <principal problem not specified>  Procedure(s) (LRB):  CYSTOSCOPY, RIGHT URETERAL STENT INSERTION, RIGHT RETROGRADE PYLOGRAM (Right) 2 Days Post-Op  Precautions:  (NG tube; iliostomy)  Chart, occupational therapy assessment, plan of care, and goals were reviewed. ASSESSMENT  Patient continues with skilled OT services and is progressing towards goals. Patient very motivated to work with OT today and progress towards goals of ambulation and self-care. Pt received supine in bed A&Ox4 with NG tube, zhang cath, and ileostomy in place. Pt motivated to participate with therapy and states she is feeling more clearheaded and calm today. She states she knows she is weak but needed continued guidance for rehab need to progress back towards baseline independence with ADLs and functional mobility.  Elevated BP limiting factor for activity today, RN alerted and in room intermittently to administer IV meds. Pt educated with BUE ex supine including full composite flexion/extension fists/hands, wrist circumduction, elbow flexion/extension on R side (L side currently with active IV). Pt needing Mod Ax2 with supine to sit with HOB elevated. Good sitting balance on EOB with BP improvement after IV med took effect. Sit to stand with Mod Ax2 with RW. Pt with side steps to recliner with RW and 2 person assistance. Continue to recommend Newman Inks for 1 person assist and for back to bed. Pt with great progress today and demonstrates volition and improved awareness of need for rehab to progress towards PTA basline of Mod I with RW for functional ambulation and ADLs. Discussed with CM, stating referrals to be sent. Patient Vitals for the past 4 hrs:    Temp Pulse Resp BP SpO2   08/22/22 1237 98 °F (36.7 °C) 86 18 137/69 93 %   08/22/22 1226 -- 95 -- (!) 151/62 sitting post activity 100 %   08/22/22 1217 -- 87 -- (!) 175/96 sitting s/p IV BP med --   08/22/22 1214 -- 87 -- (!) 233/93 supine --   08/22/22 1154 -- 83 -- (!) 207/95  supine --       Current Level of Function Impacting Discharge (ADLs): Mod A x2 for transfers and bed mobility today    Other factors to consider for discharge: PLOF, co-morbidities, needs rehab         PLAN :  Patient continues to benefit from skilled intervention to address the above impairments. Continue treatment per established plan of care to address goals. Recommend with staff: OOB to recliner as tolerated.  OOB to [facility] with assist x2 and RW for safety     Recommend next OT session: Progress activity tolerance, continue POC, EOB/chair ADLs    Recommendation for discharge: (in order for the patient to meet his/her long term goals)  Therapy 3 hours per day 5-7 days per week    This discharge recommendation:  Has been made in collaboration with the attending provider and/or case management    IF patient discharges home will need the following DME: AE: long handled bathing, AE: long handled dressing, bedside commode, gait belt, hospital bed, and shower chair       SUBJECTIVE:   Patient stated I' much better today than I have been.     OBJECTIVE DATA SUMMARY:   Cognitive/Behavioral Status:  Neurologic State: Alert  Orientation Level: Oriented X4  Cognition: Follows commands; Appropriate for age attention/concentration  Perception: Appears intact  Perseveration: No perseveration noted  Safety/Judgement: Awareness of environment;Decreased insight into deficits    Functional Mobility and Transfers for ADLs:  Bed Mobility:  Rolling: Moderate assistance  Supine to Sit: Moderate assistance;Assist x2; Additional time  Scooting: Contact guard assistance; Additional time    Transfers:  Sit to Stand: Moderate assistance;Assist x2; Additional time          Balance:  Sitting: High guard; Intact  Sitting - Static: Good (unsupported)  Sitting - Dynamic: Fair (occasional)  Standing: Impaired  Standing - Static: Constant support; Fair  Standing - Dynamic : Poor    ADL Intervention:       Grooming  Brushing Teeth: Set-up; Supervision (with sponge)      Cognitive Retraining  Safety/Judgement: Awareness of environment;Decreased insight into deficits      Activity Tolerance:   Poor, SpO2 stable on RA, requires rest breaks, and observed SOB with activity    After treatment patient left in no apparent distress:   Sitting in chair, Call bell within reach, and Bed / chair alarm activated    COMMUNICATION/COLLABORATION:   The patients plan of care was discussed with: Physical therapist, Registered nurse, and Case management.      Guru Graham OT  Time Calculation: 52 mins

## 2022-08-22 NOTE — PROGRESS NOTES
[facility]  [address] [facility], [place], [id number] [place] [address]  [phone] [address] Adult  Hospitalist Group                                                                                          Hospitalist Progress Note  Melissa Baca MD        Date of Service:  2022  NAME:  Cindi Rivera  :  1950  MRN:  [id number]      Admission Summary:   Hospitalist consulted for AHRF and aspiration pna    Interval history / Subjective:   Patient somnolent but arousable. Feels better, still anxious. Mild abdominal pain but more tolerable. Assessment & Plan: Altered mental status: Due to narcotics and benzos. CT head, ABG, TSH, ammonia level all unremarkable. Recent UA with no signs of infection. Now improved after adjusting pain regimen. Acute hypoxic respiratory failure: Possible aspiration given events of nausea vomiting. off oxygen. Continue IV Zosyn until tomorrow (7 days total). DuoNebs as needed. Incentive spirometry     Intractable nausea vomiting: X-ray noting possible ileus or early obstruction. NGT replaced and patient now has a sitter. CT abd with oral contrast without leaking. Management per surgery. Hydroureteronephrosis/BERNADETTE : seen on CT. Urology consulted, placed stent on . Plan to follow up outpatient. Cr now back to normal.     Colectomy with colorectal anastomosis/ diverting loop ileostomy/ bilateral ureteral stents: Management per surgery. Elevated blood pressure: History of hypertension. cont clonidine patch given NPO. Thyroidism: TSH wnl . Will give IV synthroid given NPO. History of head injury with memory loss: Supportive care.     Code status: full  DVT prophylaxis: [facility]  Date Reviewed: 2022            Codes Class Noted POA    Colovesical fistula ICD-10-CM: N32.1  ICD-9-CM: 596.1  2022 Unknown        Hypothyroidism ICD-10-CM: E03.9  ICD-9-CM: 244.9  Unknown Yes Benign essential HTN ICD-10-CM: I10  ICD-9-CM: 401.1  Unknown Yes         Review of Systems:   A comprehensive review of systems was negative except for that written in the HPI. Vital Signs:    Last 24hrs VS reviewed since prior progress note. Most recent are:  Visit Vitals  BP (!) 207/95 (BP 1 Location: Left upper arm, BP Patient Position: At rest)   Pulse 83   Temp 97.9 °F (36.6 °C)   Resp 17   Ht 5' 4\" (1.626 m)   Wt 112.6 kg (248 lb 5.6 oz)   SpO2 93%   BMI 42.63 kg/m²         Intake/Output Summary (Last 24 hours) at 8/22/2022 1222  Last data filed at 8/22/2022 1021  Gross per 24 hour   Intake 2639.58 ml   Output 4280 ml   Net -1640.42 ml          Physical Examination:             Constitutional:  Moderate pain, alert and awake and oriented   ENT:  Oral mucosa moist, oropharynx benign. Resp:  Course sounds bilaterally   CV:  Regular rhythm, normal rate, no murmurs, gallops, rubs    GI:  Soft, non distended, diffuse nonspecific tenderness     Musculoskeletal:  No edema, warm, 2+ pulses throughout    Neurologic:  Moves all extremities. AAOx3, CN II-XII reviewed     Psych:  anxious. Poor insight       Data Review:    Review and/or order of clinical lab test      Labs:     Recent Labs     08/22/22 0255 08/21/22  0233   WBC 8.3 7.5   HGB 11.4* 11.3*   HCT 34.6* 34.2*    214       Recent Labs     08/22/22  0255 08/21/22  0233 08/20/22  0340    136 133*   K 4.1 4.6 4.1    106 103   CO2 28 26 25   BUN 6 8 10   CREA 0.78 0.79 1.36*   * 144* 144*   CA 8.5 8.9 8.6   PHOS 1.9* 1.9* 1.2*       Recent Labs     08/22/22  0255 08/21/22  0233 08/20/22  0340   ALT 56 49 40    104 98   TBILI 1.0 1.2* 2.0*   TP 6.0* 6.1* 6.2*   ALB 2.3* 2.3* 2.4*   GLOB 3.7 3.8 3.8       No results for input(s): INR, PTP, APTT, INREXT, INREXT in the last 72 hours. No results for input(s): FE, TIBC, PSAT, FERR in the last 72 hours.    No results found for: FOL, RBCF   No results for input(s): PH, PCO2, PO2 in the last 72 hours. No results for input(s): CPK, CKNDX, TROIQ in the last 72 hours.     No lab exists for component: CPKMB  Lab Results   Component Value Date/Time    Cholesterol, total 193 12/10/2020 02:41 PM    HDL Cholesterol 36 12/10/2020 02:41 PM    LDL, calculated 136 (H) 12/10/2020 02:41 PM    Triglyceride 105 12/10/2020 02:41 PM    CHOL/HDL Ratio 5.4 (H) 12/10/2020 02:41 PM     No results found for: [facility]  Lab Results   Component Value Date/Time    Color YELLOW/STRAW 08/16/2022 08:16 AM    Appearance CLOUDY (A) 08/16/2022 08:16 AM    Specific gravity 1.018 08/16/2022 08:16 AM    pH (UA) 5.0 08/16/2022 08:16 AM    Protein 30 (A) 08/16/2022 08:16 AM    Glucose Negative 08/16/2022 08:16 AM    Ketone 15 (A) 08/16/2022 08:16 AM    Bilirubin Negative 08/16/2022 08:16 AM    Urobilinogen 0.2 08/16/2022 08:16 AM    Nitrites Negative 08/16/2022 08:16 AM    Leukocyte Esterase MODERATE (A) 08/16/2022 08:16 AM    Epithelial cells MODERATE (A) 08/16/2022 08:16 AM    Bacteria Negative 08/16/2022 08:16 AM    WBC 20-50 08/16/2022 08:16 AM    RBC 10-20 08/16/2022 08:16 AM         Medications Reviewed:     Current Facility-Administered Medications   Medication Dose Route Frequency    piperacillin-tazobactam (ZOSYN) 3.375 g in 0.9% sodium chloride (MBP/ADV) 100 mL MBP  3.375 g IntraVENous Q8H    ketorolac (TORADOL) injection 15 mg  15 mg IntraVENous Q6H    HYDROmorphone (DILAUDID) syringe 0.2 mg  0.2 mg IntraVENous Q3H PRN    [START ON 8/23/2022] levothyroxine (SYNTHROID) injection 88 mcg  88 mcg IntraVENous Q24H    lidocaine (PF) (XYLOCAINE) 10 mg/mL (1 %) injection 0.1 mL  0.1 mL SubCUTAneous PRN    enoxaparin (LOVENOX) injection 30 mg  30 mg SubCUTAneous Q12H    lidocaine 4 % patch 2 Patch  2 Patch TransDERmal Q24H    cloNIDine (CATAPRES) 0.1 mg/24 hr patch 1 Patch  1 Patch TransDERmal Q7D    phenol throat spray (CHLORASEPTIC) 1 Spray  1 Spray Oral PRN    albuterol-ipratropium (DUO-NEB) 2.5 MG-0.5 MG/3 ML  3 mL Nebulization Q6H PRN    pantoprazole (PROTONIX) 40 mg in 0.9% sodium chloride 10 mL injection  40 mg IntraVENous Q12H    ondansetron (ZOFRAN) injection 4 mg  4 mg IntraVENous Q6H    naloxone (NARCAN) injection 0.2 mg  0.2 mg IntraVENous EVERY 2 MINUTES AS NEEDED    [Held by provider] metoprolol succinate (TOPROL-XL) XL tablet 50 mg  50 mg Oral QAM    dextrose 5% - 0.45% NaCl with KCl 20 mEq/L infusion  125 mL/hr IntraVENous CONTINUOUS    sodium chloride (NS) flush 5-40 mL  5-40 mL IntraVENous Q8H    sodium chloride (NS) flush 5-40 mL  5-40 mL IntraVENous PRN    [Held by provider] naloxegoL (MOVANTIK) tablet 25 mg  25 mg Oral DAILY    [Held by provider] acetaminophen (TYLENOL) tablet 1,000 mg  1,000 mg Oral Q6H    alum-mag hydroxide-simeth (MYLANTA) oral suspension 15 mL  15 mL Oral Q6H PRN    hydrALAZINE (APRESOLINE) 20 mg/mL injection 10 mg  10 mg IntraVENous Q6H PRN    [Held by provider] levothyroxine (SYNTHROID) tablet 125 mcg  125 mcg Oral 6am     ______________________________________________________________________  EXPECTED LENGTH OF STAY: 5d 16h  ACTUAL LENGTH OF STAY:          6                 Primo Hill MD

## 2022-08-22 NOTE — PROGRESS NOTES
CRS  Please see Davin Tatum NP, note  Long d/w pt and her sister, Keya (Keya via phone)    Replete Phos  Avoid any meds for potential MS changes  Plan PICC line tomorrow for TPN  DC IV when tpn runnining  Clamping trial tomorrow if ostomy still putting out enteric contents  Cont Zosyn for diverticulitis w/abscess  Low threshold to repeat CT chest/abd/pelvis  Appreciate Urology help/ plans noted

## 2022-08-23 ENCOUNTER — APPOINTMENT (OUTPATIENT)
Dept: GENERAL RADIOLOGY | Age: 72
DRG: 329 | End: 2022-08-23
Attending: NURSE PRACTITIONER
Payer: MEDICARE

## 2022-08-23 LAB
ALBUMIN SERPL-MCNC: 2.3 G/DL (ref 3.5–5)
ALBUMIN/GLOB SERPL: 0.7 {RATIO} (ref 1.1–2.2)
ALP SERPL-CCNC: 121 U/L (ref 45–117)
ALT SERPL-CCNC: 55 U/L (ref 12–78)
ANION GAP SERPL CALC-SCNC: 7 MMOL/L (ref 5–15)
AST SERPL-CCNC: 35 U/L (ref 15–37)
BASOPHILS # BLD: 0 K/UL (ref 0–0.1)
BASOPHILS NFR BLD: 0 % (ref 0–1)
BILIRUB SERPL-MCNC: 1.1 MG/DL (ref 0.2–1)
BUN SERPL-MCNC: 6 MG/DL (ref 6–20)
BUN/CREAT SERPL: 8 (ref 12–20)
CALCIUM SERPL-MCNC: 8.6 MG/DL (ref 8.5–10.1)
CHLORIDE SERPL-SCNC: 105 MMOL/L (ref 97–108)
CO2 SERPL-SCNC: 27 MMOL/L (ref 21–32)
CREAT SERPL-MCNC: 0.78 MG/DL (ref 0.55–1.02)
DIFFERENTIAL METHOD BLD: ABNORMAL
EOSINOPHIL # BLD: 0.4 K/UL (ref 0–0.4)
EOSINOPHIL NFR BLD: 5 % (ref 0–7)
ERYTHROCYTE [DISTWIDTH] IN BLOOD BY AUTOMATED COUNT: 12.9 % (ref 11.5–14.5)
GLOBULIN SER CALC-MCNC: 3.5 G/DL (ref 2–4)
GLUCOSE BLD STRIP.AUTO-MCNC: 76 MG/DL (ref 65–117)
GLUCOSE SERPL-MCNC: 86 MG/DL (ref 65–100)
HCT VFR BLD AUTO: 34.6 % (ref 35–47)
HGB BLD-MCNC: 11.5 G/DL (ref 11.5–16)
IMM GRANULOCYTES # BLD AUTO: 0 K/UL
IMM GRANULOCYTES NFR BLD AUTO: 0 %
LYMPHOCYTES # BLD: 2 K/UL (ref 0.8–3.5)
LYMPHOCYTES NFR BLD: 24 % (ref 12–49)
MAGNESIUM SERPL-MCNC: 1.6 MG/DL (ref 1.6–2.4)
MCH RBC QN AUTO: 33 PG (ref 26–34)
MCHC RBC AUTO-ENTMCNC: 33.2 G/DL (ref 30–36.5)
MCV RBC AUTO: 99.4 FL (ref 80–99)
MONOCYTES # BLD: 0.3 K/UL (ref 0–1)
MONOCYTES NFR BLD: 4 % (ref 5–13)
NEUTS BAND NFR BLD MANUAL: 1 % (ref 0–6)
NEUTS SEG # BLD: 5.5 K/UL (ref 1.8–8)
NEUTS SEG NFR BLD: 66 % (ref 32–75)
NRBC # BLD: 0 K/UL (ref 0–0.01)
NRBC BLD-RTO: 0 PER 100 WBC
PHOSPHATE SERPL-MCNC: 4.1 MG/DL (ref 2.6–4.7)
PLATELET # BLD AUTO: 228 K/UL (ref 150–400)
PMV BLD AUTO: 10.6 FL (ref 8.9–12.9)
POTASSIUM SERPL-SCNC: 3.8 MMOL/L (ref 3.5–5.1)
PROT SERPL-MCNC: 5.8 G/DL (ref 6.4–8.2)
RBC # BLD AUTO: 3.48 M/UL (ref 3.8–5.2)
RBC MORPH BLD: ABNORMAL
RBC MORPH BLD: ABNORMAL
SERVICE CMNT-IMP: NORMAL
SODIUM SERPL-SCNC: 139 MMOL/L (ref 136–145)
WBC # BLD AUTO: 8.2 K/UL (ref 3.6–11)
WBC MORPH BLD: ABNORMAL

## 2022-08-23 PROCEDURE — 77010033678 HC OXYGEN DAILY

## 2022-08-23 PROCEDURE — 74011000258 HC RX REV CODE- 258: Performed by: COLON & RECTAL SURGERY

## 2022-08-23 PROCEDURE — 36415 COLL VENOUS BLD VENIPUNCTURE: CPT

## 2022-08-23 PROCEDURE — 76937 US GUIDE VASCULAR ACCESS: CPT

## 2022-08-23 PROCEDURE — 85025 COMPLETE CBC W/AUTO DIFF WBC: CPT

## 2022-08-23 PROCEDURE — 77030018786 HC NDL GD F/USND BARD -B

## 2022-08-23 PROCEDURE — 94761 N-INVAS EAR/PLS OXIMETRY MLT: CPT

## 2022-08-23 PROCEDURE — 74011000250 HC RX REV CODE- 250: Performed by: STUDENT IN AN ORGANIZED HEALTH CARE EDUCATION/TRAINING PROGRAM

## 2022-08-23 PROCEDURE — 02HV33Z INSERTION OF INFUSION DEVICE INTO SUPERIOR VENA CAVA, PERCUTANEOUS APPROACH: ICD-10-PCS | Performed by: NURSE PRACTITIONER

## 2022-08-23 PROCEDURE — 74011250637 HC RX REV CODE- 250/637: Performed by: NURSE PRACTITIONER

## 2022-08-23 PROCEDURE — 74011250636 HC RX REV CODE- 250/636: Performed by: STUDENT IN AN ORGANIZED HEALTH CARE EDUCATION/TRAINING PROGRAM

## 2022-08-23 PROCEDURE — C9113 INJ PANTOPRAZOLE SODIUM, VIA: HCPCS | Performed by: STUDENT IN AN ORGANIZED HEALTH CARE EDUCATION/TRAINING PROGRAM

## 2022-08-23 PROCEDURE — 36573 INSJ PICC RS&I 5 YR+: CPT | Performed by: NURSE PRACTITIONER

## 2022-08-23 PROCEDURE — 80053 COMPREHEN METABOLIC PANEL: CPT

## 2022-08-23 PROCEDURE — 74011250636 HC RX REV CODE- 250/636: Performed by: COLON & RECTAL SURGERY

## 2022-08-23 PROCEDURE — 65270000029 HC RM PRIVATE

## 2022-08-23 PROCEDURE — 74011000250 HC RX REV CODE- 250: Performed by: FAMILY MEDICINE

## 2022-08-23 PROCEDURE — 74011250636 HC RX REV CODE- 250/636: Performed by: NURSE PRACTITIONER

## 2022-08-23 PROCEDURE — 82962 GLUCOSE BLOOD TEST: CPT

## 2022-08-23 PROCEDURE — 83735 ASSAY OF MAGNESIUM: CPT

## 2022-08-23 PROCEDURE — 74011000250 HC RX REV CODE- 250: Performed by: NURSE PRACTITIONER

## 2022-08-23 PROCEDURE — 2709999900 HC NON-CHARGEABLE SUPPLY

## 2022-08-23 PROCEDURE — C1751 CATH, INF, PER/CENT/MIDLINE: HCPCS

## 2022-08-23 PROCEDURE — 84100 ASSAY OF PHOSPHORUS: CPT

## 2022-08-23 RX ORDER — INSULIN LISPRO 100 [IU]/ML
INJECTION, SOLUTION INTRAVENOUS; SUBCUTANEOUS EVERY 6 HOURS
Status: DISCONTINUED | OUTPATIENT
Start: 2022-08-23 | End: 2022-08-29

## 2022-08-23 RX ORDER — DEXTROSE, SODIUM CHLORIDE, AND POTASSIUM CHLORIDE 5; .45; .15 G/100ML; G/100ML; G/100ML
50 INJECTION INTRAVENOUS CONTINUOUS
Status: DISCONTINUED | OUTPATIENT
Start: 2022-08-23 | End: 2022-08-24

## 2022-08-23 RX ORDER — POLYETHYLENE GLYCOL 3350 17 G/17G
17 POWDER, FOR SOLUTION ORAL ONCE
Status: COMPLETED | OUTPATIENT
Start: 2022-08-23 | End: 2022-08-23

## 2022-08-23 RX ORDER — DEXTROSE MONOHYDRATE 100 MG/ML
0-250 INJECTION, SOLUTION INTRAVENOUS AS NEEDED
Status: DISCONTINUED | OUTPATIENT
Start: 2022-08-23 | End: 2022-08-29

## 2022-08-23 RX ORDER — MAGNESIUM SULFATE 100 %
4 CRYSTALS MISCELLANEOUS AS NEEDED
Status: DISCONTINUED | OUTPATIENT
Start: 2022-08-23 | End: 2022-08-29

## 2022-08-23 RX ADMIN — ONDANSETRON HYDROCHLORIDE 4 MG: 2 SOLUTION INTRAMUSCULAR; INTRAVENOUS at 05:48

## 2022-08-23 RX ADMIN — ENOXAPARIN SODIUM 30 MG: 100 INJECTION SUBCUTANEOUS at 09:29

## 2022-08-23 RX ADMIN — PIPERACILLIN AND TAZOBACTAM 3.38 G: 3; .375 INJECTION, POWDER, FOR SOLUTION INTRAVENOUS at 03:05

## 2022-08-23 RX ADMIN — SODIUM CHLORIDE, PRESERVATIVE FREE 40 MG: 5 INJECTION INTRAVENOUS at 21:15

## 2022-08-23 RX ADMIN — LEVOTHYROXINE SODIUM ANHYDROUS 88 MCG: 100 INJECTION, POWDER, LYOPHILIZED, FOR SOLUTION INTRAVENOUS at 15:02

## 2022-08-23 RX ADMIN — HYDROMORPHONE HYDROCHLORIDE 0.2 MG: 1 INJECTION, SOLUTION INTRAMUSCULAR; INTRAVENOUS; SUBCUTANEOUS at 10:47

## 2022-08-23 RX ADMIN — KETOROLAC TROMETHAMINE 15 MG: 30 INJECTION, SOLUTION INTRAMUSCULAR; INTRAVENOUS at 12:00

## 2022-08-23 RX ADMIN — ONDANSETRON HYDROCHLORIDE 4 MG: 2 SOLUTION INTRAMUSCULAR; INTRAVENOUS at 17:18

## 2022-08-23 RX ADMIN — POTASSIUM CHLORIDE, DEXTROSE MONOHYDRATE AND SODIUM CHLORIDE 50 ML/HR: 150; 5; 450 INJECTION, SOLUTION INTRAVENOUS at 20:38

## 2022-08-23 RX ADMIN — SODIUM CHLORIDE, PRESERVATIVE FREE 10 ML: 5 INJECTION INTRAVENOUS at 21:16

## 2022-08-23 RX ADMIN — ONDANSETRON HYDROCHLORIDE 4 MG: 2 SOLUTION INTRAMUSCULAR; INTRAVENOUS at 12:00

## 2022-08-23 RX ADMIN — ENOXAPARIN SODIUM 30 MG: 100 INJECTION SUBCUTANEOUS at 21:15

## 2022-08-23 RX ADMIN — ASCORBIC ACID, VITAMIN A PALMITATE, CHOLECALCIFEROL, THIAMINE HYDROCHLORIDE, RIBOFLAVIN-5 PHOSPHATE SODIUM, PYRIDOXINE HYDROCHLORIDE, NIACINAMIDE, DEXPANTHENOL, ALPHA-TOCOPHEROL ACETATE, VITAMIN K1, FOLIC ACID, BIOTIN, CYANOCOBALAMIN: 200; 3300; 200; 6; 3.6; 6; 40; 15; 10; 150; 600; 60; 5 INJECTION, SOLUTION INTRAVENOUS at 17:39

## 2022-08-23 RX ADMIN — KETOROLAC TROMETHAMINE 15 MG: 30 INJECTION, SOLUTION INTRAMUSCULAR; INTRAVENOUS at 17:18

## 2022-08-23 RX ADMIN — SODIUM CHLORIDE, PRESERVATIVE FREE 40 MG: 5 INJECTION INTRAVENOUS at 09:29

## 2022-08-23 RX ADMIN — PIPERACILLIN AND TAZOBACTAM 3.38 G: 3; .375 INJECTION, POWDER, FOR SOLUTION INTRAVENOUS at 12:43

## 2022-08-23 RX ADMIN — Medication 1 SPRAY: at 21:25

## 2022-08-23 RX ADMIN — PIPERACILLIN AND TAZOBACTAM 3.38 G: 3; .375 INJECTION, POWDER, FOR SOLUTION INTRAVENOUS at 18:34

## 2022-08-23 RX ADMIN — HYDRALAZINE HYDROCHLORIDE 10 MG: 20 INJECTION INTRAMUSCULAR; INTRAVENOUS at 21:13

## 2022-08-23 RX ADMIN — SODIUM CHLORIDE, PRESERVATIVE FREE 10 ML: 5 INJECTION INTRAVENOUS at 05:47

## 2022-08-23 RX ADMIN — POLYETHYLENE GLYCOL 3350 17 G: 17 POWDER, FOR SOLUTION ORAL at 14:19

## 2022-08-23 RX ADMIN — KETOROLAC TROMETHAMINE 15 MG: 30 INJECTION, SOLUTION INTRAMUSCULAR; INTRAVENOUS at 05:48

## 2022-08-23 RX ADMIN — POTASSIUM CHLORIDE, DEXTROSE MONOHYDRATE AND SODIUM CHLORIDE 50 ML/HR: 150; 5; 450 INJECTION, SOLUTION INTRAVENOUS at 17:46

## 2022-08-23 NOTE — WOUND CARE
Ostomy visit: post op day #7  ROBOTIC SPLENIC FLEXURE MOBILIZATION, ROBOTIC CONVERTED TO LAPAROSCOPY ASSIST, EXTENDED LEFT COLECTOMY WITH COLORECTAL ANASTOMOSIS AND DIVERTING LOOP ILEOSTOMY  CYSTOSCOPY WITH INSERTION BILATERAL URETERAL STENTS. In this morning with Rut Kauffman and Vincent Laguerre to continue ileostomy teaching. Vincent Laguerre is alert and oriented x 4, engaged in learning; some sadness expressed as to condition and never seeing herself having been so ill and now with ileostomy - [facility] also at bedside and comforting / supporting patient. She is moving well side to side in bed; NGT clamped and she continues to deny nausea. ~ 100 cc bilious output in appliance when removed. Vincent Laguerre used hand held mirror to participate in ostomy education. Keya verbalizing correct step by step process of change to her sister and providing accurate rationale to questions. Assessment:  RLQ ileostomy -  moist red stoma, flush to just above skin level, red rubber tube fashioned as support shanta; surrounding skin intact, no redness. Bilious output. No leaking under appliance that was placed Friday. Sacrum/buttocks without redness. Teaching:  One piece appliance with cohesive seal used - showed them how to fold and place like \"taco\" to get into skin fold. No sting barrier used to skin. Reinforced to change every 3 - 4 days and if leaks as output is very caustic to skin. How to measure and cut out. Both having some difficulty with opaque appliance  - we can have them order [place] ultra clear for home in one piece which will allow Vincentdurga Laguerre to visualize stoma easily when placing. Reinforced it will be emptied as needed through out day. Recommendations/Plan:  Begin to have patient assist with emptying appliance to learn this vital skill. Will continue to work with Vincent Laguerre.   Juliet Arana, RN,CWON

## 2022-08-23 NOTE — PROGRESS NOTES
PICC Placement Note    PRE-PROCEDURE VERIFICATION  Correct Procedure: yes  Correct Site:  yes  Temperature: Temp: 98.2 °F (36.8 °C), Temperature Source: Temp Source: Oral  Recent Labs     08/23/22  0026   BUN 6   CREA 0.78      WBC 8.2     Allergies: Sulfa (sulfonamide antibiotics)  Education materials for PICC Care given: yes. See Patient Education activity for further details. PICC Booklet placed at bedside: yes    Closed Ended PICC Catheters:  Flush Lumens as Follows:  Intermittent Medication:   Flush before and after each medication with 10 ml NS. Unused Ports:  Flush every 8 hours with 10 ml NS.  TPN Ports:  Flush every 24 hours with 20 ml NS prior to hanging new bag. Blood Draws: Stop infusion, draw off and waste 10 ml of blood. Draw sample with 10cc syringe or greater. DO NOT USE VACUTAINER . Transfer with appropriate device to lab  tubes. Flush with 20 ml NS. Dressing Change:  Every 7 days, and PRN using sterile technique if integrity of dressing is compromised. Initial dressing change for central line 24-48 hours post insertion if gauze is used. Apply new dressing per policy. PROCEDURE DETAIL  Consent was obtained and all questions were answered related to risks and benefits. A triple lumen PICC line was inserted, as a sterile procedure using ultrasound and modified Seldinger technique for TPN. The following documentation is in addition to the PICC properties in the lines/airways flowsheet :  Lot #: [id number]  Lidocaine 1% administered intradermally :yes  Internal Catheter Total Length: 40 (cm)  Vein Selection for PICC:right basilic Arm circumference is 36.5 (cm)  Central Line Bundle followed yes  Complication Related to Insertion:no    The placement was verified by EKG, MAX P WAVE @ 2 (cm). PER EKG PICC TIP @ C/A junction.       Line is okay to use: yes    London Rodriguez RN

## 2022-08-23 NOTE — PROGRESS NOTES
[facility]  [address] [facility], [facility] [address]  [phone]       Hospitalist Progress Note      NAME: Luis Carlos Lebron   :  1950   MRN:  [id number]     Date/Time:  2022     Patient PCP: Marcos Fitzpatrick MD    Emergency Contact:    Extended Emergency Contact Information  Primary Emergency Contact: [facility]  Address: [address] [place] [address] Phone: [phone]  Mobile Phone: [phone]  Relation: Sister  Secondary Emergency Contact: [address] [facility] Phone: [phone]  Mobile Phone: [phone]  Relation: Daughter      Code: Full Code     Isolation Precautions: There are currently no Active Isolations        Subjective:     REASON FOR VISIT:  Recheck AMS and acute hypoxic respiratory failure    HPI & INTERVAL HISTORY:       : Patient was seen and examined. She was alert oriented and appears that the altered mental status has resolved. Also reports no shortness of breath at this time. Nausea and vomiting being controlled with NG tube. Allergies   Allergen Reactions    Sulfa (Sulfonamide Antibiotics) Hives     Not allergic at all. Miki Confer Miki Confer \"yeast infection\"          ROS:  Gen:   Negative  Eyes:  negative  ENT:    negative  Resp:  negative  CVS:   negative  GI:       nausea, vomiting, and abdominal pain  :     negative  Kaylan:    negative            Objective:      Visit Vitals  BP (!) 177/49 (BP 1 Location: Left upper arm, BP Patient Position: At rest)   Pulse 87   Temp 98.1 °F (36.7 °C)   Resp 17   Ht 5' 4.02\" (1.626 m)   Wt 112.6 kg (248 lb 5.6 oz)   SpO2 94%   BMI 42.61 kg/m²       Physical Exam:  General: alert, well appearing, and no distress  Head: Normocephalic, without obvious abnormality, atraumatic  Eyes: PERRL, EOMI, anicteric sclerae, and conjuntiva clear  ENT: lips, mucosa, and tongue normal  Neck: normal, supple, and no tenderness  Lungs: clear to auscultation with good breath sounds and normal respiratory effort  Heart: S1, S2 normal, regular rate, and regular rhythm  Abd: not distended, soft, nontender, BS present and normactive, colostomy in place and stable  Ext: no cyanosis and no edema  Skin: normal skin color, no rashes, and texture normal  Neuro:  alert, oriented x 3, no defects noted in general exam.  Psych: not anxious, cooperative, appropriate affect      Medications:  Current Facility-Administered Medications   Medication Dose Route Frequency    insulin lispro (HUMALOG) injection   SubCUTAneous Q6H    glucose chewable tablet 16 g  4 Tablet Oral PRN    glucagon (GLUCAGEN) injection 1 mg  1 mg IntraMUSCular PRN    dextrose 10% infusion 0-250 mL  0-250 mL IntraVENous PRN    dextrose 5% - 0.45% NaCl with KCl 20 mEq/L infusion  50 mL/hr IntraVENous CONTINUOUS    TPN ADULT - CENTRAL AA 5% D20% W/ ELECTROLYTES AND CA   IntraVENous CONTINUOUS    alteplase (CATHFLO) 1 mg in sterile water (preservative free) 1 mL injection  1 mg InterCATHeter PRN    piperacillin-tazobactam (ZOSYN) 3.375 g in 0.9% sodium chloride (MBP/ADV) 100 mL MBP  3.375 g IntraVENous Q8H    ketorolac (TORADOL) injection 15 mg  15 mg IntraVENous Q6H    HYDROmorphone (DILAUDID) syringe 0.2 mg  0.2 mg IntraVENous Q3H PRN    levothyroxine (SYNTHROID) injection 88 mcg  88 mcg IntraVENous Q24H    lidocaine (PF) (XYLOCAINE) 10 mg/mL (1 %) injection 0.1 mL  0.1 mL SubCUTAneous PRN    enoxaparin (LOVENOX) injection 30 mg  30 mg SubCUTAneous Q12H    lidocaine 4 % patch 2 Patch  2 Patch TransDERmal Q24H    cloNIDine (CATAPRES) 0.1 mg/24 hr patch 1 Patch  1 Patch TransDERmal Q7D    phenol throat spray (CHLORASEPTIC) 1 Spray  1 Spray Oral PRN    albuterol-ipratropium (DUO-NEB) 2.5 MG-0.5 MG/3 ML  3 mL Nebulization Q6H PRN    pantoprazole (PROTONIX) 40 mg in 0.9% sodium chloride 10 mL injection  40 mg IntraVENous Q12H    ondansetron (ZOFRAN) injection 4 mg  4 mg IntraVENous Q6H    naloxone (NARCAN) injection 0.2 mg  0.2 mg IntraVENous EVERY 2 MINUTES AS NEEDED    [Held by provider] metoprolol succinate (TOPROL-XL) XL tablet 50 mg  50 mg Oral QAM    dextrose 5% - 0.45% NaCl with KCl 20 mEq/L infusion  75 mL/hr IntraVENous CONTINUOUS    sodium chloride (NS) flush 5-40 mL  5-40 mL IntraVENous Q8H    sodium chloride (NS) flush 5-40 mL  5-40 mL IntraVENous PRN    alum-mag hydroxide-simeth (MYLANTA) oral suspension 15 mL  15 mL Oral Q6H PRN    hydrALAZINE (APRESOLINE) 20 mg/mL injection 10 mg  10 mg IntraVENous Q6H PRN        Labs:  Recent Labs     08/23/22  0026   WBC 8.2   HGB 11.5   HCT 34.6*        Recent Labs     08/23/22  0026      K 3.8      CO2 27   GLU 86   BUN 6   CREA 0.78   CA 8.6   MG 1.6   PHOS 4.1   ALB 2.3*   TBILI 1.1*   ALT 55       Radiology:  XR US GUIDED VASCULAR ACCESS    Result Date: 8/23/2022  Ultrasound guidance for PICC line  placement. DENNIS   I personally reviewed and interpreted the imaging studies and agree with official reading    The labs, imaging studies, and medications was reviewed by me on: August 23, 2022         Assessment/Plan:      AMS d/t acute toxic encephalopathy from narcotics and benzos: Workup unremarkable (UA. Head CT, ABGs, TSH and NH3)  Has improved with medication adjustments and tx of possible aspiration     Acute hypoxic respiratory failure:   Possible aspiration given events of nausea vomiting. Improving now off oxygen. On IV Zosyn today   Continue prn DuoNebs and incentive spirometry     Intractable nausea vomiting s/p Colectomy with colorectal anastomosis/ diverting loop ileostomy/ bilateral ureteral stents:  X-ray noting possible ileus or early obstruction. NGT replaced and patient now has a sitter. CT abd with oral contrast without leaking. Management per surgery. Hydroureteronephrosis/BERNADETTE:   Seen on CT. Urology consulted, placed stent on 8/20. Plan to follow up outpatient. Cr now back to normal.    Elevated blood pressure:   History of hypertension. cont clonidine patch given NPO. Thyroidism:   TSH wnl . Will give IV synthroid given NPO. History of head injury with memory loss:   Supportive care. Body mass index is 42.61 kg/m².:  40 or above:   Morbid obesity      Discussed:  Pt's condition, Imaging findings, Lab findings, Assessment, and Care Plan discussed with: Patient, RN, and Care Manager    Prophylaxis:  Lovenox SQ    Probable disposition:  Per primary team          Date of service:    8/23/2022                ___________________________________________________    Admitting Physician: Sydell Aschoff, MD

## 2022-08-23 NOTE — PROGRESS NOTES
Occupational therapy Note : RN reporting pt recently having PICC line and pt needing to recover and rest. Will cont to follow.

## 2022-08-23 NOTE — PROGRESS NOTES
Bedside, Verbal, and Written shift change report given to Annie Dc 67 (oncoming nurse) by Maricarmen Akhtar RN  (offgoing nurse). Report included the following information SBAR, Kardex, Procedure Summary, Intake/Output, MAR, Accordion, Recent Results, Med Rec Status, Alarm Parameters , and Procedure Verification.

## 2022-08-23 NOTE — PROGRESS NOTES
Problem: Falls - Risk of  Goal: *Absence of Falls  Description: Document Danica Appiah Fall Risk and appropriate interventions in the flowsheet. Outcome: Progressing Towards Goal  Note: Fall Risk Interventions:  Mobility Interventions: Bed/chair exit alarm    Mentation Interventions: Bed/chair exit alarm, Door open when patient unattended, Evaluate medications/consider consulting pharmacy, Reorient patient, Room close to nurse's station    Medication Interventions: Evaluate medications/consider consulting pharmacy    Elimination Interventions: Call light in reach              Problem: Patient Education: Go to Patient Education Activity  Goal: Patient/Family Education  Outcome: Progressing Towards Goal     Problem: Pain  Goal: *Control of Pain  Outcome: Progressing Towards Goal     Problem: Patient Education: Go to Patient Education Activity  Goal: Patient/Family Education  Outcome: Progressing Towards Goal     Problem: Patient Education: Go to Patient Education Activity  Goal: Patient/Family Education  Outcome: Progressing Towards Goal     Problem: Patient Education: Go to Patient Education Activity  Goal: Patient/Family Education  Outcome: Progressing Towards Goal     Problem: Impaired Skin Integrity/Pressure Injury Treatment  Goal: *Improvement of Existing Pressure Injury  Outcome: Progressing Towards Goal  Goal: *Prevention of pressure injury  Description: Document Stef Scale and appropriate interventions in the flowsheet.   Outcome: Progressing Towards Goal  Note: Pressure Injury Interventions:  Sensory Interventions: Assess changes in LOC    Moisture Interventions: Absorbent underpads    Activity Interventions: Increase time out of bed    Mobility Interventions: HOB 30 degrees or less    Nutrition Interventions: Document food/fluid/supplement intake    Friction and Shear Interventions: Minimize layers                Problem: Patient Education: Go to Patient Education Activity  Goal: Patient/Family Education  Outcome: Progressing Towards Goal     Problem: Anxiety  Goal: *Alleviation of anxiety  Outcome: Progressing Towards Goal  Goal: *Alleviation of anxiety (Palliative Care)  Outcome: Progressing Towards Goal     Problem: Patient Education: Go to Patient Education Activity  Goal: Patient/Family Education  Outcome: Progressing Towards Goal

## 2022-08-23 NOTE — PROGRESS NOTES
8/23/2022  Case Management Progress Note    11:12 AM  Patient is 66year old female admitted 8/16 with colovesical fistula  Patient's RUR is 10% green/low risk for readmission  Covid test: none this admission  Chart reviewed--patient discussed at IDR rounds  Per chart patient is having an NGT clamp trial today until 1pm. TPN is also ordered to start today; patient not yet medically stable for discharge. Patient is appropriate for IPR and referrals area out--attempted this morning to discuss with patient however she is currently getting her PICC line placed. Will continue to follow and update.      Transition of Care Plan   Continue medical management/treatment  IPR referrals out but patient not medically stable  Will not need an auth  Transportation tbd pending progress  CM will continue to follow    DORCAS Suresh

## 2022-08-23 NOTE — PROGRESS NOTES
Physical Therapy    Attempted to see today at noon but RN reports patient just finished having PICC line placed. Advised deferral for a bit to allow patient to recover and rest.  Will follow back later today as able and appropriate.     Mary Alice Gill, MS, PT

## 2022-08-23 NOTE — PROGRESS NOTES
Nutrition Note    Noted TPN to start later today. Pt to obtain central line prior. TPN recs:   Day 1: D20/AA5 @ 42 mL/hr  Day 2: D20/AA5 @ 63 mL/hr  Day 3: D20/AA6 @ 83 mL/hr. Lipids: 500 mL x 2 per week. Goal rate with lipids provides 2089 kcal, 120 pro, 1992 mL fluid.        Estimated Nutrition Needs:   Energy: 2100  Wt used: Current  Protein: 113  Wt used: Current   Fluid: 2100       Electronically signed by Marylin Vernon RD on 8/23/2022    Contact: [phone]

## 2022-08-23 NOTE — PROGRESS NOTES
Brief Nutrition Assessment    Type and Reason for Visit: Reassess    Nutrition Recommendations/Plan:   Brief follow up. Noted patient started on TPN - please see below for recommended goal rate to best fit patient needs. Day 1: D20, AA5% @ 42 mL/hour       Day 2: D20, AA5% @ 63 mL/hour        Day 3: D20, AA5% @ 83 mL/hour (goal)            - Lipids: Check triglycerides and if <400, add lipids 2x/week per pharmacy       - Monitor K, Phos, Mg until stable x 3 days with nutrition, replace PRN       - Hold TPN at current rate if electrolytes are not stable    TPN at goal rate 83 mL/hour with lipids 2x/week provides 1896 kcal (90% needs); 100 g protein (89% needs). Please provide PO diet for patient to consume ONS and meet greater percentage of protein needs, or modify TPN to D20, AA6% @75 mL/hour (goal)         If modified to D20, AA6%, this at 75 mL/hour with lipids 2x/week provides 1799 kcal (85% needs); 108 g protein (95% needs).     Lab Results   Component Value Date/Time    GFR est AA >60 08/23/2022 12:26 AM    GFR est non-AA >60 08/23/2022 12:26 AM    Creatinine 0.78 08/23/2022 12:26 AM    BUN 6 08/23/2022 12:26 AM    Sodium 139 08/23/2022 12:26 AM    Potassium 3.8 08/23/2022 12:26 AM    Chloride 105 08/23/2022 12:26 AM    CO2 27 08/23/2022 12:26 AM     Lab Results   Component Value Date/Time    Glucose 86 08/23/2022 12:26 AM     Magnesium   Date Value Ref Range Status   08/23/2022 1.6 1.6 - 2.4 mg/dL Final   08/17/2022 2.5 (H) 1.6 - 2.4 mg/dL Final   11/12/2021 2.2 1.6 - 2.4 mg/dL Final     Lab Results   Component Value Date/Time    Calcium 8.6 08/23/2022 12:26 AM    Phosphorus 4.1 08/23/2022 12:26 AM       Estimated Nutrition Needs:   Energy: 2100 (MSJ x 1.2 x 1.1)  Wt used: Current  Protein: 113 (1.0 g/kg)  Wt used: Current   Fluid: 2100       Electronically signed by Laron Haq Dao [address], RD   Contact: [phone] or via [employer]

## 2022-08-23 NOTE — PROGRESS NOTES
Overall continues to improve nida with regard to mentation  Sister at bedside  NGT in place still with sig volume, but CT from Friday without sig distention  Bps continue to remain elevated with systolic into 936Q    Visit Vitals  BP (!) 189/74 (BP 1 Location: Left upper arm, BP Patient Position: At rest)   Pulse 83   Temp 98.4 °F (36.9 °C)   Resp 17   Ht 5' 4.02\" (1.626 m)   Wt 112.6 kg (248 lb 5.6 oz)   SpO2 90%   BMI 42.61 kg/m²     Date 08/22/22 0700 - 08/23/22 0659 08/23/22 0700 - 08/24/22 0659   Shift 5788-8129 6258-9906 24 Hour Total 4523-2558 3775-5239 24 Hour Total   INTAKE   I.V.(mL/kg/hr) 3225(2.4) 0.7(0) 3225.7(1.2)        I.V. 1500  1500        Volume (sodium phosphate 30 mmol in 0.9% sodium chloride 250 mL infusion)  0.7 0.7        Volume (dextrose 5% - 0.45% NaCl with KCl 20 mEq/L infusion) 1625  1625        Volume (piperacillin-tazobactam (ZOSYN) 3.375 g in 0.9% sodium chloride (MBP/ADV) 100 mL MBP) 100  100      Shift Total(mL/kg) 3225(28.6) 0.7(0) 3225. 7(28.6)      OUTPUT   Urine(mL/kg/hr) 6015(6.7) 450(0.3) 2125(0.8)        Urine Output (mL) (Urinary Catheter 08/16/22 Forte) 5023 775 7643      Emesis/NG output 875 1000 1875        Emesis  0 0        Output (ml) (Nasogastric Tube 08/19/22) 875 1000 1875      Drains 60 10 70        Output (ml) (Olvin-Kenny Drain 08/16/22 Abdomen) 60 10 70      Other  0 0        Other Output  0 0      Stool 50 0 50        Stool  0 0        Output (ml) (Colostomy 08/16/22 Right ; Lower  Abdomen) 50  50      Blood  0 0        Estimated Blood Loss  0 0        Quantitative Blood Loss  0 0        Blood  0 0      Shift Total(mL/kg) 1262(36.5) 1460(13) 4120(36.6)       -8159.3 -894.3      Weight (kg) 112.6 112.6 112.6 112.6 112.6 112.6       Abd soft, some mottled skin directly under heat pack on lower abdomen  Some dark liquid green stool in the stoma appliance, NGT with bilious/orange appearing output    A/p   Daily Labs  Will attempt clamp trial today (7a-1pm)  PICC line  TPN to start today (order triglycerides and blood sugar checks)  Continue zhang (will have outpt follow up with urology for stent management)  Cont aggressive PT/OT/mobility  Cont IS  Appreciate hospitalist help with aspiration pna, BP, and med managemet       Pt discussed with Dr Jamir Myers, NP

## 2022-08-24 ENCOUNTER — APPOINTMENT (OUTPATIENT)
Dept: CT IMAGING | Age: 72
DRG: 329 | End: 2022-08-24
Attending: COLON & RECTAL SURGERY
Payer: MEDICARE

## 2022-08-24 LAB
ALBUMIN SERPL-MCNC: 2.2 G/DL (ref 3.5–5)
ALBUMIN/GLOB SERPL: 0.6 {RATIO} (ref 1.1–2.2)
ALP SERPL-CCNC: 124 U/L (ref 45–117)
ALT SERPL-CCNC: 54 U/L (ref 12–78)
ANION GAP SERPL CALC-SCNC: 7 MMOL/L (ref 5–15)
AST SERPL-CCNC: 32 U/L (ref 15–37)
BASOPHILS # BLD: 0 K/UL (ref 0–0.1)
BASOPHILS NFR BLD: 0 % (ref 0–1)
BILIRUB SERPL-MCNC: 1 MG/DL (ref 0.2–1)
BUN SERPL-MCNC: 6 MG/DL (ref 6–20)
BUN/CREAT SERPL: 8 (ref 12–20)
CALCIUM SERPL-MCNC: 8.6 MG/DL (ref 8.5–10.1)
CHLORIDE SERPL-SCNC: 106 MMOL/L (ref 97–108)
CO2 SERPL-SCNC: 28 MMOL/L (ref 21–32)
CREAT SERPL-MCNC: 0.8 MG/DL (ref 0.55–1.02)
DIFFERENTIAL METHOD BLD: ABNORMAL
EOSINOPHIL # BLD: 0.6 K/UL (ref 0–0.4)
EOSINOPHIL NFR BLD: 8 % (ref 0–7)
ERYTHROCYTE [DISTWIDTH] IN BLOOD BY AUTOMATED COUNT: 12.8 % (ref 11.5–14.5)
GLOBULIN SER CALC-MCNC: 3.8 G/DL (ref 2–4)
GLUCOSE BLD STRIP.AUTO-MCNC: 101 MG/DL (ref 65–117)
GLUCOSE BLD STRIP.AUTO-MCNC: 112 MG/DL (ref 65–117)
GLUCOSE BLD STRIP.AUTO-MCNC: 125 MG/DL (ref 65–117)
GLUCOSE BLD STRIP.AUTO-MCNC: 127 MG/DL (ref 65–117)
GLUCOSE SERPL-MCNC: 125 MG/DL (ref 65–100)
HCT VFR BLD AUTO: 35.8 % (ref 35–47)
HGB BLD-MCNC: 12 G/DL (ref 11.5–16)
IMM GRANULOCYTES # BLD AUTO: 0 K/UL
IMM GRANULOCYTES NFR BLD AUTO: 0 %
LYMPHOCYTES # BLD: 2.2 K/UL (ref 0.8–3.5)
LYMPHOCYTES NFR BLD: 29 % (ref 12–49)
MAGNESIUM SERPL-MCNC: 1.6 MG/DL (ref 1.6–2.4)
MCH RBC QN AUTO: 32.3 PG (ref 26–34)
MCHC RBC AUTO-ENTMCNC: 33.5 G/DL (ref 30–36.5)
MCV RBC AUTO: 96.2 FL (ref 80–99)
MONOCYTES # BLD: 0.5 K/UL (ref 0–1)
MONOCYTES NFR BLD: 6 % (ref 5–13)
NEUTS BAND NFR BLD MANUAL: 1 % (ref 0–6)
NEUTS SEG # BLD: 4.2 K/UL (ref 1.8–8)
NEUTS SEG NFR BLD: 56 % (ref 32–75)
NRBC # BLD: 0 K/UL (ref 0–0.01)
NRBC BLD-RTO: 0 PER 100 WBC
PHOSPHATE SERPL-MCNC: 2.3 MG/DL (ref 2.6–4.7)
PLATELET # BLD AUTO: 270 K/UL (ref 150–400)
PMV BLD AUTO: 10.6 FL (ref 8.9–12.9)
POTASSIUM SERPL-SCNC: 3.4 MMOL/L (ref 3.5–5.1)
PROT SERPL-MCNC: 6 G/DL (ref 6.4–8.2)
RBC # BLD AUTO: 3.72 M/UL (ref 3.8–5.2)
RBC MORPH BLD: ABNORMAL
SERVICE CMNT-IMP: ABNORMAL
SERVICE CMNT-IMP: ABNORMAL
SERVICE CMNT-IMP: NORMAL
SERVICE CMNT-IMP: NORMAL
SODIUM SERPL-SCNC: 141 MMOL/L (ref 136–145)
TRIGL SERPL-MCNC: 125 MG/DL (ref ?–150)
WBC # BLD AUTO: 7.5 K/UL (ref 3.6–11)
WBC MORPH BLD: ABNORMAL

## 2022-08-24 PROCEDURE — 82962 GLUCOSE BLOOD TEST: CPT

## 2022-08-24 PROCEDURE — 97530 THERAPEUTIC ACTIVITIES: CPT

## 2022-08-24 PROCEDURE — 74011250636 HC RX REV CODE- 250/636: Performed by: COLON & RECTAL SURGERY

## 2022-08-24 PROCEDURE — 84100 ASSAY OF PHOSPHORUS: CPT

## 2022-08-24 PROCEDURE — C9113 INJ PANTOPRAZOLE SODIUM, VIA: HCPCS | Performed by: STUDENT IN AN ORGANIZED HEALTH CARE EDUCATION/TRAINING PROGRAM

## 2022-08-24 PROCEDURE — 83735 ASSAY OF MAGNESIUM: CPT

## 2022-08-24 PROCEDURE — 85025 COMPLETE CBC W/AUTO DIFF WBC: CPT

## 2022-08-24 PROCEDURE — 74011250636 HC RX REV CODE- 250/636: Performed by: STUDENT IN AN ORGANIZED HEALTH CARE EDUCATION/TRAINING PROGRAM

## 2022-08-24 PROCEDURE — 77010033678 HC OXYGEN DAILY

## 2022-08-24 PROCEDURE — 74011000250 HC RX REV CODE- 250: Performed by: STUDENT IN AN ORGANIZED HEALTH CARE EDUCATION/TRAINING PROGRAM

## 2022-08-24 PROCEDURE — 65270000029 HC RM PRIVATE

## 2022-08-24 PROCEDURE — 74011250636 HC RX REV CODE- 250/636: Performed by: HOSPITALIST

## 2022-08-24 PROCEDURE — 74011000258 HC RX REV CODE- 258: Performed by: COLON & RECTAL SURGERY

## 2022-08-24 PROCEDURE — 97535 SELF CARE MNGMENT TRAINING: CPT

## 2022-08-24 PROCEDURE — 94761 N-INVAS EAR/PLS OXIMETRY MLT: CPT

## 2022-08-24 PROCEDURE — 36415 COLL VENOUS BLD VENIPUNCTURE: CPT

## 2022-08-24 PROCEDURE — 74011000250 HC RX REV CODE- 250: Performed by: NURSE PRACTITIONER

## 2022-08-24 PROCEDURE — 74177 CT ABD & PELVIS W/CONTRAST: CPT

## 2022-08-24 PROCEDURE — 80053 COMPREHEN METABOLIC PANEL: CPT

## 2022-08-24 PROCEDURE — 74011000636 HC RX REV CODE- 636: Performed by: COLON & RECTAL SURGERY

## 2022-08-24 PROCEDURE — 2709999900 HC NON-CHARGEABLE SUPPLY

## 2022-08-24 PROCEDURE — 97110 THERAPEUTIC EXERCISES: CPT

## 2022-08-24 PROCEDURE — 74011000250 HC RX REV CODE- 250: Performed by: FAMILY MEDICINE

## 2022-08-24 PROCEDURE — 74011250636 HC RX REV CODE- 250/636: Performed by: NURSE PRACTITIONER

## 2022-08-24 PROCEDURE — 84478 ASSAY OF TRIGLYCERIDES: CPT

## 2022-08-24 RX ORDER — POTASSIUM CHLORIDE 7.45 MG/ML
10 INJECTION INTRAVENOUS
Status: DISPENSED | OUTPATIENT
Start: 2022-08-24 | End: 2022-08-24

## 2022-08-24 RX ORDER — MAGNESIUM SULFATE 1 G/100ML
1 INJECTION INTRAVENOUS ONCE
Status: COMPLETED | OUTPATIENT
Start: 2022-08-24 | End: 2022-08-24

## 2022-08-24 RX ORDER — SODIUM CHLORIDE 9 MG/ML
50 INJECTION, SOLUTION INTRAVENOUS CONTINUOUS
Status: DISPENSED | OUTPATIENT
Start: 2022-08-24 | End: 2022-08-24

## 2022-08-24 RX ORDER — SODIUM CHLORIDE 9 MG/ML
25 INJECTION, SOLUTION INTRAVENOUS CONTINUOUS
Status: DISCONTINUED | OUTPATIENT
Start: 2022-08-24 | End: 2022-08-26

## 2022-08-24 RX ADMIN — ONDANSETRON HYDROCHLORIDE 4 MG: 2 SOLUTION INTRAMUSCULAR; INTRAVENOUS at 12:36

## 2022-08-24 RX ADMIN — MAGNESIUM SULFATE HEPTAHYDRATE 1 G: 1 INJECTION, SOLUTION INTRAVENOUS at 13:12

## 2022-08-24 RX ADMIN — PIPERACILLIN AND TAZOBACTAM 3.38 G: 3; .375 INJECTION, POWDER, FOR SOLUTION INTRAVENOUS at 13:12

## 2022-08-24 RX ADMIN — ENOXAPARIN SODIUM 30 MG: 100 INJECTION SUBCUTANEOUS at 22:29

## 2022-08-24 RX ADMIN — SODIUM CHLORIDE, PRESERVATIVE FREE 40 MG: 5 INJECTION INTRAVENOUS at 08:56

## 2022-08-24 RX ADMIN — SODIUM CHLORIDE 50 ML/HR: 9 INJECTION, SOLUTION INTRAVENOUS at 09:39

## 2022-08-24 RX ADMIN — HYDRALAZINE HYDROCHLORIDE 10 MG: 20 INJECTION INTRAMUSCULAR; INTRAVENOUS at 13:17

## 2022-08-24 RX ADMIN — PIPERACILLIN AND TAZOBACTAM 3.38 G: 3; .375 INJECTION, POWDER, FOR SOLUTION INTRAVENOUS at 05:04

## 2022-08-24 RX ADMIN — POTASSIUM CHLORIDE 10 MEQ: 7.46 INJECTION, SOLUTION INTRAVENOUS at 18:20

## 2022-08-24 RX ADMIN — LEVOTHYROXINE SODIUM ANHYDROUS 88 MCG: 100 INJECTION, POWDER, LYOPHILIZED, FOR SOLUTION INTRAVENOUS at 17:59

## 2022-08-24 RX ADMIN — ONDANSETRON HYDROCHLORIDE 4 MG: 2 SOLUTION INTRAMUSCULAR; INTRAVENOUS at 06:09

## 2022-08-24 RX ADMIN — ASCORBIC ACID, VITAMIN A PALMITATE, CHOLECALCIFEROL, THIAMINE HYDROCHLORIDE, RIBOFLAVIN-5 PHOSPHATE SODIUM, PYRIDOXINE HYDROCHLORIDE, NIACINAMIDE, DEXPANTHENOL, ALPHA-TOCOPHEROL ACETATE, VITAMIN K1, FOLIC ACID, BIOTIN, CYANOCOBALAMIN: 200; 3300; 200; 6; 3.6; 6; 40; 15; 10; 150; 600; 60; 5 INJECTION, SOLUTION INTRAVENOUS at 18:01

## 2022-08-24 RX ADMIN — PIPERACILLIN AND TAZOBACTAM 3.38 G: 3; .375 INJECTION, POWDER, FOR SOLUTION INTRAVENOUS at 22:30

## 2022-08-24 RX ADMIN — KETOROLAC TROMETHAMINE 15 MG: 30 INJECTION, SOLUTION INTRAMUSCULAR; INTRAVENOUS at 00:40

## 2022-08-24 RX ADMIN — SODIUM CHLORIDE, PRESERVATIVE FREE 10 ML: 5 INJECTION INTRAVENOUS at 13:12

## 2022-08-24 RX ADMIN — POTASSIUM PHOSPHATE, MONOBASIC AND POTASSIUM PHOSPHATE, DIBASIC: 224; 236 INJECTION, SOLUTION, CONCENTRATE INTRAVENOUS at 12:37

## 2022-08-24 RX ADMIN — SODIUM CHLORIDE, PRESERVATIVE FREE 10 ML: 5 INJECTION INTRAVENOUS at 22:31

## 2022-08-24 RX ADMIN — SODIUM CHLORIDE, PRESERVATIVE FREE 10 ML: 5 INJECTION INTRAVENOUS at 06:09

## 2022-08-24 RX ADMIN — DIATRIZOATE MEGLUMINE AND DIATRIZOATE SODIUM 30 ML: 660; 100 LIQUID ORAL; RECTAL at 08:55

## 2022-08-24 RX ADMIN — ENOXAPARIN SODIUM 30 MG: 100 INJECTION SUBCUTANEOUS at 12:37

## 2022-08-24 RX ADMIN — SODIUM CHLORIDE, PRESERVATIVE FREE 40 MG: 5 INJECTION INTRAVENOUS at 22:28

## 2022-08-24 RX ADMIN — Medication 1 SPRAY: at 09:01

## 2022-08-24 RX ADMIN — ONDANSETRON HYDROCHLORIDE 4 MG: 2 SOLUTION INTRAMUSCULAR; INTRAVENOUS at 18:00

## 2022-08-24 RX ADMIN — SODIUM CHLORIDE 25 ML/HR: 9 INJECTION, SOLUTION INTRAVENOUS at 22:41

## 2022-08-24 RX ADMIN — IOPAMIDOL 100 ML: 755 INJECTION, SOLUTION INTRAVENOUS at 10:48

## 2022-08-24 RX ADMIN — ONDANSETRON HYDROCHLORIDE 4 MG: 2 SOLUTION INTRAMUSCULAR; INTRAVENOUS at 00:40

## 2022-08-24 RX ADMIN — HYDROMORPHONE HYDROCHLORIDE 0.2 MG: 1 INJECTION, SOLUTION INTRAMUSCULAR; INTRAVENOUS; SUBCUTANEOUS at 13:15

## 2022-08-24 RX ADMIN — SODIUM CHLORIDE, PRESERVATIVE FREE 10 ML: 5 INJECTION INTRAVENOUS at 00:43

## 2022-08-24 RX ADMIN — KETOROLAC TROMETHAMINE 15 MG: 30 INJECTION, SOLUTION INTRAMUSCULAR; INTRAVENOUS at 06:09

## 2022-08-24 RX ADMIN — HYDROMORPHONE HYDROCHLORIDE 0.2 MG: 1 INJECTION, SOLUTION INTRAMUSCULAR; INTRAVENOUS; SUBCUTANEOUS at 18:15

## 2022-08-24 NOTE — PROGRESS NOTES
1700  Bedside and Verbal shift change report given to Mariza Alonso RN (oncoming nurse) by Bonnie Germain RN (offgoing nurse). Report included the following information SBAR, Kardex, Intake/Output, and MAR.

## 2022-08-24 NOTE — PROGRESS NOTES
Ezequiel Ca frances[address] [place] [address]  [address] [facility] 19  [phone]       Hospitalist Progress Note      NAME: Crystal Reyes   :  1950   MRN:  [id number]     Date/Time:  2022     Patient PCP: Tammie Resendiz MD    Emergency Contact:    Extended Emergency Contact Information  Primary Emergency Contact: [facility]  Address: [address] [place] [address] Phone: [phone]  Mobile Phone: [phone]  Relation: Sister  Secondary Emergency Contact: [address] [facility] Phone: [phone]  Mobile Phone: [phone]  Relation: Daughter      Code: Full Code     Isolation Precautions: There are currently no Active Isolations        Subjective:     REASON FOR VISIT:  Recheck AMS and acute hypoxic respiratory failure    HPI & INTERVAL HISTORY:       : Patient continues to do well. Alert oriented and no distress at this time. Denies shortness of breath.    : Patient was seen and examined. She was alert oriented and appears that the altered mental status has resolved. Also reports no shortness of breath at this time. Nausea and vomiting being controlled with NG tube. Allergies   Allergen Reactions    Sulfa (Sulfonamide Antibiotics) Hives     Not allergic at all. Benedetta Ou Benedetta Ou \"yeast infection\"          ROS:  Gen:   Negative  Resp:  negative  CVS:   negative  GI:       nausea and abdominal pain  :     negative  Kaylan:    negative            Objective:      Visit Vitals  BP (!) 140/88 (BP 1 Location: Left upper arm, BP Patient Position: Lying)   Pulse 82   Temp 98.2 °F (36.8 °C)   Resp 16   Ht 5' 4.02\" (1.626 m)   Wt 112.6 kg (248 lb 5.6 oz)   SpO2 96%   BMI 42.61 kg/m²       Physical Exam:  General: Alert and in no distress  Head: Normocephalic, without obvious abnormality, atraumatic  Eyes: anicteric sclerae and conjuntiva clear  ENT: lips, mucosa, and tongue normal  Neck: normal, supple, and no tenderness  Lungs: clear to auscultation  Heart: S1, S2 normal, regular rate, and regular rhythm  Abd: not distended, soft, nontender, BS present and normactive, colostomy in place and stable  Ext: no cyanosis and no edema  Skin: normal skin color, no rashes, and texture normal  Neuro:  alert, oriented, no defects noted in general exam.  Psych: not anxious, cooperative, appropriate affect      Medications:  Current Facility-Administered Medications   Medication Dose Route Frequency    insulin lispro (HUMALOG) injection   SubCUTAneous Q6H    glucose chewable tablet 16 g  4 Tablet Oral PRN    glucagon (GLUCAGEN) injection 1 mg  1 mg IntraMUSCular PRN    dextrose 10% infusion 0-250 mL  0-250 mL IntraVENous PRN    dextrose 5% - 0.45% NaCl with KCl 20 mEq/L infusion  50 mL/hr IntraVENous CONTINUOUS    TPN ADULT - CENTRAL AA 5% D20% W/ ELECTROLYTES AND CA   IntraVENous CONTINUOUS    alteplase (CATHFLO) 1 mg in sterile water (preservative free) 1 mL injection  1 mg InterCATHeter PRN    piperacillin-tazobactam (ZOSYN) 3.375 g in 0.9% sodium chloride (MBP/ADV) 100 mL MBP  3.375 g IntraVENous Q8H    HYDROmorphone (DILAUDID) syringe 0.2 mg  0.2 mg IntraVENous Q3H PRN    levothyroxine (SYNTHROID) injection 88 mcg  88 mcg IntraVENous Q24H    lidocaine (PF) (XYLOCAINE) 10 mg/mL (1 %) injection 0.1 mL  0.1 mL SubCUTAneous PRN    enoxaparin (LOVENOX) injection 30 mg  30 mg SubCUTAneous Q12H    lidocaine 4 % patch 2 Patch  2 Patch TransDERmal Q24H    cloNIDine (CATAPRES) 0.1 mg/24 hr patch 1 Patch  1 Patch TransDERmal Q7D    phenol throat spray (CHLORASEPTIC) 1 Spray  1 Spray Oral PRN    albuterol-ipratropium (DUO-NEB) 2.5 MG-0.5 MG/3 ML  3 mL Nebulization Q6H PRN    pantoprazole (PROTONIX) 40 mg in 0.9% sodium chloride 10 mL injection  40 mg IntraVENous Q12H    ondansetron (ZOFRAN) injection 4 mg  4 mg IntraVENous Q6H    naloxone (NARCAN) injection 0.2 mg  0.2 mg IntraVENous EVERY 2 MINUTES AS NEEDED    [Held by provider] metoprolol succinate (TOPROL-XL) XL tablet 50 mg  50 mg Oral QAM    sodium chloride (NS) flush 5-40 mL  5-40 mL IntraVENous Q8H    sodium chloride (NS) flush 5-40 mL  5-40 mL IntraVENous PRN    alum-mag hydroxide-simeth (MYLANTA) oral suspension 15 mL  15 mL Oral Q6H PRN    hydrALAZINE (APRESOLINE) 20 mg/mL injection 10 mg  10 mg IntraVENous Q6H PRN        Labs:  Recent Labs     08/24/22  0026   WBC 7.5   HGB 12.0   HCT 35.8          Recent Labs     08/24/22  0026      K 3.4*      CO2 28   *   BUN 6   CREA 0.80   CA 8.6   MG 1.6   PHOS 2.3*   ALB 2.2*   TBILI 1.0   ALT 54         Radiology:  XR US GUIDED VASCULAR ACCESS    Result Date: 8/23/2022  Ultrasound guidance for PICC line  placement. GBP   I personally reviewed and interpreted the imaging studies and agree with official reading    The labs, imaging studies, and medications was reviewed by me on: August 24, 2022         Assessment/Plan:      AMS d/t acute toxic encephalopathy from narcotics and benzos: Workup unremarkable (UA. Head CT, ABGs, TSH and NH3)  Has resolved and back to baseline     Acute hypoxic respiratory failure:   Possible aspiration given events of nausea vomiting. Improving and remains off oxygen  On IV Zosyn  Continue prn DuoNebs and incentive spirometry     Intractable nausea vomiting s/p Colectomy with colorectal anastomosis/ diverting loop ileostomy/ bilateral ureteral stents:  X-ray noting possible ileus or early obstruction. NGT replaced and patient   CT abd with oral contrast without leaking. Management per surgery. Hydroureteronephrosis/BERNADETTE:   Seen on CT. Urology consulted, placed stent on 8/20. Plan to follow up outpatient. Cr now back to normal.    Elevated blood pressure:   History of hypertension. cont clonidine patch given NPO. Thyroidism:   TSH wnl . Will give IV synthroid given NPO. History of head injury with memory loss:   Supportive care.     Hypokalemia  Replace and monitor as needed we will order IV potassium    Body mass index is 42.61 kg/m².:  40 or above:   Morbid obesity      Discussed:  Pt's condition, Imaging findings, Lab findings, Assessment, and Care Plan discussed with: Patient, RN, and Care Manager    Prophylaxis:  Lovenox SQ    Probable disposition:  Per primary team          Date of service:    8/24/2022                ___________________________________________________    Admitting Physician: Felicia Corbett MD

## 2022-08-24 NOTE — PROGRESS NOTES
8/24/22  3:05 PM    CM met with patient in the room to discuss IPR at [facility] as patient will be on TPN and they can provide TPN to the patient. Patient requested CM conference in her sister, Rafat Antonio on the discussion. CM spoke to both the patient and her sister and they are both agreeable to a referral to [facility] and would like the liaison to meet with them together.  Lula Deluca

## 2022-08-24 NOTE — PROGRESS NOTES
Problem: Mobility Impaired (Adult and Pediatric)  Goal: *Acute Goals and Plan of Care (Insert Text)  Description: FUNCTIONAL STATUS PRIOR TO ADMISSION: Pt reports mod I baseline ambulation using rollator walker, utilizing chair lift for stair negotiation, and performing ADLs independently. HOME SUPPORT PRIOR TO ADMISSION: The patient lived with sister and brother in law. Physical Therapy Goals  Initiated 8/20/2022  1. Patient will move from supine to sit and sit to supine  in bed with minimal assistance/contact guard assist within 7 day(s). 2.  Patient will transfer from bed to chair and chair to bed with minimal assistance/contact guard assist using the least restrictive device within 7 day(s). 3.  Patient will perform sit to stand with minimal assistance/contact guard assist within 7 day(s). 4.  Patient will ambulate with minimal assistance/contact guard assist for 80 feet with the least restrictive device within 7 day(s). Outcome: Progressing Towards Goal   PHYSICAL THERAPY TREATMENT  Patient: India Lares (89 y.o. female)  Date: 8/24/2022  Diagnosis: Colovesical fistula [N32.1] <principal problem not specified>  Procedure(s) (LRB):  CYSTOSCOPY, RIGHT URETERAL STENT INSERTION, RIGHT RETROGRADE PYLOGRAM (Right) 4 Days Post-Op  Precautions:  (NG tube; iliostomy)  Chart, physical therapy assessment, plan of care and goals were reviewed. ASSESSMENT  Patient continues with skilled PT services and is progressing towards goals. Communicated with nurse cleared for therapy. Patient supine on bed when received. Rolled on the edge of bed min assist x 2, supine to sit min assist x 2, sit to stand min assist x 2, OOB to chair as tolerated with Sidney Merlyn. Patient too anxious and to weak to ambulate with rolling  walker today however patient able to 4 feet few days ago. Performed some active range of motion exercise on both LE all planes.  Educate and instructed patient to continue active range of motion exercise on both legs while up on chair or on bed multiple times. Recommend patient to be up on the chair at least 3 times a day every meal times as tolerated. Activated chair alarm and notified nurse who agreed to monitor patient. Current Level of Function Impacting Discharge (mobility/balance): min assist x 2 with bed mobility and use Maryse Mighty steady to transfers to the recliner. Other factors to consider for discharge: fall         PLAN :  Patient continues to benefit from skilled intervention to address the above impairments. Continue treatment per established plan of care. to address goals. Recommendation for discharge: (in order for the patient to meet his/her long term goals)  Therapy up to 5 days/week in rehab setting    This discharge recommendation:  Has been made in collaboration with the attending provider and/or case management    IF patient discharges home will need the following DME: patient owns DME required for discharge       SUBJECTIVE:   Patient stated I feel too weak today I cannot walk.     OBJECTIVE DATA SUMMARY:   Critical Behavior:  Neurologic State: Alert  Orientation Level: Oriented X4  Cognition: Follows commands  Safety/Judgement: Awareness of environment, Decreased insight into deficits  Functional Mobility Training:  Bed Mobility:  Rolling: Minimum assistance;Assist x2  Supine to Sit: Minimum assistance;Assist x2  Sit to Supine: Minimum assistance;Assist x2  Scooting: Minimum assistance;Assist x2        Transfers:  Sit to Stand: Minimum assistance;Assist x2  Stand to Sit: Minimum assistance;Assist x2  Stand Pivot Transfers: Minimum assistance;Assist x2     Bed to Chair: Minimum assistance;Assist x2 (lory steady)                    Balance:  Sitting: Intact; High guard  Sitting - Static: Good (unsupported)  Sitting - Dynamic: Fair (occasional)  Standing: Impaired;Pull to stand; With support  Standing - Static: Fair  Standing - Dynamic : Poor  Ambulation/Gait Training: Therapeutic Exercises:   Educate and instructed patient to continue active range of motion exercise on both legs while up on chair or on bed multiple times. Recommend patient to be up on the chair at least 3 times a day every meal times as tolerated. Pain Ratin/10    Activity Tolerance:   Good    After treatment patient left in no apparent distress:   Sitting in chair, Heels elevated for pressure relief, Call bell within reach, and Bed / chair alarm activated    COMMUNICATION/COLLABORATION:   The patients plan of care was discussed with: Occupational therapy assistant and Registered nurse. Mary Romano PT,WCC.    Time Calculation: 24 mins

## 2022-08-24 NOTE — PROGRESS NOTES
Patient c/o pain in the abdomen and where her zhang is. She is c/o pressure in where the zhang is inserted.  Will notify MD.

## 2022-08-24 NOTE — PROGRESS NOTES
CRS  Pt biggest complaint is NGT causing irritation in nares  Flowsheet, labs reviewed  Abd: nt, soft  Ileostomy: enteric in bag  Drain: serous    Plan:  Minimal residual from NGT. However, minimal output via ileostomy. Somewhat surprising, given prodigious outputs via NGT that residuals would be low from start of clamping. Will check CT abd/pelvis- also check for abscess, leak, other. If no worrisome findings of bowel dilitation would DC NGT which is irritating her nares. Otherwise, would likely keep NGT and reposition as needed.

## 2022-08-24 NOTE — PROGRESS NOTES
Patient called and was assessed, leakage noted around maya drain, dressing saturated. Dressing was changed and 10ml removed from tube. Attempted to change ostomy, patient refused stating they want to change it and learn how to with the wound care nurse. Bedside, Verbal, and Written shift change report given to [address] (oncoming nurse) by Marquez Duong RN (offgoing nurse). Report included the following information SBAR, Kardex, OR Summary, Procedure Summary, Intake/Output, MAR, Accordion, Recent Results, Med Rec Status, Alarm Parameters , Pre Procedure Checklist, and Quality Measures.

## 2022-08-24 NOTE — PROGRESS NOTES
CT reviewed with Dr Arnoldo Chapin  Dc'ed [place] and [employer] this afternoon. Cont NPO currently.     Gurmeet Aguilar NP

## 2022-08-25 LAB
ALBUMIN SERPL-MCNC: 2.3 G/DL (ref 3.5–5)
ALBUMIN/GLOB SERPL: 0.7 {RATIO} (ref 1.1–2.2)
ALP SERPL-CCNC: 105 U/L (ref 45–117)
ALT SERPL-CCNC: 42 U/L (ref 12–78)
ANION GAP SERPL CALC-SCNC: 4 MMOL/L (ref 5–15)
AST SERPL-CCNC: 19 U/L (ref 15–37)
BASOPHILS # BLD: 0 K/UL (ref 0–0.1)
BASOPHILS NFR BLD: 0 % (ref 0–1)
BILIRUB SERPL-MCNC: 0.7 MG/DL (ref 0.2–1)
BUN SERPL-MCNC: 9 MG/DL (ref 6–20)
BUN/CREAT SERPL: 12 (ref 12–20)
CALCIUM SERPL-MCNC: 8.7 MG/DL (ref 8.5–10.1)
CHLORIDE SERPL-SCNC: 107 MMOL/L (ref 97–108)
CO2 SERPL-SCNC: 28 MMOL/L (ref 21–32)
CREAT SERPL-MCNC: 0.78 MG/DL (ref 0.55–1.02)
DIFFERENTIAL METHOD BLD: ABNORMAL
EOSINOPHIL # BLD: 0.5 K/UL (ref 0–0.4)
EOSINOPHIL NFR BLD: 6 % (ref 0–7)
ERYTHROCYTE [DISTWIDTH] IN BLOOD BY AUTOMATED COUNT: 13.1 % (ref 11.5–14.5)
GLOBULIN SER CALC-MCNC: 3.5 G/DL (ref 2–4)
GLUCOSE BLD STRIP.AUTO-MCNC: 114 MG/DL (ref 65–117)
GLUCOSE BLD STRIP.AUTO-MCNC: 119 MG/DL (ref 65–117)
GLUCOSE BLD STRIP.AUTO-MCNC: 122 MG/DL (ref 65–117)
GLUCOSE BLD STRIP.AUTO-MCNC: 130 MG/DL (ref 65–117)
GLUCOSE BLD STRIP.AUTO-MCNC: 131 MG/DL (ref 65–117)
GLUCOSE SERPL-MCNC: 132 MG/DL (ref 65–100)
HCT VFR BLD AUTO: 35.5 % (ref 35–47)
HGB BLD-MCNC: 11.8 G/DL (ref 11.5–16)
IMM GRANULOCYTES # BLD AUTO: 0.1 K/UL (ref 0–0.04)
IMM GRANULOCYTES NFR BLD AUTO: 1 % (ref 0–0.5)
LYMPHOCYTES # BLD: 2 K/UL (ref 0.8–3.5)
LYMPHOCYTES NFR BLD: 25 % (ref 12–49)
MAGNESIUM SERPL-MCNC: 2.4 MG/DL (ref 1.6–2.4)
MCH RBC QN AUTO: 32.3 PG (ref 26–34)
MCHC RBC AUTO-ENTMCNC: 33.2 G/DL (ref 30–36.5)
MCV RBC AUTO: 97.3 FL (ref 80–99)
MONOCYTES # BLD: 0.7 K/UL (ref 0–1)
MONOCYTES NFR BLD: 8 % (ref 5–13)
NEUTS SEG # BLD: 4.9 K/UL (ref 1.8–8)
NEUTS SEG NFR BLD: 60 % (ref 32–75)
NRBC # BLD: 0 K/UL (ref 0–0.01)
NRBC BLD-RTO: 0 PER 100 WBC
PHOSPHATE SERPL-MCNC: 2 MG/DL (ref 2.6–4.7)
PLATELET # BLD AUTO: 271 K/UL (ref 150–400)
PMV BLD AUTO: 10.2 FL (ref 8.9–12.9)
POTASSIUM SERPL-SCNC: 3.9 MMOL/L (ref 3.5–5.1)
PROT SERPL-MCNC: 5.8 G/DL (ref 6.4–8.2)
RBC # BLD AUTO: 3.65 M/UL (ref 3.8–5.2)
SERVICE CMNT-IMP: ABNORMAL
SERVICE CMNT-IMP: NORMAL
SODIUM SERPL-SCNC: 139 MMOL/L (ref 136–145)
WBC # BLD AUTO: 8.2 K/UL (ref 3.6–11)

## 2022-08-25 PROCEDURE — 74011000258 HC RX REV CODE- 258: Performed by: COLON & RECTAL SURGERY

## 2022-08-25 PROCEDURE — 80053 COMPREHEN METABOLIC PANEL: CPT

## 2022-08-25 PROCEDURE — 97530 THERAPEUTIC ACTIVITIES: CPT

## 2022-08-25 PROCEDURE — 74011000250 HC RX REV CODE- 250: Performed by: STUDENT IN AN ORGANIZED HEALTH CARE EDUCATION/TRAINING PROGRAM

## 2022-08-25 PROCEDURE — 74011250636 HC RX REV CODE- 250/636: Performed by: STUDENT IN AN ORGANIZED HEALTH CARE EDUCATION/TRAINING PROGRAM

## 2022-08-25 PROCEDURE — 97116 GAIT TRAINING THERAPY: CPT

## 2022-08-25 PROCEDURE — 65270000029 HC RM PRIVATE

## 2022-08-25 PROCEDURE — 74011000250 HC RX REV CODE- 250: Performed by: COLON & RECTAL SURGERY

## 2022-08-25 PROCEDURE — 83735 ASSAY OF MAGNESIUM: CPT

## 2022-08-25 PROCEDURE — 74011000250 HC RX REV CODE- 250: Performed by: NURSE PRACTITIONER

## 2022-08-25 PROCEDURE — 97535 SELF CARE MNGMENT TRAINING: CPT

## 2022-08-25 PROCEDURE — 74011250636 HC RX REV CODE- 250/636: Performed by: NURSE PRACTITIONER

## 2022-08-25 PROCEDURE — 97110 THERAPEUTIC EXERCISES: CPT

## 2022-08-25 PROCEDURE — 82962 GLUCOSE BLOOD TEST: CPT

## 2022-08-25 PROCEDURE — 36415 COLL VENOUS BLD VENIPUNCTURE: CPT

## 2022-08-25 PROCEDURE — 74011250636 HC RX REV CODE- 250/636: Performed by: COLON & RECTAL SURGERY

## 2022-08-25 PROCEDURE — 2709999900 HC NON-CHARGEABLE SUPPLY

## 2022-08-25 PROCEDURE — C9113 INJ PANTOPRAZOLE SODIUM, VIA: HCPCS | Performed by: STUDENT IN AN ORGANIZED HEALTH CARE EDUCATION/TRAINING PROGRAM

## 2022-08-25 PROCEDURE — 74011000250 HC RX REV CODE- 250: Performed by: FAMILY MEDICINE

## 2022-08-25 PROCEDURE — 85025 COMPLETE CBC W/AUTO DIFF WBC: CPT

## 2022-08-25 PROCEDURE — 84100 ASSAY OF PHOSPHORUS: CPT

## 2022-08-25 PROCEDURE — 74011250637 HC RX REV CODE- 250/637: Performed by: STUDENT IN AN ORGANIZED HEALTH CARE EDUCATION/TRAINING PROGRAM

## 2022-08-25 PROCEDURE — 94761 N-INVAS EAR/PLS OXIMETRY MLT: CPT

## 2022-08-25 RX ADMIN — ENOXAPARIN SODIUM 30 MG: 100 INJECTION SUBCUTANEOUS at 09:15

## 2022-08-25 RX ADMIN — SODIUM CHLORIDE, PRESERVATIVE FREE 40 MG: 5 INJECTION INTRAVENOUS at 21:21

## 2022-08-25 RX ADMIN — HYDROMORPHONE HYDROCHLORIDE 0.2 MG: 1 INJECTION, SOLUTION INTRAMUSCULAR; INTRAVENOUS; SUBCUTANEOUS at 10:52

## 2022-08-25 RX ADMIN — SODIUM CHLORIDE, PRESERVATIVE FREE 10 ML: 5 INJECTION INTRAVENOUS at 05:06

## 2022-08-25 RX ADMIN — PIPERACILLIN AND TAZOBACTAM 3.38 G: 3; .375 INJECTION, POWDER, FOR SOLUTION INTRAVENOUS at 21:20

## 2022-08-25 RX ADMIN — ENOXAPARIN SODIUM 30 MG: 100 INJECTION SUBCUTANEOUS at 21:25

## 2022-08-25 RX ADMIN — SODIUM CHLORIDE, PRESERVATIVE FREE 40 MG: 5 INJECTION INTRAVENOUS at 09:16

## 2022-08-25 RX ADMIN — PIPERACILLIN AND TAZOBACTAM 3.38 G: 3; .375 INJECTION, POWDER, FOR SOLUTION INTRAVENOUS at 05:05

## 2022-08-25 RX ADMIN — LEVOTHYROXINE SODIUM ANHYDROUS 88 MCG: 100 INJECTION, POWDER, LYOPHILIZED, FOR SOLUTION INTRAVENOUS at 17:08

## 2022-08-25 RX ADMIN — POTASSIUM PHOSPHATE, MONOBASIC AND POTASSIUM PHOSPHATE, DIBASIC: 224; 236 INJECTION, SOLUTION, CONCENTRATE INTRAVENOUS at 09:20

## 2022-08-25 RX ADMIN — PIPERACILLIN AND TAZOBACTAM 3.38 G: 3; .375 INJECTION, POWDER, FOR SOLUTION INTRAVENOUS at 13:04

## 2022-08-25 RX ADMIN — SODIUM CHLORIDE, PRESERVATIVE FREE 10 ML: 5 INJECTION INTRAVENOUS at 21:21

## 2022-08-25 RX ADMIN — ASCORBIC ACID, VITAMIN A PALMITATE, CHOLECALCIFEROL, THIAMINE HYDROCHLORIDE, RIBOFLAVIN-5 PHOSPHATE SODIUM, PYRIDOXINE HYDROCHLORIDE, NIACINAMIDE, DEXPANTHENOL, ALPHA-TOCOPHEROL ACETATE, VITAMIN K1, FOLIC ACID, BIOTIN, CYANOCOBALAMIN: 200; 3300; 200; 6; 3.6; 6; 40; 15; 10; 150; 600; 60; 5 INJECTION, SOLUTION INTRAVENOUS at 17:09

## 2022-08-25 RX ADMIN — SODIUM CHLORIDE, PRESERVATIVE FREE 10 ML: 5 INJECTION INTRAVENOUS at 13:04

## 2022-08-25 RX ADMIN — ONDANSETRON HYDROCHLORIDE 4 MG: 2 SOLUTION INTRAMUSCULAR; INTRAVENOUS at 09:16

## 2022-08-25 RX ADMIN — HYDROMORPHONE HYDROCHLORIDE 0.2 MG: 1 INJECTION, SOLUTION INTRAMUSCULAR; INTRAVENOUS; SUBCUTANEOUS at 18:38

## 2022-08-25 NOTE — PROGRESS NOTES
CRS  Pt hungry this am and also reporting a \"weird\" feeling in zhang after sitting up in chair yesterday  Flowsheet, labs reviewed  Abd: nt, soft, non-distended  Ileostomy: enteric in bag increasing amount over last 24 hours- still liquid       Plan: Will advance diet to clears and add clear supplements  Cont TPN at 63/hr today with hopes to DC over next few days when taking in more PO  Can consider cystogram tomorrow if still discomfort in bladder from zhang- monitor UOP closely  Ground Glass opacities seen on CT- hospitalists for recs and follow up  Will need continued aggressive PT/OT while in hospital  Cont Hourly IS use.       Pt seen and discussed with Dr Nic Johnson, NP

## 2022-08-25 NOTE — PROGRESS NOTES
Problem: Self Care Deficits Care Plan (Adult)  Goal: *Acute Goals and Plan of Care (Insert Text)  Description: Description: FUNCTIONAL STATUS PRIOR TO ADMISSION: Patient reports modified independent baseline ambulation using rollator walker, utilizing chair lift for stair negotiation, and performing ADLs independently. HOME SUPPORT PRIOR TO ADMISSION: The patient lived with her sister and brother in law. Occupational Therapy Goals  Initiated 8/20/2022  1. Patient will perform grooming with minimal assistance/contact guard in bed or chair within 7 day(s). 2.  Patient will don  UE's into hospital gown and sponge bathe UE's and abdomen with supervision/set-up within 7 day(s). 3.  Patient will tolerate 5 minutes functional activity or exercise without asking for pain meds within 7 day(s). 4.  Patient will stand with bilateral UE support or in lory steady > or = 1 minute with good tolerance to activity within 7 day(s). Outcome: Progressing Towards Goal     OCCUPATIONAL THERAPY TREATMENT  Patient: Crystal Reyes (71 y.o. female)  Date: 8/25/2022  Diagnosis: Colovesical fistula [N32.1] <principal problem not specified>  Procedure(s) (LRB):  CYSTOSCOPY, RIGHT URETERAL STENT INSERTION, RIGHT RETROGRADE PYLOGRAM (Right) 5 Days Post-Op  Precautions: Fall, Skin, Aspiration  Chart, occupational therapy assessment, plan of care, and goals were reviewed. ASSESSMENT  Patient received semi supine in bed A&Ox4 and agreeable for OT/PT tx. Patient continues with skilled OT services and is progressing towards goals. Pt noted with some anxiousness during therapy however easily redirectable. Pt requiring min Ax2 sup->sit and scooting EOB and while EOB requiring CGA to min A combing hair 2/2 tangles in hair. Pt requiring min Ax2 for sit<-.stand and bed to chair with Rw and gait belt in prep for [facility] transfers. Pt educated on lian UE HEP with pt verbalizing and demonstrating understanding.  Pt would benefit from continued skilled OT services while at [facility] in order to increase safety and independence with self care and functional transfers/mobility. Recommend discharge to therapy 3 hours a day 5-7 days a week when medically appropriate. Other factors to consider for discharge: time since onset, severity of deficits, PLOF         PLAN :  Patient continues to benefit from skilled intervention to address the above impairments. Continue treatment per established plan of care to address goals. Recommend with staff: BSC transfers with side steps, up in chair for meals    Recommend next OT session: toilet transfer, sink level self care tasks    Recommendation for discharge: (in order for the patient to meet his/her long term goals)  Therapy 3 hours per day 5-7 days per week    This discharge recommendation:  Has been made in collaboration with the attending provider and/or case management    IF patient discharges home will need the following DME: TBD       SUBJECTIVE:   Patient stated I can't do the commode just yet.     OBJECTIVE DATA SUMMARY:   Cognitive/Behavioral Status:  Neurologic State: Alert  Orientation Level: Oriented X4  Cognition: Follows commands     Functional Mobility and Transfers for ADLs:  Bed Mobility:  Rolling: Additional time; Adaptive equipment;Bed Modified;Assist x2;Contact guard assistance (verbal cues)  Supine to Sit: Additional time;Assist x2;Minimum assistance; Adaptive equipment;Bed Modified  Scooting: Minimum assistance; Additional time; Adaptive equipment (cues and encouragement)    Transfers:  Sit to Stand: Additional time;Assist x2;Minimum assistance     Bed to Chair: Minimum assistance;Assist x2; Additional time    Balance:  Sitting: Without support  Sitting - Static: Good (unsupported)  Sitting - Dynamic: Good (unsupported)  Standing: With support  Standing - Static: Fair  Standing - Dynamic : Fair    ADL Intervention:     Grooming  Grooming Assistance: Stand-by assistance  Position Performed: Seated in chair;Seated edge of bed  Washing Face: Stand-by assistance  Brushing/Combing Hair:  (min A due to tangles in hair)    Lower Body Dressing Assistance  Socks: Total assistance (dependent)    Therapeutic Exercises:   Pt educated on lian UE HEP in prep for self care tasks with pt verbalizing understanding    Pain:  Pt reporting 5/10 pain (requesting pain medication prior to starting therapy and was provided by nursing)    Activity Tolerance:   Good and requires rest breaks    After treatment patient left in no apparent distress:   Sitting in chair, Call bell within reach, Bed / chair alarm activated, and Caregiver / family present    COMMUNICATION/COLLABORATION:   The patients plan of care was discussed with: Physical therapist and Registered nurse. PT/OT sessions occurred together for increased safety of pt and clinician.       Asmita Bianchi  Time Calculation: 33 mins

## 2022-08-25 NOTE — PROGRESS NOTES
[address] [place] [address]  [address] [facility], [facility] [address]  [phone]       Hospitalist Progress Note      NAME: Rosalie Ott   :  1950   MRN:  [id number]     Date/Time:  2022     Patient PCP: Baljeet Alvarez MD    Emergency Contact:    Extended Emergency Contact Information  Primary Emergency Contact: [facility]  Address: [address] [place] [address] Phone: [phone]  Mobile Phone: [phone]  Relation: Sister  Secondary Emergency Contact: [address] [facility] Phone: [phone]  Mobile Phone: [phone]  Relation: Daughter      Code: Full Code     Isolation Precautions: There are currently no Active Isolations        Subjective:     REASON FOR VISIT:  Recheck AMS and acute hypoxic respiratory failure    HPI & INTERVAL HISTORY:     : Patient has been doing well. NG tube is out and is tolerating clears a small amounts. She is off oxygen and denies chest pain or shortness of breath.    : Patient continues to do well. Alert oriented and no distress at this time. Denies shortness of breath.    : Patient was seen and examined. She was alert oriented and appears that the altered mental status has resolved. Also reports no shortness of breath at this time. Nausea and vomiting being controlled with NG tube. Allergies   Allergen Reactions    Sulfa (Sulfonamide Antibiotics) Hives     Not allergic at all. [place] \"yeast infection\"          ROS:  Gen:   Negative  Resp:  negative  CVS:   negative  GI:       nausea and abdominal pain  :     negative  Kaylan:    negative            Objective:      Visit Vitals  BP (!) 160/67   Pulse 81   Temp 98.1 °F (36.7 °C)   Resp 16   Ht 5' 4.02\" (1.626 m)   Wt 108.8 kg (239 lb 12.8 oz)   SpO2 92%   BMI 41.14 kg/m²       Physical Exam:  General: Alert and in no distress  Head: Normocephalic, without obvious abnormality, atraumatic  Eyes: anicteric sclerae and conjuntiva clear  ENT: lips, mucosa, and tongue normal  Neck: normal, supple, and no tenderness  Lungs: clear to auscultation  Heart: S1, S2 normal, regular rate, and regular rhythm  Abd: not distended, soft, nontender, BS present and normactive, colostomy in place and stable  Ext: no cyanosis and no edema  Skin: normal skin color, no rashes, and texture normal  Neuro:  alert, oriented, no defects noted in general exam.  Psych: not anxious, cooperative, appropriate affect      Medications:  Current Facility-Administered Medications   Medication Dose Route Frequency    potassium phosphate 15 mmol in 0.9% sodium chloride 250 mL infusion   IntraVENous ONCE    TPN ADULT - CENTRAL AA 5% D20% W/ ELECTROLYTES AND CA   IntraVENous CONTINUOUS    TPN ADULT - CENTRAL AA 5% D20% W/ ELECTROLYTES AND CA   IntraVENous CONTINUOUS    0.9% sodium chloride infusion  25 mL/hr IntraVENous CONTINUOUS    insulin lispro (HUMALOG) injection   SubCUTAneous Q6H    glucose chewable tablet 16 g  4 Tablet Oral PRN    glucagon (GLUCAGEN) injection 1 mg  1 mg IntraMUSCular PRN    dextrose 10% infusion 0-250 mL  0-250 mL IntraVENous PRN    alteplase (CATHFLO) 1 mg in sterile water (preservative free) 1 mL injection  1 mg InterCATHeter PRN    piperacillin-tazobactam (ZOSYN) 3.375 g in 0.9% sodium chloride (MBP/ADV) 100 mL MBP  3.375 g IntraVENous Q8H    HYDROmorphone (DILAUDID) syringe 0.2 mg  0.2 mg IntraVENous Q3H PRN    levothyroxine (SYNTHROID) injection 88 mcg  88 mcg IntraVENous Q24H    lidocaine (PF) (XYLOCAINE) 10 mg/mL (1 %) injection 0.1 mL  0.1 mL SubCUTAneous PRN    enoxaparin (LOVENOX) injection 30 mg  30 mg SubCUTAneous Q12H    lidocaine 4 % patch 2 Patch  2 Patch TransDERmal Q24H    cloNIDine (CATAPRES) 0.1 mg/24 hr patch 1 Patch  1 Patch TransDERmal Q7D    phenol throat spray (CHLORASEPTIC) 1 Spray  1 Spray Oral PRN    albuterol-ipratropium (DUO-NEB) 2.5 MG-0.5 MG/3 ML  3 mL Nebulization Q6H PRN    pantoprazole (PROTONIX) 40 mg in 0.9% sodium chloride 10 mL injection  40 mg IntraVENous Q12H    ondansetron (ZOFRAN) injection 4 mg  4 mg IntraVENous Q6H    naloxone (NARCAN) injection 0.2 mg  0.2 mg IntraVENous EVERY 2 MINUTES AS NEEDED    [Held by provider] metoprolol succinate (TOPROL-XL) XL tablet 50 mg  50 mg Oral QAM    sodium chloride (NS) flush 5-40 mL  5-40 mL IntraVENous Q8H    sodium chloride (NS) flush 5-40 mL  5-40 mL IntraVENous PRN    alum-mag hydroxide-simeth (MYLANTA) oral suspension 15 mL  15 mL Oral Q6H PRN    hydrALAZINE (APRESOLINE) 20 mg/mL injection 10 mg  10 mg IntraVENous Q6H PRN        Labs:  Recent Labs     08/25/22  0306   WBC 8.2   HGB 11.8   HCT 35.5          Recent Labs     08/25/22  0306      K 3.9      CO2 28   *   BUN 9   CREA 0.78   CA 8.7   MG 2.4   PHOS 2.0*   ALB 2.3*   TBILI 0.7   ALT 42         Radiology:  CT ABD PELV W CONT    Result Date: 8/24/2022  1. Post surgical changes with right lower quadrant loop ileostomy. The small bowel is mildly dilated proximal to the ileostomy site. 2.  No evidence of abscess or drainable fluid collections. No significant free fluid is identified. 3.  Small bilateral pleural effusions. Ground last nodular opacities in the right lower lobe may represent aspiration or infection. I personally reviewed and interpreted the imaging studies and agree with official reading    The labs, imaging studies, and medications was reviewed by me on: August 25, 2022         Assessment/Plan:      AMS d/t acute toxic encephalopathy from narcotics and benzos:   Has resolved and had an unremarkable work-up     Acute hypoxic respiratory failure possible aspiration event with the nausea and vomiting:   Has resolved has been off oxygen and back to baseline  On IV Zosyn. From our standpoint she has completed treatment for possible aspiration however surgery may keep her on for another week. I will leave this decision to the primary team discretion.   Continue prn DuoNebs and incentive spirometry     Intractable nausea vomiting s/p Colectomy with colorectal anastomosis/ diverting loop ileostomy/ bilateral ureteral stents:  Possible ileus and early obstruction. Seems to be improving  CT abd with oral contrast without leaking. NG tube has been removed and tolerating clears  Management per surgery. Hydroureteronephrosis/BERNADETTE:   Seen on CT. Urology consulted, placed stent on 8/20. Plan to follow up outpatient. Cr now back to normal.    Elevated blood pressure:   History of hypertension. cont clonidine patch given NPO. Thyroidism:   TSH wnl . Will give IV synthroid given NPO. History of head injury with memory loss:   Supportive care. Hypokalemia  Replace and monitor as needed we will order IV potassium    Body mass index is 41.14 kg/m².:  40 or above: Morbid obesity      Discussed:  Pt's condition, Imaging findings, Lab findings, Assessment, and Care Plan discussed with: Patient, RN, and Care Manager    Prophylaxis:  Lovenox SQ    Probable disposition:  Per primary team    At this point from the medical standpoint she appears to be stable and her hypoxic respiratory failure along with altered mental status have resolved. Hospitalist team will be signing off at this time. Please call back with any questions. Thank you for allowing us to participate in the care of this very anderson and pleasant lady.       Date of service:    8/25/2022                ___________________________________________________    Admitting Physician: Merary Victoria MD

## 2022-08-25 NOTE — PROGRESS NOTES
Comprehensive Nutrition Assessment    Type and Reason for Visit: Reassess    Nutrition Recommendations/Plan:   Advance diet as tolerated - recommend regular, GI bland  Wean TPN per surgery - but recommend continuing @ 63 mL until consuming 50% or greater of meals on full liquids   Provide Ensure Clear BID to increase kcal/protein intake (480 kcal, 104 g carbs, 16 g protein)      Malnutrition Assessment:  Malnutrition Status:  Mild malnutrition (08/25/22 1317)    Context:  Acute illness     Findings of the 6 clinical characteristics of malnutrition:   Energy Intake:  50% or less of est energy requirements for 5 or more days  Weight Loss:  No significant weight loss     Body Fat Loss:  No significant body fat loss,     Muscle Mass Loss:  No significant muscle mass loss,    Fluid Accumulation:  No significant fluid accumulation,     Strength:  Not performed     Nutrition Assessment:     8/25: Follow up. Patient's diet just advanced to clear liquids. TPN @ 63 mL/hour - weaning per surgery. Patient with no complaints at this time. Consumed 75% of clear liquid lunch. No N/V/D. Has tried Ensure in the past and is hesitant to try it, but voiced acceptance of clear liquid supplement. Noted already ordered for AM/PM snack - RD modified order to come with meals so patient would receive supplement cold. No complaints at this time. TPN at 63 mL/hour with lipids 2x/week provides 1473 kcal (66% needs); 76 g protein (69% needs). Nutrition Related Findings:      Wound Type: Surgical incision  Last Bowel Movement Date:  (ostomy)  Stool Appearance: Watery  Abdominal Assessment: Obese  Appetite: Fair  Bowel Sounds: Active   Edema:No data recorded    Nutr.  Labs:  Lab Results   Component Value Date/Time    GFR est AA >60 08/25/2022 03:06 AM    GFR est non-AA >60 08/25/2022 03:06 AM    Creatinine 0.78 08/25/2022 03:06 AM    BUN 9 08/25/2022 03:06 AM    Sodium 139 08/25/2022 03:06 AM    Potassium 3.9 08/25/2022 03:06 AM Chloride 107 08/25/2022 03:06 AM    CO2 28 08/25/2022 03:06 AM       Lab Results   Component Value Date/Time    Glucose 132 (H) 08/25/2022 03:06 AM    Glucose (POC) 131 (H) 08/25/2022 12:46 PM       Lab Results   Component Value Date/Time    Hemoglobin A1c 5.6 08/03/2022 02:57 PM       Nutr. Meds:  Catapres, Lovenox, humalog, synthroid, zofran PRN, Protonix, zosyn       Current Nutrition Intake & Therapies:  Average Meal Intake: 51-75%  Average Supplement Intake: Unable to assess  TPN ADULT - CENTRAL AA 5% D20% W/ ELECTROLYTES AND CA  ADULT DIET Clear Liquid  ADULT ORAL NUTRITION SUPPLEMENT AM Snack, PM Snack; Clear Liquid  TPN ADULT - CENTRAL AA 5% D20% W/ ELECTROLYTES AND CA    Anthropometric Measures:  Height: 5' 4.02\" (162.6 cm)  Ideal Body Weight (IBW): 120 lbs (55 kg)     Current Body Wt:  109 kg (240 lb 4.8 oz), 200.3 % IBW.  Standing scale  Current BMI (kg/m2): 41.2        Weight Adjustment: No adjustment                 BMI Category: Obese class 3 (BMI 40.0 or greater)    Estimated Daily Nutrient Needs:  Energy Requirements Based On: Formula  Weight Used for Energy Requirements: Current  Energy (kcal/day): 2213 (MSJ x 1.2 x 1.2)  Weight Used for Protein Requirements: Current  Protein (g/day): 109 (1.0 g/kg)  Method Used for Fluid Requirements: 1 ml/kcal  Fluid (ml/day): 2213    Nutrition Diagnosis:   Inadequate protein-energy intake related to altered GI structure, altered GI function as evidenced by wounds, NPO or clear liquid status due to medical condition    Nutrition Interventions:   Food and/or Nutrient Delivery: Modify current diet, Continue oral nutrition supplement, Continue parenteral nutrition  Nutrition Education/Counseling: No recommendations at this time  Coordination of Nutrition Care: Continue to monitor while inpatient, Interdisciplinary rounds  Plan of Care discussed with: IDR team    Goals:  Previous Goal Met: Goal(s) achieved  Goals: PO intake 50% or greater, by next RD assessment Nutrition Monitoring and Evaluation:   Behavioral-Environmental Outcomes: None identified  Food/Nutrient Intake Outcomes: Food and nutrient intake, Diet advancement/tolerance, Parenteral nutrition intake/tolerance  Physical Signs/Symptoms Outcomes: Biochemical data, Hemodynamic status, Weight    Discharge Planning:    Continue oral nutrition supplement    Gretel Armijo MS, RD  Contact: Ext: [phone], or via [employer]utter

## 2022-08-25 NOTE — PROGRESS NOTES
Problem: Mobility Impaired (Adult and Pediatric)  Goal: *Acute Goals and Plan of Care (Insert Text)  Description: FUNCTIONAL STATUS PRIOR TO ADMISSION: Pt reports mod I baseline ambulation using rollator walker, utilizing chair lift for stair negotiation, and performing ADLs independently. HOME SUPPORT PRIOR TO ADMISSION: The patient lived with sister and brother in law. Physical Therapy Goals  Initiated 8/20/2022  1. Patient will move from supine to sit and sit to supine  in bed with minimal assistance/contact guard assist within 7 day(s). 2.  Patient will transfer from bed to chair and chair to bed with minimal assistance/contact guard assist using the least restrictive device within 7 day(s). 3.  Patient will perform sit to stand with minimal assistance/contact guard assist within 7 day(s). 4.  Patient will ambulate with minimal assistance/contact guard assist for 80 feet with the least restrictive device within 7 day(s).       8/25/2022 1157 by Irving Morley, PT, DPT  Outcome: Progressing Towards Goal   PHYSICAL THERAPY TREATMENT  Patient: Huber Sorenson (50 y.o. female)  Date: 8/25/2022  Diagnosis: Colovesical fistula [N32.1] <principal problem not specified>  Procedure(s) (LRB):  CYSTOSCOPY, RIGHT URETERAL STENT INSERTION, RIGHT RETROGRADE PYLOGRAM (Right) 5 Days Post-Op  Precautions: Fall, Skin, Aspiration  Chart, physical therapy assessment, plan of care and goals were reviewed. ASSESSMENT  Patient continues with skilled PT services and is progressing towards goals. With encouragement and affirmation pt progresses to ambulation using RW after performing pre-gait standing activities and unsupported sitting activities. She offers good participation throughout encounter though presents with increased anxiety and decreased attention compared with initial 2 days of PT treatments. Sister present throughout encounter today.  Encouraged pt to mobilize out of bed to chair at least 3x/day and reflected on pt abilities to perform transfers using RW or [place] with min A x 2. Current Level of Function Impacting Discharge (mobility/balance): min A x 2 transfers and gait    Other factors to consider for discharge: none additional         PLAN :  Patient continues to benefit from skilled intervention to address the above impairments. Continue treatment per established plan of care. to address goals. Recommendation for discharge: (in order for the patient to meet his/her long term goals)  Therapy 3 hours per day 5-7 days per week    This discharge recommendation:  Has been made in collaboration with the attending provider and/or case management    IF patient discharges home will need the following DME: to be determined (TBD)       SUBJECTIVE:   Patient stated Let's use the Laray [place]am.  Reviewed pt goals including return to home and importance of progressing activity each day. Pt received supine, agreeable to PT and cleared by RN. Stat lock for zhang requested by PT and OT, placed by RN prior to mobility. OBJECTIVE DATA SUMMARY:   Critical Behavior:  Neurologic State: Alert  Orientation Level: Oriented X4  Cognition: Follows commands  Safety/Judgement: Awareness of environment, Decreased insight into functional status  Functional Mobility Training:  Bed Mobility:  Rolling: Additional time; Adaptive equipment;Bed Modified;Assist x2;Contact guard assistance (verbal cues)  Supine to Sit: Additional time;Assist x2;Minimum assistance; Adaptive equipment;Bed Modified     Scooting: Minimum assistance; Additional time; Adaptive equipment (cues and encouragement)        Transfers:  Sit to Stand: Additional time;Assist x2;Minimum assistance  Stand to Sit: Assist x2; Additional time;Minimum assistance                             Balance:  Sitting: Without support  Sitting - Static: Good (unsupported)  Sitting - Dynamic: Good (unsupported)  Standing: With support  Standing - Static: Fair  Standing - Dynamic : Fair  Ambulation/Gait Training:  Distance (ft): 4 Feet (ft) (pivot steps to chair)  Assistive Device: Walker, rolling;Gait belt  Ambulation - Level of Assistance: Minimal assistance; Additional time;Assist x2        Gait Abnormalities: Decreased step clearance        Base of Support: Widened     Speed/Yina: Pace decreased (<100 feet/min)  Step Length: Right shortened;Left shortened                  One step cues for sequencing, task completion                Therapeutic Exercises: Ankle pumps x 10  Pain Rating:  Pt anticipating pain with mobility; requests pain medication from RN     Activity Tolerance:   requires rest breaks    After treatment patient left in no apparent distress:   Sitting in chair, Call bell within reach, Bed / chair alarm activated, Caregiver / family present, and air cushion    COMMUNICATION/COLLABORATION:   The patients plan of care was discussed with: Occupational therapist and Registered nurse.      Ollie Carver PT, DPT   Time Calculation: 40 mins

## 2022-08-25 NOTE — PROGRESS NOTES
Bedside and Verbal shift change report given to Alyssa Zhu RN (oncoming nurse) by Beena Steinberg RN (offgoing nurse). Report included the following information SBAR, Kardex, Intake/Output, MAR, and Recent Results.

## 2022-08-25 NOTE — PROGRESS NOTES
8/25/2022  Case Management Progress Note    9:59 AM  Patient is 66year old female admitted 8/16 with colovesical fistula  Patient's RUR is 9% green/low risk for readmission  Covid test: none this admission  Chart reviewed  Per chart patient is hungry this morning and diet to be advanced to clears today. She can be discharged once taking in more PO per surgery update. Patient is currently on TPN and can go to [facility] on TPN as needed--patient is agreeable to this plan. She does not  need an auth but will need an IM letter prior to discharge. Will continue to follow and assist with discharge planning as needed.      Transition of Care Plan   Continue medical management/treatment  Diet advancing today   Discharge to [facility] when ready   Transportation tbd pending progress  CM will continue to follow    DORCAS [place]

## 2022-08-25 NOTE — PROGRESS NOTES
Problem: Falls - Risk of  Goal: *Absence of Falls  Description: Document Tran Goodman Fall Risk and appropriate interventions in the flowsheet.   8/24/2022 2335 by Ab Weiss RN  Outcome: Progressing Towards Goal  Note: Fall Risk Interventions:  Mobility Interventions: Bed/chair exit alarm    Mentation Interventions: Adequate sleep, hydration, pain control    Medication Interventions: Bed/chair exit alarm    Elimination Interventions: Call light in reach    History of Falls Interventions: Bed/chair exit alarm      8/24/2022 2333 by Ab Weiss RN  Outcome: Progressing Towards Goal  Note: Fall Risk Interventions:  Mobility Interventions: Bed/chair exit alarm    Mentation Interventions: Adequate sleep, hydration, pain control    Medication Interventions: Bed/chair exit alarm    Elimination Interventions: Call light in reach    History of Falls Interventions: Bed/chair exit alarm         Problem: Patient Education: Go to Patient Education Activity  Goal: Patient/Family Education  8/24/2022 2335 by Ab Weiss RN  Outcome: Progressing Towards Goal  8/24/2022 2333 by Ab Weiss RN  Outcome: Progressing Towards Goal     Problem: Pain  Goal: *Control of Pain  8/24/2022 2335 by Ab Weiss RN  Outcome: Progressing Towards Goal  8/24/2022 2333 by Ab Weiss RN  Outcome: Progressing Towards Goal     Problem: Patient Education: Go to Patient Education Activity  Goal: Patient/Family Education  8/24/2022 2335 by Ab Weiss RN  Outcome: Progressing Towards Goal  8/24/2022 2333 by Ab Weiss RN  Outcome: Progressing Towards Goal     Problem: Impaired Skin Integrity/Pressure Injury Treatment  Goal: *Improvement of Existing Pressure Injury  8/24/2022 2335 by Ab Weiss RN  Outcome: Progressing Towards Goal  8/24/2022 2333 by Ab Weiss RN  Outcome: Progressing Towards Goal  Goal: *Prevention of pressure injury  Description: Document Stef Scale and appropriate interventions in the flowsheet.   8/24/2022 2335 by Jimenez Hickman RN  Outcome: Progressing Towards Goal  Note: Pressure Injury Interventions:  Sensory Interventions: Assess changes in LOC    Moisture Interventions: Absorbent underpads    Activity Interventions: Assess need for specialty bed    Mobility Interventions: Assess need for specialty bed    Nutrition Interventions: Document food/fluid/supplement intake    Friction and Shear Interventions: Apply protective barrier, creams and emollients             8/24/2022 2333 by Jimenez Hickman RN  Outcome: Progressing Towards Goal  Note: Pressure Injury Interventions:  Sensory Interventions: Assess changes in LOC    Moisture Interventions: Absorbent underpads    Activity Interventions: Assess need for specialty bed    Mobility Interventions: Assess need for specialty bed    Nutrition Interventions: Document food/fluid/supplement intake    Friction and Shear Interventions: Apply protective barrier, creams and emollients

## 2022-08-25 NOTE — PROGRESS NOTES
Problem: Falls - Risk of  Goal: *Absence of Falls  Description: Document Rachele Gaviria Fall Risk and appropriate interventions in the flowsheet. Outcome: Progressing Towards Goal  Note: Fall Risk Interventions:  Mobility Interventions: Bed/chair exit alarm    Mentation Interventions: Adequate sleep, hydration, pain control    Medication Interventions: Bed/chair exit alarm    Elimination Interventions: Call light in reach    History of Falls Interventions: Bed/chair exit alarm         Problem: Pain  Goal: *Control of Pain  Outcome: Progressing Towards Goal     Problem: Patient Education: Go to Patient Education Activity  Goal: Patient/Family Education  Outcome: Progressing Towards Goal     Problem: Patient Education: Go to Patient Education Activity  Goal: Patient/Family Education  Outcome: Progressing Towards Goal     Problem: Patient Education: Go to Patient Education Activity  Goal: Patient/Family Education  Outcome: Progressing Towards Goal     Problem: Impaired Skin Integrity/Pressure Injury Treatment  Goal: *Improvement of Existing Pressure Injury  Outcome: Progressing Towards Goal  Goal: *Prevention of pressure injury  Description: Document Stef Scale and appropriate interventions in the flowsheet.   Outcome: Progressing Towards Goal  Note: Pressure Injury Interventions:  Sensory Interventions: Assess changes in LOC    Moisture Interventions: Absorbent underpads    Activity Interventions: Assess need for specialty bed    Mobility Interventions: Assess need for specialty bed    Nutrition Interventions: Document food/fluid/supplement intake    Friction and Shear Interventions: Apply protective barrier, creams and emollients                Problem: Patient Education: Go to Patient Education Activity  Goal: Patient/Family Education  Outcome: Progressing Towards Goal     Problem: Anxiety  Goal: *Alleviation of anxiety  Outcome: Progressing Towards Goal  Goal: *Alleviation of anxiety (Palliative Care)  Outcome: Progressing Towards Goal     Problem: Patient Education: Go to Patient Education Activity  Goal: Patient/Family Education  Outcome: Progressing Towards Goal     Problem: Pressure Injury - Risk of  Goal: *Prevention of pressure injury  Description: Document Stef Scale and appropriate interventions in the flowsheet.   Outcome: Progressing Towards Goal  Note: Pressure Injury Interventions:  Sensory Interventions: Assess changes in LOC    Moisture Interventions: Absorbent underpads    Activity Interventions: Assess need for specialty bed    Mobility Interventions: Assess need for specialty bed    Nutrition Interventions: Document food/fluid/supplement intake    Friction and Shear Interventions: Apply protective barrier, creams and emollients

## 2022-08-25 NOTE — PROGRESS NOTES
Bedside, Verbal, and Written shift change report given to RN (oncoming nurse) by Faizan Perdomo RN (offgoing nurse). Report included the following information SBAR, Kardex, Procedure Summary, Intake/Output, MAR, Recent Results, Med Rec Status, Alarm Parameters , Pre Procedure Checklist, Procedure Verification, and Quality Measures.

## 2022-08-26 LAB
BASOPHILS # BLD: 0 K/UL (ref 0–0.1)
BASOPHILS # BLD: NORMAL K/UL (ref 0–0.1)
BASOPHILS NFR BLD: 0 % (ref 0–1)
BASOPHILS NFR BLD: NORMAL % (ref 0–1)
DIFFERENTIAL METHOD BLD: ABNORMAL
DIFFERENTIAL METHOD BLD: NORMAL
EOSINOPHIL # BLD: 0.4 K/UL (ref 0–0.4)
EOSINOPHIL # BLD: NORMAL K/UL (ref 0–0.4)
EOSINOPHIL NFR BLD: 5 % (ref 0–7)
EOSINOPHIL NFR BLD: NORMAL % (ref 0–7)
ERYTHROCYTE [DISTWIDTH] IN BLOOD BY AUTOMATED COUNT: 13.2 % (ref 11.5–14.5)
ERYTHROCYTE [DISTWIDTH] IN BLOOD BY AUTOMATED COUNT: NORMAL % (ref 11.5–14.5)
GLUCOSE BLD STRIP.AUTO-MCNC: 122 MG/DL (ref 65–117)
GLUCOSE BLD STRIP.AUTO-MCNC: 131 MG/DL (ref 65–117)
GLUCOSE BLD STRIP.AUTO-MCNC: 131 MG/DL (ref 65–117)
GLUCOSE BLD STRIP.AUTO-MCNC: 134 MG/DL (ref 65–117)
GLUCOSE BLD STRIP.AUTO-MCNC: 136 MG/DL (ref 65–117)
HCT VFR BLD AUTO: 35.7 % (ref 35–47)
HCT VFR BLD AUTO: NORMAL % (ref 35–47)
HGB BLD-MCNC: 10.9 G/DL (ref 11.5–16)
HGB BLD-MCNC: NORMAL G/DL (ref 11.5–16)
IMM GRANULOCYTES # BLD AUTO: 0.1 K/UL (ref 0–0.04)
IMM GRANULOCYTES # BLD AUTO: NORMAL K/UL (ref 0–0.04)
IMM GRANULOCYTES NFR BLD AUTO: 1 % (ref 0–0.5)
IMM GRANULOCYTES NFR BLD AUTO: NORMAL % (ref 0–0.5)
LYMPHOCYTES # BLD: 1.7 K/UL (ref 0.8–3.5)
LYMPHOCYTES # BLD: NORMAL K/UL (ref 0.8–3.5)
LYMPHOCYTES NFR BLD: 21 % (ref 12–49)
LYMPHOCYTES NFR BLD: NORMAL % (ref 12–49)
MCH RBC QN AUTO: 32.7 PG (ref 26–34)
MCH RBC QN AUTO: NORMAL PG (ref 26–34)
MCHC RBC AUTO-ENTMCNC: 30.5 G/DL (ref 30–36.5)
MCHC RBC AUTO-ENTMCNC: NORMAL G/DL (ref 30–36.5)
MCV RBC AUTO: 107.2 FL (ref 80–99)
MCV RBC AUTO: NORMAL FL (ref 80–99)
MONOCYTES # BLD: 0.6 K/UL (ref 0–1)
MONOCYTES # BLD: NORMAL K/UL (ref 0–1)
MONOCYTES NFR BLD: 8 % (ref 5–13)
MONOCYTES NFR BLD: NORMAL % (ref 5–13)
NEUTS SEG # BLD: 5.2 K/UL (ref 1.8–8)
NEUTS SEG # BLD: NORMAL K/UL (ref 1.8–8)
NEUTS SEG NFR BLD: 65 % (ref 32–75)
NEUTS SEG NFR BLD: NORMAL % (ref 32–75)
NRBC # BLD: 0 K/UL (ref 0–0.01)
NRBC # BLD: NORMAL K/UL (ref 0–0.01)
NRBC BLD-RTO: 0 PER 100 WBC
NRBC BLD-RTO: NORMAL PER 100 WBC
PLATELET # BLD AUTO: 266 K/UL (ref 150–400)
PLATELET # BLD AUTO: NORMAL K/UL (ref 150–400)
PMV BLD AUTO: 10.6 FL (ref 8.9–12.9)
PMV BLD AUTO: NORMAL FL (ref 8.9–12.9)
RBC # BLD AUTO: 3.33 M/UL (ref 3.8–5.2)
RBC # BLD AUTO: NORMAL M/UL (ref 3.8–5.2)
SERVICE CMNT-IMP: ABNORMAL
WBC # BLD AUTO: 7.9 K/UL (ref 3.6–11)
WBC # BLD AUTO: NORMAL K/UL (ref 3.6–11)

## 2022-08-26 PROCEDURE — 2709999900 HC NON-CHARGEABLE SUPPLY

## 2022-08-26 PROCEDURE — 74011250636 HC RX REV CODE- 250/636: Performed by: STUDENT IN AN ORGANIZED HEALTH CARE EDUCATION/TRAINING PROGRAM

## 2022-08-26 PROCEDURE — 74011000250 HC RX REV CODE- 250: Performed by: STUDENT IN AN ORGANIZED HEALTH CARE EDUCATION/TRAINING PROGRAM

## 2022-08-26 PROCEDURE — 74011000258 HC RX REV CODE- 258: Performed by: COLON & RECTAL SURGERY

## 2022-08-26 PROCEDURE — 74011250636 HC RX REV CODE- 250/636: Performed by: NURSE PRACTITIONER

## 2022-08-26 PROCEDURE — 74011250636 HC RX REV CODE- 250/636: Performed by: COLON & RECTAL SURGERY

## 2022-08-26 PROCEDURE — 74011250637 HC RX REV CODE- 250/637: Performed by: NURSE PRACTITIONER

## 2022-08-26 PROCEDURE — C9113 INJ PANTOPRAZOLE SODIUM, VIA: HCPCS | Performed by: STUDENT IN AN ORGANIZED HEALTH CARE EDUCATION/TRAINING PROGRAM

## 2022-08-26 PROCEDURE — 97535 SELF CARE MNGMENT TRAINING: CPT

## 2022-08-26 PROCEDURE — 94761 N-INVAS EAR/PLS OXIMETRY MLT: CPT

## 2022-08-26 PROCEDURE — 65270000029 HC RM PRIVATE

## 2022-08-26 PROCEDURE — 36415 COLL VENOUS BLD VENIPUNCTURE: CPT

## 2022-08-26 PROCEDURE — 82962 GLUCOSE BLOOD TEST: CPT

## 2022-08-26 PROCEDURE — 74011000250 HC RX REV CODE- 250: Performed by: NURSE PRACTITIONER

## 2022-08-26 PROCEDURE — 85025 COMPLETE CBC W/AUTO DIFF WBC: CPT

## 2022-08-26 PROCEDURE — 97530 THERAPEUTIC ACTIVITIES: CPT

## 2022-08-26 RX ORDER — LEVOTHYROXINE SODIUM 125 UG/1
125 TABLET ORAL
Status: DISCONTINUED | OUTPATIENT
Start: 2022-08-27 | End: 2022-08-30 | Stop reason: HOSPADM

## 2022-08-26 RX ORDER — PANTOPRAZOLE SODIUM 40 MG/1
40 TABLET, DELAYED RELEASE ORAL
Status: DISCONTINUED | OUTPATIENT
Start: 2022-08-26 | End: 2022-08-30 | Stop reason: HOSPADM

## 2022-08-26 RX ORDER — METOPROLOL SUCCINATE 50 MG/1
50 TABLET, EXTENDED RELEASE ORAL DAILY
Status: DISCONTINUED | OUTPATIENT
Start: 2022-08-27 | End: 2022-08-30 | Stop reason: HOSPADM

## 2022-08-26 RX ADMIN — SODIUM CHLORIDE, PRESERVATIVE FREE 40 MG: 5 INJECTION INTRAVENOUS at 09:27

## 2022-08-26 RX ADMIN — HYDROMORPHONE HYDROCHLORIDE 0.2 MG: 1 INJECTION, SOLUTION INTRAMUSCULAR; INTRAVENOUS; SUBCUTANEOUS at 23:44

## 2022-08-26 RX ADMIN — ONDANSETRON HYDROCHLORIDE 4 MG: 2 SOLUTION INTRAMUSCULAR; INTRAVENOUS at 23:44

## 2022-08-26 RX ADMIN — PIPERACILLIN AND TAZOBACTAM 3.38 G: 3; .375 INJECTION, POWDER, FOR SOLUTION INTRAVENOUS at 04:48

## 2022-08-26 RX ADMIN — SODIUM CHLORIDE, PRESERVATIVE FREE 10 ML: 5 INJECTION INTRAVENOUS at 21:54

## 2022-08-26 RX ADMIN — PIPERACILLIN AND TAZOBACTAM 3.38 G: 3; .375 INJECTION, POWDER, FOR SOLUTION INTRAVENOUS at 20:38

## 2022-08-26 RX ADMIN — ENOXAPARIN SODIUM 30 MG: 100 INJECTION SUBCUTANEOUS at 21:53

## 2022-08-26 RX ADMIN — ENOXAPARIN SODIUM 30 MG: 100 INJECTION SUBCUTANEOUS at 09:27

## 2022-08-26 RX ADMIN — PIPERACILLIN AND TAZOBACTAM 3.38 G: 3; .375 INJECTION, POWDER, FOR SOLUTION INTRAVENOUS at 12:27

## 2022-08-26 RX ADMIN — HYDROMORPHONE HYDROCHLORIDE 0.2 MG: 1 INJECTION, SOLUTION INTRAMUSCULAR; INTRAVENOUS; SUBCUTANEOUS at 12:36

## 2022-08-26 RX ADMIN — ASCORBIC ACID, VITAMIN A PALMITATE, CHOLECALCIFEROL, THIAMINE HYDROCHLORIDE, RIBOFLAVIN-5 PHOSPHATE SODIUM, PYRIDOXINE HYDROCHLORIDE, NIACINAMIDE, DEXPANTHENOL, ALPHA-TOCOPHEROL ACETATE, VITAMIN K1, FOLIC ACID, BIOTIN, CYANOCOBALAMIN: 200; 3300; 200; 6; 3.6; 6; 40; 15; 10; 150; 600; 60; 5 INJECTION, SOLUTION INTRAVENOUS at 18:16

## 2022-08-26 RX ADMIN — ONDANSETRON HYDROCHLORIDE 4 MG: 2 SOLUTION INTRAMUSCULAR; INTRAVENOUS at 05:25

## 2022-08-26 RX ADMIN — SODIUM CHLORIDE, PRESERVATIVE FREE 10 ML: 5 INJECTION INTRAVENOUS at 05:26

## 2022-08-26 RX ADMIN — PANTOPRAZOLE SODIUM 40 MG: 40 TABLET, DELAYED RELEASE ORAL at 18:14

## 2022-08-26 RX ADMIN — ONDANSETRON HYDROCHLORIDE 4 MG: 2 SOLUTION INTRAMUSCULAR; INTRAVENOUS at 00:13

## 2022-08-26 NOTE — PROGRESS NOTES
Bedside and Verbal shift change report given to Silvia ''LIO'' LEIDA Gautam (oncoming nurse) by Lester Marquez RN (offgoing nurse). Report included the following information SBAR, Kardex, Intake/Output, MAR, and Recent Results.

## 2022-08-26 NOTE — ROUTINE PROCESS
Bedside and Verbal shift change report given to [facility]  (oncoming nurse) by Gregoria Mancera RN  (offgoing nurse). Report included the following information SBAR, Kardex, MAR, and Recent Results.

## 2022-08-26 NOTE — PROGRESS NOTES
8/26/2022  Case Management Progress Note    10:20 AM  Patient is 66year old female admitted 8/16 with colovesical fistula  Patient's RUR is 10% green/low risk for readmission  Covid test: none this admission  Chart reviewed  Per chart patient will be discharged next week off of TPN. She is accepted at [facility] for inpatient rehab. Holding at clears for now and advancing diet as tolerated. Will continue to follow and assist as needed.      Transition of Care Plan   Continue medical management/treatment  Discharge to [facility] when ready   Surgery note indicates discharge next week  Transportation tbd pending progress, may need stretcher  CM will continue to follow    DORCAS Cole

## 2022-08-26 NOTE — PROGRESS NOTES
CRS  Pt with little appetite. Having ice chips  Flowsheet reviewed  Abd: soft, nt  Inc: c/d/I  Ileostomy: enteric    Plan:  Pt is not independent getting oob. Will check cystogram Monday after a few more days of physical therapy. Forte stays for bladder fistula. Keep shanta in ileostomy. Hold at clears. If able to advanceto fulls, wean tpn to 1/2 rate. If able to adv to GI lite, wean off. Partner covering this weekend.  Plan dispo next week off TPN, PICC line out, no atbx

## 2022-08-26 NOTE — WOUND CARE
Ostomy visit: post op day #10  ROBOTIC SPLENIC FLEXURE MOBILIZATION, ROBOTIC CONVERTED TO LAPAROSCOPY ASSIST, EXTENDED LEFT COLECTOMY WITH COLORECTAL ANASTOMOSIS AND DIVERTING LOOP ILEOSTOMY  CYSTOSCOPY WITH INSERTION BILATERAL URETERAL STENTS. In to follow up with continued ostomy teaching. Elisabet Salmon is up in recliner. Keya in to visit. Assessment:  RLQ ileostomy - moist red stoma just above skin level; red rubber cath fashioned as support shanta; no tension noted on stoma, some leakage under appliance noted; small amount skin irritation adjacent to stoma. Output dark brown. Midline incision well approximated. Teaching/Treatment:  Carol Caroilna and Elisabet Salmon how to use stoma powder and crust with no sting barrier. Used cohesive ring around back of wafer opening and placed. Elisabet Salmon is much brighter, oriented x 4 and appropriate for teaching. She tells me she will go to SNF hopefully for continued conditioning and is still receiving TPN. Encouraged her to start to learn how to empty with staff. Recommendations/Plan:  Continue ostomy teaching; encourage patient to empty with staff to learn this self care skill. Will continue to follow.   Cammie Degroot, RN,CWON Detail Level: Detailed Depth Of Biopsy: dermis Was A Bandage Applied: Yes Size Of Lesion In Cm: 0.8 X Size Of Lesion In Cm: 0 Biopsy Type: H and E Biopsy Method: Personna blade Anesthesia Type: 1% lidocaine with 1:100,000 epinephrine and a 1:3 solution of 8.4% sodium bicarbonate Anesthesia Volume In Cc (Will Not Render If 0): 0.4 Hemostasis: Electrocautery Wound Care: Aquaphor Dressing: Band-Aid Destruction After The Procedure: No Type Of Destruction Used: Curettage Curettage Text: The wound bed was treated with curettage after the biopsy was performed. Cryotherapy Text: The wound bed was treated with cryotherapy after the biopsy was performed. Electrodesiccation Text: The wound bed was treated with electrodesiccation after the biopsy was performed. Electrodesiccation And Curettage Text: The wound bed was treated with electrodesiccation and curettage after the biopsy was performed. Silver Nitrate Text: The wound bed was treated with silver nitrate after the biopsy was performed. Lab: 6 Lab Facility: 3 Path Notes (To The Dermatopathologist): Please check PAS Consent: Written consent was obtained and risks were reviewed including but not limited to scarring, infection, bleeding, scabbing, incomplete removal, nerve damage and allergy to anesthesia. Post-Care Instructions: I reviewed with the patient in detail post-care instructions. Patient is to keep the biopsy site dry overnight, and then apply bacitracin twice daily until healed. Patient may apply hydrogen peroxide soaks to remove any crusting. Notification Instructions: Patient will be notified of biopsy results. However, patient instructed to call the office if not contacted within 2 weeks. Billing Type: Third-Party Bill Information: Selecting Yes will display possible errors in your note based on the variables you have selected. This validation is only offered as a suggestion for you. PLEASE NOTE THAT THE VALIDATION TEXT WILL BE REMOVED WHEN YOU FINALIZE YOUR NOTE. IF YOU WANT TO FAX A PRELIMINARY NOTE YOU WILL NEED TO TOGGLE THIS TO 'NO' IF YOU DO NOT WANT IT IN YOUR FAXED NOTE. Size Of Lesion In Cm: 0.6 Size Of Lesion In Cm: 0.3

## 2022-08-26 NOTE — PROGRESS NOTES
Problem: Self Care Deficits Care Plan (Adult)  Goal: *Acute Goals and Plan of Care (Insert Text)  Description: Description: FUNCTIONAL STATUS PRIOR TO ADMISSION: Patient reports modified independent baseline ambulation using rollator walker, utilizing chair lift for stair negotiation, and performing ADLs independently. HOME SUPPORT PRIOR TO ADMISSION: The patient lived with her sister and brother in law. Occupational Therapy Goals    Reassessment goals: 8/26/22  1. Patient will perform upper body dressing with minimal assistance/contact guard in or chair within 7 day(s). 2.  Patient will don UE's into hospital gown and sponge bathe UE's and abdomen with supervision/set-up within 7 day(s). 3.  Patient will tolerate 10 minutes functional activity or exercise without asking for pain meds within 7 day(s). 4.  Patient will perform sit<>stand transfer with minimal assistance/contact guard assistance within 7 days. Initiated 8/20/2022. Reviewed and updated at weekly reassessment 8/26/22  1. Patient will perform grooming with minimal assistance/contact guard in bed or chair within 7 day(s). Met (at supervision level) 8/26/22  2. Patient will don UE's into hospital gown and sponge bathe UE's and abdomen with supervision/set-up within 7 day(s). Not Met, continue  3. Patient will tolerate 5 minutes functional activity or exercise without asking for pain meds within 7 day(s). Met 8/26/22 upgrade  4. Patient will stand with bilateral UE support or in lory steady > or = 1 minute with good tolerance to activity within 7 day(s).  Met 8/26/22  Outcome: Progressing Towards Goal   OCCUPATIONAL THERAPY TREATMENT/WEEKLY RE-ASSESSMENT  Patient: Kathryn Watters (17 y.o. female)  Date: 8/26/2022  Diagnosis: Colovesical fistula [N32.1] <principal problem not specified>  Procedure(s) (LRB):  CYSTOSCOPY, RIGHT URETERAL STENT INSERTION, RIGHT RETROGRADE PYLOGRAM (Right) 6 Days Post-Op  Precautions: Fall, Skin, Aspiration  Chart, occupational therapy assessment, plan of care, and goals were reviewed. ASSESSMENT  Patient continues with skilled OT services and is progressing towards goals. Patient received supine in bed sleeping but easily roused and eager to participate. Patient with excellent participation and improved performance with therapy today. Reporting pain and more nausea earlier but states it has begun to resolve (was medicated by RN prior to therapy session today. Patient to EOB with Min A and able to perform lateral scooting with full clearance of buttocks off bed. Mancel Quant Steady today d/t patient needing 2 person A, patient tolerates ~1 minute in supported standing prior to transfer to recliner chair. Completes hygiene ADLs as documented below with overall supervision setup level of assistance up in chair. Patient continues to be very motivated to participate and improve performance and would be an excellent candidate for rehab, continuing to recommend this at discharge given her decrease from PTA functional baseline. Current Level of Function Impacting Discharge (ADLs): Max A for functional mobility (used lory steady today for transfer) but able to tolerate ~1 minute standing in lory steady prior to transfer    Other factors to consider for discharge: PLOF, time since onset, severity of deficits         PLAN :  Goals have been updated based on progression since last assessment. Patient continues to benefit from skilled intervention to address the above impairments. Continue to follow patient 5 times a week to address goals.     Recommend with staff: OOB to recliner as tolerated via 2 person assist/SPT/Lory steady OOB via SPT to [facility] with assist x2 for safety    Recommend next OT session: would benefit from skilled co-tx for progressing functional mobility, ADLs EOB if not    Recommendation for discharge: (in order for the patient to meet his/her long term goals)  Therapy 3 hours per day 5-7 days per week    This discharge recommendation:  Has been made in collaboration with the attending provider and/or case management    IF patient discharges home will need the following DME: TBD       SUBJECTIVE:   Patient stated Alaina Francois had a lot of stomach pain earlier but it's getting better.     OBJECTIVE DATA SUMMARY:   Cognitive/Behavioral Status:  Neurologic State: Alert; Appropriate for age  Orientation Level: Oriented X4;Appropriate for age  Cognition: Follows commands; Appropriate safety awareness; Appropriate decision making      Functional Mobility and Transfers for ADLs:  Bed Mobility:  Rolling: Additional time;Assist x1;Stand-by assistance  Supine to Sit: Stand-by assistance;Minimum assistance; Additional time;Assist x1  Scooting: Stand-by assistance; Additional time    Transfers:  Sit to Stand: Additional time;Assist x1;Maximum assistance     Bed to Chair:  (lory steady)    Balance:  Sitting: Intact; Without support  Sitting - Static: Good (unsupported)  Sitting - Dynamic: Good (unsupported)    ADL Intervention:       Grooming  Position Performed: Seated in chair  Washing Face: Supervision;Set-up  Washing Hands: Set-up; Supervision  Brushing Teeth: Set-up; Supervision    Lower Body Dressing Assistance  Socks: Total assistance (dependent)    Pain: \"In abdomen\" no numeric score given    Activity Tolerance:   Fair and SpO2 stable on RA    After treatment patient left in no apparent distress:   Sitting in chair, Call bell within reach, and Bed / chair alarm activated    COMMUNICATION/COLLABORATION:   The patients plan of care was discussed with: Registered nurse.      Arjun Rivera OT  Time Calculation: 38 mins

## 2022-08-26 NOTE — PROGRESS NOTES
Problem: Falls - Risk of  Goal: *Absence of Falls  Description: Document Srinivasan Villegas Fall Risk and appropriate interventions in the flowsheet.   Outcome: Progressing Towards Goal  Note: Fall Risk Interventions:  Mobility Interventions: Bed/chair exit alarm, Mechanical lift, Patient to call before getting OOB, PT Consult for mobility concerns, Strengthening exercises (ROM-active/passive), Utilize walker, cane, or other assistive device, PT Consult for assist device competence, Utilize gait belt for transfers/ambulation    Mentation Interventions: Adequate sleep, hydration, pain control, Door open when patient unattended, Bed/chair exit alarm, Evaluate medications/consider consulting pharmacy, Eyeglasses and hearing aids, Increase mobility, More frequent rounding, Room close to nurse's station, Reorient patient, Self-releasing belt, Toileting rounds, Update white board    Medication Interventions: Assess postural VS orthostatic hypotension, Bed/chair exit alarm, Evaluate medications/consider consulting pharmacy, Patient to call before getting OOB, Teach patient to arise slowly, Utilize gait belt for transfers/ambulation    Elimination Interventions: Bed/chair exit alarm, Call light in reach, Elevated toilet seat, Patient to call for help with toileting needs, Stay With Me (per policy), Toilet paper/wipes in reach, Toileting schedule/hourly rounds    History of Falls Interventions: Bed/chair exit alarm, Consult care management for discharge planning, Door open when patient unattended, Evaluate medications/consider consulting pharmacy, Room close to nurse's station, Vital signs minimum Q4HRs X 24 hrs (comment for end date), Utilize gait belt for transfer/ambulation, Assess for delayed presentation/identification of injury for 48 hrs (comment for end date)         Problem: Patient Education: Go to Patient Education Activity  Goal: Patient/Family Education  Outcome: Progressing Towards Goal     Problem: Pain  Goal: *Control of Pain  Outcome: Progressing Towards Goal     Problem: Patient Education: Go to Patient Education Activity  Goal: Patient/Family Education  Outcome: Progressing Towards Goal     Problem: Patient Education: Go to Patient Education Activity  Goal: Patient/Family Education  Outcome: Progressing Towards Goal     Problem: Impaired Skin Integrity/Pressure Injury Treatment  Goal: *Improvement of Existing Pressure Injury  Outcome: Progressing Towards Goal  Goal: *Prevention of pressure injury  Description: Document Stef Scale and appropriate interventions in the flowsheet.   Outcome: Progressing Towards Goal  Note: Pressure Injury Interventions:  Sensory Interventions: Avoid rigorous massage over bony prominences, Float heels, Keep linens dry and wrinkle-free, Minimize linen layers, Monitor skin under medical devices, Pad between skin to skin    Moisture Interventions: Absorbent underpads, Apply protective barrier, creams and emollients, Internal/External urinary devices, Limit adult briefs, Maintain skin hydration (lotion/cream), Minimize layers, Moisture barrier    Activity Interventions: Increase time out of bed, Pressure redistribution bed/mattress(bed type), PT/OT evaluation, Chair cushion    Mobility Interventions: Float heels, HOB 30 degrees or less, Pressure redistribution bed/mattress (bed type), PT/OT evaluation    Nutrition Interventions: Document food/fluid/supplement intake, Offer support with meals,snacks and hydration    Friction and Shear Interventions: HOB 30 degrees or less, Lift sheet, Lift team/patient mobility team, Minimize layers, Sit at 90-degree angle, Transfer aides:transfer board/Maritza lift/ceiling lift                Problem: Patient Education: Go to Patient Education Activity  Goal: Patient/Family Education  Outcome: Progressing Towards Goal     Problem: Anxiety  Goal: *Alleviation of anxiety  Outcome: Progressing Towards Goal  Goal: *Alleviation of anxiety (Palliative Care)  Outcome: Progressing Towards Goal     Problem: Patient Education: Go to Patient Education Activity  Goal: Patient/Family Education  Outcome: Progressing Towards Goal     Problem: Pressure Injury - Risk of  Goal: *Prevention of pressure injury  Description: Document Stef Scale and appropriate interventions in the flowsheet.   Outcome: Progressing Towards Goal     Problem: Patient Education: Go to Patient Education Activity  Goal: Patient/Family Education  Outcome: Progressing Towards Goal     Problem: Patient Education: Go to Patient Education Activity  Goal: Patient/Family Education  Outcome: Progressing Towards Goal     Problem: Patient Education: Go to Patient Education Activity  Goal: Patient/Family Education  Outcome: Progressing Towards Goal     Problem: Non-Violent Restraints  Goal: Removal from restraints as soon as assessed to be safe  Outcome: Progressing Towards Goal  Goal: No harm/injury to patient while restraints in use  Outcome: Progressing Towards Goal  Goal: Patient's dignity will be maintained  Outcome: Progressing Towards Goal  Goal: Patient Interventions  Outcome: Progressing Towards Goal     Problem: Nutrition Deficit  Goal: *Optimize nutritional status  Outcome: Progressing Towards Goal

## 2022-08-27 LAB
ANION GAP SERPL CALC-SCNC: 6 MMOL/L (ref 5–15)
BUN SERPL-MCNC: 12 MG/DL (ref 6–20)
BUN/CREAT SERPL: 18 (ref 12–20)
CALCIUM SERPL-MCNC: 8.4 MG/DL (ref 8.5–10.1)
CHLORIDE SERPL-SCNC: 105 MMOL/L (ref 97–108)
CO2 SERPL-SCNC: 27 MMOL/L (ref 21–32)
CREAT SERPL-MCNC: 0.65 MG/DL (ref 0.55–1.02)
GLUCOSE BLD STRIP.AUTO-MCNC: 116 MG/DL (ref 65–117)
GLUCOSE BLD STRIP.AUTO-MCNC: 118 MG/DL (ref 65–117)
GLUCOSE BLD STRIP.AUTO-MCNC: 123 MG/DL (ref 65–117)
GLUCOSE BLD STRIP.AUTO-MCNC: 136 MG/DL (ref 65–117)
GLUCOSE SERPL-MCNC: 129 MG/DL (ref 65–100)
MAGNESIUM SERPL-MCNC: 2.2 MG/DL (ref 1.6–2.4)
PHOSPHATE SERPL-MCNC: 2.4 MG/DL (ref 2.6–4.7)
POTASSIUM SERPL-SCNC: 3.9 MMOL/L (ref 3.5–5.1)
SERVICE CMNT-IMP: ABNORMAL
SERVICE CMNT-IMP: NORMAL
SODIUM SERPL-SCNC: 138 MMOL/L (ref 136–145)

## 2022-08-27 PROCEDURE — 83735 ASSAY OF MAGNESIUM: CPT

## 2022-08-27 PROCEDURE — 65270000029 HC RM PRIVATE

## 2022-08-27 PROCEDURE — 74011250636 HC RX REV CODE- 250/636: Performed by: COLON & RECTAL SURGERY

## 2022-08-27 PROCEDURE — 82962 GLUCOSE BLOOD TEST: CPT

## 2022-08-27 PROCEDURE — 74011000250 HC RX REV CODE- 250: Performed by: STUDENT IN AN ORGANIZED HEALTH CARE EDUCATION/TRAINING PROGRAM

## 2022-08-27 PROCEDURE — 80048 BASIC METABOLIC PNL TOTAL CA: CPT

## 2022-08-27 PROCEDURE — 94761 N-INVAS EAR/PLS OXIMETRY MLT: CPT

## 2022-08-27 PROCEDURE — 74011250637 HC RX REV CODE- 250/637: Performed by: NURSE PRACTITIONER

## 2022-08-27 PROCEDURE — 74011250636 HC RX REV CODE- 250/636: Performed by: STUDENT IN AN ORGANIZED HEALTH CARE EDUCATION/TRAINING PROGRAM

## 2022-08-27 PROCEDURE — 74011000250 HC RX REV CODE- 250: Performed by: SURGERY

## 2022-08-27 PROCEDURE — 36415 COLL VENOUS BLD VENIPUNCTURE: CPT

## 2022-08-27 PROCEDURE — 74011000258 HC RX REV CODE- 258: Performed by: COLON & RECTAL SURGERY

## 2022-08-27 PROCEDURE — 84100 ASSAY OF PHOSPHORUS: CPT

## 2022-08-27 RX ADMIN — PIPERACILLIN AND TAZOBACTAM 3.38 G: 3; .375 INJECTION, POWDER, FOR SOLUTION INTRAVENOUS at 20:49

## 2022-08-27 RX ADMIN — SODIUM CHLORIDE, PRESERVATIVE FREE 10 ML: 5 INJECTION INTRAVENOUS at 13:09

## 2022-08-27 RX ADMIN — LEVOTHYROXINE SODIUM 125 MCG: 0.12 TABLET ORAL at 06:46

## 2022-08-27 RX ADMIN — ONDANSETRON HYDROCHLORIDE 4 MG: 2 SOLUTION INTRAMUSCULAR; INTRAVENOUS at 05:34

## 2022-08-27 RX ADMIN — ONDANSETRON HYDROCHLORIDE 4 MG: 2 SOLUTION INTRAMUSCULAR; INTRAVENOUS at 23:54

## 2022-08-27 RX ADMIN — PANTOPRAZOLE SODIUM 40 MG: 40 TABLET, DELAYED RELEASE ORAL at 16:23

## 2022-08-27 RX ADMIN — PIPERACILLIN AND TAZOBACTAM 3.38 G: 3; .375 INJECTION, POWDER, FOR SOLUTION INTRAVENOUS at 05:33

## 2022-08-27 RX ADMIN — ONDANSETRON HYDROCHLORIDE 4 MG: 2 SOLUTION INTRAMUSCULAR; INTRAVENOUS at 12:51

## 2022-08-27 RX ADMIN — SODIUM CHLORIDE, PRESERVATIVE FREE 10 ML: 5 INJECTION INTRAVENOUS at 05:34

## 2022-08-27 RX ADMIN — ONDANSETRON HYDROCHLORIDE 4 MG: 2 SOLUTION INTRAMUSCULAR; INTRAVENOUS at 17:01

## 2022-08-27 RX ADMIN — METOPROLOL SUCCINATE 50 MG: 50 TABLET, FILM COATED, EXTENDED RELEASE ORAL at 05:33

## 2022-08-27 RX ADMIN — PIPERACILLIN AND TAZOBACTAM 3.38 G: 3; .375 INJECTION, POWDER, FOR SOLUTION INTRAVENOUS at 12:50

## 2022-08-27 RX ADMIN — ENOXAPARIN SODIUM 30 MG: 100 INJECTION SUBCUTANEOUS at 09:47

## 2022-08-27 RX ADMIN — SODIUM CHLORIDE, PRESERVATIVE FREE 10 ML: 5 INJECTION INTRAVENOUS at 21:48

## 2022-08-27 RX ADMIN — ASCORBIC ACID, VITAMIN A PALMITATE, CHOLECALCIFEROL, THIAMINE HYDROCHLORIDE, RIBOFLAVIN-5 PHOSPHATE SODIUM, PYRIDOXINE HYDROCHLORIDE, NIACINAMIDE, DEXPANTHENOL, ALPHA-TOCOPHEROL ACETATE, VITAMIN K1, FOLIC ACID, BIOTIN, CYANOCOBALAMIN: 200; 3300; 200; 6; 3.6; 6; 40; 15; 10; 150; 600; 60; 5 INJECTION, SOLUTION INTRAVENOUS at 17:06

## 2022-08-27 RX ADMIN — PANTOPRAZOLE SODIUM 40 MG: 40 TABLET, DELAYED RELEASE ORAL at 06:45

## 2022-08-27 RX ADMIN — ENOXAPARIN SODIUM 30 MG: 100 INJECTION SUBCUTANEOUS at 21:48

## 2022-08-27 NOTE — PROGRESS NOTES
Bedside and Verbal shift change report given to Radha Major RN (oncoming nurse) by Sarahi Mccormick RN (offgoing nurse). Report included the following information SBAR, Kardex, ED Summary, Procedure Summary, Intake/Output, and Recent Results.

## 2022-08-27 NOTE — ROUTINE PROCESS
Bedside and Verbal shift change report given to Sarah  (oncoming nurse) by Claude Muñoz RN  (offgoing nurse). Report included the following information SBAR, Kardex, MAR, and Recent Results.

## 2022-08-27 NOTE — PROGRESS NOTES
Problem: Falls - Risk of  Goal: *Absence of Falls  Description: Document Jose Daniel Moya Fall Risk and appropriate interventions in the flowsheet.   Outcome: Progressing Towards Goal  Note: Fall Risk Interventions:  Mobility Interventions: Bed/chair exit alarm, Communicate number of staff needed for ambulation/transfer, OT consult for ADLs, Patient to call before getting OOB, PT Consult for assist device competence, PT Consult for mobility concerns, Strengthening exercises (ROM-active/passive), Utilize walker, cane, or other assistive device, Utilize gait belt for transfers/ambulation    Mentation Interventions: Adequate sleep, hydration, pain control, Bed/chair exit alarm, Door open when patient unattended, Evaluate medications/consider consulting pharmacy, Eyeglasses and hearing aids, More frequent rounding, Reorient patient, Room close to nurse's station, Self-releasing belt, Update white board, Toileting rounds    Medication Interventions: Bed/chair exit alarm, Evaluate medications/consider consulting pharmacy, Patient to call before getting OOB, Teach patient to arise slowly, Utilize gait belt for transfers/ambulation    Elimination Interventions: Bed/chair exit alarm, Call light in reach, Elevated toilet seat, Patient to call for help with toileting needs, Stay With Me (per policy), Toilet paper/wipes in reach, Toileting schedule/hourly rounds    History of Falls Interventions: Bed/chair exit alarm, Consult care management for discharge planning, Door open when patient unattended, Evaluate medications/consider consulting pharmacy, Room close to nurse's station, Assess for delayed presentation/identification of injury for 48 hrs (comment for end date), Vital signs minimum Q4HRs X 24 hrs (comment for end date), Utilize gait belt for transfer/ambulation         Problem: Patient Education: Go to Patient Education Activity  Goal: Patient/Family Education  Outcome: Progressing Towards Goal     Problem: Pain  Goal: *Control of Pain  Outcome: Progressing Towards Goal     Problem: Patient Education: Go to Patient Education Activity  Goal: Patient/Family Education  Outcome: Progressing Towards Goal     Problem: Patient Education: Go to Patient Education Activity  Goal: Patient/Family Education  Outcome: Progressing Towards Goal     Problem: Impaired Skin Integrity/Pressure Injury Treatment  Goal: *Improvement of Existing Pressure Injury  Outcome: Progressing Towards Goal  Goal: *Prevention of pressure injury  Description: Document Stef Scale and appropriate interventions in the flowsheet.   Outcome: Progressing Towards Goal  Note: Pressure Injury Interventions:  Sensory Interventions: Avoid rigorous massage over bony prominences, Check visual cues for pain, Discuss PT/OT consult with provider, Float heels, Keep linens dry and wrinkle-free, Maintain/enhance activity level, Minimize linen layers, Monitor skin under medical devices, Chair cushion, Pad between skin to skin    Moisture Interventions: Absorbent underpads, Apply protective barrier, creams and emollients, Assess need for specialty bed, Internal/External urinary devices, Limit adult briefs, Maintain skin hydration (lotion/cream), Moisture barrier, Minimize layers    Activity Interventions: Assess need for specialty bed, Chair cushion, Increase time out of bed, Pressure redistribution bed/mattress(bed type)    Mobility Interventions: Assess need for specialty bed, Chair cushion, Float heels, HOB 30 degrees or less, Pressure redistribution bed/mattress (bed type), PT/OT evaluation    Nutrition Interventions: Document food/fluid/supplement intake, Discuss nutritional consult with provider, Offer support with meals,snacks and hydration    Friction and Shear Interventions: Apply protective barrier, creams and emollients, Feet elevated on foot rest, Foam dressings/transparent film/skin sealants, HOB 30 degrees or less, Lift sheet, Lift team/patient mobility team, Minimize layers, Sit at 90-degree angle                Problem: Patient Education: Go to Patient Education Activity  Goal: Patient/Family Education  Outcome: Progressing Towards Goal     Problem: Anxiety  Goal: *Alleviation of anxiety  Outcome: Progressing Towards Goal  Goal: *Alleviation of anxiety (Palliative Care)  Outcome: Progressing Towards Goal     Problem: Patient Education: Go to Patient Education Activity  Goal: Patient/Family Education  Outcome: Progressing Towards Goal     Problem: Pressure Injury - Risk of  Goal: *Prevention of pressure injury  Description: Document Stef Scale and appropriate interventions in the flowsheet.   Outcome: Progressing Towards Goal     Problem: Patient Education: Go to Patient Education Activity  Goal: Patient/Family Education  Outcome: Progressing Towards Goal     Problem: Patient Education: Go to Patient Education Activity  Goal: Patient/Family Education  Outcome: Progressing Towards Goal     Problem: Patient Education: Go to Patient Education Activity  Goal: Patient/Family Education  Outcome: Progressing Towards Goal     Problem: Non-Violent Restraints  Goal: Removal from restraints as soon as assessed to be safe  Outcome: Progressing Towards Goal  Goal: No harm/injury to patient while restraints in use  Outcome: Progressing Towards Goal  Goal: Patient's dignity will be maintained  Outcome: Progressing Towards Goal  Goal: Patient Interventions  Outcome: Progressing Towards Goal     Problem: Nutrition Deficit  Goal: *Optimize nutritional status  Outcome: Progressing Towards Goal

## 2022-08-27 NOTE — PROGRESS NOTES
CRS  Appetite improving. Ostomy functioning (200 out yesterday and 150 so far today). Pain controlled  Flowsheet reviewed  Abd: soft, nt  Inc: c/d/I  Ileostomy: enteric    Plan:  1/2 TPN.   1000ml at 42/hr  Adv to full liquid diet  Encourage OOB/ambulation  Plan for cysto on Monday

## 2022-08-28 LAB
ANION GAP SERPL CALC-SCNC: 5 MMOL/L (ref 5–15)
BUN SERPL-MCNC: 13 MG/DL (ref 6–20)
BUN/CREAT SERPL: 17 (ref 12–20)
CALCIUM SERPL-MCNC: 9.4 MG/DL (ref 8.5–10.1)
CHLORIDE SERPL-SCNC: 104 MMOL/L (ref 97–108)
CO2 SERPL-SCNC: 27 MMOL/L (ref 21–32)
CREAT SERPL-MCNC: 0.77 MG/DL (ref 0.55–1.02)
GLUCOSE BLD STRIP.AUTO-MCNC: 100 MG/DL (ref 65–117)
GLUCOSE BLD STRIP.AUTO-MCNC: 108 MG/DL (ref 65–117)
GLUCOSE BLD STRIP.AUTO-MCNC: 120 MG/DL (ref 65–117)
GLUCOSE BLD STRIP.AUTO-MCNC: 134 MG/DL (ref 65–117)
GLUCOSE BLD STRIP.AUTO-MCNC: 87 MG/DL (ref 65–117)
GLUCOSE SERPL-MCNC: 121 MG/DL (ref 65–100)
MAGNESIUM SERPL-MCNC: 2.4 MG/DL (ref 1.6–2.4)
PHOSPHATE SERPL-MCNC: 2.4 MG/DL (ref 2.6–4.7)
POTASSIUM SERPL-SCNC: 4.1 MMOL/L (ref 3.5–5.1)
SERVICE CMNT-IMP: ABNORMAL
SERVICE CMNT-IMP: ABNORMAL
SERVICE CMNT-IMP: NORMAL
SODIUM SERPL-SCNC: 136 MMOL/L (ref 136–145)

## 2022-08-28 PROCEDURE — 83735 ASSAY OF MAGNESIUM: CPT

## 2022-08-28 PROCEDURE — 74011000258 HC RX REV CODE- 258: Performed by: COLON & RECTAL SURGERY

## 2022-08-28 PROCEDURE — 74011250636 HC RX REV CODE- 250/636: Performed by: STUDENT IN AN ORGANIZED HEALTH CARE EDUCATION/TRAINING PROGRAM

## 2022-08-28 PROCEDURE — 84100 ASSAY OF PHOSPHORUS: CPT

## 2022-08-28 PROCEDURE — 36415 COLL VENOUS BLD VENIPUNCTURE: CPT

## 2022-08-28 PROCEDURE — 94761 N-INVAS EAR/PLS OXIMETRY MLT: CPT

## 2022-08-28 PROCEDURE — 74011250636 HC RX REV CODE- 250/636: Performed by: COLON & RECTAL SURGERY

## 2022-08-28 PROCEDURE — 74011000250 HC RX REV CODE- 250: Performed by: STUDENT IN AN ORGANIZED HEALTH CARE EDUCATION/TRAINING PROGRAM

## 2022-08-28 PROCEDURE — 74011250637 HC RX REV CODE- 250/637: Performed by: NURSE PRACTITIONER

## 2022-08-28 PROCEDURE — 65270000029 HC RM PRIVATE

## 2022-08-28 PROCEDURE — 80048 BASIC METABOLIC PNL TOTAL CA: CPT

## 2022-08-28 PROCEDURE — 74011250636 HC RX REV CODE- 250/636: Performed by: NURSE PRACTITIONER

## 2022-08-28 PROCEDURE — 82962 GLUCOSE BLOOD TEST: CPT

## 2022-08-28 RX ADMIN — ONDANSETRON HYDROCHLORIDE 4 MG: 2 SOLUTION INTRAMUSCULAR; INTRAVENOUS at 17:25

## 2022-08-28 RX ADMIN — ENOXAPARIN SODIUM 30 MG: 100 INJECTION SUBCUTANEOUS at 10:18

## 2022-08-28 RX ADMIN — ENOXAPARIN SODIUM 30 MG: 100 INJECTION SUBCUTANEOUS at 21:20

## 2022-08-28 RX ADMIN — PIPERACILLIN AND TAZOBACTAM 3.38 G: 3; .375 INJECTION, POWDER, FOR SOLUTION INTRAVENOUS at 21:20

## 2022-08-28 RX ADMIN — ONDANSETRON HYDROCHLORIDE 4 MG: 2 SOLUTION INTRAMUSCULAR; INTRAVENOUS at 13:25

## 2022-08-28 RX ADMIN — PANTOPRAZOLE SODIUM 40 MG: 40 TABLET, DELAYED RELEASE ORAL at 06:32

## 2022-08-28 RX ADMIN — SODIUM CHLORIDE, PRESERVATIVE FREE 10 ML: 5 INJECTION INTRAVENOUS at 21:21

## 2022-08-28 RX ADMIN — ONDANSETRON HYDROCHLORIDE 4 MG: 2 SOLUTION INTRAMUSCULAR; INTRAVENOUS at 23:51

## 2022-08-28 RX ADMIN — PANTOPRAZOLE SODIUM 40 MG: 40 TABLET, DELAYED RELEASE ORAL at 17:25

## 2022-08-28 RX ADMIN — ONDANSETRON HYDROCHLORIDE 4 MG: 2 SOLUTION INTRAMUSCULAR; INTRAVENOUS at 05:19

## 2022-08-28 RX ADMIN — SODIUM CHLORIDE, PRESERVATIVE FREE 10 ML: 5 INJECTION INTRAVENOUS at 05:19

## 2022-08-28 RX ADMIN — METOPROLOL SUCCINATE 50 MG: 50 TABLET, FILM COATED, EXTENDED RELEASE ORAL at 10:18

## 2022-08-28 RX ADMIN — PIPERACILLIN AND TAZOBACTAM 3.38 G: 3; .375 INJECTION, POWDER, FOR SOLUTION INTRAVENOUS at 13:25

## 2022-08-28 RX ADMIN — PIPERACILLIN AND TAZOBACTAM 3.38 G: 3; .375 INJECTION, POWDER, FOR SOLUTION INTRAVENOUS at 05:19

## 2022-08-28 RX ADMIN — LEVOTHYROXINE SODIUM 125 MCG: 0.12 TABLET ORAL at 06:32

## 2022-08-28 RX ADMIN — HYDROMORPHONE HYDROCHLORIDE 0.2 MG: 1 INJECTION, SOLUTION INTRAMUSCULAR; INTRAVENOUS; SUBCUTANEOUS at 21:21

## 2022-08-28 RX ADMIN — SODIUM CHLORIDE, PRESERVATIVE FREE 10 ML: 5 INJECTION INTRAVENOUS at 13:01

## 2022-08-28 NOTE — PROGRESS NOTES
CRS  Appetite improving. Doesn't feel quite ready for diet advancement. Some nausea with breakfast.  Ostomy functioning (450 out yesterday). Pain controlled  Flowsheet reviewed  Abd: soft, nt  Inc: c/d/I  Ileostomy: enteric    Plan:  Stop TPN. PO intake was 1080 and tolerated full liquids with full Ensure yesterday  Continue full liquid diet for now. She is not quite ready for GI lite diet quite yet.     Encourage OOB/ambulation  Plan for cysto on Monday

## 2022-08-28 NOTE — PROGRESS NOTES
Problem: Falls - Risk of  Goal: *Absence of Falls  Description: Document So Blood Fall Risk and appropriate interventions in the flowsheet.   Outcome: Progressing Towards Goal  Note: Fall Risk Interventions:  Mobility Interventions: Bed/chair exit alarm, OT consult for ADLs, Patient to call before getting OOB, PT Consult for assist device competence, Strengthening exercises (ROM-active/passive), Utilize walker, cane, or other assistive device, PT Consult for mobility concerns, Utilize gait belt for transfers/ambulation    Mentation Interventions: Adequate sleep, hydration, pain control, Bed/chair exit alarm, Door open when patient unattended, Evaluate medications/consider consulting pharmacy, Increase mobility, More frequent rounding, Reorient patient, Room close to nurse's station, Self-releasing belt, Toileting rounds, Update white board    Medication Interventions: Assess postural VS orthostatic hypotension, Bed/chair exit alarm, Evaluate medications/consider consulting pharmacy, Patient to call before getting OOB, Teach patient to arise slowly, Utilize gait belt for transfers/ambulation    Elimination Interventions: Bed/chair exit alarm, Call light in reach, Elevated toilet seat, Patient to call for help with toileting needs, Toileting schedule/hourly rounds, Urinal in reach, Toilet paper/wipes in reach    History of Falls Interventions: Bed/chair exit alarm, Consult care management for discharge planning, Door open when patient unattended, Evaluate medications/consider consulting pharmacy, Room close to nurse's station, Utilize gait belt for transfer/ambulation, Assess for delayed presentation/identification of injury for 48 hrs (comment for end date), Vital signs minimum Q4HRs X 24 hrs (comment for end date)         Problem: Patient Education: Go to Patient Education Activity  Goal: Patient/Family Education  Outcome: Progressing Towards Goal     Problem: Pain  Goal: *Control of Pain  Outcome: Progressing Towards Goal     Problem: Patient Education: Go to Patient Education Activity  Goal: Patient/Family Education  Outcome: Progressing Towards Goal     Problem: Patient Education: Go to Patient Education Activity  Goal: Patient/Family Education  Outcome: Progressing Towards Goal     Problem: Impaired Skin Integrity/Pressure Injury Treatment  Goal: *Improvement of Existing Pressure Injury  Outcome: Progressing Towards Goal  Goal: *Prevention of pressure injury  Description: Document Stef Scale and appropriate interventions in the flowsheet.   Outcome: Progressing Towards Goal  Note: Pressure Injury Interventions:  Sensory Interventions: Assess need for specialty bed, Avoid rigorous massage over bony prominences, Chair cushion, Check visual cues for pain, Discuss PT/OT consult with provider, Float heels, Keep linens dry and wrinkle-free, Maintain/enhance activity level, Minimize linen layers, Monitor skin under medical devices, Pad between skin to skin    Moisture Interventions: Absorbent underpads, Assess need for specialty bed, Internal/External urinary devices, Limit adult briefs, Maintain skin hydration (lotion/cream), Minimize layers, Moisture barrier    Activity Interventions: Assess need for specialty bed, Chair cushion, Increase time out of bed, Pressure redistribution bed/mattress(bed type)    Mobility Interventions: Assess need for specialty bed, Chair cushion, Float heels, HOB 30 degrees or less, Pressure redistribution bed/mattress (bed type)    Nutrition Interventions: Document food/fluid/supplement intake, Offer support with meals,snacks and hydration    Friction and Shear Interventions: Apply protective barrier, creams and emollients, Foam dressings/transparent film/skin sealants, HOB 30 degrees or less, Lift team/patient mobility team, Sit at 90-degree angle, Minimize layers, Transfer aides:transfer board/Maritza lift/ceiling lift                Problem: Patient Education: Go to Patient Education Activity  Goal: Patient/Family Education  Outcome: Progressing Towards Goal     Problem: Anxiety  Goal: *Alleviation of anxiety  Outcome: Progressing Towards Goal  Goal: *Alleviation of anxiety (Palliative Care)  Outcome: Progressing Towards Goal     Problem: Patient Education: Go to Patient Education Activity  Goal: Patient/Family Education  Outcome: Progressing Towards Goal     Problem: Pressure Injury - Risk of  Goal: *Prevention of pressure injury  Description: Document Stef Scale and appropriate interventions in the flowsheet.   Outcome: Progressing Towards Goal     Problem: Patient Education: Go to Patient Education Activity  Goal: Patient/Family Education  Outcome: Progressing Towards Goal     Problem: Patient Education: Go to Patient Education Activity  Goal: Patient/Family Education  Outcome: Progressing Towards Goal     Problem: Patient Education: Go to Patient Education Activity  Goal: Patient/Family Education  Outcome: Progressing Towards Goal     Problem: Non-Violent Restraints  Goal: Removal from restraints as soon as assessed to be safe  Outcome: Progressing Towards Goal  Goal: No harm/injury to patient while restraints in use  Outcome: Progressing Towards Goal  Goal: Patient's dignity will be maintained  Outcome: Progressing Towards Goal  Goal: Patient Interventions  Outcome: Progressing Towards Goal     Problem: Nutrition Deficit  Goal: *Optimize nutritional status  Outcome: Progressing Towards Goal

## 2022-08-28 NOTE — ROUTINE PROCESS
Bedside and Verbal shift change report given to Sarah  (oncoming nurse) by Alyssa Zhu RN  (offgoing nurse). Report included the following information SBAR, Kardex, Intake/Output, and Recent Results.

## 2022-08-28 NOTE — PROGRESS NOTES
Bedside and Verbal shift change report given to [facility], RN (oncoming nurse) by Mami Jeffries RN (offgoing nurse). Report included the following information SBAR, Kardex, ED Summary, Procedure Summary, Recent Results, and Med Rec Status.

## 2022-08-29 ENCOUNTER — APPOINTMENT (OUTPATIENT)
Dept: GENERAL RADIOLOGY | Age: 72
DRG: 329 | End: 2022-08-29
Attending: NURSE PRACTITIONER
Payer: MEDICARE

## 2022-08-29 LAB
ANION GAP SERPL CALC-SCNC: 5 MMOL/L (ref 5–15)
BUN SERPL-MCNC: 12 MG/DL (ref 6–20)
BUN/CREAT SERPL: 13 (ref 12–20)
CALCIUM SERPL-MCNC: 9.2 MG/DL (ref 8.5–10.1)
CHLORIDE SERPL-SCNC: 102 MMOL/L (ref 97–108)
CO2 SERPL-SCNC: 29 MMOL/L (ref 21–32)
CREAT SERPL-MCNC: 0.9 MG/DL (ref 0.55–1.02)
GLUCOSE BLD STRIP.AUTO-MCNC: 81 MG/DL (ref 65–117)
GLUCOSE SERPL-MCNC: 89 MG/DL (ref 65–100)
MAGNESIUM SERPL-MCNC: 2 MG/DL (ref 1.6–2.4)
PHOSPHATE SERPL-MCNC: 2.8 MG/DL (ref 2.6–4.7)
POTASSIUM SERPL-SCNC: 4.3 MMOL/L (ref 3.5–5.1)
SERVICE CMNT-IMP: NORMAL
SODIUM SERPL-SCNC: 136 MMOL/L (ref 136–145)

## 2022-08-29 PROCEDURE — 65270000029 HC RM PRIVATE

## 2022-08-29 PROCEDURE — 74011000258 HC RX REV CODE- 258: Performed by: COLON & RECTAL SURGERY

## 2022-08-29 PROCEDURE — 74011250636 HC RX REV CODE- 250/636: Performed by: STUDENT IN AN ORGANIZED HEALTH CARE EDUCATION/TRAINING PROGRAM

## 2022-08-29 PROCEDURE — 36592 COLLECT BLOOD FROM PICC: CPT

## 2022-08-29 PROCEDURE — 80048 BASIC METABOLIC PNL TOTAL CA: CPT

## 2022-08-29 PROCEDURE — 84100 ASSAY OF PHOSPHORUS: CPT

## 2022-08-29 PROCEDURE — 82962 GLUCOSE BLOOD TEST: CPT

## 2022-08-29 PROCEDURE — 74011250636 HC RX REV CODE- 250/636: Performed by: COLON & RECTAL SURGERY

## 2022-08-29 PROCEDURE — 83735 ASSAY OF MAGNESIUM: CPT

## 2022-08-29 PROCEDURE — 74011000250 HC RX REV CODE- 250: Performed by: STUDENT IN AN ORGANIZED HEALTH CARE EDUCATION/TRAINING PROGRAM

## 2022-08-29 PROCEDURE — 36415 COLL VENOUS BLD VENIPUNCTURE: CPT

## 2022-08-29 PROCEDURE — 74011250637 HC RX REV CODE- 250/637: Performed by: NURSE PRACTITIONER

## 2022-08-29 PROCEDURE — 77030038269 HC DRN EXT URIN PURWCK BARD -A

## 2022-08-29 PROCEDURE — 74430 CONTRAST X-RAY BLADDER: CPT

## 2022-08-29 PROCEDURE — 77030004952 HC CATH ENTRL RADPQ VIAS -B

## 2022-08-29 PROCEDURE — 74011000636 HC RX REV CODE- 636: Performed by: COLON & RECTAL SURGERY

## 2022-08-29 PROCEDURE — 97535 SELF CARE MNGMENT TRAINING: CPT

## 2022-08-29 PROCEDURE — 94761 N-INVAS EAR/PLS OXIMETRY MLT: CPT

## 2022-08-29 RX ORDER — OXYCODONE HYDROCHLORIDE 5 MG/1
5 TABLET ORAL
Status: DISCONTINUED | OUTPATIENT
Start: 2022-08-29 | End: 2022-08-30 | Stop reason: HOSPADM

## 2022-08-29 RX ORDER — ONDANSETRON 2 MG/ML
4 INJECTION INTRAMUSCULAR; INTRAVENOUS
Status: DISCONTINUED | OUTPATIENT
Start: 2022-08-29 | End: 2022-08-30 | Stop reason: HOSPADM

## 2022-08-29 RX ADMIN — PANTOPRAZOLE SODIUM 40 MG: 40 TABLET, DELAYED RELEASE ORAL at 17:13

## 2022-08-29 RX ADMIN — ENOXAPARIN SODIUM 30 MG: 100 INJECTION SUBCUTANEOUS at 22:10

## 2022-08-29 RX ADMIN — OXYCODONE 5 MG: 5 TABLET ORAL at 14:32

## 2022-08-29 RX ADMIN — PIPERACILLIN AND TAZOBACTAM 3.38 G: 3; .375 INJECTION, POWDER, FOR SOLUTION INTRAVENOUS at 04:47

## 2022-08-29 RX ADMIN — DIATRIZOATE MEGLUMINE 300 ML: 300 INJECTION, SOLUTION INTRAVENOUS at 12:36

## 2022-08-29 RX ADMIN — PIPERACILLIN AND TAZOBACTAM 3.38 G: 3; .375 INJECTION, POWDER, FOR SOLUTION INTRAVENOUS at 14:31

## 2022-08-29 RX ADMIN — SODIUM CHLORIDE, PRESERVATIVE FREE 10 ML: 5 INJECTION INTRAVENOUS at 14:32

## 2022-08-29 RX ADMIN — ENOXAPARIN SODIUM 30 MG: 100 INJECTION SUBCUTANEOUS at 09:38

## 2022-08-29 RX ADMIN — ONDANSETRON HYDROCHLORIDE 4 MG: 2 SOLUTION INTRAMUSCULAR; INTRAVENOUS at 05:23

## 2022-08-29 RX ADMIN — METOPROLOL SUCCINATE 50 MG: 50 TABLET, FILM COATED, EXTENDED RELEASE ORAL at 09:37

## 2022-08-29 RX ADMIN — SODIUM CHLORIDE, PRESERVATIVE FREE 10 ML: 5 INJECTION INTRAVENOUS at 22:00

## 2022-08-29 RX ADMIN — LEVOTHYROXINE SODIUM 125 MCG: 0.12 TABLET ORAL at 06:31

## 2022-08-29 RX ADMIN — OXYCODONE 5 MG: 5 TABLET ORAL at 20:54

## 2022-08-29 RX ADMIN — SODIUM CHLORIDE, PRESERVATIVE FREE 10 ML: 5 INJECTION INTRAVENOUS at 05:18

## 2022-08-29 RX ADMIN — PIPERACILLIN AND TAZOBACTAM 3.38 G: 3; .375 INJECTION, POWDER, FOR SOLUTION INTRAVENOUS at 20:54

## 2022-08-29 RX ADMIN — PANTOPRAZOLE SODIUM 40 MG: 40 TABLET, DELAYED RELEASE ORAL at 06:31

## 2022-08-29 NOTE — PROGRESS NOTES
CRS  Appetite improving- craving eggs and mashed potatoes. No n/v but is getting scheduled zofran. Ostomy functioning (450 out yesterday and 225 so far this am). Pain controlled  Working with PT/OT      Flowsheet reviewed  Abd: soft, nt  Inc: c/d/I with exception of midline incision which is pink directly over incision but no erythema extending outward, no drainage no tenderness to palpation  Ileostomy: enteric    Plan:  Inc to [facility] today  Dr Catalino [place] to assess midline incision later today  Will discuss Rehab placement with case management  Will need PICC removed prior to DC   Encourage OOB/ambulation      Pt discussed with Dr Mendez Hernandez, NP      [phone]: Reviewed Cystogram with Dr Catalino Birmingham. Stef Birmingham to see later today. Urinated post zhang removal. Eating a GI lite diet. Plan dc tomorrow to rehab. No need for atbx at dc. Plan dc PICC tomorrow before going to rehab. Incision without overt evidence of cellulitis and bogginess.

## 2022-08-29 NOTE — ROUTINE PROCESS
Bedside and Verbal shift change report given to Annie Dc 67  (oncoming nurse) by Alyssa Zhu RN  (offgoing nurse). Report included the following information SBAR, Kardex, MAR, and Recent Results.

## 2022-08-29 NOTE — PROGRESS NOTES
Problem: Self Care Deficits Care Plan (Adult)  Goal: *Acute Goals and Plan of Care (Insert Text)  Description: Description: FUNCTIONAL STATUS PRIOR TO ADMISSION: Patient reports modified independent baseline ambulation using rollator walker, utilizing chair lift for stair negotiation, and performing ADLs independently. HOME SUPPORT PRIOR TO ADMISSION: The patient lived with her sister and brother in law. Occupational Therapy Goals    Reassessment goals: 8/26/22  1. Patient will perform upper body dressing with minimal assistance/contact guard in or chair within 7 day(s). 2.  Patient will don UE's into hospital gown and sponge bathe UE's and abdomen with supervision/set-up within 7 day(s). 3.  Patient will tolerate 10 minutes functional activity or exercise without asking for pain meds within 7 day(s). 4.  Patient will perform sit<>stand transfer with minimal assistance/contact guard assistance within 7 days. Initiated 8/20/2022. Reviewed and updated at weekly reassessment 8/26/22  1. Patient will perform grooming with minimal assistance/contact guard in bed or chair within 7 day(s). Met (at supervision level) 8/26/22  2. Patient will don UE's into hospital gown and sponge bathe UE's and abdomen with supervision/set-up within 7 day(s). Not Met, continue  3. Patient will tolerate 5 minutes functional activity or exercise without asking for pain meds within 7 day(s). Met 8/26/22 upgrade  4. Patient will stand with bilateral UE support or in lory steady > or = 1 minute with good tolerance to activity within 7 day(s).  Met 8/26/22  Outcome: Progressing Towards Goal   OCCUPATIONAL THERAPY TREATMENT  Patient: Kathryn Watters (17 y.o. female)  Date: 8/29/2022  Diagnosis: Colovesical fistula [N32.1] <principal problem not specified>  Procedure(s) (LRB):  CYSTOSCOPY, RIGHT URETERAL STENT INSERTION, RIGHT RETROGRADE PYLOGRAM (Right) 9 Days Post-Op  Precautions: Fall, Skin, Aspiration  Chart, occupational therapy assessment, plan of care, and goals were reviewed. ASSESSMENT  Patient continues with skilled OT services and is progressing towards goals. Patient demonstrates great gains in ADL related mobility and completed stand pivot at  level from bed to chair with Min A. Patient reportedly ambulated 3-4' to stretcher bed upon transport's arrival some time after today's session. Patient is still well below baseline for ADL and mobility, though anticipate good gains as patient's strength and confidence improves. Intensive rehab services recommended to maximize patient's progress and facilitate return to PLOF     Current Level of Function Impacting Discharge (ADLs): max assist for LB ADL and toileting. Min to Mod A for transfers (increased assist likely from lower sitting surface)    Other factors to consider for discharge:          PLAN :  Patient continues to benefit from skilled intervention to address the above impairments. Continue treatment per established plan of care to address goals. Recommend with staff: RW transfers - Solmon Roberth steady if patient is excessively fatigued, BSC use no bed pan     Recommend next OT session: progress POC- toileting, clothing mgmt in stand at [facility]    Recommendation for discharge: (in order for the patient to meet his/her long term goals)  Therapy 3 hours per day 5-7 days per week    This discharge recommendation:  Has been made in collaboration with the attending provider and/or case management    IF patient discharges home will need the following DME: TBD       SUBJECTIVE:   Patient pleasant and cooperative     OBJECTIVE DATA SUMMARY:   Cognitive/Behavioral Status:  Neurologic State: Alert  Orientation Level: Oriented X4  Cognition: Appropriate decision making; Follows commands             Functional Mobility and Transfers for ADLs:  Bed Mobility:  Supine to Sit: Supervision;Assist x1;Additional time;Bed Modified; Adaptive equipment  Scooting: Supervision  Multiple sit to stand- Min A with consistent performance  Sidestepping along side of bed prior to attempt at step toward chair- CGA     Transfers:  Sit to Stand: Minimum assistance; Other (comment) (eevated bed height)  Functional Transfers  Adaptive Equipment: Walker (comment)  Bed to Chair: Minimum assistance; Additional time; Adaptive equipment;Assist x1    Balance:  Sitting: Intact; Without support  Sitting - Static: Good (unsupported)  Sitting - Dynamic: Good (unsupported)  Standing: Impaired; Without support  Standing - Static: Fair;Constant support  Standing - Dynamic : Fair;Constant support    ADL Intervention:       Grooming  Grooming Assistance: Modified independent  Position Performed: Seated in chair            Lower Body Dressing Assistance  Socks: Total assistance (dependent)  Leg Crossed Method Used: No  Position Performed: Seated in chair            Pain:  None reported     Activity Tolerance:   Good    After treatment patient left in no apparent distress:   Sitting in chair, Call bell within reach, and Bed / chair alarm activated    COMMUNICATION/COLLABORATION:   The patients plan of care was discussed with: Registered nurse.      Nell Frey OT  Time Calculation: 33 mins

## 2022-08-29 NOTE — PROGRESS NOTES
Physical Therapy Note:    12:22 - PT treatment deferred. Pt is off the unit and unavailable to participate. Will continue to follow per POC. 14:55 - Pt declining participation at this time. She reports severe low back pain and lower pelvic pain s/p procedure earlier today. RN aware and working to manage. Pt affirms sitting in chair earlier today and agrees to mobilize again with staff assist for evening meal.     Will continue to follow per POC.     Carmelo Lentz, PT, DPT, [facility]

## 2022-08-29 NOTE — PROGRESS NOTES
8/29/2022  Case Management Progress Note    9:53 AM  Patient is 66year old female admitted 8/16 with colovesical fistula  Patient's RUR is 7% green/low risk for readmission  Covid test: none this admission  Chart reviewed  Per chart patient's TPN was stopped yesterday and has been tolerating full liquids. Surgery note indicates patient is planned for cystoscopy today. Patient has been accepted by [facility] for inpatient rehab and is agreeable to that plan. No auth needed with patient's insurance. Patient will need an IM letter prior to discharge. Will continue to follow and assist with discharge planning as clinical course continues.      Transition of Care Plan   Continue medical management/treatment  Patient accepted to [facility] for IPR   Cystoscopy today   Transportation tbd pending clinical progress  CM will continue to follow    DORCAS Latif

## 2022-08-29 NOTE — PROGRESS NOTES
Problem: Falls - Risk of  Goal: *Absence of Falls  Description: Document Sepulveda Reason Fall Risk and appropriate interventions in the flowsheet.   Outcome: Progressing Towards Goal  Note: Fall Risk Interventions:  Mobility Interventions: Bed/chair exit alarm, OT consult for ADLs, Patient to call before getting OOB, PT Consult for mobility concerns, PT Consult for assist device competence, Utilize walker, cane, or other assistive device, Utilize gait belt for transfers/ambulation, Strengthening exercises (ROM-active/passive)    Mentation Interventions: Adequate sleep, hydration, pain control, Bed/chair exit alarm, Door open when patient unattended, Evaluate medications/consider consulting pharmacy, Familiar objects from home, Eyeglasses and hearing aids, More frequent rounding, Reorient patient, Room close to nurse's station, Self-releasing belt, Toileting rounds, Update white board    Medication Interventions: Assess postural VS orthostatic hypotension, Bed/chair exit alarm, Patient to call before getting OOB, Teach patient to arise slowly, Utilize gait belt for transfers/ambulation, Evaluate medications/consider consulting pharmacy    Elimination Interventions: Bed/chair exit alarm, Call light in reach, Patient to call for help with toileting needs, Toilet paper/wipes in reach, Toileting schedule/hourly rounds    History of Falls Interventions: Bed/chair exit alarm, Consult care management for discharge planning, Door open when patient unattended, Evaluate medications/consider consulting pharmacy, Room close to nurse's station, Utilize gait belt for transfer/ambulation, Assess for delayed presentation/identification of injury for 48 hrs (comment for end date), Vital signs minimum Q4HRs X 24 hrs (comment for end date)         Problem: Patient Education: Go to Patient Education Activity  Goal: Patient/Family Education  Outcome: Progressing Towards Goal     Problem: Pain  Goal: *Control of Pain  Outcome: Progressing Towards Goal     Problem: Patient Education: Go to Patient Education Activity  Goal: Patient/Family Education  Outcome: Progressing Towards Goal     Problem: Patient Education: Go to Patient Education Activity  Goal: Patient/Family Education  Outcome: Progressing Towards Goal     Problem: Impaired Skin Integrity/Pressure Injury Treatment  Goal: *Improvement of Existing Pressure Injury  Outcome: Progressing Towards Goal  Goal: *Prevention of pressure injury  Description: Document Stef Scale and appropriate interventions in the flowsheet.   Outcome: Progressing Towards Goal  Note: Pressure Injury Interventions:  Sensory Interventions: Assess changes in LOC, Avoid rigorous massage over bony prominences, Check visual cues for pain, Discuss PT/OT consult with provider, Float heels, Keep linens dry and wrinkle-free, Minimize linen layers, Maintain/enhance activity level, Monitor skin under medical devices    Moisture Interventions: Absorbent underpads, Apply protective barrier, creams and emollients, Assess need for specialty bed, Internal/External urinary devices, Limit adult briefs, Maintain skin hydration (lotion/cream), Minimize layers, Moisture barrier    Activity Interventions: Assess need for specialty bed, Chair cushion, Increase time out of bed, Pressure redistribution bed/mattress(bed type), PT/OT evaluation    Mobility Interventions: Chair cushion, Assess need for specialty bed, Float heels, HOB 30 degrees or less, Pressure redistribution bed/mattress (bed type), PT/OT evaluation    Nutrition Interventions: Document food/fluid/supplement intake, Offer support with meals,snacks and hydration    Friction and Shear Interventions: Apply protective barrier, creams and emollients, HOB 30 degrees or less, Foam dressings/transparent film/skin sealants, Lift team/patient mobility team, Minimize layers, Sit at 90-degree angle, Transfer aides:transfer board/Maritza lift/ceiling lift                Problem: Patient Education: Go to Patient Education Activity  Goal: Patient/Family Education  Outcome: Progressing Towards Goal     Problem: Anxiety  Goal: *Alleviation of anxiety  Outcome: Progressing Towards Goal  Goal: *Alleviation of anxiety (Palliative Care)  Outcome: Progressing Towards Goal     Problem: Patient Education: Go to Patient Education Activity  Goal: Patient/Family Education  Outcome: Progressing Towards Goal     Problem: Pressure Injury - Risk of  Goal: *Prevention of pressure injury  Description: Document Stef Scale and appropriate interventions in the flowsheet.   Outcome: Progressing Towards Goal     Problem: Patient Education: Go to Patient Education Activity  Goal: Patient/Family Education  Outcome: Progressing Towards Goal     Problem: Patient Education: Go to Patient Education Activity  Goal: Patient/Family Education  Outcome: Progressing Towards Goal     Problem: Patient Education: Go to Patient Education Activity  Goal: Patient/Family Education  Outcome: Progressing Towards Goal     Problem: Non-Violent Restraints  Goal: Removal from restraints as soon as assessed to be safe  Outcome: Progressing Towards Goal  Goal: No harm/injury to patient while restraints in use  Outcome: Progressing Towards Goal  Goal: Patient's dignity will be maintained  Outcome: Progressing Towards Goal  Goal: Patient Interventions  Outcome: Progressing Towards Goal     Problem: Nutrition Deficit  Goal: *Optimize nutritional status  Outcome: Progressing Towards Goal

## 2022-08-29 NOTE — PROGRESS NOTES
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 Findings may reflect edema, atypical infection and or aspiration  Treatment: IV zoysn, CXR, Incentive spiromety, NG tube. IV Zofran,    Thank you  Spencer Sami Buenrostro, CPHQ, CCDS, SMART  Options provided:  -- Aspiration Pneumonia ?is a?postoperative complication  -- Aspiration Pneumonia  is not a postoperative complication, but is due to ### Please specify the risk factor, Please specify the risk factor. -- Aspiration Pneumonia is not a postoperative complication, but is due to other incidental risk factor, Please specify other incidental risk factor. -- Other - I will add my own diagnosis  -- Disagree - Not applicable / Not valid  -- Disagree - Clinically unable to determine / Unknown  -- Refer to Clinical Documentation Reviewer    PROVIDER RESPONSE TEXT:    Patient has Aspiration Pneumonia as a?postoperative complication.   Aspiration pneumonia secondary to pulling out NGT and gastric dilitation    Query created by: Anthony Escobar on 2022 1:55 PM      QUERY TEXT:    Good Afternoon    This patient admitted for Lap assisted Left colectomy, and diverting loop ileostomy    08/25---RD assessed. If possible, please document in progress notes and discharge summary if you are evaluating and /or treating any of the following: The medical record reflects the following:  Risk Factors: Post op colon surgery  Clinical Indicators: Per RD assessment pt with 50% or less of est. energy requirements for 5 or more days. Has been on TPN post op  and diet was just advanced to clear liquids. Treatment: RD following, Advance diet as malik, Weaning TPN, Provide Ensure clear BID. Thank you  EDNA Hoffman,RN, CPHQ, CCDS, SMART      ASPEN Criteria:  https://aspenjournals. onlinelibrary. riley. com/doi/full/10.1177/1342047095382400  Options provided:  -- Protein calorie malnutrition mild  -- Other - I will add my own diagnosis  -- Disagree - Not applicable / Not valid  -- Disagree - Clinically unable to determine / Unknown  -- Refer to Clinical Documentation Reviewer    PROVIDER RESPONSE TEXT:    This patient has mild protein calorie malnutrition. Query created by: Adrian Davalos on 8/26/2022 2:04 PM      QUERY TEXT:    Good Afternoon    This patient admitted for Planned left colectomy and diverting loop ileostomy and insertion of bill. ureteral stents. on 08/18, the pt noted with AMS, after receiving IV Ativan and IV dilaudid  overnight. If possible, please document in the progress notes and discharge summary if you are evaluating and / or treating any of the following: The medical record reflects the following:  Risk Factors: hx of head injury with some memory loss at baseline, post op coloctomy and receiving IV pain meds  Clinical Indicators: RRT called on 08/18after patient was confused and sedated, and MD notes \"likely due to result of the Ativan and dilaudid.  Ct of head negative  Treatment: RRT called, Narcan given, CT head, MD stopped Ativan and decreased dilaudid    Thank you  EDNA Hoffman, RN, CPHQ, CCDS, SMART  Options provided:  -- Drug-induced encephalopathy due to IV Ativan and IV dilaudid  -- Drug-induced encephalopathy due to, Please specify substance.   -- Metabolic encephalopathy  -- Toxic encephalopathy  -- Toxic metabolic encephalopathy  -- Other - I will add my own diagnosis  -- Disagree - Not applicable / Not valid  -- Disagree - Clinically unable to determine / Unknown  -- Refer to Clinical Documentation Reviewer    PROVIDER RESPONSE TEXT:    This patient has drug-induced encephalopathy due to IV Ativan and IV dilaudid    Query created by: Renzo Hand on 8/26/2022 2:11 PM      Electronically signed by:  Celine Vu MD 8/29/2022 6:03 PM

## 2022-08-30 VITALS
HEART RATE: 81 BPM | RESPIRATION RATE: 16 BRPM | OXYGEN SATURATION: 98 % | HEIGHT: 64 IN | SYSTOLIC BLOOD PRESSURE: 131 MMHG | TEMPERATURE: 97.8 F | DIASTOLIC BLOOD PRESSURE: 75 MMHG | BODY MASS INDEX: 39.22 KG/M2 | WEIGHT: 229.72 LBS

## 2022-08-30 PROCEDURE — 74011000250 HC RX REV CODE- 250: Performed by: STUDENT IN AN ORGANIZED HEALTH CARE EDUCATION/TRAINING PROGRAM

## 2022-08-30 PROCEDURE — 94761 N-INVAS EAR/PLS OXIMETRY MLT: CPT

## 2022-08-30 PROCEDURE — 74011250636 HC RX REV CODE- 250/636: Performed by: COLON & RECTAL SURGERY

## 2022-08-30 PROCEDURE — 74011000258 HC RX REV CODE- 258: Performed by: COLON & RECTAL SURGERY

## 2022-08-30 PROCEDURE — 74011250636 HC RX REV CODE- 250/636: Performed by: STUDENT IN AN ORGANIZED HEALTH CARE EDUCATION/TRAINING PROGRAM

## 2022-08-30 PROCEDURE — 74011250637 HC RX REV CODE- 250/637: Performed by: NURSE PRACTITIONER

## 2022-08-30 RX ORDER — ENOXAPARIN SODIUM 100 MG/ML
40 INJECTION SUBCUTANEOUS DAILY
Qty: 14 EACH | Refills: 0 | Status: SHIPPED | OUTPATIENT
Start: 2022-08-30 | End: 2022-09-13

## 2022-08-30 RX ADMIN — PIPERACILLIN AND TAZOBACTAM 3.38 G: 3; .375 INJECTION, POWDER, FOR SOLUTION INTRAVENOUS at 06:11

## 2022-08-30 RX ADMIN — LEVOTHYROXINE SODIUM 125 MCG: 0.12 TABLET ORAL at 06:56

## 2022-08-30 RX ADMIN — SODIUM CHLORIDE, PRESERVATIVE FREE 10 ML: 5 INJECTION INTRAVENOUS at 06:13

## 2022-08-30 RX ADMIN — ENOXAPARIN SODIUM 30 MG: 100 INJECTION SUBCUTANEOUS at 10:01

## 2022-08-30 RX ADMIN — METOPROLOL SUCCINATE 50 MG: 50 TABLET, FILM COATED, EXTENDED RELEASE ORAL at 10:02

## 2022-08-30 RX ADMIN — PANTOPRAZOLE SODIUM 40 MG: 40 TABLET, DELAYED RELEASE ORAL at 06:56

## 2022-08-30 NOTE — PROGRESS NOTES
Bedside and Verbal shift change report given to [address], RN (oncoming nurse) by Ivette Green RN (offgoing nurse). Report included the following information SBAR, Kardex, ED Summary, Intake/Output, MAR, and Recent Results.

## 2022-08-30 NOTE — PROGRESS NOTES
08/30/22      Medicare pt has received, reviewed, and signed 2nd IM letter informing them of their right to appeal the discharge. Signed copied has been placed on pt bedside chart. Follow up RN assmt.      Treatment site: glottis  Last Treatment date: 06/02/2021       Pain: 5  Site of pain: neck, doesn't hurt when swallowing  Pain intervention: norco TID as needed  Swallowing difficulty: No difficulty, has to chew food thoroughly and take sips of water  Odynophagia: None  Dry mouth: Yes  Dry mouth intervention: Just water to assist with dryness  Taste change: None  Voice change: laryngectomy  Tube feeding: None  Diet: Regular Diet  Fatigue: Pretty much back to normal predisease   Limited ROM: Difficulty looking up due to surgery, some tightness. Still healing  Skin change: None  Activity: Pretty much back to normal predisease   Patient concern: Occasional cough, phlegm production.     Recent imaging: CT Neck on 12/22/2021  IMPRESSION:     1.  Postoperative changes of total laryngectomy, tracheostomy, and small  prosthesis placement between the trachea and esophagus.     2.  Posttreatment changes from radiation within the neck.     3.  No findings to suggest residual or recurrent tumor.     4.  Multiple chronic abnormalities are detailed above.    Recent procedure: TOTAL LARYNGECTOMY; TRACHEOESOPHAGEAL PUNCTURE on 9/16/2021 by Dr. Gilman      Weight    01/27/22 1122   Weight: 81.7 kg (180 lb 1.9 oz)          Report off to Dr. Mcclure

## 2022-08-30 NOTE — PROGRESS NOTES
CRS  Pt with urinary incontinence  Flowsheet reviewed  Abd: soft, nt, nd  Ileostomy: liquid stools    Plan:  Dispo to rehab if bed available. Prior to dc to rehab, dc PICC. Stop atbx.

## 2022-08-30 NOTE — PROGRESS NOTES
Picc line removed. Discharge instructions given and gone over with Pt. Opportunity to ask questions given. Attempted to call report to [phone], they stated that they would \"call back\". Spoke with urology and they recommend increasing water intake and keep follow up appointment next week.

## 2022-08-30 NOTE — PROGRESS NOTES
Problem: Falls - Risk of  Goal: *Absence of Falls  Description: Document Jose Daniel Moya Fall Risk and appropriate interventions in the flowsheet. Outcome: Progressing Towards Goal  Note: Fall Risk Interventions:  Mobility Interventions: Strengthening exercises (ROM-active/passive), Utilize walker, cane, or other assistive device, Bed/chair exit alarm, Communicate number of staff needed for ambulation/transfer    Mentation Interventions: Bed/chair exit alarm, Evaluate medications/consider consulting pharmacy, Door open when patient unattended, Update white board    Medication Interventions: Bed/chair exit alarm, Patient to call before getting OOB, Teach patient to arise slowly    Elimination Interventions: Patient to call for help with toileting needs, Toileting schedule/hourly rounds, Bed/chair exit alarm, Call light in reach    History of Falls Interventions: Bed/chair exit alarm, Door open when patient unattended, Room close to nurse's station         Problem: Pain  Goal: *Control of Pain  Outcome: Progressing Towards Goal     Problem: Impaired Skin Integrity/Pressure Injury Treatment  Goal: *Improvement of Existing Pressure Injury  Outcome: Progressing Towards Goal  Goal: *Prevention of pressure injury  Description: Document Stef Scale and appropriate interventions in the flowsheet. Outcome: Progressing Towards Goal  Note: Pressure Injury Interventions:  Sensory Interventions: Check visual cues for pain, Float heels, Keep linens dry and wrinkle-free, Maintain/enhance activity level, Minimize linen layers, Pressure redistribution bed/mattress (bed type), Turn and reposition approx.  every two hours (pillows and wedges if needed)    Moisture Interventions: Apply protective barrier, creams and emollients, Check for incontinence Q2 hours and as needed, Internal/External fecal devices, Internal/External urinary devices, Minimize layers, Moisture barrier    Activity Interventions: Increase time out of bed, Pressure redistribution bed/mattress(bed type), PT/OT evaluation    Mobility Interventions: Float heels, HOB 30 degrees or less, Pressure redistribution bed/mattress (bed type), PT/OT evaluation, Turn and reposition approx. every two hours(pillow and wedges)    Nutrition Interventions: Document food/fluid/supplement intake, Offer support with meals,snacks and hydration    Friction and Shear Interventions: Lift team/patient mobility team, Minimize layers                Problem: Anxiety  Goal: *Alleviation of anxiety  Outcome: Progressing Towards Goal  Goal: *Alleviation of anxiety (Palliative Care)  Outcome: Progressing Towards Goal     Problem: Pressure Injury - Risk of  Goal: *Prevention of pressure injury  Description: Document Stef Scale and appropriate interventions in the flowsheet. Outcome: Progressing Towards Goal  Note: Pressure Injury Interventions:  Sensory Interventions: Check visual cues for pain, Float heels, Keep linens dry and wrinkle-free, Maintain/enhance activity level, Minimize linen layers, Pressure redistribution bed/mattress (bed type), Turn and reposition approx. every two hours (pillows and wedges if needed)    Moisture Interventions: Apply protective barrier, creams and emollients, Check for incontinence Q2 hours and as needed, Internal/External fecal devices, Internal/External urinary devices, Minimize layers, Moisture barrier    Activity Interventions: Increase time out of bed, Pressure redistribution bed/mattress(bed type), PT/OT evaluation    Mobility Interventions: Float heels, HOB 30 degrees or less, Pressure redistribution bed/mattress (bed type), PT/OT evaluation, Turn and reposition approx.  every two hours(pillow and wedges)    Nutrition Interventions: Document food/fluid/supplement intake, Offer support with meals,snacks and hydration    Friction and Shear Interventions: Lift team/patient mobility team, Minimize layers           Problem: Patient Education: Go to Patient Education Activity  Goal: Patient/Family Education  Outcome: Progressing Towards Goal

## 2022-08-30 NOTE — PROGRESS NOTES
8/30/2022  Case Management Progress Note    11:26 AM  Confirmed [facility] does have a bed today. Spoke with patient in person and sister via phone to let them know. They have some specific questions so asked Laure to follow up with sister. Stretcher set up with [facility] for 3pm as per RN patient would not be comfortable sitting up for >20 mins. Patient will be admitted by Dr Haley Shultz, RN please call report to [phone]. DORCAS Stewart    9:52 AM  Patient is 66year old female admitted 8/16 with colovesical fistula  Patient's RUR is 7% green/low risk for readmission  Covid test: none this admission  Chart reviewed  Per chart patient is ready for discharge today and order is placed. I have reached out this morning to the liaison for [facility] to see if they can admit today. She will get back to me after their morning meeting. Will continue to follow and update.      Transition of Care Plan   Continue medical management  Hopeful for discharge today to [facility] pending bed availability  Transportation tbd--patient declined therapy yesterday and did not have over the weekend  CM will continue to follow    DORCAS Stewart

## 2022-08-30 NOTE — DISCHARGE INSTRUCTIONS
Nelson Cullen MD, FACS  Ramirez Wong MD, FACS  Gene Chandra. Christie Barnes MD, [address] [facility] Jus Rainey MD, [address] [facility] Xi Rodríguez MD, FACS  Orion Hayden. MD Gerald Steve MD    [facility] Discharge Instructions for Radha Ruiz    Diagnosis: sigmoid colon resection, diverting loop ileostomy  You are at high risk for dehydration with an ileostomy:   ABSOLUTELY NO LAXATIVES,   try to eat small frequent meals,   eat foods that will thicken output (peanut butter, cheerios without milk, bananas)  If unable to thicken output up with diet (bag output should be consistency of oatmeal) , if emptying bag frequently with watery output:  loperamide (imodium) 2mg pill every 6 hours up to 4/day. Notify office at [phone] if still watery output after taking 4 loperamide  Monitor for signs of dehydration:  dark urine, infrequent urine, dry mouth, lightheadedness, cramping  May take a shower. May walk as desired. May go up and down stairs. Take pain medication as prescribed: (NO DRIVING WHILE ON PAIN MEDICATIONS). Oxy IR 5mg EVERY 6 HOURS AS NEEDED. Other Medications: Loperamide 2mg every 6 hours for high output/ watery output from ileostomy bag                                 Lovenox 40mg SQ injection daily for 14 days  Ostomy Supplies: Home Health   See me in the office in 10-14 days. Call as soon as discharged for an appointment . IF SURGERY INVOLVED AN OSTOMY BAG, PLEASE BRING YOUR SUPPLIES TO YOUR 1ST VISIT! Please request a Thusday/Ostomy Clinic appt with Dr. Gareth Scott   Call the Exchange [phone], if you have any questions or problems after office hours.     Suzanne Hernández [address], [address]  [facility]  [phone] · Fax [phone] [address] [facility] [place], [address]  [phone] · Fax [phone] Brigantine Anneliese, [address]  [place]  [phone] · Fax [phone]

## 2022-08-30 NOTE — WOUND CARE
Ostomy visit: post op day #14  ROBOTIC SPLENIC FLEXURE MOBILIZATION, ROBOTIC CONVERTED TO LAPAROSCOPY ASSIST, EXTENDED LEFT COLECTOMY WITH COLORECTAL ANASTOMOSIS AND DIVERTING LOOP ILEOSTOMY  CYSTOSCOPY WITH INSERTION BILATERAL URETERAL STENTS. In to follow up with continued ostomy teaching. In with Mil Simon and Keya to continue ostomy teaching. Keya verbalizes and has demonstrated ostomy technique and teaches Mil Simon. Assessment:  RLQ ileostomy - support shanta of red rubber tube remains in place; stoma is red, moist flush to skin, no surrounding redness or rash. Output dark brown. No leaking since placed on 8/26 so wear time if a good 4 days. Midline incision well approximated with sutures/glue, erythema surrounding, no drainage. Teaching:  Measured and Inna cut out appliance. Showed them again how to use powder and no sting. We are using Xpro one piece now which is ultra clear so seeing through to place is much easier. Using cohesive seal around opening of appliance. Reviewed items for ordering and DME companies to order from. All item numbers written on order sheet for when home. Plan:  Patient is transitioning to rehab and will continue to learn ostomy skills. Reinforced learning to empty with staff. Supplies ample for discharge.   Sharlene Marshall RN,CWON

## 2022-09-02 NOTE — DISCHARGE SUMMARY
[address] SUMMARY    Name:  Genesis Clark  MR#:  [id number]  :  1950  ACCOUNT #:  [de-identified]  ADMIT DATE:  2022  DISCHARGE DATE:  2022    ADMITTING DIAGNOSIS:  Sigmoid diverticulitis with colovesical fistula. FINAL DIAGNOSES:  1. Sigmoid diverticulitis with colovesical fistula. 2.  Status post robotic splenic flexure mobilization with robotic converted to laparoscopic assisted extended left colectomy with colorectal anastomosis and diverting loop ileostomy and cystoscopy and insertion of bilateral ureteral stents by Dr. Osmin Duarte. 3.  Postoperative delirium. 4.  Postoperative delayed return of bowel function/ileus. 5.  Right hydronephrosis, treated with cystoscopy and stent placement by Urology. CLINICAL COURSE:  Very pleasant 20-year-old who presented electively to the hospital on  for the robotic sigmoid colectomy. This ultimately required a conversion to laparoscopy and the left colectomy was formed with a transverse colon to rectal anastomosis in a diverting loop ileostomy fashion. Please note that she had bilateral ureteral stents prior to my procedure by Dr. Osmin Duarte for the fistula and ease of identification of the ureters. Her postoperative course was notable for a postoperative delirium. She developed also a postoperative delayed return of bowel function and ileus. An NG tube was placed but was removed by her on multiple occasions due to the delirium. It was thought that perhaps she even had an aspiration pneumonia at one point. Please note that she was on the antibiotics not for any pneumonia but for the abscess that was identified at the time of surgery. During her hospital course, she was noted to have severe pain, and CT scan showed right hydronephrosis. I would refer the reader to urologist note for further details.   Apparently, there was some thick debris seen within the right ureter, perhaps the source of the hydronephrosis. Nonetheless, a double-J stent was placed by Urology, I sincerely appreciate their help. The patient ultimately started to do better. The NG tube was removed. She was initially placed on TPN, which was later weaned. She was eventually advanced on a diet. Physical therapy and occupational therapy were consulted. The patient was ultimately discharged to rehab. She will follow up with Urology for stent removal.  We performed a cystogram here in the hospital showing no leaking from her bladder and therefore, the catheter was removed prior to her discharge to rehab. She had completed a full course for her diverticulitis with abscess and therefore this was stopped at the time of rehab. Her PICC line was also removed. She is to follow up with me in the office within the next couple of weeks within ostomy clinic or sooner if symptoms warrant. Prescription for loperamide, oxycodone were provided. I will refer the reader to the hospital charts to really get a better sense of the hospital course. This is not meant to substitute for that.       Juju Necessary, MD      [id number]  D:  09/01/2022 19:30  T:  09/02/2022 1:51  JOB #:  [id number]

## 2022-09-13 ENCOUNTER — TELEPHONE (OUTPATIENT)
Dept: INTERNAL MEDICINE CLINIC | Age: 72
End: 2022-09-13

## 2022-09-13 NOTE — TELEPHONE ENCOUNTER
Per nurse with [facility]  , patient needs a LESLEY prior to the end of September, appt on hold for early part of October, please reach out to patient to offer anything sooner.

## 2022-09-13 NOTE — TELEPHONE ENCOUNTER
Spoke with patient and she requests that I call her sister Keya to schedule her follow up appointment with Dr. Juan Pollard. Patient states she is being discharged today from [facility]. Grateful for the call.

## 2022-09-13 NOTE — TELEPHONE ENCOUNTER
Spoke with Keya/Sister (HIPPA VERIFIED) and updated that patient is rescheduled for her LESLEY appt to 9/23/22 at 11:20 AM. She states understanding and grateful for the call.

## 2022-09-22 ENCOUNTER — OFFICE VISIT (OUTPATIENT)
Dept: INTERNAL MEDICINE CLINIC | Age: 72
End: 2022-09-22
Payer: MEDICARE

## 2022-09-22 VITALS
OXYGEN SATURATION: 96 % | HEART RATE: 107 BPM | HEIGHT: 64 IN | DIASTOLIC BLOOD PRESSURE: 82 MMHG | RESPIRATION RATE: 14 BRPM | WEIGHT: 230.8 LBS | BODY MASS INDEX: 39.4 KG/M2 | TEMPERATURE: 98.3 F | SYSTOLIC BLOOD PRESSURE: 139 MMHG

## 2022-09-22 DIAGNOSIS — Z93.2 ILEOSTOMY IN PLACE (HCC): ICD-10-CM

## 2022-09-22 DIAGNOSIS — N39.0 RECURRENT UTI: ICD-10-CM

## 2022-09-22 DIAGNOSIS — M54.50 ACUTE MIDLINE LOW BACK PAIN WITHOUT SCIATICA: ICD-10-CM

## 2022-09-22 DIAGNOSIS — I10 BENIGN ESSENTIAL HTN: ICD-10-CM

## 2022-09-22 DIAGNOSIS — Z09 HOSPITAL DISCHARGE FOLLOW-UP: ICD-10-CM

## 2022-09-22 DIAGNOSIS — Z96.0 STATUS POST PLACEMENT OF URETERAL STENT: ICD-10-CM

## 2022-09-22 DIAGNOSIS — K21.9 GASTROESOPHAGEAL REFLUX DISEASE WITHOUT ESOPHAGITIS: ICD-10-CM

## 2022-09-22 DIAGNOSIS — N32.1 COLOVESICAL FISTULA: Primary | ICD-10-CM

## 2022-09-22 PROBLEM — F11.99 OPIOID USE, UNSPECIFIED WITH UNSPECIFIED OPIOID-INDUCED DISORDER (HCC): Status: ACTIVE | Noted: 2022-09-22

## 2022-09-22 PROCEDURE — 1111F DSCHRG MED/CURRENT MED MERGE: CPT | Performed by: INTERNAL MEDICINE

## 2022-09-22 PROCEDURE — G8752 SYS BP LESS 140: HCPCS | Performed by: INTERNAL MEDICINE

## 2022-09-22 PROCEDURE — 3017F COLORECTAL CA SCREEN DOC REV: CPT | Performed by: INTERNAL MEDICINE

## 2022-09-22 PROCEDURE — G8536 NO DOC ELDER MAL SCRN: HCPCS | Performed by: INTERNAL MEDICINE

## 2022-09-22 PROCEDURE — 1101F PT FALLS ASSESS-DOCD LE1/YR: CPT | Performed by: INTERNAL MEDICINE

## 2022-09-22 PROCEDURE — 1090F PRES/ABSN URINE INCON ASSESS: CPT | Performed by: INTERNAL MEDICINE

## 2022-09-22 PROCEDURE — G8510 SCR DEP NEG, NO PLAN REQD: HCPCS | Performed by: INTERNAL MEDICINE

## 2022-09-22 PROCEDURE — 99214 OFFICE O/P EST MOD 30 MIN: CPT | Performed by: INTERNAL MEDICINE

## 2022-09-22 PROCEDURE — G9899 SCRN MAM PERF RSLTS DOC: HCPCS | Performed by: INTERNAL MEDICINE

## 2022-09-22 PROCEDURE — G8427 DOCREV CUR MEDS BY ELIG CLIN: HCPCS | Performed by: INTERNAL MEDICINE

## 2022-09-22 PROCEDURE — G8754 DIAS BP LESS 90: HCPCS | Performed by: INTERNAL MEDICINE

## 2022-09-22 PROCEDURE — G0463 HOSPITAL OUTPT CLINIC VISIT: HCPCS | Performed by: INTERNAL MEDICINE

## 2022-09-22 PROCEDURE — G8417 CALC BMI ABV UP PARAM F/U: HCPCS | Performed by: INTERNAL MEDICINE

## 2022-09-22 PROCEDURE — G8399 PT W/DXA RESULTS DOCUMENT: HCPCS | Performed by: INTERNAL MEDICINE

## 2022-09-22 RX ORDER — TRAMADOL HYDROCHLORIDE 50 MG/1
50 TABLET ORAL
Qty: 30 TABLET | Refills: 0 | Status: SHIPPED | OUTPATIENT
Start: 2022-09-22 | End: 2022-10-31

## 2022-09-22 RX ORDER — ACETAMINOPHEN 500 MG
TABLET ORAL
COMMUNITY

## 2022-09-22 RX ORDER — PANTOPRAZOLE SODIUM 40 MG/1
40 TABLET, DELAYED RELEASE ORAL 2 TIMES DAILY
COMMUNITY
Start: 2022-09-14 | End: 2022-10-26 | Stop reason: ALTCHOICE

## 2022-09-22 RX ORDER — TRAMADOL HYDROCHLORIDE 50 MG/1
TABLET ORAL
COMMUNITY
Start: 2022-09-14 | End: 2022-09-22 | Stop reason: SDUPTHER

## 2022-09-22 RX ORDER — FOLIC ACID 1 MG/1
TABLET ORAL DAILY
COMMUNITY

## 2022-09-22 RX ORDER — LANOLIN ALCOHOL/MO/W.PET/CERES
CREAM (GRAM) TOPICAL DAILY
COMMUNITY

## 2022-09-22 RX ORDER — LANOLIN ALCOHOL/MO/W.PET/CERES
CREAM (GRAM) TOPICAL
COMMUNITY

## 2022-09-22 RX ORDER — FAMOTIDINE 20 MG/1
20 TABLET, FILM COATED ORAL
Qty: 60 TABLET | Refills: 4
Start: 2022-09-22

## 2022-09-22 RX ORDER — LIDOCAINE 50 MG/G
1 PATCH TOPICAL EVERY 24 HOURS
Qty: 30 EACH | Refills: 3 | Status: SHIPPED | OUTPATIENT
Start: 2022-09-22 | End: 2022-09-26

## 2022-09-22 NOTE — PROGRESS NOTES
HISTORY OF PRESENT ILLNESS    Chief Complaint   Patient presents with    Hospital Follow Up     [address]     She is brought in by her sister, Yolanda Martinez for follow-up of left colectomy and repair of fistula with loop ileostomy 8/16/22. Developed post-op right hydronephrosis with pain, fever, UTI. Required R ureteral stent. Discharged 8/30/22. Was sent to [facility] for rehab 8/30 - 9/14/22 due to weakness, AMS, back pain, dysphagia  Saw Dr. Flor Coyle in urology for follow up 9/202/22. UA collected today showed possible UTI. Tx pending based on results. Plan for right diagnostic ureteroscopy and stent removal.    Her ostomy is working well. 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    has a past surgical history that includes hx tonsil and adenoidectomy (1955); hx colonoscopy (11/24/2009); colonoscopy (N/A, 09/19/2018); hx colposcopy (11/26/2018); hx tubal ligation (1984); hx dilation and curettage (02/2019); hx cholecystectomy (1991); hx cataract removal (Bilateral); hx knee arthroscopy (Right, 1978); hx haroldo and bso (10/13/2019); hx meniscus repair (Right); hx colonoscopy (08/15/2022); hx ileostomy (Left, 08/16/2022); and ir change internal ureteral stent rt (08/2022). reports that she quit smoking about 12 years ago. Her smoking use included cigarettes. She has a 5.00 pack-year smoking history. She has never used smokeless tobacco. She reports current alcohol use of about 4.0 standard drinks per week. She reports that she does not use drugs. family history includes Dementia in her mother; Diabetes in her mother and sister; Heart Disease in her sister; No Known Problems in her daughter; Panic disorder in her brother and sister; Thyroid Disease in her mother and sister. Physical Exam   Nursing note and vitals reviewed. Blood pressure 139/82, pulse (!) 107, temperature 98.3 °F (36.8 °C), temperature source Oral, resp. rate 14, height 5' 4.02\" (1.626 m), weight 230 lb 12.8 oz (104.7 kg), SpO2 96 %. Constitutional:  No distress. Eyes: Conjunctivae are normal.   Ears:  Hearing grossly intact  Cardiovascular: Normal rate. regular rhythm, no murmurs or gallops  No edema  Pulmonary/Chest: Effort normal.   CTAB  Musculoskeletal: moves all 4 extremities   Neurological: Alert and oriented to person, place, and time. Osteomy RLQ c/d/i  Medium brown partially formed stool  Skin: No visible rash noted. Psychiatric: Normal mood and affect. Behavior is normal.     Assessment and Plan    Diagnoses and all orders for this visit:    1. Colovesical fistula  S/p repair. Doing well    2. Recurrent UTI  Unclear if current UTI. Per urology. No overt f/c    3. Status post placement of ureteral stent  follow up urology re: timing of removial    4. Ileostomy in place [facility])  Functioning well. Hopeful reversal in the next few months. They have supplies for care. follow up surgery. 5. Benign essential HTN  This condition is controlled by medication therapy with toprol  Refilled medications.     6. Gastroesophageal reflux disease without esophagitis  Based on my history and review of available data, the overall control of this problem borderline controlled. Cont PPI bid and add prn pepcid  -     famotidine (PEPCID) 20 mg tablet; Take 1 Tablet by mouth two (2) times daily as needed for Heartburn or Indigestion. 7. Acute midline low back pain without sciatica  MSK pain  Avoid nsaids due to GI ulcer risk. Prn tramadol, tylenol   profile was accessed online for Siena Bose and reviewed by me during this encounter. I did not see evidence of inappropriate or suspicious controlled substance prescription activity. -     traMADoL (ULTRAM) 50 mg tablet; Take 1 Tablet by mouth every eight (8) hours as needed for Pain for up to 10 days. Max Daily Amount: 150 mg.  -     lidocaine (LIDODERM) 5 %; 1 Patch by TransDERmal route every twenty-four (24) hours. Apply patch to the affected area for 12 hours a day and remove for 12 hours a day. 2 [facility] discharge follow-up  -     W[place] DISCHARGE MEDS RECONCILED W/ CURRENT OUTPATIENT MED LIST    Will see urology, surgery. follow up with me 2-3 months, sooner prn    lab results and schedule of future lab studies reviewed with patient  reviewed medications and side effects in detail    Return to clinic for further evaluation if new symptoms develop        Current Outpatient Medications   Medication Sig    pantoprazole (PROTONIX) 40 mg tablet Take 40 mg by mouth two (2) times a day. acetaminophen (Tylenol Extra Strength) 500 mg tablet Take  by mouth every six (6) hours as needed for Pain. folic acid (FOLVITE) 1 mg tablet Take  by mouth daily. thiamine HCL (B-1) 100 mg tablet Take  by mouth daily. melatonin 3 mg tablet Take  by mouth nightly. famotidine (PEPCID) 20 mg tablet Take 1 Tablet by mouth two (2) times daily as needed for Heartburn or Indigestion. traMADoL (ULTRAM) 50 mg tablet Take 1 Tablet by mouth every eight (8) hours as needed for Pain for up to 10 days.  Max Daily Amount: 150 mg.    lidocaine (LIDODERM) 5 % 1 Patch by TransDERmal route every twenty-four (24) hours. Apply patch to the affected area for 12 hours a day and remove for 12 hours a day. estradioL (ESTRACE) 0.01 % (0.1 mg/gram) vaginal cream Insert 2 g into vagina in the morning.    ketoconazole (NIZORAL) 2 % topical cream Apply  to affected area daily. multivitamin (ONE A DAY) tablet Take 1 Tablet by mouth in the morning. vibegron (Gemtesa) 75 mg tab Take  by mouth Every morning. metoprolol succinate (TOPROL-XL) 50 mg XL tablet Take 50 mg by mouth Every morning. levothyroxine (SYNTHROID) 125 mcg tablet TAKE 1 TABLET BY MOUTH EVERY DAY BEFORE BREAKFAST     No current facility-administered medications for this visit.

## 2022-09-26 ENCOUNTER — HOSPITAL ENCOUNTER (OUTPATIENT)
Dept: PREADMISSION TESTING | Age: 72
Discharge: HOME | End: 2022-09-26
Payer: MEDICARE

## 2022-09-26 VITALS
HEIGHT: 64 IN | RESPIRATION RATE: 18 BRPM | WEIGHT: 229.5 LBS | TEMPERATURE: 98.2 F | DIASTOLIC BLOOD PRESSURE: 84 MMHG | SYSTOLIC BLOOD PRESSURE: 124 MMHG | HEART RATE: 94 BPM | BODY MASS INDEX: 39.18 KG/M2

## 2022-09-26 LAB
APPEARANCE UR: ABNORMAL
BACTERIA URNS QL MICRO: ABNORMAL /HPF
BILIRUB UR QL CFM: ABNORMAL
COLOR UR: ABNORMAL
EPITH CASTS URNS QL MICRO: ABNORMAL /LPF
GLUCOSE UR STRIP.AUTO-MCNC: NEGATIVE MG/DL
HGB UR QL STRIP: ABNORMAL
KETONES UR QL STRIP.AUTO: NEGATIVE MG/DL
LEUKOCYTE ESTERASE UR QL STRIP.AUTO: ABNORMAL
NITRITE UR QL STRIP.AUTO: POSITIVE
PH UR STRIP: 5 [PH] (ref 5–8)
PROT UR STRIP-MCNC: 100 MG/DL
RBC #/AREA URNS HPF: >100 /HPF (ref 0–5)
SP GR UR REFRACTOMETRY: 1.02 (ref 1–1.03)
UA: UC IF INDICATED,UAUC: ABNORMAL
UROBILINOGEN UR QL STRIP.AUTO: 1 EU/DL (ref 0.2–1)
WBC URNS QL MICRO: >100 /HPF (ref 0–4)
YEAST URNS QL MICRO: PRESENT

## 2022-09-26 PROCEDURE — 87086 URINE CULTURE/COLONY COUNT: CPT

## 2022-09-26 PROCEDURE — 81001 URINALYSIS AUTO W/SCOPE: CPT

## 2022-09-26 RX ORDER — NYSTATIN 100000 [USP'U]/G
POWDER TOPICAL AS NEEDED
COMMUNITY

## 2022-09-26 RX ORDER — LOPERAMIDE HYDROCHLORIDE 2 MG/1
2 CAPSULE ORAL EVERY 8 HOURS
COMMUNITY

## 2022-09-26 RX ORDER — DICLOFENAC SODIUM 10 MG/G
GEL TOPICAL AS NEEDED
COMMUNITY

## 2022-09-26 NOTE — PERIOP NOTES
[address] INSTRUCTIONS    Surgery Date:   10/6/22    Your surgeon's office or [facility] staff will call you between 4 PM- 8 PM the day before surgery with your arrival time. If your surgery is on a Monday, you will receive a call the preceding Friday. Please report to [facility] Patient Access/Admitting on the 1st floor. Bring your insurance card, photo identification, and any copayment ( if applicable). If you are going home the same day of your surgery, you must have a responsible adult to drive you home. You need to have a responsible adult to stay with you the first 24 hours after surgery and you should not drive a car for 24 hours following your surgery. Nothing to eat or drink after midnight the night before surgery. This includes no water, gum, mints, coffee, juice, etc.  Please note special instructions, if applicable, below for medications. Do NOT drink alcohol or smoke 24 hours before surgery. STOP smoking for 14 days prior as it helps with breathing and healing after surgery. If you are being admitted to the hospital, please leave personal belongings/luggage in your car until you have an assigned hospital room number. Please wear comfortable clothes. Wear your glasses instead of contacts. We ask that all money, jewelry and valuables be left at home. Wear no make up, particularly mascara, the day of surgery. All body piercings, rings, and jewelry need to be removed and left at home. Please remove any nail polish or artificial nails from your fingernails. Please wear your hair loose or down. Please no pony-tails, buns, or any metal hair accessories. If you shower the morning of surgery, please do not apply any lotions or powders afterwards. You may wear deodorant, unless having breast surgery. Do not shave any body area within 24 hours of your surgery. Please follow all instructions to avoid any potential surgical cancellation.   Should your physical condition change, (i.e. fever, cold, flu, etc.) please notify your surgeon as soon as possible. It is important to be on time. If a situation occurs where you may be delayed, please call:  [phone] / [phone] on the day of surgery. The Preadmission Testing staff can be reached at [phone]. Special instructions: NONE      Current Outpatient Medications   Medication Sig    loperamide (IMODIUM) 2 mg capsule Take 2 mg by mouth every eight (8) hours. nystatin (MYCOSTATIN) powder Apply  to affected area as needed. diclofenac (VOLTAREN) 1 % gel Apply  to affected area as needed for Pain.    pantoprazole (PROTONIX) 40 mg tablet Take 40 mg by mouth two (2) times a day. acetaminophen (TYLENOL) 500 mg tablet Take  by mouth every six (6) hours as needed for Pain. folic acid (FOLVITE) 1 mg tablet Take  by mouth daily. thiamine HCL (B-1) 100 mg tablet Take  by mouth daily. melatonin 3 mg tablet Take  by mouth nightly. traMADoL (ULTRAM) 50 mg tablet Take 1 Tablet by mouth every eight (8) hours as needed for Pain for up to 10 days. Max Daily Amount: 150 mg. (Patient taking differently: Take 50 mg by mouth every six (6) hours as needed for Pain.)    ketoconazole (NIZORAL) 2 % topical cream Apply  to affected area as needed. multivitamin (ONE A DAY) tablet Take 1 Tablet by mouth in the morning. vibegron (Gemtesa) 75 mg tab Take  by mouth Every morning. metoprolol succinate (TOPROL-XL) 50 mg XL tablet Take 50 mg by mouth Every morning. levothyroxine (SYNTHROID) 125 mcg tablet TAKE 1 TABLET BY MOUTH EVERY DAY BEFORE BREAKFAST    phenazopyridine HCl (AZO-DINE PO) Take  by mouth as needed. PT TOOK 2 DAYS (Patient not taking: Reported on 9/26/2022)    famotidine (PEPCID) 20 mg tablet Take 1 Tablet by mouth two (2) times daily as needed for Heartburn or Indigestion. (Patient not taking: Reported on 9/26/2022)    estradioL (ESTRACE) 0.01 % (0.1 mg/gram) vaginal cream Insert 2 g into vagina in the morning. (Patient not taking: Reported on 9/26/2022)     No current facility-administered medications for this encounter. YOU MUST ONLY TAKE THESE MEDICATIONS THE MORNING OF SURGERY WITH A SIP OF WATER: METOPROLOL, PROTONIX, SYNTHROID  MEDICATIONS TO TAKE THE MORNING OF SURGERY ONLY IF NEEDED: TYLENOL, PEPCID, TRAMADOL  HOLD these prescription medications BEFORE Surgery: DO NOT TAKE GEMTESA MORNING OF SURGERY  Ask your surgeon/prescribing physician about when/if to STOP taking these medications: NONE  Stop all vitamins, herbal medicines and Aspirin containing products 7 days prior to surgery. Stop any non-steroidal anti-inflammatory drugs (i.e. Ibuprofen, Naproxen, Advil, Aleve) 3 days before surgery. You may take Tylenol. If you are currently taking Plavix, Coumadin, or any other blood-thinning/anticoagulant medication contact your prescribing physician for instructions. Preventing Infections Before and After - Your Surgery    IMPORTANT INSTRUCTIONS      You play an important role in your health and preparation for surgery. To reduce the germs on your skin you will need to shower with CHG soap (Chorhexidine gluconate 4%) two times before surgery. CHG soap (Hibiclens, Hex-A-Clens or store brand)  CHG soap will be provided at your Preadmission Testing (PAT) appointment. If you do not have a PAT appointment before surgery, you may arrange to  CHG soap from our office or purchase CHG soap at a pharmacy, grocery or department store. You need to purchase TWO 4 ounce bottles to use for your 2 showers. Steps to follow:  Stuart Fake your hair with your normal shampoo and your body with regular soap and rinse well to remove shampoo and soap from your skin. Wet a clean washcloth and turn off the shower. Put CHG soap on washcloth and apply to your entire body from the neck down. Do not use on your head, face or private parts(genitals). Do not use CHG soap on open sores, wounds or areas of skin irritation.   Wash you body gently for 5 minutes. Do not wash your skin too hard. This soap does not create lather. Pay special attention to your underarms and from your belly button to your feet. Turn the shower back on and rinse well to get CHG soap off your body. Pat your skin dry with a clean, dry towel. Do not apply lotions or moisturizer. Put on clean clothes and sleep on fresh bed sheets and do not allow pets to sleep with you. Shower with CHG soap 2 times before your surgery  The evening before your surgery  The morning of your surgery      Tips to help prevent infections after your surgery:  Protect your surgical wound from germs:  Hand washing is the most important thing you and your caregivers can do to prevent infections. Keep your bandage clean and dry! Do not touch your surgical wound. Use clean, freshly washed towels and washcloths every time you shower; do not share bath linens with others. Until your surgical wound is healed, wear clothing and sleep on bed linens each day that are clean and freshly washed. Do not allow pets to sleep in your bed with you or touch your surgical wound. Do not smoke - smoking delays wound healing. This may be a good time to stop smoking. If you have diabetes, it is important for you to manage your blood sugar levels properly before your surgery as well as after your surgery. Poorly managed blood sugar levels slow down wound healing and prevent you from healing completely. Patient Information Regarding COVID Restrictions    Day of Procedure    Please park in the parking deck or any designated visitor parking lot. Enter the facility through the Main Entrance of the hospital.  On the day of surgery, please provide the cell phone number of the person who will be waiting for you to the Patient Access representative at the time of registration. Please wear a mask on the day of your procedure. We are now allowing two designated visitors per stay.  Pediatric patients may have 2 designated visitors. These two people may come in with you on the day of your procedure. The designated visitor must also wear a mask. Once your procedure and the immediate recovery period is completed, a nurse in the recovery area will contact your designated visitor to inform them of your room number or to otherwise review other pertinent information regarding your care. Social distancing practices are to be adhered to in waiting areas and the cafeteria. The patient and caregiver was contacted  in person. She verbalized understanding of all instructions does not  need reinforcement.

## 2022-09-27 LAB
BACTERIA SPEC CULT: NORMAL
CC UR VC: NORMAL
SERVICE CMNT-IMP: NORMAL

## 2022-09-27 NOTE — PERIOP NOTES
Notified Cameron Sessions- Dr. Mayelin Rizo office re: abnormal UA, culture pending. Cameron Sessions requests to please fax to office culture and sensitivities result as soon as available.

## 2022-10-06 ENCOUNTER — ANESTHESIA EVENT (OUTPATIENT)
Dept: SURGERY | Age: 72
End: 2022-10-06
Payer: MEDICARE

## 2022-10-06 ENCOUNTER — HOSPITAL ENCOUNTER (OUTPATIENT)
Age: 72
Setting detail: OUTPATIENT SURGERY
Discharge: HOME | End: 2022-10-06
Attending: STUDENT IN AN ORGANIZED HEALTH CARE EDUCATION/TRAINING PROGRAM | Admitting: STUDENT IN AN ORGANIZED HEALTH CARE EDUCATION/TRAINING PROGRAM
Payer: MEDICARE

## 2022-10-06 ENCOUNTER — ANESTHESIA (OUTPATIENT)
Dept: SURGERY | Age: 72
End: 2022-10-06
Payer: MEDICARE

## 2022-10-06 ENCOUNTER — HOSPITAL ENCOUNTER (OUTPATIENT)
Dept: GENERAL RADIOLOGY | Age: 72
Discharge: HOME | End: 2022-10-06
Attending: STUDENT IN AN ORGANIZED HEALTH CARE EDUCATION/TRAINING PROGRAM

## 2022-10-06 VITALS
OXYGEN SATURATION: 100 % | TEMPERATURE: 98.3 F | WEIGHT: 229.5 LBS | RESPIRATION RATE: 15 BRPM | HEART RATE: 85 BPM | BODY MASS INDEX: 39.18 KG/M2 | DIASTOLIC BLOOD PRESSURE: 51 MMHG | SYSTOLIC BLOOD PRESSURE: 154 MMHG | HEIGHT: 64 IN

## 2022-10-06 PROCEDURE — 76210000021 HC REC RM PH II 0.5 TO 1 HR: Performed by: STUDENT IN AN ORGANIZED HEALTH CARE EDUCATION/TRAINING PROGRAM

## 2022-10-06 PROCEDURE — 74011250636 HC RX REV CODE- 250/636: Performed by: ANESTHESIOLOGY

## 2022-10-06 PROCEDURE — 76060000032 HC ANESTHESIA 0.5 TO 1 HR: Performed by: STUDENT IN AN ORGANIZED HEALTH CARE EDUCATION/TRAINING PROGRAM

## 2022-10-06 PROCEDURE — 77030010509 HC AIRWY LMA MSK TELE -A: Performed by: ANESTHESIOLOGY

## 2022-10-06 PROCEDURE — 77030019927 HC TBNG IRR CYSTO BAXT -A: Performed by: STUDENT IN AN ORGANIZED HEALTH CARE EDUCATION/TRAINING PROGRAM

## 2022-10-06 PROCEDURE — 74011250636 HC RX REV CODE- 250/636: Performed by: NURSE ANESTHETIST, CERTIFIED REGISTERED

## 2022-10-06 PROCEDURE — 74011250637 HC RX REV CODE- 250/637: Performed by: ANESTHESIOLOGY

## 2022-10-06 PROCEDURE — 76010000138 HC OR TIME 0.5 TO 1 HR: Performed by: STUDENT IN AN ORGANIZED HEALTH CARE EDUCATION/TRAINING PROGRAM

## 2022-10-06 PROCEDURE — 2709999900 HC NON-CHARGEABLE SUPPLY: Performed by: STUDENT IN AN ORGANIZED HEALTH CARE EDUCATION/TRAINING PROGRAM

## 2022-10-06 PROCEDURE — 74011000250 HC RX REV CODE- 250: Performed by: NURSE ANESTHETIST, CERTIFIED REGISTERED

## 2022-10-06 PROCEDURE — 76210000063 HC OR PH I REC FIRST 0.5 HR: Performed by: STUDENT IN AN ORGANIZED HEALTH CARE EDUCATION/TRAINING PROGRAM

## 2022-10-06 PROCEDURE — 74011000636 HC RX REV CODE- 636: Performed by: STUDENT IN AN ORGANIZED HEALTH CARE EDUCATION/TRAINING PROGRAM

## 2022-10-06 RX ORDER — SODIUM CHLORIDE, SODIUM LACTATE, POTASSIUM CHLORIDE, CALCIUM CHLORIDE 600; 310; 30; 20 MG/100ML; MG/100ML; MG/100ML; MG/100ML
125 INJECTION, SOLUTION INTRAVENOUS CONTINUOUS
Status: DISCONTINUED | OUTPATIENT
Start: 2022-10-06 | End: 2022-10-06 | Stop reason: HOSPADM

## 2022-10-06 RX ORDER — MORPHINE SULFATE 2 MG/ML
2 INJECTION, SOLUTION INTRAMUSCULAR; INTRAVENOUS
Status: DISCONTINUED | OUTPATIENT
Start: 2022-10-06 | End: 2022-10-06 | Stop reason: HOSPADM

## 2022-10-06 RX ORDER — ONDANSETRON 2 MG/ML
4 INJECTION INTRAMUSCULAR; INTRAVENOUS AS NEEDED
Status: DISCONTINUED | OUTPATIENT
Start: 2022-10-06 | End: 2022-10-06 | Stop reason: HOSPADM

## 2022-10-06 RX ORDER — SODIUM CHLORIDE, SODIUM LACTATE, POTASSIUM CHLORIDE, CALCIUM CHLORIDE 600; 310; 30; 20 MG/100ML; MG/100ML; MG/100ML; MG/100ML
INJECTION, SOLUTION INTRAVENOUS
Status: DISCONTINUED | OUTPATIENT
Start: 2022-10-06 | End: 2022-10-06 | Stop reason: HOSPADM

## 2022-10-06 RX ORDER — FENTANYL CITRATE 50 UG/ML
50 INJECTION, SOLUTION INTRAMUSCULAR; INTRAVENOUS AS NEEDED
Status: DISCONTINUED | OUTPATIENT
Start: 2022-10-06 | End: 2022-10-06 | Stop reason: HOSPADM

## 2022-10-06 RX ORDER — DEXAMETHASONE SODIUM PHOSPHATE 4 MG/ML
INJECTION, SOLUTION INTRA-ARTICULAR; INTRALESIONAL; INTRAMUSCULAR; INTRAVENOUS; SOFT TISSUE AS NEEDED
Status: DISCONTINUED | OUTPATIENT
Start: 2022-10-06 | End: 2022-10-06 | Stop reason: HOSPADM

## 2022-10-06 RX ORDER — HYDROMORPHONE HYDROCHLORIDE 1 MG/ML
0.2 INJECTION, SOLUTION INTRAMUSCULAR; INTRAVENOUS; SUBCUTANEOUS
Status: DISCONTINUED | OUTPATIENT
Start: 2022-10-06 | End: 2022-10-06 | Stop reason: HOSPADM

## 2022-10-06 RX ORDER — MIDAZOLAM HYDROCHLORIDE 1 MG/ML
1 INJECTION, SOLUTION INTRAMUSCULAR; INTRAVENOUS AS NEEDED
Status: DISCONTINUED | OUTPATIENT
Start: 2022-10-06 | End: 2022-10-06 | Stop reason: HOSPADM

## 2022-10-06 RX ORDER — ONDANSETRON 2 MG/ML
INJECTION INTRAMUSCULAR; INTRAVENOUS AS NEEDED
Status: DISCONTINUED | OUTPATIENT
Start: 2022-10-06 | End: 2022-10-06 | Stop reason: HOSPADM

## 2022-10-06 RX ORDER — FENTANYL CITRATE 50 UG/ML
INJECTION, SOLUTION INTRAMUSCULAR; INTRAVENOUS AS NEEDED
Status: DISCONTINUED | OUTPATIENT
Start: 2022-10-06 | End: 2022-10-06 | Stop reason: HOSPADM

## 2022-10-06 RX ORDER — MIDAZOLAM HYDROCHLORIDE 1 MG/ML
0.5 INJECTION, SOLUTION INTRAMUSCULAR; INTRAVENOUS
Status: COMPLETED | OUTPATIENT
Start: 2022-10-06 | End: 2022-10-06

## 2022-10-06 RX ORDER — PROPOFOL 10 MG/ML
INJECTION, EMULSION INTRAVENOUS AS NEEDED
Status: DISCONTINUED | OUTPATIENT
Start: 2022-10-06 | End: 2022-10-06 | Stop reason: HOSPADM

## 2022-10-06 RX ORDER — FENTANYL CITRATE 50 UG/ML
25 INJECTION, SOLUTION INTRAMUSCULAR; INTRAVENOUS
Status: DISCONTINUED | OUTPATIENT
Start: 2022-10-06 | End: 2022-10-06 | Stop reason: HOSPADM

## 2022-10-06 RX ORDER — SODIUM CHLORIDE 0.9 % (FLUSH) 0.9 %
5-40 SYRINGE (ML) INJECTION EVERY 8 HOURS
Status: DISCONTINUED | OUTPATIENT
Start: 2022-10-06 | End: 2022-10-06 | Stop reason: HOSPADM

## 2022-10-06 RX ORDER — SODIUM CHLORIDE 0.9 % (FLUSH) 0.9 %
5-40 SYRINGE (ML) INJECTION AS NEEDED
Status: DISCONTINUED | OUTPATIENT
Start: 2022-10-06 | End: 2022-10-06 | Stop reason: HOSPADM

## 2022-10-06 RX ORDER — OXYCODONE AND ACETAMINOPHEN 5; 325 MG/1; MG/1
1 TABLET ORAL AS NEEDED
Status: DISCONTINUED | OUTPATIENT
Start: 2022-10-06 | End: 2022-10-06 | Stop reason: HOSPADM

## 2022-10-06 RX ORDER — CEFUROXIME AXETIL 250 MG/1
250 TABLET ORAL 2 TIMES DAILY
Qty: 6 TABLET | Refills: 0 | Status: ON HOLD | OUTPATIENT
Start: 2022-10-06 | End: 2022-10-19 | Stop reason: SDUPTHER

## 2022-10-06 RX ORDER — DIPHENHYDRAMINE HYDROCHLORIDE 50 MG/ML
12.5 INJECTION, SOLUTION INTRAMUSCULAR; INTRAVENOUS AS NEEDED
Status: DISCONTINUED | OUTPATIENT
Start: 2022-10-06 | End: 2022-10-06 | Stop reason: HOSPADM

## 2022-10-06 RX ORDER — LIDOCAINE HYDROCHLORIDE 10 MG/ML
0.1 INJECTION, SOLUTION EPIDURAL; INFILTRATION; INTRACAUDAL; PERINEURAL AS NEEDED
Status: DISCONTINUED | OUTPATIENT
Start: 2022-10-06 | End: 2022-10-06 | Stop reason: HOSPADM

## 2022-10-06 RX ORDER — LIDOCAINE HYDROCHLORIDE 20 MG/ML
INJECTION, SOLUTION EPIDURAL; INFILTRATION; INTRACAUDAL; PERINEURAL AS NEEDED
Status: DISCONTINUED | OUTPATIENT
Start: 2022-10-06 | End: 2022-10-06 | Stop reason: HOSPADM

## 2022-10-06 RX ORDER — CEFAZOLIN SODIUM 1 G/3ML
INJECTION, POWDER, FOR SOLUTION INTRAMUSCULAR; INTRAVENOUS AS NEEDED
Status: DISCONTINUED | OUTPATIENT
Start: 2022-10-06 | End: 2022-10-06 | Stop reason: HOSPADM

## 2022-10-06 RX ORDER — KETOROLAC TROMETHAMINE 10 MG/1
10 TABLET, FILM COATED ORAL
Qty: 10 TABLET | Refills: 0 | Status: SHIPPED | OUTPATIENT
Start: 2022-10-06 | End: 2022-10-20 | Stop reason: ALTCHOICE

## 2022-10-06 RX ORDER — ACETAMINOPHEN 325 MG/1
650 TABLET ORAL ONCE
Status: COMPLETED | OUTPATIENT
Start: 2022-10-06 | End: 2022-10-06

## 2022-10-06 RX ORDER — SODIUM CHLORIDE 9 MG/ML
25 INJECTION, SOLUTION INTRAVENOUS CONTINUOUS
Status: DISCONTINUED | OUTPATIENT
Start: 2022-10-06 | End: 2022-10-06 | Stop reason: HOSPADM

## 2022-10-06 RX ORDER — MIDAZOLAM HYDROCHLORIDE 1 MG/ML
INJECTION, SOLUTION INTRAMUSCULAR; INTRAVENOUS AS NEEDED
Status: DISCONTINUED | OUTPATIENT
Start: 2022-10-06 | End: 2022-10-06 | Stop reason: HOSPADM

## 2022-10-06 RX ORDER — MIDAZOLAM HYDROCHLORIDE 1 MG/ML
INJECTION, SOLUTION INTRAMUSCULAR; INTRAVENOUS
Status: DISCONTINUED
Start: 2022-10-06 | End: 2022-10-06 | Stop reason: HOSPADM

## 2022-10-06 RX ADMIN — CEFAZOLIN 2 G: 330 INJECTION, POWDER, FOR SOLUTION INTRAMUSCULAR; INTRAVENOUS at 13:23

## 2022-10-06 RX ADMIN — ACETAMINOPHEN 650 MG: 325 TABLET ORAL at 12:48

## 2022-10-06 RX ADMIN — MIDAZOLAM HYDROCHLORIDE 0.5 MG: 1 INJECTION, SOLUTION INTRAMUSCULAR; INTRAVENOUS at 14:15

## 2022-10-06 RX ADMIN — PROPOFOL 180 MG: 10 INJECTION, EMULSION INTRAVENOUS at 13:15

## 2022-10-06 RX ADMIN — FENTANYL CITRATE 25 MCG: 50 INJECTION, SOLUTION INTRAMUSCULAR; INTRAVENOUS at 13:15

## 2022-10-06 RX ADMIN — SODIUM CHLORIDE, POTASSIUM CHLORIDE, SODIUM LACTATE AND CALCIUM CHLORIDE 125 ML/HR: 600; 310; 30; 20 INJECTION, SOLUTION INTRAVENOUS at 12:37

## 2022-10-06 RX ADMIN — FENTANYL CITRATE 25 MCG: 50 INJECTION, SOLUTION INTRAMUSCULAR; INTRAVENOUS at 13:41

## 2022-10-06 RX ADMIN — MIDAZOLAM HYDROCHLORIDE 0.5 MG: 1 INJECTION, SOLUTION INTRAMUSCULAR; INTRAVENOUS at 14:00

## 2022-10-06 RX ADMIN — ONDANSETRON HYDROCHLORIDE 4 MG: 2 INJECTION, SOLUTION INTRAMUSCULAR; INTRAVENOUS at 13:22

## 2022-10-06 RX ADMIN — MIDAZOLAM HYDROCHLORIDE 0.5 MG: 1 INJECTION, SOLUTION INTRAMUSCULAR; INTRAVENOUS at 14:10

## 2022-10-06 RX ADMIN — LIDOCAINE HYDROCHLORIDE 60 MG: 20 INJECTION, SOLUTION EPIDURAL; INFILTRATION; INTRACAUDAL; PERINEURAL at 13:15

## 2022-10-06 RX ADMIN — MIDAZOLAM 2 MG: 1 INJECTION INTRAMUSCULAR; INTRAVENOUS at 13:10

## 2022-10-06 RX ADMIN — DEXAMETHASONE SODIUM PHOSPHATE 4 MG: 4 INJECTION, SOLUTION INTRAMUSCULAR; INTRAVENOUS at 13:22

## 2022-10-06 RX ADMIN — MIDAZOLAM HYDROCHLORIDE 0.5 MG: 1 INJECTION, SOLUTION INTRAMUSCULAR; INTRAVENOUS at 14:05

## 2022-10-06 RX ADMIN — FENTANYL CITRATE 25 MCG: 50 INJECTION, SOLUTION INTRAMUSCULAR; INTRAVENOUS at 13:37

## 2022-10-06 RX ADMIN — SODIUM CHLORIDE, POTASSIUM CHLORIDE, SODIUM LACTATE AND CALCIUM CHLORIDE: 600; 310; 30; 20 INJECTION, SOLUTION INTRAVENOUS at 13:10

## 2022-10-06 RX ADMIN — MIDAZOLAM HYDROCHLORIDE 1 MG: 1 INJECTION, SOLUTION INTRAMUSCULAR; INTRAVENOUS at 12:50

## 2022-10-06 RX ADMIN — FENTANYL CITRATE 25 MCG: 50 INJECTION, SOLUTION INTRAMUSCULAR; INTRAVENOUS at 13:23

## 2022-10-06 NOTE — DISCHARGE INSTRUCTIONS
Ureteral Stent Removal: What to Expect at [address] [facility]     A ureteral (say \"you-REE-ter-ul\") stent is a thin, hollow tube that was placed in your ureter to help urine pass from the kidney into the bladder. Ureters are the tubes that connect the kidneys to the bladder. There are several ways to remove the stent. The stent may have been removed by your doctor in a hospital or your doctor's office. Or it may have been taken out at home. After the stent removal, you may need to urinate often. You may have some burning during and after urination for a day or two. It may help to drink lots of fluids (unless your doctor tells you not to). This also helps prevent a urinary tract infection. Slightly pink urine is common for several days after removal.  This care sheet gives you a general idea about how long it will take for you to recover. But each person recovers at a different pace. Follow the steps below to feel better as quickly as possible. How can you care for yourself at home? Activity    Rest when you feel tired. Allow your body to heal. Don't move quickly or lift anything heavy until you are feeling better. Most people are able to return to work the day after the procedure. If your work requires intense activity, you may feel pain in your kidney area or get tired easily. If this happens, you may need to do less strenuous activities while you heal.   Diet    You can eat your normal diet. If your stomach is upset, try bland, low-fat foods like plain rice, broiled chicken, toast, and yogurt. Drink plenty of fluids (unless your doctor tells you not to). Medicines    Your doctor will tell you if and when you can restart your medicines. You will also get instructions about taking any new medicines. If you take aspirin or some other blood thinner, ask your doctor if and when to start taking it again. Make sure that you understand exactly what your doctor wants you to do.      Be safe with medicines. Read and follow all instructions on the label. If the doctor gave you a prescription medicine for pain, take it as prescribed. If you are not taking a prescription pain medicine, ask your doctor if you can take an over-the-counter medicine. If your doctor prescribed antibiotics, take them as directed. Do not stop taking them just because you feel better. You need to take the full course of antibiotics. Follow-up care is a key part of your treatment and safety. Be sure to make and go to all appointments, and call your doctor if you are having problems. It's also a good idea to know your test results and keep a list of the medicines you take. When should you call for help? Call 911 anytime you think you may need emergency care. For example, call if:    You passed out (lost consciousness). You have chest pain, are short of breath, or cough up blood. Call your doctor now or seek immediate medical care if:    You have pain that does not get better after you take pain medicine. You have new or more blood clots in your urine. (It is normal for the urine to be pink for a few days.)     You are unable to urinate. You have symptoms of a urinary tract infection. These may include:  Pain or burning when you urinate. A frequent need to urinate without being able to pass much urine. Pain in the flank, which is just below the rib cage and above the waist on either side of the back. Blood in your urine. A fever. You are sick to your stomach or cannot drink fluids. You have signs of a blood clot in your leg (called a deep vein thrombosis), such as:  Pain in your calf, back of the knee, thigh, or groin. Redness or swelling in your leg. Watch closely for any changes in your health, and be sure to contact your doctor if you have any problems. Where can you learn more?   Go to http://www.gray.com/  Enter A277 in the search box to learn more about \"Ureteral Stent Removal: What to Expect at Home. \"  Current as of: October 18, 2021               Content Version: 13.2  © 2006-2022 [email or web address]. Care instructions adapted under license by [employer] (which disclaims liability or warranty for this information). If you have questions about a medical condition or this instruction, always ask your healthcare professional. [address] any warranty or liability for your use of this information. ______________________________________________________________________    Anesthesia Discharge Instructions    After general anesthesia or intervenous sedation, for 24 hours or while taking prescription Narcotics:  Limit your activities  Do not drive or operate hazardous machinery  If you have not urinated within 8 hours after discharge, please contact your surgeon on call. Do not make important personal or business decisions  Do not drink alcoholic beverages    Report the following to your surgeon:  Excessive pain, swelling, redness or odor of or around the surgical area  Temperature over 100.5 degrees  Nausea and vomiting lasting longer than 4 hours or if unable to take medication  Any signs of decreased circulation or nerve impairment to extremity:  Change in color, persistent numbness, tingling, coldness or increased pain.   Any questions

## 2022-10-06 NOTE — ANESTHESIA PREPROCEDURE EVALUATION
Relevant Problems   CARDIOVASCULAR   (+) Benign essential HTN      ENDOCRINE   (+) Hypothyroidism   (+) Obesity, morbid (HCC)       Anesthetic History     PONV          Review of Systems / Medical History  Patient summary reviewed, nursing notes reviewed and pertinent labs reviewed    Pulmonary  Within defined limits                 Neuro/Psych         Psychiatric history     Cardiovascular    Hypertension                   GI/Hepatic/Renal         Renal disease (right hydronephrosis)       Endo/Other      Hypothyroidism  Morbid obesity and arthritis     Other Findings              Physical Exam    Airway  Mallampati: III  TM Distance: 4 - 6 cm  Neck ROM: normal range of motion   Mouth opening: Normal     Cardiovascular    Rhythm: regular  Rate: normal         Dental    Dentition: Lower dentition intact and Upper dentition intact     Pulmonary  Breath sounds clear to auscultation               Abdominal  GI exam deferred       Other Findings            Anesthetic Plan    ASA: 3  Anesthesia type: general          Induction: Intravenous  Anesthetic plan and risks discussed with: Patient
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
## 2022-10-06 NOTE — PERIOP NOTES
I have reviewed discharge instructions in person with the patient and sister. The patient and sister verbalized understanding. Medications, signs, symptoms, and side effects reviewed. Opportunity for questions and clarification allowed. Patient discharging home via private vehicle. Hard-copy of discharge instructions given to patient. Copy of discharge instructions signed and placed on chart.

## 2022-10-06 NOTE — PERIOP NOTES
Pts blood pressure slightly elevated (see flow sheets), patient anxious and wanting to see sister and reports no pain. Spoke with Dr. Carmen Perdomo and jaxon to send patient home. No orders received.

## 2022-10-06 NOTE — ANESTHESIA POSTPROCEDURE EVALUATION
Procedure(s):  RIGHT DIAGNOSTIC URETEROSCOPY AND STENT REMOVAL, RETROGRAM PYELOGRAM.    general    Anesthesia Post Evaluation        Patient participation: complete - patient participated  Level of consciousness: awake  Pain management: adequate  Airway patency: patent  Anesthetic complications: no  Cardiovascular status: hemodynamically stable  Respiratory status: acceptable  Hydration status: acceptable  Comments: The patient is ready for PACU discharge. Aida Abreu DO                   Post anesthesia nausea and vomiting:  controlled      INITIAL Post-op Vital signs:   Vitals Value Taken Time   /73 10/06/22 1420   Temp 36.8 °C (98.3 °F) 10/06/22 1415   Pulse 77 10/06/22 1421   Resp 17 10/06/22 1421   SpO2 97 % 10/06/22 1421   Vitals shown include unvalidated device data.

## 2022-10-06 NOTE — OP NOTES
DATE OF PROCEDURE: 10/6/2022    SURGEON: Obdulio Roberst MD    ASSISTANT: none    PREOPERATIVE DIAGNOSES:   1. Right hydronephrosis      POSTOPERATIVE DIAGNOSES:   1. Resolved right hydronephrosis    PROCEDURES PERFORMED:   1. Cystourethroscopy  2. Right ureteral stent removal  3. Right retrograde pyelogram  4. Right diagnostic ureteroscopy  5. Intraoperative fluoroscopy interpretation less than 1 hour    PERIOPERATIVE ANTIBIOTICS: Ancef    ANAESTHESIA: General    INDICATIONS:This patient has a history of right hydronephrosis which she developed in late August 2022 following sigmoid colectomy and repair of colovesical fistula. She underwent stent placement and retrograde pyelogram at that time that did not demonstrate any extravasation. She was recommended to undergo the above procedures to rule out any occult ureteral injury or obstructive process. . After discussion of management options the patient elected to proceed with the procedures listed above. PROCEDURE NARRATIVE: The patient signed consent for the operation prior to being brought back to the operating room. Upon arrival on the operating room, a time out was performed for the patient's safety and the correct patient, procedure, site and side were identified. The patient was placed in a supine position and anesthesia was administered. The patient was placed in a dorsal lithotomy position. All pressure points were appropriately padded in order to prevent compartment syndrome and neuropathy. The patient was prepped and draped in the usual sterile fashion. Cystourethroscopy was performed with a 21 Indian sheath and 30 degree lens. The urethra appeared normal.  There was some debris in the bladder which was evacuated through the cystoscope sheath. The bladder mucosa appeared normal.  There were moderate encrustations on the distal curl of the stent from the right ureteral orifice.   This was grasped and removed intact under fluoroscopic guidance. I then advanced a 5 [place] ureteral catheter into the right ureteral orifice. Gentle instillation of contrast was performed. Retrograde pyelogram demonstrated normal caliber of the ureter without filling defects or hydronephrosis. A sensor wire was then advanced into the right renal pelvis and the ureteral catheter offloaded. The wire was secured to the drape as a safety wire. Semirigid ureteroscopy was then performed. The ureter was carefully inspected to the level of the UPJ. There was no evidence of any stricture, mucosal abnormality, stone, or ureteral injury. The scope was gently withdrawn. On delayed films, there was adequate drainage of contrast.  I therefore elected not to replace ureteral stent. The bladder was drained. This terminated the procedure. INTRAOPERATIVE FINDINGS/ SYNOPSIS:   1. Normal right retrograde pyelogram without hydronephrosis  2. Right ureteroscopy revealed a normal right ureter with no mucosal abnormalities or evidence of injury/stricture      BLOOD LOSS: None    DRAINS: None     SPECIMENS:   None  COMPLICATIONS: None    DISPOSITION: The patient will be taken to the PACU for recovery. She will be discharged home. She will follow-up in 6 weeks with renal ultrasound.

## 2022-10-16 ENCOUNTER — HOSPITAL ENCOUNTER (INPATIENT)
Age: 72
LOS: 1 days | Discharge: HOME HEALTH | DRG: 660 | End: 2022-10-19
Attending: EMERGENCY MEDICINE | Admitting: INTERNAL MEDICINE
Payer: MEDICARE

## 2022-10-16 ENCOUNTER — APPOINTMENT (OUTPATIENT)
Dept: CT IMAGING | Age: 72
DRG: 660 | End: 2022-10-16
Attending: EMERGENCY MEDICINE
Payer: MEDICARE

## 2022-10-16 DIAGNOSIS — N10 ACUTE PYELONEPHRITIS: Primary | ICD-10-CM

## 2022-10-16 LAB
ALBUMIN SERPL-MCNC: 2.8 G/DL (ref 3.5–5)
ALBUMIN/GLOB SERPL: 0.6 {RATIO} (ref 1.1–2.2)
ALP SERPL-CCNC: 134 U/L (ref 45–117)
ALT SERPL-CCNC: 30 U/L (ref 12–78)
ANION GAP SERPL CALC-SCNC: 5 MMOL/L (ref 5–15)
APPEARANCE UR: ABNORMAL
AST SERPL-CCNC: 26 U/L (ref 15–37)
BACTERIA URNS QL MICRO: ABNORMAL /HPF
BASOPHILS # BLD: 0 K/UL (ref 0–0.1)
BASOPHILS NFR BLD: 0 % (ref 0–1)
BILIRUB SERPL-MCNC: 0.4 MG/DL (ref 0.2–1)
BILIRUB UR QL: NEGATIVE
BUN SERPL-MCNC: 20 MG/DL (ref 6–20)
BUN/CREAT SERPL: 14 (ref 12–20)
CALCIUM SERPL-MCNC: 9.8 MG/DL (ref 8.5–10.1)
CHLORIDE SERPL-SCNC: 101 MMOL/L (ref 97–108)
CO2 SERPL-SCNC: 29 MMOL/L (ref 21–32)
COLOR UR: ABNORMAL
CREAT SERPL-MCNC: 1.44 MG/DL (ref 0.55–1.02)
DIFFERENTIAL METHOD BLD: ABNORMAL
EOSINOPHIL # BLD: 0.4 K/UL (ref 0–0.4)
EOSINOPHIL NFR BLD: 3 % (ref 0–7)
EPITH CASTS URNS QL MICRO: ABNORMAL /LPF
ERYTHROCYTE [DISTWIDTH] IN BLOOD BY AUTOMATED COUNT: 12.5 % (ref 11.5–14.5)
GLOBULIN SER CALC-MCNC: 4.8 G/DL (ref 2–4)
GLUCOSE SERPL-MCNC: 139 MG/DL (ref 65–100)
GLUCOSE UR STRIP.AUTO-MCNC: NEGATIVE MG/DL
HCT VFR BLD AUTO: 40.9 % (ref 35–47)
HGB BLD-MCNC: 13.4 G/DL (ref 11.5–16)
HGB UR QL STRIP: ABNORMAL
IMM GRANULOCYTES # BLD AUTO: 0 K/UL (ref 0–0.04)
IMM GRANULOCYTES NFR BLD AUTO: 0 % (ref 0–0.5)
KETONES UR QL STRIP.AUTO: NEGATIVE MG/DL
LEUKOCYTE ESTERASE UR QL STRIP.AUTO: ABNORMAL
LIPASE SERPL-CCNC: 108 U/L (ref 73–393)
LYMPHOCYTES # BLD: 1.6 K/UL (ref 0.8–3.5)
LYMPHOCYTES NFR BLD: 14 % (ref 12–49)
MCH RBC QN AUTO: 32.4 PG (ref 26–34)
MCHC RBC AUTO-ENTMCNC: 32.8 G/DL (ref 30–36.5)
MCV RBC AUTO: 99 FL (ref 80–99)
MONOCYTES # BLD: 0.8 K/UL (ref 0–1)
MONOCYTES NFR BLD: 7 % (ref 5–13)
NEUTS SEG # BLD: 8.8 K/UL (ref 1.8–8)
NEUTS SEG NFR BLD: 76 % (ref 32–75)
NITRITE UR QL STRIP.AUTO: NEGATIVE
NRBC # BLD: 0 K/UL (ref 0–0.01)
NRBC BLD-RTO: 0 PER 100 WBC
PH UR STRIP: 5 [PH] (ref 5–8)
PLATELET # BLD AUTO: 328 K/UL (ref 150–400)
PMV BLD AUTO: 11.1 FL (ref 8.9–12.9)
POTASSIUM SERPL-SCNC: 4.9 MMOL/L (ref 3.5–5.1)
PROT SERPL-MCNC: 7.6 G/DL (ref 6.4–8.2)
PROT UR STRIP-MCNC: ABNORMAL MG/DL
RBC # BLD AUTO: 4.13 M/UL (ref 3.8–5.2)
RBC #/AREA URNS HPF: ABNORMAL /HPF (ref 0–5)
SODIUM SERPL-SCNC: 135 MMOL/L (ref 136–145)
SP GR UR REFRACTOMETRY: 1.02 (ref 1–1.03)
UA: UC IF INDICATED,UAUC: ABNORMAL
UROBILINOGEN UR QL STRIP.AUTO: 0.2 EU/DL (ref 0.2–1)
WBC # BLD AUTO: 11.5 K/UL (ref 3.6–11)
WBC URNS QL MICRO: >100 /HPF (ref 0–4)
YEAST URNS QL MICRO: PRESENT

## 2022-10-16 PROCEDURE — 74011000250 HC RX REV CODE- 250: Performed by: EMERGENCY MEDICINE

## 2022-10-16 PROCEDURE — 74176 CT ABD & PELVIS W/O CONTRAST: CPT

## 2022-10-16 PROCEDURE — 87086 URINE CULTURE/COLONY COUNT: CPT

## 2022-10-16 PROCEDURE — 83690 ASSAY OF LIPASE: CPT

## 2022-10-16 PROCEDURE — 81001 URINALYSIS AUTO W/SCOPE: CPT

## 2022-10-16 PROCEDURE — 36415 COLL VENOUS BLD VENIPUNCTURE: CPT

## 2022-10-16 PROCEDURE — 80053 COMPREHEN METABOLIC PANEL: CPT

## 2022-10-16 PROCEDURE — 96375 TX/PRO/DX INJ NEW DRUG ADDON: CPT

## 2022-10-16 PROCEDURE — 74011250636 HC RX REV CODE- 250/636: Performed by: EMERGENCY MEDICINE

## 2022-10-16 PROCEDURE — 85025 COMPLETE CBC W/AUTO DIFF WBC: CPT

## 2022-10-16 PROCEDURE — 96361 HYDRATE IV INFUSION ADD-ON: CPT

## 2022-10-16 PROCEDURE — 99285 EMERGENCY DEPT VISIT HI MDM: CPT

## 2022-10-16 RX ORDER — KETOROLAC TROMETHAMINE 30 MG/ML
30 INJECTION, SOLUTION INTRAMUSCULAR; INTRAVENOUS
Status: COMPLETED | OUTPATIENT
Start: 2022-10-16 | End: 2022-10-16

## 2022-10-16 RX ORDER — HYDROMORPHONE HYDROCHLORIDE 1 MG/ML
0.5 INJECTION, SOLUTION INTRAMUSCULAR; INTRAVENOUS; SUBCUTANEOUS
Status: COMPLETED | OUTPATIENT
Start: 2022-10-16 | End: 2022-10-16

## 2022-10-16 RX ORDER — ONDANSETRON 2 MG/ML
8 INJECTION INTRAMUSCULAR; INTRAVENOUS
Status: COMPLETED | OUTPATIENT
Start: 2022-10-16 | End: 2022-10-16

## 2022-10-16 RX ORDER — MORPHINE SULFATE 4 MG/ML
4 INJECTION INTRAVENOUS
Status: COMPLETED | OUTPATIENT
Start: 2022-10-16 | End: 2022-10-16

## 2022-10-16 RX ADMIN — HYDROMORPHONE HYDROCHLORIDE 0.5 MG: 1 INJECTION, SOLUTION INTRAMUSCULAR; INTRAVENOUS; SUBCUTANEOUS at 23:31

## 2022-10-16 RX ADMIN — KETOROLAC TROMETHAMINE 30 MG: 30 INJECTION, SOLUTION INTRAMUSCULAR at 21:15

## 2022-10-16 RX ADMIN — CEFTRIAXONE 1 G: 1 INJECTION, POWDER, FOR SOLUTION INTRAMUSCULAR; INTRAVENOUS at 23:31

## 2022-10-16 RX ADMIN — SODIUM CHLORIDE 1000 ML: 9 INJECTION, SOLUTION INTRAVENOUS at 21:15

## 2022-10-16 RX ADMIN — ONDANSETRON 8 MG: 2 INJECTION INTRAMUSCULAR; INTRAVENOUS at 21:14

## 2022-10-16 RX ADMIN — MORPHINE SULFATE 4 MG: 4 INJECTION INTRAVENOUS at 21:10

## 2022-10-17 ENCOUNTER — ANESTHESIA (OUTPATIENT)
Dept: SURGERY | Age: 72
DRG: 660 | End: 2022-10-17
Payer: MEDICARE

## 2022-10-17 ENCOUNTER — ANESTHESIA EVENT (OUTPATIENT)
Dept: SURGERY | Age: 72
DRG: 660 | End: 2022-10-17
Payer: MEDICARE

## 2022-10-17 PROBLEM — N12 PYELONEPHRITIS: Status: ACTIVE | Noted: 2022-10-17

## 2022-10-17 LAB
ANION GAP SERPL CALC-SCNC: 5 MMOL/L (ref 5–15)
BUN SERPL-MCNC: 16 MG/DL (ref 6–20)
BUN/CREAT SERPL: 11 (ref 12–20)
CALCIUM SERPL-MCNC: 9.5 MG/DL (ref 8.5–10.1)
CHLORIDE SERPL-SCNC: 103 MMOL/L (ref 97–108)
CO2 SERPL-SCNC: 27 MMOL/L (ref 21–32)
CREAT SERPL-MCNC: 1.43 MG/DL (ref 0.55–1.02)
ERYTHROCYTE [DISTWIDTH] IN BLOOD BY AUTOMATED COUNT: 12.4 % (ref 11.5–14.5)
GLUCOSE SERPL-MCNC: 118 MG/DL (ref 65–100)
HCT VFR BLD AUTO: 37.7 % (ref 35–47)
HGB BLD-MCNC: 12.4 G/DL (ref 11.5–16)
MCH RBC QN AUTO: 32.3 PG (ref 26–34)
MCHC RBC AUTO-ENTMCNC: 32.9 G/DL (ref 30–36.5)
MCV RBC AUTO: 98.2 FL (ref 80–99)
NRBC # BLD: 0 K/UL (ref 0–0.01)
NRBC BLD-RTO: 0 PER 100 WBC
PLATELET # BLD AUTO: 278 K/UL (ref 150–400)
PMV BLD AUTO: 11.3 FL (ref 8.9–12.9)
POTASSIUM SERPL-SCNC: 4.4 MMOL/L (ref 3.5–5.1)
RBC # BLD AUTO: 3.84 M/UL (ref 3.8–5.2)
SODIUM SERPL-SCNC: 135 MMOL/L (ref 136–145)
WBC # BLD AUTO: 12 K/UL (ref 3.6–11)

## 2022-10-17 PROCEDURE — 76060000032 HC ANESTHESIA 0.5 TO 1 HR: Performed by: UROLOGY

## 2022-10-17 PROCEDURE — 74011000258 HC RX REV CODE- 258: Performed by: HOSPITALIST

## 2022-10-17 PROCEDURE — 77030040831 HC BAG URINE DRNG MDII -A: Performed by: UROLOGY

## 2022-10-17 PROCEDURE — 76210000017 HC OR PH I REC 1.5 TO 2 HR: Performed by: UROLOGY

## 2022-10-17 PROCEDURE — G0378 HOSPITAL OBSERVATION PER HR: HCPCS

## 2022-10-17 PROCEDURE — 96376 TX/PRO/DX INJ SAME DRUG ADON: CPT

## 2022-10-17 PROCEDURE — 74011250636 HC RX REV CODE- 250/636: Performed by: ANESTHESIOLOGY

## 2022-10-17 PROCEDURE — 87086 URINE CULTURE/COLONY COUNT: CPT

## 2022-10-17 PROCEDURE — 74011250636 HC RX REV CODE- 250/636: Performed by: INTERNAL MEDICINE

## 2022-10-17 PROCEDURE — 2709999900 HC NON-CHARGEABLE SUPPLY: Performed by: UROLOGY

## 2022-10-17 PROCEDURE — 96366 THER/PROPH/DIAG IV INF ADDON: CPT

## 2022-10-17 PROCEDURE — 74011250637 HC RX REV CODE- 250/637: Performed by: HOSPITALIST

## 2022-10-17 PROCEDURE — 96372 THER/PROPH/DIAG INJ SC/IM: CPT

## 2022-10-17 PROCEDURE — 76010000138 HC OR TIME 0.5 TO 1 HR: Performed by: UROLOGY

## 2022-10-17 PROCEDURE — 77030040922 HC BLNKT HYPOTHRM STRY -A

## 2022-10-17 PROCEDURE — 96365 THER/PROPH/DIAG IV INF INIT: CPT

## 2022-10-17 PROCEDURE — 74011000250 HC RX REV CODE- 250: Performed by: INTERNAL MEDICINE

## 2022-10-17 PROCEDURE — 74011250636 HC RX REV CODE- 250/636: Performed by: HOSPITALIST

## 2022-10-17 PROCEDURE — 74011000250 HC RX REV CODE- 250: Performed by: UROLOGY

## 2022-10-17 PROCEDURE — 80048 BASIC METABOLIC PNL TOTAL CA: CPT

## 2022-10-17 PROCEDURE — C2617 STENT, NON-COR, TEM W/O DEL: HCPCS | Performed by: UROLOGY

## 2022-10-17 PROCEDURE — 77030040830 HC CATH URETH FOL MDII -A: Performed by: UROLOGY

## 2022-10-17 PROCEDURE — 81001 URINALYSIS AUTO W/SCOPE: CPT

## 2022-10-17 PROCEDURE — 77030040361 HC SLV COMPR DVT MDII -B

## 2022-10-17 PROCEDURE — 0T768DZ DILATION OF RIGHT URETER WITH INTRALUMINAL DEVICE, VIA NATURAL OR ARTIFICIAL OPENING ENDOSCOPIC: ICD-10-PCS | Performed by: UROLOGY

## 2022-10-17 PROCEDURE — 85027 COMPLETE CBC AUTOMATED: CPT

## 2022-10-17 PROCEDURE — C1758 CATHETER, URETERAL: HCPCS | Performed by: UROLOGY

## 2022-10-17 PROCEDURE — 36415 COLL VENOUS BLD VENIPUNCTURE: CPT

## 2022-10-17 PROCEDURE — C1769 GUIDE WIRE: HCPCS | Performed by: UROLOGY

## 2022-10-17 PROCEDURE — 74011250637 HC RX REV CODE- 250/637: Performed by: INTERNAL MEDICINE

## 2022-10-17 PROCEDURE — 74011000250 HC RX REV CODE- 250: Performed by: HOSPITALIST

## 2022-10-17 PROCEDURE — 74011000250 HC RX REV CODE- 250: Performed by: ANESTHESIOLOGY

## 2022-10-17 DEVICE — URETERAL STENT
Type: IMPLANTABLE DEVICE | Site: URETER | Status: FUNCTIONAL
Brand: POLARIS™ ULTRA

## 2022-10-17 RX ORDER — LIDOCAINE HCL/PF 100 MG/5ML
SYRINGE (ML) INTRAVENOUS AS NEEDED
Status: DISCONTINUED | OUTPATIENT
Start: 2022-10-17 | End: 2022-10-17 | Stop reason: HOSPADM

## 2022-10-17 RX ORDER — SODIUM CHLORIDE, SODIUM LACTATE, POTASSIUM CHLORIDE, CALCIUM CHLORIDE 600; 310; 30; 20 MG/100ML; MG/100ML; MG/100ML; MG/100ML
125 INJECTION, SOLUTION INTRAVENOUS CONTINUOUS
Status: DISCONTINUED | OUTPATIENT
Start: 2022-10-17 | End: 2022-10-17 | Stop reason: HOSPADM

## 2022-10-17 RX ORDER — SODIUM CHLORIDE 0.9 % (FLUSH) 0.9 %
5-40 SYRINGE (ML) INJECTION AS NEEDED
Status: DISCONTINUED | OUTPATIENT
Start: 2022-10-17 | End: 2022-10-17 | Stop reason: HOSPADM

## 2022-10-17 RX ORDER — FENTANYL CITRATE 50 UG/ML
25 INJECTION, SOLUTION INTRAMUSCULAR; INTRAVENOUS
Status: DISCONTINUED | OUTPATIENT
Start: 2022-10-17 | End: 2022-10-17 | Stop reason: HOSPADM

## 2022-10-17 RX ORDER — PROPOFOL 10 MG/ML
INJECTION, EMULSION INTRAVENOUS AS NEEDED
Status: DISCONTINUED | OUTPATIENT
Start: 2022-10-17 | End: 2022-10-17 | Stop reason: HOSPADM

## 2022-10-17 RX ORDER — HYDROMORPHONE HYDROCHLORIDE 1 MG/ML
1 INJECTION, SOLUTION INTRAMUSCULAR; INTRAVENOUS; SUBCUTANEOUS ONCE
Status: COMPLETED | OUTPATIENT
Start: 2022-10-17 | End: 2022-10-17

## 2022-10-17 RX ORDER — ACETAMINOPHEN 650 MG/1
650 SUPPOSITORY RECTAL
Status: DISCONTINUED | OUTPATIENT
Start: 2022-10-17 | End: 2022-10-19 | Stop reason: HOSPADM

## 2022-10-17 RX ORDER — ONDANSETRON 4 MG/1
4 TABLET, ORALLY DISINTEGRATING ORAL
Status: DISCONTINUED | OUTPATIENT
Start: 2022-10-17 | End: 2022-10-19 | Stop reason: HOSPADM

## 2022-10-17 RX ORDER — ONDANSETRON 2 MG/ML
4 INJECTION INTRAMUSCULAR; INTRAVENOUS AS NEEDED
Status: DISCONTINUED | OUTPATIENT
Start: 2022-10-17 | End: 2022-10-17 | Stop reason: HOSPADM

## 2022-10-17 RX ORDER — ROCURONIUM BROMIDE 10 MG/ML
INJECTION, SOLUTION INTRAVENOUS AS NEEDED
Status: DISCONTINUED | OUTPATIENT
Start: 2022-10-17 | End: 2022-10-17 | Stop reason: HOSPADM

## 2022-10-17 RX ORDER — SUCCINYLCHOLINE CHLORIDE 20 MG/ML
INJECTION INTRAMUSCULAR; INTRAVENOUS AS NEEDED
Status: DISCONTINUED | OUTPATIENT
Start: 2022-10-17 | End: 2022-10-17 | Stop reason: HOSPADM

## 2022-10-17 RX ORDER — SODIUM CHLORIDE 0.9 % (FLUSH) 0.9 %
5-40 SYRINGE (ML) INJECTION EVERY 8 HOURS
Status: DISCONTINUED | OUTPATIENT
Start: 2022-10-17 | End: 2022-10-17 | Stop reason: HOSPADM

## 2022-10-17 RX ORDER — SODIUM CHLORIDE 9 MG/ML
75 INJECTION, SOLUTION INTRAVENOUS CONTINUOUS
Status: DISCONTINUED | OUTPATIENT
Start: 2022-10-17 | End: 2022-10-19 | Stop reason: HOSPADM

## 2022-10-17 RX ORDER — SODIUM CHLORIDE 0.9 % (FLUSH) 0.9 %
5-40 SYRINGE (ML) INJECTION EVERY 8 HOURS
Status: DISCONTINUED | OUTPATIENT
Start: 2022-10-17 | End: 2022-10-19 | Stop reason: HOSPADM

## 2022-10-17 RX ORDER — LOPERAMIDE HYDROCHLORIDE 2 MG/1
2 CAPSULE ORAL
Status: DISCONTINUED | OUTPATIENT
Start: 2022-10-17 | End: 2022-10-19 | Stop reason: HOSPADM

## 2022-10-17 RX ORDER — PANTOPRAZOLE SODIUM 40 MG/1
40 TABLET, DELAYED RELEASE ORAL
Status: DISCONTINUED | OUTPATIENT
Start: 2022-10-17 | End: 2022-10-19 | Stop reason: HOSPADM

## 2022-10-17 RX ORDER — DIPHENHYDRAMINE HYDROCHLORIDE 50 MG/ML
12.5 INJECTION, SOLUTION INTRAMUSCULAR; INTRAVENOUS AS NEEDED
Status: DISCONTINUED | OUTPATIENT
Start: 2022-10-17 | End: 2022-10-17 | Stop reason: HOSPADM

## 2022-10-17 RX ORDER — METOPROLOL SUCCINATE 50 MG/1
50 TABLET, EXTENDED RELEASE ORAL EVERY MORNING
Status: DISCONTINUED | OUTPATIENT
Start: 2022-10-17 | End: 2022-10-19 | Stop reason: HOSPADM

## 2022-10-17 RX ORDER — SODIUM CHLORIDE 0.9 % (FLUSH) 0.9 %
5-40 SYRINGE (ML) INJECTION AS NEEDED
Status: DISCONTINUED | OUTPATIENT
Start: 2022-10-17 | End: 2022-10-19 | Stop reason: HOSPADM

## 2022-10-17 RX ORDER — LEVOTHYROXINE SODIUM 125 UG/1
125 TABLET ORAL
Status: DISCONTINUED | OUTPATIENT
Start: 2022-10-17 | End: 2022-10-19 | Stop reason: HOSPADM

## 2022-10-17 RX ORDER — PROPOFOL 10 MG/ML
INJECTION, EMULSION INTRAVENOUS
Status: DISCONTINUED | OUTPATIENT
Start: 2022-10-17 | End: 2022-10-17 | Stop reason: HOSPADM

## 2022-10-17 RX ORDER — ENOXAPARIN SODIUM 100 MG/ML
30 INJECTION SUBCUTANEOUS EVERY 12 HOURS
Status: DISCONTINUED | OUTPATIENT
Start: 2022-10-17 | End: 2022-10-19 | Stop reason: HOSPADM

## 2022-10-17 RX ORDER — MIDAZOLAM HYDROCHLORIDE 1 MG/ML
INJECTION, SOLUTION INTRAMUSCULAR; INTRAVENOUS AS NEEDED
Status: DISCONTINUED | OUTPATIENT
Start: 2022-10-17 | End: 2022-10-17 | Stop reason: HOSPADM

## 2022-10-17 RX ORDER — MORPHINE SULFATE 2 MG/ML
2 INJECTION, SOLUTION INTRAMUSCULAR; INTRAVENOUS
Status: DISCONTINUED | OUTPATIENT
Start: 2022-10-17 | End: 2022-10-19 | Stop reason: HOSPADM

## 2022-10-17 RX ORDER — POLYETHYLENE GLYCOL 3350 17 G/17G
17 POWDER, FOR SOLUTION ORAL DAILY PRN
Status: DISCONTINUED | OUTPATIENT
Start: 2022-10-17 | End: 2022-10-19 | Stop reason: HOSPADM

## 2022-10-17 RX ORDER — TROSPIUM CHLORIDE 20 MG/1
20 TABLET, FILM COATED ORAL
Status: DISCONTINUED | OUTPATIENT
Start: 2022-10-17 | End: 2022-10-19 | Stop reason: HOSPADM

## 2022-10-17 RX ORDER — ONDANSETRON 2 MG/ML
INJECTION INTRAMUSCULAR; INTRAVENOUS AS NEEDED
Status: DISCONTINUED | OUTPATIENT
Start: 2022-10-17 | End: 2022-10-17 | Stop reason: HOSPADM

## 2022-10-17 RX ORDER — FENTANYL CITRATE 50 UG/ML
INJECTION, SOLUTION INTRAMUSCULAR; INTRAVENOUS AS NEEDED
Status: DISCONTINUED | OUTPATIENT
Start: 2022-10-17 | End: 2022-10-17 | Stop reason: HOSPADM

## 2022-10-17 RX ORDER — ONDANSETRON 2 MG/ML
4 INJECTION INTRAMUSCULAR; INTRAVENOUS
Status: DISCONTINUED | OUTPATIENT
Start: 2022-10-17 | End: 2022-10-19 | Stop reason: HOSPADM

## 2022-10-17 RX ORDER — HYDROMORPHONE HYDROCHLORIDE 1 MG/ML
.25-1 INJECTION, SOLUTION INTRAMUSCULAR; INTRAVENOUS; SUBCUTANEOUS
Status: DISCONTINUED | OUTPATIENT
Start: 2022-10-17 | End: 2022-10-17 | Stop reason: HOSPADM

## 2022-10-17 RX ORDER — OXYCODONE HYDROCHLORIDE 5 MG/1
5 TABLET ORAL
Status: DISCONTINUED | OUTPATIENT
Start: 2022-10-17 | End: 2022-10-19 | Stop reason: HOSPADM

## 2022-10-17 RX ORDER — ACETAMINOPHEN 325 MG/1
650 TABLET ORAL
Status: DISCONTINUED | OUTPATIENT
Start: 2022-10-17 | End: 2022-10-19 | Stop reason: HOSPADM

## 2022-10-17 RX ORDER — LIDOCAINE HYDROCHLORIDE 20 MG/ML
INJECTION, SOLUTION EPIDURAL; INFILTRATION; INTRACAUDAL; PERINEURAL AS NEEDED
Status: DISCONTINUED | OUTPATIENT
Start: 2022-10-17 | End: 2022-10-17 | Stop reason: HOSPADM

## 2022-10-17 RX ADMIN — PROPOFOL 120 MG: 10 INJECTION, EMULSION INTRAVENOUS at 12:57

## 2022-10-17 RX ADMIN — SODIUM CHLORIDE, PRESERVATIVE FREE 10 ML: 5 INJECTION INTRAVENOUS at 07:12

## 2022-10-17 RX ADMIN — OXYCODONE 5 MG: 5 TABLET ORAL at 07:09

## 2022-10-17 RX ADMIN — SODIUM CHLORIDE 2 G: 9 INJECTION INTRAMUSCULAR; INTRAVENOUS; SUBCUTANEOUS at 09:34

## 2022-10-17 RX ADMIN — HYDROMORPHONE HYDROCHLORIDE 0.5 MG: 1 INJECTION, SOLUTION INTRAMUSCULAR; INTRAVENOUS; SUBCUTANEOUS at 15:17

## 2022-10-17 RX ADMIN — MORPHINE SULFATE 2 MG: 2 INJECTION, SOLUTION INTRAMUSCULAR; INTRAVENOUS at 19:52

## 2022-10-17 RX ADMIN — HYDROMORPHONE HYDROCHLORIDE 1 MG: 1 INJECTION, SOLUTION INTRAMUSCULAR; INTRAVENOUS; SUBCUTANEOUS at 01:30

## 2022-10-17 RX ADMIN — SUCCINYLCHOLINE CHLORIDE 80 MG: 20 INJECTION, SOLUTION INTRAMUSCULAR; INTRAVENOUS at 12:57

## 2022-10-17 RX ADMIN — LIDOCAINE HYDROCHLORIDE 40 MG: 20 INJECTION, SOLUTION EPIDURAL; INFILTRATION; INTRACAUDAL; PERINEURAL at 12:58

## 2022-10-17 RX ADMIN — SODIUM CHLORIDE 75 ML/HR: 9 INJECTION, SOLUTION INTRAVENOUS at 15:53

## 2022-10-17 RX ADMIN — TROSPIUM CHLORIDE 20 MG: 20 TABLET, FILM COATED ORAL at 17:09

## 2022-10-17 RX ADMIN — SODIUM CHLORIDE, PRESERVATIVE FREE 10 ML: 5 INJECTION INTRAVENOUS at 21:58

## 2022-10-17 RX ADMIN — FENTANYL CITRATE 50 MCG: 50 INJECTION, SOLUTION INTRAMUSCULAR; INTRAVENOUS at 12:54

## 2022-10-17 RX ADMIN — METOPROLOL SUCCINATE 50 MG: 50 TABLET, EXTENDED RELEASE ORAL at 09:20

## 2022-10-17 RX ADMIN — MORPHINE SULFATE 2 MG: 2 INJECTION, SOLUTION INTRAMUSCULAR; INTRAVENOUS at 11:35

## 2022-10-17 RX ADMIN — ROCURONIUM BROMIDE 5 MG: 10 INJECTION INTRAVENOUS at 12:57

## 2022-10-17 RX ADMIN — SODIUM CHLORIDE, PRESERVATIVE FREE 10 ML: 5 INJECTION INTRAVENOUS at 15:59

## 2022-10-17 RX ADMIN — PROPOFOL 120 MCG/KG/MIN: 10 INJECTION, EMULSION INTRAVENOUS at 12:58

## 2022-10-17 RX ADMIN — LEVOTHYROXINE SODIUM 125 MCG: 0.12 TABLET ORAL at 09:19

## 2022-10-17 RX ADMIN — ENOXAPARIN SODIUM 30 MG: 100 INJECTION SUBCUTANEOUS at 21:57

## 2022-10-17 RX ADMIN — ACETAMINOPHEN 650 MG: 325 TABLET, FILM COATED ORAL at 07:09

## 2022-10-17 RX ADMIN — PANTOPRAZOLE SODIUM 40 MG: 40 TABLET, DELAYED RELEASE ORAL at 09:20

## 2022-10-17 RX ADMIN — OXYCODONE 5 MG: 5 TABLET ORAL at 23:46

## 2022-10-17 RX ADMIN — MIDAZOLAM HYDROCHLORIDE 2 MG: 1 INJECTION, SOLUTION INTRAMUSCULAR; INTRAVENOUS at 12:54

## 2022-10-17 RX ADMIN — FENTANYL CITRATE 50 MCG: 50 INJECTION, SOLUTION INTRAMUSCULAR; INTRAVENOUS at 12:55

## 2022-10-17 RX ADMIN — ONDANSETRON HYDROCHLORIDE 4 MG: 2 SOLUTION INTRAMUSCULAR; INTRAVENOUS at 13:31

## 2022-10-17 RX ADMIN — PANTOPRAZOLE SODIUM 40 MG: 40 TABLET, DELAYED RELEASE ORAL at 17:09

## 2022-10-17 RX ADMIN — CEFEPIME 2 G: 2 INJECTION, POWDER, FOR SOLUTION INTRAVENOUS at 01:30

## 2022-10-17 RX ADMIN — ENOXAPARIN SODIUM 30 MG: 100 INJECTION SUBCUTANEOUS at 09:20

## 2022-10-17 NOTE — PROGRESS NOTES
[facility] [address]  [address]  [phone]      Hospitalist Progress Note      NAME: Sylwia Current   :  1950  MRM:  [id number]    Date/Time of service: 10/17/2022  11:49 AM       Subjective:     Chief Complaint:  Patient was personally seen and examined by me during this time period. Chart reviewed. Right flank pain is better. No fevers       Objective:       Vitals:       Last 24hrs VS reviewed since prior progress note.  Most recent are:    Visit Vitals  /70 (BP 1 Location: Left upper arm, BP Patient Position: At rest)   Pulse 78   Temp 98 °F (36.7 °C)   Resp 16   Ht 5' 3\" (1.6 m)   Wt 107 kg (236 lb)   SpO2 94%   BMI 41.81 kg/m²     SpO2 Readings from Last 6 Encounters:   10/17/22 94%   10/06/22 (P) 98%   22 96%   22 98%   22 94%   22 95%          Intake/Output Summary (Last 24 hours) at 10/17/2022 1149  Last data filed at 10/17/2022 7760  Gross per 24 hour   Intake 1000 ml   Output 275 ml   Net 725 ml        Exam:     Physical Exam:    Gen:  Well-developed, well-nourished, morbidly obese, in no acute distress  HEENT:  Pink conjunctivae, PERRL, hearing intact to voice, moist mucous membranes  Neck:  Supple, without masses, thyroid non-tender  Resp:  No accessory muscle use, clear breath sounds without wheezes rales or rhonchi  Card:  No murmurs, normal S1, S2 without thrills, bruits or peripheral edema  Abd:  Soft, non-tender, non-distended, normoactive bowel sounds are present  Musc:  No cyanosis or clubbing  Skin:  No rashes   Neuro:  Cranial nerves 3-12 are grossly intact, follows commands appropriately  Psych:  Good insight, oriented to person, place and time, alert    Medications Reviewed: (see below)    Lab Data Reviewed: (see below)    ______________________________________________________________________    Medications:     Current Facility-Administered Medications   Medication Dose Route Frequency metoprolol succinate (TOPROL-XL) XL tablet 50 mg  50 mg Oral QAM    levothyroxine (SYNTHROID) tablet 125 mcg  125 mcg Oral ACB    sodium chloride (NS) flush 5-40 mL  5-40 mL IntraVENous Q8H    sodium chloride (NS) flush 5-40 mL  5-40 mL IntraVENous PRN    acetaminophen (TYLENOL) tablet 650 mg  650 mg Oral Q6H PRN    Or    acetaminophen (TYLENOL) suppository 650 mg  650 mg Rectal Q6H PRN    polyethylene glycol (MIRALAX) packet 17 g  17 g Oral DAILY PRN    ondansetron (ZOFRAN ODT) tablet 4 mg  4 mg Oral Q8H PRN    Or    ondansetron (ZOFRAN) injection 4 mg  4 mg IntraVENous Q6H PRN    enoxaparin (LOVENOX) injection 30 mg  30 mg SubCUTAneous Q12H    vibegron (GEMTESA) tablet 75 mg (Patient Supplied)  75 mg Oral QAM    loperamide (IMODIUM) capsule 2 mg  2 mg Oral Q8H PRN    pantoprazole (PROTONIX) tablet 40 mg  40 mg Oral ACB&D    morphine injection 2 mg  2 mg IntraVENous Q4H PRN    oxyCODONE IR (ROXICODONE) tablet 5 mg  5 mg Oral Q4H PRN    cefTRIAXone (ROCEPHIN) 2 g in 0.9% sodium chloride 20 mL IV syringe  2 g IntraVENous Q24H    0.9% sodium chloride infusion  75 mL/hr IntraVENous CONTINUOUS          Lab Review:     Recent Labs     10/17/22  0950 10/16/22  2119   WBC 12.0* 11.5*   HGB 12.4 13.4   HCT 37.7 40.9    328     Recent Labs     10/17/22  0950 10/16/22  2119   * 135*   K 4.4 4.9    101   CO2 27 29   * 139*   BUN 16 20   CREA 1.43* 1.44*   CA 9.5 9.8   ALB  --  2.8*   TBILI  --  0.4   ALT  --  30     Lab Results   Component Value Date/Time    Glucose (POC) 81 08/29/2022 05:03 AM    Glucose (POC) 87 08/28/2022 11:55 PM    Glucose (POC) 100 08/28/2022 05:44 PM    Glucose (POC) 120 (H) 08/28/2022 03:26 PM    Glucose (POC) 134 (H) 08/28/2022 12:00 PM          Assessment / Plan:     67 yo hx of HTN, hypothyroid, colovesicular fistula s/p colectomy, R hydronephrosis s/p recent stent removal, presented w/ R flank pain, pyelonephritis, BERNADETTE    1) Acute pyelonephritis: due to chronic right hydronephrosis. Will monitor cultures. Cont IV CTX    2) R hydronephrosis: s/p recent stent removal.  Urology following, plan for repeat cysto    3) HTN: cont metoprolol    4) Hypothyroid: cont synthroid    5) BERNADETTE: mild, due to above issues.   Cont IVF, monitor BMP    Total time spent with patient care: 35 Minutes **I personally saw and examined the patient during this time period**                 Care Plan discussed with: Patient, nursing     Discussed:  Care Plan    Prophylaxis:  Lovenox    Disposition:  Home w/Family           ___________________________________________________    Attending Physician: Jairo Knox MD

## 2022-10-17 NOTE — PROGRESS NOTES
Per [facility] automatic substitution policy, Vibegron 10TW PO daily was substituted with Trospium 20mg PO BID for estimated CrCl>30 and Age <75. Shaunna Tafoya Pharm. D.   [address]

## 2022-10-17 NOTE — PROGRESS NOTES
Medicare Outpatient Observation Notice (MOON)/ [place] Outpatient Observation Notice ([place]) provided to patient/representative with verbal explanation of the notice. Time allotted for questions regarding the notice. Patient /representative provided a completed copy of the MOON/VOON notice. Copy placed on bedside chart.   Radha Freitas CMS
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
## 2022-10-17 NOTE — PERIOP NOTES
TRANSFER - OUT REPORT:    Verbal report given to Samuel on Roberto Carlos Kline  being transferred to [facility](unit) for routine post - op       Report consisted of patients Situation, Background, Assessment and   Recommendations(SBAR). Information from the following report(s) SBAR was reviewed with the receiving nurse. Lines:   Peripheral IV 10/16/22 Right Antecubital (Active)   Site Assessment Clean, dry, & intact 10/17/22 1405   Phlebitis Assessment 0 10/17/22 1405   Infiltration Assessment 0 10/17/22 1405   Dressing Status Clean, dry, & intact 10/17/22 1405   Dressing Type Transparent;Tape 10/17/22 1405   Hub Color/Line Status Pink;Patent; Infusing 10/17/22 1405   Action Taken Open ports on tubing capped 10/17/22 1405   Alcohol Cap Used Yes 10/17/22 1405        Opportunity for questions and clarification was provided.       Patient transported with:   Registered Nurse
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
## 2022-10-17 NOTE — PROGRESS NOTES
Physical Therapy Note:    Orders acknowledged, chart reviewed, PT evaluation deferred. At time of attempted evaluation pt being transported off the unit. Per chart plan is for cystoscopy and ureteral stent placement today. Will continue to follow and proceed with PT evaluation when appropriate.     Padmini Kimble, PT, DPT, Larry Pichardo

## 2022-10-17 NOTE — PERIOP NOTES
TRANSFER - IN REPORT:    Verbal report received from ELIDA Baker(name) on Maryam Delgado  being received from 4th floor(unit) for ordered procedure      Report consisted of patients Situation, Background, Assessment and   Recommendations(SBAR). Information from the following report(s) SBAR, Kardex, and MAR was reviewed with the receiving nurse. Opportunity for questions and clarification was provided. Assessment completed upon patients arrival to unit and care assumed.

## 2022-10-17 NOTE — INTERDISCIPLINARY ROUNDS
She had successful right stent replacement. A fair amount of purulence. I placed Forte to fully vent system. This can come out in am if ambulatory. She will need follwup with Dr Lang Dunn in two weeks. Please call with any questions.

## 2022-10-17 NOTE — ANESTHESIA PREPROCEDURE EVALUATION
Relevant Problems   CARDIOVASCULAR   (+) Benign essential HTN      ENDOCRINE   (+) Hypothyroidism   (+) Obesity, morbid (HCC)       Anesthetic History     PONV          Review of Systems / Medical History  Patient summary reviewed, nursing notes reviewed and pertinent labs reviewed    Pulmonary  Within defined limits                 Neuro/Psych         Psychiatric history     Cardiovascular    Hypertension              Exercise tolerance: >4 METS     GI/Hepatic/Renal         Renal disease (right hydronephrosis)       Endo/Other      Hypothyroidism  Morbid obesity and arthritis     Other Findings              Physical Exam    Airway  Mallampati: III  TM Distance: 4 - 6 cm  Neck ROM: normal range of motion   Mouth opening: Normal     Cardiovascular    Rhythm: regular  Rate: normal         Dental    Dentition: Lower dentition intact and Upper dentition intact     Pulmonary  Breath sounds clear to auscultation               Abdominal  GI exam deferred       Other Findings            Anesthetic Plan    ASA: 3  Anesthesia type: general          Induction: Intravenous  Anesthetic plan and risks discussed with: Patient
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
## 2022-10-17 NOTE — PROGRESS NOTES
10/17/2022 11:59 AM   Care Management Assessment      Reason for Admission:  Emergency -       ICD-10-CM ICD-9-CM    1. Acute pyelonephritis  N10 590.10           Assessment:   [x]In person with pt   Charted address and phone numbers confirmed. RUR: n/a under Obs   Risk Level: [x]Low []Moderate []High  Value-based purchasing: [x] Yes [] No    Advance Directive: Full Code.    [] No AD on file. [x] AD on file. [] Current AD not on file. Copy requested. [] Requests AD, and referral submitted to [facility]. Healthcare Decision Maker:   Primary Decision Maker: Julio Adams - Sister - [phone]    Secondary Decision Maker: Robbie Hamlin - Daughter - [phone]        Assessment:    Age: 66 y.o. Sex: [] Male [x]Female     Residency: [x]Private residence []Apartment []Assisted Living []LTC []Other:   Exterior Steps: ramp to enter   Interior Steps: stairlift to bedroom     Lives With: [x]With sister and brother in law. Prior functioning:  [x]Independent with ADLs and iADLS from pt's sister. Pt is using a rollator to assist with ambulation. Cognition: [x]Intact []Some spheres some of the time. []Some spheres all of the time. []All spheres all of the time.      Prior transportation method: []Self []Spouse [x]Other family members []Medicaid transport []Other:     Prior DME required:  []None [x]RW []Cane []Crutches [x]Bedside commode []CPAP []Home O2 (Liter/Provider: ) []Nebulizer   []Shower Chair []Wheelchair []Hospital Bed []Maritza []Stair lift [x]Rollator [x]Other: tub transfer bench      Rehab history: []None []Outpatient PT [x]Home Health (Open to [facility])  []SNF (Provider/Date: ) [x]IPR ([facility] ) []LTC (Provider/Date: ) []Hospice (Provider/Date: )  []Other:     Insurer:   [facility] Navjot Sexton Phone: [phone]    Subscriber: Jim Allen Subscriber#: [id number]    Group#: -- Precert#: --        [employer] [address] MEDICARE Phone: --    Subscriber: Minerva Rodriguez Subscriber#: [id number]    Group#: [id number] Precert#: --            PCP: Raleigh Najjar   Address: [address] [facility]   Phone number: [phone]   Current patient: [x]Yes []No   Approximate date of last visit: 9/22   Access to virtual PCP visits: []Yes [x]No       Pharmacy: [facility] [address] [facility] [place] Transport: Pt's sister or brother in law      Transition of care plan:    Return home with home health. [x] Home with Home Health   - Provider: choice letter signed for [facility]  Resumption referral sent to [facility] via [address] [place]    Care Management Interventions  PCP Verified by CM:  Yes (Gigi Sorenson)  [place] Signup: No  Discharge Durable Medical Equipment: No  Physical Therapy Consult: Yes  Occupational Therapy Consult: No  Speech Therapy Consult: No  Support Systems: Other Family Member(s)  The Plan for Transition of Care is Related to the Following Treatment Goals : home health  The Patient and/or Patient Representative was Provided with a Choice of Provider and Agrees with the Discharge Plan?: Yes  Freedom of Choice List was Provided with Basic Dialogue that Supports the Patient's Individualized Plan of Care/Goals, Treatment Preferences and Shares the Quality Data Associated with the Providers?: Yes  Discharge Location  Patient Expects to be Discharged to[de-identified] Home with home health

## 2022-10-17 NOTE — ANESTHESIA POSTPROCEDURE EVALUATION
Procedure(s):  CYSTOSCOPY WITH RIGHT URETERAL STENT INSERTION. general    Anesthesia Post Evaluation      Multimodal analgesia: multimodal analgesia not used between 6 hours prior to anesthesia start to PACU discharge  Patient location during evaluation: bedside  Patient participation: complete - patient participated  Level of consciousness: awake  Pain score: 0  Pain management: adequate  Airway patency: patent  Cardiovascular status: acceptable  Respiratory status: acceptable  Hydration status: acceptable    Final Post Anesthesia Temperature Assessment:  Normothermia (36.0-37.5 degrees C)      INITIAL Post-op Vital signs:   Vitals Value Taken Time   /66 10/17/22 1405   Temp     Pulse 78 10/17/22 1408   Resp 22 10/17/22 1408   SpO2 99 % 10/17/22 1408   Vitals shown include unvalidated device data.

## 2022-10-17 NOTE — ED TRIAGE NOTES
Patient coming in for severe pain in her right kidney/flank area that is radiating to the front. Patient states pain started two days ago. Patient has been taking Toradol at home for pain. Patient is also having nausea with vomiting.
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
## 2022-10-18 PROBLEM — N13.30 HYDRONEPHROSIS: Status: ACTIVE | Noted: 2022-10-18

## 2022-10-18 LAB
ALBUMIN SERPL-MCNC: 2.5 G/DL (ref 3.5–5)
ALBUMIN/GLOB SERPL: 0.6 {RATIO} (ref 1.1–2.2)
ALP SERPL-CCNC: 124 U/L (ref 45–117)
ALT SERPL-CCNC: 25 U/L (ref 12–78)
AMORPH CRY URNS QL MICRO: ABNORMAL
ANION GAP SERPL CALC-SCNC: 7 MMOL/L (ref 5–15)
APPEARANCE UR: ABNORMAL
AST SERPL-CCNC: 20 U/L (ref 15–37)
BACTERIA SPEC CULT: NORMAL
BACTERIA URNS QL MICRO: NEGATIVE /HPF
BASOPHILS # BLD: 0 K/UL (ref 0–0.1)
BASOPHILS NFR BLD: 0 % (ref 0–1)
BILIRUB SERPL-MCNC: 0.7 MG/DL (ref 0.2–1)
BILIRUB UR QL: NEGATIVE
BUN SERPL-MCNC: 12 MG/DL (ref 6–20)
BUN/CREAT SERPL: 9 (ref 12–20)
CALCIUM SERPL-MCNC: 9.4 MG/DL (ref 8.5–10.1)
CHLORIDE SERPL-SCNC: 103 MMOL/L (ref 97–108)
CO2 SERPL-SCNC: 28 MMOL/L (ref 21–32)
COLOR UR: ABNORMAL
CREAT SERPL-MCNC: 1.27 MG/DL (ref 0.55–1.02)
DIFFERENTIAL METHOD BLD: ABNORMAL
EOSINOPHIL # BLD: 0.3 K/UL (ref 0–0.4)
EOSINOPHIL NFR BLD: 3 % (ref 0–7)
EPITH CASTS URNS QL MICRO: ABNORMAL /LPF
ERYTHROCYTE [DISTWIDTH] IN BLOOD BY AUTOMATED COUNT: 12.5 % (ref 11.5–14.5)
GLOBULIN SER CALC-MCNC: 4.5 G/DL (ref 2–4)
GLUCOSE SERPL-MCNC: 108 MG/DL (ref 65–100)
GLUCOSE UR STRIP.AUTO-MCNC: NEGATIVE MG/DL
HCT VFR BLD AUTO: 36.9 % (ref 35–47)
HGB BLD-MCNC: 12 G/DL (ref 11.5–16)
HGB UR QL STRIP: ABNORMAL
IMM GRANULOCYTES # BLD AUTO: 0 K/UL (ref 0–0.04)
IMM GRANULOCYTES NFR BLD AUTO: 0 % (ref 0–0.5)
KETONES UR QL STRIP.AUTO: NEGATIVE MG/DL
LEUKOCYTE ESTERASE UR QL STRIP.AUTO: ABNORMAL
LYMPHOCYTES # BLD: 2 K/UL (ref 0.8–3.5)
LYMPHOCYTES NFR BLD: 16 % (ref 12–49)
MAGNESIUM SERPL-MCNC: 1.8 MG/DL (ref 1.6–2.4)
MCH RBC QN AUTO: 32.1 PG (ref 26–34)
MCHC RBC AUTO-ENTMCNC: 32.5 G/DL (ref 30–36.5)
MCV RBC AUTO: 98.7 FL (ref 80–99)
MONOCYTES # BLD: 0.8 K/UL (ref 0–1)
MONOCYTES NFR BLD: 7 % (ref 5–13)
NEUTS SEG # BLD: 8.9 K/UL (ref 1.8–8)
NEUTS SEG NFR BLD: 74 % (ref 32–75)
NITRITE UR QL STRIP.AUTO: NEGATIVE
NRBC # BLD: 0 K/UL (ref 0–0.01)
NRBC BLD-RTO: 0 PER 100 WBC
PH UR STRIP: 5 [PH] (ref 5–8)
PHOSPHATE SERPL-MCNC: 3.3 MG/DL (ref 2.6–4.7)
PLATELET # BLD AUTO: 275 K/UL (ref 150–400)
PMV BLD AUTO: 11.1 FL (ref 8.9–12.9)
POTASSIUM SERPL-SCNC: 4 MMOL/L (ref 3.5–5.1)
PROT SERPL-MCNC: 7 G/DL (ref 6.4–8.2)
PROT UR STRIP-MCNC: 100 MG/DL
RBC # BLD AUTO: 3.74 M/UL (ref 3.8–5.2)
RBC #/AREA URNS HPF: ABNORMAL /HPF (ref 0–5)
SERVICE CMNT-IMP: NORMAL
SODIUM SERPL-SCNC: 138 MMOL/L (ref 136–145)
SP GR UR REFRACTOMETRY: 1.01 (ref 1–1.03)
UA: UC IF INDICATED,UAUC: ABNORMAL
UROBILINOGEN UR QL STRIP.AUTO: 0.2 EU/DL (ref 0.2–1)
WBC # BLD AUTO: 12 K/UL (ref 3.6–11)
WBC URNS QL MICRO: >100 /HPF (ref 0–4)
YEAST URNS QL MICRO: PRESENT

## 2022-10-18 PROCEDURE — 74011250637 HC RX REV CODE- 250/637: Performed by: NURSE PRACTITIONER

## 2022-10-18 PROCEDURE — 65270000029 HC RM PRIVATE

## 2022-10-18 PROCEDURE — G0378 HOSPITAL OBSERVATION PER HR: HCPCS

## 2022-10-18 PROCEDURE — 74011250637 HC RX REV CODE- 250/637: Performed by: HOSPITALIST

## 2022-10-18 PROCEDURE — 74011000250 HC RX REV CODE- 250: Performed by: HOSPITALIST

## 2022-10-18 PROCEDURE — 74011250636 HC RX REV CODE- 250/636: Performed by: INTERNAL MEDICINE

## 2022-10-18 PROCEDURE — 36415 COLL VENOUS BLD VENIPUNCTURE: CPT

## 2022-10-18 PROCEDURE — 84100 ASSAY OF PHOSPHORUS: CPT

## 2022-10-18 PROCEDURE — 74011250637 HC RX REV CODE- 250/637: Performed by: INTERNAL MEDICINE

## 2022-10-18 PROCEDURE — 74011250636 HC RX REV CODE- 250/636: Performed by: HOSPITALIST

## 2022-10-18 PROCEDURE — 83735 ASSAY OF MAGNESIUM: CPT

## 2022-10-18 PROCEDURE — 77030038269 HC DRN EXT URIN PURWCK BARD -A

## 2022-10-18 PROCEDURE — 74011000250 HC RX REV CODE- 250: Performed by: INTERNAL MEDICINE

## 2022-10-18 PROCEDURE — 80053 COMPREHEN METABOLIC PANEL: CPT

## 2022-10-18 PROCEDURE — 2709999900 HC NON-CHARGEABLE SUPPLY

## 2022-10-18 PROCEDURE — 51798 US URINE CAPACITY MEASURE: CPT

## 2022-10-18 PROCEDURE — 97162 PT EVAL MOD COMPLEX 30 MIN: CPT

## 2022-10-18 PROCEDURE — 97116 GAIT TRAINING THERAPY: CPT

## 2022-10-18 PROCEDURE — 85025 COMPLETE CBC W/AUTO DIFF WBC: CPT

## 2022-10-18 RX ORDER — TAMSULOSIN HYDROCHLORIDE 0.4 MG/1
0.4 CAPSULE ORAL DAILY
Status: DISCONTINUED | OUTPATIENT
Start: 2022-10-18 | End: 2022-10-19 | Stop reason: HOSPADM

## 2022-10-18 RX ADMIN — PANTOPRAZOLE SODIUM 40 MG: 40 TABLET, DELAYED RELEASE ORAL at 08:09

## 2022-10-18 RX ADMIN — TAMSULOSIN HYDROCHLORIDE 0.4 MG: 0.4 CAPSULE ORAL at 08:09

## 2022-10-18 RX ADMIN — ENOXAPARIN SODIUM 30 MG: 100 INJECTION SUBCUTANEOUS at 21:25

## 2022-10-18 RX ADMIN — LEVOTHYROXINE SODIUM 125 MCG: 0.12 TABLET ORAL at 08:11

## 2022-10-18 RX ADMIN — ACETAMINOPHEN 650 MG: 325 TABLET, FILM COATED ORAL at 21:24

## 2022-10-18 RX ADMIN — SODIUM CHLORIDE, PRESERVATIVE FREE 10 ML: 5 INJECTION INTRAVENOUS at 21:25

## 2022-10-18 RX ADMIN — TROSPIUM CHLORIDE 20 MG: 20 TABLET, FILM COATED ORAL at 08:09

## 2022-10-18 RX ADMIN — METOPROLOL SUCCINATE 50 MG: 50 TABLET, EXTENDED RELEASE ORAL at 08:09

## 2022-10-18 RX ADMIN — SODIUM CHLORIDE 2 G: 9 INJECTION INTRAMUSCULAR; INTRAVENOUS; SUBCUTANEOUS at 08:09

## 2022-10-18 RX ADMIN — SODIUM CHLORIDE, PRESERVATIVE FREE 10 ML: 5 INJECTION INTRAVENOUS at 06:38

## 2022-10-18 RX ADMIN — ENOXAPARIN SODIUM 30 MG: 100 INJECTION SUBCUTANEOUS at 08:09

## 2022-10-18 RX ADMIN — TROSPIUM CHLORIDE 20 MG: 20 TABLET, FILM COATED ORAL at 16:23

## 2022-10-18 RX ADMIN — PANTOPRAZOLE SODIUM 40 MG: 40 TABLET, DELAYED RELEASE ORAL at 16:23

## 2022-10-18 RX ADMIN — OXYCODONE 5 MG: 5 TABLET ORAL at 22:42

## 2022-10-18 RX ADMIN — MORPHINE SULFATE 2 MG: 2 INJECTION, SOLUTION INTRAMUSCULAR; INTRAVENOUS at 07:08

## 2022-10-18 RX ADMIN — OXYCODONE 5 MG: 5 TABLET ORAL at 03:56

## 2022-10-18 RX ADMIN — SODIUM CHLORIDE, PRESERVATIVE FREE 10 ML: 5 INJECTION INTRAVENOUS at 15:00

## 2022-10-18 NOTE — PROGRESS NOTES
Urology Progress Note    Subjective:     Daily Progress Note: 10/18/2022 9:36 AM    Alexa Cobb is 1 Day Post-Op and doing good. She reports right flank pain has greatly improved sp stent placement, reports discomfort from urinary catheter. Urine clear yvonne in tubing. WBC 12 (12)  Cr 1.27 (1.43)   UCX negative although purulence noted in OR yesterday   Good UO overnight     Objective:     Visit Vitals  /65 (BP 1 Location: Left lower arm, BP Patient Position: At rest)   Pulse 93   Temp 97.9 °F (36.6 °C)   Resp 18   Ht 5' 2.99\" (1.6 m)   Wt 110.6 kg (243 lb 13.3 oz)   SpO2 94%   BMI 43.20 kg/m²        Temp (24hrs), Av.9 °F (36.6 °C), Min:97 °F (36.1 °C), Max:98.2 °F (36.8 °C)      Intake and Output:  10/16 1901 - 10/18 0700  In:  [I.V.:]  Out:  [Urine:]  No intake/output data recorded.     Physical Exam:   General appearance: alert, cooperative, no distress, appears stated age  Respiratory: no distress, room air   Abdomen: not distended, nontender   : zhang catheter secured to statlock, clear yvonne urine in tubing   Extremities: moves all     Data Review:    Recent Results (from the past 24 hour(s))   CBC W/O DIFF    Collection Time: 10/17/22  9:50 AM   Result Value Ref Range    WBC 12.0 (H) 3.6 - 11.0 K/uL    RBC 3.84 3.80 - 5.20 M/uL    HGB 12.4 11.5 - 16.0 g/dL    HCT 37.7 35.0 - 47.0 %    MCV 98.2 80.0 - 99.0 FL    MCH 32.3 26.0 - 34.0 PG    MCHC 32.9 30.0 - 36.5 g/dL    RDW 12.4 11.5 - 14.5 %    PLATELET 120 884 - 496 K/uL    MPV 11.3 8.9 - 12.9 FL    NRBC 0.0 0  WBC    ABSOLUTE NRBC 0.00 0.00 - 8.80 K/uL   METABOLIC PANEL, BASIC    Collection Time: 10/17/22  9:50 AM   Result Value Ref Range    Sodium 135 (L) 136 - 145 mmol/L    Potassium 4.4 3.5 - 5.1 mmol/L    Chloride 103 97 - 108 mmol/L    CO2 27 21 - 32 mmol/L    Anion gap 5 5 - 15 mmol/L    Glucose 118 (H) 65 - 100 mg/dL    BUN 16 6 - 20 MG/DL    Creatinine 1.43 (H) 0.55 - 1.02 MG/DL    BUN/Creatinine ratio 11 (L) 12 - 20      eGFR 39 (L) >60 ml/min/1.73m2    Calcium 9.5 8.5 - 57.2 MG/DL   METABOLIC PANEL, COMPREHENSIVE    Collection Time: 10/18/22  4:07 AM   Result Value Ref Range    Sodium 138 136 - 145 mmol/L    Potassium 4.0 3.5 - 5.1 mmol/L    Chloride 103 97 - 108 mmol/L    CO2 28 21 - 32 mmol/L    Anion gap 7 5 - 15 mmol/L    Glucose 108 (H) 65 - 100 mg/dL    BUN 12 6 - 20 MG/DL    Creatinine 1.27 (H) 0.55 - 1.02 MG/DL    BUN/Creatinine ratio 9 (L) 12 - 20      eGFR 45 (L) >60 ml/min/1.73m2    Calcium 9.4 8.5 - 10.1 MG/DL    Bilirubin, total 0.7 0.2 - 1.0 MG/DL    ALT (SGPT) 25 12 - 78 U/L    AST (SGOT) 20 15 - 37 U/L    Alk. phosphatase 124 (H) 45 - 117 U/L    Protein, total 7.0 6.4 - 8.2 g/dL    Albumin 2.5 (L) 3.5 - 5.0 g/dL    Globulin 4.5 (H) 2.0 - 4.0 g/dL    A-G Ratio 0.6 (L) 1.1 - 2.2     CBC WITH AUTOMATED DIFF    Collection Time: 10/18/22  4:07 AM   Result Value Ref Range    WBC 12.0 (H) 3.6 - 11.0 K/uL    RBC 3.74 (L) 3.80 - 5.20 M/uL    HGB 12.0 11.5 - 16.0 g/dL    HCT 36.9 35.0 - 47.0 %    MCV 98.7 80.0 - 99.0 FL    MCH 32.1 26.0 - 34.0 PG    MCHC 32.5 30.0 - 36.5 g/dL    RDW 12.5 11.5 - 14.5 %    PLATELET 945 829 - 271 K/uL    MPV 11.1 8.9 - 12.9 FL    NRBC 0.0 0  WBC    ABSOLUTE NRBC 0.00 0.00 - 0.01 K/uL    NEUTROPHILS 74 32 - 75 %    LYMPHOCYTES 16 12 - 49 %    MONOCYTES 7 5 - 13 %    EOSINOPHILS 3 0 - 7 %    BASOPHILS 0 0 - 1 %    IMMATURE GRANULOCYTES 0 0.0 - 0.5 %    ABS. NEUTROPHILS 8.9 (H) 1.8 - 8.0 K/UL    ABS. LYMPHOCYTES 2.0 0.8 - 3.5 K/UL    ABS. MONOCYTES 0.8 0.0 - 1.0 K/UL    ABS. EOSINOPHILS 0.3 0.0 - 0.4 K/UL    ABS. BASOPHILS 0.0 0.0 - 0.1 K/UL    ABS. IMM.  GRANS. 0.0 0.00 - 0.04 K/UL    DF AUTOMATED     MAGNESIUM    Collection Time: 10/18/22  4:07 AM   Result Value Ref Range    Magnesium 1.8 1.6 - 2.4 mg/dL   PHOSPHORUS    Collection Time: 10/18/22  4:07 AM   Result Value Ref Range    Phosphorus 3.3 2.6 - 4.7 MG/DL       Assessment/Plan:     Active Problems:    Hypothyroidism ()      Benign essential HTN ()      S/P hysterectomy with oophorectomy (11/27/2019)      Colovesical fistula (8/16/2022)      Pyelonephritis (10/17/2022)        Status Post:  Procedure(s):  CYSTOSCOPY WITH RIGHT URETERAL STENT INSERTION     Plan:   Right sided hydroureteronephrosis, pyelonephritis, BERNADETTE  -sp cystoscopy with right ureteral stent 10/17/22  -UCX negative although purulence noted during procedure. Recommend continuing course of broad spectrum abx.    -Flomax added for stent discomfort   -DC zhang catheter. PVR after first void. Replace catheter if PVR >400ml.  -Cr improving, continue hydration     No further  interventions planned during this stay. OP FU arranged and placed within DC section of chart. Please call with questions or concerns.     Signed By: Yang Guaman NP                         October 18, 2022

## 2022-10-18 NOTE — PROGRESS NOTES
Medicare pt has received, reviewed, and signed 2nd IM letter informing them of their right to appeal the discharge. Signed copy has been placed on pt bedside chart.   Jennifer [employer]s [employer]

## 2022-10-18 NOTE — PROGRESS NOTES
[facility] [address]  [address] [facility], [address] [place] [address]  [phone]      Medical Progress Note      NAME: Roberto Carlos Kline   :  1950  MRM:  [id number]    Date of service: 10/18/2022  7:23 AM       Assessment and Plan:   R hydronephrosis: s/p recent stent removal. right stent replacement on 10/17. Purulent urine drained. UC is negative. Forte is out. 2.   HTN: cont metoprolol     3. Hypothyroid: cont synthroid     4. BERNADETTE: mild, due hydronephrosis. improving. Cont IVF, monitor BMP    5. Colovesical fistula s/p colon resection. Outpatient FU     6.  Obesity/ debility. Would benefit from wt loss. PT/OT eval        Subjective:     Chief Complaint[de-identified] Patient was seen and examined as a follow up for hydo. Chart was reviewed. c/o RT falnk pain     ROS:  (bold if positive, if negative)    Tolerating PT  Tolerating Diet        Objective:     Last 24hrs VS reviewed since prior progress note.  Most recent are:    Visit Vitals  /65 (BP 1 Location: Left lower arm, BP Patient Position: At rest)   Pulse 93   Temp 97.9 °F (36.6 °C)   Resp 18   Ht 5' 2.99\" (1.6 m)   Wt 110.6 kg (243 lb 13.3 oz)   SpO2 94%   BMI 43.20 kg/m²     SpO2 Readings from Last 6 Encounters:   10/18/22 94%   10/06/22 (P) 98%   22 96%   22 98%   22 94%   22 95%    O2 Flow Rate (L/min): 2 l/min     Intake/Output Summary (Last 24 hours) at 10/18/2022 0723  Last data filed at 10/18/2022 0644  Gross per 24 hour   Intake 1000 ml   Output 1950 ml   Net -950 ml        Physical Exam:    Gen:  obese, in no acute distress  HEENT:  Pink conjunctivae, PERRL, hearing intact to voice, moist mucous membranes  Neck:  Supple, without masses, thyroid non-tender  Resp:  No accessory muscle use, clear breath sounds without wheezes rales or rhonchi  Card:  No murmurs, normal S1, S2 without thrills, bruits or peripheral edema  Abd:  Soft, non-tender, non-distended, normoactive bowel sounds are present, no palpable organomegaly and no detectable hernias.  Colostomy bag in place   Lymph:  No cervical or inguinal adenopathy  Musc:  No cyanosis or clubbing  Skin:  No rashes or ulcers, skin turgor is good  Neuro:  Cranial nerves are grossly intact, no focal motor weakness, follows commands appropriately  Psych:  Good insight, oriented to person, place and time, alert  __________________________________________________________________  Medications Reviewed: (see below)  Medications:     Current Facility-Administered Medications   Medication Dose Route Frequency    metoprolol succinate (TOPROL-XL) XL tablet 50 mg  50 mg Oral QAM    levothyroxine (SYNTHROID) tablet 125 mcg  125 mcg Oral ACB    sodium chloride (NS) flush 5-40 mL  5-40 mL IntraVENous Q8H    sodium chloride (NS) flush 5-40 mL  5-40 mL IntraVENous PRN    acetaminophen (TYLENOL) tablet 650 mg  650 mg Oral Q6H PRN    Or    acetaminophen (TYLENOL) suppository 650 mg  650 mg Rectal Q6H PRN    polyethylene glycol (MIRALAX) packet 17 g  17 g Oral DAILY PRN    ondansetron (ZOFRAN ODT) tablet 4 mg  4 mg Oral Q8H PRN    Or    ondansetron (ZOFRAN) injection 4 mg  4 mg IntraVENous Q6H PRN    enoxaparin (LOVENOX) injection 30 mg  30 mg SubCUTAneous Q12H    loperamide (IMODIUM) capsule 2 mg  2 mg Oral Q8H PRN    pantoprazole (PROTONIX) tablet 40 mg  40 mg Oral ACB&D    morphine injection 2 mg  2 mg IntraVENous Q4H PRN    oxyCODONE IR (ROXICODONE) tablet 5 mg  5 mg Oral Q4H PRN    cefTRIAXone (ROCEPHIN) 2 g in 0.9% sodium chloride 20 mL IV syringe  2 g IntraVENous Q24H    0.9% sodium chloride infusion  75 mL/hr IntraVENous CONTINUOUS    trospium (SANCTURA) tablet 20 mg  20 mg Oral ACB&D        Lab Data Reviewed: (see below)  Lab Review:     Recent Labs     10/18/22  0407 10/17/22  0950 10/16/22  2119   WBC 12.0* 12.0* 11.5*   HGB 12.0 12.4 13.4   HCT 36.9 37.7 40.9    278 328     Recent Labs     10/18/22  0407 10/17/22  0950 10/16/22  2119    135* 135*   K 4.0 4.4 4.9    103 101   CO2 28 27 29   * 118* 139*   BUN 12 16 20   CREA 1.27* 1.43* 1.44*   CA 9.4 9.5 9.8   MG 1.8  --   --    PHOS 3.3  --   --    ALB 2.5*  --  2.8*   TBILI 0.7  --  0.4   ALT 25  --  30     Lab Results   Component Value Date/Time    Glucose (POC) 81 08/29/2022 05:03 AM    Glucose (POC) 87 08/28/2022 11:55 PM    Glucose (POC) 100 08/28/2022 05:44 PM    Glucose (POC) 120 (H) 08/28/2022 03:26 PM    Glucose (POC) 134 (H) 08/28/2022 12:00 PM     No results for input(s): PH, PCO2, PO2, HCO3, FIO2 in the last 72 hours. No results for input(s): INR, INREXT in the last 72 hours. All Micro Results       Procedure Component Value Units Date/Time    CULTURE, URINE [[id number]] Collected: 10/16/22 2119    Order Status: Completed Specimen: Cath Urine Updated: 10/18/22 0704     Special Requests: NO SPECIAL REQUESTS        Culture result:       No significant growth, <10,000 CFU/mL                  I have reviewed notes of prior 24hr. Other pertinent lab: Total time spent with patient: 35 minutes I personally reviewed chart, notes, data and current medications in the medical record. I have personally examined and treated the patient at bedside during this period.                  Care Plan discussed with: Patient, Nursing Staff, and >50% of time spent in counseling and coordination of care    Discussed:  Care Plan    Prophylaxis:  Lovenox    Disposition:  Home w/Family           ___________________________________________________    Attending Physician: Elie uHrtado MD

## 2022-10-18 NOTE — PROGRESS NOTES
Bedside and Verbal shift change report given to Samuel MARRERO (oncoming nurse) by Jagjit Martin (offgoing nurse). Report included the following information SBAR, Kardex, Intake/Output, MAR, and Recent Results.

## 2022-10-18 NOTE — PROGRESS NOTES
Problem: Urinary Elimination - Impaired  Goal: *Absence/decrease in urinary retention/ episodes of  urinary incontinence  Interventions:   Genitourinary assessment (eg: Bladder/bowel control; output; prostate size; rectal prolapse; skin integrity-perineum; tenderness- abdomen/bladder; uterine   prolapse)  Impaired urinary elimination risk assessment (eg: Anxiety; dehydration; dementia; diabetes; hypervolemia; neurogenic bladder; urethral   atrophy)  Infection signs and symptoms monitoring - urinary tract (eg: Flank or suprapubic pain; burning; fever; dysuria; frequency; urgency; cloudy/bloody/foul odor urine; confusion/agitation;   incontinence)  Urinary catheter needs   assessment  Fall risk assessment (eg: Impaired balance/unsteady gait; confusion; ambulatory aid/IV; fall risk/secondary diagnosis; prior fall; crib/side rails up; agitation; sensory perception;   depression)  Toileting rounds based on fall   assessment  Assistive device provision -   toileting  Fall prevention management (eg: Bedside table/call light in reach; nonskid footwear; regular toileting; lock wheelchair; call for help; crib/side rails up; frequent checks; room near nurse   station)  Hydration   management  Intake and output   measurement  Perineal   care  Bowel and bladder   training  Constipation signs and symptoms   assessment  Urinary catheter management with   protocol  Patient Education: Go to Patient Education   Activity  Patient/Family   Education  Progressing Towards Goal  Not Progressing Towards Goal  Resolved/Met  Not Met  Resolved/Not Met  The care plan is on multiple pages.      Go to page:        Kat Collier as Reviewed  Restore  Close  Cancel      Outcome: Progressing Towards Goal

## 2022-10-18 NOTE — OP NOTES
[facility]  OPERATIVE REPORT    Name:  Jamal Kehr  MR#:  [id number]  :  1950  ACCOUNT #:  [de-identified]  DATE OF SERVICE:  10/17/2022  nonenone  PREOPERATIVE DIAGNOSIS:  Right pyonephrosis. POSTOPERATIVE DIAGNOSIS:  Right pyonephrosis. PROCEDURES PERFORMED:  Cystoscopy, placement of right ureteral stent. SURGEON:  Fanta Yee MD    ASSISTANT:  None. ANESTHESIA:  General.    COMPLICATIONS:  None. SPECIMENS REMOVED:  None. IMPLANTS:  none    ESTIMATED BLOOD LOSS:  None. DRAINS:  A 5-Paraguayan x 24-cm double-J stent. HISTORY:  This is a 10-year-old  patient of Dr. Paul Michele who underwent a ureteroscopy to confirm clearing of any obstruction within the ureter last week, unfortunately presents today with ongoing right flank pain, elevated white count, and cloudy urine. A CT scan shows ongoing right hydronephrosis and a normal left side. PROCEDURE:  She has been on scheduled antibiotics, was brought to the operating room. General anesthesia was applied. She was cautiously prepped and draped in the dorsal lithotomy position. All pressure points were well padded. I went ahead and passed a 21-Paraguayan cystoscope into the bladder. Her bladder had some old clot-like material in it and what appeared to be purulence in a tube-like fashion consistent with ureteral drainage. I went ahead and cannulated the right ureteral orifice with an open-ended 5-Paraguayan stent and passed it up to the kidney. There was a hydronephrotic drip, which changed to purulence. I did not want to place this under pressure anyway and therefore will not perform retrogrades. A 0.035 Bentson guidewire was passed through the open-ended stent with good curl in the kidney. The open-ended stent was exchanged for a 5-Paraguayan x 24-cm double-J stent. A lot of purulence emanates through the side holes as well as alongside the stent. The bladder was drained.   I did think we had to vent her system completely by placing a Forte catheter. William Hickory was placed prior to this to try to help with anesthesia. At the end of the case, we confirmed good location of the stent with a fluoroscopy. She tolerated the procedure well and she was returned to the recovery room in stable condition.       Charlee Caro MD      [id number]  D:  10/17/2022 13:32  T:  10/17/2022 21:10  JOB #:  [id number]  CC:   _____________, (PCP)
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
## 2022-10-18 NOTE — PROGRESS NOTES
Physician Progress Note      Man HEMPHILL #:                  [id number]  :                       1950  ADMIT DATE:       10/16/2022 8:18 PM  [address] [place] DATE:  RESPONDING  PROVIDER #:        Tommy Plaza MD          QUERY TEXT:    Pt admitted with Right pyonephrosis, noted documentation of \"mild malnutrition\" on 10/17 RD assessment. If possible, please document in progress notes and discharge summary:    The medical record reflects the following:  Risk Factors: advanced age, acute illness  Clinical Indicators: pt admitted with Right pyonephrosis  - 10/17 RD- Malnutrition Status:  Mild malnutrition (10/17/22 1432)  Context:  Acute illness  Findings of the 6 clinical characteristics of malnutrition:  Energy Intake:  Mild decrease in energy intake  Weight Loss:  5% over one month  Treatment: RD assessment, Ensure Clear with dinner, monitoring    Thank you,    Caridad Garvey RN  CDI  Options provided:  -- Mild malnutrition confirmed present on admission  -- MIld malnutrition confirmed not present on admission  -- Mild malnutrition ruled out  -- Other - I will add my own diagnosis  -- Disagree - Not applicable / Not valid  -- Disagree - Clinically unable to determine / Unknown  -- Refer to Clinical Documentation Reviewer    PROVIDER RESPONSE TEXT:    The diagnosis of mild malnutrition was confirmed as present on admission.     Query created by: Kulwant Graham on 10/18/2022 2:09 PM      Electronically signed by:  Tommy Plaza MD 10/18/2022 2:20 PM

## 2022-10-18 NOTE — PROGRESS NOTES
10/18/2022 11:47 AM Met with pt and pt's sister, relayed plan for discharge likely on 10/19 home with resumption of home health through [facility]. Pt and pt's sister were agreeable. CM will follow. 10/18/2022 9:57 AM Resumption home health referral sent to [facility] [place] via [email or web address] [address]. Care Management Progress Note      ICD-10-CM ICD-9-CM    1. Acute pyelonephritis  N10 590.10           RUR:  n/a under Obs  Risk Level: [x]Low []Moderate []High  Value-based purchasing: [x] Yes [] No  Bundle patient: [] Yes [x] No   Specify:     Transition of care plan:  Ongoing management, Urology has signed off, likely home tomorrow  Home with family at discharge  Home health PT/OT/RN through [facility] [place]  Outpatient follow-up.   Pt's family to transport

## 2022-10-19 VITALS
OXYGEN SATURATION: 94 % | DIASTOLIC BLOOD PRESSURE: 75 MMHG | HEIGHT: 63 IN | SYSTOLIC BLOOD PRESSURE: 142 MMHG | BODY MASS INDEX: 44.38 KG/M2 | WEIGHT: 250.44 LBS | HEART RATE: 74 BPM | RESPIRATION RATE: 17 BRPM | TEMPERATURE: 97.9 F

## 2022-10-19 LAB
BACTERIA SPEC CULT: ABNORMAL
CC UR VC: ABNORMAL
SERVICE CMNT-IMP: ABNORMAL

## 2022-10-19 PROCEDURE — 97530 THERAPEUTIC ACTIVITIES: CPT

## 2022-10-19 PROCEDURE — 74011000250 HC RX REV CODE- 250: Performed by: HOSPITALIST

## 2022-10-19 PROCEDURE — 77030038269 HC DRN EXT URIN PURWCK BARD -A

## 2022-10-19 PROCEDURE — 74011000250 HC RX REV CODE- 250: Performed by: INTERNAL MEDICINE

## 2022-10-19 PROCEDURE — 74011250636 HC RX REV CODE- 250/636: Performed by: HOSPITALIST

## 2022-10-19 PROCEDURE — 74011250637 HC RX REV CODE- 250/637: Performed by: NURSE PRACTITIONER

## 2022-10-19 PROCEDURE — 74011250636 HC RX REV CODE- 250/636: Performed by: INTERNAL MEDICINE

## 2022-10-19 PROCEDURE — 74011250637 HC RX REV CODE- 250/637: Performed by: INTERNAL MEDICINE

## 2022-10-19 PROCEDURE — 97116 GAIT TRAINING THERAPY: CPT

## 2022-10-19 PROCEDURE — 74011250637 HC RX REV CODE- 250/637: Performed by: HOSPITALIST

## 2022-10-19 RX ORDER — OXYCODONE HYDROCHLORIDE 5 MG/1
5 TABLET ORAL
Qty: 12 TABLET | Refills: 0 | Status: SHIPPED | OUTPATIENT
Start: 2022-10-19 | End: 2022-10-22

## 2022-10-19 RX ORDER — CEFUROXIME AXETIL 250 MG/1
250 TABLET ORAL 2 TIMES DAILY
Qty: 6 TABLET | Refills: 0 | Status: SHIPPED | OUTPATIENT
Start: 2022-10-19 | End: 2022-10-26 | Stop reason: ALTCHOICE

## 2022-10-19 RX ADMIN — OXYCODONE 5 MG: 5 TABLET ORAL at 06:51

## 2022-10-19 RX ADMIN — PANTOPRAZOLE SODIUM 40 MG: 40 TABLET, DELAYED RELEASE ORAL at 09:01

## 2022-10-19 RX ADMIN — SODIUM CHLORIDE, PRESERVATIVE FREE 10 ML: 5 INJECTION INTRAVENOUS at 06:51

## 2022-10-19 RX ADMIN — LEVOTHYROXINE SODIUM 125 MCG: 0.12 TABLET ORAL at 09:01

## 2022-10-19 RX ADMIN — ACETAMINOPHEN 650 MG: 325 TABLET, FILM COATED ORAL at 03:35

## 2022-10-19 RX ADMIN — TAMSULOSIN HYDROCHLORIDE 0.4 MG: 0.4 CAPSULE ORAL at 09:01

## 2022-10-19 RX ADMIN — SODIUM CHLORIDE 2 G: 9 INJECTION INTRAMUSCULAR; INTRAVENOUS; SUBCUTANEOUS at 09:01

## 2022-10-19 RX ADMIN — ENOXAPARIN SODIUM 30 MG: 100 INJECTION SUBCUTANEOUS at 09:02

## 2022-10-19 RX ADMIN — LOPERAMIDE HYDROCHLORIDE 2 MG: 2 CAPSULE ORAL at 10:51

## 2022-10-19 RX ADMIN — METOPROLOL SUCCINATE 50 MG: 50 TABLET, EXTENDED RELEASE ORAL at 09:01

## 2022-10-19 RX ADMIN — ACETAMINOPHEN 650 MG: 325 TABLET, FILM COATED ORAL at 10:51

## 2022-10-19 RX ADMIN — TROSPIUM CHLORIDE 20 MG: 20 TABLET, FILM COATED ORAL at 09:01

## 2022-10-19 RX ADMIN — SODIUM CHLORIDE 75 ML/HR: 9 INJECTION, SOLUTION INTRAVENOUS at 10:47

## 2022-10-19 NOTE — DISCHARGE SUMMARY
Hospitalist Discharge Summary     Patient ID:    Héctor Francisco  [id number]  66 y.o.  1950    Admit date of service: 10/16/2022    Discharge date of service: 10/19/2022    Admission Diagnoses: Pyelonephritis [N12]  Hydronephrosis [N13.30]    Chronic Diagnoses:    Problem List as of 10/19/2022 Date Reviewed: 10/17/2022            Codes Class Noted - Resolved    Hydronephrosis ICD-10-CM: N13.30  ICD-9-CM: 591  10/18/2022 - Present        Pyelonephritis ICD-10-CM: N12  ICD-9-CM: 590.80  10/17/2022 - Present        Opioid use, unspecified with unspecified opioid-induced disorder ICD-10-CM: F11.99  ICD-9-CM: 292.9, 305.50  9/22/2022 - Present        Colovesical fistula ICD-10-CM: N32.1  ICD-9-CM: 596.1  8/16/2022 - Present        S/P hysterectomy with oophorectomy ICD-10-CM: Z90.710, Z90.721  ICD-9-CM: V88.01  11/27/2019 - Present        Thickened endometrium ICD-10-CM: R93.89  ICD-9-CM: 793.5  9/29/2019 - Present    Overview Signed 11/15/2019  3:37 PM by Margaret Harmon MD     [facility] 10/13/19 Dr. Vania Rinaldi             Osteopenia ICD-10-CM: M85.80  ICD-9-CM: 733.90  10/1/2018 - Present    Overview Signed 10/16/2018 11:47 AM by Margaret Harmon MD     of [facility]. Advanced care planning/counseling discussion ICD-10-CM: Z71.89  ICD-9-CM: V65.49  7/2/2018 - Present    Overview Signed 7/2/2018  4:38 PM by Latricia Moy RN     Patient states conversation about Advanced Medical Directive has been started, but nothing written down yet. NN encouraged patient to complete Advanced Medical Directive paperwork & bring a copy to the office for scanning into the medical record. Patient verbalized understanding & agreement. Obesity, morbid ([address] ICD-10-CM: E66.01  ICD-9-CM: 278.01  4/25/2018 - Present        Impaired gait ICD-10-CM: R26.9  ICD-9-CM: 827. 2  Unknown - Present    Overview Signed 4/25/2018  4:10 PM by Margaret Harmon MD     uses cane. knee OA.                Hypothyroidism ICD-10-CM: E03.9  ICD-9-CM: 244.9  Unknown - Present        Benign essential HTN ICD-10-CM: I10  ICD-9-CM: 401.1  Unknown - Present        Grief reaction ICD-10-CM: F43.21  ICD-9-CM: 309.0  Unknown - Present    Overview Signed 4/25/2018  4:10 PM by Hannah Pulido MD     loss of  1/2018             Head injury ICD-10-CM: S09. 90XA  ICD-9-CM: 959.01  Unknown - Present    Overview Signed 4/25/2018  4:10 PM by Hannah Pulido MD     fell in [facility] 12/23/17. ER eval- neg CT.  left facial contusion/orbit injury. Postconcussion syndrome ICD-10-CM: F07.81  ICD-9-CM: 310.2  2/1/2018 - Present    Overview Signed 4/25/2018  4:10 PM by Hannah Pulido MD     dx by neurology in [place].  head injury 12/23/17                Discharge Medications:   Current Discharge Medication List        START taking these medications    Details   oxyCODONE IR (ROXICODONE) 5 mg immediate release tablet Take 1 Tablet by mouth every four (4) hours as needed for Pain for up to 3 days. Max Daily Amount: 30 mg.  Qty: 12 Tablet, Refills: 0    Associated Diagnoses: Acute pyelonephritis           CONTINUE these medications which have CHANGED    Details   cefUROXime (CEFTIN) 250 mg tablet Take 1 Tablet by mouth two (2) times a day. Qty: 6 Tablet, Refills: 0           CONTINUE these medications which have NOT CHANGED    Details   ketorolac (TORADOL) 10 mg tablet Take 1 Tablet by mouth every six (6) hours as needed for Pain. Qty: 10 Tablet, Refills: 0      phenazopyridine HCl (AZO-DINE PO) Take  by mouth as needed. PT TOOK 2 DAYS      loperamide (IMODIUM) 2 mg capsule Take 2 mg by mouth every eight (8) hours. nystatin (MYCOSTATIN) powder Apply  to affected area as needed. diclofenac (VOLTAREN) 1 % gel Apply  to affected area as needed for Pain.      pantoprazole (PROTONIX) 40 mg tablet Take 40 mg by mouth two (2) times a day.     Associated Diagnoses: Gastroesophageal reflux disease without esophagitis      acetaminophen (TYLENOL) 500 mg tablet Take  by mouth every six (6) hours as needed for Pain. Associated Diagnoses: Acute midline low back pain without sciatica      folic acid (FOLVITE) 1 mg tablet Take  by mouth daily. thiamine HCL (B-1) 100 mg tablet Take  by mouth daily. melatonin 3 mg tablet Take  by mouth nightly. famotidine (PEPCID) 20 mg tablet Take 1 Tablet by mouth two (2) times daily as needed for Heartburn or Indigestion. Qty: 60 Tablet, Refills: 4    Associated Diagnoses: Gastroesophageal reflux disease without esophagitis      estradioL (ESTRACE) 0.01 % (0.1 mg/gram) vaginal cream Insert 2 g into vagina daily. multivitamin (ONE A DAY) tablet Take 1 Tablet by mouth in the morning. vibegron (Gemtesa) 75 mg tab Take  by mouth Every morning. metoprolol succinate (TOPROL-XL) 50 mg XL tablet Take 50 mg by mouth Every morning. levothyroxine (SYNTHROID) 125 mcg tablet TAKE 1 TABLET BY MOUTH EVERY DAY BEFORE BREAKFAST  Qty: 90 Tablet, Refills: 1    Associated Diagnoses: Acquired hypothyroidism      ketoconazole (NIZORAL) 2 % topical cream Apply  to affected area as needed. Follow up Care:    1. Josselyn Lezama MD in 1-2 weeks  2. Urology     Diet:  Cardiac Diet    Disposition:  Home. Advanced Directive:    Discharge Exam:  See today's note. CONSULTATIONS: Urology    Significant Diagnostic Studies:   Recent Labs     10/18/22  0407 10/17/22  0950   WBC 12.0* 12.0*   HGB 12.0 12.4   HCT 36.9 37.7    278     Recent Labs     10/18/22  0407 10/17/22  0950 10/16/22  2119    135* 135*   K 4.0 4.4 4.9    103 101   CO2 28 27 29   BUN 12 16 20   CREA 1.27* 1.43* 1.44*   * 118* 139*   CA 9.4 9.5 9.8   MG 1.8  --   --    PHOS 3.3  --   --      Recent Labs     10/18/22  0407 10/16/22  2119   ALT 25 30   * 134*   TBILI 0.7 0.4   TP 7.0 7.6   ALB 2.5* 2.8*   GLOB 4.5* 4.8*   LPSE  --  108     No results for input(s): INR, PTP, APTT, INREXT in the last 72 hours.    No results for input(s): FE, TIBC, PSAT, FERR in the last 72 hours. No results for input(s): PH, PCO2, PO2 in the last 72 hours. No results for input(s): CPK, CKMB in the last 72 hours. No lab exists for component: TROPONINI  Lab Results   Component Value Date/Time    Glucose (POC) 81 08/29/2022 05:03 AM    Glucose (POC) 87 08/28/2022 11:55 PM    Glucose (POC) 100 08/28/2022 05:44 PM    Glucose (POC) 120 (H) 08/28/2022 03:26 PM    Glucose (POC) 134 (H) 08/28/2022 12:00 PM             HOSPITAL COURSE & TREATMENT RENDERED:   R hydronephrosis: s/p recent stent removal. right stent replacement on 10/17. Purulent urine drained. UC is negative, but will discharge on ceftin for few days more. Forte is out. 2.   HTN: cont metoprolol     3. Hypothyroid: cont synthroid     4. BERNADETTE: mild, due hydronephrosis. improving. Cont IVF, monitor BMP     5. Colovesical fistula s/p colon resection. Outpatient FU      6.  Obesity/ debility. Would benefit from wt loss. PT/OT eval         Discharged in improved condition.     Spent 35 minutes    Signed:  Benita Ponce MD  10/19/2022  11:18 AM

## 2022-10-19 NOTE — PROGRESS NOTES
[facility] [address]  1994 [facility], [address] [place] [address]  [phone]      Medical Progress Note      NAME: Laury Johnson   :  1950  MRM:  [id number]    Date of service: 10/19/2022  7:23 AM       Assessment and Plan:   R hydronephrosis: s/p recent stent removal. right stent replacement on 10/17. Purulent urine drained. UC is negative, but will discharge on ceftin fr few days more. Forte is out. 2.   HTN: cont metoprolol     3. Hypothyroid: cont synthroid     4. BERNADETTE: mild, due hydronephrosis. improving. Cont IVF, monitor BMP    5. Colovesical fistula s/p colon resection. Outpatient FU     6.  Obesity/ debility. Would benefit from wt loss. PT/OT eval        Subjective:     Chief Complaint[de-identified] Patient was seen and examined as a follow up for hydo. Chart was reviewed. c/o RT falnk pain     ROS:  (bold if positive, if negative)    Tolerating PT  Tolerating Diet        Objective:     Last 24hrs VS reviewed since prior progress note. Most recent are:    Visit Vitals  /71 (BP 1 Location: Left upper arm, BP Patient Position: Lying; At rest)   Pulse 78   Temp 97.6 °F (36.4 °C)   Resp 16   Ht 5' 2.99\" (1.6 m)   Wt 113.6 kg (250 lb 7.1 oz)   SpO2 93%   BMI 44.38 kg/m²     SpO2 Readings from Last 6 Encounters:   10/19/22 93%   10/06/22 (P) 98%   22 96%   22 98%   22 94%   22 95%    O2 Flow Rate (L/min): 2 l/min     Intake/Output Summary (Last 24 hours) at 10/19/2022 1106  Last data filed at 10/19/2022 1028  Gross per 24 hour   Intake 1178 ml   Output 1000 ml   Net 178 ml          Physical Exam:    Gen:  obese, in no acute distress  HEENT:  Pink conjunctivae, PERRL, hearing intact to voice, moist mucous membranes  Neck:  Supple, without masses, thyroid non-tender  Resp:  No accessory muscle use, clear breath sounds without wheezes rales or rhonchi  Card:  No murmurs, normal S1, S2 without thrills, bruits or peripheral edema  Abd:  Soft, non-tender, non-distended, normoactive bowel sounds are present, no palpable organomegaly and no detectable hernias.  Colostomy bag in place   Lymph:  No cervical or inguinal adenopathy  Musc:  No cyanosis or clubbing  Skin:  No rashes or ulcers, skin turgor is good  Neuro:  Cranial nerves are grossly intact, no focal motor weakness, follows commands appropriately  Psych:  Good insight, oriented to person, place and time, alert  __________________________________________________________________  Medications Reviewed: (see below)  Medications:     Current Facility-Administered Medications   Medication Dose Route Frequency    tamsulosin (FLOMAX) capsule 0.4 mg  0.4 mg Oral DAILY    metoprolol succinate (TOPROL-XL) XL tablet 50 mg  50 mg Oral QAM    levothyroxine (SYNTHROID) tablet 125 mcg  125 mcg Oral ACB    sodium chloride (NS) flush 5-40 mL  5-40 mL IntraVENous Q8H    sodium chloride (NS) flush 5-40 mL  5-40 mL IntraVENous PRN    acetaminophen (TYLENOL) tablet 650 mg  650 mg Oral Q6H PRN    Or    acetaminophen (TYLENOL) suppository 650 mg  650 mg Rectal Q6H PRN    polyethylene glycol (MIRALAX) packet 17 g  17 g Oral DAILY PRN    ondansetron (ZOFRAN ODT) tablet 4 mg  4 mg Oral Q8H PRN    Or    ondansetron (ZOFRAN) injection 4 mg  4 mg IntraVENous Q6H PRN    enoxaparin (LOVENOX) injection 30 mg  30 mg SubCUTAneous Q12H    loperamide (IMODIUM) capsule 2 mg  2 mg Oral Q8H PRN    pantoprazole (PROTONIX) tablet 40 mg  40 mg Oral ACB&D    morphine injection 2 mg  2 mg IntraVENous Q4H PRN    oxyCODONE IR (ROXICODONE) tablet 5 mg  5 mg Oral Q4H PRN    cefTRIAXone (ROCEPHIN) 2 g in 0.9% sodium chloride 20 mL IV syringe  2 g IntraVENous Q24H    0.9% sodium chloride infusion  75 mL/hr IntraVENous CONTINUOUS    trospium (SANCTURA) tablet 20 mg  20 mg Oral ACB&D        Lab Data Reviewed: (see below)  Lab Review:     Recent Labs     10/18/22  0407 10/17/22  0950 10/16/22  2119   WBC 12.0* 12.0* 11.5*   HGB 12.0 12.4 13.4   HCT 36.9 37.7 40.9  278 328       Recent Labs     10/18/22  0407 10/17/22  0950 10/16/22  2119    135* 135*   K 4.0 4.4 4.9    103 101   CO2 28 27 29   * 118* 139*   BUN 12 16 20   CREA 1.27* 1.43* 1.44*   CA 9.4 9.5 9.8   MG 1.8  --   --    PHOS 3.3  --   --    ALB 2.5*  --  2.8*   TBILI 0.7  --  0.4   ALT 25  --  30       Lab Results   Component Value Date/Time    Glucose (POC) 81 08/29/2022 05:03 AM    Glucose (POC) 87 08/28/2022 11:55 PM    Glucose (POC) 100 08/28/2022 05:44 PM    Glucose (POC) 120 (H) 08/28/2022 03:26 PM    Glucose (POC) 134 (H) 08/28/2022 12:00 PM     No results for input(s): PH, PCO2, PO2, HCO3, FIO2 in the last 72 hours. No results for input(s): INR, INREXT, INREXT in the last 72 hours. All Micro Results       Procedure Component Value Units Date/Time    CULTURE, URINE [[id number]] Collected: 10/17/22 0930    Order Status: No result Updated: 10/18/22 1555    CULTURE, URINE [[id number]] Collected: 10/16/22 2119    Order Status: Completed Specimen: Cath Urine Updated: 10/18/22 0704     Special Requests: NO SPECIAL REQUESTS        Culture result:       No significant growth, <10,000 CFU/mL                  I have reviewed notes of prior 24hr. Other pertinent lab: Total time spent with patient: 35 minutes I personally reviewed chart, notes, data and current medications in the medical record. I have personally examined and treated the patient at bedside during this period.                  Care Plan discussed with: Patient, Nursing Staff, and >50% of time spent in counseling and coordination of care    Discussed:  Care Plan    Prophylaxis:  Lovenox    Disposition:  Home w/Family           ___________________________________________________    Attending Physician: Marilin Mendoza MD

## 2022-10-19 NOTE — PROGRESS NOTES
Problem: Mobility Impaired (Adult and Pediatric)  Goal: *Acute Goals and Plan of Care (Insert Text)  Description: FUNCTIONAL STATUS PRIOR TO ADMISSION: Patient was modified independent to supervusuib using a rolling walker for functional mobility. HOME SUPPORT PRIOR TO ADMISSION: The patient lived with sister and brother in law, both of whom assist as needed. Physical Therapy Goals  Initiated 10/18/2022  1. Patient will move from supine to sit and sit to supine  in bed with supervision/set-up within 7 day(s). 2.  Patient will transfer from bed to chair and chair to bed with supervision/set-up using the least restrictive device within 7 day(s). 3.  Patient will perform sit to stand with supervision/set-up within 7 day(s). 4.  Patient will ambulate with supervision/set-up for 150 feet with the least restrictive device within 7 day(s). 5.  Patient will ascend/descend 4 stairs with one handrail(s) with minimal assistance/contact guard assist within 7 day(s). Outcome: Progressing Towards Goal  Note:   PHYSICAL THERAPY TREATMENT  Patient: Mely Pichardo (13 y.o. female)  Date: 10/19/2022  Diagnosis: Pyelonephritis [N12]  Hydronephrosis [N13.30] <principal problem not specified>  Procedure(s) (LRB):  CYSTOSCOPY WITH RIGHT URETERAL STENT INSERTION (Right) 2 Days Post-Op  Precautions: Fall, Skin  Chart, physical therapy assessment, plan of care and goals were reviewed. ASSESSMENT  Patient continues with skilled PT services and is progressing towards goals. Up to [address] for bed mobility>sitting EOB. CGA for functional tranfers and gait training using RW, pt with bladder incontinence/urgency to get to BR leading to decreased safety awareness. Able to manage luca hygiene and ostomy with set up assistance.  Pt remained in chair at end of session    Current Level of Function Impacting Discharge (mobility/balance): Min A     Other factors to consider for discharge:          PLAN :  Patient continues to benefit from skilled intervention to address the above impairments. Continue treatment per established plan of care. to address goals. Recommendation for discharge: (in order for the patient to meet his/her long term goals)  Physical therapy at least 2 days/week in the home AND ensure assist and/or supervision for safety with mobility    This discharge recommendation:  Has been made in collaboration with the attending provider and/or case management    IF patient discharges home will need the following DME: rolling walker       SUBJECTIVE:   Patient stated I do ok once I am up.     OBJECTIVE DATA SUMMARY:   Critical Behavior:  Neurologic State: Alert  Orientation Level: Oriented X4  Cognition: Follows commands     Functional Mobility Training:  Bed Mobility:        Sit to Supine: Minimum assistance;Bed Modified  Scooting: Stand-by assistance        Transfers:  Sit to Stand: Contact guard assistance;Minimum assistance;Assist x1;Additional time  Stand to Sit: Contact guard assistance                             Balance:  Sitting: Intact  Standing: With support  Standing - Static: Constant support;Good  Standing - Dynamic : Fair  Ambulation/Gait Training:  Distance (ft): 40 Feet (ft)  Assistive Device: Walker, rolling;Gait belt  Ambulation - Level of Assistance: Contact guard assistance        Gait Abnormalities: Decreased step clearance        Base of Support: Widened     Speed/Yina: Pace decreased (<100 feet/min)                       Stairs: Therapeutic Exercises:     Pain Ratin/10 back pain    Activity Tolerance:   Good    After treatment patient left in no apparent distress:   Sitting in chair, Call bell within reach, and Bed / chair alarm activated    COMMUNICATION/COLLABORATION:   The patients plan of care was discussed with: Registered nurseKimberly Shoemaker   Time Calculation: 30 mins

## 2022-10-19 NOTE — PROGRESS NOTES
10/19/2022 1:06 PM [address] was sent pt's discharge clinicals and notified of pt's discharge home today via [employer]. Pt's sister transported pt home. No further CM needs identified. RM Paredes    Care Management Interventions  PCP Verified by CM:  Yes (Madelyn Scheuermann)  MyChart Signup: No  Discharge Durable Medical Equipment: No  Physical Therapy Consult: Yes  Occupational Therapy Consult: No  Speech Therapy Consult: No  Support Systems: Other Family Member(s)  The Plan for Transition of Care is Related to the Following Treatment Goals : home health  The Patient and/or Patient Representative was Provided with a Choice of Provider and Agrees with the Discharge Plan?: Yes  Freedom of Choice List was Provided with Basic Dialogue that Supports the Patient's Individualized Plan of Care/Goals, Treatment Preferences and Shares the Quality Data Associated with the Providers?: Yes  Discharge Location  Patient Expects to be Discharged to[de-identified] Home with home health

## 2022-10-19 NOTE — DISCHARGE INSTRUCTIONS
ACUTE DIAGNOSES:  Pyelonephritis [N12]  Hydronephrosis [N13.30]    CHRONIC MEDICAL DIAGNOSES:  [unfilled]    DISCHARGE MEDICATIONS:          It is important that you take the medication exactly as they are prescribed. Keep your medication in the bottles provided by the pharmacist and keep a list of the medication names, dosages, and times to be taken in your wallet. Do not take other medications without consulting your doctor. DIET:  Cardiac Diet    ACTIVITY: Activity as tolerated    ADDITIONAL INFORMATION: If you experience any of the following symptoms then please call your primary care physician or return to the emergency room if you cannot get hold of your doctor: Fever, chills, nausea, vomiting, diarrhea, change in mentation, falling, bleeding, shortness of breath. FOLLOW UP CARE:   @[facility]@  you are to call and set up an appointment to see them in 5 days. Follow-up with urology in 2 weeks       Information obtained by :  I understand that if any problems occur once I am at home I am to contact my physician. I understand and acknowledge receipt of the instructions indicated above.                                                                                                                                            Physician's or R.N.'s Signature                                                                  Date/Time                                                                                                                                              Patient or Representative Signature                                                          Date/Time

## 2022-10-19 NOTE — PROGRESS NOTES
Ultrasound IV by Geraldo Ribera RN :  Procedure Note    Patient meets criteria for US IV insertion. Ultrasound IV education provided to patient. Opportunities for questions given. Ultrasound used for PIV placement:  20gauge 1.25in BD Nexiva  R forearm location. 1 X Attempt(s). Flushed with ease; vigorous blood return. Procedure tolerated well. Primary RN aware of IV placement and added to LDA.       Geraldo Ribera RN

## 2022-10-20 ENCOUNTER — PATIENT OUTREACH (OUTPATIENT)
Dept: CASE MANAGEMENT | Age: 72
End: 2022-10-20

## 2022-10-20 NOTE — ACP (ADVANCE CARE PLANNING)
Patient states her ACP document dated 10-4-2019, and that is currently scanned into her chart, is a current ACP document.

## 2022-10-20 NOTE — PROGRESS NOTES
Care Transitions Initial Call    Call within 2 business days of discharge: Yes     Patient: Noni Erazo Patient : 1950 MRN: [id number]    Last Discharge  Street       Date Complaint Diagnosis Description Type Department Provider    10/16/22 [place] Pain Acute pyelonephritis ED to Hosp-Admission (Discharged) (ADMIT) Robert Aguero MD; Cynthia Louise... Was this an external facility discharge? No   Challenges to be reviewed by the provider   Additional needs identified to be addressed with provider: yes  - Patient reports she is no longer taking AZO-Dine. AZO-Dine was marked as  'Not Taking' on today's medication reconciliation. Please consider removing AZO-Dine from patient's current list of medications. Method of communication with provider:   chart routing to PCP. Component      Latest Ref Rng & Units 10/18/2022 10/17/2022 10/16/2022           4:07 AM  9:50 AM  9:19 PM   WBC      3.6 - 11.0 K/uL 12.0 (H) 12.0 (H) 11.5 (H)   RBC      3.80 - 5.20 M/uL 3.74 (L) 3.84 4.13     Component      Latest Ref Rng & Units 10/18/2022 10/17/2022 10/16/2022           4:07 AM  9:50 AM  9:19 PM   BUN      6 - 20 MG/DL 12 16 20   Creatinine      0.55 - 1.02 MG/DL 1.27 (H) 1.43 (H) 1.44 (H)   BUN/Creatinine ratio      12 - 20   9 (L) 11 (L) 14   eGFR      >60 ml/min/1.73m2 45 (L) 39 (L) 38 (L)     Component      Latest Ref Rng & Units 10/18/2022 10/17/2022 10/16/2022           4:07 AM  9:50 AM  9:19 PM   Alk.  phosphatase      45 - 117 U/L 124 (H)  134 (H)     Component      Latest Ref Rng & Units 10/18/2022 10/17/2022 10/16/2022           4:07 AM  9:50 AM  9:19 PM   Albumin      3.5 - 5.0 g/dL 2.5 (L)  2.8 (L)   Globulin      2.0 - 4.0 g/dL 4.5 (H)  4.8 (H)     Component      Latest Ref Rng & Units 10/17/2022 10/16/2022           9:30 AM  9:19 PM   Protein      NEG mg/dL 100 (A) TRACE (A)     Component      Latest Ref Rng & Units 10/17/2022 10/16/2022           9:30 AM  9:19 PM   Blood NEG   LARGE (A) TRACE (A)     Component      Latest Ref Rng & Units 10/17/2022 10/16/2022           9:30 AM  9:19 PM   Leukocyte Esterase      NEG   LARGE (A) LARGE (A)   WBC      0 - 4 /hpf >100 (H) >100 (H)     Component      Latest Ref Rng & Units 10/17/2022           9:30 AM   UA:UC IF INDICATED      CNI   URINE CULTURE ORDERED (A)   Amorphous Crystals      NEG 3+ (A)   Yeast      NEG   PRESENT (A)     Component      Latest Ref Rng & Units 10/16/2022           9:19 PM   UA:UC IF INDICATED      CNI   URINE CULTURE ORDERED (A)   Amorphous Crystals      NEG    Yeast      NEG   PRESENT (A)     Component      Latest Ref Rng & Units 10/17/2022           9:30 AM   Special Requests:       NO SPECIAL REQUESTS . . .   [place] Count       4000 . . .   Culture result:       PROBABLE ENTEROCOCCUS SPECIES (A)     COVID-19 related testing was not completed during this admission. Advance Care Planning:   Does patient have an Advance Directive: yes, reviewed and current. Inpatient Readmission Risk score: Unplanned Readmit Risk Score: 13.9    Was this a readmission? no   Patient stated reason for the admission: \"I started to feel uncomfortable and pain when I went to the bathroom. \"     Patients top risk factors for readmission:  Medical condition - Benign essential hypertension, colovesical fistula, and recent pyelonephritis and hydronephrosis per chart. Interventions to address risk factors: Obtained and reviewed discharge summary and/or continuity of care documents and Education of patient/family/caregiver/guardian to support self-management-Education provided regarding signs/symptoms of pyelonephritis, patient verbalized an understanding. Care Transition Nurse (CTN) contacted the  patient and patient's sister, Zurdo Gamble (on [address] [place] [address] dated 10-4-2019),  by telephone to perform post hospital discharge assessment.  Patient gave verbal permission for this CTN to speak with her sister, Zurdo Gamble, during today's phone conversation. Verified name and  with patient as identifiers. Provided introduction to self, and explanation of the CTN role. CTN reviewed discharge instructions, medical action plan and red flags with patient and sister and both verbalized understanding. Were discharge instructions available to patient? yes. Reviewed appropriate site of care based on symptoms and resources available to patient including: PCP, Specialist, Urgent [address], and When to call 911. Patient and sister were  given an opportunity to ask questions and do not have any further questions or concerns at this time. The  patient and sister  agree to contact the PCP office for questions related to patient's healthcare. Medication reconciliation was performed with  patient and sister , both verbalized understanding of administration of home medications. Advised obtaining a 90-day supply of all daily and as-needed medications. Referral to Pharm D needed: no     Home Health/Outpatient orders at discharge: home health care, PT, OT, and [address]: [address]. Date of initial visit: Patient states SN visited today. Durable Medical Equipment ordered at discharge: None  [address] received: n/a    Was patient discharged with a pulse oximeter? no    Discussed follow-up appointments. If no appointment was previously scheduled, appointment scheduling offered:  N/A, patient has [facility] appointment scheduled with PCP on 10-26-22 . Is follow up appointment scheduled within 7 days of discharge? yes. [facility] follow up appointment(s):   Future Appointments   Date Time Provider William Calderon   10/26/2022 11:20 AM Christa Thomas MD [facility]     Non-[facility] follow up appointment(s): Patient has urology follow-up appointment with Dr. [facility] on 11-3-22.  Patient states she will call the office of Dr. Leoncio Giles/italo and rectal surgery today to schedule her follow-up appointment. Plan for follow-up call in 10-14 days based on severity of symptoms and risk factors. Plan for next call: self management-Review red flags of pyelonephritis, and follow up appointment-Evaluate if patient is attending follow-up appointments as scheduled, offer assistance with scheduling as needed. CTN provided contact information for future needs. Goals        Understands red flags post discharge. 10-20-22: Red flags of pyelonephritis reviewed with patient and patient's sister, Swati Myers (on [place] dated 10-4-2019), both patient and sister verbalized an understanding. Patient denies pain, denies chest pain, denies shortness of breath, denies fever/chills, and denies nausea/vomiting since discharge on 10-19-22. Patient states she is voiding well, no pain/discomfort upon urination, states urine is clear and yellow. Patient reports she has an ileostomy and states both she and her sister perform ileostomy care, patient states she empties her ileostomy bag. States she is utilizing a regular diet at home, appetite is fair. Denies having any falls in the last 12 months. Patient has no red flags to report at this time and states, \"I'm much better. \" Care Transitions Nurse will review red flags again on next phone conversation with patient.  Teresa Parker

## 2022-10-26 ENCOUNTER — OFFICE VISIT (OUTPATIENT)
Dept: INTERNAL MEDICINE CLINIC | Age: 72
End: 2022-10-26
Payer: MEDICARE

## 2022-10-26 VITALS
TEMPERATURE: 97.7 F | DIASTOLIC BLOOD PRESSURE: 71 MMHG | SYSTOLIC BLOOD PRESSURE: 141 MMHG | RESPIRATION RATE: 15 BRPM | OXYGEN SATURATION: 96 % | WEIGHT: 227.2 LBS | HEART RATE: 93 BPM | BODY MASS INDEX: 40.26 KG/M2 | HEIGHT: 63 IN

## 2022-10-26 DIAGNOSIS — N39.0 ENTEROCOCCUS UTI: ICD-10-CM

## 2022-10-26 DIAGNOSIS — Z96.0 STATUS POST PLACEMENT OF URETERAL STENT: ICD-10-CM

## 2022-10-26 DIAGNOSIS — Z93.2 ILEOSTOMY IN PLACE (HCC): ICD-10-CM

## 2022-10-26 DIAGNOSIS — B95.2 ENTEROCOCCUS UTI: ICD-10-CM

## 2022-10-26 DIAGNOSIS — N39.0 URINARY TRACT INFECTION WITHOUT HEMATURIA, SITE UNSPECIFIED: ICD-10-CM

## 2022-10-26 DIAGNOSIS — K21.9 GASTROESOPHAGEAL REFLUX DISEASE WITHOUT ESOPHAGITIS: ICD-10-CM

## 2022-10-26 DIAGNOSIS — Z09 HOSPITAL DISCHARGE FOLLOW-UP: ICD-10-CM

## 2022-10-26 DIAGNOSIS — N13.30 HYDRONEPHROSIS, RIGHT: Primary | ICD-10-CM

## 2022-10-26 PROBLEM — F11.99 OPIOID USE, UNSPECIFIED WITH UNSPECIFIED OPIOID-INDUCED DISORDER (HCC): Status: RESOLVED | Noted: 2022-09-22 | Resolved: 2022-10-26

## 2022-10-26 LAB
BILIRUB UR QL STRIP: NEGATIVE
GLUCOSE UR-MCNC: NEGATIVE MG/DL
KETONES P FAST UR STRIP-MCNC: NEGATIVE MG/DL
PH UR STRIP: 5.5 [PH] (ref 4.6–8)
PROT UR QL STRIP: NORMAL
SP GR UR STRIP: 1.01 (ref 1–1.03)
UA UROBILINOGEN AMB POC: NORMAL (ref 0.2–1)
URINALYSIS CLARITY POC: NORMAL
URINALYSIS COLOR POC: YELLOW
URINE BLOOD POC: NORMAL
URINE LEUKOCYTES POC: NORMAL
URINE NITRITES POC: NEGATIVE

## 2022-10-26 PROCEDURE — 99495 TRANSJ CARE MGMT MOD F2F 14D: CPT | Performed by: INTERNAL MEDICINE

## 2022-10-26 PROCEDURE — 81003 URINALYSIS AUTO W/O SCOPE: CPT | Performed by: INTERNAL MEDICINE

## 2022-10-26 PROCEDURE — 1111F DSCHRG MED/CURRENT MED MERGE: CPT | Performed by: INTERNAL MEDICINE

## 2022-10-26 PROCEDURE — G8427 DOCREV CUR MEDS BY ELIG CLIN: HCPCS | Performed by: INTERNAL MEDICINE

## 2022-10-26 RX ORDER — CIPROFLOXACIN 500 MG/1
500 TABLET ORAL 2 TIMES DAILY
Qty: 14 TABLET | Refills: 0 | Status: SHIPPED | OUTPATIENT
Start: 2022-10-26 | End: 2022-11-02

## 2022-10-26 RX ORDER — OMEPRAZOLE 40 MG/1
40 CAPSULE, DELAYED RELEASE ORAL DAILY
Qty: 30 CAPSULE | Refills: 3 | Status: SHIPPED | OUTPATIENT
Start: 2022-10-26

## 2022-10-26 NOTE — PROGRESS NOTES
HISTORY OF PRESENT ILLNESS    Chief Complaint   Patient presents with    Hospital Follow Up     [facility]     Medication Evaluation     Discuss protonix and oxycotin. Immunization/Injection     Discuss Flu and COVID shots. Presents for Transitional care management (TCM) visit s/p of hospital admission from 10/16- 10/20 at [facility].    Nurse Navigator call noted to patient and documented in [address] on 10/20/22. Hospital record and relevant lab results, test results and consult notes have personally been reviewed by me at this office visit. Medications reviewed and reconciled. Patient was hospitalized for recurrent right flank pain, right hydronephrosis. She is s/p left colectomy and repair of fistula with loop ileostomy 8/16/22. Developed post-op right hydronephrosis with pain, fever, UTI. Required R ureteral stent. Discharged 8/30/22. Ureteral stent was then removed on October 6, 2022. R hydronephrosis: s/p recent stent removal. right stent replacement on 10/17. Purulent urine drained. UC was negative, but was discharged on Ceftin for 3 days. Completed 10-22. Further review of records, her last urinalysis which was performed on October 17 did grow 4000 colonies per mL of probable Enterococcus species. Today, patient is again accompanied by her sister, Silviano Pena that she is urinating relatively well without any significant dysuria, flank pain, fever, chills, abdominal pain. Urinalysis today shows 3+ ;eukocyte esterase, 2+ protein, negative nitrite. Urostomy bag is draining well. No blood. No dark stool.     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 has a past surgical history that includes hx tonsil and adenoidectomy (1955); hx colonoscopy (11/24/2009); colonoscopy (N/A, 09/19/2018); hx colposcopy (11/26/2018); hx tubal ligation (1984); hx dilation and curettage (02/2019); hx cholecystectomy (1991); hx cataract removal (Bilateral); hx knee arthroscopy (Right, 1978); hx haroldo and bso (10/13/2019); hx meniscus repair (Right); hx colonoscopy (08/15/2022); hx ileostomy (Left, 08/16/2022); and ir change internal ureteral stent rt (08/2022). reports that she quit smoking about 12 years ago. Her smoking use included cigarettes. She has a 5.00 pack-year smoking history. She has never used smokeless tobacco. She reports that she does not currently use alcohol after a past usage of about 4.0 standard drinks per week. She reports that she does not use drugs. family history includes Dementia in her mother; Diabetes in her mother and sister; Heart Attack in her sister; Heart Disease in her sister; Hypertension in her brother and brother; No Known Problems in her daughter; OSTEOARTHRITIS in her sister; Other in her mother; Panic disorder in her brother, brother, and sister; Thyroid Disease in her mother and sister. Physical Exam   Nursing note and vitals reviewed. Blood pressure (!) 141/71, pulse 93, temperature 97.7 °F (36.5 °C), temperature source Oral, resp. rate 15, height 5' 2.99\" (1.6 m), weight 227 lb 3.2 oz (103.1 kg), SpO2 96 %. Constitutional:  No distress.     Eyes: Conjunctivae are normal.   Ears: Hearing grossly intact  Cardiovascular: Normal rate. regular rhythm, no murmurs or gallops  No edema  Pulmonary/Chest: Effort normal.   CTAB  Musculoskeletal: moves all 4 extremities   Neurological: Alert and oriented to person, place, and time. Skin: No rash noted. Psychiatric: Normal mood and affect. Behavior is normal.     ASSESSMENT and PLAN  Diagnoses and all orders for this visit:    1. Hydronephrosis, right  4. Status post placement of ureteral stent  Currently asymptomatic status post stent. Recurrent. She is going to follow-up with urology in the near future. Unclear when their plan will be to remove this. High risk of recurrent infection. Would recommend prophylactic antibiotic therapy after stent removal.    2. Hospital discharge follow-up  -     VT DISCHARGE MEDS RECONCILED W/ CURRENT OUTPATIENT MED LIST    3. Enterococcus UTI  5. Urinary tract infection without hematuria, site unspecified  There was at least enterococcal contamination on her urinalysis on 10/17. She did receive Ceftin which would not have adequately treated this. Urinalysis today looks dirty. Unclear if infection versus contamination. We will treat for Enterococcus and also check culture. -     ciprofloxacin HCl (Cipro) 500 mg tablet; Take 1 Tablet by mouth two (2) times a day for 7 days.  -     AMB POC URINALYSIS DIP STICK AUTO W/O MICRO  -     CULTURE, URINE; Future    6. Ileostomy in place [facility])  The ostomy continues to function well. Continue monitoring and change. 7. Gastroesophageal reflux disease without esophagitis  Uncontrolled GERD on pantoprazole and apparently there is some issue getting it from the pharmacy. We will change to omeprazole 40 mg daily. Pepcid as needed for breakthrough symptoms  -     omeprazole (PRILOSEC) 40 mg capsule; Take 1 Capsule by mouth daily.       lab results and schedule of future lab studies reviewed with patient  reviewed medications and side effects in detail    Return to clinic for further evaluation if new symptoms develop      Current Outpatient Medications   Medication Sig    ciprofloxacin HCl (Cipro) 500 mg tablet Take 1 Tablet by mouth two (2) times a day for 7 days. omeprazole (PRILOSEC) 40 mg capsule Take 1 Capsule by mouth daily. loperamide (IMODIUM) 2 mg capsule Take 2 mg by mouth every eight (8) hours. nystatin (MYCOSTATIN) powder Apply  to affected area as needed. diclofenac (VOLTAREN) 1 % gel Apply  to affected area as needed for Pain. acetaminophen (TYLENOL) 500 mg tablet Take  by mouth every six (6) hours as needed for Pain. folic acid (FOLVITE) 1 mg tablet Take  by mouth daily. thiamine HCL (B-1) 100 mg tablet Take  by mouth daily. melatonin 3 mg tablet Take  by mouth nightly. famotidine (PEPCID) 20 mg tablet Take 1 Tablet by mouth two (2) times daily as needed for Heartburn or Indigestion. estradioL (ESTRACE) 0.01 % (0.1 mg/gram) vaginal cream Insert 2 g into vagina daily. ketoconazole (NIZORAL) 2 % topical cream Apply  to affected area as needed. multivitamin (ONE A DAY) tablet Take 1 Tablet by mouth in the morning. vibegron (Gemtesa) 75 mg tab Take  by mouth Every morning. metoprolol succinate (TOPROL-XL) 50 mg XL tablet Take 50 mg by mouth Every morning. levothyroxine (SYNTHROID) 125 mcg tablet TAKE 1 TABLET BY MOUTH EVERY DAY BEFORE BREAKFAST     No current facility-administered medications for this visit.

## 2022-10-28 DIAGNOSIS — M17.10 KNEE ARTHROPATHY: Primary | ICD-10-CM

## 2022-10-28 RX ORDER — DICLOFENAC SODIUM 75 MG/1
75 TABLET, DELAYED RELEASE ORAL DAILY
Qty: 90 TABLET | Refills: 0 | Status: SHIPPED | OUTPATIENT
Start: 2022-10-28

## 2022-10-29 LAB
BACTERIA SPEC CULT: ABNORMAL
BACTERIA SPEC CULT: ABNORMAL
CC UR VC: ABNORMAL
SERVICE CMNT-IMP: ABNORMAL

## 2022-10-31 ENCOUNTER — TELEPHONE (OUTPATIENT)
Dept: INTERNAL MEDICINE CLINIC | Age: 72
End: 2022-10-31

## 2022-10-31 NOTE — TELEPHONE ENCOUNTER
Spoke with Terri/Nurse Dr. Kimberly mendoza and she requests that Labs be faxed to F # [phone] for the doctor to review. Faxed as requested. Grateful for the call.

## 2022-10-31 NOTE — TELEPHONE ENCOUNTER
Spoke with Karina at Dr. Whitley/Urology and requested to speak with his nurse. Unavailable at thsi time and Left a message. Await rtc.

## 2022-10-31 NOTE — TELEPHONE ENCOUNTER
----- Message from Hossein Galindo MD sent at 10/30/2022  3:13 PM EDT -----  Results reviewed. See MyChart Comment. hospitals, please call and speak with the nurse of her urologist at [facility] Dr. Patricia Reeves. Urine appears infected w enterococcus which is resistant. May need oral Linezolid or IV antibiotics  She has another stent now in her ureter.

## 2022-10-31 NOTE — TELEPHONE ENCOUNTER
----- Message from Meg Barth MD sent at 10/30/2022  3:13 PM EDT -----  Results reviewed. See MyChart Comment. [facility], please call and speak with the nurse of her urologist at [facility] Dr. Camacho Desai. Urine appears infected w enterococcus which is resistant. May need oral Linezolid or IV antibiotics  She has another stent now in her ureter.

## 2022-11-17 ENCOUNTER — PATIENT OUTREACH (OUTPATIENT)
Dept: CASE MANAGEMENT | Age: 72
End: 2022-11-17

## 2022-11-17 NOTE — PROGRESS NOTES
Care Transitions Follow Up Call    Challenges to be reviewed by the provider   Additional needs identified to be addressed with provider: no  None, patient has no red flags to report at this time. Method of communication with provider:  none, patient has no red flags to report at this time. Care Transition Nurse (CTN) contacted the patient by telephone to follow up after admission on 10-16-22. Verified name and  with patient as identifiers. Addressed changes since last contact:  Patient states she had her right ureteral stent replaced on 11-15-22. Follow up appointment completed? yes. Was follow up appointment scheduled within 7 days of discharge? yes. CTN reviewed discharge instructions, medical action plan and red flags with patient and discussed any barriers to care and/or understanding of plan of care after discharge. Discussed appropriate site of care based on symptoms and resources available to patient including: PCP, Specialist, Urgent Care Clinics, and When to call 911. The patient agrees to contact the PCP office for questions related to their healthcare. Patients top risk factors for readmission: Medical condition - Benign essential hypertension, colovesical fistula, and recent pyelonephritis and hydronephrosis per chart. Interventions to address risk factors: Obtained and reviewed discharge summary and/or continuity of care documents and Education of patient/family/caregiver/guardian to support self-management-Education provided regarding signs/symptoms of pyelonephritis, patient verbalized an understanding. [facility] follow up appointment(s): No future appointments. Non-[facility] follow up appointment(s): Patient states she continues to be followed by Dr. Yee Hallman Urology and Dr. Asim Giles/colon and rectal surgery. CTN provided contact information for future needs. No further follow-up call indicated based on severity of symptoms and risk factors. Episode of care will close on 11-18-22. Goals Addressed                   This Visit's Progress     Understands red flags post discharge. 10-20-22: Red flags of pyelonephritis reviewed with patient and patient's sister, Cynthia Arevalo (on [address] dated 10-4-2019), both patient and sister verbalized an understanding. Patient denies pain, denies chest pain, denies shortness of breath, denies fever/chills, and denies nausea/vomiting since discharge on 10-19-22. Patient states she is voiding well, no pain/discomfort upon urination, states urine is clear and yellow. Patient reports she has an ileostomy and states both she and her sister perform ileostomy care, patient states she empties her ileostomy bag. States she is utilizing a regular diet at home, appetite is fair. Denies having any falls in the last 12 months. Patient has no red flags to report at this time and states, \"I'm much better. \" Care Transitions Nurse will review red flags again on next phone conversation with patient. IRENE     11-17-22:  Red flags of pyelonephritis reviewed with patient, and patient verbalized an understanding. Patient denies pain, denies chest pain, denies shortness of breath, denies fever/chills, and denies nausea/vomiting. States she had her right ureteral stent replaced on 11-15-22, at the surgical suite of [facility], and states she is voiding well and taking AZO as ordered. States she and her sister continue to perform ileostomy care, patient states she empties ileostomy bag. Patient has no red flags to report at this time. Goal met.  Bailey Goff

## 2022-11-18 ENCOUNTER — PATIENT OUTREACH (OUTPATIENT)
Dept: CASE MANAGEMENT | Age: 72
End: 2022-11-18

## 2022-11-19 NOTE — PROGRESS NOTES
Patient has graduated from the Transitions of Care Coordination  program on 11-18-22. Patient/family has the ability to self-manage at this time Care management goals have been completed. Patient was not referred to the [facility] team for further management. Goals        Understands red flags post discharge. 10-20-22: Red flags of pyelonephritis reviewed with patient and patient's sister, Kong Reid (on [address] dated 10-4-2019), both patient and sister verbalized an understanding. Patient denies pain, denies chest pain, denies shortness of breath, denies fever/chills, and denies nausea/vomiting since discharge on 10-19-22. Patient states she is voiding well, no pain/discomfort upon urination, states urine is clear and yellow. Patient reports she has an ileostomy and states both she and her sister perform ileostomy care, patient states she empties her ileostomy bag. States she is utilizing a regular diet at home, appetite is fair. Denies having any falls in the last 12 months. Patient has no red flags to report at this time and states, \"I'm much better. \" Care Transitions Nurse will review red flags again on next phone conversation with patient. IRENE     11-17-22:  Red flags of pyelonephritis reviewed with patient, and patient verbalized an understanding. Patient denies pain, denies chest pain, denies shortness of breath, denies fever/chills, and denies nausea/vomiting. States she had her right ureteral stent replaced on 11-15-22, at the surgical suite of [facility], and states she is voiding well and taking AZO as ordered. States she and her sister continue to perform ileostomy care, patient states she empties ileostomy bag. Patient has no red flags to report at this time. Goal met. Berry Parra                   Patient has Care Transition Nurse's contact information for any further questions, concerns, or needs. Patients upcoming visits:  No future appointments.

## 2022-12-14 ENCOUNTER — APPOINTMENT (OUTPATIENT)
Dept: CT IMAGING | Age: 72
DRG: 390 | End: 2022-12-14
Attending: EMERGENCY MEDICINE
Payer: MEDICARE

## 2022-12-14 ENCOUNTER — HOSPITAL ENCOUNTER (INPATIENT)
Age: 72
LOS: 6 days | Discharge: HOME | DRG: 390 | End: 2022-12-20
Attending: EMERGENCY MEDICINE | Admitting: INTERNAL MEDICINE
Payer: MEDICARE

## 2022-12-14 DIAGNOSIS — E86.0 DEHYDRATION: ICD-10-CM

## 2022-12-14 DIAGNOSIS — K56.609 SBO (SMALL BOWEL OBSTRUCTION) (HCC): Primary | ICD-10-CM

## 2022-12-14 LAB
ALBUMIN SERPL-MCNC: 3.6 G/DL (ref 3.5–5)
ALBUMIN/GLOB SERPL: 0.8 {RATIO} (ref 1.1–2.2)
ALP SERPL-CCNC: 127 U/L (ref 45–117)
ALT SERPL-CCNC: 48 U/L (ref 12–78)
ANION GAP SERPL CALC-SCNC: 8 MMOL/L (ref 5–15)
AST SERPL-CCNC: 33 U/L (ref 15–37)
BASOPHILS # BLD: 0 K/UL (ref 0–0.1)
BASOPHILS NFR BLD: 0 % (ref 0–1)
BILIRUB SERPL-MCNC: 0.6 MG/DL (ref 0.2–1)
BUN SERPL-MCNC: 15 MG/DL (ref 6–20)
BUN/CREAT SERPL: 15 (ref 12–20)
CALCIUM SERPL-MCNC: 10.4 MG/DL (ref 8.5–10.1)
CHLORIDE SERPL-SCNC: 104 MMOL/L (ref 97–108)
CO2 SERPL-SCNC: 25 MMOL/L (ref 21–32)
COMMENT, HOLDF: NORMAL
CREAT SERPL-MCNC: 0.99 MG/DL (ref 0.55–1.02)
DIFFERENTIAL METHOD BLD: ABNORMAL
EOSINOPHIL # BLD: 0.2 K/UL (ref 0–0.4)
EOSINOPHIL NFR BLD: 2 % (ref 0–7)
ERYTHROCYTE [DISTWIDTH] IN BLOOD BY AUTOMATED COUNT: 13.1 % (ref 11.5–14.5)
GLOBULIN SER CALC-MCNC: 4.6 G/DL (ref 2–4)
GLUCOSE SERPL-MCNC: 143 MG/DL (ref 65–100)
HCT VFR BLD AUTO: 44.5 % (ref 35–47)
HGB BLD-MCNC: 14.8 G/DL (ref 11.5–16)
IMM GRANULOCYTES # BLD AUTO: 0 K/UL (ref 0–0.04)
IMM GRANULOCYTES NFR BLD AUTO: 0 % (ref 0–0.5)
LACTATE BLD-SCNC: 1.57 MMOL/L (ref 0.4–2)
LIPASE SERPL-CCNC: 80 U/L (ref 73–393)
LYMPHOCYTES # BLD: 1.7 K/UL (ref 0.8–3.5)
LYMPHOCYTES NFR BLD: 17 % (ref 12–49)
MCH RBC QN AUTO: 32.4 PG (ref 26–34)
MCHC RBC AUTO-ENTMCNC: 33.3 G/DL (ref 30–36.5)
MCV RBC AUTO: 97.4 FL (ref 80–99)
MONOCYTES # BLD: 0.5 K/UL (ref 0–1)
MONOCYTES NFR BLD: 5 % (ref 5–13)
NEUTS SEG # BLD: 7.4 K/UL (ref 1.8–8)
NEUTS SEG NFR BLD: 76 % (ref 32–75)
NRBC # BLD: 0 K/UL (ref 0–0.01)
NRBC BLD-RTO: 0 PER 100 WBC
PLATELET # BLD AUTO: 289 K/UL (ref 150–400)
PMV BLD AUTO: 11.1 FL (ref 8.9–12.9)
POTASSIUM SERPL-SCNC: 4.4 MMOL/L (ref 3.5–5.1)
PROT SERPL-MCNC: 8.2 G/DL (ref 6.4–8.2)
RBC # BLD AUTO: 4.57 M/UL (ref 3.8–5.2)
SAMPLES BEING HELD,HOLD: NORMAL
SODIUM SERPL-SCNC: 137 MMOL/L (ref 136–145)
TROPONIN-HIGH SENSITIVITY: 9 NG/L (ref 0–51)
WBC # BLD AUTO: 9.7 K/UL (ref 3.6–11)

## 2022-12-14 PROCEDURE — 96372 THER/PROPH/DIAG INJ SC/IM: CPT

## 2022-12-14 PROCEDURE — 83605 ASSAY OF LACTIC ACID: CPT

## 2022-12-14 PROCEDURE — 74011250636 HC RX REV CODE- 250/636: Performed by: NURSE PRACTITIONER

## 2022-12-14 PROCEDURE — 85025 COMPLETE CBC W/AUTO DIFF WBC: CPT

## 2022-12-14 PROCEDURE — 96374 THER/PROPH/DIAG INJ IV PUSH: CPT

## 2022-12-14 PROCEDURE — 65270000029 HC RM PRIVATE

## 2022-12-14 PROCEDURE — 74011250636 HC RX REV CODE- 250/636: Performed by: EMERGENCY MEDICINE

## 2022-12-14 PROCEDURE — 99285 EMERGENCY DEPT VISIT HI MDM: CPT

## 2022-12-14 PROCEDURE — 80053 COMPREHEN METABOLIC PANEL: CPT

## 2022-12-14 PROCEDURE — 74011000250 HC RX REV CODE- 250: Performed by: INTERNAL MEDICINE

## 2022-12-14 PROCEDURE — 74176 CT ABD & PELVIS W/O CONTRAST: CPT

## 2022-12-14 PROCEDURE — 84484 ASSAY OF TROPONIN QUANT: CPT

## 2022-12-14 PROCEDURE — 36415 COLL VENOUS BLD VENIPUNCTURE: CPT

## 2022-12-14 PROCEDURE — 83690 ASSAY OF LIPASE: CPT

## 2022-12-14 PROCEDURE — 96375 TX/PRO/DX INJ NEW DRUG ADDON: CPT

## 2022-12-14 PROCEDURE — 74011250636 HC RX REV CODE- 250/636: Performed by: INTERNAL MEDICINE

## 2022-12-14 RX ORDER — LEVOTHYROXINE SODIUM 125 UG/1
125 TABLET ORAL
Status: DISCONTINUED | OUTPATIENT
Start: 2022-12-15 | End: 2022-12-20 | Stop reason: HOSPADM

## 2022-12-14 RX ORDER — DICYCLOMINE HYDROCHLORIDE 10 MG/ML
20 INJECTION INTRAMUSCULAR
Status: COMPLETED | OUTPATIENT
Start: 2022-12-14 | End: 2022-12-14

## 2022-12-14 RX ORDER — POLYETHYLENE GLYCOL 3350 17 G/17G
17 POWDER, FOR SOLUTION ORAL DAILY PRN
Status: DISCONTINUED | OUTPATIENT
Start: 2022-12-14 | End: 2022-12-16

## 2022-12-14 RX ORDER — MORPHINE SULFATE 2 MG/ML
2 INJECTION, SOLUTION INTRAMUSCULAR; INTRAVENOUS
Status: DISCONTINUED | OUTPATIENT
Start: 2022-12-14 | End: 2022-12-20 | Stop reason: HOSPADM

## 2022-12-14 RX ORDER — ENOXAPARIN SODIUM 100 MG/ML
40 INJECTION SUBCUTANEOUS DAILY
Status: DISCONTINUED | OUTPATIENT
Start: 2022-12-15 | End: 2022-12-17

## 2022-12-14 RX ORDER — ONDANSETRON 2 MG/ML
4 INJECTION INTRAMUSCULAR; INTRAVENOUS
Status: DISCONTINUED | OUTPATIENT
Start: 2022-12-14 | End: 2022-12-20 | Stop reason: HOSPADM

## 2022-12-14 RX ORDER — ONDANSETRON 2 MG/ML
4 INJECTION INTRAMUSCULAR; INTRAVENOUS ONCE
Status: COMPLETED | OUTPATIENT
Start: 2022-12-14 | End: 2022-12-14

## 2022-12-14 RX ORDER — ACETAMINOPHEN 650 MG/1
650 SUPPOSITORY RECTAL
Status: DISCONTINUED | OUTPATIENT
Start: 2022-12-14 | End: 2022-12-20 | Stop reason: HOSPADM

## 2022-12-14 RX ORDER — MORPHINE SULFATE 4 MG/ML
3 INJECTION INTRAVENOUS
Status: COMPLETED | OUTPATIENT
Start: 2022-12-14 | End: 2022-12-14

## 2022-12-14 RX ORDER — METOPROLOL SUCCINATE 50 MG/1
50 TABLET, EXTENDED RELEASE ORAL EVERY MORNING
Status: DISCONTINUED | OUTPATIENT
Start: 2022-12-15 | End: 2022-12-20 | Stop reason: HOSPADM

## 2022-12-14 RX ORDER — SODIUM CHLORIDE 0.9 % (FLUSH) 0.9 %
5-40 SYRINGE (ML) INJECTION EVERY 8 HOURS
Status: DISCONTINUED | OUTPATIENT
Start: 2022-12-14 | End: 2022-12-20 | Stop reason: HOSPADM

## 2022-12-14 RX ORDER — ACETAMINOPHEN 325 MG/1
650 TABLET ORAL
Status: DISCONTINUED | OUTPATIENT
Start: 2022-12-14 | End: 2022-12-20 | Stop reason: HOSPADM

## 2022-12-14 RX ORDER — ONDANSETRON 4 MG/1
4 TABLET, ORALLY DISINTEGRATING ORAL
Status: DISCONTINUED | OUTPATIENT
Start: 2022-12-14 | End: 2022-12-20 | Stop reason: HOSPADM

## 2022-12-14 RX ORDER — SODIUM CHLORIDE 0.9 % (FLUSH) 0.9 %
5-40 SYRINGE (ML) INJECTION AS NEEDED
Status: DISCONTINUED | OUTPATIENT
Start: 2022-12-14 | End: 2022-12-20 | Stop reason: HOSPADM

## 2022-12-14 RX ADMIN — DICYCLOMINE HYDROCHLORIDE 20 MG: 10 INJECTION, SOLUTION INTRAMUSCULAR at 16:32

## 2022-12-14 RX ADMIN — ONDANSETRON 4 MG: 2 INJECTION INTRAMUSCULAR; INTRAVENOUS at 21:29

## 2022-12-14 RX ADMIN — SODIUM CHLORIDE, POTASSIUM CHLORIDE, SODIUM LACTATE AND CALCIUM CHLORIDE 1000 ML: 600; 310; 30; 20 INJECTION, SOLUTION INTRAVENOUS at 16:28

## 2022-12-14 RX ADMIN — Medication 10 ML: at 22:00

## 2022-12-14 RX ADMIN — MORPHINE SULFATE 3 MG: 4 INJECTION INTRAVENOUS at 16:32

## 2022-12-14 RX ADMIN — MORPHINE SULFATE 2 MG: 2 INJECTION, SOLUTION INTRAMUSCULAR; INTRAVENOUS at 21:29

## 2022-12-14 RX ADMIN — ONDANSETRON 4 MG: 2 INJECTION INTRAMUSCULAR; INTRAVENOUS at 16:15
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
## 2022-12-14 NOTE — ED TRIAGE NOTES
Patient had ureter stent removed yesterday and woke up late last night with severe abdominal pain.  Called urologist and was advised to come to ED

## 2022-12-15 PROCEDURE — 74011000250 HC RX REV CODE- 250: Performed by: INTERNAL MEDICINE

## 2022-12-15 PROCEDURE — 74011250637 HC RX REV CODE- 250/637: Performed by: INTERNAL MEDICINE

## 2022-12-15 PROCEDURE — 74011250636 HC RX REV CODE- 250/636: Performed by: FAMILY MEDICINE

## 2022-12-15 PROCEDURE — 74011250636 HC RX REV CODE- 250/636: Performed by: INTERNAL MEDICINE

## 2022-12-15 PROCEDURE — 65270000029 HC RM PRIVATE

## 2022-12-15 RX ORDER — KETOROLAC TROMETHAMINE 10 MG/1
TABLET, FILM COATED ORAL
COMMUNITY

## 2022-12-15 RX ORDER — HYDROMORPHONE HYDROCHLORIDE 1 MG/ML
1 INJECTION, SOLUTION INTRAMUSCULAR; INTRAVENOUS; SUBCUTANEOUS
Status: DISCONTINUED | OUTPATIENT
Start: 2022-12-15 | End: 2022-12-20 | Stop reason: HOSPADM

## 2022-12-15 RX ORDER — SODIUM CHLORIDE 9 MG/ML
100 INJECTION, SOLUTION INTRAVENOUS CONTINUOUS
Status: DISCONTINUED | OUTPATIENT
Start: 2022-12-15 | End: 2022-12-16

## 2022-12-15 RX ADMIN — SODIUM CHLORIDE 100 ML/HR: 9 INJECTION, SOLUTION INTRAVENOUS at 10:15

## 2022-12-15 RX ADMIN — MORPHINE SULFATE 2 MG: 2 INJECTION, SOLUTION INTRAMUSCULAR; INTRAVENOUS at 05:38

## 2022-12-15 RX ADMIN — HYDROMORPHONE HYDROCHLORIDE 1 MG: 1 INJECTION, SOLUTION INTRAMUSCULAR; INTRAVENOUS; SUBCUTANEOUS at 20:13

## 2022-12-15 RX ADMIN — METOPROLOL SUCCINATE 50 MG: 50 TABLET, EXTENDED RELEASE ORAL at 08:40

## 2022-12-15 RX ADMIN — Medication 10 ML: at 05:39

## 2022-12-15 RX ADMIN — MORPHINE SULFATE 2 MG: 2 INJECTION, SOLUTION INTRAMUSCULAR; INTRAVENOUS at 02:40

## 2022-12-15 RX ADMIN — Medication 10 ML: at 20:13

## 2022-12-15 RX ADMIN — ONDANSETRON 4 MG: 2 INJECTION INTRAMUSCULAR; INTRAVENOUS at 02:54

## 2022-12-15 RX ADMIN — SODIUM CHLORIDE 100 ML/HR: 9 INJECTION, SOLUTION INTRAVENOUS at 20:19

## 2022-12-15 RX ADMIN — HYDROMORPHONE HYDROCHLORIDE 1 MG: 1 INJECTION, SOLUTION INTRAMUSCULAR; INTRAVENOUS; SUBCUTANEOUS at 13:01

## 2022-12-15 RX ADMIN — MORPHINE SULFATE 2 MG: 2 INJECTION, SOLUTION INTRAMUSCULAR; INTRAVENOUS at 08:59

## 2022-12-15 RX ADMIN — ONDANSETRON 4 MG: 2 INJECTION INTRAMUSCULAR; INTRAVENOUS at 08:59

## 2022-12-15 RX ADMIN — ONDANSETRON 4 MG: 2 INJECTION INTRAMUSCULAR; INTRAVENOUS at 20:13

## 2022-12-15 NOTE — PROGRESS NOTES
TRANSFER - OUT REPORT:    Verbal report given to dandy Mosley(name) on Louisa  being transferred to 534(unit) for routine progression of care       Report consisted of patients Situation, Background, Assessment and   Recommendations(SBAR). Information from the following report(s) SBAR, Kardex, ED Summary, OR Summary, Procedure Summary, Intake/Output, and MAR was reviewed with the receiving nurse. Lines:   Peripheral IV 12/14/22 Left Antecubital (Active)   Site Assessment Clean, dry, & intact 12/15/22 0100   Phlebitis Assessment 0 12/15/22 0100   Infiltration Assessment 0 12/15/22 0100   Dressing Status Clean, dry, & intact 12/15/22 0100   Dressing Type Transparent 12/15/22 0100   Hub Color/Line Status Pink 12/15/22 0100   Alcohol Cap Used Yes 12/15/22 0100        Opportunity for questions and clarification was provided.       Patient transported with:   Registered Nurse

## 2022-12-15 NOTE — ED NOTES
Verbal report given to [facility] RN at this time. Opportunity for questions provided.

## 2022-12-15 NOTE — ROUTINE PROCESS
Bedside and Verbal shift change report given to [id number] Eloise Joseph (oncoming nurse) by Aj Kuo (offgoing nurse). Report included the following information SBAR and Kardex.
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
## 2022-12-15 NOTE — PROGRESS NOTES
[address]  [id number] [facility] Adult  Hospitalist Group                                                                                          Hospitalist Progress Note  Michael Holman MD        Date of Service:  12/15/2022  NAME:  Laury Johnson  :  1950  MRN:  [id number]      Admission Summary:   26-year-old female with a history of ostomy presented with abdominal pain and was found to have small bowel obstruction    Interval history / Subjective:   Patient had severe abdominal cramping and nausea this morning. Per RN vomited about 500 cc. Currently patient states she feels okay and no longer nauseous after getting Zofran     Assessment & Plan:     Small bowel obstruction  -Suspect distal jejunal SBO based on CT findings  -Medical management for now  -Keep n.p.o., IV Dilaudid and IV Zofran for symptom control  -Low threshold to insert NG tube if patient continues vomiting  -General surgery consulted    Hypertension  -Continue with metoprolol    Hypothyroidism  -Continue with levothyroxine    Code status: Full code  DVT prophylaxis: [facility] Problems  Date Reviewed: 10/26/2022            Codes Class Noted POA    SBO (small bowel obstruction) ([facility] ICD-10-CM: H18.184  ICD-9-CM: 560.9  2022 Unknown             Review of Systems:   A comprehensive review of systems was negative except for that written in the HPI. Vital Signs:    Last 24hrs VS reviewed since prior progress note.  Most recent are:  Visit Vitals  BP (!) 161/77 (BP 1 Location: Right upper arm, BP Patient Position: At rest)   Pulse 96   Temp 97.7 °F (36.5 °C)   Resp 20   Ht 5' 3\" (1.6 m)   Wt 101.6 kg (224 lb)   SpO2 96%   BMI 39.68 kg/m²         Intake/Output Summary (Last 24 hours) at 12/15/2022 1005  Last data filed at 12/15/2022 0904  Gross per 24 hour   Intake 0 ml   Output 500 ml   Net -500 ml        Physical Examination:             Constitutional:  No acute distress, cooperative, pleasant    ENT:  Oral mucosa moist, oropharynx benign. Resp:  CTA bilaterally. No wheezing/rhonchi/rales. No accessory muscle use   CV:  Regular rhythm, normal rate, no murmurs, gallops, rubs    GI:  Soft, non distended, non tender. Very minimal output from ostomy    Musculoskeletal:  No edema, warm, 2+ pulses throughout    Neurologic:  Moves all extremities. AAOx3, CN II-XII reviewed     Psych:  Good insight, Not anxious nor agitated. Data Review:    Review and/or order of clinical lab test      Labs:     Recent Labs     12/14/22  1608   WBC 9.7   HGB 14.8   HCT 44.5        Recent Labs     12/14/22  1608      K 4.4      CO2 25   BUN 15   CREA 0.99   *   CA 10.4*     Recent Labs     12/14/22  1608   ALT 48   *   TBILI 0.6   TP 8.2   ALB 3.6   GLOB 4.6*   LPSE 80     No results for input(s): INR, PTP, APTT, INREXT in the last 72 hours. No results for input(s): FE, TIBC, PSAT, FERR in the last 72 hours. No results found for: FOL, RBCF   No results for input(s): PH, PCO2, PO2 in the last 72 hours. No results for input(s): CPK, CKNDX, TROIQ in the last 72 hours.     No lab exists for component: CPKMB  Lab Results   Component Value Date/Time    Cholesterol, total 193 12/10/2020 02:41 PM    HDL Cholesterol 36 12/10/2020 02:41 PM    LDL, calculated 136 (H) 12/10/2020 02:41 PM    Triglyceride 125 08/24/2022 12:26 AM    CHOL/HDL Ratio 5.4 (H) 12/10/2020 02:41 PM     Lab Results   Component Value Date/Time    Glucose (POC) 81 08/29/2022 05:03 AM    Glucose (POC) 87 08/28/2022 11:55 PM    Glucose (POC) 100 08/28/2022 05:44 PM    Glucose (POC) 120 (H) 08/28/2022 03:26 PM    Glucose (POC) 134 (H) 08/28/2022 12:00 PM     Lab Results   Component Value Date/Time    Color YELLOW/STRAW 10/17/2022 09:30 AM    Appearance TURBID (A) 10/17/2022 09:30 AM    Specific gravity 1.010 10/17/2022 09:30 AM    pH (UA) 5.0 10/17/2022 09:30 AM    Protein 100 (A) 10/17/2022 09:30 AM    Glucose Negative 10/17/2022 09:30 AM    Ketone Negative 10/17/2022 09:30 AM    Bilirubin Negative 10/17/2022 09:30 AM    Urobilinogen 0.2 10/17/2022 09:30 AM    Nitrites Negative 10/17/2022 09:30 AM    Leukocyte Esterase LARGE (A) 10/17/2022 09:30 AM    Epithelial cells FEW 10/17/2022 09:30 AM    Bacteria Negative 10/17/2022 09:30 AM    WBC >100 (H) 10/17/2022 09:30 AM    RBC 10-20 10/17/2022 09:30 AM         Medications Reviewed:     Current Facility-Administered Medications   Medication Dose Route Frequency    HYDROmorphone (DILAUDID) injection 1 mg  1 mg IntraVENous Q4H PRN    sodium chloride (NS) flush 5-40 mL  5-40 mL IntraVENous Q8H    sodium chloride (NS) flush 5-40 mL  5-40 mL IntraVENous PRN    acetaminophen (TYLENOL) tablet 650 mg  650 mg Oral Q6H PRN    Or    acetaminophen (TYLENOL) suppository 650 mg  650 mg Rectal Q6H PRN    polyethylene glycol (MIRALAX) packet 17 g  17 g Oral DAILY PRN    ondansetron (ZOFRAN ODT) tablet 4 mg  4 mg Oral Q8H PRN    Or    ondansetron (ZOFRAN) injection 4 mg  4 mg IntraVENous Q6H PRN    enoxaparin (LOVENOX) injection 40 mg  40 mg SubCUTAneous DAILY    levothyroxine (SYNTHROID) tablet 125 mcg  125 mcg Oral 6am    metoprolol succinate (TOPROL-XL) XL tablet 50 mg  50 mg Oral QAM    morphine injection 2 mg  2 mg IntraVENous Q4H PRN     ______________________________________________________________________  EXPECTED LENGTH OF STAY: - - -  ACTUAL LENGTH OF STAY:          1                 Rui Espinosa MD

## 2022-12-15 NOTE — PROGRESS NOTES
Problem: Hypertension  Goal: *Blood pressure within specified parameters  Outcome: Progressing Towards Goal  Goal: *Fluid volume balance  Outcome: Progressing Towards Goal  Goal: *Labs within defined limits  Outcome: Progressing Towards Goal     Problem: Patient Education: Go to Patient Education Activity  Goal: Patient/Family Education  Outcome: Progressing Towards Goal     Problem: Patient Education: Go to Patient Education Activity  Goal: Patient/Family Education  Outcome: Progressing Towards Goal     Problem: Patient Education: Go to Patient Education Activity  Goal: Patient/Family Education  Outcome: Progressing Towards Goal     Problem: Patient Education: Go to Patient Education Activity  Goal: Patient/Family Education  Outcome: Progressing Towards Goal     Problem: Falls - Risk of  Goal: *Absence of Falls  Outcome: Progressing Towards Goal  Note: Fall Risk Interventions:  Mobility Interventions: Bed/chair exit alarm, Communicate number of staff needed for ambulation/transfer, Patient to call before getting OOB         Medication Interventions: Bed/chair exit alarm, Patient to call before getting OOB, Teach patient to arise slowly    Elimination Interventions: Call light in reach, Stay With Me (per policy), Patient to call for help with toileting needs, Toileting schedule/hourly rounds              Problem: Aspiration - Risk of  Goal: *Absence of aspiration  Outcome: Progressing Towards Goal     Problem: Impaired Skin Integrity/Pressure Injury Treatment  Goal: *Improvement of Existing Pressure Injury  Outcome: Progressing Towards Goal  Goal: *Prevention of pressure injury  Outcome: Progressing Towards Goal  Note: Pressure Injury Interventions:       Moisture Interventions: Absorbent underpads, Minimize layers, Moisture barrier, Maintain skin hydration (lotion/cream)    Activity Interventions: Pressure redistribution bed/mattress(bed type)    Mobility Interventions: HOB 30 degrees or less, Pressure redistribution bed/mattress (bed type)    Nutrition Interventions: Document food/fluid/supplement intake    Friction and Shear Interventions: HOB 30 degrees or less, Minimize layers, Apply protective barrier, creams and emollients

## 2022-12-15 NOTE — PROGRESS NOTES
12/15/22  4:28 PM    Care Management Initial Evaluation:    Reason for Admission:     1. SBO (small bowel obstruction) (HCC)    2. Dehydration                        RUR Score:     15%  Level: Moderate             PCP: First and Last name:   Liliana Rayo MD     Name of Practice: Internal Medicine [facility]   Are you a current patient: Yes/No: Yes   Approximate date of last visit: 10/26/22   Can you participate in a virtual visit if needed:     Do you (patient/family) have any concerns for transition/discharge? Not at this time              Plan for utilizing home health:   Patient is open with [facility] home health- will need a resumption order at dc    Rehab history: José Coronel at [facility] in September    Current Advanced Directive/Advance Care Plan:  Full Code      Healthcare Decision Maker:   Click here to complete [address] including selection of the Healthcare Decision Maker Relationship (ie \"Primary\")            Primary Decision Maker: Estee Turner - Sister - [phone]    Secondary Decision Maker: Taylor Roa - Daughter - [phone]    Transition of Care Plan:        -Patient lives with her sister and BERTHA in a one level home with a ramp to enter. Her sister and BERTHA are going out of town but her nephew is available to assist.  -Patient has a rollator,  and [facility]  -She is normally independent with all ADL's, needs assistance with meals, supervision for meals    1). Patient admitted for emergent care  2). PT/OT consulted  3). Family to transport at dc  4). Resumption at dc - [facility]  5). CM falling for [facility] Management Interventions  PCP Verified by CM: Yes  Last Visit to PCP: 10/26/22  Mode of Transport at Discharge:  Other (see comment)  Transition of Care Consult (CM Consult): Discharge Planning  Discharge Durable Medical Equipment: No  Physical Therapy Consult: Yes  Occupational Therapy Consult: Yes  Speech Therapy Consult: No  Support Systems: Other Family Member(s)  Confirm Follow Up Transport: Family  Discharge Location  Patient Expects to be Discharged to[de-identified] Unable to determine at this time

## 2022-12-15 NOTE — ROUTINE PROCESS
TRANSFER - IN REPORT:    Verbal report received from [facility] JANIE ROBINRN(name) on Maryam Delgado  being received from ER(unit) for routine progression of care      Report consisted of patients Situation, Background, Assessment and   Recommendations(SBAR). Information from the following report(s) SBAR, Kardex, ED Summary, MAR, and Recent Results was reviewed with the receiving nurse. Opportunity for questions and clarification was provided. Assessment completed upon patients arrival to unit and care assumed.
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
## 2022-12-15 NOTE — PROGRESS NOTES
Problem: Hypertension  Goal: *Blood pressure within specified parameters  Outcome: Progressing Towards Goal  Goal: *Fluid volume balance  Outcome: Progressing Towards Goal  Goal: *Labs within defined limits  Outcome: Progressing Towards Goal     Problem: Patient Education: Go to Patient Education Activity  Goal: Patient/Family Education  Outcome: Progressing Towards Goal     Problem: Patient Education: Go to Patient Education Activity  Goal: Patient/Family Education  Outcome: Progressing Towards Goal     Problem: TIA/CVA Stroke: 0-24 hours  Goal: Off Pathway (Use only if patient is Off Pathway)  Outcome: Progressing Towards Goal  Goal: Activity/Safety  Outcome: Progressing Towards Goal  Goal: Consults, if ordered  Outcome: Progressing Towards Goal  Goal: Diagnostic Test/Procedures  Outcome: Progressing Towards Goal  Goal: Nutrition/Diet  Outcome: Progressing Towards Goal  Goal: Discharge Planning  Outcome: Progressing Towards Goal  Goal: Medications  Outcome: Progressing Towards Goal  Goal: Respiratory  Outcome: Progressing Towards Goal  Goal: Treatments/Interventions/Procedures  Outcome: Progressing Towards Goal  Goal: Minimize risk of bleeding post-thrombolytic infusion  Outcome: Progressing Towards Goal  Goal: Monitor for complications post-thrombolytic infusion  Outcome: Progressing Towards Goal  Goal: Psychosocial  Outcome: Progressing Towards Goal  Goal: *Hemodynamically stable  Outcome: Progressing Towards Goal  Goal: *Neurologically stable  Description: Absence of additional neurological deficits    Outcome: Progressing Towards Goal  Goal: *Verbalizes anxiety and depression are reduced or absent  Outcome: Progressing Towards Goal  Goal: *Absence of Signs of Aspiration on Current Diet  Outcome: Progressing Towards Goal  Goal: *Absence of deep venous thrombosis signs and symptoms(Stroke Metric)  Outcome: Progressing Towards Goal  Goal: *Ability to perform ADLs and demonstrates progressive mobility and function  Outcome: Progressing Towards Goal  Goal: *Stroke education started(Stroke Metric)  Outcome: Progressing Towards Goal  Goal: *Dysphagia screen performed(Stroke Metric)  Outcome: Progressing Towards Goal  Goal: *Rehab consulted(Stroke Metric)  Outcome: Progressing Towards Goal     Problem: TIA/CVA Stroke: Day 2 Until Discharge  Goal: Off Pathway (Use only if patient is Off Pathway)  Outcome: Progressing Towards Goal  Goal: Activity/Safety  Outcome: Progressing Towards Goal  Goal: Diagnostic Test/Procedures  Outcome: Progressing Towards Goal  Goal: Nutrition/Diet  Outcome: Progressing Towards Goal  Goal: Discharge Planning  Outcome: Progressing Towards Goal  Goal: Medications  Outcome: Progressing Towards Goal  Goal: Respiratory  Outcome: Progressing Towards Goal  Goal: Treatments/Interventions/Procedures  Outcome: Progressing Towards Goal  Goal: Psychosocial  Outcome: Progressing Towards Goal  Goal: *Verbalizes anxiety and depression are reduced or absent  Outcome: Progressing Towards Goal  Goal: *Absence of aspiration  Outcome: Progressing Towards Goal  Goal: *Absence of deep venous thrombosis signs and symptoms(Stroke Metric)  Outcome: Progressing Towards Goal  Goal: *Optimal pain control at patient's stated goal  Outcome: Progressing Towards Goal  Goal: *Tolerating diet  Outcome: Progressing Towards Goal  Goal: *Ability to perform ADLs and demonstrates progressive mobility and function  Outcome: Progressing Towards Goal  Goal: *Stroke education continued(Stroke Metric)  Outcome: Progressing Towards Goal     Problem: Ischemic Stroke: Discharge Outcomes  Goal: *Verbalizes anxiety and depression are reduced or absent  Outcome: Progressing Towards Goal  Goal: *Verbalize understanding of risk factor modification(Stroke Metric)  Outcome: Progressing Towards Goal  Goal: *Hemodynamically stable  Outcome: Progressing Towards Goal  Goal: *Absence of aspiration pneumonia  Outcome: Progressing Towards Goal  Goal: *Aware of needed dietary changes  Outcome: Progressing Towards Goal  Goal: *Verbalize understanding of prescribed medications including anti-coagulants, anti-lipid, and/or anti-platelets(Stroke Metric)  Outcome: Progressing Towards Goal  Goal: *Tolerating diet  Outcome: Progressing Towards Goal  Goal: *Aware of follow-up diagnostics related to anticoagulants  Outcome: Progressing Towards Goal  Goal: *Ability to perform ADLs and demonstrates progressive mobility and function  Outcome: Progressing Towards Goal  Goal: *Absence of DVT(Stroke Metric)  Outcome: Progressing Towards Goal  Goal: *Absence of aspiration  Outcome: Progressing Towards Goal  Goal: *Optimal pain control at patient's stated goal  Outcome: Progressing Towards Goal  Goal: *Home safety concerns addressed  Outcome: Progressing Towards Goal  Goal: *Describes available resources and support systems  Outcome: Progressing Towards Goal  Goal: *Verbalizes understanding of activation of EMS(911) for stroke symptoms(Stroke Metric)  Outcome: Progressing Towards Goal  Goal: *Understands and describes signs and symptoms to report to providers(Stroke Metric)  Outcome: Progressing Towards Goal  Goal: *Neurolgocially stable (absence of additional neurological deficits)  Outcome: Progressing Towards Goal  Goal: *Verbalizes importance of follow-up with primary care physician(Stroke Metric)  Outcome: Progressing Towards Goal  Goal: *Smoking cessation discussed,if applicable(Stroke Metric)  Outcome: Progressing Towards Goal  Goal: *Depression screening completed(Stroke Metric)  Outcome: Progressing Towards Goal     Problem: Falls - Risk of  Goal: *Absence of Falls  Description: Document Lawanda Fall Risk and appropriate interventions in the flowsheet.   Outcome: Progressing Towards Goal  Note: Fall Risk Interventions:                                Problem: Patient Education: Go to Patient Education Activity  Goal: Patient/Family Education  Outcome: Progressing Towards Goal

## 2022-12-16 LAB
ANION GAP SERPL CALC-SCNC: 9 MMOL/L (ref 5–15)
BASOPHILS # BLD: 0 K/UL (ref 0–0.1)
BASOPHILS NFR BLD: 0 % (ref 0–1)
BUN SERPL-MCNC: 10 MG/DL (ref 6–20)
BUN/CREAT SERPL: 11 (ref 12–20)
CALCIUM SERPL-MCNC: 10 MG/DL (ref 8.5–10.1)
CHLORIDE SERPL-SCNC: 106 MMOL/L (ref 97–108)
CO2 SERPL-SCNC: 27 MMOL/L (ref 21–32)
CREAT SERPL-MCNC: 0.88 MG/DL (ref 0.55–1.02)
DIFFERENTIAL METHOD BLD: ABNORMAL
EOSINOPHIL # BLD: 0.3 K/UL (ref 0–0.4)
EOSINOPHIL NFR BLD: 2 % (ref 0–7)
ERYTHROCYTE [DISTWIDTH] IN BLOOD BY AUTOMATED COUNT: 13.1 % (ref 11.5–14.5)
GLUCOSE SERPL-MCNC: 113 MG/DL (ref 65–100)
HCT VFR BLD AUTO: 39.9 % (ref 35–47)
HGB BLD-MCNC: 13.2 G/DL (ref 11.5–16)
IMM GRANULOCYTES # BLD AUTO: 0.1 K/UL (ref 0–0.04)
IMM GRANULOCYTES NFR BLD AUTO: 0 % (ref 0–0.5)
LYMPHOCYTES # BLD: 2.9 K/UL (ref 0.8–3.5)
LYMPHOCYTES NFR BLD: 23 % (ref 12–49)
MCH RBC QN AUTO: 32.8 PG (ref 26–34)
MCHC RBC AUTO-ENTMCNC: 33.1 G/DL (ref 30–36.5)
MCV RBC AUTO: 99 FL (ref 80–99)
MONOCYTES # BLD: 0.7 K/UL (ref 0–1)
MONOCYTES NFR BLD: 6 % (ref 5–13)
NEUTS SEG # BLD: 8.5 K/UL (ref 1.8–8)
NEUTS SEG NFR BLD: 69 % (ref 32–75)
NRBC # BLD: 0 K/UL (ref 0–0.01)
NRBC BLD-RTO: 0 PER 100 WBC
PLATELET # BLD AUTO: 264 K/UL (ref 150–400)
PMV BLD AUTO: 11.6 FL (ref 8.9–12.9)
POTASSIUM SERPL-SCNC: 3.7 MMOL/L (ref 3.5–5.1)
RBC # BLD AUTO: 4.03 M/UL (ref 3.8–5.2)
SODIUM SERPL-SCNC: 142 MMOL/L (ref 136–145)
WBC # BLD AUTO: 12.4 K/UL (ref 3.6–11)

## 2022-12-16 PROCEDURE — 65270000029 HC RM PRIVATE

## 2022-12-16 PROCEDURE — 74011250637 HC RX REV CODE- 250/637: Performed by: COLON & RECTAL SURGERY

## 2022-12-16 PROCEDURE — 74011250637 HC RX REV CODE- 250/637: Performed by: INTERNAL MEDICINE

## 2022-12-16 PROCEDURE — 80048 BASIC METABOLIC PNL TOTAL CA: CPT

## 2022-12-16 PROCEDURE — 74011000250 HC RX REV CODE- 250: Performed by: INTERNAL MEDICINE

## 2022-12-16 PROCEDURE — 74011250636 HC RX REV CODE- 250/636: Performed by: COLON & RECTAL SURGERY

## 2022-12-16 PROCEDURE — 85025 COMPLETE CBC W/AUTO DIFF WBC: CPT

## 2022-12-16 PROCEDURE — 74011250636 HC RX REV CODE- 250/636: Performed by: FAMILY MEDICINE

## 2022-12-16 PROCEDURE — 36415 COLL VENOUS BLD VENIPUNCTURE: CPT

## 2022-12-16 PROCEDURE — 74011250636 HC RX REV CODE- 250/636: Performed by: INTERNAL MEDICINE

## 2022-12-16 RX ORDER — POLYETHYLENE GLYCOL 3350 17 G/17G
17 POWDER, FOR SOLUTION ORAL DAILY
Status: DISCONTINUED | OUTPATIENT
Start: 2022-12-16 | End: 2022-12-17

## 2022-12-16 RX ORDER — DEXTROSE, SODIUM CHLORIDE, AND POTASSIUM CHLORIDE 5; .45; .15 G/100ML; G/100ML; G/100ML
125 INJECTION INTRAVENOUS CONTINUOUS
Status: DISCONTINUED | OUTPATIENT
Start: 2022-12-16 | End: 2022-12-18

## 2022-12-16 RX ADMIN — Medication 10 ML: at 06:29

## 2022-12-16 RX ADMIN — HYDROMORPHONE HYDROCHLORIDE 1 MG: 1 INJECTION, SOLUTION INTRAMUSCULAR; INTRAVENOUS; SUBCUTANEOUS at 01:02

## 2022-12-16 RX ADMIN — ENOXAPARIN SODIUM 40 MG: 100 INJECTION SUBCUTANEOUS at 08:27

## 2022-12-16 RX ADMIN — ONDANSETRON 4 MG: 2 INJECTION INTRAMUSCULAR; INTRAVENOUS at 06:29

## 2022-12-16 RX ADMIN — POLYETHYLENE GLYCOL 3350 17 G: 17 POWDER, FOR SOLUTION ORAL at 08:27

## 2022-12-16 RX ADMIN — DEXTROSE MONOHYDRATE, SODIUM CHLORIDE, AND POTASSIUM CHLORIDE 125 ML/HR: 50; 4.5; 1.49 INJECTION, SOLUTION INTRAVENOUS at 20:15

## 2022-12-16 RX ADMIN — Medication 10 ML: at 20:17

## 2022-12-16 RX ADMIN — SODIUM CHLORIDE, PRESERVATIVE FREE 10 ML: 5 INJECTION INTRAVENOUS at 01:03

## 2022-12-16 RX ADMIN — HYDROMORPHONE HYDROCHLORIDE 1 MG: 1 INJECTION, SOLUTION INTRAMUSCULAR; INTRAVENOUS; SUBCUTANEOUS at 20:16

## 2022-12-16 RX ADMIN — LEVOTHYROXINE SODIUM 125 MCG: 0.12 TABLET ORAL at 06:29

## 2022-12-16 RX ADMIN — HYDROMORPHONE HYDROCHLORIDE 1 MG: 1 INJECTION, SOLUTION INTRAMUSCULAR; INTRAVENOUS; SUBCUTANEOUS at 06:29

## 2022-12-16 RX ADMIN — SODIUM CHLORIDE 100 ML/HR: 9 INJECTION, SOLUTION INTRAVENOUS at 06:28

## 2022-12-16 RX ADMIN — DEXTROSE MONOHYDRATE, SODIUM CHLORIDE, AND POTASSIUM CHLORIDE 125 ML/HR: 50; 4.5; 1.49 INJECTION, SOLUTION INTRAVENOUS at 07:00

## 2022-12-16 RX ADMIN — ACETAMINOPHEN 650 MG: 325 TABLET ORAL at 11:04

## 2022-12-16 NOTE — PROGRESS NOTES
CRS  Pt with less pain. No output via ileostomy. Hungry  Flowsheet reviewed  Abd: soft, minimally tender, no rebound, no guarding    Plan:  Ambulate in hw TID, add miralax daily. [facility] for sips ofclears- otherwise NPO. May be ready to slowly advance over weekend.  Partner covering

## 2022-12-16 NOTE — PROGRESS NOTES
Spiritual Care Partner Volunteer visited patient at [address] in [place] [address] on 12/16/2022   Documented by:  Brina 78, Laron Dc 87, Hitesh 68, [facility]  Staff [address]  Paging service: [phone] (RAY)
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
## 2022-12-16 NOTE — ROUTINE PROCESS
Bedside and Verbal shift change report given to [id number] Eloise Joseph (oncoming nurse) by Alysia Solis (offgoing nurse). Report included the following information SBAR and Kardex.

## 2022-12-16 NOTE — PROGRESS NOTES
Ezequiel Mejiaselsen [facility]  [address] [facility], [place], [address] [place] [address]  [phone] [address] Adult  Hospitalist Group                                                                                          Hospitalist Progress Note  Michael Dunn MD        Date of Service:  2022  NAME:  Emiliana Dunaway  :  1950  MRN:  [id number]      Admission Summary:   47-year-old female with a history of ostomy presented with abdominal pain and was found to have small bowel obstruction    Interval history / Subjective:   Feels much better today, still occasional cramping. Toleraing sips of clears. Having liquid output now via ostomy. Assessment & Plan:     Small bowel obstruction  -Suspect distal jejunal SBO based on CT findings  -IV Dilaudid and IV Zofran for symptom control  -sips and chips  -miralax  -General surgery following    Hypertension  -Continue with metoprolol    Hypothyroidism  -Continue with levothyroxine    Code status: Full code  DVT prophylaxis: [facility] Problems  Date Reviewed: 10/26/2022            Codes Class Noted POA    [facility] ICD-10-CM: K17.132  ICD-9-CM: 560.9  2022 Unknown           Review of Systems:   A comprehensive review of systems was negative except for that written in the HPI. Vital Signs:    Last 24hrs VS reviewed since prior progress note. Most recent are:  Visit Vitals  /62 (BP 1 Location: Right upper arm, BP Patient Position: At rest;Sitting)   Pulse 85   Temp 97.9 °F (36.6 °C)   Resp 18   Ht 5' 3\" (1.6 m)   Wt 101.6 kg (224 lb)   SpO2 95%   BMI 39.68 kg/m²         Intake/Output Summary (Last 24 hours) at 2022 1322  Last data filed at 2022 1108  Gross per 24 hour   Intake 1170 ml   Output 175 ml   Net 995 ml          Physical Examination:             Constitutional:  No acute distress, cooperative, pleasant    ENT:  Oral mucosa moist, oropharynx benign.     Resp: CTA bilaterally. No wheezing/rhonchi/rales. No accessory muscle use   CV:  Regular rhythm, normal rate, no murmurs, gallops, rubs    GI:  Soft, non distended, non tender. Very minimal output from ostomy    Musculoskeletal:  No edema, warm, 2+ pulses throughout    Neurologic:  Moves all extremities. AAOx3, CN II-XII reviewed     Psych:  Good insight, Not anxious nor agitated. Data Review:    Review and/or order of clinical lab test      Labs:     Recent Labs     12/16/22  0104 12/14/22  1608   WBC 12.4* 9.7   HGB 13.2 14.8   HCT 39.9 44.5    289       Recent Labs     12/16/22  0104 12/14/22  1608    137   K 3.7 4.4    104   CO2 27 25   BUN 10 15   CREA 0.88 0.99   * 143*   CA 10.0 10.4*       Recent Labs     12/14/22  1608   ALT 48   *   TBILI 0.6   TP 8.2   ALB 3.6   GLOB 4.6*   LPSE 80       No results for input(s): INR, PTP, APTT, INREXT, INREXT in the last 72 hours. No results for input(s): FE, TIBC, PSAT, FERR in the last 72 hours. No results found for: FOL, RBCF   No results for input(s): PH, PCO2, PO2 in the last 72 hours. No results for input(s): CPK, CKNDX, TROIQ in the last 72 hours.     No lab exists for component: CPKMB  Lab Results   Component Value Date/Time    Cholesterol, total 193 12/10/2020 02:41 PM    HDL Cholesterol 36 12/10/2020 02:41 PM    LDL, calculated 136 (H) 12/10/2020 02:41 PM    Triglyceride 125 08/24/2022 12:26 AM    CHOL/HDL Ratio 5.4 (H) 12/10/2020 02:41 PM     Lab Results   Component Value Date/Time    Glucose (POC) 81 08/29/2022 05:03 AM    Glucose (POC) 87 08/28/2022 11:55 PM    Glucose (POC) 100 08/28/2022 05:44 PM    Glucose (POC) 120 (H) 08/28/2022 03:26 PM    Glucose (POC) 134 (H) 08/28/2022 12:00 PM     Lab Results   Component Value Date/Time    Color YELLOW/STRAW 10/17/2022 09:30 AM    Appearance TURBID (A) 10/17/2022 09:30 AM    Specific gravity 1.010 10/17/2022 09:30 AM    pH (UA) 5.0 10/17/2022 09:30 AM    Protein 100 (A) 10/17/2022 09:30 AM    Glucose Negative 10/17/2022 09:30 AM    Ketone Negative 10/17/2022 09:30 AM    Bilirubin Negative 10/17/2022 09:30 AM    Urobilinogen 0.2 10/17/2022 09:30 AM    Nitrites Negative 10/17/2022 09:30 AM    Leukocyte Esterase LARGE (A) 10/17/2022 09:30 AM    Epithelial cells FEW 10/17/2022 09:30 AM    Bacteria Negative 10/17/2022 09:30 AM    WBC >100 (H) 10/17/2022 09:30 AM    RBC 10-20 10/17/2022 09:30 AM         Medications Reviewed:     Current Facility-Administered Medications   Medication Dose Route Frequency    dextrose 5% - 0.45% NaCl with KCl 20 mEq/L infusion  125 mL/hr IntraVENous CONTINUOUS    polyethylene glycol (MIRALAX) packet 17 g  17 g Oral DAILY    HYDROmorphone (DILAUDID) injection 1 mg  1 mg IntraVENous Q4H PRN    sodium chloride (NS) flush 5-40 mL  5-40 mL IntraVENous Q8H    sodium chloride (NS) flush 5-40 mL  5-40 mL IntraVENous PRN    acetaminophen (TYLENOL) tablet 650 mg  650 mg Oral Q6H PRN    Or    acetaminophen (TYLENOL) suppository 650 mg  650 mg Rectal Q6H PRN    ondansetron (ZOFRAN ODT) tablet 4 mg  4 mg Oral Q8H PRN    Or    ondansetron (ZOFRAN) injection 4 mg  4 mg IntraVENous Q6H PRN    enoxaparin (LOVENOX) injection 40 mg  40 mg SubCUTAneous DAILY    levothyroxine (SYNTHROID) tablet 125 mcg  125 mcg Oral 6am    metoprolol succinate (TOPROL-XL) XL tablet 50 mg  50 mg Oral QAM    morphine injection 2 mg  2 mg IntraVENous Q4H PRN     ______________________________________________________________________  EXPECTED LENGTH OF STAY: 2d 7h  ACTUAL LENGTH OF STAY:          2                 Bisi Scott MD

## 2022-12-16 NOTE — PROGRESS NOTES
12/16/2022   CARE MANAGEMENT NOTE:  CM reviewed EMR and handoff received from previous  Jyothi Maldonado). Pt was admitted with SBO. Reportedly, pt resides with her sister Zuly Wu ([phone]). Dtr Rory Torres ([phone]) is another family contact. RUR 15%    Transition Plan of Care:  Colon rectal surgery following for medical management - per note, plan for conservative measures  Plan is for pt to return home with her sister  Pt is currently receiving [facility]. Resumption orders will be needed  Outpatient follow up  Family will transport pt home    CM will continue to follow pt until discharged.   Do

## 2022-12-17 LAB
ANION GAP SERPL CALC-SCNC: 6 MMOL/L (ref 5–15)
BASOPHILS # BLD: 0 K/UL (ref 0–0.1)
BASOPHILS NFR BLD: 1 % (ref 0–1)
BUN SERPL-MCNC: 7 MG/DL (ref 6–20)
BUN/CREAT SERPL: 10 (ref 12–20)
CALCIUM SERPL-MCNC: 9.3 MG/DL (ref 8.5–10.1)
CHLORIDE SERPL-SCNC: 108 MMOL/L (ref 97–108)
CO2 SERPL-SCNC: 28 MMOL/L (ref 21–32)
CREAT SERPL-MCNC: 0.73 MG/DL (ref 0.55–1.02)
DIFFERENTIAL METHOD BLD: ABNORMAL
EOSINOPHIL # BLD: 0.6 K/UL (ref 0–0.4)
EOSINOPHIL NFR BLD: 7 % (ref 0–7)
ERYTHROCYTE [DISTWIDTH] IN BLOOD BY AUTOMATED COUNT: 12.9 % (ref 11.5–14.5)
GLUCOSE SERPL-MCNC: 111 MG/DL (ref 65–100)
HCT VFR BLD AUTO: 34.9 % (ref 35–47)
HGB BLD-MCNC: 11.4 G/DL (ref 11.5–16)
IMM GRANULOCYTES # BLD AUTO: 0 K/UL (ref 0–0.04)
IMM GRANULOCYTES NFR BLD AUTO: 0 % (ref 0–0.5)
LYMPHOCYTES # BLD: 2.2 K/UL (ref 0.8–3.5)
LYMPHOCYTES NFR BLD: 29 % (ref 12–49)
MAGNESIUM SERPL-MCNC: 2 MG/DL (ref 1.6–2.4)
MCH RBC QN AUTO: 32.2 PG (ref 26–34)
MCHC RBC AUTO-ENTMCNC: 32.7 G/DL (ref 30–36.5)
MCV RBC AUTO: 98.6 FL (ref 80–99)
MONOCYTES # BLD: 0.6 K/UL (ref 0–1)
MONOCYTES NFR BLD: 8 % (ref 5–13)
NEUTS SEG # BLD: 4.4 K/UL (ref 1.8–8)
NEUTS SEG NFR BLD: 55 % (ref 32–75)
NRBC # BLD: 0 K/UL (ref 0–0.01)
NRBC BLD-RTO: 0 PER 100 WBC
PHOSPHATE SERPL-MCNC: 2 MG/DL (ref 2.6–4.7)
PLATELET # BLD AUTO: 198 K/UL (ref 150–400)
PMV BLD AUTO: 11.1 FL (ref 8.9–12.9)
POTASSIUM SERPL-SCNC: 3.7 MMOL/L (ref 3.5–5.1)
RBC # BLD AUTO: 3.54 M/UL (ref 3.8–5.2)
SODIUM SERPL-SCNC: 142 MMOL/L (ref 136–145)
WBC # BLD AUTO: 7.8 K/UL (ref 3.6–11)

## 2022-12-17 PROCEDURE — 84100 ASSAY OF PHOSPHORUS: CPT

## 2022-12-17 PROCEDURE — 74011250636 HC RX REV CODE- 250/636: Performed by: INTERNAL MEDICINE

## 2022-12-17 PROCEDURE — 74011000250 HC RX REV CODE- 250: Performed by: INTERNAL MEDICINE

## 2022-12-17 PROCEDURE — 74011250637 HC RX REV CODE- 250/637: Performed by: INTERNAL MEDICINE

## 2022-12-17 PROCEDURE — 83735 ASSAY OF MAGNESIUM: CPT

## 2022-12-17 PROCEDURE — 80048 BASIC METABOLIC PNL TOTAL CA: CPT

## 2022-12-17 PROCEDURE — 36415 COLL VENOUS BLD VENIPUNCTURE: CPT

## 2022-12-17 PROCEDURE — 85025 COMPLETE CBC W/AUTO DIFF WBC: CPT

## 2022-12-17 PROCEDURE — 65270000029 HC RM PRIVATE

## 2022-12-17 PROCEDURE — 2709999900 HC NON-CHARGEABLE SUPPLY

## 2022-12-17 PROCEDURE — 74011250636 HC RX REV CODE- 250/636: Performed by: COLON & RECTAL SURGERY

## 2022-12-17 PROCEDURE — 74011250636 HC RX REV CODE- 250/636: Performed by: FAMILY MEDICINE

## 2022-12-17 RX ORDER — ENOXAPARIN SODIUM 100 MG/ML
30 INJECTION SUBCUTANEOUS EVERY 12 HOURS
Status: DISCONTINUED | OUTPATIENT
Start: 2022-12-18 | End: 2022-12-20 | Stop reason: HOSPADM

## 2022-12-17 RX ADMIN — Medication 10 ML: at 20:47

## 2022-12-17 RX ADMIN — LEVOTHYROXINE SODIUM 125 MCG: 0.12 TABLET ORAL at 04:36

## 2022-12-17 RX ADMIN — MORPHINE SULFATE 2 MG: 2 INJECTION, SOLUTION INTRAMUSCULAR; INTRAVENOUS at 23:45

## 2022-12-17 RX ADMIN — ONDANSETRON 4 MG: 2 INJECTION INTRAMUSCULAR; INTRAVENOUS at 02:24

## 2022-12-17 RX ADMIN — DEXTROSE MONOHYDRATE, SODIUM CHLORIDE, AND POTASSIUM CHLORIDE 125 ML/HR: 50; 4.5; 1.49 INJECTION, SOLUTION INTRAVENOUS at 12:32

## 2022-12-17 RX ADMIN — ENOXAPARIN SODIUM 40 MG: 100 INJECTION SUBCUTANEOUS at 10:15

## 2022-12-17 RX ADMIN — METOPROLOL SUCCINATE 50 MG: 50 TABLET, EXTENDED RELEASE ORAL at 10:15

## 2022-12-17 RX ADMIN — ACETAMINOPHEN 650 MG: 325 TABLET ORAL at 11:24

## 2022-12-17 RX ADMIN — Medication 10 ML: at 13:12

## 2022-12-17 RX ADMIN — DEXTROSE MONOHYDRATE, SODIUM CHLORIDE, AND POTASSIUM CHLORIDE 125 ML/HR: 50; 4.5; 1.49 INJECTION, SOLUTION INTRAVENOUS at 04:35

## 2022-12-17 RX ADMIN — HYDROMORPHONE HYDROCHLORIDE 1 MG: 1 INJECTION, SOLUTION INTRAMUSCULAR; INTRAVENOUS; SUBCUTANEOUS at 02:24

## 2022-12-17 RX ADMIN — Medication 10 ML: at 04:37

## 2022-12-17 NOTE — PROGRESS NOTES
P&T-Approved DVT Prophylaxis Dosing    Per P&T Committee-approved protocol Enoxaparin 40 mg daily has been adjusted to 30mg SQ BID based on weight and renal function as shown in the table below. Obdulio Zimmerman D.   [address]

## 2022-12-17 NOTE — PROGRESS NOTES
CRS Note    Doing well overall. Pain significantly improved, but feels a little \"tight\" today. Ostomy with liquid output. Tolerating sips of water with meds.     Vitals reviewed    NAD  Abd soft, nontender, moderately distended ostomy with liquid enteric output in appliance    >500cc out of ostomy over last 24h    Labs reviewed    A/P:  74 y.o. F admitted with pSBO, improving  - ok for sips of clears (can have popsicle)  - MiraLAX dc'd - she doesn't want to take it because her stoma output is watery  -  OOB/ambulate    Anisa MD Tamiko   [facility]  [phone]

## 2022-12-17 NOTE — PROGRESS NOTES
Ezequiel Ca [facility]  [address] [facility], [address] [place] [address]  [phone] [address] Adult  Hospitalist Group                                                                                          Hospitalist Progress Note  Noni Mills MD        Date of Service:  2022  NAME:  Adonis Merritt  :  1950  MRN:  [id number]      Admission Summary:   75-year-old female with a history of ostomy presented with abdominal pain and was found to have small bowel obstruction    Interval history / Subjective:   Feels much better today, still occasional cramping. Toleraing sips of clears. Having liquid output now via ostomy. Assessment & Plan:     Small bowel obstruction  -Suspect distal jejunal SBO based on CT findings  -IV Dilaudid and IV Zofran for symptom control  -diet advanced to clears  -miralax stopped by surg  -General surgery following  -encourage more ambulation    Hypertension  -Continue with metoprolol    Hypothyroidism  -Continue with levothyroxine    Code status: Full code  DVT prophylaxis: [facility] Problems  Date Reviewed: 10/26/2022            Codes Class Noted POA    SBO (small bowel obstruction) ([facility] ICD-10-CM: G80.292  ICD-9-CM: 560.9  2022 Unknown         Review of Systems:   A comprehensive review of systems was negative except for that written in the HPI. Vital Signs:    Last 24hrs VS reviewed since prior progress note.  Most recent are:  Visit Vitals  /71 (BP 1 Location: Right upper arm, BP Patient Position: At rest)   Pulse 70   Temp 97.8 °F (36.6 °C)   Resp 18   Ht 5' 3\" (1.6 m)   Wt 101.6 kg (224 lb)   SpO2 92%   BMI 39.68 kg/m²         Intake/Output Summary (Last 24 hours) at 2022 1329  Last data filed at 2022 6882  Gross per 24 hour   Intake 4364.58 ml   Output 400 ml   Net 3964.58 ml          Physical Examination:             Constitutional:  No acute distress, cooperative, pleasant    ENT: Oral mucosa moist, oropharynx benign. Resp:  CTA bilaterally. No wheezing/rhonchi/rales. No accessory muscle use   CV:  Regular rhythm, normal rate, no murmurs, gallops, rubs    GI:  Soft, non distended, non tender. Very minimal output from ostomy    Musculoskeletal:  No edema, warm, 2+ pulses throughout    Neurologic:  Moves all extremities. AAOx3, CN II-XII reviewed     Psych:  Good insight, Not anxious nor agitated. Data Review:    Review and/or order of clinical lab test      Labs:     Recent Labs     12/17/22 0135 12/16/22 0104   WBC 7.8 12.4*   HGB 11.4* 13.2   HCT 34.9* 39.9    264       Recent Labs     12/17/22 0135 12/16/22 0104 12/14/22  1608    142 137   K 3.7 3.7 4.4    106 104   CO2 28 27 25   BUN 7 10 15   CREA 0.73 0.88 0.99   * 113* 143*   CA 9.3 10.0 10.4*   MG 2.0  --   --    PHOS 2.0*  --   --        Recent Labs     12/14/22  1608   ALT 48   *   TBILI 0.6   TP 8.2   ALB 3.6   GLOB 4.6*   LPSE 80       No results for input(s): INR, PTP, APTT, INREXT, INREXT in the last 72 hours. No results for input(s): FE, TIBC, PSAT, FERR in the last 72 hours. No results found for: FOL, RBCF   No results for input(s): PH, PCO2, PO2 in the last 72 hours. No results for input(s): CPK, CKNDX, TROIQ in the last 72 hours.     No lab exists for component: CPKMB  Lab Results   Component Value Date/Time    Cholesterol, total 193 12/10/2020 02:41 PM    HDL Cholesterol 36 12/10/2020 02:41 PM    LDL, calculated 136 (H) 12/10/2020 02:41 PM    Triglyceride 125 08/24/2022 12:26 AM    CHOL/HDL Ratio 5.4 (H) 12/10/2020 02:41 PM     Lab Results   Component Value Date/Time    Glucose (POC) 81 08/29/2022 05:03 AM    Glucose (POC) 87 08/28/2022 11:55 PM    Glucose (POC) 100 08/28/2022 05:44 PM    Glucose (POC) 120 (H) 08/28/2022 03:26 PM    Glucose (POC) 134 (H) 08/28/2022 12:00 PM     Lab Results   Component Value Date/Time    Color YELLOW/STRAW 10/17/2022 09:30 AM    Appearance TURBID (A) 10/17/2022 09:30 AM    Specific gravity 1.010 10/17/2022 09:30 AM    pH (UA) 5.0 10/17/2022 09:30 AM    Protein 100 (A) 10/17/2022 09:30 AM    Glucose Negative 10/17/2022 09:30 AM    Ketone Negative 10/17/2022 09:30 AM    Bilirubin Negative 10/17/2022 09:30 AM    Urobilinogen 0.2 10/17/2022 09:30 AM    Nitrites Negative 10/17/2022 09:30 AM    Leukocyte Esterase LARGE (A) 10/17/2022 09:30 AM    Epithelial cells FEW 10/17/2022 09:30 AM    Bacteria Negative 10/17/2022 09:30 AM    WBC >100 (H) 10/17/2022 09:30 AM    RBC 10-20 10/17/2022 09:30 AM         Medications Reviewed:     Current Facility-Administered Medications   Medication Dose Route Frequency    dextrose 5% - 0.45% NaCl with KCl 20 mEq/L infusion  125 mL/hr IntraVENous CONTINUOUS    HYDROmorphone (DILAUDID) injection 1 mg  1 mg IntraVENous Q4H PRN    sodium chloride (NS) flush 5-40 mL  5-40 mL IntraVENous Q8H    sodium chloride (NS) flush 5-40 mL  5-40 mL IntraVENous PRN    acetaminophen (TYLENOL) tablet 650 mg  650 mg Oral Q6H PRN    Or    acetaminophen (TYLENOL) suppository 650 mg  650 mg Rectal Q6H PRN    ondansetron (ZOFRAN ODT) tablet 4 mg  4 mg Oral Q8H PRN    Or    ondansetron (ZOFRAN) injection 4 mg  4 mg IntraVENous Q6H PRN    enoxaparin (LOVENOX) injection 40 mg  40 mg SubCUTAneous DAILY    levothyroxine (SYNTHROID) tablet 125 mcg  125 mcg Oral 6am    metoprolol succinate (TOPROL-XL) XL tablet 50 mg  50 mg Oral QAM    morphine injection 2 mg  2 mg IntraVENous Q4H PRN     ______________________________________________________________________  EXPECTED LENGTH OF STAY: 2d 7h  ACTUAL LENGTH OF STAY:          3                 Jonny Gustafson MD

## 2022-12-17 NOTE — PROGRESS NOTES
Problem: Hypertension  Goal: *Blood pressure within specified parameters  Outcome: Progressing Towards Goal  Goal: *Fluid volume balance  Outcome: Progressing Towards Goal  Goal: *Labs within defined limits  Outcome: Progressing Towards Goal     Problem: Patient Education: Go to Patient Education Activity  Goal: Patient/Family Education  Outcome: Progressing Towards Goal     Problem: Patient Education: Go to Patient Education Activity  Goal: Patient/Family Education  Outcome: Progressing Towards Goal     Problem: Falls - Risk of  Goal: *Absence of Falls  Outcome: Progressing Towards Goal  Note: Fall Risk Interventions:  Mobility Interventions: Bed/chair exit alarm, Communicate number of staff needed for ambulation/transfer, Patient to call before getting OOB         Medication Interventions: Bed/chair exit alarm, Patient to call before getting OOB, Teach patient to arise slowly    Elimination Interventions: Bed/chair exit alarm, Call light in reach, Patient to call for help with toileting needs, Stay With Me (per policy), Toileting schedule/hourly rounds

## 2022-12-17 NOTE — ROUTINE PROCESS
Bedside and Verbal shift change report given to Roosevelt Cruz (oncoming nurse) by Bryant Leblanc (offgoing nurse). Report included the following information SBAR and Kardex.

## 2022-12-18 LAB
ANION GAP SERPL CALC-SCNC: 5 MMOL/L (ref 5–15)
BASOPHILS # BLD: 0 K/UL (ref 0–0.1)
BASOPHILS NFR BLD: 0 % (ref 0–1)
BUN SERPL-MCNC: 3 MG/DL (ref 6–20)
BUN/CREAT SERPL: 4 (ref 12–20)
CALCIUM SERPL-MCNC: 9.1 MG/DL (ref 8.5–10.1)
CHLORIDE SERPL-SCNC: 109 MMOL/L (ref 97–108)
CO2 SERPL-SCNC: 26 MMOL/L (ref 21–32)
CREAT SERPL-MCNC: 0.7 MG/DL (ref 0.55–1.02)
DIFFERENTIAL METHOD BLD: ABNORMAL
EOSINOPHIL # BLD: 0.6 K/UL (ref 0–0.4)
EOSINOPHIL NFR BLD: 7 % (ref 0–7)
ERYTHROCYTE [DISTWIDTH] IN BLOOD BY AUTOMATED COUNT: 12.8 % (ref 11.5–14.5)
GLUCOSE SERPL-MCNC: 111 MG/DL (ref 65–100)
HCT VFR BLD AUTO: 35.3 % (ref 35–47)
HGB BLD-MCNC: 11.7 G/DL (ref 11.5–16)
IMM GRANULOCYTES # BLD AUTO: 0 K/UL (ref 0–0.04)
IMM GRANULOCYTES NFR BLD AUTO: 0 % (ref 0–0.5)
LYMPHOCYTES # BLD: 2.5 K/UL (ref 0.8–3.5)
LYMPHOCYTES NFR BLD: 30 % (ref 12–49)
MCH RBC QN AUTO: 32.3 PG (ref 26–34)
MCHC RBC AUTO-ENTMCNC: 33.1 G/DL (ref 30–36.5)
MCV RBC AUTO: 97.5 FL (ref 80–99)
MONOCYTES # BLD: 0.6 K/UL (ref 0–1)
MONOCYTES NFR BLD: 8 % (ref 5–13)
NEUTS SEG # BLD: 4.4 K/UL (ref 1.8–8)
NEUTS SEG NFR BLD: 55 % (ref 32–75)
NRBC # BLD: 0 K/UL (ref 0–0.01)
NRBC BLD-RTO: 0 PER 100 WBC
PLATELET # BLD AUTO: 217 K/UL (ref 150–400)
PMV BLD AUTO: 11.7 FL (ref 8.9–12.9)
POTASSIUM SERPL-SCNC: 3.7 MMOL/L (ref 3.5–5.1)
RBC # BLD AUTO: 3.62 M/UL (ref 3.8–5.2)
SODIUM SERPL-SCNC: 140 MMOL/L (ref 136–145)
WBC # BLD AUTO: 8.1 K/UL (ref 3.6–11)

## 2022-12-18 PROCEDURE — 36415 COLL VENOUS BLD VENIPUNCTURE: CPT

## 2022-12-18 PROCEDURE — 74011000250 HC RX REV CODE- 250: Performed by: INTERNAL MEDICINE

## 2022-12-18 PROCEDURE — 74011250636 HC RX REV CODE- 250/636: Performed by: FAMILY MEDICINE

## 2022-12-18 PROCEDURE — 85025 COMPLETE CBC W/AUTO DIFF WBC: CPT

## 2022-12-18 PROCEDURE — 65270000029 HC RM PRIVATE

## 2022-12-18 PROCEDURE — 74011250636 HC RX REV CODE- 250/636: Performed by: INTERNAL MEDICINE

## 2022-12-18 PROCEDURE — 74011250637 HC RX REV CODE- 250/637: Performed by: INTERNAL MEDICINE

## 2022-12-18 PROCEDURE — 80048 BASIC METABOLIC PNL TOTAL CA: CPT

## 2022-12-18 PROCEDURE — 74011250636 HC RX REV CODE- 250/636: Performed by: COLON & RECTAL SURGERY

## 2022-12-18 RX ADMIN — Medication 10 ML: at 21:12

## 2022-12-18 RX ADMIN — METOPROLOL SUCCINATE 50 MG: 50 TABLET, EXTENDED RELEASE ORAL at 09:54

## 2022-12-18 RX ADMIN — ENOXAPARIN SODIUM 30 MG: 100 INJECTION SUBCUTANEOUS at 17:12

## 2022-12-18 RX ADMIN — Medication 10 ML: at 05:11

## 2022-12-18 RX ADMIN — ONDANSETRON 4 MG: 2 INJECTION INTRAMUSCULAR; INTRAVENOUS at 00:06

## 2022-12-18 RX ADMIN — LEVOTHYROXINE SODIUM 125 MCG: 0.12 TABLET ORAL at 05:11

## 2022-12-18 RX ADMIN — ENOXAPARIN SODIUM 30 MG: 100 INJECTION SUBCUTANEOUS at 05:10

## 2022-12-18 RX ADMIN — Medication 10 ML: at 13:49

## 2022-12-18 RX ADMIN — DEXTROSE MONOHYDRATE, SODIUM CHLORIDE, AND POTASSIUM CHLORIDE 125 ML/HR: 50; 4.5; 1.49 INJECTION, SOLUTION INTRAVENOUS at 05:11

## 2022-12-18 NOTE — PROGRESS NOTES
Patient ambulating in the hallway with nephew. 1958  Bedside and Verbal shift change report given to Abdirizak (oncoming nurse) by Dylan Hinojosa (offgoing nurse). Report included the following information SBAR, Kardex, MAR, and Recent Results.

## 2022-12-18 NOTE — PROGRESS NOTES
Ezequiel Mejiaselsen [facility] 79  [address] [facility], [address] [place] [address]  [phone] [place] Adult  Hospitalist Group                                                                                          Hospitalist Progress Note  Delbert Mayorga MD        Date of Service:  2022  NAME:  Benjamin Lee  :  1950  MRN:  [id number]      Admission Summary:   58-year-old female with a history of ostomy presented with abdominal pain and was found to have small bowel obstruction    Interval history / Subjective:   Feels much better today, still occasional cramping. Tolerating clear liquids. States that ostomy output appears less dark     Assessment & Plan:     Small bowel obstruction  -Suspect distal jejunal SBO based on CT findings  -IV Dilaudid and IV Zofran for symptom control  -Diet advanced to full liquids today by surgery  -Colorectal surgery following  -encourage more ambulation    Hypertension  -Continue with metoprolol    Hypothyroidism  -Continue with levothyroxine    Code status: Full code  DVT prophylaxis: [facility] Problems  Date Reviewed: 10/26/2022            Codes Class Noted POA    SBO (small bowel obstruction) ([facility] ICD-10-CM: S86.131  ICD-9-CM: 560.9  2022 Unknown         Review of Systems:   A comprehensive review of systems was negative except for that written in the HPI. Vital Signs:    Last 24hrs VS reviewed since prior progress note.  Most recent are:  Visit Vitals  /78 (BP 1 Location: Right upper arm, BP Patient Position: At rest)   Pulse 77   Temp 97.9 °F (36.6 °C)   Resp 18   Ht 5' 3\" (1.6 m)   Wt 101.6 kg (224 lb)   SpO2 93%   BMI 39.68 kg/m²         Intake/Output Summary (Last 24 hours) at 2022 1157  Last data filed at 2022 0845  Gross per 24 hour   Intake 2846.17 ml   Output 550 ml   Net 2296.17 ml          Physical Examination:             Constitutional:  No acute distress, cooperative, pleasant    ENT:  Oral mucosa moist, oropharynx benign. Resp:  CTA bilaterally. No wheezing/rhonchi/rales. No accessory muscle use   CV:  Regular rhythm, normal rate, no murmurs, gallops, rubs    GI:  Soft, non distended, non tender. Very minimal output from ostomy    Musculoskeletal:  No edema, warm, 2+ pulses throughout    Neurologic:  Moves all extremities. AAOx3, CN II-XII reviewed     Psych:  Good insight, Not anxious nor agitated. Data Review:    Review and/or order of clinical lab test      Labs:     Recent Labs     12/18/22 0127 12/17/22 0135   WBC 8.1 7.8   HGB 11.7 11.4*   HCT 35.3 34.9*    198       Recent Labs     12/18/22 0127 12/17/22 0135 12/16/22  0104    142 142   K 3.7 3.7 3.7   * 108 106   CO2 26 28 27   BUN 3* 7 10   CREA 0.70 0.73 0.88   * 111* 113*   CA 9.1 9.3 10.0   MG  --  2.0  --    PHOS  --  2.0*  --        No results for input(s): ALT, AP, TBIL, TBILI, TP, ALB, GLOB, GGT, AML, LPSE in the last 72 hours. No lab exists for component: SGOT, GPT, AMYP, HLPSE    No results for input(s): INR, PTP, APTT, INREXT, INREXT in the last 72 hours. No results for input(s): FE, TIBC, PSAT, FERR in the last 72 hours. No results found for: FOL, RBCF   No results for input(s): PH, PCO2, PO2 in the last 72 hours. No results for input(s): CPK, CKNDX, TROIQ in the last 72 hours.     No lab exists for component: CPKMB  Lab Results   Component Value Date/Time    Cholesterol, total 193 12/10/2020 02:41 PM    HDL Cholesterol 36 12/10/2020 02:41 PM    LDL, calculated 136 (H) 12/10/2020 02:41 PM    Triglyceride 125 08/24/2022 12:26 AM    CHOL/HDL Ratio 5.4 (H) 12/10/2020 02:41 PM     Lab Results   Component Value Date/Time    Glucose (POC) 81 08/29/2022 05:03 AM    Glucose (POC) 87 08/28/2022 11:55 PM    Glucose (POC) 100 08/28/2022 05:44 PM    Glucose (POC) 120 (H) 08/28/2022 03:26 PM    Glucose (POC) 134 (H) 08/28/2022 12:00 PM     Lab Results   Component Value Date/Time    Color YELLOW/STRAW 10/17/2022 09:30 AM    Appearance TURBID (A) 10/17/2022 09:30 AM    Specific gravity 1.010 10/17/2022 09:30 AM    pH (UA) 5.0 10/17/2022 09:30 AM    Protein 100 (A) 10/17/2022 09:30 AM    Glucose Negative 10/17/2022 09:30 AM    Ketone Negative 10/17/2022 09:30 AM    Bilirubin Negative 10/17/2022 09:30 AM    Urobilinogen 0.2 10/17/2022 09:30 AM    Nitrites Negative 10/17/2022 09:30 AM    Leukocyte Esterase LARGE (A) 10/17/2022 09:30 AM    Epithelial cells FEW 10/17/2022 09:30 AM    Bacteria Negative 10/17/2022 09:30 AM    WBC >100 (H) 10/17/2022 09:30 AM    RBC 10-20 10/17/2022 09:30 AM         Medications Reviewed:     Current Facility-Administered Medications   Medication Dose Route Frequency    enoxaparin (LOVENOX) injection 30 mg  30 mg SubCUTAneous Q12H    HYDROmorphone (DILAUDID) injection 1 mg  1 mg IntraVENous Q4H PRN    sodium chloride (NS) flush 5-40 mL  5-40 mL IntraVENous Q8H    sodium chloride (NS) flush 5-40 mL  5-40 mL IntraVENous PRN    acetaminophen (TYLENOL) tablet 650 mg  650 mg Oral Q6H PRN    Or    acetaminophen (TYLENOL) suppository 650 mg  650 mg Rectal Q6H PRN    ondansetron (ZOFRAN ODT) tablet 4 mg  4 mg Oral Q8H PRN    Or    ondansetron (ZOFRAN) injection 4 mg  4 mg IntraVENous Q6H PRN    levothyroxine (SYNTHROID) tablet 125 mcg  125 mcg Oral 6am    metoprolol succinate (TOPROL-XL) XL tablet 50 mg  50 mg Oral QAM    morphine injection 2 mg  2 mg IntraVENous Q4H PRN     ______________________________________________________________________  EXPECTED LENGTH OF STAY: 2d 7h  ACTUAL LENGTH OF STAY:          4                 Preethi Cheema MD

## 2022-12-18 NOTE — PROGRESS NOTES
CRS Note    Continues to improve. No pain this morning, tolerating CLD without nausea. Ostomy putting out more and looks like her normal output to her.     Vitals reviewed    NAD  Abd soft, nontender, mildly distended improved from yesterday, ostomy with liquid enteric output in appliance      Labs reviewed    A/P:  74 y.o. F admitted with pSBO, improving  - advance to full liquids   -  OOB/ambulate    Romario Rachel MD   [facility]  [phone]

## 2022-12-18 NOTE — PROGRESS NOTES
Bedside and Verbal shift change report given to Clementine Bose RN (oncoming nurse) by Andrew Madsen RN (offgoing nurse). Report included the following information SBAR, Kardex, MAR, Accordion, and Recent Results.

## 2022-12-18 NOTE — PROGRESS NOTES
Bedside and Verbal shift change report given to Jeremi Parra (oncoming nurse) by Dylan Hinojosa (offgoing nurse). Report included the following information SBAR, Kardex, MAR, and Recent Results.

## 2022-12-19 PROCEDURE — 97165 OT EVAL LOW COMPLEX 30 MIN: CPT | Performed by: OCCUPATIONAL THERAPIST

## 2022-12-19 PROCEDURE — 65270000029 HC RM PRIVATE

## 2022-12-19 PROCEDURE — 74011250636 HC RX REV CODE- 250/636: Performed by: FAMILY MEDICINE

## 2022-12-19 PROCEDURE — 74011250637 HC RX REV CODE- 250/637: Performed by: INTERNAL MEDICINE

## 2022-12-19 PROCEDURE — 2709999900 HC NON-CHARGEABLE SUPPLY

## 2022-12-19 PROCEDURE — 97535 SELF CARE MNGMENT TRAINING: CPT | Performed by: OCCUPATIONAL THERAPIST

## 2022-12-19 RX ADMIN — LEVOTHYROXINE SODIUM 125 MCG: 0.12 TABLET ORAL at 05:35

## 2022-12-19 RX ADMIN — ENOXAPARIN SODIUM 30 MG: 100 INJECTION SUBCUTANEOUS at 18:24

## 2022-12-19 RX ADMIN — ENOXAPARIN SODIUM 30 MG: 100 INJECTION SUBCUTANEOUS at 05:35

## 2022-12-19 RX ADMIN — METOPROLOL SUCCINATE 50 MG: 50 TABLET, EXTENDED RELEASE ORAL at 09:19

## 2022-12-19 NOTE — PROGRESS NOTES
12/19/2022   CARE MANAGEMENT NOTE:  CM reviewed EMR and handoff received from previous  Micha Lozano). Pt was admitted with SBO. Reportedly, pt resides with her sister Carmen Rojas ([phone]). Dtr Shahid Aguiar ([phone]) is another family contact. RUR 15%; LOS 4 days     Transition Plan of Care:  Colon rectal surgery following for medical management - per note, plan for conservative measures  Plan is for pt to return home with her sister  Pt is currently receiving [facility] (skilled nursing, PT). Outpatient follow up  Family will transport pt home     CM will continue to follow pt until discharged.   Do

## 2022-12-19 NOTE — ROUTINE PROCESS
Pt IV access is infiltrated and she refused to do re insertion unless she talks to her MD tomorrow morning.    0700  Bedside and Verbal shift change report given to Olivia Clay and Shilpa Lim  (oncoming nurse) by Michelle Romano  (offgoing nurse). Report included the following information SBAR, Kardex, Procedure Summary, MAR, and Recent Results.

## 2022-12-19 NOTE — PROGRESS NOTES
CRS Note    Continues to improve. No pain this morning only reports tiredness. Tolerating fulls without nausea. Ostomy putting out more and looks like her normal output to her. Vitals reviewed    NAD  Abd soft, nontender, mildly distended improved from yesterday, ostomy with liquid enteric output in appliance      Labs reviewed    A/P:  74 y.o. F admitted with pSBO, improving  - advance to low fiber  -  OOB/ambulate  - consider dc later today (after tolerating lunch) if doing well from surgical standpoint         Pt seen and discussed with Dr Chrissy Flores.   Thea Emanuel, NP

## 2022-12-19 NOTE — PROGRESS NOTES
Problem: Hypertension  Goal: *Blood pressure within specified parameters  Outcome: Progressing Towards Goal  Goal: *Fluid volume balance  Outcome: Progressing Towards Goal  Goal: *Labs within defined limits  Outcome: Progressing Towards Goal     Problem: Patient Education: Go to Patient Education Activity  Goal: Patient/Family Education  Outcome: Progressing Towards Goal     Problem: Patient Education: Go to Patient Education Activity  Goal: Patient/Family Education  Outcome: Progressing Towards Goal     Problem: TIA/CVA Stroke: 0-24 hours  Goal: Off Pathway (Use only if patient is Off Pathway)  Outcome: Progressing Towards Goal  Goal: Activity/Safety  Outcome: Progressing Towards Goal  Goal: Consults, if ordered  Outcome: Progressing Towards Goal  Goal: Diagnostic Test/Procedures  Outcome: Progressing Towards Goal  Goal: Nutrition/Diet  Outcome: Progressing Towards Goal  Goal: Discharge Planning  Outcome: Progressing Towards Goal  Goal: Medications  Outcome: Progressing Towards Goal  Goal: Respiratory  Outcome: Progressing Towards Goal  Goal: Treatments/Interventions/Procedures  Outcome: Progressing Towards Goal  Goal: Minimize risk of bleeding post-thrombolytic infusion  Outcome: Progressing Towards Goal  Goal: Monitor for complications post-thrombolytic infusion  Outcome: Progressing Towards Goal  Goal: Psychosocial  Outcome: Progressing Towards Goal  Goal: *Hemodynamically stable  Outcome: Progressing Towards Goal  Goal: *Neurologically stable  Description: Absence of additional neurological deficits    Outcome: Progressing Towards Goal  Goal: *Verbalizes anxiety and depression are reduced or absent  Outcome: Progressing Towards Goal  Goal: *Absence of Signs of Aspiration on Current Diet  Outcome: Progressing Towards Goal  Goal: *Absence of deep venous thrombosis signs and symptoms(Stroke Metric)  Outcome: Progressing Towards Goal  Goal: *Ability to perform ADLs and demonstrates progressive mobility and function  Outcome: Progressing Towards Goal  Goal: *Stroke education started(Stroke Metric)  Outcome: Progressing Towards Goal  Goal: *Dysphagia screen performed(Stroke Metric)  Outcome: Progressing Towards Goal  Goal: *Rehab consulted(Stroke Metric)  Outcome: Progressing Towards Goal     Problem: TIA/CVA Stroke: Day 2 Until Discharge  Goal: Off Pathway (Use only if patient is Off Pathway)  Outcome: Progressing Towards Goal  Goal: Activity/Safety  Outcome: Progressing Towards Goal  Goal: Diagnostic Test/Procedures  Outcome: Progressing Towards Goal  Goal: Nutrition/Diet  Outcome: Progressing Towards Goal  Goal: Discharge Planning  Outcome: Progressing Towards Goal  Goal: Medications  Outcome: Progressing Towards Goal  Goal: Respiratory  Outcome: Progressing Towards Goal  Goal: Treatments/Interventions/Procedures  Outcome: Progressing Towards Goal  Goal: Psychosocial  Outcome: Progressing Towards Goal  Goal: *Verbalizes anxiety and depression are reduced or absent  Outcome: Progressing Towards Goal  Goal: *Absence of aspiration  Outcome: Progressing Towards Goal  Goal: *Absence of deep venous thrombosis signs and symptoms(Stroke Metric)  Outcome: Progressing Towards Goal  Goal: *Optimal pain control at patient's stated goal  Outcome: Progressing Towards Goal  Goal: *Tolerating diet  Outcome: Progressing Towards Goal  Goal: *Ability to perform ADLs and demonstrates progressive mobility and function  Outcome: Progressing Towards Goal  Goal: *Stroke education continued(Stroke Metric)  Outcome: Progressing Towards Goal     Problem: Ischemic Stroke: Discharge Outcomes  Goal: *Verbalizes anxiety and depression are reduced or absent  Outcome: Progressing Towards Goal  Goal: *Verbalize understanding of risk factor modification(Stroke Metric)  Outcome: Progressing Towards Goal  Goal: *Hemodynamically stable  Outcome: Progressing Towards Goal  Goal: *Absence of aspiration pneumonia  Outcome: Progressing Towards Goal  Goal: *Aware of needed dietary changes  Outcome: Progressing Towards Goal  Goal: *Verbalize understanding of prescribed medications including anti-coagulants, anti-lipid, and/or anti-platelets(Stroke Metric)  Outcome: Progressing Towards Goal  Goal: *Tolerating diet  Outcome: Progressing Towards Goal  Goal: *Aware of follow-up diagnostics related to anticoagulants  Outcome: Progressing Towards Goal  Goal: *Ability to perform ADLs and demonstrates progressive mobility and function  Outcome: Progressing Towards Goal  Goal: *Absence of DVT(Stroke Metric)  Outcome: Progressing Towards Goal  Goal: *Absence of aspiration  Outcome: Progressing Towards Goal  Goal: *Optimal pain control at patient's stated goal  Outcome: Progressing Towards Goal  Goal: *Home safety concerns addressed  Outcome: Progressing Towards Goal  Goal: *Describes available resources and support systems  Outcome: Progressing Towards Goal  Goal: *Verbalizes understanding of activation of EMS(911) for stroke symptoms(Stroke Metric)  Outcome: Progressing Towards Goal  Goal: *Understands and describes signs and symptoms to report to providers(Stroke Metric)  Outcome: Progressing Towards Goal  Goal: *Neurolgocially stable (absence of additional neurological deficits)  Outcome: Progressing Towards Goal  Goal: *Verbalizes importance of follow-up with primary care physician(Stroke Metric)  Outcome: Progressing Towards Goal  Goal: *Smoking cessation discussed,if applicable(Stroke Metric)  Outcome: Progressing Towards Goal  Goal: *Depression screening completed(Stroke Metric)  Outcome: Progressing Towards Goal     Problem: Falls - Risk of  Goal: *Absence of Falls  Description: Document Lawanda Fall Risk and appropriate interventions in the flowsheet.   Outcome: Progressing Towards Goal  Note: Fall Risk Interventions:  Mobility Interventions: Bed/chair exit alarm, Communicate number of staff needed for ambulation/transfer, Patient to call before getting OOB Medication Interventions: Bed/chair exit alarm, Patient to call before getting OOB, Teach patient to arise slowly    Elimination Interventions: Bed/chair exit alarm, Call light in reach, Patient to call for help with toileting needs, Stay With Me (per policy), Toileting schedule/hourly rounds              Problem: Patient Education: Go to Patient Education Activity  Goal: Patient/Family Education  Outcome: Progressing Towards Goal     Problem: Falls - Risk of  Goal: *Absence of Falls  Description: [address] and appropriate interventions in the flowsheet.   Outcome: Progressing Towards Goal     Problem: Patient Education: Go to Patient Education Activity  Goal: Patient/Family Education  Outcome: Progressing Towards Goal

## 2022-12-19 NOTE — PROGRESS NOTES
Ezequiel Mejiaselsen [facility] 79  [address] [facility], [place], [address] [place] [address]  [phone] [address] Adult  Hospitalist Group                                                                                          Hospitalist Progress Note  Kierra Dangelo MD        Date of Service:  2022  NAME:  Lauren Chavez  :  1950  MRN:  [id number]      Admission Summary:   75-year-old female with a history of ostomy presented with abdominal pain and was found to have small bowel obstruction    Interval history / Subjective:   Feels much better today, has cramping and diarrhea after breakfast.      Assessment & Plan:     Small bowel obstruction  -Suspect distal jejunal SBO based on CT findings  -IV Dilaudid and IV Zofran for symptom control  -Diet advanced to full  today by surgery  -Colorectal surgery following, ok with DC if tolerating diet  -some discomfort and diarreha after feeds, will monitor overnight  -encourage more ambulation    Hypertension  -Continue with metoprolol    Hypothyroidism  -Continue with levothyroxine    Code status: Full code  DVT prophylaxis: [facility] Problems  Date Reviewed: 10/26/2022            Codes Class Noted POA    [facility] ICD-10-CM: U64.076  ICD-9-CM: 560.9  2022 Unknown       Review of Systems:   A comprehensive review of systems was negative except for that written in the HPI. Vital Signs:    Last 24hrs VS reviewed since prior progress note.  Most recent are:  Visit Vitals  /80 (BP 1 Location: Left upper arm, BP Patient Position: At rest)   Pulse 75   Temp 97.7 °F (36.5 °C)   Resp 18   Ht 5' 3\" (1.6 m)   Wt 101.6 kg (224 lb)   SpO2 93%   BMI 39.68 kg/m²         Intake/Output Summary (Last 24 hours) at 2022 1703  Last data filed at 2022 1223  Gross per 24 hour   Intake 360 ml   Output 700 ml   Net -340 ml          Physical Examination:             Constitutional:  No acute distress, cooperative, pleasant    ENT:  Oral mucosa moist, oropharynx benign. Resp:  CTA bilaterally. No wheezing/rhonchi/rales. No accessory muscle use   CV:  Regular rhythm, normal rate, no murmurs, gallops, rubs    GI:  Soft, non distended, non tender. Very minimal output from ostomy    Musculoskeletal:  No edema, warm, 2+ pulses throughout    Neurologic:  Moves all extremities. AAOx3, CN II-XII reviewed     Psych:  Good insight, Not anxious nor agitated. Data Review:    Review and/or order of clinical lab test      Labs:     Recent Labs     12/18/22 0127 12/17/22 0135   WBC 8.1 7.8   HGB 11.7 11.4*   HCT 35.3 34.9*    198       Recent Labs     12/18/22 0127 12/17/22 0135    142   K 3.7 3.7   * 108   CO2 26 28   BUN 3* 7   CREA 0.70 0.73   * 111*   CA 9.1 9.3   MG  --  2.0   PHOS  --  2.0*       No results for input(s): ALT, AP, TBIL, TBILI, TP, ALB, GLOB, GGT, AML, LPSE in the last 72 hours. No lab exists for component: SGOT, GPT, AMYP, HLPSE    No results for input(s): INR, PTP, APTT, INREXT, INREXT in the last 72 hours. No results for input(s): FE, TIBC, PSAT, FERR in the last 72 hours. No results found for: FOL, RBCF   No results for input(s): PH, PCO2, PO2 in the last 72 hours. No results for input(s): CPK, CKNDX, TROIQ in the last 72 hours.     No lab exists for component: CPKMB  Lab Results   Component Value Date/Time    Cholesterol, total 193 12/10/2020 02:41 PM    HDL Cholesterol 36 12/10/2020 02:41 PM    LDL, calculated 136 (H) 12/10/2020 02:41 PM    Triglyceride 125 08/24/2022 12:26 AM    CHOL/HDL Ratio 5.4 (H) 12/10/2020 02:41 PM     Lab Results   Component Value Date/Time    Glucose (POC) 81 08/29/2022 05:03 AM    Glucose (POC) 87 08/28/2022 11:55 PM    Glucose (POC) 100 08/28/2022 05:44 PM    Glucose (POC) 120 (H) 08/28/2022 03:26 PM    Glucose (POC) 134 (H) 08/28/2022 12:00 PM     Lab Results   Component Value Date/Time    Color YELLOW/STRAW 10/17/2022 09:30 AM    Appearance TURBID (A) 10/17/2022 09:30 AM    Specific gravity 1.010 10/17/2022 09:30 AM    pH (UA) 5.0 10/17/2022 09:30 AM    Protein 100 (A) 10/17/2022 09:30 AM    Glucose Negative 10/17/2022 09:30 AM    Ketone Negative 10/17/2022 09:30 AM    Bilirubin Negative 10/17/2022 09:30 AM    Urobilinogen 0.2 10/17/2022 09:30 AM    Nitrites Negative 10/17/2022 09:30 AM    Leukocyte Esterase LARGE (A) 10/17/2022 09:30 AM    Epithelial cells FEW 10/17/2022 09:30 AM    Bacteria Negative 10/17/2022 09:30 AM    WBC >100 (H) 10/17/2022 09:30 AM    RBC 10-20 10/17/2022 09:30 AM         Medications Reviewed:     Current Facility-Administered Medications   Medication Dose Route Frequency    enoxaparin (LOVENOX) injection 30 mg  30 mg SubCUTAneous Q12H    HYDROmorphone (DILAUDID) injection 1 mg  1 mg IntraVENous Q4H PRN    sodium chloride (NS) flush 5-40 mL  5-40 mL IntraVENous Q8H    sodium chloride (NS) flush 5-40 mL  5-40 mL IntraVENous PRN    acetaminophen (TYLENOL) tablet 650 mg  650 mg Oral Q6H PRN    Or    acetaminophen (TYLENOL) suppository 650 mg  650 mg Rectal Q6H PRN    ondansetron (ZOFRAN ODT) tablet 4 mg  4 mg Oral Q8H PRN    Or    ondansetron (ZOFRAN) injection 4 mg  4 mg IntraVENous Q6H PRN    levothyroxine (SYNTHROID) tablet 125 mcg  125 mcg Oral 6am    metoprolol succinate (TOPROL-XL) XL tablet 50 mg  50 mg Oral QAM    morphine injection 2 mg  2 mg IntraVENous Q4H PRN     ______________________________________________________________________  EXPECTED LENGTH OF STAY: 2d 7h  ACTUAL LENGTH OF STAY:          5                 Christelle Ibrahim MD

## 2022-12-19 NOTE — WOUND CARE
Wound Consult:  new consult Visit. Chart reviewed. Consulted for ostomy care. Spoke with patients nurse,  Jie MARRERO. Patient is resting on a steven bed with DANNI mattress. Heels off loaded with pillows. Patient is awake, alert, stated ostomy bag leaking and sister usually changes her appliance, she uses convex karmen product, OT at bedside and walked patient to [facility]  Assessment:  RUQ illeostomy- stoma pink, moist, flush to skin, peristoma- lightly pink, no skin breakdown ,no pain. Buttocks/sacrum/heels- no redness noted  Treatment:  Ileostomy bag changed with patient karmen convex appliance and ring. Plan:  Please re-consult should concerns arise despite continued skin/PI prevention measures.   [address] [place], Wound / [address] [facility] Healing Office [phone]
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
## 2022-12-20 ENCOUNTER — TELEPHONE (OUTPATIENT)
Dept: INTERNAL MEDICINE CLINIC | Age: 72
End: 2022-12-20

## 2022-12-20 VITALS
TEMPERATURE: 97.9 F | WEIGHT: 224 LBS | RESPIRATION RATE: 18 BRPM | OXYGEN SATURATION: 93 % | HEIGHT: 63 IN | HEART RATE: 78 BPM | SYSTOLIC BLOOD PRESSURE: 132 MMHG | DIASTOLIC BLOOD PRESSURE: 62 MMHG | BODY MASS INDEX: 39.69 KG/M2

## 2022-12-20 LAB
ALBUMIN SERPL-MCNC: 3 G/DL (ref 3.5–5)
ANION GAP SERPL CALC-SCNC: 8 MMOL/L (ref 5–15)
BASOPHILS # BLD: 0 K/UL (ref 0–0.1)
BASOPHILS NFR BLD: 0 % (ref 0–1)
BUN SERPL-MCNC: 7 MG/DL (ref 6–20)
BUN/CREAT SERPL: 9 (ref 12–20)
CALCIUM SERPL-MCNC: 9.9 MG/DL (ref 8.5–10.1)
CHLORIDE SERPL-SCNC: 105 MMOL/L (ref 97–108)
CO2 SERPL-SCNC: 28 MMOL/L (ref 21–32)
CREAT SERPL-MCNC: 0.79 MG/DL (ref 0.55–1.02)
DIFFERENTIAL METHOD BLD: NORMAL
EOSINOPHIL # BLD: 0.4 K/UL (ref 0–0.4)
EOSINOPHIL NFR BLD: 4 % (ref 0–7)
ERYTHROCYTE [DISTWIDTH] IN BLOOD BY AUTOMATED COUNT: 13.2 % (ref 11.5–14.5)
GLUCOSE SERPL-MCNC: 107 MG/DL (ref 65–100)
HCT VFR BLD AUTO: 37.6 % (ref 35–47)
HGB BLD-MCNC: 12.7 G/DL (ref 11.5–16)
IMM GRANULOCYTES # BLD AUTO: 0 K/UL (ref 0–0.04)
IMM GRANULOCYTES NFR BLD AUTO: 0 % (ref 0–0.5)
LYMPHOCYTES # BLD: 2.8 K/UL (ref 0.8–3.5)
LYMPHOCYTES NFR BLD: 35 % (ref 12–49)
MAGNESIUM SERPL-MCNC: 1.8 MG/DL (ref 1.6–2.4)
MCH RBC QN AUTO: 32.9 PG (ref 26–34)
MCHC RBC AUTO-ENTMCNC: 33.8 G/DL (ref 30–36.5)
MCV RBC AUTO: 97.4 FL (ref 80–99)
MONOCYTES # BLD: 0.7 K/UL (ref 0–1)
MONOCYTES NFR BLD: 8 % (ref 5–13)
NEUTS SEG # BLD: 4.3 K/UL (ref 1.8–8)
NEUTS SEG NFR BLD: 53 % (ref 32–75)
NRBC # BLD: 0 K/UL (ref 0–0.01)
NRBC BLD-RTO: 0 PER 100 WBC
PHOSPHATE SERPL-MCNC: 3.2 MG/DL (ref 2.6–4.7)
PLATELET # BLD AUTO: 257 K/UL (ref 150–400)
PMV BLD AUTO: 11.3 FL (ref 8.9–12.9)
POTASSIUM SERPL-SCNC: 3.4 MMOL/L (ref 3.5–5.1)
RBC # BLD AUTO: 3.86 M/UL (ref 3.8–5.2)
SODIUM SERPL-SCNC: 141 MMOL/L (ref 136–145)
WBC # BLD AUTO: 8.1 K/UL (ref 3.6–11)

## 2022-12-20 PROCEDURE — 97161 PT EVAL LOW COMPLEX 20 MIN: CPT

## 2022-12-20 PROCEDURE — 97116 GAIT TRAINING THERAPY: CPT

## 2022-12-20 PROCEDURE — 80069 RENAL FUNCTION PANEL: CPT

## 2022-12-20 PROCEDURE — 83735 ASSAY OF MAGNESIUM: CPT

## 2022-12-20 PROCEDURE — 85025 COMPLETE CBC W/AUTO DIFF WBC: CPT

## 2022-12-20 PROCEDURE — 36415 COLL VENOUS BLD VENIPUNCTURE: CPT

## 2022-12-20 PROCEDURE — 74011250636 HC RX REV CODE- 250/636: Performed by: FAMILY MEDICINE

## 2022-12-20 PROCEDURE — 74011250637 HC RX REV CODE- 250/637: Performed by: INTERNAL MEDICINE

## 2022-12-20 RX ADMIN — LEVOTHYROXINE SODIUM 125 MCG: 0.12 TABLET ORAL at 05:15

## 2022-12-20 RX ADMIN — ENOXAPARIN SODIUM 30 MG: 100 INJECTION SUBCUTANEOUS at 05:15

## 2022-12-20 RX ADMIN — METOPROLOL SUCCINATE 50 MG: 50 TABLET, EXTENDED RELEASE ORAL at 08:20

## 2022-12-20 NOTE — PROGRESS NOTES
12/20/2022   CARE MANAGEMENT NOTE:  CM reviewed EMR and handoff received from previous  Jevon Moreno). Pt was admitted with SBO. Reportedly, pt resides with her sister Michelle Hammonds ([phone]). Dtr Vani Crawford ([phone]) is another family contact. RUR 15%; LOS 5 days     Transition Plan of Care:  Plan is for pt to return home with her sister  Pt is currently receiving [facility] (skilled nursing, PT). CM notified agency of pt's discharge today and AVS, summary was sent to them. Outpatient follow up  Family will transport pt home     No further post discharge needs indicated.   Do

## 2022-12-20 NOTE — DISCHARGE SUMMARY
Discharge Summary       PATIENT ID: Isabel Tubbs  MRN: [id number]   YOB: 1950    DATE OF ADMISSION: 12/14/2022  3:26 PM    DATE OF DISCHARGE: 20 Dec 2022   PRIMARY CARE PROVIDER: Sierra Isaac MD     ATTENDING PHYSICIAN: Baker Phoenix  DISCHARGING PROVIDER: Gui James MD    To contact this individual call [phone] and ask the  to page. If unavailable ask to be transferred the Adult Hospitalist Department. CONSULTATIONS: IP CONSULT TO COLORECTAL SURGERY    PROCEDURES/SURGERIES: * No surgery found *    DISCHARGE DIAGNOSES:   Small bowel obstruction  Volume depletion 2/2 small bowel obstruction    ADMISSION SUMMARY AND HOSPITAL COURSE:   41-year-old female with a history of ostomy presented with abdominal pain and was found to have small bowel obstruction. She was provided IV fluids while made NPO with bowel rest. Once resolved diet was advanced as tolerated. She was cleared for DC by Colorectal surgery    DISCHARGE DIAGNOSES / PLAN:      Small bowel obstruction  -Suspect distal jejunal SBO based on CT findings  -IV Dilaudid and IV Zofran were provided for symptom control  -Diet advanced to full  and was tolerated  -Colorectal surgery following, ok with DC  -Follow up with colorectal surgery in 2-3 weeks     Hypertension  -Continue with metoprolol     Hypothyroidism  -Continue with levothyroxine    BMI: Body mass index is 39.68 kg/m². . This patient: Meets criteria for obesity given BMI >/= 30 and < 40 due to excess calories/nutritional. Weight loss and lifestyle modifications should be encouraged as an outpatient. PENDING TEST RESULTS:   At the time of discharge the following test results are still pending: None     ADDITIONAL CARE RECOMMENDATIONS:   None     NOTIFY YOUR PHYSICIAN FOR ANY OF THE FOLLOWING:   Fever over 101 degrees for 24 hours. Chest pain, shortness of breath, fever, chills, nausea, vomiting, diarrhea, change in mentation, falling, weakness, bleeding.  Severe pain or pain not relieved by medications, as well as any other signs or symptoms that you may have questions about. FOLLOW UP APPOINTMENTS:    Follow-up Information       Follow up With Specialties Details Why [address]  Follow up Resumption of homecare services. Please call agency directly with any questions. [phone]    Quintin Michael MD Colon and Rectal Surgery Call in 2 week(s) follow up from hospitalization and discuss upcoming surgery [address] [address]  [phone]      Veronica Curtis MD Internal Medicine Physician Schedule an appointment as soon as possible for a visit in 3 day(s)  [address]  [phone]                 DIET: Regular Diet    ACTIVITY: Activity as tolerated    EQUIPMENT needed: None    DISCHARGE MEDICATIONS:  Current Discharge Medication List        CONTINUE these medications which have NOT CHANGED    Details   ketorolac (TORADOL) 10 mg tablet Take  by mouth. nystatin (MYCOSTATIN) powder Apply  to affected area as needed. diclofenac (VOLTAREN) 1 % gel Apply  to affected area as needed for Pain. acetaminophen (TYLENOL) 500 mg tablet Take  by mouth every six (6) hours as needed for Pain. Associated Diagnoses: Acute midline low back pain without sciatica      ketoconazole (NIZORAL) 2 % topical cream Apply  to affected area as needed. diclofenac EC (VOLTAREN) 75 mg EC tablet Take 1 Tablet by mouth daily. Qty: 90 Tablet, Refills: 0    Associated Diagnoses: Knee arthropathy      omeprazole (PRILOSEC) 40 mg capsule Take 1 Capsule by mouth daily. Qty: 30 Capsule, Refills: 3    Associated Diagnoses: Gastroesophageal reflux disease without esophagitis      loperamide (IMODIUM) 2 mg capsule Take 2 mg by mouth every eight (8) hours. folic acid (FOLVITE) 1 mg tablet Take  by mouth daily. thiamine HCL (B-1) 100 mg tablet Take  by mouth daily.       melatonin 3 mg tablet Take  by mouth nightly. famotidine (PEPCID) 20 mg tablet Take 1 Tablet by mouth two (2) times daily as needed for Heartburn or Indigestion. Qty: 60 Tablet, Refills: 4    Associated Diagnoses: Gastroesophageal reflux disease without esophagitis      estradioL (ESTRACE) 0.01 % (0.1 mg/gram) vaginal cream Insert 2 g into vagina daily. multivitamin (ONE A DAY) tablet Take 1 Tablet by mouth in the morning. vibegron (Gemtesa) 75 mg tab Take  by mouth Every morning. metoprolol succinate (TOPROL-XL) 50 mg XL tablet Take 50 mg by mouth Every morning. levothyroxine (SYNTHROID) 125 mcg tablet TAKE 1 TABLET BY MOUTH EVERY DAY BEFORE BREAKFAST  Qty: 90 Tablet, Refills: 1    Associated Diagnoses: Acquired hypothyroidism             DISPOSITION:    Home With:   OT  PT  HH  RN       Long term SNF/Inpatient Rehab    Independent/assisted living    Hospice    Other:       PATIENT CONDITION AT DISCHARGE:     Functional status    Poor     Deconditioned     Independent      Cognition     Lucid     Forgetful     Dementia      Catheters/lines (plus indication)    Forte     PICC     PEG     None      Code status     Full code     DNR      PHYSICAL EXAMINATION AT DISCHARGE:  General:          Alert, cooperative, no distress, appears stated age. HEENT:           Atraumatic, anicteric sclerae, pink conjunctivae                          No oral ulcers, mucosa moist, throat clear, dentition fair  Neck:               Supple, symmetrical  Lungs:             Clear to auscultation bilaterally. No Wheezing or Rhonchi. No rales. Chest wall:      No tenderness  No Accessory muscle use. Heart:              Regular  rhythm,  No  murmur   No edema  Abdomen:        Soft, non-tender. Not distended. Bowel sounds normal  Extremities:     No cyanosis. No clubbing,                            Skin turgor normal, Capillary refill normal  Skin:                Not pale.   Not Jaundiced  No rashes   Psych: Not anxious or agitated. Neurologic:      Alert, moves all extremities, answers questions appropriately and responds to commands       CHRONIC MEDICAL DIAGNOSES:  Problem List as of 12/20/2022 Date Reviewed: 10/26/2022            Codes Class Noted - Resolved    SBO (small bowel obstruction) ([address] ICD-10-CM: U02.605  ICD-9-CM: 560.9  12/14/2022 - Present        Hydronephrosis ICD-10-CM: N13.30  ICD-9-CM: 591  10/18/2022 - Present        Pyelonephritis ICD-10-CM: N12  ICD-9-CM: 590.80  10/17/2022 - Present        Colovesical fistula ICD-10-CM: N32.1  ICD-9-CM: 596.1  8/16/2022 - Present        S/P hysterectomy with oophorectomy ICD-10-CM: Z90.710, Z90.721  ICD-9-CM: V88.01  11/27/2019 - Present        Thickened endometrium ICD-10-CM: R93.89  ICD-9-CM: 793.5  9/29/2019 - Present    Overview Signed 11/15/2019  3:37 PM by Moises Alvarez MD     [facility] 10/13/19 Dr. Myrna Lieberman             Osteopenia ICD-10-CM: M85.80  ICD-9-CM: 733.90  10/1/2018 - Present    Overview Signed 10/16/2018 11:47 AM by Moises Alvarez MD     [facility]. Advanced care planning/counseling discussion ICD-10-CM: Z71.89  ICD-9-CM: V65.49  7/2/2018 - Present    Overview Signed 7/2/2018  4:38 PM by Ronak Wakefield RN     Patient states conversation about Advanced Medical Directive has been started, but nothing written down yet. NN encouraged patient to complete Advanced Medical Directive paperwork & bring a copy to the office for scanning into the medical record. Patient verbalized understanding & agreement. Obesity, morbid ([address] ICD-10-CM: E66.01  ICD-9-CM: 278.01  4/25/2018 - Present        Impaired gait ICD-10-CM: R26.9  ICD-9-CM: 143. 2  Unknown - Present    Overview Signed 4/25/2018  4:10 PM by Moises Alvarez MD     uses cane. knee OA.                Hypothyroidism ICD-10-CM: E03.9  ICD-9-CM: 244.9  Unknown - Present        Benign essential HTN ICD-10-CM: I10  ICD-9-CM: 401.1  Unknown - Present        Grief reaction ICD-10-CM: F43.21  ICD-9-CM: 309.0  Unknown - Present    Overview Signed 4/25/2018  4:10 PM by Alonso Leonard MD     loss of  1/2018             Head injury ICD-10-CM: S09. 90XA  ICD-9-CM: 959.01  Unknown - Present    Overview Signed 4/25/2018  4:10 PM by Alonso Leonard MD     fell in [facility] 12/23/17. ER eval- neg CT.  left facial contusion/orbit injury.              Postconcussion syndrome ICD-10-CM: F07.81  ICD-9-CM: 310.2  2/1/2018 - Present    Overview Signed 4/25/2018  4:10 PM by Alonso Leonard MD     dx by neurology in [place].  head injury 12/23/17             RESOLVED: Opioid use, unspecified with unspecified opioid-induced disorder ICD-10-CM: F11.99  ICD-9-CM: 292.9, 305.50  9/22/2022 - 10/26/2022           Greater than 60 minutes were spent with the patient on counseling and coordination of care    Signed:   Anna Goodman MD  12/20/2022  9:10 AM    .

## 2022-12-20 NOTE — TELEPHONE ENCOUNTER
Gael Pérez  called to state the patient needs a hospital follow up to be seen within three days. PSR does not see anything in that time frame. PSR does not see anything until the middle of January. Patient is at [facility] for small bowel obstruction, and is being discharged today 12/20. Please advise.

## 2022-12-20 NOTE — TELEPHONE ENCOUNTER
T/C to patient to assist with scheduling a hospital follow up appt with Dr. Trung Lobo. No answer and LVM.

## 2022-12-20 NOTE — ROUTINE PROCESS
Bedside and Verbal shift change report given to MaiRN  (oncoming nurse) by Tiarra Gracia  (offgoing nurse). Report included the following information SBAR, Kardex, Procedure Summary, MAR, and Recent Results.

## 2022-12-20 NOTE — PROGRESS NOTES
Bedside and Verbal shift change report given to [address] (oncoming nurse) by Joseph Smith (offgoing nurse). Report included the following information SBAR, Kardex, Intake/Output, and MAR.

## 2022-12-20 NOTE — PROGRESS NOTES
12/20/22    Patient Discharged before Medicare 2nd [facility] Letter could be given to her.

## 2022-12-20 NOTE — PROGRESS NOTES
CRS Note    Continues to improve from an abd standpoint. No pain this morning only reports tired/weak. Tolerating reg low fiber diet without nausea. Ostomy putting out more liquid and looks like her normal output to her when not taking lomotil. Vitals reviewed    NAD  Abd soft, nontender, mildly distended improved from yesterday, ostomy with liquid enteric output in appliance      Labs reviewed    A/P:  74 y.o. F admitted with pSBO, improving  - cont low fiber diet  -  OOB/ambulate  - ok to be discharged home from surgical standpoint  - Will follow up in approx 2 weeks to discuss ostomy reversal surgery         Pt seen and discussed with Dr Jamir Mancera.   Ok Hurtado NP
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
## 2022-12-21 ENCOUNTER — PATIENT OUTREACH (OUTPATIENT)
Dept: CASE MANAGEMENT | Age: 72
End: 2022-12-21

## 2022-12-21 NOTE — TELEPHONE ENCOUNTER
Spoke with patient and scheduled for a hospital follow up for a small bowel obstruction was at [facility].   Scheduled for 12/27/22 at 11:40 AM.

## 2022-12-21 NOTE — PROGRESS NOTES
Care Transitions Initial Call    Call within 2 business days of discharge: Yes     Patient: Jacque Gillespie Patient : 1950 MRN: [id number]    Last Discharge [address]       Date Complaint Diagnosis Description Type Department Provider    22 Abdominal Pain SBO (small bowel obstruction) ([facility] . .. ED to Hosp-Admission (Discharged) (ADMIT) [id number] Whit Regan MD; [place]. .. Was this an external facility discharge? No Discharge Facility: na    Challenges to be reviewed by the provider   Additional needs identified to be addressed with provider: no  none         Method of communication with provider : none         Advance Care Planning:   Does patient have an Advance Directive: yes, reviewed and current. Inpatient Readmission Risk score: Unplanned Readmit Risk Score: 14.8    Was this a readmission? no   Patient stated reason for the admission: n/vomiting, called urology who referred her to the ED    Patients top risk factors for readmission: medical condition-multiple abdominal issues, being followed by surgeon and urology    Interventions to address risk factors: Obtained and reviewed discharge summary and/or continuity of care documents    Care Transition Nurse (CTN) contacted the patient by telephone to perform post hospital discharge assessment. Verified name and  with patient as identifiers. Provided introduction to self, and explanation of the CTN role. CTN reviewed discharge instructions, medical action plan and red flags with patient who verbalized understanding. Were discharge instructions available to patient? yes. Reviewed appropriate site of care based on symptoms and resources available to patient including: PCP, Specialist, [address] [place] [address] [facility], When to call 911, and [facility] . Patient given an opportunity to ask questions and does not have any further questions or concerns at this time.  The patient agrees to contact the PCP office for questions related to their healthcare. Medication reconciliation was performed with patient, who verbalizes understanding of administration of home medications. Advised obtaining a 90-day supply of all daily and as-needed medications. Referral to Pharm D needed: no     Home Health/Outpatient orders at discharge: home health care  [address]: Claude 1962  Date of initial visit: TBD      Discussed follow-up appointments. If no appointment was previously scheduled, appointment scheduling offered:  pt is calling and scheduling appts per discharge paperwork . Is follow up appointment scheduled within 7 days of discharge? yes. [address] follow up appointment(s):   Future Appointments   Date Time Provider William Calderon   12/27/2022 11:40 AM Supa Thomas MD [facility]     [facility] up appointment(s): surgeon    Plan for follow-up call in 1-2 days based on severity of symptoms and risk factors. Plan for next call:  will review appts and symptoms    CTN provided contact information for future needs. Goals Addressed                   This Visit's Progress     Prevent complications post hospitalization. 12/21/2022  Performed medication reconciliation with pt and pt has maintenance medications. Pt confirmed follow up appt with PCP on 12/17/2022 and with surgeon. Reviewed red flags with pt and pt states understanding to call 911 for medical emergency. Pt reports that she will call [facility] to follow up and reports that service had just ended. Pt states that she is doing okay and her nephew is staying with her while her sister is out of town until Jan. 1, 2023. Will call pt on 12/23/2022 to review appts and symptoms.   Jin Hirsch RN [facility], Care Transitions Nurse, [phone]

## 2022-12-23 ENCOUNTER — PATIENT OUTREACH (OUTPATIENT)
Dept: CASE MANAGEMENT | Age: 72
End: 2022-12-23

## 2022-12-23 NOTE — PROGRESS NOTES
Care Transitions Follow Up Call    Challenges to be reviewed by the provider   Additional needs identified to be addressed with provider: no  none           Method of communication with provider : none    Care Transition Nurse (CTN) contacted the patient by telephone to follow up after admission on 2022. Verified name and  with patient as identifiers. Addressed changes since last contact:  pt has made follow up appts, nephew is with her to assist as needed  Follow up appointment completed? no.   Was follow up appointment scheduled within 7 days of discharge? yes. Interventions to address risk factors:  pt has follow up appts    [facility] follow up appointment(s):   Future Appointments   Date Time Provider William Calderon   2022 11:40 AM Christa Thomas MD [facility]     Non-[facility] follow up appointment(s): see goals    CTN provided contact information for future needs. Plan for follow-up call in 5-7 days based on severity of symptoms and risk factors. Plan for next call:  check on any signs of infection       Goals Addressed                   This Visit's Progress     Prevent complications post hospitalization. 2022  Performed medication reconciliation with pt and pt has maintenance medications. Pt confirmed follow up appt with PCP on 2022 and with surgeon. Reviewed red flags with pt and pt states understanding to call 911 for medical emergency. Pt reports that she will call [facility] to follow up and reports that service had just ended. Pt states that she is doing okay and her nephew is staying with her while her sister is out of town until 2023. Will call pt on 2022 to review appts and symptoms. Usha Carpenter RN [address] [facility], Care Transitions Nurse, [phone]     2022  Pt reports no signs of infections and is doing well.   Pt has made the following appts:  PCP 2022, 2022 Dr. Padmini Diallo for U/S, 1/3/2023 follow up on U/S with Padmini Diallo; 1/5/2023 Dr. Darnell Hansen surgeon, 1/13/2023 Dr. Darnell Hansen for mini colonoscopy; 1/17/2023 reversal surgery with Dr. Darnell Hansen. Pt is agreeable to a call by a colleague next week to review symptoms and follow up appts.   Adrianne Gilbert RN [address] [facility], Care Transitions Nurse, [phone]

## 2022-12-27 ENCOUNTER — OFFICE VISIT (OUTPATIENT)
Dept: INTERNAL MEDICINE CLINIC | Age: 72
End: 2022-12-27
Payer: MEDICARE

## 2022-12-27 VITALS
OXYGEN SATURATION: 96 % | RESPIRATION RATE: 15 BRPM | DIASTOLIC BLOOD PRESSURE: 80 MMHG | BODY MASS INDEX: 38.66 KG/M2 | TEMPERATURE: 98.1 F | SYSTOLIC BLOOD PRESSURE: 120 MMHG | HEART RATE: 88 BPM | HEIGHT: 63 IN | WEIGHT: 218.2 LBS

## 2022-12-27 DIAGNOSIS — Z93.2 ILEOSTOMY IN PLACE (HCC): Primary | ICD-10-CM

## 2022-12-27 DIAGNOSIS — Z09 HOSPITAL DISCHARGE FOLLOW-UP: ICD-10-CM

## 2022-12-27 DIAGNOSIS — N39.0 RECURRENT UTI: ICD-10-CM

## 2022-12-27 DIAGNOSIS — I10 BENIGN ESSENTIAL HTN: ICD-10-CM

## 2022-12-27 DIAGNOSIS — K56.609 SBO (SMALL BOWEL OBSTRUCTION) (HCC): ICD-10-CM

## 2022-12-27 PROCEDURE — 1111F DSCHRG MED/CURRENT MED MERGE: CPT | Performed by: INTERNAL MEDICINE

## 2022-12-27 PROCEDURE — G8427 DOCREV CUR MEDS BY ELIG CLIN: HCPCS | Performed by: INTERNAL MEDICINE

## 2022-12-27 PROCEDURE — 99496 TRANSJ CARE MGMT HIGH F2F 7D: CPT | Performed by: INTERNAL MEDICINE

## 2022-12-27 NOTE — PROGRESS NOTES
HISTORY OF PRESENT ILLNESS    Chief Complaint   Patient presents with    Hospital Follow Up     [address] bowel obstruction        Presents for Transitional care management (TCM) visit s/p of hospital admission from 12/14 until 12/20/2022 at [facility].    Nurse Navigator call noted to patient and documented in [facility] on 12/20/22. Hospital record and relevant lab results, test results and consult notes have personally been reviewed by me at this office visit. Medications reviewed and reconciled. Patient was hospitalized for small bowel obstruction. Has history of ostomy. Admitted with abdominal pain. She was also found to be significantly dehydrated. CT scan revealed suspected distal jejunal SBO. She was treated with Dilaudid and Zofran for symptom control. Consulted with surgery while in the hospital.  Advance diet slowly. Today, patient is accompanied by her nephew, Jerald Chapin.  She is sitting in a wheelchair. Ostomy bag is being changed by a neighbor who is a nurse. Eating well with no nausea, vomiting, or pain. Appetite is poor, but will eat. Ostomy is working well. Loose stools are stable. No blood in stool. Urology. History of recurrent UTIs and fistula. Status post left colectomy and diverting loop ileostomy August 16, 2022 with Dr. Sunita Paiz and colorectal surgery. She is seeing urology DrKimberly Callejas for appt 12/29/22. Surgery is scheduled for reverse loop ileostomy 1/17/223 with Dr. Flores Mcdonald. She also has a small, fat-containing, umbilical  hernia and a small to moderate parastomal hernia. Appt with him 1/5/23 to consult with sigmoidoscopy 1/13/23. Leg edema is stable. Mild. Labs on discharge showed no anemia, normal Cr, mild hypokalemia, normal thyroid.     Lab Results   Component Value Date/Time    TSH 1.60 08/18/2022 02:40 PM    T4, Free 1.5 02/21/2022 09:26 AM     Lab Results   Component Value Date/Time    HGB 12.7 12/20/2022 01:38 AM Lab Results   Component Value Date/Time    Sodium 141 12/20/2022 01:38 AM    Potassium 3.4 (L) 12/20/2022 01:38 AM    Chloride 105 12/20/2022 01:38 AM    CO2 28 12/20/2022 01:38 AM    Anion gap 8 12/20/2022 01:38 AM    Glucose 107 (H) 12/20/2022 01:38 AM    BUN 7 12/20/2022 01:38 AM    Creatinine 0.79 12/20/2022 01:38 AM    BUN/Creatinine ratio 9 (L) 12/20/2022 01:38 AM    GFR est AA >60 08/29/2022 04:52 AM    GFR est non-AA >60 08/29/2022 04:52 AM    Calcium 9.9 12/20/2022 01:38 AM         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 has a past surgical history that includes hx tonsil and adenoidectomy (1955); hx colonoscopy (11/24/2009); colonoscopy (N/A, 09/19/2018); hx colposcopy (11/26/2018); hx tubal ligation (1984); hx dilation and curettage (02/2019); hx cholecystectomy (1991); hx cataract removal (Bilateral); hx knee arthroscopy (Right, 1978); hx haroldo and bso (10/13/2019); hx meniscus repair (Right); hx colonoscopy (08/15/2022); hx ileostomy (Left, 08/16/2022); and ir change internal ureteral stent rt (08/2022). reports that she quit smoking about 12 years ago. Her smoking use included cigarettes. She has a 5.00 pack-year smoking history.  She has never used smokeless tobacco. She reports that she does not currently use alcohol after a past usage of about 4.0 standard drinks per week. She reports that she does not use drugs. family history includes Dementia in her mother; Diabetes in her mother and sister; Heart Attack in her sister; Heart Disease in her sister; Hypertension in her brother and brother; No Known Problems in her daughter; OSTEOARTHRITIS in her sister; Other in her mother; Panic disorder in her brother, brother, and sister; Thyroid Disease in her mother and sister. Physical Exam   Nursing note and vitals reviewed. Blood pressure 120/80, pulse 88, temperature 98.1 °F (36.7 °C), temperature source Oral, resp. rate 15, height 5' 3\" (1.6 m), weight 218 lb 3.2 oz (99 kg), SpO2 96 %. Constitutional:  No distress. Eyes: Conjunctivae are normal.   Ears:  Hearing grossly intact  Cardiovascular: Normal rate. regular rhythm, no murmurs or gallops  No edema  Pulmonary/Chest: Effort normal.   CTAB  Musculoskeletal: moves all 4 extremities   Abdomen with ostomy right lower quadrant, draining medium brown loose stool. No blood in the stool. Mild periumbilical hernia which is easily reducible. No significant tenderness throughout. Normal active bowel sounds. Neurological: Alert and oriented to person, place, and time. Skin: No rash noted. Psychiatric: Normal mood and affect. Behavior is normal.     ASSESSMENT and PLAN  Diagnoses and all orders for this visit:    1. Ileostomy in place [facility])  2. SBO (small bowel obstruction) ([facility] [address]  Pending reversal 1/17/22 with Dr. Elsa Escalante after sigmoidoscopy  Appears to be working well. Continue VAC changes. 3. Benign essential HTN  Blood pressure is reasonably well controlled on metoprolol. We will continue. 4. Recurrent UTI  Currently asymptomatic. Following up with urology. Fistula has been repaired and stents removed.     5. Hospital discharge follow-up  -     SC DISCHARGE MEDS RECONCILED W/ CURRENT OUTPATIENT MED LIST    lab results and schedule of future lab studies reviewed with patient  reviewed medications and side effects in detail    Return to clinic for further evaluation if new symptoms develop      Current Outpatient Medications   Medication Sig    diclofenac EC (VOLTAREN) 75 mg EC tablet Take 1 Tablet by mouth daily. omeprazole (PRILOSEC) 40 mg capsule Take 1 Capsule by mouth daily. nystatin (MYCOSTATIN) powder Apply  to affected area as needed. diclofenac (VOLTAREN) 1 % gel Apply  to affected area as needed for Pain. acetaminophen (TYLENOL) 500 mg tablet Take  by mouth every six (6) hours as needed for Pain. folic acid (FOLVITE) 1 mg tablet Take  by mouth daily. thiamine HCL (B-1) 100 mg tablet Take  by mouth daily. melatonin 3 mg tablet Take  by mouth nightly.    ketoconazole (NIZORAL) 2 % topical cream Apply  to affected area as needed. multivitamin (ONE A DAY) tablet Take 1 Tablet by mouth in the morning. vibegron (Gemtesa) 75 mg tab Take  by mouth Every morning. metoprolol succinate (TOPROL-XL) 50 mg XL tablet Take 50 mg by mouth Every morning. levothyroxine (SYNTHROID) 125 mcg tablet TAKE 1 TABLET BY MOUTH EVERY DAY BEFORE BREAKFAST     No current facility-administered medications for this visit.

## 2022-12-28 ENCOUNTER — TRANSCRIBE ORDER (OUTPATIENT)
Dept: SCHEDULING | Age: 72
End: 2022-12-28

## 2022-12-28 DIAGNOSIS — Z93.2 ILEOSTOMY STATUS (HCC): Primary | ICD-10-CM

## 2022-12-30 ENCOUNTER — TELEPHONE (OUTPATIENT)
Dept: INTERNAL MEDICINE CLINIC | Age: 72
End: 2022-12-30

## 2023-01-05 ENCOUNTER — PATIENT OUTREACH (OUTPATIENT)
Dept: CASE MANAGEMENT | Age: 73
End: 2023-01-05

## 2023-01-05 NOTE — PROGRESS NOTES
Care Transitions Follow Up Call    Challenges to be reviewed by the provider   Additional needs identified to be addressed with provider: no  none         Method of communication with provider : none    Care Transition Nurse (CTN) contacted the patient by telephone to follow up. Verified name and  with patient as identifiers. Addressed changes since last contact: none  Follow up appointment completed? yes. Was follow up appointment scheduled within 7 days of discharge? no.     Advance Care Planning:   Does patient have an Advance Directive:  yes; reviewed and current     CTN reviewed discharge instructions, medical action plan and red flags with patient and discussed any barriers to care and/or understanding of plan of care after discharge. Discussed appropriate site of care based on symptoms and resources available to patient including: PCP and Specialist. The patient agrees to contact the PCP office for questions related to their healthcare. [facility] follow up appointment(s):   Future Appointments   Date Time Provider William Calderon   2023  1:00 PM SFM PAT ASSESSMENT B [facility]MOR[facility] RI OR PRE AS   2023  2:00 PM SFM PAT ASSESSMENT B [facility]MORPAT RI OR PRE AS   2023  1:30 PM [facility] FLUORO 1 SFMRAD [facility] follow up appointment(s): Mini colonoscopy 23    CTN provided contact information for future needs. Plan for follow-up call in 7-10 days based on severity of symptoms and risk factors. Goals Addressed                   This Visit's Progress     Prevent complications post hospitalization. On track     2022  Performed medication reconciliation with pt and pt has maintenance medications. Pt confirmed follow up appt with PCP on 2022 and with surgeon. Reviewed red flags with pt and pt states understanding to call 911 for medical emergency. Pt reports that she will call [facility] to follow up and reports that service had just ended.   Pt states that she is doing jaxon and her nephew is staying with her while her sister is out of town until Jan. 1, 2023. Will call pt on 12/23/2022 to review appts and symptoms. Jailyn Vaughn RN [address] [facility], Care Transitions Nurse, [phone]     12/23/2022  Pt reports no signs of infections and is doing well. Pt has made the following appts:  PCP 12/27/2022, 12/29/2022 Dr. Keith Olson for U/S, 1/3/2023 follow up on U/S with Keith Olson; 1/5/2023 Dr. Sonny Todd surgeon, 1/13/2023 Dr. Sonny Todd for mini colonoscopy; 1/17/2023 reversal surgery with Dr. Sonny Todd. Pt is agreeable to a call by a colleague next week to review symptoms and follow up appts. Jailyn Vaughn RN [address] [facility], Care Transitions Nurse, [phone]       01/05/23   Patient denies and N/V, is eating and drinking well, plenty of stool in colostomy bag. Patient seen by PCP Dr Jackie Dang on 12/27 as scheduled. Having Gastrograffin Enema on 1/9/23  Having \"Mini Colonoscopy\" on 1/13/23  Having loop reversal on 1/17/23. Monitor status of these appt/procedures and N/V, PO intake and activity tolerance on next call.     Anny Shipman MSN, RN, [facility] / Care Transition Nurse / [phone]

## 2023-01-06 ENCOUNTER — HOSPITAL ENCOUNTER (OUTPATIENT)
Dept: GENERAL RADIOLOGY | Age: 73
End: 2023-01-06
Attending: COLON & RECTAL SURGERY
Payer: MEDICARE

## 2023-01-06 ENCOUNTER — HOSPITAL ENCOUNTER (OUTPATIENT)
Dept: PREADMISSION TESTING | Age: 73
Discharge: HOME | End: 2023-01-06

## 2023-01-06 ENCOUNTER — HOSPITAL ENCOUNTER (OUTPATIENT)
Dept: PREADMISSION TESTING | Age: 73
End: 2023-01-06
Payer: MEDICARE

## 2023-01-06 VITALS
OXYGEN SATURATION: 97 % | WEIGHT: 220.3 LBS | SYSTOLIC BLOOD PRESSURE: 124 MMHG | DIASTOLIC BLOOD PRESSURE: 61 MMHG | TEMPERATURE: 97.7 F | RESPIRATION RATE: 18 BRPM | BODY MASS INDEX: 39.04 KG/M2 | HEIGHT: 63 IN | HEART RATE: 86 BPM

## 2023-01-06 LAB
ABO + RH BLD: NORMAL
ALBUMIN SERPL-MCNC: 3.9 G/DL (ref 3.5–5)
ALBUMIN/GLOB SERPL: 1 (ref 1.1–2.2)
ALP SERPL-CCNC: 150 U/L (ref 45–117)
ALT SERPL-CCNC: 62 U/L (ref 12–78)
ANION GAP SERPL CALC-SCNC: 8 MMOL/L (ref 5–15)
AST SERPL-CCNC: 45 U/L (ref 15–37)
BILIRUB SERPL-MCNC: 0.6 MG/DL (ref 0.2–1)
BLOOD GROUP ANTIBODIES SERPL: NORMAL
BUN SERPL-MCNC: 18 MG/DL (ref 6–20)
BUN/CREAT SERPL: 21 (ref 12–20)
CALCIUM SERPL-MCNC: 10.4 MG/DL (ref 8.5–10.1)
CHLORIDE SERPL-SCNC: 104 MMOL/L (ref 97–108)
CO2 SERPL-SCNC: 25 MMOL/L (ref 21–32)
CREAT SERPL-MCNC: 0.87 MG/DL (ref 0.55–1.02)
ERYTHROCYTE [DISTWIDTH] IN BLOOD BY AUTOMATED COUNT: 13.2 % (ref 11.5–14.5)
GLOBULIN SER CALC-MCNC: 4 G/DL (ref 2–4)
GLUCOSE SERPL-MCNC: 106 MG/DL (ref 65–100)
HCT VFR BLD AUTO: 44.2 % (ref 35–47)
HGB BLD-MCNC: 14.9 G/DL (ref 11.5–16)
MCH RBC QN AUTO: 33 PG (ref 26–34)
MCHC RBC AUTO-ENTMCNC: 33.7 G/DL (ref 30–36.5)
MCV RBC AUTO: 98 FL (ref 80–99)
NRBC # BLD: 0 K/UL (ref 0–0.01)
NRBC BLD-RTO: 0 PER 100 WBC
PLATELET # BLD AUTO: 276 K/UL (ref 150–400)
PMV BLD AUTO: 11.9 FL (ref 8.9–12.9)
POTASSIUM SERPL-SCNC: 4.9 MMOL/L (ref 3.5–5.1)
PROT SERPL-MCNC: 7.9 G/DL (ref 6.4–8.2)
RBC # BLD AUTO: 4.51 M/UL (ref 3.8–5.2)
SODIUM SERPL-SCNC: 137 MMOL/L (ref 136–145)
SPECIMEN EXP DATE BLD: NORMAL
WBC # BLD AUTO: 9 K/UL (ref 3.6–11)

## 2023-01-06 PROCEDURE — 86900 BLOOD TYPING SEROLOGIC ABO: CPT

## 2023-01-06 PROCEDURE — 83036 HEMOGLOBIN GLYCOSYLATED A1C: CPT

## 2023-01-06 PROCEDURE — 71046 X-RAY EXAM CHEST 2 VIEWS: CPT

## 2023-01-06 PROCEDURE — 93005 ELECTROCARDIOGRAM TRACING: CPT

## 2023-01-06 PROCEDURE — 36415 COLL VENOUS BLD VENIPUNCTURE: CPT

## 2023-01-06 PROCEDURE — 80053 COMPREHEN METABOLIC PANEL: CPT

## 2023-01-06 PROCEDURE — 85027 COMPLETE CBC AUTOMATED: CPT

## 2023-01-06 RX ORDER — PHENOL/SODIUM PHENOLATE
20 AEROSOL, SPRAY (ML) MUCOUS MEMBRANE 2 TIMES DAILY
COMMUNITY

## 2023-01-06 NOTE — PERIOP NOTES
ERAS protocol and handbooks reviewed with patient. Handbooks given. Bowel prep reviewed. Supplements reviewed. All questions answered. Pre-Operative Nutrition Score        Has the patient had an unplanned weight loss of 10%  of body weight or more in the last 6 months? Yes = 1    Has the patient shala eating less than 50% of their normal diet in the preceding week? No = 0    Patient instructed on Non-Diabetic pre-operative nutrition plan to start 5 days prior to surgery. Patient given 10 shakes and 3 pre-surgery drinks. Opportunity given for questions and all questions answered. Patient states that she had difficulty drinking her pre surgery drinks for her previous surgery. Educated patient and sister the importance of drinking the protein and carb loading drinks, patient and sister voiced understanding. Additionally, offered the patient that she could try to find another protein drink that she would be able to tolerate better. Also, educated patient on trying to drink protein drinks through out the day. To have 1/4 with breakfast, 1/4 mid morning 1/4 with lunch and 1/4 in the evening. Patient and sister both voiced understanding.

## 2023-01-06 NOTE — PERIOP NOTES
[address] [address] [facility] OR                                  [phone]   MAIN PRE OP                          [phone]                                                                                AMBULATORY PRE OP          [phone]  PRE-ADMISSION TESTING    [phone]     Surgery Date:  1/17 Tuesday        Is surgery arrival time given by surgeon? NO  If NO, [facility] staff will call you between 3 and 7pm the day before your surgery with your arrival time. (If your surgery is on a Monday, we will call you the Friday before.)    Call [phone] after 7pm Monday-Friday if you did not receive this call. INSTRUCTIONS BEFORE YOUR SURGERY   When You  Arrive Arrive at the 2nd [address] [facility] on the day of your surgery  Have your insurance card, photo ID, and any copayment (if needed)   Food   and   Drink NO food or drink after midnight the night before surgery    This means NO water, gum, mints, coffee, juice, etc.  No alcohol (beer, wine, liquor) 24 hours before and after surgery   Medications to   TAKE   Morning of Surgery MEDICATIONS TO TAKE THE MORNING OF SURGERY WITH A SIP OF WATER:   Metoprolol  Prilosec   Gemtesa    Medications  To  STOP      7 days before surgery Non-Steroidal anti-inflammatory Drugs (NSAID's): for example, Ibuprofen (Advil, Motrin), Naproxen (Aleve)  Aspirin, if taking for pain   Herbal supplements, vitamins, and fish oil  Other: Diclofenac, folic acid, multivitamin, B-1,   (Pain medications not listed above, including Tylenol may be taken)   Blood  Thinners If you take  Aspirin, Plavix, Coumadin, or any blood-thinning or anti-blood clot medicine, talk to the doctor who prescribed the medications for pre-operative instructions.    Bathing Clothing  Jewelry  Valuables     If you shower the morning of surgery, please do not apply anything to your skin (lotions, powders, deodorant, or makeup, especially mascara)  Follow Chlorhex[employer] body wash instructions provided to you during PAT appointment. Begin 3 days prior to surgery. Do not shave or trim anywhere 24 hours before surgery  Wear your hair loose or down; no pony-tails, buns, or metal hair clips  Wear loose, comfortable, clean clothes  Wear glasses instead of contacts  Leave money, valuables, and jewelry, including body piercings, at home  If you were given an [employer], bring it on day of surgery. Going Home - or Spending the Night SAME-DAY SURGERY: You must have a responsible adult drive you home and stay with you 24 hours after surgery  ADMITS: If your doctor is keeping you in the hospital after surgery, leave personal belongings/luggage in your car until you have a hospital room number. Hospital discharge time is 12 noon  Drivers must be here before 12 noon unless you are told differently   Special Instructions        Follow all instructions so your surgery wont be cancelled. Please, be on time. If a situation occurs and you are delayed the day of surgery, call [phone]     If your physical condition changes (like a fever, cold, flu, etc.) call your surgeon. Home medication(s) reviewed and verified verbally with list during PAT appointment. The patient was contacted in person. The patient verbalizes understanding of all instructions and does not need reinforcement.

## 2023-01-06 NOTE — PERIOP NOTES
Spoke with Sergio Cavanaugh, nurse and surgery scheduler for Dr. Romulo Ferreira, reviewed the drinks with her to see if patient could hold drinks Thursday 1/12 and Friday 1/13 for \"mini colonoscopy\" and resume drinks on Saturday 1/14. Sergio Cavanaugh agreed with plan. Called and notified patient to take drinks as instructed at time of her PAT appointment.

## 2023-01-07 LAB
EST. AVERAGE GLUCOSE BLD GHB EST-MCNC: 103 MG/DL
HBA1C MFR BLD: 5.2 % (ref 4–5.6)

## 2023-01-08 LAB
ATRIAL RATE: 80 BPM
CALCULATED P AXIS, ECG09: 30 DEGREES
CALCULATED R AXIS, ECG10: 71 DEGREES
CALCULATED T AXIS, ECG11: 50 DEGREES
DIAGNOSIS, 93000: NORMAL
P-R INTERVAL, ECG05: 172 MS
Q-T INTERVAL, ECG07: 354 MS
QRS DURATION, ECG06: 86 MS
QTC CALCULATION (BEZET), ECG08: 408 MS
VENTRICULAR RATE, ECG03: 80 BPM

## 2023-01-09 ENCOUNTER — HOSPITAL ENCOUNTER (OUTPATIENT)
Dept: GENERAL RADIOLOGY | Age: 73
Discharge: HOME | End: 2023-01-09
Attending: COLON & RECTAL SURGERY
Payer: MEDICARE

## 2023-01-09 DIAGNOSIS — Z93.2 ILEOSTOMY STATUS (HCC): ICD-10-CM

## 2023-01-09 PROCEDURE — 74011000636 HC RX REV CODE- 636: Performed by: RADIOLOGY

## 2023-01-09 PROCEDURE — 74270 X-RAY XM COLON 1CNTRST STD: CPT

## 2023-01-09 PROCEDURE — 74011250636 HC RX REV CODE- 250/636

## 2023-01-09 RX ADMIN — DIATRIZOATE MEGLUMINE 300 ML: 300 INJECTION, SOLUTION INTRAVENOUS at 14:17

## 2023-01-09 NOTE — PERIOP NOTES
Preoperative Nutrition Screen (GLENROY)   Patient's Age: 74 y.o. Patient's BMI: Estimated body mass index is 39.02 kg/m² as calculated from the following:    Height as of this encounter: 5' 3\" (1.6 m). Weight as of this encounter: 99.9 kg (220 lb 4.8 oz). If the answer to any of the following is Yes, then recommend prescribe Oral Nutrition Supplements (ONS) for at least 7 days prior to surgery and/or order referral to dietitian for further assessment and nutrition therapy. 1. Does the patient have a documented serum albumin less than 3.0 within the last 90 days? No = 0        2. Is patient's BMI less than 18.5 (or less than 20 if age over 72)? No = 0      3. Has the patient had an unplanned weight loss of 10% of body weight or more in the last 6 months? Yes = 1   4. Has the patient been eating less than 50% of their normal diet in the preceding week?  No = 0   GLENROY Score (number of Yes responses), 0-4 1     Plan:   No changes made     Electronically signed by Valentín Estrella RN on 1/9/23 at 5:50 PM

## 2023-01-13 ENCOUNTER — PATIENT OUTREACH (OUTPATIENT)
Dept: CASE MANAGEMENT | Age: 73
End: 2023-01-13

## 2023-01-13 NOTE — PROGRESS NOTES
Care Transitions Follow Up Call    Challenges to be reviewed by the provider   Additional needs identified to be addressed with provider: no  none           Method of communication with provider : none    Care Transition Nurse (CTN) contacted the patient by telephone to follow up after admission on 12/14/2023. [facility] follow up appointment(s): No future appointments. Non-[facility] follow up appointment(s): surgery scheduled for 1/17/2023    CTN provided contact information for future needs. Plan for follow-up call in 5-7 days based on severity of symptoms and risk factors. Plan for next call:  will follow up with pt post-operatively. Goals Addressed                   This Visit's Progress     Prevent complications post hospitalization. 12/21/2022  Performed medication reconciliation with pt and pt has maintenance medications. Pt confirmed follow up appt with PCP on 12/17/2022 and with surgeon. Reviewed red flags with pt and pt states understanding to call 911 for medical emergency. Pt reports that she will call [facility] to follow up and reports that service had just ended. Pt states that she is doing okay and her nephew is staying with her while her sister is out of town until Jan. 1, 2023. Will call pt on 12/23/2022 to review appts and symptoms. Leyla Gimenez RN [address] [facility], Care Transitions Nurse, [phone]     12/23/2022  Pt reports no signs of infections and is doing well. Pt has made the following appts:  PCP 12/27/2022, 12/29/2022 Dr. Ida Harris for U/S, 1/3/2023 follow up on U/S with Ida Harris; 1/5/2023 Dr. Jeff Montoya surgeon, 1/13/2023 Dr. Jeff Montoya for mini colonoscopy; 1/17/2023 reversal surgery with Dr. Jeff Montoya. Pt is agreeable to a call by a colleague next week to review symptoms and follow up appts. Leyla Gimenez RN [address] [facility], Care Transitions Nurse, [phone]       01/05/23   Patient denies and N/V, is eating and drinking well, plenty of stool in colostomy bag.     Patient seen by PCP Dr Trung Lobo on 12/27 as scheduled. Having Gastrograffin Enema on 1/9/23  Having \"Mini Colonoscopy\" on 1/13/23  Having loop reversal on 1/17/23. Monitor status of these appt/procedures and N/V, PO intake and activity tolerance on next call. Leonid Cintron MSN, RN, [facility] / Care Transition Nurse / [phone]     1/13/2023  Pt reports that she is currently at the surgery center and confirmed surgery on 1/17/2023. Will follow up with pt in 7 days.   Robby Diaz RN [address] [facility], Care Transitions Nurse, [phone]

## 2023-01-14 DIAGNOSIS — E03.9 ACQUIRED HYPOTHYROIDISM: ICD-10-CM

## 2023-01-14 RX ORDER — LEVOTHYROXINE SODIUM 125 UG/1
TABLET ORAL
Qty: 90 TABLET | Refills: 1 | Status: SHIPPED | OUTPATIENT
Start: 2023-01-14

## 2023-01-16 ENCOUNTER — ANESTHESIA EVENT (OUTPATIENT)
Dept: SURGERY | Age: 73
DRG: 330 | End: 2023-01-16
Payer: MEDICARE

## 2023-01-16 DIAGNOSIS — E03.9 ACQUIRED HYPOTHYROIDISM: ICD-10-CM

## 2023-01-16 RX ORDER — LEVOTHYROXINE SODIUM 125 UG/1
125 TABLET ORAL
Qty: 90 TABLET | Refills: 1 | Status: ON HOLD | OUTPATIENT
Start: 2023-01-16

## 2023-01-17 ENCOUNTER — HOSPITAL ENCOUNTER (INPATIENT)
Age: 73
LOS: 15 days | Discharge: NURSING, REHAB OR LONG-TERM CARE | DRG: 330 | End: 2023-02-01
Attending: COLON & RECTAL SURGERY | Admitting: COLON & RECTAL SURGERY
Payer: MEDICARE

## 2023-01-17 ENCOUNTER — ANESTHESIA (OUTPATIENT)
Dept: SURGERY | Age: 73
DRG: 330 | End: 2023-01-17
Payer: MEDICARE

## 2023-01-17 PROBLEM — Z93.2 ILEOSTOMY STATUS (HCC): Status: ACTIVE | Noted: 2023-01-17

## 2023-01-17 LAB
ANION GAP SERPL CALC-SCNC: 9 MMOL/L (ref 5–15)
BASOPHILS # BLD: 0 K/UL (ref 0–0.1)
BASOPHILS NFR BLD: 0 % (ref 0–1)
BUN SERPL-MCNC: 17 MG/DL (ref 6–20)
BUN/CREAT SERPL: 10 (ref 12–20)
CALCIUM SERPL-MCNC: 10.1 MG/DL (ref 8.5–10.1)
CHLORIDE SERPL-SCNC: 105 MMOL/L (ref 97–108)
CO2 SERPL-SCNC: 24 MMOL/L (ref 21–32)
CREAT SERPL-MCNC: 1.67 MG/DL (ref 0.55–1.02)
DIFFERENTIAL METHOD BLD: ABNORMAL
EOSINOPHIL # BLD: 0.1 K/UL (ref 0–0.4)
EOSINOPHIL NFR BLD: 0 % (ref 0–7)
ERYTHROCYTE [DISTWIDTH] IN BLOOD BY AUTOMATED COUNT: 12.8 % (ref 11.5–14.5)
GLUCOSE SERPL-MCNC: 145 MG/DL (ref 65–100)
HCT VFR BLD AUTO: 39.2 % (ref 35–47)
HGB BLD-MCNC: 13.6 G/DL (ref 11.5–16)
IMM GRANULOCYTES # BLD AUTO: 0 K/UL (ref 0–0.04)
IMM GRANULOCYTES NFR BLD AUTO: 0 % (ref 0–0.5)
LYMPHOCYTES # BLD: 1.6 K/UL (ref 0.8–3.5)
LYMPHOCYTES NFR BLD: 11 % (ref 12–49)
MCH RBC QN AUTO: 33.4 PG (ref 26–34)
MCHC RBC AUTO-ENTMCNC: 34.7 G/DL (ref 30–36.5)
MCV RBC AUTO: 96.3 FL (ref 80–99)
MONOCYTES # BLD: 0.4 K/UL (ref 0–1)
MONOCYTES NFR BLD: 3 % (ref 5–13)
NEUTS SEG # BLD: 12.3 K/UL (ref 1.8–8)
NEUTS SEG NFR BLD: 86 % (ref 32–75)
NRBC # BLD: 0 K/UL (ref 0–0.01)
NRBC BLD-RTO: 0 PER 100 WBC
PLATELET # BLD AUTO: 244 K/UL (ref 150–400)
PMV BLD AUTO: 10.8 FL (ref 8.9–12.9)
POTASSIUM SERPL-SCNC: 4.4 MMOL/L (ref 3.5–5.1)
RBC # BLD AUTO: 4.07 M/UL (ref 3.8–5.2)
SODIUM SERPL-SCNC: 138 MMOL/L (ref 136–145)
WBC # BLD AUTO: 14.3 K/UL (ref 3.6–11)

## 2023-01-17 PROCEDURE — P9045 ALBUMIN (HUMAN), 5%, 250 ML: HCPCS | Performed by: NURSE ANESTHETIST, CERTIFIED REGISTERED

## 2023-01-17 PROCEDURE — 77030002916 HC SUT ETHLN J&J -A: Performed by: COLON & RECTAL SURGERY

## 2023-01-17 PROCEDURE — 77030040361 HC SLV COMPR DVT MDII -B

## 2023-01-17 PROCEDURE — 77030010507 HC ADH SKN DERMBND J&J -B: Performed by: COLON & RECTAL SURGERY

## 2023-01-17 PROCEDURE — 77030009978 HC RELD STPLR TCR J&J -B: Performed by: COLON & RECTAL SURGERY

## 2023-01-17 PROCEDURE — C1781 MESH (IMPLANTABLE): HCPCS | Performed by: COLON & RECTAL SURGERY

## 2023-01-17 PROCEDURE — 77030002996 HC SUT SLK J&J -A: Performed by: COLON & RECTAL SURGERY

## 2023-01-17 PROCEDURE — 74011250636 HC RX REV CODE- 250/636: Performed by: COLON & RECTAL SURGERY

## 2023-01-17 PROCEDURE — 85025 COMPLETE CBC W/AUTO DIFF WBC: CPT

## 2023-01-17 PROCEDURE — 74011250637 HC RX REV CODE- 250/637: Performed by: COLON & RECTAL SURGERY

## 2023-01-17 PROCEDURE — 76010000134 HC OR TIME 3.5 TO 4 HR: Performed by: COLON & RECTAL SURGERY

## 2023-01-17 PROCEDURE — 77030002982 HC SUT POLYSRB J&J -A: Performed by: COLON & RECTAL SURGERY

## 2023-01-17 PROCEDURE — 2709999900 HC NON-CHARGEABLE SUPPLY: Performed by: COLON & RECTAL SURGERY

## 2023-01-17 PROCEDURE — 76060000038 HC ANESTHESIA 3.5 TO 4 HR: Performed by: COLON & RECTAL SURGERY

## 2023-01-17 PROCEDURE — 0WUF0JZ SUPPLEMENT ABDOMINAL WALL WITH SYNTHETIC SUBSTITUTE, OPEN APPROACH: ICD-10-PCS | Performed by: COLON & RECTAL SURGERY

## 2023-01-17 PROCEDURE — 77030007866 HC KT SPN ANES BBMI -B: Performed by: ANESTHESIOLOGY

## 2023-01-17 PROCEDURE — 74011250636 HC RX REV CODE- 250/636: Performed by: ANESTHESIOLOGY

## 2023-01-17 PROCEDURE — 77030026102 HC DEV TISS ENSEAL G2 J&J -F: Performed by: COLON & RECTAL SURGERY

## 2023-01-17 PROCEDURE — 77030026438 HC STYL ET INTUB CARD -A: Performed by: ANESTHESIOLOGY

## 2023-01-17 PROCEDURE — 74011000250 HC RX REV CODE- 250: Performed by: COLON & RECTAL SURGERY

## 2023-01-17 PROCEDURE — 65270000029 HC RM PRIVATE

## 2023-01-17 PROCEDURE — 88302 TISSUE EXAM BY PATHOLOGIST: CPT

## 2023-01-17 PROCEDURE — C1765 ADHESION BARRIER: HCPCS | Performed by: COLON & RECTAL SURGERY

## 2023-01-17 PROCEDURE — 74011250636 HC RX REV CODE- 250/636: Performed by: NURSE ANESTHETIST, CERTIFIED REGISTERED

## 2023-01-17 PROCEDURE — C9290 INJ, BUPIVACAINE LIPOSOME: HCPCS | Performed by: COLON & RECTAL SURGERY

## 2023-01-17 PROCEDURE — 77030011264 HC ELECTRD BLD EXT COVD -A: Performed by: COLON & RECTAL SURGERY

## 2023-01-17 PROCEDURE — 77030018809 HC RETRCTR ALXSO DISP AMR -B: Performed by: COLON & RECTAL SURGERY

## 2023-01-17 PROCEDURE — 77030005513 HC CATH URETH FOL11 MDII -B: Performed by: COLON & RECTAL SURGERY

## 2023-01-17 PROCEDURE — 36415 COLL VENOUS BLD VENIPUNCTURE: CPT

## 2023-01-17 PROCEDURE — 77030013079 HC BLNKT BAIR HGGR 3M -A: Performed by: ANESTHESIOLOGY

## 2023-01-17 PROCEDURE — 88304 TISSUE EXAM BY PATHOLOGIST: CPT

## 2023-01-17 PROCEDURE — 77030031139 HC SUT VCRL2 J&J -A: Performed by: COLON & RECTAL SURGERY

## 2023-01-17 PROCEDURE — 77030036731 HC STPLR ENDOSC J&J -F: Performed by: COLON & RECTAL SURGERY

## 2023-01-17 PROCEDURE — 77030002966 HC SUT PDS J&J -A: Performed by: COLON & RECTAL SURGERY

## 2023-01-17 PROCEDURE — 80048 BASIC METABOLIC PNL TOTAL CA: CPT

## 2023-01-17 PROCEDURE — 77030040922 HC BLNKT HYPOTHRM STRY -A: Performed by: COLON & RECTAL SURGERY

## 2023-01-17 PROCEDURE — 77030036554: Performed by: COLON & RECTAL SURGERY

## 2023-01-17 PROCEDURE — 77030002933 HC SUT MCRYL J&J -A: Performed by: COLON & RECTAL SURGERY

## 2023-01-17 PROCEDURE — 77030008684 HC TU ET CUF COVD -B: Performed by: ANESTHESIOLOGY

## 2023-01-17 PROCEDURE — 74011000250 HC RX REV CODE- 250: Performed by: NURSE ANESTHETIST, CERTIFIED REGISTERED

## 2023-01-17 PROCEDURE — 77030018673: Performed by: COLON & RECTAL SURGERY

## 2023-01-17 PROCEDURE — 77030040922 HC BLNKT HYPOTHRM STRY -A

## 2023-01-17 PROCEDURE — 77030011265 HC ELECTRD BLD HEX COVD -A: Performed by: COLON & RECTAL SURGERY

## 2023-01-17 PROCEDURE — 0DBB0ZZ EXCISION OF ILEUM, OPEN APPROACH: ICD-10-PCS | Performed by: COLON & RECTAL SURGERY

## 2023-01-17 PROCEDURE — 76210000000 HC OR PH I REC 2 TO 2.5 HR: Performed by: COLON & RECTAL SURGERY

## 2023-01-17 DEVICE — MESH HERN W7XL10CM SYN TISS REINF BIOABSRB DISP BIO-A: Type: IMPLANTABLE DEVICE | Status: FUNCTIONAL

## 2023-01-17 RX ORDER — PROMETHAZINE HYDROCHLORIDE 25 MG/1
12.5 TABLET ORAL
Status: DISCONTINUED | OUTPATIENT
Start: 2023-01-17 | End: 2023-02-01 | Stop reason: HOSPADM

## 2023-01-17 RX ORDER — LIDOCAINE HYDROCHLORIDE 10 MG/ML
0.1 INJECTION, SOLUTION EPIDURAL; INFILTRATION; INTRACAUDAL; PERINEURAL AS NEEDED
Status: DISCONTINUED | OUTPATIENT
Start: 2023-01-17 | End: 2023-01-17 | Stop reason: HOSPADM

## 2023-01-17 RX ORDER — SODIUM CHLORIDE 0.9 % (FLUSH) 0.9 %
5-40 SYRINGE (ML) INJECTION AS NEEDED
Status: DISCONTINUED | OUTPATIENT
Start: 2023-01-17 | End: 2023-01-17 | Stop reason: HOSPADM

## 2023-01-17 RX ORDER — METOPROLOL SUCCINATE 50 MG/1
50 TABLET, EXTENDED RELEASE ORAL EVERY MORNING
Status: DISCONTINUED | OUTPATIENT
Start: 2023-01-18 | End: 2023-02-01 | Stop reason: HOSPADM

## 2023-01-17 RX ORDER — ONDANSETRON 2 MG/ML
4 INJECTION INTRAMUSCULAR; INTRAVENOUS
Status: DISCONTINUED | OUTPATIENT
Start: 2023-01-17 | End: 2023-02-01 | Stop reason: HOSPADM

## 2023-01-17 RX ORDER — DEXAMETHASONE SODIUM PHOSPHATE 4 MG/ML
INJECTION, SOLUTION INTRA-ARTICULAR; INTRALESIONAL; INTRAMUSCULAR; INTRAVENOUS; SOFT TISSUE AS NEEDED
Status: DISCONTINUED | OUTPATIENT
Start: 2023-01-17 | End: 2023-01-17 | Stop reason: HOSPADM

## 2023-01-17 RX ORDER — KETAMINE HYDROCHLORIDE 10 MG/ML
INJECTION INTRAMUSCULAR; INTRAVENOUS AS NEEDED
Status: DISCONTINUED | OUTPATIENT
Start: 2023-01-17 | End: 2023-01-17 | Stop reason: HOSPADM

## 2023-01-17 RX ORDER — KETOROLAC TROMETHAMINE 30 MG/ML
15 INJECTION, SOLUTION INTRAMUSCULAR; INTRAVENOUS EVERY 8 HOURS
Status: DISCONTINUED | OUTPATIENT
Start: 2023-01-17 | End: 2023-01-19

## 2023-01-17 RX ORDER — HYDROMORPHONE HYDROCHLORIDE 1 MG/ML
.5-1 INJECTION, SOLUTION INTRAMUSCULAR; INTRAVENOUS; SUBCUTANEOUS
Status: DISCONTINUED | OUTPATIENT
Start: 2023-01-17 | End: 2023-01-17 | Stop reason: HOSPADM

## 2023-01-17 RX ORDER — SODIUM CHLORIDE 0.9 % (FLUSH) 0.9 %
5-40 SYRINGE (ML) INJECTION AS NEEDED
Status: DISCONTINUED | OUTPATIENT
Start: 2023-01-17 | End: 2023-02-01 | Stop reason: HOSPADM

## 2023-01-17 RX ORDER — SODIUM CHLORIDE, SODIUM LACTATE, POTASSIUM CHLORIDE, CALCIUM CHLORIDE 600; 310; 30; 20 MG/100ML; MG/100ML; MG/100ML; MG/100ML
100 INJECTION, SOLUTION INTRAVENOUS CONTINUOUS
Status: DISCONTINUED | OUTPATIENT
Start: 2023-01-17 | End: 2023-01-17 | Stop reason: HOSPADM

## 2023-01-17 RX ORDER — ONDANSETRON 2 MG/ML
4 INJECTION INTRAMUSCULAR; INTRAVENOUS AS NEEDED
Status: DISCONTINUED | OUTPATIENT
Start: 2023-01-17 | End: 2023-01-17 | Stop reason: HOSPADM

## 2023-01-17 RX ORDER — OXYCODONE HYDROCHLORIDE 5 MG/1
10 TABLET ORAL
Status: DISCONTINUED | OUTPATIENT
Start: 2023-01-17 | End: 2023-01-19

## 2023-01-17 RX ORDER — HYDROMORPHONE HYDROCHLORIDE 1 MG/ML
0.5 INJECTION, SOLUTION INTRAMUSCULAR; INTRAVENOUS; SUBCUTANEOUS
Status: DISCONTINUED | OUTPATIENT
Start: 2023-01-17 | End: 2023-01-19

## 2023-01-17 RX ORDER — SODIUM CHLORIDE 0.9 % (FLUSH) 0.9 %
5-40 SYRINGE (ML) INJECTION EVERY 8 HOURS
Status: DISCONTINUED | OUTPATIENT
Start: 2023-01-17 | End: 2023-02-01 | Stop reason: HOSPADM

## 2023-01-17 RX ORDER — LEVOTHYROXINE SODIUM 125 UG/1
125 TABLET ORAL
Status: DISCONTINUED | OUTPATIENT
Start: 2023-01-18 | End: 2023-01-23

## 2023-01-17 RX ORDER — MAGNESIUM SULFATE HEPTAHYDRATE 40 MG/ML
INJECTION, SOLUTION INTRAVENOUS AS NEEDED
Status: DISCONTINUED | OUTPATIENT
Start: 2023-01-17 | End: 2023-01-17 | Stop reason: HOSPADM

## 2023-01-17 RX ORDER — LORAZEPAM 2 MG/ML
0.5 INJECTION INTRAMUSCULAR
Status: DISCONTINUED | OUTPATIENT
Start: 2023-01-17 | End: 2023-02-01 | Stop reason: HOSPADM

## 2023-01-17 RX ORDER — PROPOFOL 10 MG/ML
INJECTION, EMULSION INTRAVENOUS
Status: DISCONTINUED | OUTPATIENT
Start: 2023-01-17 | End: 2023-01-17 | Stop reason: HOSPADM

## 2023-01-17 RX ORDER — DEXTROSE, SODIUM CHLORIDE, AND POTASSIUM CHLORIDE 5; .45; .15 G/100ML; G/100ML; G/100ML
100 INJECTION INTRAVENOUS CONTINUOUS
Status: DISCONTINUED | OUTPATIENT
Start: 2023-01-17 | End: 2023-01-21

## 2023-01-17 RX ORDER — VECURONIUM BROMIDE FOR INJECTION 1 MG/ML
INJECTION, POWDER, LYOPHILIZED, FOR SOLUTION INTRAVENOUS AS NEEDED
Status: DISCONTINUED | OUTPATIENT
Start: 2023-01-17 | End: 2023-01-17 | Stop reason: HOSPADM

## 2023-01-17 RX ORDER — LIDOCAINE HYDROCHLORIDE 20 MG/ML
INJECTION, SOLUTION EPIDURAL; INFILTRATION; INTRACAUDAL; PERINEURAL AS NEEDED
Status: DISCONTINUED | OUTPATIENT
Start: 2023-01-17 | End: 2023-01-17 | Stop reason: HOSPADM

## 2023-01-17 RX ORDER — ENOXAPARIN SODIUM 100 MG/ML
40 INJECTION SUBCUTANEOUS DAILY
Status: DISCONTINUED | OUTPATIENT
Start: 2023-01-18 | End: 2023-01-21

## 2023-01-17 RX ORDER — ACETAMINOPHEN 500 MG
1000 TABLET ORAL ONCE
Status: COMPLETED | OUTPATIENT
Start: 2023-01-17 | End: 2023-01-17

## 2023-01-17 RX ORDER — PANTOPRAZOLE SODIUM 40 MG/1
40 TABLET, DELAYED RELEASE ORAL
Status: DISCONTINUED | OUTPATIENT
Start: 2023-01-18 | End: 2023-01-17 | Stop reason: SDUPTHER

## 2023-01-17 RX ORDER — NALOXONE HYDROCHLORIDE 0.4 MG/ML
0.2 INJECTION, SOLUTION INTRAMUSCULAR; INTRAVENOUS; SUBCUTANEOUS
Status: DISCONTINUED | OUTPATIENT
Start: 2023-01-17 | End: 2023-01-17 | Stop reason: HOSPADM

## 2023-01-17 RX ORDER — FENTANYL CITRATE 50 UG/ML
INJECTION, SOLUTION INTRAMUSCULAR; INTRAVENOUS AS NEEDED
Status: DISCONTINUED | OUTPATIENT
Start: 2023-01-17 | End: 2023-01-17 | Stop reason: HOSPADM

## 2023-01-17 RX ORDER — ASPIRIN 325 MG/1
100 TABLET, FILM COATED ORAL DAILY
Status: DISCONTINUED | OUTPATIENT
Start: 2023-01-18 | End: 2023-02-01 | Stop reason: HOSPADM

## 2023-01-17 RX ORDER — SODIUM CHLORIDE 0.9 % (FLUSH) 0.9 %
5-40 SYRINGE (ML) INJECTION EVERY 8 HOURS
Status: DISCONTINUED | OUTPATIENT
Start: 2023-01-17 | End: 2023-01-17 | Stop reason: HOSPADM

## 2023-01-17 RX ORDER — SODIUM CHLORIDE, SODIUM LACTATE, POTASSIUM CHLORIDE, CALCIUM CHLORIDE 600; 310; 30; 20 MG/100ML; MG/100ML; MG/100ML; MG/100ML
125 INJECTION, SOLUTION INTRAVENOUS CONTINUOUS
Status: DISCONTINUED | OUTPATIENT
Start: 2023-01-17 | End: 2023-01-17 | Stop reason: HOSPADM

## 2023-01-17 RX ORDER — ACETAMINOPHEN 500 MG
1000 TABLET ORAL EVERY 6 HOURS
Status: DISCONTINUED | OUTPATIENT
Start: 2023-01-17 | End: 2023-01-21

## 2023-01-17 RX ORDER — MIDAZOLAM HYDROCHLORIDE 1 MG/ML
INJECTION, SOLUTION INTRAMUSCULAR; INTRAVENOUS AS NEEDED
Status: DISCONTINUED | OUTPATIENT
Start: 2023-01-17 | End: 2023-01-17 | Stop reason: HOSPADM

## 2023-01-17 RX ORDER — FLUMAZENIL 0.1 MG/ML
0.2 INJECTION INTRAVENOUS
Status: DISCONTINUED | OUTPATIENT
Start: 2023-01-17 | End: 2023-01-17 | Stop reason: HOSPADM

## 2023-01-17 RX ORDER — MORPHINE SULFATE 1 MG/ML
INJECTION, SOLUTION EPIDURAL; INTRATHECAL; INTRAVENOUS
Status: COMPLETED | OUTPATIENT
Start: 2023-01-17 | End: 2023-01-17

## 2023-01-17 RX ORDER — LANOLIN ALCOHOL/MO/W.PET/CERES
0.4 CREAM (GRAM) TOPICAL DAILY
Status: DISCONTINUED | OUTPATIENT
Start: 2023-01-18 | End: 2023-02-01 | Stop reason: HOSPADM

## 2023-01-17 RX ORDER — PANTOPRAZOLE SODIUM 40 MG/1
40 TABLET, DELAYED RELEASE ORAL
Status: DISCONTINUED | OUTPATIENT
Start: 2023-01-18 | End: 2023-02-01 | Stop reason: HOSPADM

## 2023-01-17 RX ORDER — LIDOCAINE HYDROCHLORIDE ANHYDROUS AND DEXTROSE MONOHYDRATE .8; 5 G/100ML; G/100ML
INJECTION, SOLUTION INTRAVENOUS
Status: DISCONTINUED | OUTPATIENT
Start: 2023-01-17 | End: 2023-01-17 | Stop reason: HOSPADM

## 2023-01-17 RX ORDER — LANOLIN ALCOHOL/MO/W.PET/CERES
100 CREAM (GRAM) TOPICAL DAILY
Status: DISCONTINUED | OUTPATIENT
Start: 2023-01-18 | End: 2023-01-17

## 2023-01-17 RX ORDER — OXYCODONE HYDROCHLORIDE 5 MG/1
5 TABLET ORAL
Status: DISCONTINUED | OUTPATIENT
Start: 2023-01-17 | End: 2023-01-19

## 2023-01-17 RX ORDER — ALBUMIN HUMAN 50 G/1000ML
SOLUTION INTRAVENOUS AS NEEDED
Status: DISCONTINUED | OUTPATIENT
Start: 2023-01-17 | End: 2023-01-17 | Stop reason: HOSPADM

## 2023-01-17 RX ORDER — TROSPIUM CHLORIDE 20 MG/1
20 TABLET, FILM COATED ORAL
Status: DISCONTINUED | OUTPATIENT
Start: 2023-01-17 | End: 2023-02-01 | Stop reason: HOSPADM

## 2023-01-17 RX ORDER — PROPOFOL 10 MG/ML
INJECTION, EMULSION INTRAVENOUS AS NEEDED
Status: DISCONTINUED | OUTPATIENT
Start: 2023-01-17 | End: 2023-01-17 | Stop reason: HOSPADM

## 2023-01-17 RX ORDER — ONDANSETRON 2 MG/ML
INJECTION INTRAMUSCULAR; INTRAVENOUS AS NEEDED
Status: DISCONTINUED | OUTPATIENT
Start: 2023-01-17 | End: 2023-01-17 | Stop reason: HOSPADM

## 2023-01-17 RX ADMIN — PROPOFOL 25 MCG/KG/MIN: 10 INJECTION, EMULSION INTRAVENOUS at 07:54

## 2023-01-17 RX ADMIN — POTASSIUM CHLORIDE, DEXTROSE MONOHYDRATE AND SODIUM CHLORIDE 100 ML/HR: 150; 5; 450 INJECTION, SOLUTION INTRAVENOUS at 22:11

## 2023-01-17 RX ADMIN — FENTANYL CITRATE 100 MCG: 50 INJECTION, SOLUTION INTRAMUSCULAR; INTRAVENOUS at 07:43

## 2023-01-17 RX ADMIN — KETAMINE HYDROCHLORIDE 30 MG: 10 INJECTION INTRAMUSCULAR; INTRAVENOUS at 07:42

## 2023-01-17 RX ADMIN — VECURONIUM BROMIDE 0.5 MG: 1 INJECTION, POWDER, LYOPHILIZED, FOR SOLUTION INTRAVENOUS at 09:52

## 2023-01-17 RX ADMIN — VECURONIUM BROMIDE 10 MG: 1 INJECTION, POWDER, LYOPHILIZED, FOR SOLUTION INTRAVENOUS at 07:45

## 2023-01-17 RX ADMIN — ACETAMINOPHEN 1000 MG: 500 TABLET ORAL at 07:21

## 2023-01-17 RX ADMIN — PROPOFOL 20 MG: 10 INJECTION, EMULSION INTRAVENOUS at 08:36

## 2023-01-17 RX ADMIN — SODIUM CHLORIDE, PRESERVATIVE FREE 10 ML: 5 INJECTION INTRAVENOUS at 22:13

## 2023-01-17 RX ADMIN — HYDROMORPHONE HYDROCHLORIDE 0.5 MG: 1 INJECTION, SOLUTION INTRAMUSCULAR; INTRAVENOUS; SUBCUTANEOUS at 12:38

## 2023-01-17 RX ADMIN — ONDANSETRON HYDROCHLORIDE 4 MG: 2 SOLUTION INTRAMUSCULAR; INTRAVENOUS at 09:53

## 2023-01-17 RX ADMIN — LIDOCAINE HYDROCHLORIDE ANHYDROUS AND DEXTROSE MONOHYDRATE 2 MG/KG/HR: .8; 5 INJECTION, SOLUTION INTRAVENOUS at 07:49

## 2023-01-17 RX ADMIN — PROPOFOL 200 MG: 10 INJECTION, EMULSION INTRAVENOUS at 07:44

## 2023-01-17 RX ADMIN — LIDOCAINE HYDROCHLORIDE 80 MG: 20 INJECTION, SOLUTION EPIDURAL; INFILTRATION; INTRACAUDAL; PERINEURAL at 07:49

## 2023-01-17 RX ADMIN — POTASSIUM CHLORIDE, DEXTROSE MONOHYDRATE AND SODIUM CHLORIDE 100 ML/HR: 150; 5; 450 INJECTION, SOLUTION INTRAVENOUS at 12:35

## 2023-01-17 RX ADMIN — LORAZEPAM 0.5 MG: 2 INJECTION INTRAMUSCULAR; INTRAVENOUS at 14:58

## 2023-01-17 RX ADMIN — MAGNESIUM SULFATE 2 G: 2 INJECTION INTRAVENOUS at 07:47

## 2023-01-17 RX ADMIN — VECURONIUM BROMIDE 1 MG: 1 INJECTION, POWDER, LYOPHILIZED, FOR SOLUTION INTRAVENOUS at 08:46

## 2023-01-17 RX ADMIN — DEXAMETHASONE SODIUM PHOSPHATE 8 MG: 4 INJECTION, SOLUTION INTRAMUSCULAR; INTRAVENOUS at 08:15

## 2023-01-17 RX ADMIN — ACETAMINOPHEN 1000 MG: 500 TABLET ORAL at 14:49

## 2023-01-17 RX ADMIN — KETOROLAC TROMETHAMINE 15 MG: 30 INJECTION, SOLUTION INTRAMUSCULAR at 14:48

## 2023-01-17 RX ADMIN — KETAMINE HYDROCHLORIDE 10 MG: 10 INJECTION INTRAMUSCULAR; INTRAVENOUS at 09:19

## 2023-01-17 RX ADMIN — MIDAZOLAM HYDROCHLORIDE 2 MG: 1 INJECTION, SOLUTION INTRAMUSCULAR; INTRAVENOUS at 07:25

## 2023-01-17 RX ADMIN — KETAMINE HYDROCHLORIDE 10 MG: 10 INJECTION INTRAMUSCULAR; INTRAVENOUS at 08:34

## 2023-01-17 RX ADMIN — KETOROLAC TROMETHAMINE 15 MG: 30 INJECTION, SOLUTION INTRAMUSCULAR at 22:12

## 2023-01-17 RX ADMIN — SUGAMMADEX 200 MG: 100 INJECTION, SOLUTION INTRAVENOUS at 11:12

## 2023-01-17 RX ADMIN — MORPHINE SULFATE 0.1 MG: 1 INJECTION, SOLUTION EPIDURAL; INTRATHECAL; INTRAVENOUS at 07:31

## 2023-01-17 RX ADMIN — VECURONIUM BROMIDE 1 MG: 1 INJECTION, POWDER, LYOPHILIZED, FOR SOLUTION INTRAVENOUS at 08:20

## 2023-01-17 RX ADMIN — SODIUM CHLORIDE, POTASSIUM CHLORIDE, SODIUM LACTATE AND CALCIUM CHLORIDE: 600; 310; 30; 20 INJECTION, SOLUTION INTRAVENOUS at 10:46

## 2023-01-17 RX ADMIN — TROSPIUM CHLORIDE 20 MG: 20 TABLET, FILM COATED ORAL at 18:14

## 2023-01-17 RX ADMIN — NALOXEGOL OXALATE 25 MG: 12.5 TABLET, FILM COATED ORAL at 07:21

## 2023-01-17 RX ADMIN — PROPOFOL 20 MG: 10 INJECTION, EMULSION INTRAVENOUS at 09:27

## 2023-01-17 RX ADMIN — CEFOXITIN SODIUM 2 G: 2 POWDER, FOR SOLUTION INTRAVENOUS at 10:07

## 2023-01-17 RX ADMIN — SODIUM CHLORIDE, POTASSIUM CHLORIDE, SODIUM LACTATE AND CALCIUM CHLORIDE 100 ML/HR: 600; 310; 30; 20 INJECTION, SOLUTION INTRAVENOUS at 07:21

## 2023-01-17 RX ADMIN — ALBUMIN (HUMAN) 250 ML: 12.5 SOLUTION INTRAVENOUS at 07:13

## 2023-01-17 RX ADMIN — VECURONIUM BROMIDE 1 MG: 1 INJECTION, POWDER, LYOPHILIZED, FOR SOLUTION INTRAVENOUS at 09:09

## 2023-01-17 RX ADMIN — CEFOXITIN SODIUM 2 G: 2 POWDER, FOR SOLUTION INTRAVENOUS at 08:04

## 2023-01-17 RX ADMIN — KETAMINE HYDROCHLORIDE 20 MG: 10 INJECTION INTRAMUSCULAR; INTRAVENOUS at 08:12

## 2023-01-17 RX ADMIN — SODIUM CHLORIDE, PRESERVATIVE FREE 10 ML: 5 INJECTION INTRAVENOUS at 14:51

## 2023-01-17 NOTE — ANESTHESIA PREPROCEDURE EVALUATION
Relevant Problems   No relevant active problems       Anesthetic History     PONV          Review of Systems / Medical History  Patient summary reviewed, nursing notes reviewed and pertinent labs reviewed    Pulmonary  Within defined limits                 Neuro/Psych   Within defined limits           Cardiovascular    Hypertension                   GI/Hepatic/Renal     GERD      Liver disease (fatty liver)     Endo/Other      Hypothyroidism  Obesity     Other Findings              Physical Exam    Airway  Mallampati: II  TM Distance: 4 - 6 cm  Neck ROM: normal range of motion   Mouth opening: Normal     Cardiovascular    Rhythm: regular  Rate: normal         Dental    Dentition: Lower dentition intact and Upper dentition intact     Pulmonary  Breath sounds clear to auscultation               Abdominal         Other Findings            Anesthetic Plan    ASA: 3  Anesthesia type: general      Post-op pain plan if not by surgeon: intrathecal opiates    Induction: Intravenous  Anesthetic plan and risks discussed with: Patient

## 2023-01-17 NOTE — PERIOP NOTES
TRANSFER - OUT REPORT:    Verbal report given to ELIDA VALENTE on Stacia Aase  being transferred to [facility] routine post - op       Report consisted of patients Situation, Background, Assessment and   Recommendations(SBAR). Information from the following report(s) SBAR, OR Summary, Procedure Summary, Intake/Output, MAR, and Cardiac Rhythm NSR  was reviewed with the receiving nurse. Lines:   Peripheral IV 01/17/23 Posterior; Left Hand (Active)   Site Assessment Clean, dry, & intact 01/17/23 1131   Phlebitis Assessment 0 01/17/23 1131   Infiltration Assessment 0 01/17/23 1131   Dressing Status Clean, dry, & intact; Occlusive 01/17/23 1131   Dressing Type Tape;Transparent 01/17/23 1131   Hub Color/Line Status Pink; Infusing 01/17/23 1131        Opportunity for questions and clarification was provided.       Patient transported with:   O2 @ 2 liters  Registered Nurse
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
## 2023-01-17 NOTE — ANESTHESIA POSTPROCEDURE EVALUATION
Procedure(s):  REVERSAL LOOP ILEOSTOMY, VENTRAL HERNIA REPAIR, LYSIS OF ADHESIONS. general    Anesthesia Post Evaluation        Patient location during evaluation: PACU  Level of consciousness: awake  Pain management: adequate  Airway patency: patent  Anesthetic complications: no  Cardiovascular status: acceptable  Respiratory status: acceptable  Hydration status: acceptable  Post anesthesia nausea and vomiting:  none      INITIAL Post-op Vital signs:   Vitals Value Taken Time   /69 01/17/23 1320   Temp 36.4 °C (97.6 °F) 01/17/23 1320   Pulse 82 01/17/23 1329   Resp 15 01/17/23 1329   SpO2 97 % 01/17/23 1329   Vitals shown include unvalidated device data.

## 2023-01-17 NOTE — ANESTHESIA PROCEDURE NOTES
Spinal Block    Start time: 1/17/2023 7:25 AM  End time: 1/17/2023 7:34 AM  Performed by: Bella Looney CRNA  Authorized by: Venus Herrera MD     Pre-procedure:   Indications: at surgeon's request and post-op pain management  Preanesthetic Checklist: patient identified, risks and benefits discussed, anesthesia consent, site marked, patient being monitored, timeout performed and fire risk safety assessment completed and verbalized    Timeout Time: 07:25 EST      Spinal Block:   Patient Position:  Seated  Prep Region:  Lumbar  Prep: chlorhexidine      Location:  L3-4  Technique:  Single shot  Local: morphine (PF) 1 mg/mL Intrathecal - Intrathecal   0.1 mg - 1/17/2023 7:31:00 AM    Med Admin Time: 1/17/2023 8:27 AM    Needle:       Attempts:  1      Events: CSF confirmed, no blood with aspiration and no paresthesia        Assessment:  Insertion:  Uncomplicated

## 2023-01-18 LAB
ALBUMIN SERPL-MCNC: 2.7 G/DL (ref 3.5–5)
ALBUMIN/GLOB SERPL: 0.8 (ref 1.1–2.2)
ALP SERPL-CCNC: 89 U/L (ref 45–117)
ALT SERPL-CCNC: 39 U/L (ref 12–78)
ANION GAP SERPL CALC-SCNC: 5 MMOL/L (ref 5–15)
AST SERPL-CCNC: 24 U/L (ref 15–37)
BASOPHILS # BLD: 0 K/UL (ref 0–0.1)
BASOPHILS NFR BLD: 0 % (ref 0–1)
BILIRUB SERPL-MCNC: 0.7 MG/DL (ref 0.2–1)
BUN SERPL-MCNC: 20 MG/DL (ref 6–20)
BUN/CREAT SERPL: 19 (ref 12–20)
CALCIUM SERPL-MCNC: 9 MG/DL (ref 8.5–10.1)
CHLORIDE SERPL-SCNC: 106 MMOL/L (ref 97–108)
CO2 SERPL-SCNC: 23 MMOL/L (ref 21–32)
CREAT SERPL-MCNC: 1.04 MG/DL (ref 0.55–1.02)
DIFFERENTIAL METHOD BLD: ABNORMAL
EOSINOPHIL # BLD: 0 K/UL (ref 0–0.4)
EOSINOPHIL NFR BLD: 0 % (ref 0–7)
ERYTHROCYTE [DISTWIDTH] IN BLOOD BY AUTOMATED COUNT: 12.9 % (ref 11.5–14.5)
GLOBULIN SER CALC-MCNC: 3.4 G/DL (ref 2–4)
GLUCOSE SERPL-MCNC: 155 MG/DL (ref 65–100)
HCT VFR BLD AUTO: 34 % (ref 35–47)
HGB BLD-MCNC: 11.7 G/DL (ref 11.5–16)
IMM GRANULOCYTES # BLD AUTO: 0 K/UL (ref 0–0.04)
IMM GRANULOCYTES NFR BLD AUTO: 0 % (ref 0–0.5)
LYMPHOCYTES # BLD: 1.7 K/UL (ref 0.8–3.5)
LYMPHOCYTES NFR BLD: 13 % (ref 12–49)
MAGNESIUM SERPL-MCNC: 2 MG/DL (ref 1.6–2.4)
MCH RBC QN AUTO: 33.2 PG (ref 26–34)
MCHC RBC AUTO-ENTMCNC: 34.4 G/DL (ref 30–36.5)
MCV RBC AUTO: 96.6 FL (ref 80–99)
MONOCYTES # BLD: 0.8 K/UL (ref 0–1)
MONOCYTES NFR BLD: 6 % (ref 5–13)
NEUTS SEG # BLD: 10.7 K/UL (ref 1.8–8)
NEUTS SEG NFR BLD: 81 % (ref 32–75)
NRBC # BLD: 0 K/UL (ref 0–0.01)
NRBC BLD-RTO: 0 PER 100 WBC
PHOSPHATE SERPL-MCNC: 2.9 MG/DL (ref 2.6–4.7)
PLATELET # BLD AUTO: 214 K/UL (ref 150–400)
PMV BLD AUTO: 11.3 FL (ref 8.9–12.9)
POTASSIUM SERPL-SCNC: 4.9 MMOL/L (ref 3.5–5.1)
PROT SERPL-MCNC: 6.1 G/DL (ref 6.4–8.2)
RBC # BLD AUTO: 3.52 M/UL (ref 3.8–5.2)
SODIUM SERPL-SCNC: 134 MMOL/L (ref 136–145)
WBC # BLD AUTO: 13.2 K/UL (ref 3.6–11)

## 2023-01-18 PROCEDURE — 74011250637 HC RX REV CODE- 250/637: Performed by: COLON & RECTAL SURGERY

## 2023-01-18 PROCEDURE — 74011250636 HC RX REV CODE- 250/636: Performed by: COLON & RECTAL SURGERY

## 2023-01-18 PROCEDURE — 36415 COLL VENOUS BLD VENIPUNCTURE: CPT

## 2023-01-18 PROCEDURE — 51798 US URINE CAPACITY MEASURE: CPT

## 2023-01-18 PROCEDURE — 83735 ASSAY OF MAGNESIUM: CPT

## 2023-01-18 PROCEDURE — 65270000029 HC RM PRIVATE

## 2023-01-18 PROCEDURE — 84100 ASSAY OF PHOSPHORUS: CPT

## 2023-01-18 PROCEDURE — 85025 COMPLETE CBC W/AUTO DIFF WBC: CPT

## 2023-01-18 PROCEDURE — 74011000250 HC RX REV CODE- 250: Performed by: COLON & RECTAL SURGERY

## 2023-01-18 PROCEDURE — 80053 COMPREHEN METABOLIC PANEL: CPT

## 2023-01-18 RX ADMIN — Medication 100 MG: at 10:24

## 2023-01-18 RX ADMIN — TROSPIUM CHLORIDE 20 MG: 20 TABLET, FILM COATED ORAL at 06:37

## 2023-01-18 RX ADMIN — PANTOPRAZOLE SODIUM 40 MG: 40 TABLET, DELAYED RELEASE ORAL at 06:36

## 2023-01-18 RX ADMIN — TROSPIUM CHLORIDE 20 MG: 20 TABLET, FILM COATED ORAL at 18:00

## 2023-01-18 RX ADMIN — SODIUM CHLORIDE, PRESERVATIVE FREE 10 ML: 5 INJECTION INTRAVENOUS at 13:46

## 2023-01-18 RX ADMIN — ACETAMINOPHEN 1000 MG: 500 TABLET ORAL at 13:45

## 2023-01-18 RX ADMIN — ACETAMINOPHEN 1000 MG: 500 TABLET ORAL at 10:24

## 2023-01-18 RX ADMIN — POTASSIUM CHLORIDE, DEXTROSE MONOHYDRATE AND SODIUM CHLORIDE 100 ML/HR: 150; 5; 450 INJECTION, SOLUTION INTRAVENOUS at 06:37

## 2023-01-18 RX ADMIN — OXYCODONE HYDROCHLORIDE 5 MG: 5 TABLET ORAL at 18:01

## 2023-01-18 RX ADMIN — ACETAMINOPHEN 1000 MG: 500 TABLET ORAL at 02:00

## 2023-01-18 RX ADMIN — KETOROLAC TROMETHAMINE 15 MG: 30 INJECTION, SOLUTION INTRAMUSCULAR at 06:36

## 2023-01-18 RX ADMIN — SODIUM CHLORIDE, PRESERVATIVE FREE 10 ML: 5 INJECTION INTRAVENOUS at 21:35

## 2023-01-18 RX ADMIN — ENOXAPARIN SODIUM 40 MG: 100 INJECTION SUBCUTANEOUS at 06:37

## 2023-01-18 RX ADMIN — SODIUM CHLORIDE, PRESERVATIVE FREE 10 ML: 5 INJECTION INTRAVENOUS at 06:37

## 2023-01-18 RX ADMIN — FOLIC ACID TAB 400 MCG 0.4 MG: 400 TAB at 10:24

## 2023-01-18 RX ADMIN — LEVOTHYROXINE SODIUM 125 MCG: 0.12 TABLET ORAL at 06:36

## 2023-01-18 RX ADMIN — KETOROLAC TROMETHAMINE 15 MG: 30 INJECTION, SOLUTION INTRAMUSCULAR at 13:45

## 2023-01-18 RX ADMIN — METOPROLOL SUCCINATE 50 MG: 50 TABLET, EXTENDED RELEASE ORAL at 10:24

## 2023-01-18 RX ADMIN — KETOROLAC TROMETHAMINE 15 MG: 30 INJECTION, SOLUTION INTRAMUSCULAR at 21:34

## 2023-01-18 RX ADMIN — ACETAMINOPHEN 1000 MG: 500 TABLET ORAL at 19:51

## 2023-01-18 RX ADMIN — POTASSIUM CHLORIDE, DEXTROSE MONOHYDRATE AND SODIUM CHLORIDE 50 ML/HR: 150; 5; 450 INJECTION, SOLUTION INTRAVENOUS at 21:35

## 2023-01-18 RX ADMIN — NALOXEGOL OXALATE 25 MG: 25 TABLET, FILM COATED ORAL at 10:24

## 2023-01-18 NOTE — PROGRESS NOTES
Physician Progress Note      PATIENTSharla Session  CSN #:                  [id number]  :                       1950  ADMIT DATE:       2023 5:49 AM  DISCH DATE:  RESPONDING  PROVIDER #:        Nia Carpio MD          QUERY TEXT:    Good morning  Pt admitted with encounter for ileostomy reversal.  Pt noted to have elevated creatinine level. If possible, please document in the progress notes and discharge summary if you are evaluating and/or treating any of the following: The medical record reflects the following:  Risk Factors: post op ileostomy reversal, HTN, Obesity  Clinical Indicators: Crea-1.67>>>1.04  Treatment: daily labs, maintenance IVF    Thank you  Josiah Duron RN CDI  [phone]    Defined by Kidney Disease Improving Global Outcomes (KDIGO) clinical practice guideline for acute kidney injury:  -Increase in SCr by greater than or equal to 0.3 mg/dl within 48?hours; or  -Increase or decrease in SCr to greater than or equal to 1.5 times baseline, which is known or presumed to have occurred within the prior 7 days; or  -Urine volume < 0.5ml/kg/h for 6 hours  Options provided:  -- Acute kidney failure  -- Acute kidney failure not present  -- Other - I will add my own diagnosis  -- Disagree - Not applicable / Not valid  -- Disagree - Clinically unable to determine / Unknown  -- Refer to Clinical Documentation Reviewer    PROVIDER RESPONSE TEXT:    Provider disagreed with this query.     Query created by: Shannon Ramirez on 2023 11:41 AM      Electronically signed by:  Nia Carpio MD 2023 6:11 PM

## 2023-01-18 NOTE — OP NOTES
[facility]  OPERATIVE REPORT    Name:  Paco Duran  MR#:  [id number]  :  1950  ACCOUNT #:  [de-identified]  DATE OF SERVICE:  2023    PREOPERATIVE DIAGNOSES:  1. Loop ileostomy status, desires reversal.  2.  History of laparoscopic sigmoid colectomy with descending colon to rectal anastomosis and diverting loop ileostomy. 3.  Recent hospital admission for a small bowel obstruction involving the jejunum. POSTOPERATIVE DIAGNOSES:  1. Loop ileostomy status, desires reversal.  2.  History of laparoscopic sigmoid colectomy with descending colon to rectal anastomosis and diverting loop ileostomy. 3.  Recent hospital admission for a small bowel obstruction involving the jejunum. 4.  Restricted band, jejunum and pelvis. 5.  Ventral hernia. PROCEDURES PERFORMED:  1. Lysis of adhesions. 2.  Reversal loop ileostomy with small bowel resection x1 (resection of ileostomy). 3.  Ventral hernia repair with [place] Bio-A mesh, approximately 3x5cm defect. SURGEON:  Dylan Garcia. Taran Tafoya MD    ASSISTANT:  Sravani Ga    ANESTHESIA:  GET. COMPLICATIONS:  none. SPECIMENS REMOVED:  1. Loop ileostomy with side anastomosis. 2.  Ventral/umbilical hernia containing preperitoneal fat. IMPLANTS:  [place] Bio-A mesh. ESTIMATED BLOOD LOSS:  50 mL    URINE OUTPUT:  Please see Anesthesia record. FLUIDS:  Please see Anesthesia record. FINDINGS:  1. There was a loop of jejunum which was tethered to the pelvis. This was likely the source of small bowel obstruction. Therefore, adhesive band was truncated and removed. I was able to free up the small bowel and run it from the ligament of Treitz to terminal ileum. 2.  There was a large umbilical hernia/ventral hernia. This left a defect in the fascia that I just could not bridge primarily. Although I closed inferiorly and superiorly the fascia at the midpoint, there is left a defect that could not be reapproximated. Therefore, I bridged this defect with a [place] Bio-A mesh. INDICATIONS:  A very pleasant 57-year-old, who is well-known to me for history of complicated diverticular disease with colovesical fistula. She underwent a sigmoid colectomy with diverting loop ileostomy in the past.  She is awaiting reversal loop ileostomy. Please note that she was admitted in mid December with a small bowel obstruction showing transition point in the pelvis involving the jejunum. Therefore, she is here today for an open reversal loop ileostomy. In the same setting, I will run the small bowel to see if there are any adhesions that might predispose to future small bowel obstructions. PROCEDURE:  The patient was brought to the operating room and a standard pause was observed by the entire staff in which the patient's name and procedure were clearly identified by all. Next, she was placed in the supine position and padded according to standard protocol. She was prepped and draped in sterile surgical fashion. Please note that her ileostomy had been oversewn prior to prepping and draping. Please note that an [place] was used as part of the prepping and draping process. Next, a lower midline incision was made from the previous hand-assist scar to the umbilicus. The previous scar was excised. Next, the subcu tissue was then incised down to the fascia. The fascia was then incised. The peritoneum was then grasped between two clamps and incised. I appreciated no adhesions. Therefore, I opened up the incision for full extent. There was no free fluid encountered. There was no ascites noted. At this point, attention was placed at the loop ileostomy. I identified both the efferent and afferent limbs at the level of the aperture of the abdominal wall. Window was created at the sites of both limbs. Using a 75-mm AMILCAR stapler, blue cartridge, I amputated the ileostomy as it exited out through the abdominal wall.   The mesentery to this loop ileostomy was then divided using Enseal energy device. At this point, a large wound protector was placed into the operative field. I inspected the small bowel. I want to run the small bowel to make sure that there were no obvious adhesions, which could serve as a future source of small bowel obstruction given her recent small bowel obstruction with transition. In the jejunum, there was a loop that was tethered down to the pelvis. This was undoubtedly the nidus for the small bowel obstruction that she had in the past.  In fact, there were still some bowel changes at the site from larger caliber to small caliber, although would not cause an obstruction. I sharply divided this adhesive band. Next, I inspected the small bowel that I had freed up. There was a questionable serosal defect. Therefore, I oversewed this in a transverse fashion using interrupted 3-0 silk Lembert stitches. I palpated post repair and the small bowel did not appear to be strictured or narrowed. At this point, I ran the small bowel from the ligament of Treitz all the way through to the terminal ileum. There were no obvious adhesive bands encountered. There was no obvious internal hernia. The small bowel appeared healthy. At this point, attention was now placed on performing the reanastomosis. The two staple lines were inspected. I ensured no twisting of the small bowel mesentery. Enterotomies were created on the antimesenteric border. Using a 75-mm AMILCAR stapler, blue cartridge, side-to-side anastomosis was performed. Next, a reload of the blue cartridge GI stapler was used to amputate off the enterotomies en bloc with the lateral staple line. The mesentery was then divided to this amputated segment using Enseal energy device. This will be sent together with the loop ileostomy. Next, I inspected both limbs of bowel. I oversewed per my preference all staple lines using 3-0 silk Lembert stitches.   The mesenteric defect was similarly closed using 3-0 silk. Next, I prepared to resect the amputated segment of loop ileostomy. I disconnected the loop ileostomy from the surrounding skin. Allis clamps were placed on the loop ileostomy. A plane of dissection was developed between the subcu tissue and the loop ileostomy working my way towards the abdominal wall. The loop ileostomy was able to be removed en bloc. Next, gloves were changed. Irrigation was performed. I closed the abdominal wall at the posterior sheath within the abdomen using interrupted #1 PDS figure-of-eight stitches. Next, I wanted to identify the fascia at the midline incision better. Therefore, this was mobilized. At the superior aspect, towards the umbilicus, there appeared to be a umbilical hernia/ventral hernia containing a large amount of preperitoneal fat. Therefore, I encircled this hernia. I removed this hernia and this will be sent as a specimen. With the fascia clearly defined, I injected Exparel into both rectus and preperitoneal space. A 20 mL of Exparel was used for this purpose. A six-pack of Seprafilm was then placed over the viscera. Next, a closing table was used at this point. Please note that all operating room personnel changed gown and gloves. Using the sterile back table, I began to reapproximate the fascia at the midline. This was performed with #1 Stratafix. However, towards that hernia, there was a defect that was too far to reapproximate primarily. Therefore, I only closed the inferior half of the incision. Next, using a #1 Stratafix, I closed a quarter of the superior incision with Stratafix. Next, using a [employer], I bridged the defects using 0 PDS U stitches to secure the [place] Bio-A to the abdominal wall circumferentially. Next, I reapproximated some tissue over this [place] Bio-A using 0 PDS figure-of-eight. Next, subcu tissue was irrigated with saline.   I reapproximated the aperture at the anterior rectus sheath using 0 PDS figure-of-eight stitches. The umbilicus was then reapproximated to the fascia using 2-0 Vicryl. Irrigation was repeated. The subcu tissue was then reapproximated using 2-0 Vicryl. The skin at the midline was then closed using a 4-0 Monocryl subcuticular stitch followed by Dermabond. A single 2-0 nylon stitch was used to reapproximate the skin. Megan Patch was then placed to the site. The procedure was then terminated.       Miguel Angel Ramírez MD      [id number]  D:  01/17/2023 11:29  T:  01/17/2023 19:04  JOB #:  [id number]  CC:  Dylan Hawthorne MD

## 2023-01-18 NOTE — PROGRESS NOTES
Problem: Falls - Risk of  Goal: *Absence of Falls  Description: Document Rosio Rosenberg Fall Risk and appropriate interventions in the flowsheet. Outcome: Progressing Towards Goal  Note: Fall Risk Interventions:                                Problem: Patient Education: Go to Patient Education Activity  Goal: Patient/Family Education  Outcome: Progressing Towards Goal     Problem: Nutrition Deficit  Goal: *Optimize nutritional status  Outcome: Progressing Towards Goal     Problem: Patient Education: Go to Patient Education Activity  Goal: Patient/Family Education  Outcome: Progressing Towards Goal     Problem: Patient Education: Go to Patient Education Activity  Goal: Patient/Family Education  Outcome: Progressing Towards Goal     Problem: Surgical Pathway Day of Surgery  Goal: Off Pathway (Use only if patient is Off Pathway)  Outcome: Progressing Towards Goal  Goal: Activity/Safety  Outcome: Progressing Towards Goal  Goal: Consults, if ordered  Outcome: Progressing Towards Goal  Goal: Nutrition/Diet  Outcome: Progressing Towards Goal  Goal: Medications  Outcome: Progressing Towards Goal  Goal: Respiratory  Outcome: Progressing Towards Goal  Goal: Treatments/Interventions/Procedures  Outcome: Progressing Towards Goal  Goal: Psychosocial  Outcome: Progressing Towards Goal  Goal: *No signs and symptoms of infection or wound complications  Outcome: Progressing Towards Goal  Goal: *Optimal pain control at patient's stated goal  Outcome: Progressing Towards Goal  Goal: *Adequate urinary output (equal to or greater than 30 milliliters/hour)  Description: Ambulatory Surgery patients voiding without difficulty.   Outcome: Progressing Towards Goal  Goal: *Hemodynamically stable  Outcome: Progressing Towards Goal  Goal: *Tolerating diet  Outcome: Progressing Towards Goal  Goal: *Demonstrates progressive activity  Outcome: Progressing Towards Goal     Problem: Surgical Pathway Post-Op Day 1  Goal: Off Pathway (Use only if patient is Off Pathway)  Outcome: Progressing Towards Goal  Goal: Activity/Safety  Outcome: Progressing Towards Goal  Goal: Diagnostic Test/Procedures  Outcome: Progressing Towards Goal  Goal: Nutrition/Diet  Outcome: Progressing Towards Goal  Goal: Discharge Planning  Outcome: Progressing Towards Goal  Goal: Medications  Outcome: Progressing Towards Goal  Goal: Respiratory  Outcome: Progressing Towards Goal

## 2023-01-18 NOTE — PROGRESS NOTES
CRS  Pt without complaints. Pain controlled  Flowsheet, labs reviewed  Abd: abdominal binder in place           Midline incision: c/d/I           RLQ previous ostomy site: serosang staining of packing    Plan:  Abdominal binder at all times. Paint midline incision with betadine BID.  Mobilize with assist. Does not need tele

## 2023-01-18 NOTE — PROGRESS NOTES
Comprehensive Nutrition Assessment    Type and Reason for Visit: Initial, Positive nutrition screen    Nutrition Recommendations/Plan:   Advance diet as able - recommend regular, GI bland  When diet advances past clears, Provide Ensure High Protein BID (320 kcal, 38 g carbs, 32 g protein)  - vanilla     Malnutrition Assessment:  Malnutrition Status:  Mild malnutrition (01/18/23 1150)    Context:  Acute illness     Findings of the 6 clinical characteristics of malnutrition:   Energy Intake:  Mild decrease in energy intake (specify)  Weight Loss:  No significant weight loss     Body Fat Loss:  No significant body fat loss,     Muscle Mass Loss:  Mild muscle mass loss, Clavicles (pectoralis & deltoids), Scapula (trapezius), Thigh (quadriceps)  Fluid Accumulation:  No significant fluid accumulation,     Strength:  Not performed     Nutrition Assessment:     Pt is a 74year old female [facility] [Z[facility]]. She has an extensive past medical history which includes Benign essential HTN, Diverticulosis, Fatty liver, GERD, Psychiatric disorder, SBO, and Uterine mass (2019). POD ileostomy resection. BPA for wt loss >33# and poor po. Patient reports she has been steadily losing weight for about three years now unintentionally. Is unsure of \"usual\" body weight. Per documentation, patient has lost 10# (4.4%) x 3 months, not clinically significant for timeframe. Patient reports appetite has not been great throughout last few years. Denies nausea/vomiting/diarrhea at this time. Denies chewing/swallowing problems. NKFA. Reports she dislikes Ensure Clear, but drinks Ensure HP 1x/day at home - RD to order when diet advanced. No PO intake documented yet.      Wt Readings from Last 10 Encounters:   01/17/23 98.7 kg (217 lb 9.5 oz)   01/06/23 99.9 kg (220 lb 4.8 oz)   12/27/22 99 kg (218 lb 3.2 oz)   12/14/22 101.6 kg (224 lb)   10/26/22 103.1 kg (227 lb 3.2 oz)   10/19/22 113.6 kg (250 lb 7.1 oz) 10/06/22 104.1 kg (229 lb 8 oz)   09/26/22 104.1 kg (229 lb 8 oz)   09/22/22 104.7 kg (230 lb 12.8 oz)   08/30/22 104.2 kg (229 lb 11.5 oz)       Nutrition Related Findings:      Wound Type: Multiple, Surgical incision (abd)  Last Bowel Movement Date: 01/17/23  Stool Appearance: Formed  Abdominal Assessment: Semi-soft, Obese, Other (comment) (abdominal binder, midline incision, ileostomy reversal)  Bowel Sounds: Active   Edema:LLE: 1+; Non-pitting (1/18/2023  8:50 AM)  RLE: 1+; Non-pitting (1/18/2023  8:50 AM)      Nutr. Labs:  Lab Results   Component Value Date/Time    GFR est AA >60 08/29/2022 04:52 AM    GFR est non-AA >60 08/29/2022 04:52 AM    Creatinine 1.04 (H) 01/18/2023 03:56 AM    BUN 20 01/18/2023 03:56 AM    Sodium 134 (L) 01/18/2023 03:56 AM    Potassium 4.9 01/18/2023 03:56 AM    Chloride 106 01/18/2023 03:56 AM    CO2 23 01/18/2023 03:56 AM       Lab Results   Component Value Date/Time    Glucose 155 (H) 01/18/2023 03:56 AM    Glucose (POC) 81 08/29/2022 05:03 AM       Lab Results   Component Value Date/Time    Hemoglobin A1c 5.2 01/06/2023 02:19 PM       Nutr. Meds:  D5NaCl with KCl @ 50, Lovenox, folic acid, synthroid, tropol-XL, movantik, zofran PRN, Protonix, thiamine    Current Nutrition Intake & Therapies:  Average Meal Intake: Unable to assess  Average Supplement Intake: Refusing to take  ADULT DIET Clear Liquid  ADULT ORAL NUTRITION SUPPLEMENT AM Snack, PM Snack; Clear Liquid    Anthropometric Measures:  Height: 5' 2.99\" (160 cm)  Ideal Body Weight (IBW): 115 lbs (52 kg)     Current Body Wt:  98.7 kg (217 lb 9.5 oz), 189.2 % IBW. Standing scale  Current BMI (kg/m2): 38.6        Weight Adjustment: No adjustment                 BMI Category: Obese class 2 (BMI 35.0-39. 9)    Estimated Daily Nutrient Needs:  Energy Requirements Based On: Formula  Weight Used for Energy Requirements: Current  Energy (kcal/day): 2040 (MSJ x 1.3 x 1.1)  Weight Used for Protein Requirements: Current  Protein (g/day):  (1.0-1.2 g/kg)  Method Used for Fluid Requirements: 1 ml/kcal  Fluid (ml/day): 2040    Nutrition Diagnosis:   Increased nutrient needs related to inadequate protein-energy intake, acute injury/trauma as evidenced by wounds (POD1 ileostomy resection)     Nutrition Interventions:   Food and/or Nutrient Delivery: Modify current diet, Modify oral nutrition supplement  Nutrition Education/Counseling: No recommendations at this time  Coordination of Nutrition Care: Continue to monitor while inpatient, Interdisciplinary rounds  Plan of Care discussed with: IDR team    Goals:     Goals: PO intake 50% or greater, by next RD assessment       Nutrition Monitoring and Evaluation:   Behavioral-Environmental Outcomes: None identified  Food/Nutrient Intake Outcomes: Food and nutrient intake, Supplement intake  Physical Signs/Symptoms Outcomes: Biochemical data, Weight, GI status    Discharge Planning:     Too soon to determine    Andrew Bazan MS, RD  Contact: Ext: [phone], or via Kiko

## 2023-01-18 NOTE — PROGRESS NOTES
Problem: Falls - Risk of  Goal: *Absence of Falls  Description: Document Hortencia Foss Fall Risk and appropriate interventions in the flowsheet.   Outcome: Progressing Towards Goal  Note: Fall Risk Interventions:                                Problem: Nutrition Deficit  Goal: *Optimize nutritional status  Outcome: Progressing Towards Goal     Problem: Patient Education: Go to Patient Education Activity  Goal: Patient/Family Education  Outcome: Progressing Towards Goal     Problem: Surgical Pathway Day of Surgery  Goal: Medications  Outcome: Progressing Towards Goal     Problem: Surgical Pathway Day of Surgery  Goal: Nutrition/Diet  Outcome: Progressing Towards Goal

## 2023-01-18 NOTE — PROGRESS NOTES
Problem: Patient Education: Go to Patient Education Activity  Goal: Patient/Family Education  Outcome: Progressing Towards Goal     Problem: Nutrition Deficit  Goal: *Optimize nutritional status  Outcome: Progressing Towards Goal

## 2023-01-18 NOTE — PROGRESS NOTES
1/18/23  9:22 AM    Care Management Initial Evaluation:    Reason for Readmission:       Ileostomy status    RUR Score/Risk Level:    14%  Level: Low     PCP: First and Last name:  Ramiro Johnson MD   Name of Practice: [facility]   Are you a current patient: Yes/No: Yes   Approximate date of last visit: 12/27/23   Can you participate in a virtual visit with your PCP:     Is a Care Conference indicated:   No    Did you attend your follow up appointment (s): If not, why not:  Yes       Resources/supports as identified by patient/family: Good family support, Sister and Brother in [place], nephew         Top Challenges facing patient (as identified by patient/family and CM): Finances/Medication cost?    No concerns   Transportation    Family will transport    Support system or lack thereof? Good support system     Living arrangements? 2 level home, ramp to enter, stair lift to 2nd floor       Self-care/ADLs/Cognition? Normally independent with all ADL's, alert and oriented  DME: Lift recliner, hospital bed, RW, Rollator, BSC           Current Advanced Directive/Advance Care Plan:  Full code, has ACP documents           Plan for utilizing home health:   [facility]             Transition of Care Plan:    Based on readmission, the patient's previous Plan of Care   has been evaluated and/or modified. The current Transition of Care Plan is:            1). Patient admitted for planned surgery  2). Colon and Rectal Surgery admitted  3). Family will transport at dc  4). CM following for dc needs    [facility] Management Interventions  PCP Verified by CM: Yes  Last Visit to PCP: 12/27/22  Mode of Transport at Discharge:  Other (see comment)  Transition of Care Consult (CM Consult): Discharge Planning  Discharge Durable Medical Equipment: No  Physical Therapy Consult: No  Occupational Therapy Consult: No  Speech Therapy Consult: No  Support Systems: Other Family Member(s)  Confirm Follow Up Transport: Family  Discharge Location  Patient Expects to be Discharged to[de-identified] Home with home health     Readmission Assessment  Number of days since last admission?: 8-30 days  Previous disposition: Home with Home Health  Who is being interviewed?: Patient  What was the patient's/caregiver's perception as to why they think they needed to return back to the hospital?: Other (Comment) (came back for planned surgery)  Did you visit your Primary Care Physician after you left the hospital, before you returned this time?: Yes  Did you see a specialist, such as Cardiac, Pulmonary, Orthopedic Physician, etc. after you left the hospital?: Yes  Who advised the patient to return to the hospital?: Physician  Does the patient report anything that got in the way of taking their medications?: No  In our efforts to provide the best possible care to you and others like you, can you think of anything that we could have done to help you after you left the hospital the first time, so that you might not have needed to return so soon?: Other (Comment)

## 2023-01-19 ENCOUNTER — APPOINTMENT (OUTPATIENT)
Dept: VASCULAR SURGERY | Age: 73
DRG: 330 | End: 2023-01-19
Attending: INTERNAL MEDICINE
Payer: MEDICARE

## 2023-01-19 ENCOUNTER — APPOINTMENT (OUTPATIENT)
Dept: CT IMAGING | Age: 73
DRG: 330 | End: 2023-01-19
Attending: INTERNAL MEDICINE
Payer: MEDICARE

## 2023-01-19 ENCOUNTER — APPOINTMENT (OUTPATIENT)
Dept: GENERAL RADIOLOGY | Age: 73
DRG: 330 | End: 2023-01-19
Attending: INTERNAL MEDICINE
Payer: MEDICARE

## 2023-01-19 LAB
ANION GAP SERPL CALC-SCNC: 6 MMOL/L (ref 5–15)
BASOPHILS # BLD: 0 K/UL (ref 0–0.1)
BASOPHILS # BLD: 0 K/UL (ref 0–0.1)
BASOPHILS NFR BLD: 0 % (ref 0–1)
BASOPHILS NFR BLD: 0 % (ref 0–1)
BNP SERPL-MCNC: 681 PG/ML
BUN SERPL-MCNC: 20 MG/DL (ref 6–20)
BUN/CREAT SERPL: 19 (ref 12–20)
CALCIUM SERPL-MCNC: 9.3 MG/DL (ref 8.5–10.1)
CHLORIDE SERPL-SCNC: 103 MMOL/L (ref 97–108)
CO2 SERPL-SCNC: 24 MMOL/L (ref 21–32)
CREAT SERPL-MCNC: 1.05 MG/DL (ref 0.55–1.02)
DIFFERENTIAL METHOD BLD: ABNORMAL
DIFFERENTIAL METHOD BLD: ABNORMAL
EOSINOPHIL # BLD: 0.1 K/UL (ref 0–0.4)
EOSINOPHIL # BLD: 0.3 K/UL (ref 0–0.4)
EOSINOPHIL NFR BLD: 0 % (ref 0–7)
EOSINOPHIL NFR BLD: 2 % (ref 0–7)
ERYTHROCYTE [DISTWIDTH] IN BLOOD BY AUTOMATED COUNT: 12.7 % (ref 11.5–14.5)
ERYTHROCYTE [DISTWIDTH] IN BLOOD BY AUTOMATED COUNT: 12.7 % (ref 11.5–14.5)
GLUCOSE SERPL-MCNC: 130 MG/DL (ref 65–100)
HCT VFR BLD AUTO: 36.7 % (ref 35–47)
HCT VFR BLD AUTO: 38.7 % (ref 35–47)
HGB BLD-MCNC: 12.7 G/DL (ref 11.5–16)
HGB BLD-MCNC: 13.1 G/DL (ref 11.5–16)
IMM GRANULOCYTES # BLD AUTO: 0.1 K/UL (ref 0–0.04)
IMM GRANULOCYTES # BLD AUTO: 0.2 K/UL (ref 0–0.04)
IMM GRANULOCYTES NFR BLD AUTO: 0 % (ref 0–0.5)
IMM GRANULOCYTES NFR BLD AUTO: 1 % (ref 0–0.5)
LACTATE SERPL-SCNC: 1.7 MMOL/L (ref 0.4–2)
LYMPHOCYTES # BLD: 1.3 K/UL (ref 0.8–3.5)
LYMPHOCYTES # BLD: 1.6 K/UL (ref 0.8–3.5)
LYMPHOCYTES NFR BLD: 11 % (ref 12–49)
LYMPHOCYTES NFR BLD: 8 % (ref 12–49)
MAGNESIUM SERPL-MCNC: 2.1 MG/DL (ref 1.6–2.4)
MCH RBC QN AUTO: 33.2 PG (ref 26–34)
MCH RBC QN AUTO: 33.4 PG (ref 26–34)
MCHC RBC AUTO-ENTMCNC: 33.9 G/DL (ref 30–36.5)
MCHC RBC AUTO-ENTMCNC: 34.6 G/DL (ref 30–36.5)
MCV RBC AUTO: 96.6 FL (ref 80–99)
MCV RBC AUTO: 98.2 FL (ref 80–99)
MONOCYTES # BLD: 0.9 K/UL (ref 0–1)
MONOCYTES # BLD: 1 K/UL (ref 0–1)
MONOCYTES NFR BLD: 6 % (ref 5–13)
MONOCYTES NFR BLD: 6 % (ref 5–13)
NEUTS SEG # BLD: 12.6 K/UL (ref 1.8–8)
NEUTS SEG # BLD: 15 K/UL (ref 1.8–8)
NEUTS SEG NFR BLD: 81 % (ref 32–75)
NEUTS SEG NFR BLD: 85 % (ref 32–75)
NRBC # BLD: 0 K/UL (ref 0–0.01)
NRBC # BLD: 0 K/UL (ref 0–0.01)
NRBC BLD-RTO: 0 PER 100 WBC
NRBC BLD-RTO: 0 PER 100 WBC
PHOSPHATE SERPL-MCNC: 2.7 MG/DL (ref 2.6–4.7)
PLATELET # BLD AUTO: 227 K/UL (ref 150–400)
PLATELET # BLD AUTO: 235 K/UL (ref 150–400)
PMV BLD AUTO: 11 FL (ref 8.9–12.9)
PMV BLD AUTO: 11.1 FL (ref 8.9–12.9)
POTASSIUM SERPL-SCNC: 4.8 MMOL/L (ref 3.5–5.1)
RBC # BLD AUTO: 3.8 M/UL (ref 3.8–5.2)
RBC # BLD AUTO: 3.94 M/UL (ref 3.8–5.2)
SODIUM SERPL-SCNC: 133 MMOL/L (ref 136–145)
TSH SERPL DL<=0.05 MIU/L-ACNC: 2.41 UIU/ML (ref 0.36–3.74)
WBC # BLD AUTO: 15.4 K/UL (ref 3.6–11)
WBC # BLD AUTO: 17.7 K/UL (ref 3.6–11)

## 2023-01-19 PROCEDURE — 84443 ASSAY THYROID STIM HORMONE: CPT

## 2023-01-19 PROCEDURE — 74011000636 HC RX REV CODE- 636: Performed by: COLON & RECTAL SURGERY

## 2023-01-19 PROCEDURE — 74011250637 HC RX REV CODE- 250/637: Performed by: COLON & RECTAL SURGERY

## 2023-01-19 PROCEDURE — 74011250636 HC RX REV CODE- 250/636: Performed by: COLON & RECTAL SURGERY

## 2023-01-19 PROCEDURE — 77010033678 HC OXYGEN DAILY

## 2023-01-19 PROCEDURE — 84100 ASSAY OF PHOSPHORUS: CPT

## 2023-01-19 PROCEDURE — 83880 ASSAY OF NATRIURETIC PEPTIDE: CPT

## 2023-01-19 PROCEDURE — 71045 X-RAY EXAM CHEST 1 VIEW: CPT

## 2023-01-19 PROCEDURE — 83735 ASSAY OF MAGNESIUM: CPT

## 2023-01-19 PROCEDURE — 93970 EXTREMITY STUDY: CPT

## 2023-01-19 PROCEDURE — 74011000250 HC RX REV CODE- 250: Performed by: COLON & RECTAL SURGERY

## 2023-01-19 PROCEDURE — 77030038269 HC DRN EXT URIN PURWCK BARD -A

## 2023-01-19 PROCEDURE — 74011000250 HC RX REV CODE- 250: Performed by: INTERNAL MEDICINE

## 2023-01-19 PROCEDURE — 85025 COMPLETE CBC W/AUTO DIFF WBC: CPT

## 2023-01-19 PROCEDURE — 65270000046 HC RM TELEMETRY

## 2023-01-19 PROCEDURE — 74177 CT ABD & PELVIS W/CONTRAST: CPT

## 2023-01-19 PROCEDURE — 74011250636 HC RX REV CODE- 250/636: Performed by: INTERNAL MEDICINE

## 2023-01-19 PROCEDURE — 80048 BASIC METABOLIC PNL TOTAL CA: CPT

## 2023-01-19 PROCEDURE — 83605 ASSAY OF LACTIC ACID: CPT

## 2023-01-19 PROCEDURE — 36415 COLL VENOUS BLD VENIPUNCTURE: CPT

## 2023-01-19 PROCEDURE — 87040 BLOOD CULTURE FOR BACTERIA: CPT

## 2023-01-19 RX ORDER — HYDROMORPHONE HYDROCHLORIDE 1 MG/ML
0.5 INJECTION, SOLUTION INTRAMUSCULAR; INTRAVENOUS; SUBCUTANEOUS ONCE
Status: COMPLETED | OUTPATIENT
Start: 2023-01-19 | End: 2023-01-19

## 2023-01-19 RX ORDER — HYDROMORPHONE HYDROCHLORIDE 1 MG/ML
0.2 INJECTION, SOLUTION INTRAMUSCULAR; INTRAVENOUS; SUBCUTANEOUS
Status: DISCONTINUED | OUTPATIENT
Start: 2023-01-19 | End: 2023-02-01 | Stop reason: HOSPADM

## 2023-01-19 RX ORDER — HYDRALAZINE HYDROCHLORIDE 20 MG/ML
10 INJECTION INTRAMUSCULAR; INTRAVENOUS
Status: DISCONTINUED | OUTPATIENT
Start: 2023-01-19 | End: 2023-02-01 | Stop reason: HOSPADM

## 2023-01-19 RX ORDER — HYDROMORPHONE HYDROCHLORIDE 1 MG/ML
0.5 INJECTION, SOLUTION INTRAMUSCULAR; INTRAVENOUS; SUBCUTANEOUS ONCE
Status: ACTIVE | OUTPATIENT
Start: 2023-01-19 | End: 2023-01-19

## 2023-01-19 RX ORDER — HYDROMORPHONE HYDROCHLORIDE 1 MG/ML
0.5 INJECTION, SOLUTION INTRAMUSCULAR; INTRAVENOUS; SUBCUTANEOUS
Status: DISCONTINUED | OUTPATIENT
Start: 2023-01-19 | End: 2023-01-19

## 2023-01-19 RX ORDER — KETOROLAC TROMETHAMINE 30 MG/ML
15 INJECTION, SOLUTION INTRAMUSCULAR; INTRAVENOUS EVERY 8 HOURS
Status: COMPLETED | OUTPATIENT
Start: 2023-01-19 | End: 2023-01-21

## 2023-01-19 RX ORDER — NALOXONE HYDROCHLORIDE 0.4 MG/ML
0.1 INJECTION, SOLUTION INTRAMUSCULAR; INTRAVENOUS; SUBCUTANEOUS AS NEEDED
Status: DISCONTINUED | OUTPATIENT
Start: 2023-01-19 | End: 2023-02-01 | Stop reason: HOSPADM

## 2023-01-19 RX ORDER — TRAMADOL HYDROCHLORIDE 50 MG/1
50 TABLET ORAL
Status: DISCONTINUED | OUTPATIENT
Start: 2023-01-19 | End: 2023-02-01 | Stop reason: HOSPADM

## 2023-01-19 RX ORDER — LIDOCAINE 4 G/100G
2 PATCH TOPICAL EVERY 24 HOURS
Status: DISCONTINUED | OUTPATIENT
Start: 2023-01-19 | End: 2023-02-01 | Stop reason: HOSPADM

## 2023-01-19 RX ADMIN — HYDRALAZINE HYDROCHLORIDE 10 MG: 20 INJECTION INTRAMUSCULAR; INTRAVENOUS at 12:24

## 2023-01-19 RX ADMIN — ENOXAPARIN SODIUM 40 MG: 100 INJECTION SUBCUTANEOUS at 09:17

## 2023-01-19 RX ADMIN — POTASSIUM CHLORIDE, DEXTROSE MONOHYDRATE AND SODIUM CHLORIDE 100 ML/HR: 150; 5; 450 INJECTION, SOLUTION INTRAVENOUS at 18:54

## 2023-01-19 RX ADMIN — LEVOTHYROXINE SODIUM 125 MCG: 0.12 TABLET ORAL at 05:57

## 2023-01-19 RX ADMIN — METOPROLOL SUCCINATE 50 MG: 50 TABLET, EXTENDED RELEASE ORAL at 12:29

## 2023-01-19 RX ADMIN — TRAMADOL HYDROCHLORIDE 50 MG: 50 TABLET ORAL at 22:56

## 2023-01-19 RX ADMIN — ACETAMINOPHEN 1000 MG: 500 TABLET ORAL at 12:29

## 2023-01-19 RX ADMIN — ACETAMINOPHEN 1000 MG: 500 TABLET ORAL at 20:24

## 2023-01-19 RX ADMIN — HYDROMORPHONE HYDROCHLORIDE 0.5 MG: 1 INJECTION, SOLUTION INTRAMUSCULAR; INTRAVENOUS; SUBCUTANEOUS at 01:32

## 2023-01-19 RX ADMIN — TROSPIUM CHLORIDE 20 MG: 20 TABLET, FILM COATED ORAL at 18:37

## 2023-01-19 RX ADMIN — TROSPIUM CHLORIDE 20 MG: 20 TABLET, FILM COATED ORAL at 05:57

## 2023-01-19 RX ADMIN — PANTOPRAZOLE SODIUM 40 MG: 40 TABLET, DELAYED RELEASE ORAL at 05:57

## 2023-01-19 RX ADMIN — KETOROLAC TROMETHAMINE 15 MG: 30 INJECTION, SOLUTION INTRAMUSCULAR at 05:57

## 2023-01-19 RX ADMIN — HYDROMORPHONE HYDROCHLORIDE 0.5 MG: 1 INJECTION, SOLUTION INTRAMUSCULAR; INTRAVENOUS; SUBCUTANEOUS at 05:54

## 2023-01-19 RX ADMIN — KETOROLAC TROMETHAMINE 15 MG: 30 INJECTION, SOLUTION INTRAMUSCULAR at 14:26

## 2023-01-19 RX ADMIN — IOPAMIDOL 100 ML: 755 INJECTION, SOLUTION INTRAVENOUS at 17:42

## 2023-01-19 RX ADMIN — HYDROMORPHONE HYDROCHLORIDE 0.5 MG: 1 INJECTION, SOLUTION INTRAMUSCULAR; INTRAVENOUS; SUBCUTANEOUS at 07:18

## 2023-01-19 RX ADMIN — POTASSIUM CHLORIDE, DEXTROSE MONOHYDRATE AND SODIUM CHLORIDE 100 ML/HR: 150; 5; 450 INJECTION, SOLUTION INTRAVENOUS at 12:27

## 2023-01-19 RX ADMIN — SODIUM CHLORIDE, PRESERVATIVE FREE 10 ML: 5 INJECTION INTRAVENOUS at 05:56

## 2023-01-19 RX ADMIN — SODIUM CHLORIDE, PRESERVATIVE FREE 10 ML: 5 INJECTION INTRAVENOUS at 14:27

## 2023-01-19 RX ADMIN — KETOROLAC TROMETHAMINE 15 MG: 30 INJECTION, SOLUTION INTRAMUSCULAR at 21:36

## 2023-01-19 NOTE — PROGRESS NOTES
Patient reported 10/10 pain after nurse administered scheduled Toradol. PRN dilaudid given at 0600. Patient is resting at this time. Patient reported extreme pain at shift change. Surgeon at bedside and verbally ordered one time dose of 0.5 mg dilaudid.

## 2023-01-19 NOTE — PROGRESS NOTES
CRS  Pt with postop pain. Tearful  Flowsheet, labs reviewed  Abd: soft, tender  RLQ: serosang staining on kerlex    Plan:  Adjust pain meds cautiously- has potential to over sedate on prior admission. Increase toradol. Monitor Creat. Increase IVF. Hold at clears.  Needs to work on [address]

## 2023-01-19 NOTE — PROGRESS NOTES
Spiritual Care Partner Volunteer visited patient at [facility] in [facility] 4M POST SURG ORT 1 on 1/19/2023  Documented by:  Laron Bocanegra 87, Hitesh 68, [facility]  Staff [address]  Paging service: [phone] (RAY)

## 2023-01-19 NOTE — PROGRESS NOTES
Rapid Called at 1151    Responded to RRT at 1153 for Blood Pressure hypertensive    Provider at bedside: no    Interventions ordered: Other (Comment)    Sepsis Suspected: no    Transfer to Higher Level of Care: NO     Rapid called for HTN for /90 with pulse of 104. No PRN medications ordered. Pt complaining of pain. Pt alert. Denies chest pain or SOB. 1156: RRT called Dr. José Miguel Fair, surgeon, and left voicemail requesting callback. 1158: RRT called MD office. [phone]: RN at MD office states she will reach out to MD regarding pt condition    [phone]: hospitalist on call paged for assistance with urgent pt. Orders placed for IV hydralazine. 1224: IV hydralazine given     1225: Assisted RN with PO medication administration and with hygiene care for pt.       1236: BP improved. 179/74    1239: Surgeon returned call. Orders given for hospitalist consult. Pt in no acute distress. BP improving. Pt resting comfortably.      Visit Vitals  BP (!) 151/77   Pulse (!) 109   Temp (!) 100.5 °F (38.1 °C)   Resp 24   Ht 5' 2.99\" (1.6 m)   Wt 98.7 kg (217 lb 9.5 oz)   SpO2 97%   BMI 38.55 kg/m²        Rapid Ended at [address], RN

## 2023-01-19 NOTE — DISCHARGE INSTRUCTIONS
Sam Jimenes MD, [address] [facility] Yun Grossman MD, FACS  Keshawn Cameron. Bryant Hartman MD, Humza Higginbotham MD, Val Mckenna MD, Merline Face, MD, Luis E Rojas MD    [facility] Discharge Instructions for Mandy Fournier    Diagnosis: Open loop ileostomy reversal, lysis of adhesions, hernia repair with Dyane Borer Bio-A  (January 17,2022)    Diet:  Low fiber diet x 2 weeks. Avoid fresh raw vegetables, nuts, beans, avoids skins on fruits and vegetables    Do not drive for 2 weeks or until after your next doctors appointment. Wound care:  Please shower daily. Gently wash all incisions- including your previous ostomy site- with soap (bath soap you usually use) and water. After shower, please place dressing over previous ostomy site on right abdominal wall. Change dressing several times a day whenever it is damp. This wound will drain for several weeks. Please notify office if drainage increases or changes in color (milky, green, etc). Activity: Please wear abdominal binder at all times when out of bed. No lifting any objects weighing more than 25 pounds. May go up and down stairs. You will need to take naps during day when tired. Multiple bowel movements are normal each day for a while. Pain control: Take pain medication as prescribed: (NO DRIVING WHILE ON PAIN MEDICATIONS). Oxycodone 5mg 1 pill EVERY 6 HOURS AS NEEDED. (This medication has already been e-scribed to your pharmacy)  Other Medications: Tylenol 650mg every 6 hours as needed for pain  (over the counter- no prescription needed)                                  Ibuprofen 600mg every 6 hours as needed for pain  (over the counter- no prescription needed)     See me in the office in 10-14 days. Call as soon as discharged for an appointment . Call the Exchange [phone], if you have any questions or problems after office hours.             Suzanne Lazcano, Estefanía [address]  [phone] · Fax [phone] [address]  [place], [address]  [phone] · Fax [phone] Sudeep Coy, [address]  [place], [address]   [phone] · Fax [phone]

## 2023-01-19 NOTE — PROGRESS NOTES
1734: Called [facility] to give report. TRANSFER - OUT REPORT:    Verbal report given to Mitul Glasgow RN(name) on Min Ferreira  being transferred to [facility](unit) for change in patient condition(lethargy, AMS, HTN)       Report consisted of patients Situation, Background, Assessment and   Recommendations(SBAR). Information from the following report(s) SBAR, Kardex, Intake/Output, MAR, and Recent Results was reviewed with the receiving nurse. Lines:   Peripheral IV 01/17/23 Posterior; Left Hand (Active)   Site Assessment Clean, dry, & intact 01/19/23 0730   Phlebitis Assessment 0 01/19/23 0730   Infiltration Assessment 0 01/19/23 0730   Dressing Status Clean, dry, & intact; Occlusive 01/19/23 0730   Dressing Type Tape;Transparent 01/19/23 0730   Hub Color/Line Status Pink; Infusing 01/19/23 0730   Action Taken Open ports on tubing capped 01/19/23 0730   Alcohol Cap Used Yes 01/19/23 0730       Peripheral IV 01/19/23 Left Antecubital (Active)        Opportunity for questions and clarification was provided. Patient transported from CT directly to room 326.
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
## 2023-01-19 NOTE — PROGRESS NOTES
Still attempting to contact Dr. Klarissa Arevalo. No answer on cell phone. Paged Dr. Althea Liu office.

## 2023-01-19 NOTE — PROGRESS NOTES
Bedside shift change report given to [facility] RN (oncoming nurse) by Bruce Palacios RN (offgoing nurse). Report included the following information SBAR, Kardex, Intake/Output, MAR, and Recent Results.

## 2023-01-19 NOTE — PROGRESS NOTES
RRT RN, primary RN, and nursing supervisor at bedside.  Unable to get in contact with Dr. Bryanna Nickerson

## 2023-01-19 NOTE — PROGRESS NOTES
Spiritual Care Assessment/Progress Note  [address]      NAME: Chante Mann      MRN: [id number]  AGE: 74 y.o. SEX: female  Jew Affiliation: Confucianist   Language: English     1/19/2023     Total Time (in minutes): 18     Spiritual Assessment begun in [facility] 4M POST SURG ORT 1 through conversation with:         []Patient        [] Family    [] Friend(s)        Reason for Consult: Rapid response team     Spiritual beliefs: (Please include comment if needed)     [] Identifies with a darren tradition:         [] Supported by a darren community:            [] Claims no spiritual orientation:           [] Seeking spiritual identity:                [] Adheres to an individual form of spirituality:           [x] Not able to assess:                           Identified resources for coping:      [] Prayer                               [] Music                  [] Guided Imagery     [] Family/friends                 [] Pet visits     [] Devotional reading                         [x] Unknown     [] Other:                                               Interventions offered during this visit: (See comments for more details)                Plan of Care:     [x] Support spiritual and/or cultural needs    [] Support AMD and/or advance care planning process      [x] Support grieving process   [] Coordinate Rites and/or Rituals    [] Coordination with community clergy   [] No spiritual needs identified at this time   [] Detailed Plan of Care below (See Comments)  [] Make referral to Music Therapy  [] Make referral to Pet Therapy     [] Make referral to Addiction services  [] Make referral to [facility]  [] Make referral to Spiritual Care Partner  [] No future visits requested        [] Contact Spiritual Care for further referrals     Comments: Pager response to RRT: No family present. Provided ministry of presence and silent prayer. Collaborated with staff. Please contact spiritual care for future services. [phone] paging Service [phone])

## 2023-01-19 NOTE — PROGRESS NOTES
Problem: Falls - Risk of  Goal: *Absence of Falls  Description: Document Evelio Valdes Fall Risk and appropriate interventions in the flowsheet. Outcome: Progressing Towards Goal  Note: Fall Risk Interventions:                                Problem: Patient Education: Go to Patient Education Activity  Goal: Patient/Family Education  Outcome: Progressing Towards Goal     Problem: Nutrition Deficit  Goal: *Optimize nutritional status  Outcome: Progressing Towards Goal     Problem: Patient Education: Go to Patient Education Activity  Goal: Patient/Family Education  Outcome: Progressing Towards Goal     Problem: Patient Education: Go to Patient Education Activity  Goal: Patient/Family Education  Outcome: Progressing Towards Goal     Problem: Surgical Pathway Day of Surgery  Goal: Activity/Safety  Outcome: Progressing Towards Goal  Goal: Consults, if ordered  Outcome: Progressing Towards Goal  Goal: Nutrition/Diet  Outcome: Progressing Towards Goal  Goal: Medications  Outcome: Progressing Towards Goal  Goal: Respiratory  Outcome: Progressing Towards Goal  Goal: Treatments/Interventions/Procedures  Outcome: Progressing Towards Goal  Goal: Psychosocial  Outcome: Progressing Towards Goal  Goal: *No signs and symptoms of infection or wound complications  Outcome: Progressing Towards Goal  Goal: *Adequate urinary output (equal to or greater than 30 milliliters/hour)  Description: Ambulatory Surgery patients voiding without difficulty.   Outcome: Progressing Towards Goal  Goal: *Tolerating diet  Outcome: Progressing Towards Goal  Goal: *Demonstrates progressive activity  Outcome: Progressing Towards Goal     Problem: Surgical Pathway Post-Op Day 1  Goal: Activity/Safety  Outcome: Progressing Towards Goal  Goal: Diagnostic Test/Procedures  Outcome: Progressing Towards Goal  Goal: Nutrition/Diet  Outcome: Progressing Towards Goal  Goal: Discharge Planning  Outcome: Progressing Towards Goal  Goal: Medications  Outcome: Progressing Towards Goal  Goal: Respiratory  Outcome: Progressing Towards Goal     Problem: Nutrition Deficit  Goal: *Optimize nutritional status  Outcome: Progressing Towards Goal

## 2023-01-19 NOTE — PROGRESS NOTES
1745 TRANSFER - IN REPORT:    Verbal report received from Reena Majano RN(name) on [employer]  being received from 4th Floor(unit) for change in patient condition(Tachycardia, lethargy, elevated BP)      Report consisted of patients Situation, Background, Assessment and   Recommendations(SBAR). Information from the following report(s) SBAR, Kardex, ED Summary, Intake/Output, MAR, Recent Results, and Cardiac Rhythm NSR  was reviewed with the receiving nurse. Opportunity for questions and clarification was provided. Assessment completed upon patients arrival to unit and care assumed. This patient was assisted with Intentional Toileting every 2 hours during this shift as appropriate. Documentation of ambulation and output reflected on Flowsheet as appropriate. Purposeful hourly rounding was completed using AIDET and 5Ps. Outcomes of PHR documented as they occurred. Bed alarm in use as appropriate. Dual Suction and ambubag in place. 1820 Pt arrives to unit, unable to complete full assessment due to Pt arriving at shift change. 1930 Bedside and Verbal shift change report given to [address] (oncoming nurse) by Jamila Dover Student Nurse (offgoing nurse). Report included the following information SBAR, Kardex, ED Summary, Intake/Output, MAR, Recent Results, and Cardiac Rhythm NSR .

## 2023-01-19 NOTE — WOUND CARE
Wound Nurse Note    Initial consult received to see patient regarding wound care needs s/p ileostomy reversal 1/17/23 per Dr. Timur Camarena. Patient currently resting on a Vers[employer] foam mattress; intermittently drowsy. Spoke with nursing who reports patient has been medicated for pain earlier this morning.  requesting dry kelix packing to RLQ wound daily; paint midline incision with betadine BID. Abdominal binder at all times. Assessment:  RLQ - current packing removed without difficulty and opening rinsed with saline; approx 2.0cm x 3.2cm x 6.5cm when gently probed. Walls clean and pink; some evidence of previous ostomy wafer skin issues encircling opening. One intact suture noted at 3 o'clock. Abdominal incision - 9.0cm incision line well-approximated with glue closure and betadine staining. Sacral area/buttocks  - skin intact; patient incontinent with moderate amount of urine; passing flatus. Increased discomfort with repositioning. Bilateral heels - blanching pink;otherwise intact. Plan:  RLQ - gently filled opening with 2\" dry pedro + dry gauze and ABD + tape. Applied sureprep skin barrier to rash from previous ostomy wafer. Abdominal incision - painted with betadine and re-secured abdominal binder. Turn q2h and float heels. Frequent incontinence care. Spoke with nurse Jay Corcoran with good understanding of care; will follow as needed. Please reconsult if further assistance is needed.     Valentino Celaya  [facility] Wound Dept  [phone]    abdomen        RLQ wound

## 2023-01-19 NOTE — PROGRESS NOTES
Problem: Falls - Risk of  Goal: *Absence of Falls  Description: Document Clearence Skates Fall Risk and appropriate interventions in the flowsheet.   Outcome: Progressing Towards Goal  Note: Fall Risk Interventions:                                Problem: Patient Education: Go to Patient Education Activity  Goal: Patient/Family Education  Outcome: Progressing Towards Goal     Problem: Nutrition Deficit  Goal: *Optimize nutritional status  Outcome: Progressing Towards Goal

## 2023-01-19 NOTE — PROGRESS NOTES
1/19/2023  2:34 PM  Care Management Progress Note      RUR:  14%  Risk Level: [x]Low []Moderate []High  Value-based purchasing: [x] Yes [] No  Bundle patient: [] Yes [x] No   Specify:     Transition of care plan:  Awaiting medical clearance and DC order. Wound care following. RRT called today. Home with HH with [facility], and they were notified pt is not discharging today. Outpatient follow-up. Pt's family to transport.

## 2023-01-19 NOTE — PROGRESS NOTES
Patient received 0.5mg IV Dilaudid given at [facility].     0718: Patient moaning in pain. Primary RN and oncoming RN at bedside with Dr. Selvin Martinez. MD orders one time dose of 0.5mg IV dilaudid. One time dose 0.5mg IV dilaudid given. 1696: Patient drowsy, eyes open to voice. Patient moaning in pain, unable to rate pain on a scale of 0-10. Patient only oriented to self. Patient unable to verbalize date, states she is at home. Decreased command following. All morning PO meds held. 0930: Called Dr. Selvin Martinez, updated about patient's condition. MD gave verbal order via telephone to change PRN Dilaudid back to 0.5mg IV Dilaudid Q4. MD gave verbal order via telephone to discontinue order for 10mg PO Oxycodone PRN. gave verbal order via telephone to increase Scheduled Q8 Toradol to 30mg. Will place orders. MD sts to continue to intermittently monitor patient per unit protocol. 5203: Attempted to increased Toradol to 30mg, pop-up states that it exceeds recommended maximum. 1040: Patient drowsy, eyes open to voice. Patient moaning in pain, unable to rate pain on a scale of 0-10. Patient still only oriented to self. Patient unable to verbalize date, sts she doesn't know where she is.

## 2023-01-20 LAB
ANION GAP SERPL CALC-SCNC: 5 MMOL/L (ref 5–15)
APPEARANCE UR: CLEAR
BACTERIA URNS QL MICRO: ABNORMAL /HPF
BASOPHILS # BLD: 0 K/UL (ref 0–0.1)
BASOPHILS NFR BLD: 0 % (ref 0–1)
BILIRUB UR QL: NEGATIVE
BUN SERPL-MCNC: 14 MG/DL (ref 6–20)
BUN/CREAT SERPL: 16 (ref 12–20)
CALCIUM SERPL-MCNC: 8.6 MG/DL (ref 8.5–10.1)
CHLORIDE SERPL-SCNC: 104 MMOL/L (ref 97–108)
CO2 SERPL-SCNC: 22 MMOL/L (ref 21–32)
COLOR UR: ABNORMAL
CREAT SERPL-MCNC: 0.85 MG/DL (ref 0.55–1.02)
DIFFERENTIAL METHOD BLD: ABNORMAL
EOSINOPHIL # BLD: 0.2 K/UL (ref 0–0.4)
EOSINOPHIL NFR BLD: 1 % (ref 0–7)
EPITH CASTS URNS QL MICRO: ABNORMAL /LPF
ERYTHROCYTE [DISTWIDTH] IN BLOOD BY AUTOMATED COUNT: 12.8 % (ref 11.5–14.5)
GLUCOSE SERPL-MCNC: 140 MG/DL (ref 65–100)
GLUCOSE UR STRIP.AUTO-MCNC: NEGATIVE MG/DL
HCT VFR BLD AUTO: 38 % (ref 35–47)
HGB BLD-MCNC: 13 G/DL (ref 11.5–16)
HGB UR QL STRIP: NEGATIVE
IMM GRANULOCYTES # BLD AUTO: 0.1 K/UL (ref 0–0.04)
IMM GRANULOCYTES NFR BLD AUTO: 1 % (ref 0–0.5)
KETONES UR QL STRIP.AUTO: NEGATIVE MG/DL
LEUKOCYTE ESTERASE UR QL STRIP.AUTO: ABNORMAL
LYMPHOCYTES # BLD: 1.6 K/UL (ref 0.8–3.5)
LYMPHOCYTES NFR BLD: 10 % (ref 12–49)
MAGNESIUM SERPL-MCNC: 1.9 MG/DL (ref 1.6–2.4)
MCH RBC QN AUTO: 33.1 PG (ref 26–34)
MCHC RBC AUTO-ENTMCNC: 34.2 G/DL (ref 30–36.5)
MCV RBC AUTO: 96.7 FL (ref 80–99)
MONOCYTES # BLD: 1 K/UL (ref 0–1)
MONOCYTES NFR BLD: 6 % (ref 5–13)
NEUTS SEG # BLD: 13.3 K/UL (ref 1.8–8)
NEUTS SEG NFR BLD: 82 % (ref 32–75)
NITRITE UR QL STRIP.AUTO: NEGATIVE
NRBC # BLD: 0 K/UL (ref 0–0.01)
NRBC BLD-RTO: 0 PER 100 WBC
PH UR STRIP: 5.5 (ref 5–8)
PHOSPHATE SERPL-MCNC: 2 MG/DL (ref 2.6–4.7)
PLATELET # BLD AUTO: 244 K/UL (ref 150–400)
PMV BLD AUTO: 11.2 FL (ref 8.9–12.9)
POTASSIUM SERPL-SCNC: 4.3 MMOL/L (ref 3.5–5.1)
PROT UR STRIP-MCNC: ABNORMAL MG/DL
RBC # BLD AUTO: 3.93 M/UL (ref 3.8–5.2)
RBC #/AREA URNS HPF: ABNORMAL /HPF (ref 0–5)
SODIUM SERPL-SCNC: 131 MMOL/L (ref 136–145)
SP GR UR REFRACTOMETRY: 1.03 (ref 1–1.03)
UA: UC IF INDICATED,UAUC: ABNORMAL
UROBILINOGEN UR QL STRIP.AUTO: 0.2 EU/DL (ref 0.2–1)
WBC # BLD AUTO: 16.2 K/UL (ref 3.6–11)
WBC URNS QL MICRO: ABNORMAL /HPF (ref 0–4)
YEAST URNS QL MICRO: PRESENT

## 2023-01-20 PROCEDURE — 97530 THERAPEUTIC ACTIVITIES: CPT

## 2023-01-20 PROCEDURE — 81001 URINALYSIS AUTO W/SCOPE: CPT

## 2023-01-20 PROCEDURE — 84100 ASSAY OF PHOSPHORUS: CPT

## 2023-01-20 PROCEDURE — 74011250637 HC RX REV CODE- 250/637: Performed by: COLON & RECTAL SURGERY

## 2023-01-20 PROCEDURE — 80048 BASIC METABOLIC PNL TOTAL CA: CPT

## 2023-01-20 PROCEDURE — 74011250636 HC RX REV CODE- 250/636: Performed by: COLON & RECTAL SURGERY

## 2023-01-20 PROCEDURE — 74011000250 HC RX REV CODE- 250: Performed by: INTERNAL MEDICINE

## 2023-01-20 PROCEDURE — 87086 URINE CULTURE/COLONY COUNT: CPT

## 2023-01-20 PROCEDURE — 97162 PT EVAL MOD COMPLEX 30 MIN: CPT

## 2023-01-20 PROCEDURE — 97165 OT EVAL LOW COMPLEX 30 MIN: CPT

## 2023-01-20 PROCEDURE — 74011000250 HC RX REV CODE- 250: Performed by: COLON & RECTAL SURGERY

## 2023-01-20 PROCEDURE — 36415 COLL VENOUS BLD VENIPUNCTURE: CPT

## 2023-01-20 PROCEDURE — 83735 ASSAY OF MAGNESIUM: CPT

## 2023-01-20 PROCEDURE — 85025 COMPLETE CBC W/AUTO DIFF WBC: CPT

## 2023-01-20 PROCEDURE — 74011250636 HC RX REV CODE- 250/636: Performed by: INTERNAL MEDICINE

## 2023-01-20 PROCEDURE — 65270000046 HC RM TELEMETRY

## 2023-01-20 PROCEDURE — 97535 SELF CARE MNGMENT TRAINING: CPT

## 2023-01-20 RX ORDER — FLUCONAZOLE 2 MG/ML
200 INJECTION, SOLUTION INTRAVENOUS DAILY
Status: DISCONTINUED | OUTPATIENT
Start: 2023-01-21 | End: 2023-01-21

## 2023-01-20 RX ORDER — FLUCONAZOLE 2 MG/ML
200 INJECTION, SOLUTION INTRAVENOUS ONCE
Status: COMPLETED | OUTPATIENT
Start: 2023-01-20 | End: 2023-01-20

## 2023-01-20 RX ADMIN — ENOXAPARIN SODIUM 40 MG: 100 INJECTION SUBCUTANEOUS at 08:22

## 2023-01-20 RX ADMIN — SODIUM CHLORIDE, PRESERVATIVE FREE 10 ML: 5 INJECTION INTRAVENOUS at 22:29

## 2023-01-20 RX ADMIN — SODIUM PHOSPHATE, MONOBASIC, MONOHYDRATE: 276; 142 INJECTION, SOLUTION INTRAVENOUS at 13:58

## 2023-01-20 RX ADMIN — ACETAMINOPHEN 1000 MG: 500 TABLET ORAL at 02:52

## 2023-01-20 RX ADMIN — FOLIC ACID TAB 400 MCG 0.4 MG: 400 TAB at 08:23

## 2023-01-20 RX ADMIN — METOPROLOL SUCCINATE 50 MG: 50 TABLET, EXTENDED RELEASE ORAL at 08:23

## 2023-01-20 RX ADMIN — TROSPIUM CHLORIDE 20 MG: 20 TABLET, FILM COATED ORAL at 17:31

## 2023-01-20 RX ADMIN — ONDANSETRON 4 MG: 2 INJECTION INTRAMUSCULAR; INTRAVENOUS at 18:31

## 2023-01-20 RX ADMIN — TROSPIUM CHLORIDE 20 MG: 20 TABLET, FILM COATED ORAL at 08:23

## 2023-01-20 RX ADMIN — LORAZEPAM 0.5 MG: 2 INJECTION INTRAMUSCULAR; INTRAVENOUS at 12:04

## 2023-01-20 RX ADMIN — HYDROMORPHONE HYDROCHLORIDE 0.2 MG: 1 INJECTION, SOLUTION INTRAMUSCULAR; INTRAVENOUS; SUBCUTANEOUS at 04:42

## 2023-01-20 RX ADMIN — CEFTRIAXONE 1 G: 1 INJECTION, POWDER, FOR SOLUTION INTRAMUSCULAR; INTRAVENOUS at 18:32

## 2023-01-20 RX ADMIN — KETOROLAC TROMETHAMINE 15 MG: 30 INJECTION, SOLUTION INTRAMUSCULAR at 22:29

## 2023-01-20 RX ADMIN — PANTOPRAZOLE SODIUM 40 MG: 40 TABLET, DELAYED RELEASE ORAL at 08:23

## 2023-01-20 RX ADMIN — FLUCONAZOLE 200 MG: 200 INJECTION, SOLUTION INTRAVENOUS at 17:31

## 2023-01-20 RX ADMIN — ACETAMINOPHEN 1000 MG: 500 TABLET ORAL at 22:28

## 2023-01-20 RX ADMIN — LEVOTHYROXINE SODIUM 125 MCG: 0.12 TABLET ORAL at 08:23

## 2023-01-20 RX ADMIN — POTASSIUM CHLORIDE, DEXTROSE MONOHYDRATE AND SODIUM CHLORIDE 100 ML/HR: 150; 5; 450 INJECTION, SOLUTION INTRAVENOUS at 18:31

## 2023-01-20 RX ADMIN — ACETAMINOPHEN 1000 MG: 500 TABLET ORAL at 13:31

## 2023-01-20 RX ADMIN — KETOROLAC TROMETHAMINE 15 MG: 30 INJECTION, SOLUTION INTRAMUSCULAR at 13:31

## 2023-01-20 RX ADMIN — KETOROLAC TROMETHAMINE 15 MG: 30 INJECTION, SOLUTION INTRAMUSCULAR at 06:16

## 2023-01-20 RX ADMIN — TRAMADOL HYDROCHLORIDE 50 MG: 50 TABLET ORAL at 17:36

## 2023-01-20 RX ADMIN — SODIUM CHLORIDE, PRESERVATIVE FREE 10 ML: 5 INJECTION INTRAVENOUS at 08:24

## 2023-01-20 RX ADMIN — ACETAMINOPHEN 1000 MG: 500 TABLET ORAL at 08:22

## 2023-01-20 RX ADMIN — PROMETHAZINE HYDROCHLORIDE 12.5 MG: 25 TABLET ORAL at 20:18

## 2023-01-20 RX ADMIN — SODIUM CHLORIDE, PRESERVATIVE FREE 10 ML: 5 INJECTION INTRAVENOUS at 06:18

## 2023-01-20 RX ADMIN — TRAMADOL HYDROCHLORIDE 50 MG: 50 TABLET ORAL at 08:22

## 2023-01-20 RX ADMIN — Medication 100 MG: at 08:23

## 2023-01-20 RX ADMIN — ONDANSETRON 4 MG: 2 INJECTION INTRAMUSCULAR; INTRAVENOUS at 04:41

## 2023-01-20 RX ADMIN — NALOXEGOL OXALATE 25 MG: 25 TABLET, FILM COATED ORAL at 08:23

## 2023-01-20 NOTE — PROGRESS NOTES
This patient was assisted with Intentional Toileting every 2 hours during this shift as appropriate. Documentation of ambulation and output reflected on Flowsheet as appropriate. Purposeful hourly rounding was completed using AIDET and 5Ps. Outcomes of PHR documented as they occurred. Bed alarm in use as appropriate. Dual Suction and ambubag in place.      Arlet Ramsey, [facility]

## 2023-01-20 NOTE — PROGRESS NOTES
CRS  Serial exam  Discussed status with RN on 3rd floor  Pt was transferred to the 3rd floor earlier today. I was not informed of the transfer    Uma Cárdenas is in no acute distress. Her sister is at her bedside. During our conversation she laughed. Uma Cárdenas is appropriate in conversation. She states that she is in pain. She does not recall events earlier in the day. However, she states she now feels much better than earlier today. Since the CT scan, she has had a loud flatus and has had some stools. T:97.9,  P: 92,  BP: 151/76   O2sat:95% on 2L    Abd: soft, tender on exam R>L. pain level seems appropriate given type of surgery and that she is only POD2. No peritonitis- mo rebound. RLQ dressing stained serosang    Post exam, Uma Cárdenas is able to demonstrate appropriate use of Incentive Spirometer. Uma Cárdenas is able to perform stretching exercises with limited ROM in bed after I demonstrated basic arm and leg stretching activities. WBC 17 from 15k    CT images personally reviewed. No bowel wall thickening to suggest ischemia or infarction. No bubbles of air around anastomosis, small amount of free fluid, and no obvious free air. Colon appears dt with stool in right colon and rectum, remaining colon with air dt. Bladder dt. Small gas in bladder c/w recent zhang    Imp: postop pain, fever, htn, tachycardia    Plan:  1) htn. Appreciate hospitalist help. Hydralazine given. Elev BP likely combination of not being able to take her metoprolol earlier this am (since then has staken), coupled with pain. Would treat htn only SBP>180. BP improved at present. 2) tachycardia. May be result of not being able to initially take metoprolol earlier (she has since been able to take) coupled with pain. P:90s at present. No additional dose of metoprolol given    3) fever. Uma Cárdenas has since defervesced. Uma Cárdenas is on scheduled tylenol, which could make interpretation more difficult.  However, last dose of tylenol was at noon, and next dose just given at 8:24pm (after most recent VS taken). Recommend pulmonary toilet, IS. ROM exercises. Fever more likely result of atelectasis at this point. Will hold atbx for now    4) abdominal pain. Charissa Connolly is difficult for pain management. Charissa Connolly is sensitive to opiods - can easily become oversedated/ delerious. Recommend ultram as first line pain control, dilaudid 0.2mg for refractory pain. Cont tylenol and toradol scheduled. At this point, I will hold off on taking Inna to the OR for exploration. Clinically, she is not acting toxic at the present. Hemodynamically stabilized, without fever. Will have low threshold to repeat CT and/or explore her in the OR if she should show signs of deterioration.

## 2023-01-20 NOTE — PROGRESS NOTES
Physician Progress Note      Zachary Ivy  CSN #:                  [id number]  :                       1950  ADMIT DATE:       2023 5:49 AM  DISCH DATE:  RESPONDING  PROVIDER #:        Nathalie Sanchez MD          QUERY TEXT:    Good morning  Patient admitted for ileostomy reversal.    If possible, please document in progress notes and discharge summary if you are evaluating and /or treating any of the following: The medical record reflects the following:  Risk Factors: post op status, GERD, fatty liver, Hx SBO  Clinical Indicators: Recent wt loss >33# and poor PO  Malnutrition Status:  Mild malnutrition (23 1150)  Context:  Acute illness  Findings of the 6 clinical characteristics of malnutrition:  Energy Intake:  Mild decrease in energy intake (specify)  Weight Loss:  No significant weight loss  Body Fat Loss:  No significant body fat loss,  Muscle Mass Loss:  Mild muscle mass loss, Clavicles (pectoralis & deltoids), Scapula (trapezius), Thigh (quadriceps)  Fluid Accumulation:  No significant fluid accumulation  Treatment: daily labs, RD consult    Thank you  Kayleen Davis RN [facility]  [phone]    ASPEN Criteria:  https://aspenjournals. onlinelibrary. riley. com/doi/full/10.1177/5368630335092973  Options provided:  -- Protein calorie malnutrition mild  -- Protein calorie malnutrition not present  -- Other - I will add my own diagnosis  -- Disagree - Not applicable / Not valid  -- Disagree - Clinically unable to determine / Unknown  -- Refer to Clinical Documentation Reviewer    PROVIDER RESPONSE TEXT:    Provider disagreed with this query.     Query created by: Jimbo Acuna on 2023 7:20 AM      Electronically signed by:  Nathalie Sanchez MD 2023 6:23 AM

## 2023-01-20 NOTE — PROGRESS NOTES
CRS  Pt with some pain overnight. Mentating. Diarrhea. 2 episodes of emesis- 1st was small volume, 2nd not seen by staff  Per sister, pt was able to do IS overnight and stretching exercises    Flowsheet reviewed  Labs not yet drawn  Abd: soft, appropriately tender          Dressing: small staining serosang    Plan:  Npo except sips of water with meds. Mobilize with PT/OT- abdominal binder needs to be on when OOB. Ok to sit, stand, ambulate. Cont IVF. Await labs. Remains hemodynamically stable- pulse 80s 90s and no temp spikes since yesterday at noon. Partner covering this weekend. Pt will be here thru weekend.  Slow advancement of PO over weekend

## 2023-01-20 NOTE — PROGRESS NOTES
Problem: Falls - Risk of  Goal: *Absence of Falls  Description: Document Jaime Caro Fall Risk and appropriate interventions in the flowsheet. Outcome: Progressing Towards Goal  Note: Fall Risk Interventions:                                Problem: Patient Education: Go to Patient Education Activity  Goal: Patient/Family Education  Outcome: Progressing Towards Goal     Problem: Nutrition Deficit  Goal: *Optimize nutritional status  Outcome: Progressing Towards Goal     Problem: Patient Education: Go to Patient Education Activity  Goal: Patient/Family Education  Outcome: Progressing Towards Goal     Problem: Patient Education: Go to Patient Education Activity  Goal: Patient/Family Education  Outcome: Progressing Towards Goal     Problem: Surgical Pathway Day of Surgery  Goal: Off Pathway (Use only if patient is Off Pathway)  Outcome: Progressing Towards Goal  Goal: Activity/Safety  Outcome: Progressing Towards Goal  Goal: Consults, if ordered  Outcome: Progressing Towards Goal  Goal: Nutrition/Diet  Outcome: Progressing Towards Goal  Goal: Medications  Outcome: Progressing Towards Goal  Goal: Respiratory  Outcome: Progressing Towards Goal  Goal: Treatments/Interventions/Procedures  Outcome: Progressing Towards Goal  Goal: Psychosocial  Outcome: Progressing Towards Goal  Goal: *No signs and symptoms of infection or wound complications  Outcome: Progressing Towards Goal  Goal: *Optimal pain control at patient's stated goal  Outcome: Progressing Towards Goal  Goal: *Adequate urinary output (equal to or greater than 30 milliliters/hour)  Description: Ambulatory Surgery patients voiding without difficulty.   Outcome: Progressing Towards Goal  Goal: *Hemodynamically stable  Outcome: Progressing Towards Goal  Goal: *Tolerating diet  Outcome: Progressing Towards Goal  Goal: *Demonstrates progressive activity  Outcome: Progressing Towards Goal     Problem: Surgical Pathway Post-Op Day 1  Goal: Off Pathway (Use only if patient is Off Pathway)  Outcome: Progressing Towards Goal  Goal: Activity/Safety  Outcome: Progressing Towards Goal  Goal: Diagnostic Test/Procedures  Outcome: Progressing Towards Goal  Goal: Nutrition/Diet  Outcome: Progressing Towards Goal  Goal: Discharge Planning  Outcome: Progressing Towards Goal  Goal: Medications  Outcome: Progressing Towards Goal  Goal: Respiratory  Outcome: Progressing Towards Goal     Problem: Nutrition Deficit  Goal: *Optimize nutritional status  Outcome: Progressing Towards Goal     Problem: Pain  Goal: *Control of Pain  Outcome: Progressing Towards Goal     Problem: Patient Education: Go to Patient Education Activity  Goal: Patient/Family Education  Outcome: Progressing Towards Goal     Problem: Pressure Injury - Risk of  Goal: *Prevention of pressure injury  Description: Document Stef Scale and appropriate interventions in the flowsheet.   Outcome: Progressing Towards Goal  Note: Pressure Injury Interventions:       Moisture Interventions: Absorbent underpads, Internal/External urinary devices, Assess need for specialty bed, Maintain skin hydration (lotion/cream)    Activity Interventions: Assess need for specialty bed, Increase time out of bed, PT/OT evaluation    Mobility Interventions: Assess need for specialty bed, HOB 30 degrees or less, PT/OT evaluation    Nutrition Interventions: Document food/fluid/supplement intake    Friction and Shear Interventions: Apply protective barrier, creams and emollients, HOB 30 degrees or less, Minimize layers                Problem: Patient Education: Go to Patient Education Activity  Goal: Patient/Family Education  Outcome: Progressing Towards Goal

## 2023-01-20 NOTE — CONSULTS
Comprehensive Nutrition Assessment    Type and Reason for Visit: Reassess    Nutrition Recommendations/Plan:   Advance diet to GI bland. When diet advances past clears Provide Ensure High Protein BID (320 kcal, 38 g carbs, 32 g protein). If NPO/clears continue for 2-3 more days - may require nutrition support (EN vs PN). Malnutrition Assessment:  Malnutrition Status:  Mild malnutrition (01/20/23 1231)    Context:  Acute illness     Findings of the 6 clinical characteristics of malnutrition:   Energy Intake:  Mild decrease in energy intake (specify)  Weight Loss:  No significant weight loss     Body Fat Loss:  No significant body fat loss,     Muscle Mass Loss:  Mild muscle mass loss, Clavicles (pectoralis & deltoids), Scapula (trapezius), Thigh (quadriceps)  Fluid Accumulation:  No significant fluid accumulation,     Strength:  Not performed     Nutrition Assessment:    Follow up: RD consulted for wounds. RD already following patient. Intern visited with patient bedside. POD3 ileostomy reversal and hernia repair. Patient NPO today 2/2 to emesis and diarrhea last night. Patient c/o nausea but denies any vomiting or diarrhea today. Patient dislikes clear ONS, would like to try Ensure chocolate once diet advances to full liquid. RD to order when advanced. Lack of documentation in chart for PO intake. Patient reported poor appetite prior to NPO. Last BM was today. D5% @100 provides 400 kcal/d. Documented Meal intake:  Patient Vitals for the past 168 hrs:   % Diet Eaten   01/20/23 0843 0%   01/19/23 1925 1 - 25%       Nutrition Related Findings:      Wound Type: Surgical incision (Abdomen)  Last Bowel Movement Date: 01/20/23  Stool Appearance: Watery  Abdominal Assessment: Obese, Tender, Passing flatus  Appetite: NPO  Bowel Sounds: Active   Edema:LLE: Non-pitting (1/20/2023  8:21 AM)  RLE: Non-pitting (1/20/2023  8:21 AM)      Nutr.  Labs:  Lab Results   Component Value Date/Time    GFR est AA >60 08/29/2022 04:52 AM    GFR est non-AA >60 08/29/2022 04:52 AM    Creatinine 0.85 01/20/2023 12:36 AM    BUN 14 01/20/2023 12:36 AM    Sodium 131 (L) 01/20/2023 12:36 AM    Potassium 4.3 01/20/2023 12:36 AM    Chloride 104 01/20/2023 12:36 AM    CO2 22 01/20/2023 12:36 AM       Lab Results   Component Value Date/Time    Glucose 140 (H) 01/20/2023 12:36 AM    Glucose (POC) 81 08/29/2022 05:03 AM       Lab Results   Component Value Date/Time    Hemoglobin A1c 5.2 01/06/2023 02:19 PM       Nutr. Meds:  D5% + NaCl w/Kcl @220, folic acid, lovenox, apresoline, toradol, synthroid, toprol-xl, movantik, zofran PRN, protonix, B-1, sanctura, NaPhos 0.9% NaCl 250 mL    Current Nutrition Intake & Therapies:  Average Meal Intake: NPO  Average Supplement Intake: NPO  ADULT ORAL NUTRITION SUPPLEMENT AM Snack, PM Snack; Clear Liquid  DIET NPO Sips of Water with Meds    Anthropometric Measures:  Height: 5' 2.99\" (160 cm)  Ideal Body Weight (IBW): 115 lbs (52 kg)     Current Body Wt:  98.7 kg (217 lb 9.5 oz), 189.2 % IBW. Standing scale  Current BMI (kg/m2): 38.6      Weight Adjustment: No adjustment         BMI Category: Obese class 2 (BMI 35.0-39. 9)    Estimated Daily Nutrient Needs:  Energy Requirements Based On: Formula  Weight Used for Energy Requirements: Current  Energy (kcal/day): 2040 (MSJ x 1.3 x 1.1)  Weight Used for Protein Requirements: Current  Protein (g/day):  (1-1.2g/kg)  Method Used for Fluid Requirements: 1 ml/kcal  Fluid (ml/day): 2040 (1ml/kcal)    Nutrition Diagnosis:   Increased nutrient needs related to acute injury/trauma, inadequate protein-energy intake as evidenced by wounds    Nutrition Interventions:   Food and/or Nutrient Delivery: Modify current diet  Nutrition Education/Counseling: No recommendations at this time  Coordination of Nutrition Care: Continue to monitor while inpatient, Interdisciplinary rounds  Plan of Care discussed with: IDR team and patient    Goals:  Previous Goal Met: No progress toward goal(s)  Goals: Initiate PO diet, by next RD assessment       Nutrition Monitoring and Evaluation:   Behavioral-Environmental Outcomes: None identified  Food/Nutrient Intake Outcomes: Diet advancement/tolerance, Food and nutrient intake, Supplement intake  Physical Signs/Symptoms Outcomes: Biochemical data, Weight, Skin, GI status    Discharge Planning:     Too soon to determine    [address]: [address]iese Heller Intern  RD office: [phone]

## 2023-01-20 NOTE — PROGRESS NOTES
Ezequiel Ca [facility] 79  2009 [facility], [address] [place] [address]  [phone]      Medical Progress Note      NAME: Rhett Singh   :  1950  MRM:  [id number]    Date/Time of service: 2023      Subjective:     Chief Complaint:  Patient was personally seen and examined by me during this time period. Chart reviewed. FU uncontrolled hypertension. Blood pressure better controlled, HR improved. Patient found to have ileus yesterday but overnight does report two watery bowel movements. States overall pain better controlled. Objective:       Vitals:       Last 24hrs VS reviewed since prior progress note.  Most recent are:    Visit Vitals  /60 (BP 1 Location: Right upper arm, BP Patient Position: At rest)   Pulse 91   Temp 97.6 °F (36.4 °C)   Resp 18   Ht 5' 2.99\" (1.6 m)   Wt 98.7 kg (217 lb 9.5 oz)   SpO2 95%   BMI 38.55 kg/m²     SpO2 Readings from Last 6 Encounters:   23 95%   23 97%   22 96%   22 93%   10/26/22 96%   10/19/22 94%    O2 Flow Rate (L/min): 2 l/min     Intake/Output Summary (Last 24 hours) at 2023 1220  Last data filed at 2023 0843  Gross per 24 hour   Intake 480 ml   Output 700 ml   Net -220 ml        Exam:     Physical Exam:    Gen:  Well-developed, well-nourished, in no acute distress  HEENT:  Pink conjunctivae, PERRL, hearing intact to voice  Resp:  No accessory muscle use, clear breath sounds without wheezes rales or rhonchi  Card:  tacycardia, No murmurs, normal S1, S2, b/l peripheral edema  Abd:  TTP, distended, diminished bowel sounds, abd binder in place, midline incision    Musc:  No cyanosis or clubbing  Skin:  midline incision   Neuro:  Cranial nerves 3-12 are grossly intact, follows commands appropriately  Psych:  poor insight       Medications Reviewed: (see below)    Lab Data Reviewed: (see below)    ______________________________________________________________________    Medications:     Current Facility-Administered Medications   Medication Dose Route Frequency    sodium phosphate 30 mmol in 0.9% sodium chloride 250 mL infusion   IntraVENous ONCE    ketorolac (TORADOL) injection 15 mg  15 mg IntraVENous Q8H    hydrALAZINE (APRESOLINE) 20 mg/mL injection 10 mg  10 mg IntraVENous Q6H PRN    traMADoL (ULTRAM) tablet 50 mg  50 mg Oral Q6H PRN    HYDROmorphone (DILAUDID) syringe 0.2 mg  0.2 mg IntraVENous Q4H PRN    lidocaine 4 % patch 2 Patch  2 Patch TransDERmal Q24H    naloxone (NARCAN) injection 0.1 mg  0.1 mg IntraVENous PRN    levothyroxine (SYNTHROID) tablet 125 mcg  125 mcg Oral ACB    metoprolol succinate (TOPROL-XL) XL tablet 50 mg  50 mg Oral QAM    folic acid tablet 0.4 mg  0.4 mg Oral DAILY    dextrose 5% - 0.45% NaCl with KCl 20 mEq/L infusion  100 mL/hr IntraVENous CONTINUOUS    sodium chloride (NS) flush 5-40 mL  5-40 mL IntraVENous Q8H    sodium chloride (NS) flush 5-40 mL  5-40 mL IntraVENous PRN    promethazine (PHENERGAN) tablet 12.5 mg  12.5 mg Oral Q6H PRN    Or    ondansetron (ZOFRAN) injection 4 mg  4 mg IntraVENous Q6H PRN    naloxegoL (MOVANTIK) tablet 25 mg  25 mg Oral DAILY    acetaminophen (TYLENOL) tablet 1,000 mg  1,000 mg Oral Q6H    enoxaparin (LOVENOX) injection 40 mg  40 mg SubCUTAneous DAILY    LORazepam (ATIVAN) injection 0.5 mg  0.5 mg IntraVENous Q8H PRN    pantoprazole (PROTONIX) tablet 40 mg  40 mg Oral ACB    thiamine mononitrate (B-1) tablet 100 mg  100 mg Oral DAILY    trospium (SANCTURA) tablet 20 mg  20 mg Oral ACB&D          Lab Review:     Recent Labs     01/20/23  0036 01/19/23  1528 01/19/23  0335   WBC 16.2* 17.7* 15.4*   HGB 13.0 12.7 13.1   HCT 38.0 36.7 38.7    227 235     Recent Labs     01/20/23  0036 01/19/23  0335 01/18/23  0356   * 133* 134*   K 4.3 4.8 4.9    103 106   CO2 22 24 23   * 130* 155*   BUN 14 20 20   CREA 0.85 1.05* 1.04*   CA 8.6 9.3 9.0   MG 1.9 2.1 2.0   PHOS 2.0* 2.7 2.9   ALB  --   --  2.7*   TBILI  --   -- 0. 7   ALT  --   --  39     Lab Results   Component Value Date/Time    Glucose (POC) 81 08/29/2022 05:03 AM    Glucose (POC) 87 08/28/2022 11:55 PM    Glucose (POC) 100 08/28/2022 05:44 PM    Glucose (POC) 120 (H) 08/28/2022 03:26 PM    Glucose (POC) 134 (H) 08/28/2022 12:00 PM          Assessment / Plan:     Hx of colovesicular fistula and complicated diverticulitis status post ileostomy/history of small bowel obstruction POA: underwent reversal of illestomy and hernia repair on 1/17/23. Ct 1/19 showing ileus. Encourage IS and PT. Surgery following. Uncontrolled abd pain due to above. Now better controlled. Continue scheduled Tylenol, Toradol and lidocaine patches; IV Dilaudid as needed. Continue naloxegol. Monitor for sedation. Narcan as needed. SIRS: meets criteria with leukocytosis, fevers, tachycardia but no suspected source. Likely due to post op stress reaction, pain. CXR no e/o of infection. Follow blood cx . Check UA.  lactic acid wnl.  ct abd pelvis with contrast showing no abscess. Hold off on abx for now given no source; discussed with surgery. Continue IVF. Monitor. Uncontrolled hypertension/ Hx of hypertension: Now improved. Suspect due to difficulty tolerating orals and pain. Improved pain control as above. Probnp slightly elevated; no e/o overload on CXR. Continue home beta-blocker. IV hydralazine as needed. Hyponatremia: likely due to poor oral intake. Continue IVF - may need to stop D5 if continues to worsen. Hypothyroidism: TSH wnl. Continue home synthroid.       GERD: PPI       Total time spent with patient: 28 Minutes **I personally saw and examined the patient during this time period**                 Care Plan discussed with: Patient and Nursing Staff    Discussed:  Care Plan    Prophylaxis:  Lovenox    Disposition:   PT, OT, RN           ___________________________________________________    Attending Physician: Shahida Carcamo DO

## 2023-01-20 NOTE — PROGRESS NOTES
Physical Therapy Note:    PT evaluation deferred. Discussed with OT who recently completed evaluation to include transfers and standing activities. Pt reportedly very fatigued and OT advising PT follow-up later in day to optimize pt tolerance.      Ayo Presley, PT, DPT, Genaro Morillo

## 2023-01-20 NOTE — PROGRESS NOTES
0700 Bedside and Verbal shift change report given to HARSH RN  (oncoming nurse) by Anil Pineda RN  (offgoing nurse). Report included the following information SBAR, Kardex, Intake/Output, MAR, Accordion, Recent Results, Med Rec Status, and Cardiac Rhythm NSR . This patient was assisted with Intentional Toileting every 2 hours during this shift as appropriate. Documentation of ambulation and output reflected on Flowsheet as appropriate. Purposeful hourly rounding was completed using AIDET and 5Ps. Outcomes of PHR documented as they occurred. Bed alarm in use as appropriate. Dual Suction and ambubag in place.     65 Notified MD of UA results
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
## 2023-01-20 NOTE — PROGRESS NOTES
Occupational Therapy:  01/20/23    Orders received, chart reviewed and patient evaluated by occupational therapy. Pending progression with skilled acute occupational therapy, recommend: To be determined: rehab vs. Home with [facility] and 24/7 family support. At current level, pt would require rehab but will continue to assess as pt progresses in acute setting. Vitals during session: At rest: 180/85, 88 bpm  Sitting EOB: 158/63, 95 bpm  Sitting (post stand): 155/65, 99 bpm    Recommend with nursing ADLs with supervision/setup, OOB to chair 3x/day and toileting via beside commode with 2 person assist and walker and gait belt . Thank you for completing as able in order to maintain patient strength, endurance and independence. Full evaluation to follow.     Thank you,  Cornelio Dorman OTR/L

## 2023-01-20 NOTE — PROGRESS NOTES
Care Management follow up    Patient admitted for Ileostomy reversal, hernia repair. HTN post op, post op ileus. History of: complex diverticulitis, recent small bowel obstruction, GERD, hypothyroidism, TBI. RUR 13 (Score %) low   Is This a Readmission YES  Is this a Bundle NO    Current status  Patient discussed during interdisciplinary rounds. Patient continues to require medical management including ongoing assessment and monitoring. Patient continues on IV fluids, pain management, and blood pressure management. Await better participation with PT/OT to determine disposition needs. Transition of Care Plan  Monitor patient status and response to treatment. Patient continues to require medical management. CM needs: await PT/OT evaluations when patient can fully participate. Home with family at [place]. Open to [facility], will continue at [place]. CM to monitor progress and recommendations.     Vincent Curtis, RN, MSN/Care manager
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
## 2023-01-20 NOTE — PROGRESS NOTES
1310: Patient's vitals more controlled. Order for transfer to [facility] from Dr. Renetta Donahue. Patient less lethargic but still drowsy, oriented to self. Asked MD if still want to transfer, MD sts to still transfer patient to [facility]. MD to round on patient. Waiting for bed in [facility]. Patient unable to rate pain on a scale 1-10, drowsy and confused. Visual pain scale 10/10. Unable to give narcotics d/t lethargy. Dr. Racquel Pinedo aware. Family support, repositioned, heating pad applied, lights dimmed, and emotional support given.

## 2023-01-20 NOTE — PROGRESS NOTES
Problem: Falls - Risk of  Goal: *Absence of Falls  Description: Document Marco A Mercado Fall Risk and appropriate interventions in the flowsheet. Outcome: Progressing Towards Goal  Note: Fall Risk Interventions:                                Problem: Patient Education: Go to Patient Education Activity  Goal: Patient/Family Education  Outcome: Progressing Towards Goal     Problem: Nutrition Deficit  Goal: *Optimize nutritional status  Outcome: Progressing Towards Goal     Problem: Patient Education: Go to Patient Education Activity  Goal: Patient/Family Education  Outcome: Progressing Towards Goal     Problem: Patient Education: Go to Patient Education Activity  Goal: Patient/Family Education  Outcome: Progressing Towards Goal     Problem: Surgical Pathway Day of Surgery  Goal: Off Pathway (Use only if patient is Off Pathway)  Outcome: Progressing Towards Goal  Goal: Activity/Safety  Outcome: Progressing Towards Goal  Goal: Consults, if ordered  Outcome: Progressing Towards Goal  Goal: Nutrition/Diet  Outcome: Progressing Towards Goal  Goal: Medications  Outcome: Progressing Towards Goal  Goal: Respiratory  Outcome: Progressing Towards Goal  Goal: Treatments/Interventions/Procedures  Outcome: Progressing Towards Goal  Goal: Psychosocial  Outcome: Progressing Towards Goal  Goal: *No signs and symptoms of infection or wound complications  Outcome: Progressing Towards Goal  Goal: *Optimal pain control at patient's stated goal  Outcome: Progressing Towards Goal  Goal: *Adequate urinary output (equal to or greater than 30 milliliters/hour)  Description: Ambulatory Surgery patients voiding without difficulty.   Outcome: Progressing Towards Goal  Goal: *Hemodynamically stable  Outcome: Progressing Towards Goal  Goal: *Tolerating diet  Outcome: Progressing Towards Goal  Goal: *Demonstrates progressive activity  Outcome: Progressing Towards Goal     Problem: Surgical Pathway Post-Op Day 1  Goal: Off Pathway (Use only if patient is Off Pathway)  Outcome: Progressing Towards Goal  Goal: Activity/Safety  Outcome: Progressing Towards Goal  Goal: Diagnostic Test/Procedures  Outcome: Progressing Towards Goal  Goal: Nutrition/Diet  Outcome: Progressing Towards Goal  Goal: Discharge Planning  Outcome: Progressing Towards Goal  Goal: Medications  Outcome: Progressing Towards Goal  Goal: Respiratory  Outcome: Progressing Towards Goal     Problem: Nutrition Deficit  Goal: *Optimize nutritional status  Outcome: Progressing Towards Goal     Problem: Pain  Goal: *Control of Pain  Outcome: Progressing Towards Goal     Problem: Patient Education: Go to Patient Education Activity  Goal: Patient/Family Education  Outcome: Progressing Towards Goal     Problem: Pressure Injury - Risk of  Goal: *Prevention of pressure injury  Description: Document Stef Scale and appropriate interventions in the flowsheet.   Outcome: Progressing Towards Goal     Problem: Patient Education: Go to Patient Education Activity  Goal: Patient/Family Education  Outcome: Progressing Towards Goal

## 2023-01-21 ENCOUNTER — APPOINTMENT (OUTPATIENT)
Dept: GENERAL RADIOLOGY | Age: 73
DRG: 330 | End: 2023-01-21
Payer: MEDICARE

## 2023-01-21 LAB
ANION GAP SERPL CALC-SCNC: 6 MMOL/L (ref 5–15)
BASOPHILS # BLD: 0 K/UL (ref 0–0.1)
BASOPHILS NFR BLD: 0 % (ref 0–1)
BUN SERPL-MCNC: 12 MG/DL (ref 6–20)
BUN/CREAT SERPL: 13 (ref 12–20)
CALCIUM SERPL-MCNC: 9 MG/DL (ref 8.5–10.1)
CHLORIDE SERPL-SCNC: 103 MMOL/L (ref 97–108)
CO2 SERPL-SCNC: 24 MMOL/L (ref 21–32)
CREAT SERPL-MCNC: 0.89 MG/DL (ref 0.55–1.02)
DIFFERENTIAL METHOD BLD: ABNORMAL
EOSINOPHIL # BLD: 0.4 K/UL (ref 0–0.4)
EOSINOPHIL NFR BLD: 3 % (ref 0–7)
ERYTHROCYTE [DISTWIDTH] IN BLOOD BY AUTOMATED COUNT: 13 % (ref 11.5–14.5)
GLUCOSE SERPL-MCNC: 131 MG/DL (ref 65–100)
HCT VFR BLD AUTO: 37.2 % (ref 35–47)
HGB BLD-MCNC: 12.5 G/DL (ref 11.5–16)
IMM GRANULOCYTES # BLD AUTO: 0.1 K/UL (ref 0–0.04)
IMM GRANULOCYTES NFR BLD AUTO: 0 % (ref 0–0.5)
LYMPHOCYTES # BLD: 1.9 K/UL (ref 0.8–3.5)
LYMPHOCYTES NFR BLD: 12 % (ref 12–49)
MAGNESIUM SERPL-MCNC: 2 MG/DL (ref 1.6–2.4)
MCH RBC QN AUTO: 32.9 PG (ref 26–34)
MCHC RBC AUTO-ENTMCNC: 33.6 G/DL (ref 30–36.5)
MCV RBC AUTO: 97.9 FL (ref 80–99)
MONOCYTES # BLD: 0.8 K/UL (ref 0–1)
MONOCYTES NFR BLD: 5 % (ref 5–13)
NEUTS SEG # BLD: 11.7 K/UL (ref 1.8–8)
NEUTS SEG NFR BLD: 80 % (ref 32–75)
NRBC # BLD: 0 K/UL (ref 0–0.01)
NRBC BLD-RTO: 0 PER 100 WBC
PHOSPHATE SERPL-MCNC: 3.2 MG/DL (ref 2.6–4.7)
PLATELET # BLD AUTO: 250 K/UL (ref 150–400)
PMV BLD AUTO: 11.2 FL (ref 8.9–12.9)
POTASSIUM SERPL-SCNC: 4.1 MMOL/L (ref 3.5–5.1)
RBC # BLD AUTO: 3.8 M/UL (ref 3.8–5.2)
SODIUM SERPL-SCNC: 133 MMOL/L (ref 136–145)
WBC # BLD AUTO: 14.9 K/UL (ref 3.6–11)

## 2023-01-21 PROCEDURE — 36415 COLL VENOUS BLD VENIPUNCTURE: CPT

## 2023-01-21 PROCEDURE — 74011250637 HC RX REV CODE- 250/637: Performed by: INTERNAL MEDICINE

## 2023-01-21 PROCEDURE — 74011000250 HC RX REV CODE- 250: Performed by: COLON & RECTAL SURGERY

## 2023-01-21 PROCEDURE — 83735 ASSAY OF MAGNESIUM: CPT

## 2023-01-21 PROCEDURE — 65270000046 HC RM TELEMETRY

## 2023-01-21 PROCEDURE — 84100 ASSAY OF PHOSPHORUS: CPT

## 2023-01-21 PROCEDURE — 74011250636 HC RX REV CODE- 250/636: Performed by: COLON & RECTAL SURGERY

## 2023-01-21 PROCEDURE — 80048 BASIC METABOLIC PNL TOTAL CA: CPT

## 2023-01-21 PROCEDURE — 85025 COMPLETE CBC W/AUTO DIFF WBC: CPT

## 2023-01-21 PROCEDURE — 71045 X-RAY EXAM CHEST 1 VIEW: CPT

## 2023-01-21 PROCEDURE — 74011250636 HC RX REV CODE- 250/636: Performed by: INTERNAL MEDICINE

## 2023-01-21 PROCEDURE — 74011250637 HC RX REV CODE- 250/637: Performed by: COLON & RECTAL SURGERY

## 2023-01-21 PROCEDURE — 74011000250 HC RX REV CODE- 250: Performed by: INTERNAL MEDICINE

## 2023-01-21 RX ORDER — ACETAMINOPHEN 500 MG
1000 TABLET ORAL 3 TIMES DAILY
Status: DISCONTINUED | OUTPATIENT
Start: 2023-01-21 | End: 2023-02-01 | Stop reason: HOSPADM

## 2023-01-21 RX ORDER — FLUCONAZOLE 100 MG/1
200 TABLET ORAL DAILY
Status: DISCONTINUED | OUTPATIENT
Start: 2023-01-22 | End: 2023-01-23

## 2023-01-21 RX ORDER — SODIUM CHLORIDE, SODIUM LACTATE, POTASSIUM CHLORIDE, CALCIUM CHLORIDE 600; 310; 30; 20 MG/100ML; MG/100ML; MG/100ML; MG/100ML
75 INJECTION, SOLUTION INTRAVENOUS CONTINUOUS
Status: DISCONTINUED | OUTPATIENT
Start: 2023-01-21 | End: 2023-01-24

## 2023-01-21 RX ORDER — ENOXAPARIN SODIUM 100 MG/ML
30 INJECTION SUBCUTANEOUS EVERY 12 HOURS
Status: DISCONTINUED | OUTPATIENT
Start: 2023-01-21 | End: 2023-02-01 | Stop reason: HOSPADM

## 2023-01-21 RX ORDER — VANCOMYCIN/0.9 % SOD CHLORIDE 1.5G/250ML
1500 PLASTIC BAG, INJECTION (ML) INTRAVENOUS EVERY 24 HOURS
Status: DISCONTINUED | OUTPATIENT
Start: 2023-01-22 | End: 2023-01-23

## 2023-01-21 RX ADMIN — ACETAMINOPHEN 1000 MG: 500 TABLET ORAL at 07:24

## 2023-01-21 RX ADMIN — SODIUM CHLORIDE, POTASSIUM CHLORIDE, SODIUM LACTATE AND CALCIUM CHLORIDE 75 ML/HR: 600; 310; 30; 20 INJECTION, SOLUTION INTRAVENOUS at 13:28

## 2023-01-21 RX ADMIN — TRAMADOL HYDROCHLORIDE 50 MG: 50 TABLET ORAL at 13:54

## 2023-01-21 RX ADMIN — ACETAMINOPHEN 1000 MG: 500 TABLET ORAL at 16:34

## 2023-01-21 RX ADMIN — ENOXAPARIN SODIUM 30 MG: 100 INJECTION SUBCUTANEOUS at 21:28

## 2023-01-21 RX ADMIN — METOPROLOL SUCCINATE 50 MG: 50 TABLET, EXTENDED RELEASE ORAL at 07:24

## 2023-01-21 RX ADMIN — CEFTRIAXONE 1 G: 1 INJECTION, POWDER, FOR SOLUTION INTRAMUSCULAR; INTRAVENOUS at 18:26

## 2023-01-21 RX ADMIN — VANCOMYCIN HYDROCHLORIDE 2500 MG: 5 INJECTION, POWDER, LYOPHILIZED, FOR SOLUTION INTRAVENOUS at 13:54

## 2023-01-21 RX ADMIN — ACETAMINOPHEN 1000 MG: 500 TABLET ORAL at 21:27

## 2023-01-21 RX ADMIN — KETOROLAC TROMETHAMINE 15 MG: 30 INJECTION, SOLUTION INTRAMUSCULAR at 06:27

## 2023-01-21 RX ADMIN — POTASSIUM CHLORIDE, DEXTROSE MONOHYDRATE AND SODIUM CHLORIDE 100 ML/HR: 150; 5; 450 INJECTION, SOLUTION INTRAVENOUS at 06:27

## 2023-01-21 RX ADMIN — PANTOPRAZOLE SODIUM 40 MG: 40 TABLET, DELAYED RELEASE ORAL at 07:23

## 2023-01-21 RX ADMIN — SODIUM CHLORIDE, PRESERVATIVE FREE 10 ML: 5 INJECTION INTRAVENOUS at 21:34

## 2023-01-21 RX ADMIN — TROSPIUM CHLORIDE 20 MG: 20 TABLET, FILM COATED ORAL at 07:24

## 2023-01-21 RX ADMIN — LEVOTHYROXINE SODIUM 125 MCG: 0.12 TABLET ORAL at 07:24

## 2023-01-21 RX ADMIN — TROSPIUM CHLORIDE 20 MG: 20 TABLET, FILM COATED ORAL at 16:34

## 2023-01-21 RX ADMIN — TRAMADOL HYDROCHLORIDE 50 MG: 50 TABLET ORAL at 21:27

## 2023-01-21 RX ADMIN — SODIUM CHLORIDE, PRESERVATIVE FREE 10 ML: 5 INJECTION INTRAVENOUS at 13:28

## 2023-01-21 RX ADMIN — PROMETHAZINE HYDROCHLORIDE 12.5 MG: 25 TABLET ORAL at 21:27

## 2023-01-21 RX ADMIN — SODIUM CHLORIDE, PRESERVATIVE FREE 10 ML: 5 INJECTION INTRAVENOUS at 06:27

## 2023-01-21 RX ADMIN — Medication 100 MG: at 10:07

## 2023-01-21 RX ADMIN — ONDANSETRON 4 MG: 2 INJECTION INTRAMUSCULAR; INTRAVENOUS at 01:12

## 2023-01-21 RX ADMIN — HYDRALAZINE HYDROCHLORIDE 10 MG: 20 INJECTION INTRAMUSCULAR; INTRAVENOUS at 20:00

## 2023-01-21 RX ADMIN — ENOXAPARIN SODIUM 40 MG: 100 INJECTION SUBCUTANEOUS at 10:07

## 2023-01-21 RX ADMIN — PROMETHAZINE HYDROCHLORIDE 12.5 MG: 25 TABLET ORAL at 13:54

## 2023-01-21 RX ADMIN — FLUCONAZOLE 200 MG: 2 INJECTION, SOLUTION INTRAVENOUS at 10:07

## 2023-01-21 RX ADMIN — NALOXEGOL OXALATE 25 MG: 25 TABLET, FILM COATED ORAL at 10:07

## 2023-01-21 RX ADMIN — FOLIC ACID TAB 400 MCG 0.4 MG: 400 TAB at 10:07

## 2023-01-21 NOTE — PROGRESS NOTES
Physical Therapy:  1438  Chart reviewed. Cleared by nursing to see. PCT performing bedside hygiene prior to seeing. PT attempted to see patient shortly after and she is c/o current diarrhea with nausea limiting her from dong activity at this time. Patient requesting the luca wick be placed back on and PT assisting. Patient requesting to try PT tomorrow. Nursing aware.     Bren De Luna, PT, DPT, CLT

## 2023-01-21 NOTE — PROGRESS NOTES
Bedside and Verbal shift change report given to Raf Gunn RN (oncoming nurse) by Pillo Mahan RN (offgoing nurse). Report included the following information SBAR, Kardex, Intake/Output, MAR, Accordion, and Recent Results. This patient was assisted with Intentional Toileting every 2 hours during this shift as appropriate. Documentation of ambulation and output reflected on Flowsheet as appropriate. Purposeful hourly rounding was completed using AIDET and 5Ps. Outcomes of PHR documented as they occurred. Bed alarm in use as appropriate. Dual Suction and ambubag in place. Bedside and Verbal shift change report given to [facility] (oncoming nurse) by Raf Gunn RN (offgoing nurse). Report included the following information SBAR, Kardex, Intake/Output, MAR, Accordion, and Recent Results.

## 2023-01-21 NOTE — PROGRESS NOTES
No pain issues. Having diarrhea. Visit Vitals  BP (!) 148/83 (BP 1 Location: Right lower arm, BP Patient Position: At rest)   Pulse 84   Temp 98 °F (36.7 °C)   Resp 17   Ht 5' 2.99\" (1.6 m)   Wt 133.9 kg (295 lb 4.8 oz)   SpO2 96%   BMI 52.32 kg/m²     . Date 01/20/23 0700 - 01/21/23 0659 01/21/23 0700 - 01/22/23 0659   Shift 8395-6697 1088-0539 24 Hour Total 7240-2399 2378-3845 24 Hour Total   INTAKE   P.O. 120 0 120        P. O. 120 0 120      Shift Total(mL/kg) 120(1.2) 0(0) 120(0.9)      OUTPUT   Urine(mL/kg/hr)  650(0.4) 650(0.2)        Urine Voided  200 200        Urine Occurrence(s) 3 x  3 x        Urine Output (mL) (External Urinary Catheter 01/20/23)  450 450      Emesis/NG output  500 500        Emesis  500 500        Emesis Occurrence(s) 1 x 1 x 2 x      Stool           Stool Occurrence(s) 2 x 1 x 3 x      Shift Total(mL/kg)  1150(8.6) 1150(8.6)       -1150 -1030      Weight (kg) 98.7 133.9 133.9 133.9 133.9 133.9       Abd soft  Incision clean  Packing changed.  Serous drainage    A/p doing well  Clear liquid diet  Packing to be changed by nursing daily wet to dry  Painting incision with betadine as well

## 2023-01-21 NOTE — PROGRESS NOTES
[facility] Dosing Services: 1/21/2023    Consult for Enoxaparin by Dr. Hickey Staff made for this 74 y.o. female, for prophylaxis of  DVT. Wt Readings from Last 1 Encounters:   01/21/23 133.9 kg (295 lb 4.8 oz)       Ht Readings from Last 1 Encounters:   01/20/23 160 cm (62.99\")       Lovenox dose adjusted to 30 mg Q12 hrs for -150 Kg per updated P&T protocol    Previous Dose 40 mg daily   Creatinine Clearance Estimated Creatinine Clearance: 74.4 mL/min (based on SCr of 0.89 mg/dL). Creatinine Lab Results   Component Value Date/Time    Creatinine 0.89 01/21/2023 12:19 AM       Platelet Lab Results   Component Value Date/Time    PLATELET 722 16/92/5101 12:19 AM      H/H Lab Results   Component Value Date/Time    HGB 12.5 01/21/2023 12:19 AM        Pharmacist made change to enoxaparin therapy based on:  [ X ] BMI    Pharmacy to automatically make dose adjustment for renal dysfunction (creatinine clearance less than 30 mL/min)  Pharmacy to automatically make dose adjustment for obesity (BMI greater than 40)  Pharmacy to make dose rounding adjustments per [facility] dose adjustment scale. Pharmacy to monitor patients progress. Will make dose adjustment as needed per changing renal function. Will communicate further recommendations regarding patients anticoagulation therapy with prescriber.     Signed Tila Major, [address] information: [phone]

## 2023-01-21 NOTE — PROGRESS NOTES
Ezequiel Ca francesca Briggs 77  [address] [facility], [address] [place] [address]  [phone]      Medical Progress Note      NAME: Gonzalo Domingo   :  1950  MRM:  [id number]    Date/Time of service: 2023      Subjective:     Chief Complaint:  Patient was personally seen and examined by me during this time period. Chart reviewed. FU uncontrolled hypertension. Blood pressure better controlled, HR improved. Passig gas, having some watery bowel movements, reports some intermittent n/v. No chest pain or dyspnea. Sister Keya at bedside. Objective:       Vitals:       Last 24hrs VS reviewed since prior progress note.  Most recent are:    Visit Vitals  BP (!) 148/83 (BP 1 Location: Right lower arm, BP Patient Position: At rest)   Pulse 84   Temp 98 °F (36.7 °C)   Resp 17   Ht 5' 2.99\" (1.6 m)   Wt 133.9 kg (295 lb 4.8 oz)   SpO2 96%   BMI 52.32 kg/m²     SpO2 Readings from Last 6 Encounters:   23 96%   23 97%   22 96%   22 93%   10/26/22 96%   10/19/22 94%    O2 Flow Rate (L/min): 2 l/min     Intake/Output Summary (Last 24 hours) at 2023 1049  Last data filed at 2023 0318  Gross per 24 hour   Intake 0 ml   Output 1150 ml   Net -1150 ml          Exam:     Physical Exam:    Gen:  Well-developed, well-nourished, in no acute distress  HEENT:  Pink conjunctivae, PERRL, hearing intact to voice  Resp:  No accessory muscle use, clear breath sounds without wheezes rales or rhonchi  Card:  tacycardia, No murmurs, normal S1, S2, b/l peripheral edema  Abd:  TTP, distended, diminished bowel sounds, abd binder in place, midline incision    Musc:  No cyanosis or clubbing  Skin:  midline incision   Neuro:  Cranial nerves 3-12 are grossly intact, follows commands appropriately  Psych:  poor insight       Medications Reviewed: (see below)    Lab Data Reviewed: (see below)    ______________________________________________________________________    Medications:     Current Facility-Administered Medications   Medication Dose Route Frequency    fluconazole (DIFLUCAN) 200mg/100 mL IVPB (premix)  200 mg IntraVENous DAILY    cefTRIAXone (ROCEPHIN) 1 g in 0.9% sodium chloride 10 mL IV syringe  1 g IntraVENous Q24H    hydrALAZINE (APRESOLINE) 20 mg/mL injection 10 mg  10 mg IntraVENous Q6H PRN    traMADoL (ULTRAM) tablet 50 mg  50 mg Oral Q6H PRN    HYDROmorphone (DILAUDID) syringe 0.2 mg  0.2 mg IntraVENous Q4H PRN    lidocaine 4 % patch 2 Patch  2 Patch TransDERmal Q24H    naloxone (NARCAN) injection 0.1 mg  0.1 mg IntraVENous PRN    levothyroxine (SYNTHROID) tablet 125 mcg  125 mcg Oral ACB    metoprolol succinate (TOPROL-XL) XL tablet 50 mg  50 mg Oral QAM    folic acid tablet 0.4 mg  0.4 mg Oral DAILY    dextrose 5% - 0.45% NaCl with KCl 20 mEq/L infusion  100 mL/hr IntraVENous CONTINUOUS    sodium chloride (NS) flush 5-40 mL  5-40 mL IntraVENous Q8H    sodium chloride (NS) flush 5-40 mL  5-40 mL IntraVENous PRN    promethazine (PHENERGAN) tablet 12.5 mg  12.5 mg Oral Q6H PRN    Or    ondansetron (ZOFRAN) injection 4 mg  4 mg IntraVENous Q6H PRN    naloxegoL (MOVANTIK) tablet 25 mg  25 mg Oral DAILY    acetaminophen (TYLENOL) tablet 1,000 mg  1,000 mg Oral Q6H    enoxaparin (LOVENOX) injection 40 mg  40 mg SubCUTAneous DAILY    LORazepam (ATIVAN) injection 0.5 mg  0.5 mg IntraVENous Q8H PRN    pantoprazole (PROTONIX) tablet 40 mg  40 mg Oral ACB    thiamine mononitrate (B-1) tablet 100 mg  100 mg Oral DAILY    trospium (SANCTURA) tablet 20 mg  20 mg Oral ACB&D          Lab Review:     Recent Labs     01/21/23  0019 01/20/23  0036 01/19/23  1528   WBC 14.9* 16.2* 17.7*   HGB 12.5 13.0 12.7   HCT 37.2 38.0 36.7    244 227       Recent Labs     01/21/23  0019 01/20/23  0036 01/19/23  0335   * 131* 133*   K 4.1 4.3 4.8    104 103   CO2 24 22 24   * 140* 130*   BUN 12 14 20   CREA 0.89 0.85 1.05*   CA 9.0 8.6 9.3   MG 2.0 1.9 2.1   PHOS 3.2 2.0* 2.7       Lab Results   Component Value Date/Time    Glucose (POC) 81 08/29/2022 05:03 AM    Glucose (POC) 87 08/28/2022 11:55 PM    Glucose (POC) 100 08/28/2022 05:44 PM    Glucose (POC) 120 (H) 08/28/2022 03:26 PM    Glucose (POC) 134 (H) 08/28/2022 12:00 PM          Assessment / Plan:     Hx of colovesicular fistula and complicated diverticulitis status post ileostomy/history of small bowel obstruction POA: underwent reversal of illestomy and hernia repair on 1/17/23. Ct 1/19 showing ileus; advance diet per surgery. Encourage IS and PT. Surgery following. Uncontrolled abd pain due to above. Now better controlled. Continue scheduled Tylenol,  lidocaine patches; IV Dilaudid as needed. Continue naloxegol. Monitor for sedation. Narcan as needed. SEPSIS: meets criteria with leukocytosis, fevers, tachycardia. Suspected UTI. CXR no e/o of infection. Follow blood cx . UA w/ bacteria; follow urine cx.  lactic acid wnl.  ct abd pelvis with contrast showing no abscess. IV ceftriaxone and IV Vanc. Continue IVF. Monitor. Concern for UTI/ Hx of UTIs w/ colovesicular fistula/ Yeast in Urine: Hx of enterococcus faecium w/ resistance. follow urine cx. IV ceftraixone and IV Vanc. IV fluconazole. Uncontrolled hypertension/ Hx of hypertension: Now improved. Suspect due to difficulty tolerating orals and pain. Improved pain control as above. Probnp slightly elevated; no e/o overload on CXR. Continue home beta-blocker. IV hydralazine as needed. Hyponatremia: likely due to poor oral intake. Continue IVF - may need to stop D5 if continues to worsen. Hypothyroidism: TSH wnl. Continue home synthroid.       GERD: PPI       Total time spent with patient: 28 Minutes **I personally saw and examined the patient during this time period**                 Care Plan discussed with: Patient and Nursing Staff    Discussed:  Care Plan    Prophylaxis:  Lovenox    Disposition:   PT, OT, RN           ___________________________________________________    Attending Physician: Symone Christianson DO

## 2023-01-21 NOTE — PROGRESS NOTES
This patient was assisted with Intentional Toileting every 2 hours during this shift as appropriate. Documentation of ambulation and output reflected on Flowsheet as appropriate. Purposeful hourly rounding was completed using AIDET and 5Ps. Outcomes of PHR documented as they occurred. Bed alarm in use as appropriate.   Dual Suction and ambubag in place.     -Stephanie Oliveira, PCT

## 2023-01-21 NOTE — PROGRESS NOTES
Problem: Falls - Risk of  Goal: *Absence of Falls  Description: Document Clare Skill Fall Risk and appropriate interventions in the flowsheet. Outcome: Progressing Towards Goal  Note: Fall Risk Interventions:                                Problem: Patient Education: Go to Patient Education Activity  Goal: Patient/Family Education  Outcome: Progressing Towards Goal     Problem: Nutrition Deficit  Goal: *Optimize nutritional status  Outcome: Progressing Towards Goal     Problem: Patient Education: Go to Patient Education Activity  Goal: Patient/Family Education  Outcome: Progressing Towards Goal     Problem: Patient Education: Go to Patient Education Activity  Goal: Patient/Family Education  Outcome: Progressing Towards Goal     Problem: Surgical Pathway Day of Surgery  Goal: Off Pathway (Use only if patient is Off Pathway)  Outcome: Progressing Towards Goal  Goal: Activity/Safety  Outcome: Progressing Towards Goal  Goal: Consults, if ordered  Outcome: Progressing Towards Goal  Goal: Nutrition/Diet  Outcome: Progressing Towards Goal  Goal: Medications  Outcome: Progressing Towards Goal  Goal: Respiratory  Outcome: Progressing Towards Goal  Goal: Treatments/Interventions/Procedures  Outcome: Progressing Towards Goal  Goal: Psychosocial  Outcome: Progressing Towards Goal  Goal: *No signs and symptoms of infection or wound complications  Outcome: Progressing Towards Goal  Goal: *Optimal pain control at patient's stated goal  Outcome: Progressing Towards Goal  Goal: *Adequate urinary output (equal to or greater than 30 milliliters/hour)  Description: Ambulatory Surgery patients voiding without difficulty.   Outcome: Progressing Towards Goal  Goal: *Hemodynamically stable  Outcome: Progressing Towards Goal  Goal: *Tolerating diet  Outcome: Progressing Towards Goal  Goal: *Demonstrates progressive activity  Outcome: Progressing Towards Goal     Problem: Surgical Pathway Post-Op Day 1  Goal: Off Pathway (Use only if patient is Off Pathway)  Outcome: Progressing Towards Goal  Goal: Activity/Safety  Outcome: Progressing Towards Goal  Goal: Diagnostic Test/Procedures  Outcome: Progressing Towards Goal  Goal: Nutrition/Diet  Outcome: Progressing Towards Goal  Goal: Discharge Planning  Outcome: Progressing Towards Goal  Goal: Medications  Outcome: Progressing Towards Goal  Goal: Respiratory  Outcome: Progressing Towards Goal     Problem: Nutrition Deficit  Goal: *Optimize nutritional status  Outcome: Progressing Towards Goal     Problem: Pain  Goal: *Control of Pain  Outcome: Progressing Towards Goal     Problem: Patient Education: Go to Patient Education Activity  Goal: Patient/Family Education  Outcome: Progressing Towards Goal     Problem: Pressure Injury - Risk of  Goal: *Prevention of pressure injury  Description: Document Stef Scale and appropriate interventions in the flowsheet.   Outcome: Progressing Towards Goal  Note: Pressure Injury Interventions:       Moisture Interventions: Apply protective barrier, creams and emollients    Activity Interventions: Chair cushion    Mobility Interventions: Chair cushion    Nutrition Interventions: Offer support with meals,snacks and hydration    Friction and Shear Interventions: Minimize layers                Problem: Patient Education: Go to Patient Education Activity  Goal: Patient/Family Education  Outcome: Progressing Towards Goal     Problem: Patient Education: Go to Patient Education Activity  Goal: Patient/Family Education  Outcome: Progressing Towards Goal     Problem: Nutrition Deficit  Goal: *Optimize nutritional status  Outcome: Progressing Towards Goal     Problem: Patient Education: Go to Patient Education Activity  Goal: Patient/Family Education  Outcome: Progressing Towards Goal

## 2023-01-21 NOTE — PROGRESS NOTES
[facility] Dosing Services: Antimicrobial Stewardship Daily Doc 1/21/2023    Consult for antibiotic dosing of Vancomycin by Dr. Guanakito Alonso  Indication: UTI, Hx resistant enterococcus; Colovescicular fistula  Day of Therapy: 1    Ht Readings from Last 1 Encounters:   01/20/23 160 cm (62.99\")        Wt Readings from Last 1 Encounters:   01/21/23 133.9 kg (295 lb 4.8 oz)      Vancomycin therapy:  Current maintenance dose: Initial dosing  Last level: N/A   Dose calculated to approximate a           a. Target AUC/TANIA of 400-500          b. Trough of 10-15 mcg/mL     Plan: Will start 2500 mg loading dose followed by 1500 mg Q24 hrs for anticipated  Tr 12.9 mcg/ml per bayesian kinetics model. High risk accumulation with BMI 52 kg/m2 and lower dosing goal so OK for Q24 hrs regimen initially. Will get level prior to 3rd dose if repeat urine Cx shows enterococcus. Otherwise may be able to de escalate. Dose administration notes:   Doses given appropriately as scheduled    Date Dose & Interval Measured (mcg/mL) Extrapolated (mcg/mL)   ? ? ? ?   ? ? ? ?   ? ? ? ? Other Antimicrobial   (not dosed by pharmacist) Ceftriaxone 1 gram Q24 hrs  Fluconazole 200 mg PO daily   Cultures 1/20 Urine: Pending - Yeast and 1+ bacteria on UA    Previous Cx  10/26/22 Urine: Enterococcus faecium (S daptomycin, linezolid, vancomycin)   Serum Creatinine Lab Results   Component Value Date/Time    Creatinine 0.89 01/21/2023 12:19 AM         Creatinine Clearance Estimated Creatinine Clearance: 74.4 mL/min (based on SCr of 0.89 mg/dL). Temp Temp: 98.1 °F (36.7 °C)       WBC Lab Results   Component Value Date/Time    WBC 14.9 (H) 01/21/2023 12:19 AM        Procalcitonin No results found for: PCT     For Antifungals, Metronidazole and Nafcillin: Lab Results   Component Value Date/Time    ALT (SGPT) 39 01/18/2023 03:56 AM    AST (SGOT) 24 01/18/2023 03:56 AM    Alk.  phosphatase 89 01/18/2023 03:56 AM    Bilirubin, total 0.7 01/18/2023 03:56 AM        Thanks,  Pharmacist Britt Rice, VIRGINIAD

## 2023-01-22 ENCOUNTER — APPOINTMENT (OUTPATIENT)
Dept: GENERAL RADIOLOGY | Age: 73
DRG: 330 | End: 2023-01-22
Attending: COLON & RECTAL SURGERY
Payer: MEDICARE

## 2023-01-22 LAB
ANION GAP SERPL CALC-SCNC: 7 MMOL/L (ref 5–15)
BACTERIA SPEC CULT: NORMAL
BASOPHILS # BLD: 0 K/UL (ref 0–0.1)
BASOPHILS NFR BLD: 0 % (ref 0–1)
BUN SERPL-MCNC: 12 MG/DL (ref 6–20)
BUN/CREAT SERPL: 15 (ref 12–20)
CALCIUM SERPL-MCNC: 8.7 MG/DL (ref 8.5–10.1)
CC UR VC: NORMAL
CHLORIDE SERPL-SCNC: 105 MMOL/L (ref 97–108)
CO2 SERPL-SCNC: 22 MMOL/L (ref 21–32)
CREAT SERPL-MCNC: 0.8 MG/DL (ref 0.55–1.02)
DIFFERENTIAL METHOD BLD: ABNORMAL
EOSINOPHIL # BLD: 0.3 K/UL (ref 0–0.4)
EOSINOPHIL NFR BLD: 3 % (ref 0–7)
ERYTHROCYTE [DISTWIDTH] IN BLOOD BY AUTOMATED COUNT: 13.2 % (ref 11.5–14.5)
GLUCOSE SERPL-MCNC: 98 MG/DL (ref 65–100)
HCT VFR BLD AUTO: 38.1 % (ref 35–47)
HGB BLD-MCNC: 12.9 G/DL (ref 11.5–16)
IMM GRANULOCYTES # BLD AUTO: 0 K/UL (ref 0–0.04)
IMM GRANULOCYTES NFR BLD AUTO: 0 % (ref 0–0.5)
LYMPHOCYTES # BLD: 1.5 K/UL (ref 0.8–3.5)
LYMPHOCYTES NFR BLD: 16 % (ref 12–49)
MAGNESIUM SERPL-MCNC: 1.9 MG/DL (ref 1.6–2.4)
MCH RBC QN AUTO: 33.7 PG (ref 26–34)
MCHC RBC AUTO-ENTMCNC: 33.9 G/DL (ref 30–36.5)
MCV RBC AUTO: 99.5 FL (ref 80–99)
MONOCYTES # BLD: 0.6 K/UL (ref 0–1)
MONOCYTES NFR BLD: 7 % (ref 5–13)
NEUTS SEG # BLD: 7 K/UL (ref 1.8–8)
NEUTS SEG NFR BLD: 74 % (ref 32–75)
NRBC # BLD: 0 K/UL (ref 0–0.01)
NRBC BLD-RTO: 0 PER 100 WBC
PHOSPHATE SERPL-MCNC: 2.4 MG/DL (ref 2.6–4.7)
PLATELET # BLD AUTO: 288 K/UL (ref 150–400)
PMV BLD AUTO: 11.7 FL (ref 8.9–12.9)
POTASSIUM SERPL-SCNC: 3.8 MMOL/L (ref 3.5–5.1)
RBC # BLD AUTO: 3.83 M/UL (ref 3.8–5.2)
SERVICE CMNT-IMP: NORMAL
SODIUM SERPL-SCNC: 134 MMOL/L (ref 136–145)
WBC # BLD AUTO: 9.6 K/UL (ref 3.6–11)

## 2023-01-22 PROCEDURE — 74011250636 HC RX REV CODE- 250/636: Performed by: INTERNAL MEDICINE

## 2023-01-22 PROCEDURE — 80048 BASIC METABOLIC PNL TOTAL CA: CPT

## 2023-01-22 PROCEDURE — 0DH67UZ INSERTION OF FEEDING DEVICE INTO STOMACH, VIA NATURAL OR ARTIFICIAL OPENING: ICD-10-PCS | Performed by: COLON & RECTAL SURGERY

## 2023-01-22 PROCEDURE — 74018 RADEX ABDOMEN 1 VIEW: CPT

## 2023-01-22 PROCEDURE — 74011250637 HC RX REV CODE- 250/637: Performed by: COLON & RECTAL SURGERY

## 2023-01-22 PROCEDURE — 74011250636 HC RX REV CODE- 250/636: Performed by: COLON & RECTAL SURGERY

## 2023-01-22 PROCEDURE — 84100 ASSAY OF PHOSPHORUS: CPT

## 2023-01-22 PROCEDURE — 83735 ASSAY OF MAGNESIUM: CPT

## 2023-01-22 PROCEDURE — 85025 COMPLETE CBC W/AUTO DIFF WBC: CPT

## 2023-01-22 PROCEDURE — 65270000046 HC RM TELEMETRY

## 2023-01-22 PROCEDURE — 36415 COLL VENOUS BLD VENIPUNCTURE: CPT

## 2023-01-22 PROCEDURE — 74011000250 HC RX REV CODE- 250: Performed by: COLON & RECTAL SURGERY

## 2023-01-22 PROCEDURE — 74011000250 HC RX REV CODE- 250: Performed by: INTERNAL MEDICINE

## 2023-01-22 RX ORDER — MICONAZOLE NITRATE 2 %
POWDER (GRAM) TOPICAL 2 TIMES DAILY
Status: DISCONTINUED | OUTPATIENT
Start: 2023-01-22 | End: 2023-02-01 | Stop reason: HOSPADM

## 2023-01-22 RX ORDER — PROCHLORPERAZINE EDISYLATE 5 MG/ML
10 INJECTION INTRAMUSCULAR; INTRAVENOUS
Status: DISCONTINUED | OUTPATIENT
Start: 2023-01-22 | End: 2023-02-01 | Stop reason: HOSPADM

## 2023-01-22 RX ADMIN — LORAZEPAM 0.5 MG: 2 INJECTION INTRAMUSCULAR; INTRAVENOUS at 17:49

## 2023-01-22 RX ADMIN — HYDRALAZINE HYDROCHLORIDE 10 MG: 20 INJECTION INTRAMUSCULAR; INTRAVENOUS at 20:02

## 2023-01-22 RX ADMIN — HYDROMORPHONE HYDROCHLORIDE 0.2 MG: 1 INJECTION, SOLUTION INTRAMUSCULAR; INTRAVENOUS; SUBCUTANEOUS at 12:48

## 2023-01-22 RX ADMIN — ONDANSETRON 4 MG: 2 INJECTION INTRAMUSCULAR; INTRAVENOUS at 09:00

## 2023-01-22 RX ADMIN — PROCHLORPERAZINE EDISYLATE 10 MG: 5 INJECTION INTRAMUSCULAR; INTRAVENOUS at 15:11

## 2023-01-22 RX ADMIN — CEFTRIAXONE 1 G: 1 INJECTION, POWDER, FOR SOLUTION INTRAMUSCULAR; INTRAVENOUS at 18:43

## 2023-01-22 RX ADMIN — ENOXAPARIN SODIUM 30 MG: 100 INJECTION SUBCUTANEOUS at 09:00

## 2023-01-22 RX ADMIN — HYDROMORPHONE HYDROCHLORIDE 0.2 MG: 1 INJECTION, SOLUTION INTRAMUSCULAR; INTRAVENOUS; SUBCUTANEOUS at 21:51

## 2023-01-22 RX ADMIN — ENOXAPARIN SODIUM 30 MG: 100 INJECTION SUBCUTANEOUS at 21:43

## 2023-01-22 RX ADMIN — SODIUM CHLORIDE, POTASSIUM CHLORIDE, SODIUM LACTATE AND CALCIUM CHLORIDE 75 ML/HR: 600; 310; 30; 20 INJECTION, SOLUTION INTRAVENOUS at 14:32

## 2023-01-22 RX ADMIN — VANCOMYCIN HYDROCHLORIDE 1500 MG: 10 INJECTION, POWDER, LYOPHILIZED, FOR SOLUTION INTRAVENOUS at 14:32

## 2023-01-22 RX ADMIN — SODIUM CHLORIDE, PRESERVATIVE FREE 10 ML: 5 INJECTION INTRAVENOUS at 21:54

## 2023-01-22 RX ADMIN — MICONAZOLE NITRATE 2 % TOPICAL POWDER: at 12:31

## 2023-01-22 RX ADMIN — SODIUM PHOSPHATE, MONOBASIC, MONOHYDRATE: 276; 142 INJECTION, SOLUTION INTRAVENOUS at 12:31

## 2023-01-22 RX ADMIN — MICONAZOLE NITRATE 2 % TOPICAL POWDER: at 18:44

## 2023-01-22 RX ADMIN — TRAMADOL HYDROCHLORIDE 50 MG: 50 TABLET ORAL at 06:52

## 2023-01-22 RX ADMIN — SODIUM CHLORIDE, PRESERVATIVE FREE 10 ML: 5 INJECTION INTRAVENOUS at 14:47

## 2023-01-22 NOTE — PROGRESS NOTES
Physical Therapy Note:  RN consulted stating has nausea and vomiting today and likely to defer. Pt received supine in bed having slight respite from symptoms. Awaiting anti-nausea meds. Encouraged pt to perform ankle pumps and heel slides, yet pt stating onset of abd pain with heel slides. Offered supine to sit to pt, yet pt declining at this time. Will defer and see next tx day.   Main Maldonado, PT

## 2023-01-22 NOTE — PROGRESS NOTES
Problem: Falls - Risk of  Goal: *Absence of Falls  Description: Document Claire Don Fall Risk and appropriate interventions in the flowsheet. Outcome: Progressing Towards Goal  Note: Fall Risk Interventions:                                Problem: Patient Education: Go to Patient Education Activity  Goal: Patient/Family Education  Outcome: Progressing Towards Goal     Problem: Nutrition Deficit  Goal: *Optimize nutritional status  Outcome: Progressing Towards Goal     Problem: Patient Education: Go to Patient Education Activity  Goal: Patient/Family Education  Outcome: Progressing Towards Goal     Problem: Patient Education: Go to Patient Education Activity  Goal: Patient/Family Education  Outcome: Progressing Towards Goal     Problem: Surgical Pathway Day of Surgery  Goal: Off Pathway (Use only if patient is Off Pathway)  Outcome: Progressing Towards Goal  Goal: Activity/Safety  Outcome: Progressing Towards Goal  Goal: Consults, if ordered  Outcome: Progressing Towards Goal  Goal: Nutrition/Diet  Outcome: Progressing Towards Goal  Goal: Medications  Outcome: Progressing Towards Goal  Goal: Respiratory  Outcome: Progressing Towards Goal  Goal: Treatments/Interventions/Procedures  Outcome: Progressing Towards Goal  Goal: Psychosocial  Outcome: Progressing Towards Goal  Goal: *No signs and symptoms of infection or wound complications  Outcome: Progressing Towards Goal  Goal: *Optimal pain control at patient's stated goal  Outcome: Progressing Towards Goal  Goal: *Adequate urinary output (equal to or greater than 30 milliliters/hour)  Description: Ambulatory Surgery patients voiding without difficulty.   Outcome: Progressing Towards Goal  Goal: *Hemodynamically stable  Outcome: Progressing Towards Goal  Goal: *Tolerating diet  Outcome: Progressing Towards Goal  Goal: *Demonstrates progressive activity  Outcome: Progressing Towards Goal     Problem: Surgical Pathway Post-Op Day 1  Goal: Off Pathway (Use only if patient is Off Pathway)  Outcome: Progressing Towards Goal  Goal: Activity/Safety  Outcome: Progressing Towards Goal  Goal: Diagnostic Test/Procedures  Outcome: Progressing Towards Goal  Goal: Nutrition/Diet  Outcome: Progressing Towards Goal  Goal: Discharge Planning  Outcome: Progressing Towards Goal  Goal: Medications  Outcome: Progressing Towards Goal  Goal: Respiratory  Outcome: Progressing Towards Goal     Problem: Nutrition Deficit  Goal: *Optimize nutritional status  Outcome: Progressing Towards Goal     Problem: Pain  Goal: *Control of Pain  Outcome: Progressing Towards Goal     Problem: Patient Education: Go to Patient Education Activity  Goal: Patient/Family Education  Outcome: Progressing Towards Goal     Problem: Pressure Injury - Risk of  Goal: *Prevention of pressure injury  Description: Document Stef Scale and appropriate interventions in the flowsheet.   Outcome: Progressing Towards Goal     Problem: Patient Education: Go to Patient Education Activity  Goal: Patient/Family Education  Outcome: Progressing Towards Goal     Problem: Patient Education: Go to Patient Education Activity  Goal: Patient/Family Education  Outcome: Progressing Towards Goal     Problem: Nutrition Deficit  Goal: *Optimize nutritional status  Outcome: Progressing Towards Goal     Problem: Patient Education: Go to Patient Education Activity  Goal: Patient/Family Education  Outcome: Progressing Towards Goal

## 2023-01-22 NOTE — WOUND CARE
Wound Nurse Note     Patient seen in follow-up regarding wound care needs s/p ileostomy reversal 1/17/23 per Dr. Florentino Martinez. Patient currently resting on [place] [facility] bed; alert and oriented with nausea earlier; medicated by nursing. Sister at bedside. Orders in place per  for dry kelix packing to RLQ wound daily; paint midline incision with betadine BID. Abdominal binder at all times. Assessment:    RLQ - current packing removed without difficulty with immediate drainage of large volume of serous fluid. Dressing saturated over abdomen with same. Opening rinsed with saline; approx 2.0cm x 3.2cm x 6.8cm when gently probed. Walls clean and pink; some evidence of previous ostomy wafer skin issues encircling opening. One intact suture noted at 3 o'clock. Abdominal incision - 9.0cm incision line well-approximated with glue closure and betadine staining. Some moisture noted over area from adjacent dressing. Sacral area/buttocks  - patient incontinent of moderate amount of loose dark brown stool and sacral crease appeared red and excoriated. Bilateral heels - blanching red;otherwise intact. Right groin - linear pink/tan skin loss approx 0.5cm x 7.0cm; likely moisture-related. Plan:  RLQ - dicussed with  regarding volume of drainage and if packing might be occluding output; suggested pouching of opening to allow drainage and prevent saturation over incision until drainage decreases; resume packing at that time.  approved pouching measure; will ask Wound Nurse to reassess tomorrow and notify Chi Espsoito of findings for further recommendations. Abdominal incision - continue to paint with betadine per orders; do not include incision within dressing over RLQ to prevent contact of drainage over incison. Turn q2h and float heels. Frequent incontinence care. Antifungal powder BID to right groin and sacral crease; cleanse with barrier wipes.   Can add barrier cream prn incontinence. Spoke with nurse Kathe Cuadra with good understanding of care. Wound Nurse to see tomorrow. Please reconsult if further assistance is needed. *returned to see patient after 20 minutes following today's earlier visit - pouched RLQ per discussion with . RLQ packing was already saturated with serous drainage after 20 minutes. Placed a one-piece appliance to contain drainage pending follow-up with Wound Nurse tomorrow. Nursing verbalized good understanding.     Lucy Chen RN [facility], [facility].  [facility]  [phone]      Abdomen        Right groin        Sacral crease

## 2023-01-22 NOTE — PROGRESS NOTES
Ezequiel Ca [facility] 79  [address] [facility], [address] [place] [address]  [phone]      Medical Progress Note      NAME: Min Ferreira   :  1950  MRM:  [id number]    Date/Time of service: 2023      Subjective:     Chief Complaint:  Patient was personally seen and examined by me during this time period. Chart reviewed. FU uncontrolled hypertension. Blood pressure better controlled; no further tachycardia. Having some n/v this am, abd pain remains better controlled. No chest pain or dyspnea. Sister Keya at bedside. Objective:       Vitals:       Last 24hrs VS reviewed since prior progress note.  Most recent are:    Visit Vitals  BP (!) 150/79   Pulse 99   Temp 97.4 °F (36.3 °C)   Resp 18   Ht 5' 2.99\" (1.6 m)   Wt 134.5 kg (296 lb 8 oz)   SpO2 94%   BMI 52.54 kg/m²     SpO2 Readings from Last 6 Encounters:   23 94%   23 97%   22 96%   22 93%   10/26/22 96%   10/19/22 94%    O2 Flow Rate (L/min): 2 l/min     Intake/Output Summary (Last 24 hours) at 2023 1103  Last data filed at 2023 1937  Gross per 24 hour   Intake 450 ml   Output 600 ml   Net -150 ml          Exam:     Physical Exam:    Gen:  Well-developed, well-nourished, in no acute distress  HEENT:  Pink conjunctivae, PERRL, hearing intact to voice  Resp:  No accessory muscle use, clear breath sounds without wheezes rales or rhonchi  Card:  tacycardia, No murmurs, normal S1, S2, b/l peripheral edema  Abd:  TTP, distended, diminished bowel sounds, abd binder in place  Musc:  No cyanosis or clubbing  Skin:  midline incision   Neuro:  Cranial nerves 3-12 are grossly intact, follows commands appropriately  Psych:  poor insight       Medications Reviewed: (see below)    Lab Data Reviewed: (see below)    ______________________________________________________________________    Medications:     Current Facility-Administered Medications   Medication Dose Route Frequency    miconazole (MICOTIN) 2 % powder   Topical BID    sodium phosphate 20 mmol in 0.9% sodium chloride 250 mL infusion   IntraVENous ONCE    fluconazole (DIFLUCAN) tablet 200 mg  200 mg Oral DAILY    naloxegoL (MOVANTIK) tablet 12.5 mg  12.5 mg Oral DAILY    lactated Ringers infusion  75 mL/hr IntraVENous CONTINUOUS    acetaminophen (TYLENOL) tablet 1,000 mg  1,000 mg Oral TID    vancomycin (VANCOCIN) 1500 mg in  ml infusion  1,500 mg IntraVENous Q24H    enoxaparin (LOVENOX) injection 30 mg  30 mg SubCUTAneous Q12H    cefTRIAXone (ROCEPHIN) 1 g in 0.9% sodium chloride 10 mL IV syringe  1 g IntraVENous Q24H    hydrALAZINE (APRESOLINE) 20 mg/mL injection 10 mg  10 mg IntraVENous Q6H PRN    traMADoL (ULTRAM) tablet 50 mg  50 mg Oral Q6H PRN    HYDROmorphone (DILAUDID) syringe 0.2 mg  0.2 mg IntraVENous Q4H PRN    lidocaine 4 % patch 2 Patch  2 Patch TransDERmal Q24H    naloxone (NARCAN) injection 0.1 mg  0.1 mg IntraVENous PRN    levothyroxine (SYNTHROID) tablet 125 mcg  125 mcg Oral ACB    metoprolol succinate (TOPROL-XL) XL tablet 50 mg  50 mg Oral QAM    folic acid tablet 0.4 mg  0.4 mg Oral DAILY    sodium chloride (NS) flush 5-40 mL  5-40 mL IntraVENous Q8H    sodium chloride (NS) flush 5-40 mL  5-40 mL IntraVENous PRN    promethazine (PHENERGAN) tablet 12.5 mg  12.5 mg Oral Q6H PRN    Or    ondansetron (ZOFRAN) injection 4 mg  4 mg IntraVENous Q6H PRN    LORazepam (ATIVAN) injection 0.5 mg  0.5 mg IntraVENous Q8H PRN    pantoprazole (PROTONIX) tablet 40 mg  40 mg Oral ACB    thiamine mononitrate (B-1) tablet 100 mg  100 mg Oral DAILY    trospium (SANCTURA) tablet 20 mg  20 mg Oral ACB&D          Lab Review:     Recent Labs     01/22/23  0031 01/21/23  0019 01/20/23  0036   WBC 9.6 14.9* 16.2*   HGB 12.9 12.5 13.0   HCT 38.1 37.2 38.0    250 244       Recent Labs     01/22/23  0031 01/21/23  0019 01/20/23  0036   * 133* 131*   K 3.8 4.1 4.3    103 104   CO2 22 24 22   GLU 98 131* 140*   BUN 12 12 14   CREA 0.80 0.89 0. 85   CA 8.7 9.0 8.6   MG 1.9 2.0 1.9   PHOS 2.4* 3.2 2.0*       Lab Results   Component Value Date/Time    Glucose (POC) 81 08/29/2022 05:03 AM    Glucose (POC) 87 08/28/2022 11:55 PM    Glucose (POC) 100 08/28/2022 05:44 PM    Glucose (POC) 120 (H) 08/28/2022 03:26 PM    Glucose (POC) 134 (H) 08/28/2022 12:00 PM          Assessment / Plan:     Hx of colovesicular fistula and complicated diverticulitis status post ileostomy/history of small bowel obstruction POA: underwent reversal of illestomy and hernia repair on 1/17/23. Ct 1/19 showing ileus; advance diet per surgery. Encourage IS and PT. Surgery following. Uncontrolled abd pain due to above. Now better controlled. Continue scheduled Tylenol,  lidocaine patches; IV Dilaudid as needed. Continue naloxegol. Monitor for sedation. Narcan as needed. SEPSIS: meets criteria with leukocytosis, fevers, tachycardia. Suspected UTI. CXR no e/o of infection. Follow blood cx . UA w/ bacteria; follow urine cx.  lactic acid wnl.  ct abd pelvis with contrast showing no abscess. IV ceftriaxone and IV Vanc. Continue IVF. Monitor. Concern for UTI/ Hx of UTIs w/ colovesicular fistula/ Yeast in Urine: Hx of enterococcus faecium w/ resistance. follow urine cx. IV ceftraixone and IV Vanc - due to hx of resistance. IV fluconazole. Uncontrolled hypertension/ Hx of hypertension: Now improved. Suspect due to difficulty tolerating orals and pain. Improved pain control as above. Probnp slightly elevated; no e/o overload on CXR. Continue home beta-blocker as atolerated. IV hydralazine as needed. Hyponatremia: likely due to poor oral intake. Continue IVF - may need to stop D5 if continues to worsen. Hypophosatemia: IV phos today; replete as needed     Hypothyroidism: TSH wnl. Continue home synthroid.       GERD: PPI       Total time spent with patient: 28 Minutes **I personally saw and examined the patient during this time period**                 Care Plan discussed with: Patient, Family, and Nursing Staff    Discussed:  Care Plan    Prophylaxis:  Lovenox    Disposition:  HH PT, OT, RN           ___________________________________________________    Attending Physician: Grazyna Rodriguez DO

## 2023-01-22 NOTE — PROGRESS NOTES
Having some nausea  Visit Vitals  BP (!) 151/80   Pulse 94   Temp 98.5 °F (36.9 °C)   Resp 16   Ht 5' 2.99\" (1.6 m)   Wt 134.5 kg (296 lb 8 oz)   SpO2 93%   BMI 52.54 kg/m²     Date 01/21/23 0700 - 01/22/23 0659 01/22/23 0700 - 01/23/23 0659   Shift [id number] 1900-0659 24 Hour Total 0777-5372 9407-3427 24 Hour Total   INTAKE   P.O. 450  450        P. O. 450  450      Shift Total(mL/kg) 450(3.4)  450(3.3)      OUTPUT   Urine(mL/kg/hr) 350(0.2) 50(0) 400(0.1)        Urine Voided 350 50 400        Urine Occurrence(s) 0 x  0 x      Emesis/NG output 200  200        Emesis 200  200        Emesis Occurrence(s) 1 x  1 x      Stool           Stool Occurrence(s) 1 x 1 x 2 x      Shift Total(mL/kg) 550(4.1) 50(0.4) 600(4.5)      NET -100 -50 -150      Weight (kg) 133.9 134.5 134.5 134.5 134.5 134.5     Abd soft  Dressings with serous drainage. Could represent peritoneal fluid coming through the old stoma site    A/p continue dressing changes and reinforcement of the dressings  May need ngt if continues to vomit  Hold on diet advancement. Back down to sips.

## 2023-01-22 NOTE — PROGRESS NOTES
Bedside and Verbal shift change report given to Raf Gunn RN (oncoming nurse) by ELIDA OWEN (offgoing nurse). Report included the following information SBAR, Kardex, Intake/Output, MAR, Accordion, and Recent Results. 1051 - Pt and sister at bedside inquiring about abd x-ray. Perfectserved Dr. Flakito Valente with colon and rectal surgery. Per MD, hold off unless patient vomits more. 1316 - Pt vomited 150 cc emesis and continuing to dry heave on/off. Dr. Juana Logan notified. 56 - Order received for KUB. Per MD, portable x-ray okay. 1429 - Order received for PRN IV compazine 10 mg Q6. Per MD, possible NGT placement, await KUB results. 1655 - KUB resulted. Dr. Flakito Valente notified. Order received to place NGT placement and low intermittent suction. Okay to keep patient npo w/ sips of water with meds. This patient was assisted with Intentional Toileting every 2 hours during this shift as appropriate. Documentation of ambulation and output reflected on Flowsheet as appropriate. Purposeful hourly rounding was completed using AIDET and 5Ps. Outcomes of PHR documented as they occurred. Bed alarm in use as appropriate. Dual Suction and ambubag in place. Bedside and Verbal shift change report given to [facility] (oncoming nurse) by Raf Gunn RN (offgoing nurse). Report included the following information SBAR, Kardex, Intake/Output, MAR, Accordion, and Recent Results.

## 2023-01-23 ENCOUNTER — APPOINTMENT (OUTPATIENT)
Dept: GENERAL RADIOLOGY | Age: 73
DRG: 330 | End: 2023-01-23
Attending: COLON & RECTAL SURGERY
Payer: MEDICARE

## 2023-01-23 LAB
ANION GAP SERPL CALC-SCNC: 8 MMOL/L (ref 5–15)
BASOPHILS # BLD: 0 K/UL (ref 0–0.1)
BASOPHILS NFR BLD: 0 % (ref 0–1)
BUN SERPL-MCNC: 11 MG/DL (ref 6–20)
BUN/CREAT SERPL: 16 (ref 12–20)
CALCIUM SERPL-MCNC: 8.7 MG/DL (ref 8.5–10.1)
CHLORIDE SERPL-SCNC: 107 MMOL/L (ref 97–108)
CO2 SERPL-SCNC: 22 MMOL/L (ref 21–32)
CREAT SERPL-MCNC: 0.67 MG/DL (ref 0.55–1.02)
DATE LAST DOSE: ABNORMAL
DIFFERENTIAL METHOD BLD: ABNORMAL
EOSINOPHIL # BLD: 0.2 K/UL (ref 0–0.4)
EOSINOPHIL NFR BLD: 2 % (ref 0–7)
ERYTHROCYTE [DISTWIDTH] IN BLOOD BY AUTOMATED COUNT: 13.2 % (ref 11.5–14.5)
GLUCOSE BLD STRIP.AUTO-MCNC: 97 MG/DL (ref 65–117)
GLUCOSE SERPL-MCNC: 87 MG/DL (ref 65–100)
HCT VFR BLD AUTO: 36.1 % (ref 35–47)
HGB BLD-MCNC: 12.2 G/DL (ref 11.5–16)
IMM GRANULOCYTES # BLD AUTO: 0.1 K/UL (ref 0–0.04)
IMM GRANULOCYTES NFR BLD AUTO: 1 % (ref 0–0.5)
LYMPHOCYTES # BLD: 1.7 K/UL (ref 0.8–3.5)
LYMPHOCYTES NFR BLD: 16 % (ref 12–49)
MAGNESIUM SERPL-MCNC: 1.9 MG/DL (ref 1.6–2.4)
MCH RBC QN AUTO: 33.1 PG (ref 26–34)
MCHC RBC AUTO-ENTMCNC: 33.8 G/DL (ref 30–36.5)
MCV RBC AUTO: 97.8 FL (ref 80–99)
MONOCYTES # BLD: 0.9 K/UL (ref 0–1)
MONOCYTES NFR BLD: 9 % (ref 5–13)
NEUTS SEG # BLD: 7.5 K/UL (ref 1.8–8)
NEUTS SEG NFR BLD: 72 % (ref 32–75)
NRBC # BLD: 0 K/UL (ref 0–0.01)
NRBC BLD-RTO: 0 PER 100 WBC
PHOSPHATE SERPL-MCNC: 2.8 MG/DL (ref 2.6–4.7)
PLATELET # BLD AUTO: 310 K/UL (ref 150–400)
PMV BLD AUTO: 11.1 FL (ref 8.9–12.9)
POTASSIUM SERPL-SCNC: 3.6 MMOL/L (ref 3.5–5.1)
RBC # BLD AUTO: 3.69 M/UL (ref 3.8–5.2)
REPORTED DOSE,DOSE: ABNORMAL UNITS
REPORTED DOSE/TIME,TMG: ABNORMAL
SERVICE CMNT-IMP: NORMAL
SODIUM SERPL-SCNC: 137 MMOL/L (ref 136–145)
VANCOMYCIN SERPL-MCNC: 18 UG/ML
VANCOMYCIN TROUGH SERPL-MCNC: 15.5 UG/ML (ref 5–10)
WBC # BLD AUTO: 10.3 K/UL (ref 3.6–11)

## 2023-01-23 PROCEDURE — 02HV33Z INSERTION OF INFUSION DEVICE INTO SUPERIOR VENA CAVA, PERCUTANEOUS APPROACH: ICD-10-PCS | Performed by: COLON & RECTAL SURGERY

## 2023-01-23 PROCEDURE — 51798 US URINE CAPACITY MEASURE: CPT

## 2023-01-23 PROCEDURE — 80202 ASSAY OF VANCOMYCIN: CPT

## 2023-01-23 PROCEDURE — 74011000250 HC RX REV CODE- 250: Performed by: INTERNAL MEDICINE

## 2023-01-23 PROCEDURE — 74011250636 HC RX REV CODE- 250/636: Performed by: COLON & RECTAL SURGERY

## 2023-01-23 PROCEDURE — 36415 COLL VENOUS BLD VENIPUNCTURE: CPT

## 2023-01-23 PROCEDURE — B548ZZA ULTRASONOGRAPHY OF SUPERIOR VENA CAVA, GUIDANCE: ICD-10-PCS | Performed by: COLON & RECTAL SURGERY

## 2023-01-23 PROCEDURE — C1751 CATH, INF, PER/CENT/MIDLINE: HCPCS

## 2023-01-23 PROCEDURE — 74011250637 HC RX REV CODE- 250/637: Performed by: COLON & RECTAL SURGERY

## 2023-01-23 PROCEDURE — 80048 BASIC METABOLIC PNL TOTAL CA: CPT

## 2023-01-23 PROCEDURE — 77030018786 HC NDL GD F/USND BARD -B

## 2023-01-23 PROCEDURE — 85025 COMPLETE CBC W/AUTO DIFF WBC: CPT

## 2023-01-23 PROCEDURE — 65270000046 HC RM TELEMETRY

## 2023-01-23 PROCEDURE — 76937 US GUIDE VASCULAR ACCESS: CPT

## 2023-01-23 PROCEDURE — 74011000250 HC RX REV CODE- 250: Performed by: COLON & RECTAL SURGERY

## 2023-01-23 PROCEDURE — 83735 ASSAY OF MAGNESIUM: CPT

## 2023-01-23 PROCEDURE — 74018 RADEX ABDOMEN 1 VIEW: CPT

## 2023-01-23 PROCEDURE — 74011250637 HC RX REV CODE- 250/637: Performed by: INTERNAL MEDICINE

## 2023-01-23 PROCEDURE — 2709999900 HC NON-CHARGEABLE SUPPLY

## 2023-01-23 PROCEDURE — 84100 ASSAY OF PHOSPHORUS: CPT

## 2023-01-23 PROCEDURE — 82962 GLUCOSE BLOOD TEST: CPT

## 2023-01-23 PROCEDURE — 36573 INSJ PICC RS&I 5 YR+: CPT | Performed by: COLON & RECTAL SURGERY

## 2023-01-23 RX ORDER — DEXTROSE MONOHYDRATE 100 MG/ML
0-250 INJECTION, SOLUTION INTRAVENOUS AS NEEDED
Status: DISCONTINUED | OUTPATIENT
Start: 2023-01-23 | End: 2023-02-01 | Stop reason: HOSPADM

## 2023-01-23 RX ORDER — INSULIN LISPRO 100 [IU]/ML
INJECTION, SOLUTION INTRAVENOUS; SUBCUTANEOUS
Status: DISCONTINUED | OUTPATIENT
Start: 2023-01-23 | End: 2023-02-01 | Stop reason: HOSPADM

## 2023-01-23 RX ORDER — IBUPROFEN 200 MG
4 TABLET ORAL AS NEEDED
Status: DISCONTINUED | OUTPATIENT
Start: 2023-01-23 | End: 2023-02-01 | Stop reason: HOSPADM

## 2023-01-23 RX ADMIN — ACETAMINOPHEN 1000 MG: 500 TABLET ORAL at 09:34

## 2023-01-23 RX ADMIN — ASCORBIC ACID, VITAMIN A PALMITATE, CHOLECALCIFEROL, THIAMINE HYDROCHLORIDE, RIBOFLAVIN-5 PHOSPHATE SODIUM, PYRIDOXINE HYDROCHLORIDE, NIACINAMIDE, DEXPANTHENOL, ALPHA-TOCOPHEROL ACETATE, VITAMIN K1, FOLIC ACID, BIOTIN, CYANOCOBALAMIN: 200; 3300; 200; 6; 3.6; 6; 40; 15; 10; 150; 600; 60; 5 INJECTION, SOLUTION INTRAVENOUS at 18:41

## 2023-01-23 RX ADMIN — ACETAMINOPHEN 1000 MG: 500 TABLET ORAL at 22:22

## 2023-01-23 RX ADMIN — ENOXAPARIN SODIUM 30 MG: 100 INJECTION SUBCUTANEOUS at 09:34

## 2023-01-23 RX ADMIN — PANTOPRAZOLE SODIUM 40 MG: 40 TABLET, DELAYED RELEASE ORAL at 09:36

## 2023-01-23 RX ADMIN — TROSPIUM CHLORIDE 20 MG: 20 TABLET, FILM COATED ORAL at 09:37

## 2023-01-23 RX ADMIN — SODIUM CHLORIDE, POTASSIUM CHLORIDE, SODIUM LACTATE AND CALCIUM CHLORIDE 75 ML/HR: 600; 310; 30; 20 INJECTION, SOLUTION INTRAVENOUS at 16:24

## 2023-01-23 RX ADMIN — MICONAZOLE NITRATE 2 % TOPICAL POWDER: at 18:33

## 2023-01-23 RX ADMIN — TROSPIUM CHLORIDE 20 MG: 20 TABLET, FILM COATED ORAL at 16:54

## 2023-01-23 RX ADMIN — ACETAMINOPHEN 1000 MG: 500 TABLET ORAL at 16:54

## 2023-01-23 RX ADMIN — TRAMADOL HYDROCHLORIDE 50 MG: 50 TABLET ORAL at 21:00

## 2023-01-23 RX ADMIN — ENOXAPARIN SODIUM 30 MG: 100 INJECTION SUBCUTANEOUS at 21:00

## 2023-01-23 RX ADMIN — TRAMADOL HYDROCHLORIDE 50 MG: 50 TABLET ORAL at 12:16

## 2023-01-23 RX ADMIN — METOPROLOL SUCCINATE 50 MG: 50 TABLET, EXTENDED RELEASE ORAL at 09:34

## 2023-01-23 RX ADMIN — SODIUM CHLORIDE, PRESERVATIVE FREE 10 ML: 5 INJECTION INTRAVENOUS at 16:54

## 2023-01-23 RX ADMIN — BENZOCAINE: 200 SPRAY DENTAL; ORAL; PERIODONTAL at 14:37

## 2023-01-23 RX ADMIN — NALOXEGOL OXALATE 12.5 MG: 12.5 TABLET, FILM COATED ORAL at 09:37

## 2023-01-23 RX ADMIN — MICONAZOLE NITRATE 2 % TOPICAL POWDER: at 09:34

## 2023-01-23 RX ADMIN — SODIUM CHLORIDE, PRESERVATIVE FREE 10 ML: 5 INJECTION INTRAVENOUS at 22:23

## 2023-01-23 NOTE — PROGRESS NOTES
Problem: Falls - Risk of  Goal: *Absence of Falls  Description: Document Raul Matta Fall Risk and appropriate interventions in the flowsheet. Outcome: Progressing Towards Goal  Note: Fall Risk Interventions:                                Problem: Patient Education: Go to Patient Education Activity  Goal: Patient/Family Education  Outcome: Progressing Towards Goal     Problem: Nutrition Deficit  Goal: *Optimize nutritional status  Outcome: Progressing Towards Goal     Problem: Patient Education: Go to Patient Education Activity  Goal: Patient/Family Education  Outcome: Progressing Towards Goal     Problem: Patient Education: Go to Patient Education Activity  Goal: Patient/Family Education  Outcome: Progressing Towards Goal     Problem: Surgical Pathway Day of Surgery  Goal: Off Pathway (Use only if patient is Off Pathway)  Outcome: Progressing Towards Goal  Goal: Activity/Safety  Outcome: Progressing Towards Goal  Goal: Consults, if ordered  Outcome: Progressing Towards Goal  Goal: Nutrition/Diet  Outcome: Progressing Towards Goal  Goal: Medications  Outcome: Progressing Towards Goal  Goal: Respiratory  Outcome: Progressing Towards Goal  Goal: Treatments/Interventions/Procedures  Outcome: Progressing Towards Goal  Goal: Psychosocial  Outcome: Progressing Towards Goal  Goal: *No signs and symptoms of infection or wound complications  Outcome: Progressing Towards Goal  Goal: *Optimal pain control at patient's stated goal  Outcome: Progressing Towards Goal  Goal: *Adequate urinary output (equal to or greater than 30 milliliters/hour)  Description: Ambulatory Surgery patients voiding without difficulty.   Outcome: Progressing Towards Goal  Goal: *Hemodynamically stable  Outcome: Progressing Towards Goal  Goal: *Tolerating diet  Outcome: Progressing Towards Goal  Goal: *Demonstrates progressive activity  Outcome: Progressing Towards Goal     Problem: Surgical Pathway Post-Op Day 1  Goal: Off Pathway (Use only if patient is Off Pathway)  Outcome: Progressing Towards Goal  Goal: Activity/Safety  Outcome: Progressing Towards Goal  Goal: Diagnostic Test/Procedures  Outcome: Progressing Towards Goal  Goal: Nutrition/Diet  Outcome: Progressing Towards Goal  Goal: Discharge Planning  Outcome: Progressing Towards Goal  Goal: Medications  Outcome: Progressing Towards Goal  Goal: Respiratory  Outcome: Progressing Towards Goal     Problem: Nutrition Deficit  Goal: *Optimize nutritional status  Outcome: Progressing Towards Goal     Problem: Pain  Goal: *Control of Pain  Outcome: Progressing Towards Goal     Problem: Patient Education: Go to Patient Education Activity  Goal: Patient/Family Education  Outcome: Progressing Towards Goal     Problem: Pressure Injury - Risk of  Goal: *Prevention of pressure injury  Description: Document Stef Scale and appropriate interventions in the flowsheet.   Outcome: Progressing Towards Goal     Problem: Patient Education: Go to Patient Education Activity  Goal: Patient/Family Education  Outcome: Progressing Towards Goal     Problem: Patient Education: Go to Patient Education Activity  Goal: Patient/Family Education  Outcome: Progressing Towards Goal     Problem: Nutrition Deficit  Goal: *Optimize nutritional status  Outcome: Progressing Towards Goal     Problem: Patient Education: Go to Patient Education Activity  Goal: Patient/Family Education  Outcome: Progressing Towards Goal

## 2023-01-23 NOTE — PROGRESS NOTES
[facility] Dosing Services: Antimicrobial Stewardship Daily Doc 1/22/2023    Consult for antibiotic dosing of Vancomycin by Dr. Ammy Andrade  Indication: UTI, Hx resistant enterococcus; Colovescicular fistula  Day of Therapy: 2    Ht Readings from Last 1 Encounters:   01/20/23 160 cm (62.99\")        Wt Readings from Last 1 Encounters:   01/22/23 134.5 kg (296 lb 8 oz)      Vancomycin therapy:  Current maintenance dose: Initial dosing  Last level: N/A   Dose calculated to approximate a           a. Target AUC/TANIA of 400-500          b. Trough of 10-15 mcg/mL     Plan: Continue 1500 mg Q24 hrs for anticipated therapeutic AUC per bayesian kinetics model. High risk accumulation with BMI 52 kg/m2 and lower dosing goal so OK for Q24 hrs regimen initially. Will get level prior to 3rd dose as urine Cx did not isolate specific bacteria. In s/o colovesical fistula and considering history pt is at risk for enterococcus. Previous Cx was not S to ampicillin. Dose administration notes:   Doses given appropriately as scheduled    Date Dose & Interval Measured (mcg/mL) Extrapolated (mcg/mL)   ? ? ? ?   ? ? ? ?   ? ? ? ? Other Antimicrobial   (not dosed by pharmacist) Ceftriaxone 1 gram Q24 hrs  Fluconazole 200 mg PO daily   Cultures 1/20 Urine: Pending - Yeast and 1+ bacteria on UA; Mixed urogenital kehinde 75,000 CFU - F    Previous Cx  10/26/22 Urine: Enterococcus faecium (S daptomycin, linezolid, vancomycin)   Serum Creatinine Lab Results   Component Value Date/Time    Creatinine 0.80 01/22/2023 12:31 AM         Creatinine Clearance Estimated Creatinine Clearance: 83 mL/min (based on SCr of 0.8 mg/dL). Temp Temp: 97.9 °F (36.6 °C)       WBC Lab Results   Component Value Date/Time    WBC 9.6 01/22/2023 12:31 AM        Procalcitonin No results found for: PCT     For Antifungals, Metronidazole and Nafcillin: Lab Results   Component Value Date/Time    ALT (SGPT) 39 01/18/2023 03:56 AM    AST (SGOT) 24 01/18/2023 03:56 AM    Alk. phosphatase 89 01/18/2023 03:56 AM    Bilirubin, total 0.7 01/18/2023 03:56 AM        Thanks,  Pharmacist Susan Barakat, PHARMD

## 2023-01-23 NOTE — PROGRESS NOTES
Nutrition Note    Brief Nutrition Assessment    Type and Reason for Visit: Reassess    Nutrition Recommendations/Plan:   Brief follow up. Patient NPO/clear liquid x 7 days. Starting TPN per colorectal surgeon. Visited with patient to obtain new weight due to discrepancy in chart. Measured bed scale weight - 298#. Patient stated measured bed scale weight is inaccurate. Will calculate TPN based on admission standing scale weight of 217#. Patient denies any nausea, vomiting or diarrhea today. K+, Phos and Mg all WDL. TPN recommendations per below. Last 3 Recorded Weights in this Encounter    01/21/23 0313 01/22/23 0321 01/23/23 0630   Weight: 133.9 kg (295 lb 4.8 oz) 134.5 kg (296 lb 8 oz) 135.3 kg (298 lb 3.2 oz)     Wt Readings from Last 10 Encounters:   01/23/23 135.3 kg (298 lb 3.2 oz)   01/06/23 99.9 kg (220 lb 4.8 oz)   12/27/22 99 kg (218 lb 3.2 oz)   12/14/22 101.6 kg (224 lb)   10/26/22 103.1 kg (227 lb 3.2 oz)   10/19/22 113.6 kg (250 lb 7.1 oz)   10/06/22 104.1 kg (229 lb 8 oz)   09/26/22 104.1 kg (229 lb 8 oz)   09/22/22 104.7 kg (230 lb 12.8 oz)   08/30/22 104.2 kg (229 lb 11.5 oz)       TPN as follows:       Day 1: D15, AA5% @ 42 mL/hour       Day 2: D15, AA5% @ 63 mL/hour        Day 3: D15, AA5% @ 83 mL/hour (goal)             - Monitor K, Phos, Mg until stable x 3 days with nutrition, replace PRN       - Hold TPN at current rate if electrolytes are not stable    Lipids: Check triglycerides and if <400, add lipids per below              - Lipids 20% 250 mL @ 42 mL/hr x 12 hours, 2x/week    TPN at goal rate 83 mL/hour with lipids 2x/week provides 1557 kcal (76% needs); 100 g protein (100% needs).     Lab Results   Component Value Date/Time    GFR est AA >60 08/29/2022 04:52 AM    GFR est non-AA >60 08/29/2022 04:52 AM    Creatinine 0.67 01/23/2023 12:33 AM    BUN 11 01/23/2023 12:33 AM    Sodium 137 01/23/2023 12:33 AM    Potassium 3.6 01/23/2023 12:33 AM    Chloride 107 01/23/2023 12:33 AM    CO2 22 01/23/2023 12:33 AM     Lab Results   Component Value Date/Time    Calcium 8.7 01/23/2023 12:33 AM    Phosphorus 2.8 01/23/2023 12:33 AM     Magnesium   Date Value Ref Range Status   01/23/2023 1.9 1.6 - 2.4 mg/dL Final   01/22/2023 1.9 1.6 - 2.4 mg/dL Final   01/21/2023 2.0 1.6 - 2.4 mg/dL Final   01/20/2023 1.9 1.6 - 2.4 mg/dL Final   01/19/2023 2.1 1.6 - 2.4 mg/dL Final     Lab Results   Component Value Date/Time    Cholesterol, total 193 12/10/2020 02:41 PM    HDL Cholesterol 36 12/10/2020 02:41 PM    LDL, calculated 136 (H) 12/10/2020 02:41 PM    VLDL, calculated 21 12/10/2020 02:41 PM    Triglyceride 125 08/24/2022 12:26 AM    CHOL/HDL Ratio 5.4 (H) 12/10/2020 02:41 PM       Estimated Nutrition Needs:   Energy: 2040 (MSJ x 1.3 x 1.1)  Wt used: Admission (98.7kg)  Protein:  (1-1.2g/kg)  Wt used: Admission (98.7kg)   Fluid: 2040 (1ml/kcal)       Contact: Johnny Partida  RD Intern  RD office: [phone]

## 2023-01-23 NOTE — PROGRESS NOTES
Bedside and Verbal shift change report given to Raf Gunn RN (oncoming nurse) by Jose Marshall RN (offgoing nurse). Report included the following information SBAR, Kardex, Intake/Output, MAR, Accordion, and Recent Results. This patient was assisted with Intentional Toileting every 2 hours during this shift as appropriate. Documentation of ambulation and output reflected on Flowsheet as appropriate. Purposeful hourly rounding was completed using AIDET and 5Ps. Outcomes of PHR documented as they occurred. Bed alarm in use as appropriate. Dual Suction and ambubag in place. Bedside and Verbal shift change report given to Alicia Blood RN (oncoming nurse) by Raf Gunn RN (offgoing nurse). Report included the following information SBAR, Kardex, Intake/Output, MAR, Accordion, and Recent Results.

## 2023-01-23 NOTE — PROGRESS NOTES
PICC Placement Note    PRE-PROCEDURE VERIFICATION  Correct Procedure: yes  Correct Site:  yes  Temperature: Temp: 98.7 °F (37.1 °C), Temperature Source: Temp Source: Oral  Recent Labs     01/23/23  0033   BUN 11   CREA 0.67      WBC 10.3     Allergies: Sulfa (sulfonamide antibiotics)  Education materials for PICC Care given: yes. See Patient Education activity for further details. PICC Booklet placed at bedside: yes    Closed Ended PICC Catheters:  Flush Lumens as Follows:  Intermittent Medication:   Flush before and after each medication with 10 ml NS. Unused Ports:  Flush every 8 hours with 10 ml NS.  TPN Ports:  Flush every 24 hours with 20 ml NS prior to hanging new bag. Blood Draws: Stop infusion, draw off and waste 10 ml of blood. Draw sample with 10cc syringe or greater. DO NOT USE VACUTAINER . Transfer with appropriate device to lab  tubes. Flush with 20 ml NS. Dressing Change:  Every 7 days, and PRN using sterile technique if integrity of dressing is compromised. Initial dressing change for central line 24-48 hours post insertion if gauze is used. Apply new dressing per policy. PROCEDURE DETAIL  Consent was obtained and all questions were answered related to risks and benefits. A triple lumen PICC line was inserted, as a sterile procedure using ultrasound and modified Seldinger technique for TPN. The following documentation is in addition to the PICC properties in the lines/airways flowsheet :  Lot #: [id number]  Lidocaine 1% administered intradermally :yes  Internal Catheter Total Length: 41 (cm)  Vein Selection for PICC:right cephalic  Central Line Bundle followed yes  Complication Related to Insertion:no    The placement was verified by EKG, MAX P WAVE @ 41 (cm). PER EKG PICC TIP @ C/A junction.       X-ray: no. TLine is okay to use: yes  Zach Gross RN

## 2023-01-23 NOTE — PROGRESS NOTES
PT received ok from nursing to work with PT. PT just beginning to organize room in preparation when PICC team arrived. Nursing reports she really needs PICC line. PT deferred at this time. Will return tomorrow.

## 2023-01-23 NOTE — WOUND CARE
Wound Consult:  Re-Consult Patient Visit/  Consulted for: packing of old ileostomy site RLQ per Dr. Ellyn Leo. Patient resting on Stover bed; [employer] system in place. Stef score: 15; purewick in use, incontinent of loose brown moderated BM. Patient turns side to side in bed with assist of one to two. Patient is alert and oriented x 4. Assessment/Treatment:  RLQ ostomy takedown site - 2 x 3.8 x 8 cm, moist pink base, one suture remains medially; removed ostomy appliance, inserted Q tip in to wound ~ 2 cm and serosanguinous type drainage gushed largely from wound bed, palpating gently around site expressed large amount of fluid as well. No odor, no purulence noted. Waited until no further drainage expressed and packed with continuous piece of 2\" Vashe moistened kerlex, dry gauze and island dressing, luca-wound skin with some areas of skin stripping and used no sting barrier to protect prior to placing island dressing. Midline incision - lower 1/3 - 1.1 x 0.8 x 1.2 cm separation that undermines 2 cm at 12, and then cm in remaining skin from 1 to 11 o'clock, small amount of similar drainage from this area. Used wick of gauze moistened with Vashe to fill area, dry outer dressing. Perineal area / vaginal area - red/pink irritated skin from moisture/incontinence; slightly denuded within, used antifungal powder to area after cleansing, then sealed with no sting barrier to protect skin. Perianal area - red/pink irritated skin from IAD; Niles applied zinc ointment. No redness to sacrum, buttocks, heels (heels floated). Wound Recommendations (after discussion with Dr. Ellyn Leo):  RLQ wound opening:  Q 12 hour packing. Vashe moistened 2\" kerlex gauze topack RLQ wound in one continuous strip, 4x4s/island dressing to secure. Change more frequently if drainage strikes through. Cover open area in midline incision with dry gauze/tape. Change as needed. ABDOMINAL BINDER AT ALL TIMES.   Skin and PI Prevention Recommendations:  Turn and reposition ~ every 2 hours. Mobility team to assist with routine turning / repositioning. Off load heels: use pillows and/or boots. Use waffle cushion while in chair. Manage moisture with frequent incontinence checks; intentional toileting; zinc barrier ointment; Pure wick to help contain urine; frequent check for fecal incontinence. Continue to monitor risk assessment with Stef score; Dual skin assessment and changes in condition. Promote nutrition; following, for TPN after line placed. Plan:  Discussed with patient's nurse Nadine Turpin. Hand off to Dr. Ramsey Bustos nurse and will discuss findings/plans with Dr. Abel Webber on call back. Orders proposed and added. We will continue to reassess routinely. Please re-consult should any concerns arise prior to next visit. Elinor Garza RN, [facility]      Wound and Ostomy Department.   [phone] wound nurse or Perfect [employer]

## 2023-01-23 NOTE — PROGRESS NOTES
Occupational Therapy     Completed chart review and attempted to see patient. PT leaving room as PICC team present. Will defer for OT and continue to follow.        Thank you,    Shamika Wallis, OT

## 2023-01-23 NOTE — PROGRESS NOTES
Bedside and Verbal shift change report given to [facility] (oncoming nurse) by Cody Kwong (offgoing nurse).  Report included the following information SBAR, Kardex, Procedure Summary, Intake/Output, MAR, Accordion, Recent Results, Med Rec Status, Cardiac Rhythm SR, Alarm Parameters , and Pre Procedure Checklist.

## 2023-01-23 NOTE — PROGRESS NOTES
[facility]  [address]  [phone]      Medical Progress Note      NAME: Alexa Cobb   :  1950  MRM:  [id number]    Date/Time of service: 2023      Subjective:     Chief Complaint:  Patient was personally seen and examined by me during this time period. Chart reviewed. FU uncontrolled hypertension. Patient with nausea and vomiting required; NGT placed overnight. Sx improved with NGT; abd pain improved. Objective:       Vitals:       Last 24hrs VS reviewed since prior progress note.  Most recent are:    Visit Vitals  BP (!) 149/74 (BP 1 Location: Right upper arm, BP Patient Position: At rest)   Pulse 97   Temp 98.7 °F (37.1 °C)   Resp 16   Ht 5' 2.99\" (1.6 m)   Wt 135.3 kg (298 lb 3.2 oz)   SpO2 96%   BMI 52.84 kg/m²     SpO2 Readings from Last 6 Encounters:   23 96%   23 97%   22 96%   22 93%   10/26/22 96%   10/19/22 94%    O2 Flow Rate (L/min): 2 l/min     Intake/Output Summary (Last 24 hours) at 2023 1143  Last data filed at 2023 0930  Gross per 24 hour   Intake 118 ml   Output 1300 ml   Net -1182 ml          Exam:     Physical Exam:    Gen:  Well-developed, well-nourished, in no acute distress  HEENT:  Pink conjunctivae, PERRL, hearing intact to voice  Resp:  No accessory muscle use, clear breath sounds without wheezes rales or rhonchi  Card:  No murmurs, normal S1, S2, b/l peripheral edema  Abd:  TTP, distended, diminished bowel sounds, wound, ostomy bag   Musc:  No cyanosis or clubbing  Skin:  midline incision   Neuro:  Cranial nerves 3-12 are grossly intact, follows commands appropriately  Psych:  poor insight       Medications Reviewed: (see below)    Lab Data Reviewed: (see below)    ______________________________________________________________________    Medications:     Current Facility-Administered Medications   Medication Dose Route Frequency    [START ON 2023] levothyroxine (SYNTHROID) injection 90 mcg  90 mcg IntraVENous Q24H    TPN ADULT - CENTRAL AA 5% D15% W/ ELECTROLYTES AND CA   IntraVENous CONTINUOUS    miconazole (MICOTIN) 2 % powder   Topical BID    prochlorperazine (COMPAZINE) injection 10 mg  10 mg IntraVENous Q6H PRN    naloxegoL (MOVANTIK) tablet 12.5 mg  12.5 mg Oral DAILY    lactated Ringers infusion  75 mL/hr IntraVENous CONTINUOUS    acetaminophen (TYLENOL) tablet 1,000 mg  1,000 mg Oral TID    enoxaparin (LOVENOX) injection 30 mg  30 mg SubCUTAneous Q12H    hydrALAZINE (APRESOLINE) 20 mg/mL injection 10 mg  10 mg IntraVENous Q6H PRN    traMADoL (ULTRAM) tablet 50 mg  50 mg Oral Q6H PRN    HYDROmorphone (DILAUDID) syringe 0.2 mg  0.2 mg IntraVENous Q4H PRN    lidocaine 4 % patch 2 Patch  2 Patch TransDERmal Q24H    naloxone (NARCAN) injection 0.1 mg  0.1 mg IntraVENous PRN    metoprolol succinate (TOPROL-XL) XL tablet 50 mg  50 mg Oral QAM    folic acid tablet 0.4 mg  0.4 mg Oral DAILY    sodium chloride (NS) flush 5-40 mL  5-40 mL IntraVENous Q8H    sodium chloride (NS) flush 5-40 mL  5-40 mL IntraVENous PRN    promethazine (PHENERGAN) tablet 12.5 mg  12.5 mg Oral Q6H PRN    Or    ondansetron (ZOFRAN) injection 4 mg  4 mg IntraVENous Q6H PRN    LORazepam (ATIVAN) injection 0.5 mg  0.5 mg IntraVENous Q8H PRN    pantoprazole (PROTONIX) tablet 40 mg  40 mg Oral ACB    thiamine mononitrate (B-1) tablet 100 mg  100 mg Oral DAILY    trospium (SANCTURA) tablet 20 mg  20 mg Oral ACB&D          Lab Review:     Recent Labs     01/23/23  0033 01/22/23  0031 01/21/23  0019   WBC 10.3 9.6 14.9*   HGB 12.2 12.9 12.5   HCT 36.1 38.1 37.2    288 250       Recent Labs     01/23/23  0033 01/22/23  0031 01/21/23  0019    134* 133*   K 3.6 3.8 4.1    105 103   CO2 22 22 24   GLU 87 98 131*   BUN 11 12 12   CREA 0.67 0.80 0.89   CA 8.7 8.7 9.0   MG 1.9 1.9 2.0   PHOS 2.8 2.4* 3.2       Lab Results   Component Value Date/Time    Glucose (POC) 81 08/29/2022 05:03 AM    Glucose (POC) 87 08/28/2022 11:55 PM    Glucose (POC) 100 08/28/2022 05:44 PM    Glucose (POC) 120 (H) 08/28/2022 03:26 PM    Glucose (POC) 134 (H) 08/28/2022 12:00 PM          Assessment / Plan:     Hx of colovesicular fistula and complicated diverticulitis status post ileostomy/history of small bowel obstruction POA: underwent reversal of illestomy and hernia repair on 1/17/23. Ct 1/19 showing ileus; advance diet per surgery. NGT placed 1/22 and TPN to start this am per surgery. Encourage IS and PT. Surgery following. Uncontrolled abd pain due to above. Now better controlled. Continue scheduled Tylenol,  lidocaine patches; IV Dilaudid as needed. Continue naloxegol. Monitor for sedation. Narcan as needed. SIRS: SEPSIS RULED OUT. met criteria with leukocytosis, fevers, tachycardia; BUT NO SOURCE. SIRS likely due to post op stress reaction. CXR no e/o of infection. blood cx NTD. UA w/ bacteria; but urine cx w/ no sig growth or e/o UTI.  lactic acid wnl.  ct abd pelvis with contrast showing no abscess. Stop IV ceftriaxone and IV Vanc. Continue IVF. Concern for UTI/ Hx of UTIs w/ colovesicular fistula/ Yeast in Urine: Hx of enterococcus faecium w/ resistance. urine cx w/ no sig growth or e/o UTI. Stop IV ceftraixone and IV Vanc. Stop IV fluconazole; no e/eo of yeast in urine. Uncontrolled hypertension/ Hx of hypertension: Elevated but should improve since patient now taking pills. Suspect due to difficulty tolerating orals and pain. Improved pain control as above. Probnp slightly elevated; no e/o overload on CXR. Continue home beta-blocker as atolerated. IV hydralazine as needed. Hyponatremia: Resolved. likely due to poor oral intake. Monitor      Hypophosatemia: replete as needed     Hypothyroidism: TSH wnl. Continue home synthroid.       GERD: PPI       Total time spent with patient: 28 Minutes **I personally saw and examined the patient during this time period** Care Plan discussed with: Patient, Family, and Nursing Staff    Discussed:  Care Plan    Prophylaxis:  Lovenox    Disposition:  HH PT, OT, RN           ___________________________________________________    Attending Physician: Dwain Munoz DO

## 2023-01-23 NOTE — PROGRESS NOTES
CRS  Pt had an ngt placed last nite. Still with bms    Flowsheet reviewed  Abd:  ostomy bag to collect drainage from wound. Soft, nt    Plan:  1) PICC line for tpn. Start TPN tonight as I anticipate will be several more days before PO can be re-initiated. DC IVF when TPN at goal    2) DC atbx. Blood cx NGTD. Pt is not septic. CT without abscess or obvious leak. Pt is afebrile, Creat already elevated postop- dc vanco    3) htn. Will dc IVF when tpn at goal. Defer to hospialist service to change metoprolol to IV    4) wound.  Will ask WOCN to begin packing wound and dc ostomy bag

## 2023-01-24 LAB
ANION GAP SERPL CALC-SCNC: 7 MMOL/L (ref 5–15)
BACTERIA SPEC CULT: NORMAL
BASOPHILS # BLD: 0 K/UL (ref 0–0.1)
BASOPHILS NFR BLD: 0 % (ref 0–1)
BUN SERPL-MCNC: 10 MG/DL (ref 6–20)
BUN/CREAT SERPL: 18 (ref 12–20)
CALCIUM SERPL-MCNC: 8.7 MG/DL (ref 8.5–10.1)
CHLORIDE SERPL-SCNC: 106 MMOL/L (ref 97–108)
CO2 SERPL-SCNC: 25 MMOL/L (ref 21–32)
CREAT SERPL-MCNC: 0.57 MG/DL (ref 0.55–1.02)
DIFFERENTIAL METHOD BLD: ABNORMAL
EOSINOPHIL # BLD: 0.5 K/UL (ref 0–0.4)
EOSINOPHIL NFR BLD: 6 % (ref 0–7)
ERYTHROCYTE [DISTWIDTH] IN BLOOD BY AUTOMATED COUNT: 13.1 % (ref 11.5–14.5)
GLUCOSE BLD STRIP.AUTO-MCNC: 108 MG/DL (ref 65–117)
GLUCOSE BLD STRIP.AUTO-MCNC: 115 MG/DL (ref 65–117)
GLUCOSE BLD STRIP.AUTO-MCNC: 126 MG/DL (ref 65–117)
GLUCOSE BLD STRIP.AUTO-MCNC: 129 MG/DL (ref 65–117)
GLUCOSE SERPL-MCNC: 110 MG/DL (ref 65–100)
HCT VFR BLD AUTO: 31.7 % (ref 35–47)
HGB BLD-MCNC: 10.9 G/DL (ref 11.5–16)
IMM GRANULOCYTES # BLD AUTO: 0.1 K/UL (ref 0–0.04)
IMM GRANULOCYTES NFR BLD AUTO: 2 % (ref 0–0.5)
LYMPHOCYTES # BLD: 2 K/UL (ref 0.8–3.5)
LYMPHOCYTES NFR BLD: 23 % (ref 12–49)
MAGNESIUM SERPL-MCNC: 1.9 MG/DL (ref 1.6–2.4)
MCH RBC QN AUTO: 33.7 PG (ref 26–34)
MCHC RBC AUTO-ENTMCNC: 34.4 G/DL (ref 30–36.5)
MCV RBC AUTO: 98.1 FL (ref 80–99)
MONOCYTES # BLD: 0.9 K/UL (ref 0–1)
MONOCYTES NFR BLD: 10 % (ref 5–13)
NEUTS SEG # BLD: 5.4 K/UL (ref 1.8–8)
NEUTS SEG NFR BLD: 59 % (ref 32–75)
NRBC # BLD: 0 K/UL (ref 0–0.01)
NRBC BLD-RTO: 0 PER 100 WBC
PHOSPHATE SERPL-MCNC: 2.4 MG/DL (ref 2.6–4.7)
PLATELET # BLD AUTO: 252 K/UL (ref 150–400)
PMV BLD AUTO: 10.4 FL (ref 8.9–12.9)
POTASSIUM SERPL-SCNC: 3.2 MMOL/L (ref 3.5–5.1)
RBC # BLD AUTO: 3.23 M/UL (ref 3.8–5.2)
SERVICE CMNT-IMP: ABNORMAL
SERVICE CMNT-IMP: ABNORMAL
SERVICE CMNT-IMP: NORMAL
SODIUM SERPL-SCNC: 138 MMOL/L (ref 136–145)
TRIGL SERPL-MCNC: 136 MG/DL (ref ?–150)
WBC # BLD AUTO: 8.9 K/UL (ref 3.6–11)

## 2023-01-24 PROCEDURE — 74011250636 HC RX REV CODE- 250/636: Performed by: COLON & RECTAL SURGERY

## 2023-01-24 PROCEDURE — 97530 THERAPEUTIC ACTIVITIES: CPT

## 2023-01-24 PROCEDURE — 74011250637 HC RX REV CODE- 250/637: Performed by: COLON & RECTAL SURGERY

## 2023-01-24 PROCEDURE — 65270000046 HC RM TELEMETRY

## 2023-01-24 PROCEDURE — 36415 COLL VENOUS BLD VENIPUNCTURE: CPT

## 2023-01-24 PROCEDURE — 74011250637 HC RX REV CODE- 250/637: Performed by: INTERNAL MEDICINE

## 2023-01-24 PROCEDURE — 85025 COMPLETE CBC W/AUTO DIFF WBC: CPT

## 2023-01-24 PROCEDURE — 84478 ASSAY OF TRIGLYCERIDES: CPT

## 2023-01-24 PROCEDURE — 83735 ASSAY OF MAGNESIUM: CPT

## 2023-01-24 PROCEDURE — 82962 GLUCOSE BLOOD TEST: CPT

## 2023-01-24 PROCEDURE — 74011000250 HC RX REV CODE- 250: Performed by: COLON & RECTAL SURGERY

## 2023-01-24 PROCEDURE — 80048 BASIC METABOLIC PNL TOTAL CA: CPT

## 2023-01-24 PROCEDURE — 84100 ASSAY OF PHOSPHORUS: CPT

## 2023-01-24 PROCEDURE — 74011000250 HC RX REV CODE- 250: Performed by: INTERNAL MEDICINE

## 2023-01-24 PROCEDURE — 74011250636 HC RX REV CODE- 250/636: Performed by: INTERNAL MEDICINE

## 2023-01-24 RX ORDER — POTASSIUM CHLORIDE 7.45 MG/ML
10 INJECTION INTRAVENOUS
Status: COMPLETED | OUTPATIENT
Start: 2023-01-24 | End: 2023-01-24

## 2023-01-24 RX ORDER — MAGNESIUM SULFATE HEPTAHYDRATE 40 MG/ML
2 INJECTION, SOLUTION INTRAVENOUS ONCE
Status: COMPLETED | OUTPATIENT
Start: 2023-01-24 | End: 2023-01-24

## 2023-01-24 RX ADMIN — BENZOCAINE: 200 SPRAY DENTAL; ORAL; PERIODONTAL at 14:06

## 2023-01-24 RX ADMIN — TRAMADOL HYDROCHLORIDE 50 MG: 50 TABLET ORAL at 21:42

## 2023-01-24 RX ADMIN — POTASSIUM CHLORIDE 10 MEQ: 10 INJECTION, SOLUTION INTRAVENOUS at 09:43

## 2023-01-24 RX ADMIN — ACETAMINOPHEN 1000 MG: 500 TABLET ORAL at 09:41

## 2023-01-24 RX ADMIN — POTASSIUM CHLORIDE 10 MEQ: 10 INJECTION, SOLUTION INTRAVENOUS at 11:06

## 2023-01-24 RX ADMIN — SODIUM CHLORIDE, POTASSIUM CHLORIDE, SODIUM LACTATE AND CALCIUM CHLORIDE 75 ML/HR: 600; 310; 30; 20 INJECTION, SOLUTION INTRAVENOUS at 06:43

## 2023-01-24 RX ADMIN — TROSPIUM CHLORIDE 20 MG: 20 TABLET, FILM COATED ORAL at 17:24

## 2023-01-24 RX ADMIN — POTASSIUM CHLORIDE 10 MEQ: 10 INJECTION, SOLUTION INTRAVENOUS at 12:00

## 2023-01-24 RX ADMIN — POTASSIUM CHLORIDE 10 MEQ: 10 INJECTION, SOLUTION INTRAVENOUS at 13:09

## 2023-01-24 RX ADMIN — ACETAMINOPHEN 1000 MG: 500 TABLET ORAL at 17:24

## 2023-01-24 RX ADMIN — TROSPIUM CHLORIDE 20 MG: 20 TABLET, FILM COATED ORAL at 06:42

## 2023-01-24 RX ADMIN — METOPROLOL SUCCINATE 50 MG: 50 TABLET, EXTENDED RELEASE ORAL at 09:43

## 2023-01-24 RX ADMIN — MAGNESIUM SULFATE HEPTAHYDRATE 2 G: 40 INJECTION, SOLUTION INTRAVENOUS at 15:20

## 2023-01-24 RX ADMIN — ACETAMINOPHEN 1000 MG: 500 TABLET ORAL at 21:42

## 2023-01-24 RX ADMIN — Medication 100 MG: at 09:41

## 2023-01-24 RX ADMIN — MICONAZOLE NITRATE 2 % TOPICAL POWDER: at 18:12

## 2023-01-24 RX ADMIN — ASCORBIC ACID, VITAMIN A PALMITATE, CHOLECALCIFEROL, THIAMINE HYDROCHLORIDE, RIBOFLAVIN-5 PHOSPHATE SODIUM, PYRIDOXINE HYDROCHLORIDE, NIACINAMIDE, DEXPANTHENOL, ALPHA-TOCOPHEROL ACETATE, VITAMIN K1, FOLIC ACID, BIOTIN, CYANOCOBALAMIN: 200; 3300; 200; 6; 3.6; 6; 40; 15; 10; 150; 600; 60; 5 INJECTION, SOLUTION INTRAVENOUS at 18:08

## 2023-01-24 RX ADMIN — ENOXAPARIN SODIUM 30 MG: 100 INJECTION SUBCUTANEOUS at 21:42

## 2023-01-24 RX ADMIN — SODIUM CHLORIDE, PRESERVATIVE FREE 10 ML: 5 INJECTION INTRAVENOUS at 21:42

## 2023-01-24 RX ADMIN — FOLIC ACID TAB 400 MCG 0.4 MG: 400 TAB at 09:00

## 2023-01-24 RX ADMIN — MICONAZOLE NITRATE 2 % TOPICAL POWDER: at 11:01

## 2023-01-24 RX ADMIN — PANTOPRAZOLE SODIUM 40 MG: 40 TABLET, DELAYED RELEASE ORAL at 06:43

## 2023-01-24 RX ADMIN — ENOXAPARIN SODIUM 30 MG: 100 INJECTION SUBCUTANEOUS at 09:43

## 2023-01-24 RX ADMIN — SODIUM CHLORIDE, PRESERVATIVE FREE 10 ML: 5 INJECTION INTRAVENOUS at 15:22

## 2023-01-24 RX ADMIN — SODIUM CHLORIDE, PRESERVATIVE FREE 10 ML: 5 INJECTION INTRAVENOUS at 06:43

## 2023-01-24 NOTE — PROGRESS NOTES
Care Management follow up, LOS 7 days     Patient admitted for Ileostomy reversal, hernia repair. HTN post op, post op ileus. PICC placed 1/23/23  TPN started 1/24/23    History of: complex diverticulitis, recent small bowel obstruction, GERD, hypothyroidism, TBI. RUR 14 (Score %) low             Is This a Readmission YES  Is this a Bundle NO     Current status  Patient discussed during interdisciplinary rounds. Patient continues to require medical management including ongoing assessment and monitoring. TPN started today, PICC placed yesterday. PT/OT recommending SNF placement. Met with patient at bedside, face mask on. SNF provider list given to patient. States she would like to talk with her sister about SNFs. Agreed to follow up tomorrow to discuss SNF placement and choices. States she has been at [facility] acute [facility] in the past and does not want to return there for rehab. Transition of Care Plan  Monitor patient status and response to treatment. Patient continues to require medical management. CM needs: SNF placement  Need to obtain SNF choices. Open to [facility]. Await patient progress and will monitor need for SNF placement. CM to monitor progress and recommendations.     Marry Anderson, RN, MSN/Care manager  [phone]

## 2023-01-24 NOTE — PROGRESS NOTES
Bedside shift change report given to [facility] (oncoming nurse) by Elle Phoenix (offgoing nurse). Report included the following information SBAR, Kardex, Med Rec Status, and Cardiac Rhythm NSR .

## 2023-01-24 NOTE — PROGRESS NOTES
[facility]  [address]  [phone]      Medical Progress Note      NAME: Kain Turcios   :  1950  MRM:  [id number]    Date/Time of service: 2023      Subjective:     Chief Complaint:  Patient was personally seen and examined by me during this time period. Chart reviewed. FU uncontrolled hypertension. No nausea vomiting this am s/p NGT . Pain overall improved. Sister at bedside. Objective:       Vitals:       Last 24hrs VS reviewed since prior progress note.  Most recent are:    Visit Vitals  BP (!) 155/76 (BP 1 Location: Left upper arm, BP Patient Position: At rest)   Pulse 78   Temp 98.4 °F (36.9 °C)   Resp 20   Ht 5' 2.99\" (1.6 m)   Wt 134.8 kg (297 lb 1.6 oz)   SpO2 92%   BMI 52.64 kg/m²     SpO2 Readings from Last 6 Encounters:   23 92%   23 97%   22 96%   22 93%   10/26/22 96%   10/19/22 94%    O2 Flow Rate (L/min): 2 l/min     Intake/Output Summary (Last 24 hours) at 2023 1219  Last data filed at 2023 1113  Gross per 24 hour   Intake 20 ml   Output 790 ml   Net -770 ml          Exam:     Physical Exam:    Gen:  Well-developed, well-nourished, in no acute distress  HEENT:  Pink conjunctivae, PERRL, hearing intact to voice  Resp:  No accessory muscle use, clear breath sounds without wheezes rales or rhonchi  Card:  No murmurs, normal S1, S2, b/l peripheral edema  Abd:  TTP, distended, diminished bowel sounds, wound, dressing c/d/I   Musc:  No cyanosis or clubbing  Skin:  midline incision   Neuro:  Cranial nerves 3-12 are grossly intact, follows commands appropriately  Psych:  poor insight       Medications Reviewed: (see below)    Lab Data Reviewed: (see below)    ______________________________________________________________________    Medications:     Current Facility-Administered Medications   Medication Dose Route Frequency    potassium chloride 10 mEq in 100 ml IVPB  10 mEq IntraVENous Q1H    TPN ADULT - CENTRAL AA 5% D15% W/ ELECTROLYTES AND CA   IntraVENous CONTINUOUS    magnesium sulfate 2 g/50 ml IVPB (premix or compounded)  2 g IntraVENous ONCE    [START ON 1/27/2023] levothyroxine (SYNTHROID) injection 90 mcg  90 mcg IntraVENous Q24H    TPN ADULT - CENTRAL AA 5% D15% W/ ELECTROLYTES AND CA   IntraVENous CONTINUOUS    benzocaine (HURRICANE) 20 % spray   Mucous Membrane PRN    alteplase (CATHFLO) 1 mg in sterile water (preservative free) 1 mL injection  1 mg InterCATHeter PRN    insulin lispro (HUMALOG) injection   SubCUTAneous AC&HS    glucose chewable tablet 16 g  4 Tablet Oral PRN    glucagon (GLUCAGEN) injection 1 mg  1 mg IntraMUSCular PRN    dextrose 10% infusion 0-250 mL  0-250 mL IntraVENous PRN    miconazole (MICOTIN) 2 % powder   Topical BID    prochlorperazine (COMPAZINE) injection 10 mg  10 mg IntraVENous Q6H PRN    acetaminophen (TYLENOL) tablet 1,000 mg  1,000 mg Oral TID    enoxaparin (LOVENOX) injection 30 mg  30 mg SubCUTAneous Q12H    hydrALAZINE (APRESOLINE) 20 mg/mL injection 10 mg  10 mg IntraVENous Q6H PRN    traMADoL (ULTRAM) tablet 50 mg  50 mg Oral Q6H PRN    HYDROmorphone (DILAUDID) syringe 0.2 mg  0.2 mg IntraVENous Q4H PRN    lidocaine 4 % patch 2 Patch  2 Patch TransDERmal Q24H    naloxone (NARCAN) injection 0.1 mg  0.1 mg IntraVENous PRN    metoprolol succinate (TOPROL-XL) XL tablet 50 mg  50 mg Oral QAM    folic acid tablet 0.4 mg  0.4 mg Oral DAILY    sodium chloride (NS) flush 5-40 mL  5-40 mL IntraVENous Q8H    sodium chloride (NS) flush 5-40 mL  5-40 mL IntraVENous PRN    promethazine (PHENERGAN) tablet 12.5 mg  12.5 mg Oral Q6H PRN    Or    ondansetron (ZOFRAN) injection 4 mg  4 mg IntraVENous Q6H PRN    LORazepam (ATIVAN) injection 0.5 mg  0.5 mg IntraVENous Q8H PRN    pantoprazole (PROTONIX) tablet 40 mg  40 mg Oral ACB    thiamine mononitrate (B-1) tablet 100 mg  100 mg Oral DAILY    trospium (SANCTURA) tablet 20 mg  20 mg Oral ACB&D Lab Review:     Recent Labs     01/24/23  0431 01/23/23  0033 01/22/23  0031   WBC 8.9 10.3 9.6   HGB 10.9* 12.2 12.9   HCT 31.7* 36.1 38.1    310 288       Recent Labs     01/24/23  0431 01/23/23  0033 01/22/23  0031    137 134*   K 3.2* 3.6 3.8    107 105   CO2 25 22 22   * 87 98   BUN 10 11 12   CREA 0.57 0.67 0.80   CA 8.7 8.7 8.7   MG 1.9 1.9 1.9   PHOS 2.4* 2.8 2.4*       Lab Results   Component Value Date/Time    Glucose (POC) 126 (H) 01/24/2023 11:31 AM    Glucose (POC) 108 01/24/2023 07:39 AM    Glucose (POC) 97 01/23/2023 09:48 PM    Glucose (POC) 81 08/29/2022 05:03 AM    Glucose (POC) 87 08/28/2022 11:55 PM          Assessment / Plan:     Hx of colovesicular fistula and complicated diverticulitis status post ileostomy/history of small bowel obstruction POA: underwent reversal of illestomy and hernia repair on 1/17/23. Ct 1/19 showing ileus; advance diet per surgery. NGT placed 1/22 and TPN started. Encourage IS and PT. Surgery following. Uncontrolled abd pain due to above. Now better controlled. Continue scheduled Tylenol,  lidocaine patches; IV Dilaudid as needed. Continue naloxegol. Monitor for sedation. Narcan as needed. SIRS: SEPSIS RULED OUT. met criteria with leukocytosis, fevers, tachycardia; BUT NO SOURCE. SIRS likely due to post op stress reaction. CXR no e/o of infection. blood cx NTD. UA w/ bacteria; but urine cx w/ no sig growth or e/o UTI.  lactic acid wnl.  ct abd pelvis with contrast showing no abscess. Stop IV ceftriaxone, IV Vanc. Continue IVF. Concern for UTI/ Hx of UTIs w/ colovesicular fistula/ Yeast in Urine: Hx of enterococcus faecium w/ resistance. UTI RULED OUT. urine cx w/ no sig growth or e/o UTI. Stopped IV ceftraixone and IV Vanc. Stopped IV fluconazole; no e/eo of yeast in urine. Uncontrolled hypertension/ Hx of hypertension: Elevated but should improve since patient now taking pills.   Suspect due to difficulty tolerating orals and pain. Improved pain control as above. Probnp slightly elevated; no e/o overload on CXR. Continue home beta-blocker as tolerated. IV hydralazine as needed. Hyponatremia: Resolved. likely due to poor oral intake. Monitor      Hypophosatemia: replete as needed. Now on TPN     Hypothyroidism: TSH wnl. Continue home synthroid. GERD: PPI     Thank you for letting us participate in this patient's care. Hospitalist service will sign off. Please contact us if any changes in patient status or questions.      Total time spent with patient: 28 Minutes **I personally saw and examined the patient during this time period**                 Care Plan discussed with: Patient, Family, and Nursing Staff    Discussed:  Care Plan    Prophylaxis:  Lovenox    Disposition:   PT, OT, RN           ___________________________________________________    Attending Physician: Manuel Mahan DO

## 2023-01-24 NOTE — PROGRESS NOTES
Problem: Self Care Deficits Care Plan (Adult)  Goal: *Acute Goals and Plan of Care (Insert Text)  Description: FUNCTIONAL STATUS PRIOR TO ADMISSION: Patient was modified independent using a rollator (downstairs) and rolling walker (upstairs) for functional mobility. Patient required minimal assistance for basic and instrumental ADLs, mostly for LB dressing. Pt reports hard year with 30+ pound weight loss. HOME SUPPORT: The patient lived with her sister and brother-in-law. Occupational Therapy Goals  Initiated 1/20/2023  1. Patient will perform grooming with supervision/set-up within 7 day(s). 2.  Patient will perform upper body dressing and bathing with supervision/set-up within 7 day(s). 3.  Patient will perform lower body dressing with moderate assistance using AE PRN within 7 day(s). 4.  Patient will perform toilet transfers with minimal assistance/contact guard assist within 7 day(s). 5.  Patient will perform all aspects of toileting with minimal assistance/contact guard assist within 7 day(s). 6.  Patient will participate in upper extremity therapeutic exercise/activities with independence for 10 minutes within 7 day(s). 7.  Patient will utilize energy conservation techniques during functional activities with verbal cues within 7 day(s). Outcome: Progressing Towards Goal     OCCUPATIONAL THERAPY TREATMENT  Patient: Min Ferreira (17 y.o. female)  Date: 1/24/2023  Diagnosis: [facility] [Z93.2] <principal problem not specified>  Procedure(s) (LRB):  REVERSAL LOOP ILEOSTOMY, VENTRAL HERNIA REPAIR, LYSIS OF ADHESIONS (N/A) 7 Days Post-Op  Precautions: Fall, Skin (abdominal binder when out of bed)  Chart, occupational therapy assessment, plan of care, and goals were reviewed. ASSESSMENT  Patient received semi supine in bed A&OX4 and agreeable for OT/PT tx. Patient continues with skilled OT services and is progressing towards goals.  Patient requiring min A to roll bed level for total A toileting and placement of protective undergarment and abdominal binder in prep for OOB activity. Patient requiring min Ax2 sup->sit with HOB raised and increased time and CGA scooting EOB with increased time. Once seated EOB pt stating slight dizziness however resolving after rest break. Patient requiring min Ax2 for sit<->stand with RW and gait belt and transfer to recliner in prep for [facility] transfers. Once seated in recliner patient SBA for grooming task with set up for oral hygiene. Patient educated on lian UE HEP in prep for self care tasks with pt verbalizing understanding. Patient would benefit from continued skilled OT services while at [facility] in order to increase safety and independence with self care and functional transfers/mobility. Recommend discharge to SNF when medically appropriate. Other factors to consider for discharge: time since onset, severity of deficits, PLOF         PLAN :  Patient continues to benefit from skilled intervention to address the above impairments. Continue treatment per established plan of care to address goals. Recommend with staff: up to chair for meals, up to [facility] for toileting    Recommend next OT session: continue to progress towards goals    Recommendation for discharge: (in order for the patient to meet his/her long term goals)  Therapy up to 5 days/week in SNF setting    This discharge recommendation:  Has been made in collaboration with the attending provider and/or case management    IF patient discharges home will need the following DME: TBD       SUBJECTIVE:   Patient stated I am a little dizzy.  when seated EOB however after sitting rest break pt stating dizziness resolved    OBJECTIVE DATA SUMMARY:   Cognitive/Behavioral Status:  Neurologic State: Alert  Orientation Level: Oriented X4  Cognition: Follows commands     Functional Mobility and Transfers for ADLs:  Bed Mobility:  Rolling: Minimum assistance; Additional time (for placement of protective undergarment and abdominal binder)  Supine to Sit: Minimum assistance;Assist x2; Additional time  Scooting: Contact guard assistance; Additional time    Transfers:  Sit to Stand: Minimum assistance;Assist x2; Additional time     Bed to Chair: Minimum assistance;Assist x2; Additional time    Balance:  Sitting: Intact  Standing: With support  Standing - Static: Constant support; Fair  Standing - Dynamic : Constant support; Fair    ADL Intervention:     Grooming  Grooming Assistance: Stand-by assistance;Set-up  Position Performed: Seated in chair  Brushing Teeth: Stand-by assistance    Toileting  Toileting Assistance: Total assistance(dependent) (semi supine in bed)  Bladder Hygiene: Total assistance (dependent)  Clothing Management: Total assistance (dependent)     Therapeutic Exercises:   Patient educated on lian UE HEP in prep for self care tasks with pt verbalizing understanding    Pain:  Pt stating some pain with activity    Activity Tolerance:   Fair    After treatment patient left in no apparent distress:   Sitting in chair, Call bell within reach, Bed / chair alarm activated, and nursing present    COMMUNICATION/COLLABORATION:   The patients plan of care was discussed with: Physical therapy assistant and Registered nurse.    Co-treatment completed with PT for increased patient and clinician safety    Vicky Lewis  Time Calculation: 30 mins

## 2023-01-24 NOTE — PROGRESS NOTES
CRS  Pt without complaints. +stool, +flatus. NGT output not recorded for 24hrs    Flowsheet, labs reviewed  Abd: binder shifted up to upper abdomen           Dressings clean    Plan:  1) NGT clamping trial. If output>150mL/6hours, connect to low cont wall suction. Otherwise, if <150mL, clamp NGT and check residuals [id number]    2) Increase TPN to 63mL/hr. Dc IVF.  Replete K    3) mobilize/pulm toilet

## 2023-01-24 NOTE — PROGRESS NOTES
Bedside and Verbal shift change report given to [facility] (oncoming nurse) by Yazan Benitez (offgoing nurse). Report included the following information SBAR.     0900 NG Tube clamped. 1200 Pt assisted to recliner. 1300 Pt assisted to the recliner. 1500 10 ml of Residual. NG tube clamped again. Bedside and Verbal shift change report given to [place] (oncoming nurse) by [facility] (offgoing nurse). Report included the following information SBAR, Kardex, ED Summary, Procedure Summary, Intake/Output, MAR, Recent Results, and Cardiac Rhythm NSR .

## 2023-01-24 NOTE — PROGRESS NOTES
Problem: Mobility Impaired (Adult and Pediatric)  Goal: *Acute Goals and Plan of Care (Insert Text)  Description: FUNCTIONAL STATUS PRIOR TO ADMISSION: Pt was ambulatory for household distances using rollator, performed bed mobility and transfers without assistance. HOME SUPPORT PRIOR TO ADMISSION: The patient lived with sister and brother-in-law who assist as needed. Physical Therapy Goals  Initiated 1/20/2023  1. Patient will move from supine to sit and sit to supine  in bed with minimal assistance/contact guard assist within 7 day(s). 2.  Patient will transfer from bed to chair and chair to bed with minimal assistance/contact guard assist using the least restrictive device within 7 day(s). 3.  Patient will perform sit to stand with minimal assistance/contact guard assist  4/4  trials within 7 day(s). 4.  Patient will ambulate with minimal assistance/contact guard assist for 75 feet with the least restrictive device within 7 day(s). Outcome: Progressing Towards Goal  Note:   PHYSICAL THERAPY TREATMENT  Patient: Ac Bay (55 y.o. female)  Date: 1/24/2023  Diagnosis: [facility] [Z93.2] <principal problem not specified>  Procedure(s) (LRB):  REVERSAL LOOP ILEOSTOMY, VENTRAL HERNIA REPAIR, LYSIS OF ADHESIONS (N/A) 7 Days Post-Op  Precautions: Fall, Skin (abdominal binder when out of bed)  Chart, physical therapy assessment, plan of care and goals were reviewed. ASSESSMENT  Patient continues with skilled PT services and progressing towards goals. Pt alert and eager to participate with therapy, NGT clamped. Min/Mod A for rolling bed mobility and donning abdominal binder. Increased time and Mod A to come to sitting EOB, some dizziness reported with positional change, spontaneously resolved with time. Min A x 2 for transfer to chair using RW  for steadying. Denies dizziness, nausea post activity.       Current Level of Function Impacting Discharge (mobility/balance): Min A Other factors to consider for discharge:          PLAN :  Patient continues to benefit from skilled intervention to address the above impairments. Continue treatment per established plan of care. to address goals. Recommendation for discharge: (in order for the patient to meet his/her long term goals)  Therapy up to 5 days/week in SNF setting    This discharge recommendation:  Has been made in collaboration with the attending provider and/or case management    IF patient discharges home will need the following DME: to be determined (TBD)       SUBJECTIVE:   Patient stated .    OBJECTIVE DATA SUMMARY:   Critical Behavior:  Neurologic State: Alert  Orientation Level: Oriented X4  Cognition: Follows commands  Safety/Judgement: Decreased awareness of environment, Fall prevention  Functional Mobility Training:  Bed Mobility:     Supine to Sit: Moderate assistance;Assist x1;Additional time     Scooting: Contact guard assistance; Additional time        Transfers:  Sit to Stand: Minimum assistance;Assist x2; Additional time  Stand to Sit: Minimum assistance                             Balance:  Sitting: Intact  Standing: With support  Standing - Static: Constant support; Fair  Standing - Dynamic : Constant support; Fair  Ambulation/Gait Training:  Distance (ft): 3 Feet (ft)  Assistive Device: Walker, rolling;Gait belt  Ambulation - Level of Assistance: Minimal assistance;Assist x2; Additional time        Gait Abnormalities: Decreased step clearance        Base of Support: Widened     Speed/Yina: Pace decreased (<100 feet/min)                       Stairs: Therapeutic Exercises:     Pain Rating:  3/10    Activity Tolerance:   Good    After treatment patient left in no apparent distress:   Sitting in chair, Call bell within reach, and Bed / chair alarm activated    COMMUNICATION/COLLABORATION:   The patients plan of care was discussed with: Occupational therapist and Registered nurse.      Cooper Hathaway Desiree   Time Calculation: 35 mins

## 2023-01-24 NOTE — WOUND CARE
Wound visit: in earlier today to reassess abdominal wounds. Dressing had been changed by night shift nurse in early morning hours. Packing was saturated with serosanguinous to clear rust colored drainage without odor from RLQ wound, midline scant of same. When gently probing RLQ wound with Qtip, drainage came out but with much less force that yesterday and when palpating abdominal wall medially and lower drainage also expressed, dark serosanguinous to lightly rust colored. We repacked RLQ wound and placed dry dressing over midline. Periwound skin intact, no redness. Will continue to follow.   Wali Arthur RN,CWON

## 2023-01-25 ENCOUNTER — APPOINTMENT (OUTPATIENT)
Dept: GENERAL RADIOLOGY | Age: 73
DRG: 330 | End: 2023-01-25
Attending: COLON & RECTAL SURGERY
Payer: MEDICARE

## 2023-01-25 LAB
ANION GAP SERPL CALC-SCNC: 5 MMOL/L (ref 5–15)
BASOPHILS # BLD: 0.1 K/UL (ref 0–0.1)
BASOPHILS NFR BLD: 1 % (ref 0–1)
BUN SERPL-MCNC: 11 MG/DL (ref 6–20)
BUN/CREAT SERPL: 20 (ref 12–20)
CALCIUM SERPL-MCNC: 8.5 MG/DL (ref 8.5–10.1)
CHLORIDE SERPL-SCNC: 104 MMOL/L (ref 97–108)
CO2 SERPL-SCNC: 26 MMOL/L (ref 21–32)
CREAT SERPL-MCNC: 0.54 MG/DL (ref 0.55–1.02)
DIFFERENTIAL METHOD BLD: ABNORMAL
EOSINOPHIL # BLD: 0.6 K/UL (ref 0–0.4)
EOSINOPHIL NFR BLD: 6 % (ref 0–7)
ERYTHROCYTE [DISTWIDTH] IN BLOOD BY AUTOMATED COUNT: 12.9 % (ref 11.5–14.5)
GLUCOSE BLD STRIP.AUTO-MCNC: 103 MG/DL (ref 65–117)
GLUCOSE BLD STRIP.AUTO-MCNC: 122 MG/DL (ref 65–117)
GLUCOSE BLD STRIP.AUTO-MCNC: 125 MG/DL (ref 65–117)
GLUCOSE BLD STRIP.AUTO-MCNC: 181 MG/DL (ref 65–117)
GLUCOSE SERPL-MCNC: 135 MG/DL (ref 65–100)
HCT VFR BLD AUTO: 34.5 % (ref 35–47)
HGB BLD-MCNC: 11.7 G/DL (ref 11.5–16)
IMM GRANULOCYTES # BLD AUTO: 0.2 K/UL (ref 0–0.04)
IMM GRANULOCYTES NFR BLD AUTO: 2 % (ref 0–0.5)
LYMPHOCYTES # BLD: 2.2 K/UL (ref 0.8–3.5)
LYMPHOCYTES NFR BLD: 24 % (ref 12–49)
MAGNESIUM SERPL-MCNC: 2.2 MG/DL (ref 1.6–2.4)
MCH RBC QN AUTO: 33.2 PG (ref 26–34)
MCHC RBC AUTO-ENTMCNC: 33.9 G/DL (ref 30–36.5)
MCV RBC AUTO: 98 FL (ref 80–99)
MONOCYTES # BLD: 0.8 K/UL (ref 0–1)
MONOCYTES NFR BLD: 9 % (ref 5–13)
NEUTS SEG # BLD: 5.3 K/UL (ref 1.8–8)
NEUTS SEG NFR BLD: 58 % (ref 32–75)
NRBC # BLD: 0 K/UL (ref 0–0.01)
NRBC BLD-RTO: 0 PER 100 WBC
PHOSPHATE SERPL-MCNC: 2.4 MG/DL (ref 2.6–4.7)
PLATELET # BLD AUTO: 282 K/UL (ref 150–400)
PMV BLD AUTO: 10.5 FL (ref 8.9–12.9)
POTASSIUM SERPL-SCNC: 3.5 MMOL/L (ref 3.5–5.1)
RBC # BLD AUTO: 3.52 M/UL (ref 3.8–5.2)
RBC MORPH BLD: ABNORMAL
SERVICE CMNT-IMP: ABNORMAL
SERVICE CMNT-IMP: NORMAL
SODIUM SERPL-SCNC: 135 MMOL/L (ref 136–145)
WBC # BLD AUTO: 9.2 K/UL (ref 3.6–11)

## 2023-01-25 PROCEDURE — 74011250637 HC RX REV CODE- 250/637: Performed by: INTERNAL MEDICINE

## 2023-01-25 PROCEDURE — 74011250636 HC RX REV CODE- 250/636: Performed by: COLON & RECTAL SURGERY

## 2023-01-25 PROCEDURE — 82962 GLUCOSE BLOOD TEST: CPT

## 2023-01-25 PROCEDURE — 83735 ASSAY OF MAGNESIUM: CPT

## 2023-01-25 PROCEDURE — 74011250637 HC RX REV CODE- 250/637: Performed by: COLON & RECTAL SURGERY

## 2023-01-25 PROCEDURE — 84100 ASSAY OF PHOSPHORUS: CPT

## 2023-01-25 PROCEDURE — 74011000250 HC RX REV CODE- 250: Performed by: COLON & RECTAL SURGERY

## 2023-01-25 PROCEDURE — 97530 THERAPEUTIC ACTIVITIES: CPT

## 2023-01-25 PROCEDURE — 97535 SELF CARE MNGMENT TRAINING: CPT

## 2023-01-25 PROCEDURE — 74011250636 HC RX REV CODE- 250/636: Performed by: INTERNAL MEDICINE

## 2023-01-25 PROCEDURE — 80048 BASIC METABOLIC PNL TOTAL CA: CPT

## 2023-01-25 PROCEDURE — 65270000046 HC RM TELEMETRY

## 2023-01-25 PROCEDURE — 36415 COLL VENOUS BLD VENIPUNCTURE: CPT

## 2023-01-25 PROCEDURE — 85025 COMPLETE CBC W/AUTO DIFF WBC: CPT

## 2023-01-25 PROCEDURE — 74011000250 HC RX REV CODE- 250: Performed by: INTERNAL MEDICINE

## 2023-01-25 PROCEDURE — 74018 RADEX ABDOMEN 1 VIEW: CPT

## 2023-01-25 RX ADMIN — ACETAMINOPHEN 1000 MG: 500 TABLET ORAL at 08:17

## 2023-01-25 RX ADMIN — MICONAZOLE NITRATE 2 % TOPICAL POWDER: at 18:31

## 2023-01-25 RX ADMIN — MICONAZOLE NITRATE 2 % TOPICAL POWDER: at 08:17

## 2023-01-25 RX ADMIN — HYDRALAZINE HYDROCHLORIDE 10 MG: 20 INJECTION INTRAMUSCULAR; INTRAVENOUS at 20:46

## 2023-01-25 RX ADMIN — TRAMADOL HYDROCHLORIDE 50 MG: 50 TABLET ORAL at 14:57

## 2023-01-25 RX ADMIN — PANTOPRAZOLE SODIUM 40 MG: 40 TABLET, DELAYED RELEASE ORAL at 05:45

## 2023-01-25 RX ADMIN — SODIUM CHLORIDE, PRESERVATIVE FREE 10 ML: 5 INJECTION INTRAVENOUS at 05:46

## 2023-01-25 RX ADMIN — SODIUM PHOSPHATE, MONOBASIC, MONOHYDRATE 15 MMOL: 276; 142 INJECTION, SOLUTION INTRAVENOUS at 12:35

## 2023-01-25 RX ADMIN — ASCORBIC ACID, VITAMIN A PALMITATE, CHOLECALCIFEROL, THIAMINE HYDROCHLORIDE, RIBOFLAVIN-5 PHOSPHATE SODIUM, PYRIDOXINE HYDROCHLORIDE, NIACINAMIDE, DEXPANTHENOL, ALPHA-TOCOPHEROL ACETATE, VITAMIN K1, FOLIC ACID, BIOTIN, CYANOCOBALAMIN: 200; 3300; 200; 6; 3.6; 6; 40; 15; 10; 150; 600; 60; 5 INJECTION, SOLUTION INTRAVENOUS at 18:24

## 2023-01-25 RX ADMIN — FOLIC ACID TAB 400 MCG 0.4 MG: 400 TAB at 08:21

## 2023-01-25 RX ADMIN — TROSPIUM CHLORIDE 20 MG: 20 TABLET, FILM COATED ORAL at 05:45

## 2023-01-25 RX ADMIN — TROSPIUM CHLORIDE 20 MG: 20 TABLET, FILM COATED ORAL at 18:24

## 2023-01-25 RX ADMIN — Medication 100 MG: at 08:17

## 2023-01-25 RX ADMIN — ENOXAPARIN SODIUM 30 MG: 100 INJECTION SUBCUTANEOUS at 08:16

## 2023-01-25 RX ADMIN — HYDRALAZINE HYDROCHLORIDE 10 MG: 20 INJECTION INTRAMUSCULAR; INTRAVENOUS at 05:44

## 2023-01-25 RX ADMIN — ACETAMINOPHEN 1000 MG: 500 TABLET ORAL at 22:27

## 2023-01-25 RX ADMIN — ENOXAPARIN SODIUM 30 MG: 100 INJECTION SUBCUTANEOUS at 22:27

## 2023-01-25 RX ADMIN — ACETAMINOPHEN 1000 MG: 500 TABLET ORAL at 18:24

## 2023-01-25 RX ADMIN — METOPROLOL SUCCINATE 50 MG: 50 TABLET, EXTENDED RELEASE ORAL at 08:17

## 2023-01-25 RX ADMIN — SODIUM CHLORIDE, PRESERVATIVE FREE 10 ML: 5 INJECTION INTRAVENOUS at 22:27

## 2023-01-25 NOTE — PROGRESS NOTES
CRS  Ky NGT clamping trial. Per RN, only 40cc over last 6 hours. +flatus, no stool. C/o sore throat    Oral: no obvious thrush on tongue  Abd: soft, nt  Dressings intact    Plan:  DC NGT. [id number] Demi Sexton for sips of clears, coffee with cream. Renew TPN. Cepacol lozenge. [id number] Demi Sexton for home lozenges.  Mobilize, IS

## 2023-01-25 NOTE — PROGRESS NOTES
Message taken from Dr. Ezekiel Zepeda office for primary RN, per MD clamping trial to be done overnight. Primary RN Sera notified.

## 2023-01-25 NOTE — PROGRESS NOTES
Problem: Self Care Deficits Care Plan (Adult)  Goal: *Acute Goals and Plan of Care (Insert Text)  Description: FUNCTIONAL STATUS PRIOR TO ADMISSION: Patient was modified independent using a rollator (downstairs) and rolling walker (upstairs) for functional mobility. Patient required minimal assistance for basic and instrumental ADLs, mostly for LB dressing. Pt reports hard year with 30+ pound weight loss. HOME SUPPORT: The patient lived with her sister and brother-in-law. Occupational Therapy Goals  Initiated 1/20/2023  1. Patient will perform grooming with supervision/set-up within 7 day(s). 2.  Patient will perform upper body dressing and bathing with supervision/set-up within 7 day(s). 3.  Patient will perform lower body dressing with moderate assistance using AE PRN within 7 day(s). 4.  Patient will perform toilet transfers with minimal assistance/contact guard assist within 7 day(s). 5.  Patient will perform all aspects of toileting with minimal assistance/contact guard assist within 7 day(s). 6.  Patient will participate in upper extremity therapeutic exercise/activities with independence for 10 minutes within 7 day(s). 7.  Patient will utilize energy conservation techniques during functional activities with verbal cues within 7 day(s). Outcome: Progressing Towards Goal   OCCUPATIONAL THERAPY TREATMENT  Patient: Kain Turcios (71 y.o. female)  Date: 1/25/2023  Diagnosis: [facility] [Z93.2] <principal problem not specified>  Procedure(s) (LRB):  REVERSAL LOOP ILEOSTOMY, VENTRAL HERNIA REPAIR, LYSIS OF ADHESIONS (N/A) 8 Days Post-Op  Precautions: Fall, Skin (abdominal binder when out of bed)  Chart, occupational therapy assessment, plan of care, and goals were reviewed. ASSESSMENT  Patient continues with skilled OT services and is progressing towards goals.   Patient demonstrates improving standing tolerance and balance with support of RW, though able to complete ADL tasks in stand with 1 hand release, and maintain stance for > 3 min. Patient continues to be impacted by abdominal discomfort-binder not found in room though patient reports she did not tolerate it well (it was too tight) yesterday. Decreased activity endurance is an issue today and patient required rest after bed mobility, toileting, and transfer to chair. SNF level rehab services are appropriate at discharge    Current Level of Function Impacting Discharge (ADLs): mod to max assist for LB bathing/dressing, and toileting . Min A with RW support for bed  < > BSC transfers     Other factors to consider for discharge:          PLAN :  Patient continues to benefit from skilled intervention to address the above impairments. Continue treatment per established plan of care to address goals. Recommend with staff: in chair for meals     Recommend next OT session: bathroom mobility, bathing, grooming in stand     Recommendation for discharge: (in order for the patient to meet his/her long term goals)  Therapy up to 5 days/week in SNF setting    This discharge recommendation:  Has been made in collaboration with the attending provider and/or case management    IF patient discharges home will need the following DME: patient owns DME required for discharge       SUBJECTIVE:   Patient pleasant and cooperative     OBJECTIVE DATA SUMMARY:   Cognitive/Behavioral Status:  Neurologic State: Alert  Orientation Level: Oriented X4  Cognition: Appropriate decision making; Follows commands             Functional Mobility and Transfers for ADLs:  Bed Mobility:  Rolling: Minimum assistance  Scooting: Minimum assistance;Assist x1    Transfers:  Sit to Stand: Minimum assistance  Functional Transfers  Toilet Transfer : Minimum assistance  Adaptive Equipment: Bedside commode;Walker (comment)       Balance:  Sitting: Intact  Standing: With support  Standing - Static: Constant support; Fair  Standing - Dynamic : Constant support; Fair    ADL Intervention:     Lower Body Dressing Assistance  Socks: Total assistance (dependent) (typically uses AE)    Toileting  Bladder Hygiene: Set-up (CGA in stand)  RW support. Pain:  Abdominal pain- binder not found in room     Activity Tolerance:   requires rest breaks    After treatment patient left in no apparent distress:   Sitting in chair, Call bell within reach, and Bed / chair alarm activated    COMMUNICATION/COLLABORATION:   The patients plan of care was discussed with: Physical therapy assistant and Registered nurse.      Blaise Officer, OT  Time Calculation: 32 mins

## 2023-01-25 NOTE — PROGRESS NOTES
Comprehensive Nutrition Assessment    Type and Reason for Visit: Reassess    Nutrition Recommendations/Plan:   Advance diet per surgery - eventual goal GI bland. Advance TPN to goal D15, AA5% @ 83 mL/hour (goal)    Lipids: , add lipids per below              - Lipids 20% 250 mL @ 42 mL/hr x 12 hours, 2x/week     Malnutrition Assessment:  Malnutrition Status:  Mild malnutrition (01/25/23 1106)    Context:  Acute illness     Findings of the 6 clinical characteristics of malnutrition:   Energy Intake:  Mild decrease in energy intake (specify) (TPN @63)  Weight Loss:  No significant weight loss     Body Fat Loss:  No significant body fat loss,     Muscle Mass Loss:  Mild muscle mass loss, Clavicles (pectoralis & deltoids), Scapula (trapezius)  Fluid Accumulation:  No significant fluid accumulation,     Strength:  Not performed     Nutrition Assessment:     1/25: Follow up. Patient on TPN @63 mL/hr. Discussed during IDRs. Plan is to remove NGT today and advance TPN to goal rate 83 mL/hr. K+ and Mg WDL. Phos low - being repleted. Patient reported feeling stronger. Had sips of coffee with creamer this morning with no nausea, vomiting or diarrhea. Last BM was 2 days ago. Triglycerides checked 1/24, WDL @136. Okay to provide lipids. Edema worsened, now 1+ in BLE. TPN at goal rate 83 mL/hour with lipids 2x/week provides 1557 kcal (76% needs); 100 g protein (100% needs). Nutrition Related Findings:      Wound Type: Surgical incision (abd)  Last Bowel Movement Date: 01/23/23  Stool Appearance: Loose  Abdominal Assessment: Obese, Other (comment) (firm, midline incision & ostomy reversal site)  Appetite: NPO  Bowel Sounds: Hypoactive   Edema:LLE: Non-pitting; 1+ (1/24/2023  8:04 PM)  RLE: Non-pitting; 1+ (1/24/2023  8:04 PM)      Nutr.  Labs:  Lab Results   Component Value Date/Time    GFR est AA >60 08/29/2022 04:52 AM    GFR est non-AA >60 08/29/2022 04:52 AM    Creatinine 0.54 (L) 01/25/2023 03:53 AM BUN 11 01/25/2023 03:53 AM    Sodium 135 (L) 01/25/2023 03:53 AM    Potassium 3.5 01/25/2023 03:53 AM    Chloride 104 01/25/2023 03:53 AM    CO2 26 01/25/2023 03:53 AM       Lab Results   Component Value Date/Time    Glucose 135 (H) 01/25/2023 03:53 AM    Glucose (POC) 181 (H) 01/25/2023 10:49 AM       Lab Results   Component Value Date/Time    Hemoglobin A1c 5.2 01/06/2023 02:19 PM     Lab Results   Component Value Date/Time    Calcium 8.5 01/25/2023 03:53 AM    Phosphorus 2.4 (L) 01/25/2023 03:53 AM     Magnesium   Date Value Ref Range Status   01/25/2023 2.2 1.6 - 2.4 mg/dL Final   01/24/2023 1.9 1.6 - 2.4 mg/dL Final   01/23/2023 1.9 1.6 - 2.4 mg/dL Final   01/22/2023 1.9 1.6 - 2.4 mg/dL Final   01/21/2023 2.0 1.6 - 2.4 mg/dL Final     Lab Results   Component Value Date/Time    Cholesterol, total 193 12/10/2020 02:41 PM    HDL Cholesterol 36 12/10/2020 02:41 PM    LDL, calculated 136 (H) 12/10/2020 02:41 PM    VLDL, calculated 21 12/10/2020 02:41 PM    Triglyceride 136 01/24/2023 04:31 AM    CHOL/HDL Ratio 5.4 (H) 12/10/2020 02:41 PM     Nutr. Meds:  Lovenox, folic acid, glucagon PRN, apresoline, lispro, toprol-xl, zofran PRN, protonix, compazine, Na Phos, B-1    Current Nutrition Intake & Therapies:  Average Meal Intake: NPO  Average Supplement Intake: NPO  TPN ADULT - CENTRAL AA 5% D15% W/ ELECTROLYTES AND CA  TPN ADULT - CENTRAL AA 5% D15% W/ ELECTROLYTES AND CA  DIET NPO Sips of Clear Liquids    Anthropometric Measures:  Height: 5' 2.99\" (160 cm)  Ideal Body Weight (IBW): 115 lbs (52 kg)     Current Body Wt:  98.7 kg (217 lb 9.5 oz), 189.2 % IBW. Standing scale  Current BMI (kg/m2): 38.6      Weight Adjustment: No adjustment           BMI Category: Obese class 2 (BMI 35.0-39. 9)    Estimated Daily Nutrient Needs:  Energy Requirements Based On: Formula  Weight Used for Energy Requirements: Admission (98.7kg)  Energy (kcal/day): 2040 (MSJ x 1.3 x 1.1)  Weight Used for Protein Requirements: Admission  Protein (g/day):  (1-1.2g/kg)  Method Used for Fluid Requirements: 1 ml/kcal  Fluid (ml/day): 2040 (1ml/kcal)    Nutrition Diagnosis:   Increased nutrient needs related to acute injury/trauma, inadequate protein-energy intake as evidenced by wounds  Inadequate oral intake related to inadequate protein-energy intake as evidenced by NPO or clear liquid status due to medical condition    Nutrition Interventions:   Food and/or Nutrient Delivery: Continue parenteral nutrition, Start oral diet  Nutrition Education/Counseling: No recommendations at this time  Coordination of Nutrition Care: Continue to monitor while inpatient, Interdisciplinary rounds  Plan of Care discussed with: IDR team and patient    Goals:  Previous Goal Met: Goal(s) achieved  Goals: Tolerate nutrition support at goal rate, by next RD assessment, Initiate PO diet       Nutrition Monitoring and Evaluation:   Behavioral-Environmental Outcomes: None identified  Food/Nutrient Intake Outcomes: Parenteral nutrition intake/tolerance  Physical Signs/Symptoms Outcomes: Biochemical data, Weight    Discharge Planning:     Too soon to determine      Contact: [address] Intern  RD office: [phone]

## 2023-01-25 NOTE — PROGRESS NOTES
Bedside shift change report given to [facility] (oncoming nurse) by Bob Larson (offgoing nurse). Report included the following information SBAR, Kardex, Med Rec Status, and Cardiac Rhythm NSR .     2130: unclamped NG tube to check residual per MD note. 100 ML residual noted. Reclamped and will recheck in 6 hours. 0345: unclamped NG tube to check residual per MD note.  40ml of residual. Reclamped

## 2023-01-25 NOTE — PROGRESS NOTES
Problem: Mobility Impaired (Adult and Pediatric)  Goal: *Acute Goals and Plan of Care (Insert Text)  Description: FUNCTIONAL STATUS PRIOR TO ADMISSION: Pt was ambulatory for household distances using rollator, performed bed mobility and transfers without assistance. HOME SUPPORT PRIOR TO ADMISSION: The patient lived with sister and brother-in-law who assist as needed. Physical Therapy Goals  Initiated 1/20/2023  1. Patient will move from supine to sit and sit to supine  in bed with minimal assistance/contact guard assist within 7 day(s). 2.  Patient will transfer from bed to chair and chair to bed with minimal assistance/contact guard assist using the least restrictive device within 7 day(s). 3.  Patient will perform sit to stand with minimal assistance/contact guard assist  4/4  trials within 7 day(s). 4.  Patient will ambulate with minimal assistance/contact guard assist for 75 feet with the least restrictive device within 7 day(s). Outcome: Progressing Towards Goal  Note:   PHYSICAL THERAPY TREATMENT  Patient: Roberto Carlos Kline (67 y.o. female)  Date: 1/25/2023  Diagnosis: [facility] [Z93.2] <principal problem not specified>  Procedure(s) (LRB):  REVERSAL LOOP ILEOSTOMY, VENTRAL HERNIA REPAIR, LYSIS OF ADHESIONS (N/A) 8 Days Post-Op  Precautions: Fall, Skin (abdominal binder when out of bed)  Chart, physical therapy assessment, plan of care and goals were reviewed. ASSESSMENT  Patient continues with skilled PT services and progressing towards goals. Pt reports \"busy day\" agreeable to participate with therapy. Limited by impaired balance, decreased strength and activity tolerance with increased need for assistance. No abdominal binder available in room, pt reports she did not tolerate binder well yesterday. Increased time and Ed for bed mobility and SPT to [facility] and then to chair using RW.  Fatigued with session and declined attempted additional gait training    Current Level of Function Impacting Discharge (mobility/balance): min A     Other factors to consider for discharge:          PLAN :  Patient continues to benefit from skilled intervention to address the above impairments. Continue treatment per established plan of care. to address goals. Recommendation for discharge: (in order for the patient to meet his/her long term goals)  Therapy up to 5 days/week in SNF setting    This discharge recommendation:  Has been made in collaboration with the attending provider and/or case management    IF patient discharges home will need the following DME: to be determined (TBD)       SUBJECTIVE:   Patient stated I have had a busy day- everything goes wrong.     OBJECTIVE DATA SUMMARY:   Critical Behavior:  Neurologic State: Alert  Orientation Level: Oriented X4  Cognition: Appropriate decision making, Follows commands  Safety/Judgement: Decreased awareness of environment, Fall prevention  Functional Mobility Training:  Bed Mobility:  Rolling: Minimum assistance        Scooting: Minimum assistance;Assist x1        Transfers:  Sit to Stand: Minimum assistance  Stand to Sit: Minimum assistance                             Balance:  Sitting: Intact  Standing: With support  Standing - Static: Constant support; Fair  Standing - Dynamic : Constant support; Fair  Ambulation/Gait Training:  Distance (ft): 4 Feet (ft)  Assistive Device: Walker, rolling;Gait belt  Ambulation - Level of Assistance: Minimal assistance        Gait Abnormalities: Decreased step clearance; Toe walking        Base of Support: Widened     Speed/Yina: Slow                       Stairs: Therapeutic Exercises:     Pain Rating:      Activity Tolerance:   Good    After treatment patient left in no apparent distress:   Sitting in chair, Call bell within reach, and Bed / chair alarm activated    COMMUNICATION/COLLABORATION:   The patients plan of care was discussed with: Occupational therapist and Registered nurse. Sondra Najera   Time Calculation: 40 mins

## 2023-01-25 NOTE — PROGRESS NOTES
During PT CHG bath, changing the lead stickers I noticed two place wear the patient had developed some skin break down what looked to be small blisters. I immediately notified the Primary RN.

## 2023-01-25 NOTE — PROGRESS NOTES
Patient has been identified as appropriate for SNF placement. SNF rehab discussed with patient and her sister Haleigh Lawson.  SNF provider list given to patient's sister. Choice is [facility], [address] [place] [address], [facility] [address] [facility], and [facility], referral sent via [email or web address]. Marshall of Choice obtained. Await response. Care Management Interventions  PCP Verified by CM: Yes  Last Visit to PCP: 12/27/22  Mode of Transport at Discharge:  Other (see comment)  Transition of Care Consult (CM Consult): SNF  Partner SNF: Yes  Discharge Durable Medical Equipment: No  Physical Therapy Consult: No  Occupational Therapy Consult: No  Speech Therapy Consult: No  Support Systems: Other Family Member(s) (lives with sister and brother-in-law)  Confirm Follow Up Transport: Family  The Patient and/or Patient Representative was Provided with a Choice of Provider and Agrees with the Discharge Plan?: (S) Yes  Name of the Patient Representative Who was Provided with a Choice of Provider and Agrees with the Discharge Plan: [facility], [facility]  Marshall of Choice List was Provided with Basic Dialogue that Supports the Patient's Individualized Plan of Care/Goals, Treatment Preferences and Shares the Quality Data Associated with the Providers?: (S) Yes  Discharge Location  Patient Expects to be Discharged to[de-identified] Skilled nursing facility    Enoc Fung RN, MSN/Care manager

## 2023-01-25 NOTE — PROGRESS NOTES
0900: Per MD, hold on pulling NGT as Pt's belly is firm. RN to get abd xray and turn NGT back to suction to see if >150ml removed. 1500: Pt's dressing changed and dated. 32 61 16: MD notified that Pt complaining of tingling soles of feet. Neuro checks clear and Pt stated it was not too painful. MD also asked if NGT could be d/c'd. Bedside shift change report given to [address] (oncoming nurse) by Dolores Miller RN (offgoing nurse). Report included the following information SBAR, Intake/Output, Recent Results, and Cardiac Rhythm NSR .

## 2023-01-26 LAB
ANION GAP SERPL CALC-SCNC: 9 MMOL/L (ref 5–15)
BASOPHILS # BLD: 0 K/UL (ref 0–0.1)
BASOPHILS NFR BLD: 0 % (ref 0–1)
BUN SERPL-MCNC: 12 MG/DL (ref 6–20)
BUN/CREAT SERPL: 23 (ref 12–20)
CALCIUM SERPL-MCNC: 8.6 MG/DL (ref 8.5–10.1)
CHLORIDE SERPL-SCNC: 103 MMOL/L (ref 97–108)
CO2 SERPL-SCNC: 26 MMOL/L (ref 21–32)
CREAT SERPL-MCNC: 0.53 MG/DL (ref 0.55–1.02)
DIFFERENTIAL METHOD BLD: ABNORMAL
EOSINOPHIL # BLD: 0.5 K/UL (ref 0–0.4)
EOSINOPHIL NFR BLD: 5 % (ref 0–7)
ERYTHROCYTE [DISTWIDTH] IN BLOOD BY AUTOMATED COUNT: 12.9 % (ref 11.5–14.5)
GLUCOSE BLD STRIP.AUTO-MCNC: 110 MG/DL (ref 65–117)
GLUCOSE BLD STRIP.AUTO-MCNC: 128 MG/DL (ref 65–117)
GLUCOSE BLD STRIP.AUTO-MCNC: 130 MG/DL (ref 65–117)
GLUCOSE BLD STRIP.AUTO-MCNC: 133 MG/DL (ref 65–117)
GLUCOSE SERPL-MCNC: 104 MG/DL (ref 65–100)
HCT VFR BLD AUTO: 35.7 % (ref 35–47)
HGB BLD-MCNC: 12 G/DL (ref 11.5–16)
IMM GRANULOCYTES # BLD AUTO: 0.3 K/UL (ref 0–0.04)
IMM GRANULOCYTES NFR BLD AUTO: 3 % (ref 0–0.5)
LYMPHOCYTES # BLD: 2.5 K/UL (ref 0.8–3.5)
LYMPHOCYTES NFR BLD: 26 % (ref 12–49)
MAGNESIUM SERPL-MCNC: 2 MG/DL (ref 1.6–2.4)
MCH RBC QN AUTO: 32.8 PG (ref 26–34)
MCHC RBC AUTO-ENTMCNC: 33.6 G/DL (ref 30–36.5)
MCV RBC AUTO: 97.5 FL (ref 80–99)
MONOCYTES # BLD: 1 K/UL (ref 0–1)
MONOCYTES NFR BLD: 10 % (ref 5–13)
NEUTS SEG # BLD: 5.4 K/UL (ref 1.8–8)
NEUTS SEG NFR BLD: 56 % (ref 32–75)
NRBC # BLD: 0 K/UL (ref 0–0.01)
NRBC BLD-RTO: 0 PER 100 WBC
PHOSPHATE SERPL-MCNC: 2.8 MG/DL (ref 2.6–4.7)
PLATELET # BLD AUTO: 321 K/UL (ref 150–400)
PMV BLD AUTO: 10.7 FL (ref 8.9–12.9)
POTASSIUM SERPL-SCNC: 3.4 MMOL/L (ref 3.5–5.1)
RBC # BLD AUTO: 3.66 M/UL (ref 3.8–5.2)
RBC MORPH BLD: ABNORMAL
SERVICE CMNT-IMP: ABNORMAL
SERVICE CMNT-IMP: NORMAL
SODIUM SERPL-SCNC: 138 MMOL/L (ref 136–145)
WBC # BLD AUTO: 9.7 K/UL (ref 3.6–11)

## 2023-01-26 PROCEDURE — 36415 COLL VENOUS BLD VENIPUNCTURE: CPT

## 2023-01-26 PROCEDURE — 84100 ASSAY OF PHOSPHORUS: CPT

## 2023-01-26 PROCEDURE — 97530 THERAPEUTIC ACTIVITIES: CPT

## 2023-01-26 PROCEDURE — 85025 COMPLETE CBC W/AUTO DIFF WBC: CPT

## 2023-01-26 PROCEDURE — 74011000250 HC RX REV CODE- 250: Performed by: INTERNAL MEDICINE

## 2023-01-26 PROCEDURE — 74011250636 HC RX REV CODE- 250/636: Performed by: COLON & RECTAL SURGERY

## 2023-01-26 PROCEDURE — 74011250637 HC RX REV CODE- 250/637: Performed by: INTERNAL MEDICINE

## 2023-01-26 PROCEDURE — 82962 GLUCOSE BLOOD TEST: CPT

## 2023-01-26 PROCEDURE — 65270000046 HC RM TELEMETRY

## 2023-01-26 PROCEDURE — 80048 BASIC METABOLIC PNL TOTAL CA: CPT

## 2023-01-26 PROCEDURE — 74011250637 HC RX REV CODE- 250/637: Performed by: COLON & RECTAL SURGERY

## 2023-01-26 PROCEDURE — 74011000250 HC RX REV CODE- 250: Performed by: COLON & RECTAL SURGERY

## 2023-01-26 PROCEDURE — 83735 ASSAY OF MAGNESIUM: CPT

## 2023-01-26 RX ADMIN — I.V. FAT EMULSION 250 ML: 20 EMULSION INTRAVENOUS at 17:20

## 2023-01-26 RX ADMIN — ACETAMINOPHEN 1000 MG: 500 TABLET ORAL at 15:37

## 2023-01-26 RX ADMIN — ASCORBIC ACID, VITAMIN A PALMITATE, CHOLECALCIFEROL, THIAMINE HYDROCHLORIDE, RIBOFLAVIN-5 PHOSPHATE SODIUM, PYRIDOXINE HYDROCHLORIDE, NIACINAMIDE, DEXPANTHENOL, ALPHA-TOCOPHEROL ACETATE, VITAMIN K1, FOLIC ACID, BIOTIN, CYANOCOBALAMIN: 200; 3300; 200; 6; 3.6; 6; 40; 15; 10; 150; 600; 60; 5 INJECTION, SOLUTION INTRAVENOUS at 17:20

## 2023-01-26 RX ADMIN — TROSPIUM CHLORIDE 20 MG: 20 TABLET, FILM COATED ORAL at 06:51

## 2023-01-26 RX ADMIN — ENOXAPARIN SODIUM 30 MG: 100 INJECTION SUBCUTANEOUS at 21:23

## 2023-01-26 RX ADMIN — SODIUM CHLORIDE, PRESERVATIVE FREE 10 ML: 5 INJECTION INTRAVENOUS at 06:51

## 2023-01-26 RX ADMIN — TRAMADOL HYDROCHLORIDE 50 MG: 50 TABLET ORAL at 02:35

## 2023-01-26 RX ADMIN — SODIUM CHLORIDE, PRESERVATIVE FREE 10 ML: 5 INJECTION INTRAVENOUS at 21:23

## 2023-01-26 RX ADMIN — ACETAMINOPHEN 1000 MG: 500 TABLET ORAL at 08:59

## 2023-01-26 RX ADMIN — TROSPIUM CHLORIDE 20 MG: 20 TABLET, FILM COATED ORAL at 15:37

## 2023-01-26 RX ADMIN — PANTOPRAZOLE SODIUM 40 MG: 40 TABLET, DELAYED RELEASE ORAL at 06:51

## 2023-01-26 RX ADMIN — ENOXAPARIN SODIUM 30 MG: 100 INJECTION SUBCUTANEOUS at 09:00

## 2023-01-26 RX ADMIN — ACETAMINOPHEN 1000 MG: 500 TABLET ORAL at 21:23

## 2023-01-26 RX ADMIN — FOLIC ACID TAB 400 MCG 0.4 MG: 400 TAB at 08:59

## 2023-01-26 RX ADMIN — ASCORBIC ACID, VITAMIN A PALMITATE, CHOLECALCIFEROL, THIAMINE HYDROCHLORIDE, RIBOFLAVIN-5 PHOSPHATE SODIUM, PYRIDOXINE HYDROCHLORIDE, NIACINAMIDE, DEXPANTHENOL, ALPHA-TOCOPHEROL ACETATE, VITAMIN K1, FOLIC ACID, BIOTIN, CYANOCOBALAMIN: 200; 3300; 200; 6; 3.6; 6; 40; 15; 10; 150; 600; 60; 5 INJECTION, SOLUTION INTRAVENOUS at 07:21

## 2023-01-26 RX ADMIN — Medication 100 MG: at 08:59

## 2023-01-26 RX ADMIN — METOPROLOL SUCCINATE 50 MG: 50 TABLET, EXTENDED RELEASE ORAL at 08:59

## 2023-01-26 RX ADMIN — MICONAZOLE NITRATE 2 % TOPICAL POWDER: at 17:30

## 2023-01-26 RX ADMIN — TRAMADOL HYDROCHLORIDE 50 MG: 50 TABLET ORAL at 12:11

## 2023-01-26 RX ADMIN — MICONAZOLE NITRATE 2 % TOPICAL POWDER: at 09:07

## 2023-01-26 RX ADMIN — SODIUM CHLORIDE, PRESERVATIVE FREE 10 ML: 5 INJECTION INTRAVENOUS at 14:26

## 2023-01-26 NOTE — PROGRESS NOTES
This patient was assisted with Intentional Toileting every 2 hours during this shift as appropriate. Documentation of ambulation and output reflected on Flowsheet as appropriate. Purposeful hourly rounding was completed using AIDET and 5Ps. Outcomes of PHR documented as they occurred. Bed alarm in use as appropriate.   Dual Suction and ambubag in place.     -Ruiz Fung, PCT

## 2023-01-26 NOTE — PROGRESS NOTES
Physical Therapy    Two attempts to work with therapy today, pt declining dur frequent bouts of diarrhea.   PT will follow up next treatment day

## 2023-01-26 NOTE — PROGRESS NOTES
Problem: Self Care Deficits Care Plan (Adult)  Goal: *Acute Goals and Plan of Care (Insert Text)  Description: FUNCTIONAL STATUS PRIOR TO ADMISSION: Patient was modified independent using a rollator (downstairs) and rolling walker (upstairs) for functional mobility. Patient required minimal assistance for basic and instrumental ADLs, mostly for LB dressing. Pt reports hard year with 30+ pound weight loss. HOME SUPPORT: The patient lived with her sister and brother-in-law. Occupational Therapy Goals  Initiated 1/20/2023  1. Patient will perform grooming with supervision/set-up within 7 day(s). 2.  Patient will perform upper body dressing and bathing with supervision/set-up within 7 day(s). 3.  Patient will perform lower body dressing with moderate assistance using AE PRN within 7 day(s). 4.  Patient will perform toilet transfers with minimal assistance/contact guard assist within 7 day(s). 5.  Patient will perform all aspects of toileting with minimal assistance/contact guard assist within 7 day(s). 6.  Patient will participate in upper extremity therapeutic exercise/activities with independence for 10 minutes within 7 day(s). 7.  Patient will utilize energy conservation techniques during functional activities with verbal cues within 7 day(s). Outcome: Progressing Towards Goal     OCCUPATIONAL THERAPY TREATMENT  Patient: Hermilo Britt (26 y.o. female)  Date: 1/26/2023  Diagnosis: [facility] [Z93.2] <principal problem not specified>  Procedure(s) (LRB):  REVERSAL LOOP ILEOSTOMY, VENTRAL HERNIA REPAIR, LYSIS OF ADHESIONS (N/A) 9 Days Post-Op  Precautions: Fall, Skin (abdominal binder when out of bed)  Chart, occupational therapy assessment, plan of care, and goals were reviewed. ASSESSMENT  Patient received semi supine in bed A&Ox4 and agreeable for bed level OT treatment as patient stating multiple episodes of diarrhea, feeling cold and tired.  Patient continues with skilled OT services and is progressing towards goals. Patient educated on lian UE HEP in prep for self care tasks with pt demonstrating and verbalizing understanding. Patient would benefit from continued skilled OT services while at [facility] in order to increase safety and independence with self care and functional transfers/mobility. Recommend discharge to SNF when medically appropriate. Other factors to consider for discharge: time since onset, severity of deficits, PLOF         PLAN :  Patient continues to benefit from skilled intervention to address the above impairments. Continue treatment per established plan of care to address goals. Recommend with staff: assist with self care tasks    Recommend next OT session: continue to progress towards goals    Recommendation for discharge: (in order for the patient to meet his/her long term goals)  Therapy up to 5 days/week in SNF setting    This discharge recommendation:  Has been made in collaboration with the attending provider and/or case management    IF patient discharges home will need the following DME: TBD       SUBJECTIVE:   Patient stated I have had diarrhea today.     OBJECTIVE DATA SUMMARY:   Cognitive/Behavioral Status:  Neurologic State: Alert  Orientation Level: Oriented X4  Cognition: Follows commands     Therapeutic Exercises:   SBA lian UE HEP for 1 set of 10 reps in all planes (shoulder flex/ext, abduction/adduction, scapular flexion/extension, scapular depression/elevation)    Pain:  No pain reported    Activity Tolerance:   Fair    After treatment patient left in no apparent distress:   Supine in bed, Call bell within reach, Bed / chair alarm activated, and Side rails x 3    COMMUNICATION/COLLABORATION:   The patients plan of care was discussed with: Physical therapist and Registered nurse.      Nicholas Sneed  Time Calculation: 19 mins

## 2023-01-26 NOTE — PROGRESS NOTES
CRS  Pt with flatus and stool overnight  Flowsheet, labs reviewed  Abd: soft, nt  Dressing intact    Plan:  Cont tpn. DC NGT. Mobilize, wound care.  Adv diet tomorrow if no nausea or emesis today

## 2023-01-26 NOTE — PROGRESS NOTES
Care Management follow up, LOS 13 days     Patient admitted for Ileostomy reversal, hernia repair. HTN post op, post op ileus. PICC placed 1/23/23  TPN started 1/24/23     History of: complex diverticulitis, recent small bowel obstruction, GERD, hypothyroidism, TBI. RUR 14 (Score %) low             Is This a Readmission YES  Is this a Bundle NO     Current status  Patient discussed during interdisciplinary rounds. Patient continues to require medical management including ongoing assessment and monitoring. TPN started today, PICC placed yesterday. PT/OT recommending SNF placement. Met with patient at bedside, face mask on. SNF referrals have been placed, pending acceptance. NGT dc'd yesterday, tolerating sips today. States she has been at [facility] acute rehab in the past and does not want to return there for rehab. Transition of Care Plan  Monitor patient status and response to treatment. Patient continues to require medical management. CM needs: SNF placement  SNF referrals placed. Open to [facility] [place]. Await patient progress and will monitor need for SNF placement. CM to monitor progress and recommendations.     Shilpa Shine RN, MSN/Care manager  [phone]

## 2023-01-26 NOTE — PROGRESS NOTES
Problem: Falls - Risk of  Goal: *Absence of Falls  Description: Document Hortencia Foss Fall Risk and appropriate interventions in the flowsheet. Outcome: Progressing Towards Goal  Note: Fall Risk Interventions:                                Problem: Patient Education: Go to Patient Education Activity  Goal: Patient/Family Education  Outcome: Progressing Towards Goal     Problem: Nutrition Deficit  Goal: *Optimize nutritional status  Outcome: Progressing Towards Goal     Problem: Patient Education: Go to Patient Education Activity  Goal: Patient/Family Education  Outcome: Progressing Towards Goal     Problem: Patient Education: Go to Patient Education Activity  Goal: Patient/Family Education  Outcome: Progressing Towards Goal     Problem: Surgical Pathway Day of Surgery  Goal: Off Pathway (Use only if patient is Off Pathway)  Outcome: Progressing Towards Goal  Goal: Activity/Safety  Outcome: Progressing Towards Goal  Goal: Consults, if ordered  Outcome: Progressing Towards Goal  Goal: Nutrition/Diet  Outcome: Progressing Towards Goal  Goal: Medications  Outcome: Progressing Towards Goal  Goal: Respiratory  Outcome: Progressing Towards Goal  Goal: Treatments/Interventions/Procedures  Outcome: Progressing Towards Goal  Goal: Psychosocial  Outcome: Progressing Towards Goal  Goal: *No signs and symptoms of infection or wound complications  Outcome: Progressing Towards Goal  Goal: *Optimal pain control at patient's stated goal  Outcome: Progressing Towards Goal  Goal: *Adequate urinary output (equal to or greater than 30 milliliters/hour)  Description: Ambulatory Surgery patients voiding without difficulty.   Outcome: Progressing Towards Goal  Goal: *Hemodynamically stable  Outcome: Progressing Towards Goal  Goal: *Tolerating diet  Outcome: Progressing Towards Goal  Goal: *Demonstrates progressive activity  Outcome: Progressing Towards Goal     Problem: Surgical Pathway Post-Op Day 1  Goal: Off Pathway (Use only if patient is Off Pathway)  Outcome: Progressing Towards Goal  Goal: Activity/Safety  Outcome: Progressing Towards Goal  Goal: Diagnostic Test/Procedures  Outcome: Progressing Towards Goal  Goal: Nutrition/Diet  Outcome: Progressing Towards Goal  Goal: Discharge Planning  Outcome: Progressing Towards Goal  Goal: Medications  Outcome: Progressing Towards Goal  Goal: Respiratory  Outcome: Progressing Towards Goal     Problem: Nutrition Deficit  Goal: *Optimize nutritional status  Outcome: Progressing Towards Goal     Problem: Pain  Goal: *Control of Pain  Outcome: Progressing Towards Goal     Problem: Patient Education: Go to Patient Education Activity  Goal: Patient/Family Education  Outcome: Progressing Towards Goal     Problem: Pressure Injury - Risk of  Goal: *Prevention of pressure injury  Description: Document Stef Scale and appropriate interventions in the flowsheet.   Outcome: Progressing Towards Goal     Problem: Patient Education: Go to Patient Education Activity  Goal: Patient/Family Education  Outcome: Progressing Towards Goal     Problem: Patient Education: Go to Patient Education Activity  Goal: Patient/Family Education  Outcome: Progressing Towards Goal     Problem: Nutrition Deficit  Goal: *Optimize nutritional status  Outcome: Progressing Towards Goal     Problem: Patient Education: Go to Patient Education Activity  Goal: Patient/Family Education  Outcome: Progressing Towards Goal     Problem: Nutrition Deficit  Goal: *Optimize nutritional status  Outcome: Progressing Towards Goal

## 2023-01-26 NOTE — WOUND CARE
Wound visit: follow up visit for right abdominal wound and midline. Patient resting on [facility] bed with accumax  mattress. Patient tells us abdominal dressing changed ~ 4 times yesterday due to drainage. Assessment:  Right lower quadrant ileostomy takedown site - moist red base, drainage not gushing out with packing removal; does some up to surface of skin within wound with palpation of abdominal wall, serosanguinous, no odor. Abdomen soft. Midline incision with small opening, no drainage. Treatment:  Packed RLQ wound with one continuous piece of kerlex packing moistened with Vashe, dry gauze and dry outer dressing included midline. Recommendations/Plan:  Continue current local wound care. Dr. Racquel Pinedo following. Will continue to reassess weekly and as needed.   Vanessa Atkinson RN,CWON

## 2023-01-27 LAB
ANION GAP SERPL CALC-SCNC: 5 MMOL/L (ref 5–15)
BASOPHILS # BLD: 0 K/UL (ref 0–0.1)
BASOPHILS NFR BLD: 0 % (ref 0–1)
BUN SERPL-MCNC: 15 MG/DL (ref 6–20)
BUN/CREAT SERPL: 28 (ref 12–20)
CALCIUM SERPL-MCNC: 8.9 MG/DL (ref 8.5–10.1)
CHLORIDE SERPL-SCNC: 102 MMOL/L (ref 97–108)
CO2 SERPL-SCNC: 28 MMOL/L (ref 21–32)
CREAT SERPL-MCNC: 0.54 MG/DL (ref 0.55–1.02)
DIFFERENTIAL METHOD BLD: ABNORMAL
EOSINOPHIL # BLD: 0.4 K/UL (ref 0–0.4)
EOSINOPHIL NFR BLD: 4 % (ref 0–7)
ERYTHROCYTE [DISTWIDTH] IN BLOOD BY AUTOMATED COUNT: 13.2 % (ref 11.5–14.5)
GLUCOSE BLD STRIP.AUTO-MCNC: 107 MG/DL (ref 65–117)
GLUCOSE BLD STRIP.AUTO-MCNC: 118 MG/DL (ref 65–117)
GLUCOSE BLD STRIP.AUTO-MCNC: 126 MG/DL (ref 65–117)
GLUCOSE BLD STRIP.AUTO-MCNC: 137 MG/DL (ref 65–117)
GLUCOSE SERPL-MCNC: 115 MG/DL (ref 65–100)
HCT VFR BLD AUTO: 33.8 % (ref 35–47)
HGB BLD-MCNC: 11.4 G/DL (ref 11.5–16)
IMM GRANULOCYTES # BLD AUTO: 0.1 K/UL (ref 0–0.04)
IMM GRANULOCYTES NFR BLD AUTO: 1 % (ref 0–0.5)
LYMPHOCYTES # BLD: 2.1 K/UL (ref 0.8–3.5)
LYMPHOCYTES NFR BLD: 22 % (ref 12–49)
MAGNESIUM SERPL-MCNC: 2 MG/DL (ref 1.6–2.4)
MCH RBC QN AUTO: 33.2 PG (ref 26–34)
MCHC RBC AUTO-ENTMCNC: 33.7 G/DL (ref 30–36.5)
MCV RBC AUTO: 98.5 FL (ref 80–99)
MONOCYTES # BLD: 0.8 K/UL (ref 0–1)
MONOCYTES NFR BLD: 8 % (ref 5–13)
NEUTS SEG # BLD: 5.9 K/UL (ref 1.8–8)
NEUTS SEG NFR BLD: 65 % (ref 32–75)
NRBC # BLD: 0 K/UL (ref 0–0.01)
NRBC BLD-RTO: 0 PER 100 WBC
PHOSPHATE SERPL-MCNC: 2.7 MG/DL (ref 2.6–4.7)
PLATELET # BLD AUTO: 309 K/UL (ref 150–400)
PMV BLD AUTO: 10.4 FL (ref 8.9–12.9)
POTASSIUM SERPL-SCNC: 3.5 MMOL/L (ref 3.5–5.1)
RBC # BLD AUTO: 3.43 M/UL (ref 3.8–5.2)
SERVICE CMNT-IMP: ABNORMAL
SERVICE CMNT-IMP: NORMAL
SODIUM SERPL-SCNC: 135 MMOL/L (ref 136–145)
WBC # BLD AUTO: 9.4 K/UL (ref 3.6–11)

## 2023-01-27 PROCEDURE — 97110 THERAPEUTIC EXERCISES: CPT

## 2023-01-27 PROCEDURE — 74011000250 HC RX REV CODE- 250: Performed by: COLON & RECTAL SURGERY

## 2023-01-27 PROCEDURE — 74011250637 HC RX REV CODE- 250/637: Performed by: INTERNAL MEDICINE

## 2023-01-27 PROCEDURE — 83735 ASSAY OF MAGNESIUM: CPT

## 2023-01-27 PROCEDURE — 82962 GLUCOSE BLOOD TEST: CPT

## 2023-01-27 PROCEDURE — 97530 THERAPEUTIC ACTIVITIES: CPT

## 2023-01-27 PROCEDURE — 74011000250 HC RX REV CODE- 250: Performed by: INTERNAL MEDICINE

## 2023-01-27 PROCEDURE — 74011250636 HC RX REV CODE- 250/636: Performed by: COLON & RECTAL SURGERY

## 2023-01-27 PROCEDURE — 74011250637 HC RX REV CODE- 250/637: Performed by: COLON & RECTAL SURGERY

## 2023-01-27 PROCEDURE — 85025 COMPLETE CBC W/AUTO DIFF WBC: CPT

## 2023-01-27 PROCEDURE — 80048 BASIC METABOLIC PNL TOTAL CA: CPT

## 2023-01-27 PROCEDURE — 84100 ASSAY OF PHOSPHORUS: CPT

## 2023-01-27 PROCEDURE — 36415 COLL VENOUS BLD VENIPUNCTURE: CPT

## 2023-01-27 PROCEDURE — 65270000046 HC RM TELEMETRY

## 2023-01-27 RX ORDER — LEVOTHYROXINE SODIUM 125 UG/1
125 TABLET ORAL
Status: DISCONTINUED | OUTPATIENT
Start: 2023-01-27 | End: 2023-02-01 | Stop reason: HOSPADM

## 2023-01-27 RX ADMIN — TROSPIUM CHLORIDE 20 MG: 20 TABLET, FILM COATED ORAL at 17:39

## 2023-01-27 RX ADMIN — MICONAZOLE NITRATE 2 % TOPICAL POWDER: at 17:40

## 2023-01-27 RX ADMIN — ENOXAPARIN SODIUM 30 MG: 100 INJECTION SUBCUTANEOUS at 08:40

## 2023-01-27 RX ADMIN — METOPROLOL SUCCINATE 50 MG: 50 TABLET, EXTENDED RELEASE ORAL at 08:39

## 2023-01-27 RX ADMIN — LEVOTHYROXINE SODIUM 125 MCG: 0.12 TABLET ORAL at 14:59

## 2023-01-27 RX ADMIN — SODIUM CHLORIDE, PRESERVATIVE FREE 10 ML: 5 INJECTION INTRAVENOUS at 15:03

## 2023-01-27 RX ADMIN — TRAMADOL HYDROCHLORIDE 50 MG: 50 TABLET ORAL at 14:59

## 2023-01-27 RX ADMIN — TROSPIUM CHLORIDE 20 MG: 20 TABLET, FILM COATED ORAL at 07:01

## 2023-01-27 RX ADMIN — SODIUM CHLORIDE, PRESERVATIVE FREE 10 ML: 5 INJECTION INTRAVENOUS at 21:19

## 2023-01-27 RX ADMIN — FOLIC ACID TAB 400 MCG 0.4 MG: 400 TAB at 08:39

## 2023-01-27 RX ADMIN — ACETAMINOPHEN 1000 MG: 500 TABLET ORAL at 17:39

## 2023-01-27 RX ADMIN — TRAMADOL HYDROCHLORIDE 50 MG: 50 TABLET ORAL at 08:39

## 2023-01-27 RX ADMIN — ENOXAPARIN SODIUM 30 MG: 100 INJECTION SUBCUTANEOUS at 21:19

## 2023-01-27 RX ADMIN — Medication 100 MG: at 08:39

## 2023-01-27 RX ADMIN — ACETAMINOPHEN 1000 MG: 500 TABLET ORAL at 21:19

## 2023-01-27 RX ADMIN — PANTOPRAZOLE SODIUM 40 MG: 40 TABLET, DELAYED RELEASE ORAL at 07:01

## 2023-01-27 RX ADMIN — ASCORBIC ACID, VITAMIN A PALMITATE, CHOLECALCIFEROL, THIAMINE HYDROCHLORIDE, RIBOFLAVIN-5 PHOSPHATE SODIUM, PYRIDOXINE HYDROCHLORIDE, NIACINAMIDE, DEXPANTHENOL, ALPHA-TOCOPHEROL ACETATE, VITAMIN K1, FOLIC ACID, BIOTIN, CYANOCOBALAMIN: 200; 3300; 200; 6; 3.6; 6; 40; 15; 10; 150; 600; 60; 5 INJECTION, SOLUTION INTRAVENOUS at 17:51

## 2023-01-27 RX ADMIN — ACETAMINOPHEN 1000 MG: 500 TABLET ORAL at 08:39

## 2023-01-27 RX ADMIN — SODIUM CHLORIDE, PRESERVATIVE FREE 10 ML: 5 INJECTION INTRAVENOUS at 07:01

## 2023-01-27 RX ADMIN — MICONAZOLE NITRATE 2 % TOPICAL POWDER: at 08:40

## 2023-01-27 NOTE — PROGRESS NOTES
This patient was assisted with Intentional Toileting every 2 hours during this shift as appropriate. Documentation of ambulation and output reflected on Flowsheet as appropriate. Purposeful hourly rounding was completed using AIDET and 5Ps. Outcomes of PHR documented as they occurred. Bed alarm in use as appropriate.   Dual Suction and ambubag in place.     -Guerrero Washington, PCT

## 2023-01-27 NOTE — PROGRESS NOTES
Problem: Falls - Risk of  Goal: *Absence of Falls  Description: Document Scarlett Ground Fall Risk and appropriate interventions in the flowsheet.   Outcome: Progressing Towards Goal  Note: Fall Risk Interventions:  Mobility Interventions: Bed/chair exit alarm, Patient to call before getting OOB, Utilize walker, cane, or other assistive device         Medication Interventions: Bed/chair exit alarm, Patient to call before getting OOB, Teach patient to arise slowly    Elimination Interventions: Bed/chair exit alarm, Call light in reach, Patient to call for help with toileting needs, Stay With Me (per policy)              Problem: Patient Education: Go to Patient Education Activity  Goal: Patient/Family Education  Outcome: Progressing Towards Goal

## 2023-01-27 NOTE — PROGRESS NOTES
Medicare pt has received, reviewed, and signed 2nd IM letter informing them of their right to appeal the discharge. Signed copy has been placed on pt bedside chart.   Joselito Morgan CMS

## 2023-01-27 NOTE — PROGRESS NOTES
Care Management follow up, LOS 10 days     Patient admitted for Ileostomy reversal, hernia repair. HTN post op, post op ileus. PICC placed 1/23/23  TPN started 1/24/23     History of: complex diverticulitis, recent small bowel obstruction, GERD, hypothyroidism, TBI. RUR 14 (Score %) low             Is This a Readmission YES  Is this a Bundle NO     Current status  Patient discussed during interdisciplinary rounds. Patient continues to require medical management including ongoing assessment and monitoring. TPN continues per PICC, increasing diet as tolerated. Await patient's tolerance to diet and DC TPN prior to SNF. Transition of Care Plan  Monitor patient status and response to treatment. Patient continues to require medical management. CM needs: SNF placement  SNF referrals placed. Open to [facility]. Plan is for patient to tolerate diet and TPN be discontinues prior to DC. CM to monitor progress and recommendations.     Linh Brizuela RN, MSN/Care manager  [phone]

## 2023-01-27 NOTE — PROGRESS NOTES
CRS Note    Pt with no complaints this morning - has been having trouble with control - loose BMs.       PE  Abd soft, ND, NT  Incision c/d/I    PLAN  - cont tpn  - will adv diet later today

## 2023-01-27 NOTE — PROGRESS NOTES
Problem: Mobility Impaired (Adult and Pediatric)  Goal: *Acute Goals and Plan of Care (Insert Text)  Description: FUNCTIONAL STATUS PRIOR TO ADMISSION: Pt was ambulatory for household distances using rollator, performed bed mobility and transfers without assistance. HOME SUPPORT PRIOR TO ADMISSION: The patient lived with sister and brother-in-law who assist as needed. Physical Therapy Goals  Initiated 1/20/2023  1. Patient will move from supine to sit and sit to supine  in bed with minimal assistance/contact guard assist within 7 day(s). 2.  Patient will transfer from bed to chair and chair to bed with minimal assistance/contact guard assist using the least restrictive device within 7 day(s). 3.  Patient will perform sit to stand with minimal assistance/contact guard assist  4/4  trials within 7 day(s). 4.  Patient will ambulate with minimal assistance/contact guard assist for 75 feet with the least restrictive device within 7 day(s). Outcome: Progressing Towards Goal  Note:   PHYSICAL THERAPY TREATMENT  Patient: Adrian Pollard (1 y.o. female)  Date: 1/27/2023  Diagnosis: [facility] <principal problem not specified>  Procedure(s) (LRB):  REVERSAL LOOP ILEOSTOMY, VENTRAL HERNIA REPAIR, LYSIS OF ADHESIONS (N/A) 10 Days Post-Op  Precautions: Fall, Skin (abdominal binder when out of bed)  Chart, physical therapy assessment, plan of care and goals were reviewed. ASSESSMENT  Patient continues with skilled PT services and is progressing towards goals. Patient with multiple attempts at sitting edge of bed today. With each attempt, she has increased bowel frequency, requires placement on bed pain. 3x attempts at out of bed but with diarrhea. Patient returned to supine to attempt continued bowel movement.  .     Current Level of Function Impacting Discharge (mobility/balance): Min A for rolling right and left directions    Other factors to consider for discharge:          PLAN :  Patient continues to benefit from skilled intervention to address the above impairments. Continue treatment per established plan of care. to address goals. Recommendation for discharge: (in order for the patient to meet his/her long term goals)  Therapy up to 5 days/week in rehab setting    This discharge recommendation:  Has been made in collaboration with the attending provider and/or case management    IF patient discharges home will need the following DME: to be determined (TBD)       SUBJECTIVE:   Patient stated .    OBJECTIVE DATA SUMMARY:   Critical Behavior:  Neurologic State: Alert  Orientation Level: Oriented X4  Cognition: Follows commands  Safety/Judgement: Decreased awareness of environment, Fall prevention  Functional Mobility Training:  Bed Mobility:  Rolling: Minimum assistance                   Therapeutic Exercises: Ankle pumps  2 sets of 10  Pain Rating:  Premedicated prior to session    Activity Tolerance:   Fair    After treatment patient left in no apparent distress:   Supine in bed, Bed / chair alarm activated, and Caregiver / family present    COMMUNICATION/COLLABORATION:   The patients plan of care was discussed with: Registered nurse.      Misha Rice PT, DPT   Time Calculation: 38 mins

## 2023-01-27 NOTE — PROGRESS NOTES
Problem: Falls - Risk of  Goal: *Absence of Falls  Description: Document Scarlett Ground Fall Risk and appropriate interventions in the flowsheet. Outcome: Progressing Towards Goal  Note: Fall Risk Interventions:                                Problem: Patient Education: Go to Patient Education Activity  Goal: Patient/Family Education  Outcome: Progressing Towards Goal     Problem: Nutrition Deficit  Goal: *Optimize nutritional status  Outcome: Progressing Towards Goal     Problem: Patient Education: Go to Patient Education Activity  Goal: Patient/Family Education  Outcome: Progressing Towards Goal     Problem: Patient Education: Go to Patient Education Activity  Goal: Patient/Family Education  Outcome: Progressing Towards Goal     Problem: Surgical Pathway Day of Surgery  Goal: Off Pathway (Use only if patient is Off Pathway)  Outcome: Progressing Towards Goal  Goal: Activity/Safety  Outcome: Progressing Towards Goal  Goal: Consults, if ordered  Outcome: Progressing Towards Goal  Goal: Nutrition/Diet  Outcome: Progressing Towards Goal  Goal: Medications  Outcome: Progressing Towards Goal  Goal: Respiratory  Outcome: Progressing Towards Goal  Goal: Treatments/Interventions/Procedures  Outcome: Progressing Towards Goal  Goal: Psychosocial  Outcome: Progressing Towards Goal  Goal: *No signs and symptoms of infection or wound complications  Outcome: Progressing Towards Goal  Goal: *Optimal pain control at patient's stated goal  Outcome: Progressing Towards Goal  Goal: *Adequate urinary output (equal to or greater than 30 milliliters/hour)  Description: Ambulatory Surgery patients voiding without difficulty.   Outcome: Progressing Towards Goal  Goal: *Hemodynamically stable  Outcome: Progressing Towards Goal  Goal: *Tolerating diet  Outcome: Progressing Towards Goal  Goal: *Demonstrates progressive activity  Outcome: Progressing Towards Goal     Problem: Surgical Pathway Post-Op Day 1  Goal: Off Pathway (Use only if patient is Off Pathway)  Outcome: Progressing Towards Goal  Goal: Activity/Safety  Outcome: Progressing Towards Goal  Goal: Diagnostic Test/Procedures  Outcome: Progressing Towards Goal  Goal: Nutrition/Diet  Outcome: Progressing Towards Goal  Goal: Discharge Planning  Outcome: Progressing Towards Goal  Goal: Medications  Outcome: Progressing Towards Goal  Goal: Respiratory  Outcome: Progressing Towards Goal     Problem: Nutrition Deficit  Goal: *Optimize nutritional status  Outcome: Progressing Towards Goal     Problem: Pain  Goal: *Control of Pain  Outcome: Progressing Towards Goal     Problem: Patient Education: Go to Patient Education Activity  Goal: Patient/Family Education  Outcome: Progressing Towards Goal     Problem: Pressure Injury - Risk of  Goal: *Prevention of pressure injury  Description: Document Stef Scale and appropriate interventions in the flowsheet. Outcome: Progressing Towards Goal     Problem: Patient Education: Go to Patient Education Activity  Goal: Patient/Family Education  Outcome: Progressing Towards Goal     Problem: Patient Education: Go to Patient Education Activity  Goal: Patient/Family Education  Outcome: Progressing Towards Goal     Problem: Nutrition Deficit  Goal: *Optimize nutritional status  Outcome: Progressing Towards Goal     Problem: Patient Education: Go to Patient Education Activity  Goal: Patient/Family Education  Outcome: Progressing Towards Goal     Problem: Nutrition Deficit  Goal: *Optimize nutritional status  Outcome: Progressing Towards Goal     Problem: Falls - Risk of  Goal: *Absence of Falls  Description: Document Lawanda Fall Risk and appropriate interventions in the flowsheet.   Outcome: Progressing Towards Goal  Note: Fall Risk Interventions:                                Problem: Patient Education: Go to Patient Education Activity  Goal: Patient/Family Education  Outcome: Progressing Towards Goal     Problem: Nutrition Deficit  Goal: *Optimize nutritional status  Outcome: Progressing Towards Goal     Problem: Patient Education: Go to Patient Education Activity  Goal: Patient/Family Education  Outcome: Progressing Towards Goal     Problem: Patient Education: Go to Patient Education Activity  Goal: Patient/Family Education  Outcome: Progressing Towards Goal     Problem: Surgical Pathway Day of Surgery  Goal: Off Pathway (Use only if patient is Off Pathway)  Outcome: Progressing Towards Goal  Goal: Activity/Safety  Outcome: Progressing Towards Goal  Goal: Consults, if ordered  Outcome: Progressing Towards Goal  Goal: Nutrition/Diet  Outcome: Progressing Towards Goal  Goal: Medications  Outcome: Progressing Towards Goal  Goal: Respiratory  Outcome: Progressing Towards Goal  Goal: Treatments/Interventions/Procedures  Outcome: Progressing Towards Goal  Goal: Psychosocial  Outcome: Progressing Towards Goal  Goal: *No signs and symptoms of infection or wound complications  Outcome: Progressing Towards Goal  Goal: *Optimal pain control at patient's stated goal  Outcome: Progressing Towards Goal  Goal: *Adequate urinary output (equal to or greater than 30 milliliters/hour)  Description: Ambulatory Surgery patients voiding without difficulty.   Outcome: Progressing Towards Goal  Goal: *Hemodynamically stable  Outcome: Progressing Towards Goal  Goal: *Tolerating diet  Outcome: Progressing Towards Goal  Goal: *Demonstrates progressive activity  Outcome: Progressing Towards Goal     Problem: Surgical Pathway Post-Op Day 1  Goal: Off Pathway (Use only if patient is Off Pathway)  Outcome: Progressing Towards Goal  Goal: Activity/Safety  Outcome: Progressing Towards Goal  Goal: Diagnostic Test/Procedures  Outcome: Progressing Towards Goal  Goal: Nutrition/Diet  Outcome: Progressing Towards Goal  Goal: Discharge Planning  Outcome: Progressing Towards Goal  Goal: Medications  Outcome: Progressing Towards Goal  Goal: Respiratory  Outcome: Progressing Towards Goal     Problem: Nutrition Deficit  Goal: *Optimize nutritional status  Outcome: Progressing Towards Goal     Problem: Pain  Goal: *Control of Pain  Outcome: Progressing Towards Goal     Problem: Patient Education: Go to Patient Education Activity  Goal: Patient/Family Education  Outcome: Progressing Towards Goal     Problem: Pressure Injury - Risk of  Goal: *Prevention of pressure injury  Description: Document Stef Scale and appropriate interventions in the flowsheet.   Outcome: Progressing Towards Goal     Problem: Patient Education: Go to Patient Education Activity  Goal: Patient/Family Education  Outcome: Progressing Towards Goal     Problem: Patient Education: Go to Patient Education Activity  Goal: Patient/Family Education  Outcome: Progressing Towards Goal     Problem: Nutrition Deficit  Goal: *Optimize nutritional status  Outcome: Progressing Towards Goal     Problem: Patient Education: Go to Patient Education Activity  Goal: Patient/Family Education  Outcome: Progressing Towards Goal     Problem: Nutrition Deficit  Goal: *Optimize nutritional status  Outcome: Progressing Towards Goal

## 2023-01-27 NOTE — PROGRESS NOTES
Bedside and Verbal shift change report given to Nikos Tanner (oncoming nurse) by Vanessa Lane (offgoing nurse). Report included the following information SBAR, Kardex, MAR, Accordion, and Recent Results.

## 2023-01-27 NOTE — PROGRESS NOTES
Problem: Self Care Deficits Care Plan (Adult)  Goal: *Acute Goals and Plan of Care (Insert Text)  Description: FUNCTIONAL STATUS PRIOR TO ADMISSION: Patient was modified independent using a rollator (downstairs) and rolling walker (upstairs) for functional mobility. Patient required minimal assistance for basic and instrumental ADLs, mostly for LB dressing. Pt reports hard year with 30+ pound weight loss. HOME SUPPORT: The patient lived with her sister and brother-in-law. Occupational Therapy Goals  Initiated 1/20/2023  1. Patient will perform grooming with supervision/set-up within 7 day(s). 2.  Patient will perform upper body dressing and bathing with supervision/set-up within 7 day(s). 3.  Patient will perform lower body dressing with moderate assistance using AE PRN within 7 day(s). 4.  Patient will perform toilet transfers with minimal assistance/contact guard assist within 7 day(s). 5.  Patient will perform all aspects of toileting with minimal assistance/contact guard assist within 7 day(s). 6.  Patient will participate in upper extremity therapeutic exercise/activities with independence for 10 minutes within 7 day(s). 7.  Patient will utilize energy conservation techniques during functional activities with verbal cues within 7 day(s). Outcome: Progressing Towards Goal   OCCUPATIONAL THERAPY TREATMENT  Patient: Laury Johnson (66 y.o. female)  Date: 1/27/2023  Diagnosis: [facility] [Z93.2] <principal problem not specified>  Procedure(s) (LRB):  REVERSAL LOOP ILEOSTOMY, VENTRAL HERNIA REPAIR, LYSIS OF ADHESIONS (N/A) 10 Days Post-Op  Precautions: Fall, Skin (abdominal binder when out of bed)  Chart, occupational therapy assessment, plan of care, and goals were reviewed. ASSESSMENT  Patient continues with skilled OT services and is progressing towards goals. Patient was received in bed reporting increased fatigue this afternoon.   She was agreeable to bed level exercises with encouragement. Patient educated on benefits of exercise, proper techniques, pacing, and frequency + progression of frequency. Patient performed 3 sets x5-10 reps of each exercise with good to fair tolerance. Patient repositioned in bed for comfort and agreeable to complete additional exercises frequently as tolerated. Current Level of Function Impacting Discharge (ADLs): Fair to poor exercise tolerance- HEP         PLAN :  Patient continues to benefit from skilled intervention to address the above impairments. Continue treatment per established plan of care to address goals. Recommend with staff:   Recommend patient be in chair position often and frequent positional changes. Encourage patient involvement in personal care as able. Encourage exercises frequently throughout the day. Recommend next OT session: Continue towards set OT goals    Recommendation for discharge: (in order for the patient to meet his/her long term goals)  Therapy up to 5 days/week in rehab setting    This discharge recommendation:  Has been made in collaboration with the attending provider and/or case management    IF patient discharges home will need the following DME: none       SUBJECTIVE:   Patient agreeable to OT tx. OBJECTIVE DATA SUMMARY:   Cognitive/Behavioral Status:  Neurologic State: Alert  Orientation Level: Oriented X4  Cognition: Follows commands  Perception: Appears intact  Perseveration: No perseveration noted  Safety/Judgement: Awareness of environment    Functional Mobility and Transfers for ADLs:  Bed Mobility:  Rolling: Minimum assistance    Cognitive Retraining  Safety/Judgement: Awareness of environment    Therapeutic Exercises:   Exercises:    Patient educated regarding home exercise program for strengthening, ROM, and respiratory function; they demonstrated appropriate performance. Reviewed importance of symptom monitoring and adjusting HEP based on symptoms and respiratory status.  Pt verbalized understanding via teach back, verbal statement, and demonstration. UE Exercises   shoulder flexion/extension 5 3   Chest press/back row 5 3    elbow flexion/extension AROM 10 3   Hand AROM composite flexion 10 3   Shoulder shrugs 10 3         Activity Tolerance:   Good    After treatment patient left in no apparent distress:   Supine in bed, Call bell within reach, and Bed / chair alarm activated    COMMUNICATION/COLLABORATION:   The patients plan of care was discussed with: Physical therapist, Registered nurse, and patient .      Shane Villagran OTR/L  Time Calculation: 25 mins

## 2023-01-28 LAB
GLUCOSE BLD STRIP.AUTO-MCNC: 115 MG/DL (ref 65–117)
GLUCOSE BLD STRIP.AUTO-MCNC: 115 MG/DL (ref 65–117)
GLUCOSE BLD STRIP.AUTO-MCNC: 141 MG/DL (ref 65–117)
SERVICE CMNT-IMP: ABNORMAL
SERVICE CMNT-IMP: NORMAL
SERVICE CMNT-IMP: NORMAL

## 2023-01-28 PROCEDURE — 74011000250 HC RX REV CODE- 250: Performed by: INTERNAL MEDICINE

## 2023-01-28 PROCEDURE — 65270000046 HC RM TELEMETRY

## 2023-01-28 PROCEDURE — 82962 GLUCOSE BLOOD TEST: CPT

## 2023-01-28 PROCEDURE — 74011000250 HC RX REV CODE- 250: Performed by: COLON & RECTAL SURGERY

## 2023-01-28 PROCEDURE — 74011250637 HC RX REV CODE- 250/637: Performed by: INTERNAL MEDICINE

## 2023-01-28 PROCEDURE — 74011250636 HC RX REV CODE- 250/636: Performed by: COLON & RECTAL SURGERY

## 2023-01-28 PROCEDURE — 74011250637 HC RX REV CODE- 250/637: Performed by: COLON & RECTAL SURGERY

## 2023-01-28 RX ADMIN — PANTOPRAZOLE SODIUM 40 MG: 40 TABLET, DELAYED RELEASE ORAL at 06:55

## 2023-01-28 RX ADMIN — ALTEPLASE 1 MG: 2.2 INJECTION, POWDER, LYOPHILIZED, FOR SOLUTION INTRAVENOUS at 03:53

## 2023-01-28 RX ADMIN — SODIUM CHLORIDE, PRESERVATIVE FREE 10 ML: 5 INJECTION INTRAVENOUS at 15:09

## 2023-01-28 RX ADMIN — TROSPIUM CHLORIDE 20 MG: 20 TABLET, FILM COATED ORAL at 15:43

## 2023-01-28 RX ADMIN — ACETAMINOPHEN 1000 MG: 500 TABLET ORAL at 22:47

## 2023-01-28 RX ADMIN — TROSPIUM CHLORIDE 20 MG: 20 TABLET, FILM COATED ORAL at 06:55

## 2023-01-28 RX ADMIN — TRAMADOL HYDROCHLORIDE 50 MG: 50 TABLET ORAL at 10:21

## 2023-01-28 RX ADMIN — TRAMADOL HYDROCHLORIDE 50 MG: 50 TABLET ORAL at 22:47

## 2023-01-28 RX ADMIN — ENOXAPARIN SODIUM 30 MG: 100 INJECTION SUBCUTANEOUS at 23:05

## 2023-01-28 RX ADMIN — MICONAZOLE NITRATE 2 % TOPICAL POWDER: at 17:47

## 2023-01-28 RX ADMIN — METOPROLOL SUCCINATE 50 MG: 50 TABLET, EXTENDED RELEASE ORAL at 09:15

## 2023-01-28 RX ADMIN — LEVOTHYROXINE SODIUM 125 MCG: 0.12 TABLET ORAL at 06:55

## 2023-01-28 RX ADMIN — SODIUM CHLORIDE, PRESERVATIVE FREE 10 ML: 5 INJECTION INTRAVENOUS at 22:48

## 2023-01-28 RX ADMIN — MICONAZOLE NITRATE 2 % TOPICAL POWDER: at 09:16

## 2023-01-28 RX ADMIN — ACETAMINOPHEN 1000 MG: 500 TABLET ORAL at 09:16

## 2023-01-28 RX ADMIN — Medication 100 MG: at 09:16

## 2023-01-28 RX ADMIN — ASCORBIC ACID, VITAMIN A PALMITATE, CHOLECALCIFEROL, THIAMINE HYDROCHLORIDE, RIBOFLAVIN-5 PHOSPHATE SODIUM, PYRIDOXINE HYDROCHLORIDE, NIACINAMIDE, DEXPANTHENOL, ALPHA-TOCOPHEROL ACETATE, VITAMIN K1, FOLIC ACID, BIOTIN, CYANOCOBALAMIN: 200; 3300; 200; 6; 3.6; 6; 40; 15; 10; 150; 600; 60; 5 INJECTION, SOLUTION INTRAVENOUS at 17:46

## 2023-01-28 RX ADMIN — SODIUM CHLORIDE, PRESERVATIVE FREE 10 ML: 5 INJECTION INTRAVENOUS at 06:55

## 2023-01-28 RX ADMIN — ACETAMINOPHEN 1000 MG: 500 TABLET ORAL at 15:43

## 2023-01-28 RX ADMIN — ENOXAPARIN SODIUM 30 MG: 100 INJECTION SUBCUTANEOUS at 09:16

## 2023-01-28 RX ADMIN — FOLIC ACID TAB 400 MCG 0.4 MG: 400 TAB at 09:15

## 2023-01-28 NOTE — PROGRESS NOTES
1900 - Bedside and verbal shift change report given to ELIDA Badillo (oncoming nurse) by Kurt Dc RN (offgoing nurse). Report included the following information: SBAR, Kardex, Intake/Output, MAR, Recent Results, and Med Rec Status. This patient was assisted with intentional toileting every 2 hours during this shift as appropriate. Documentation of ambulation and output reflected on flow sheet as appropriate. Purposeful hourly rounding was completed using AIDET and 5 P's. Outcomes of PHR documented as they occurred. Bed alarm in use as appropriate. Dual suction and ambubag in place. 0700 - Bedside and verbal shift change report given to Kurt Dc RN (oncoming nurse) by ELIDA Badillo (offgoing nurse). Report included the following information: SBAR, Kardex, Intake/Output, MAR, Recent Results, and Med Rec Status.

## 2023-01-28 NOTE — PROGRESS NOTES
This patient was assisted with Intentional Toileting every 2 hours during this shift as appropriate. Documentation of ambulation and output reflected on Flowsheet as appropriate. Purposeful hourly rounding was completed using AIDET and 5Ps. Outcomes of PHR documented as they occurred. Bed alarm in use as appropriate.   Dual Suction and ambubag in place.     -Makayla Gaytan, PCT

## 2023-01-28 NOTE — PROGRESS NOTES
CRS  Pt having bms, some incontinence  Flowsheet reviewed  Abd: soft, nt    Dressing: clean    Plan: Adv to fulls.  Wean tpn

## 2023-01-28 NOTE — PROGRESS NOTES
Bedside and Verbal shift change report given to ELIDA Ryder (oncoming nurse) by Elisabeth Rolon (offgoing nurse). Report included the following information SBAR, Kardex, ED Summary, MAR, Recent Results, and Cardiac Rhythm SR/ST . This patient was assisted with Intentional Toileting every 2 hours during this shift as appropriate. Documentation of ambulation and output reflected on Flowsheet as appropriate. Purposeful hourly rounding was completed using AIDET and 5Ps. Outcomes of PHR documented as they occurred. Bed alarm in use as appropriate. Dual Suction and ambubag in place. Bedside and Verbal shift change report given to [address] (oncoming nurse) by Beth Bangura (offgoing nurse). Report included the following information SBAR, Kardex, ED Summary, MAR, Recent Results, and Cardiac Rhythm SR/ST .

## 2023-01-29 LAB
GLUCOSE BLD STRIP.AUTO-MCNC: 107 MG/DL (ref 65–117)
GLUCOSE BLD STRIP.AUTO-MCNC: 113 MG/DL (ref 65–117)
GLUCOSE BLD STRIP.AUTO-MCNC: 121 MG/DL (ref 65–117)
GLUCOSE BLD STRIP.AUTO-MCNC: 99 MG/DL (ref 65–117)
SERVICE CMNT-IMP: ABNORMAL
SERVICE CMNT-IMP: NORMAL

## 2023-01-29 PROCEDURE — 74011000250 HC RX REV CODE- 250: Performed by: INTERNAL MEDICINE

## 2023-01-29 PROCEDURE — 74011250636 HC RX REV CODE- 250/636: Performed by: COLON & RECTAL SURGERY

## 2023-01-29 PROCEDURE — 82962 GLUCOSE BLOOD TEST: CPT

## 2023-01-29 PROCEDURE — 74011000250 HC RX REV CODE- 250: Performed by: COLON & RECTAL SURGERY

## 2023-01-29 PROCEDURE — 74011250637 HC RX REV CODE- 250/637: Performed by: COLON & RECTAL SURGERY

## 2023-01-29 PROCEDURE — 74011250637 HC RX REV CODE- 250/637: Performed by: INTERNAL MEDICINE

## 2023-01-29 PROCEDURE — 65270000046 HC RM TELEMETRY

## 2023-01-29 RX ADMIN — METOPROLOL SUCCINATE 50 MG: 50 TABLET, EXTENDED RELEASE ORAL at 08:41

## 2023-01-29 RX ADMIN — SODIUM CHLORIDE, PRESERVATIVE FREE 10 ML: 5 INJECTION INTRAVENOUS at 17:27

## 2023-01-29 RX ADMIN — ACETAMINOPHEN 1000 MG: 500 TABLET ORAL at 08:41

## 2023-01-29 RX ADMIN — MICONAZOLE NITRATE 2 % TOPICAL POWDER: at 08:44

## 2023-01-29 RX ADMIN — SODIUM CHLORIDE, PRESERVATIVE FREE 10 ML: 5 INJECTION INTRAVENOUS at 07:31

## 2023-01-29 RX ADMIN — LEVOTHYROXINE SODIUM 125 MCG: 0.12 TABLET ORAL at 07:30

## 2023-01-29 RX ADMIN — MICONAZOLE NITRATE 2 % TOPICAL POWDER: at 17:27

## 2023-01-29 RX ADMIN — PANTOPRAZOLE SODIUM 40 MG: 40 TABLET, DELAYED RELEASE ORAL at 07:30

## 2023-01-29 RX ADMIN — Medication 100 MG: at 08:41

## 2023-01-29 RX ADMIN — FOLIC ACID TAB 400 MCG 0.4 MG: 400 TAB at 08:41

## 2023-01-29 RX ADMIN — TROSPIUM CHLORIDE 20 MG: 20 TABLET, FILM COATED ORAL at 16:11

## 2023-01-29 RX ADMIN — SODIUM CHLORIDE, PRESERVATIVE FREE 10 ML: 5 INJECTION INTRAVENOUS at 22:30

## 2023-01-29 RX ADMIN — ACETAMINOPHEN 1000 MG: 500 TABLET ORAL at 21:27

## 2023-01-29 RX ADMIN — ENOXAPARIN SODIUM 30 MG: 100 INJECTION SUBCUTANEOUS at 08:42

## 2023-01-29 RX ADMIN — TROSPIUM CHLORIDE 20 MG: 20 TABLET, FILM COATED ORAL at 07:30

## 2023-01-29 RX ADMIN — ACETAMINOPHEN 1000 MG: 500 TABLET ORAL at 15:22

## 2023-01-29 NOTE — PROGRESS NOTES
This patient was assisted with Intentional Toileting every 2 hours during this shift as appropriate. Documentation of ambulation and output reflected on Flowsheet as appropriate. Purposeful hourly rounding was completed using AIDET and 5Ps. Outcomes of PHR documented as they occurred. Bed alarm in use as appropriate.   Dual Suction and ambubag in place.     -Rochelle Martinez, PCT

## 2023-01-29 NOTE — PROGRESS NOTES
CRS  Pt still with frequent, loose stools  Flowsheet reviewed  Abd: soft, nt  Dressing intact    Plan:  Adv diet.  If stools do not firm up with more solid PO, then will plan on adding imodium

## 2023-01-29 NOTE — PROGRESS NOTES
1900 - Bedside and verbal shift change report given to ELIDA Badillo (oncoming nurse) by Camilla Aguilar RN (offgoing nurse). Report included the following information: SBAR, Kardex, Intake/Output, MAR, Recent Results, and Med Rec Status. This patient was assisted with intentional toileting every 2 hours during this shift as appropriate. Documentation of ambulation and output reflected on flow sheet as appropriate. Purposeful hourly rounding was completed using AIDET and 5 P's. Outcomes of PHR documented as they occurred. Bed alarm in use as appropriate. Dual suction and ambubag in place. 0700 - Bedside and verbal shift change report given to Machelle Crowe RN (oncoming nurse) by ELIDA Badillo (offgoing nurse). Report included the following information: SBAR, Kardex, Intake/Output, MAR, Recent Results, and Med Rec Status.

## 2023-01-29 NOTE — PROGRESS NOTES
0700: Bedside and Verbal shift change report given to Jessica Peralta (oncoming nurse) by Uma Jefferson (offgoing nurse). Report included the following information SBAR, Kardex, Accordion, and Cardiac Rhythm NSR .    0700: 1st abdominal dressing change completed. 1850: Notified MD of patient having what looks like blood in stool. Colon MD gave orders to hold any blood thinners and to get a CBC with AM labs. 1850: 2nd abdominal dressing change completed. This patient was assisted with Intentional Toileting every 2 hours during this shift as appropriate. Documentation of ambulation and output reflected on Flowsheet as appropriate. Purposeful hourly rounding was completed using AIDET and 5Ps. Outcomes of PHR documented as they occurred. Bed alarm in use as appropriate. Dual Suction and ambubag in place.

## 2023-01-30 LAB
ANION GAP SERPL CALC-SCNC: 7 MMOL/L (ref 5–15)
BASOPHILS # BLD: 0 K/UL (ref 0–0.1)
BASOPHILS NFR BLD: 0 % (ref 0–1)
BUN SERPL-MCNC: 12 MG/DL (ref 6–20)
BUN/CREAT SERPL: 19 (ref 12–20)
CALCIUM SERPL-MCNC: 8.9 MG/DL (ref 8.5–10.1)
CHLORIDE SERPL-SCNC: 101 MMOL/L (ref 97–108)
CO2 SERPL-SCNC: 29 MMOL/L (ref 21–32)
CREAT SERPL-MCNC: 0.62 MG/DL (ref 0.55–1.02)
DIFFERENTIAL METHOD BLD: ABNORMAL
EOSINOPHIL # BLD: 0.4 K/UL (ref 0–0.4)
EOSINOPHIL NFR BLD: 5 % (ref 0–7)
ERYTHROCYTE [DISTWIDTH] IN BLOOD BY AUTOMATED COUNT: 13.1 % (ref 11.5–14.5)
GLUCOSE BLD STRIP.AUTO-MCNC: 100 MG/DL (ref 65–117)
GLUCOSE BLD STRIP.AUTO-MCNC: 93 MG/DL (ref 65–117)
GLUCOSE BLD STRIP.AUTO-MCNC: 94 MG/DL (ref 65–117)
GLUCOSE BLD STRIP.AUTO-MCNC: 98 MG/DL (ref 65–117)
GLUCOSE SERPL-MCNC: 90 MG/DL (ref 65–100)
HCT VFR BLD AUTO: 31.1 % (ref 35–47)
HGB BLD-MCNC: 10.3 G/DL (ref 11.5–16)
IMM GRANULOCYTES # BLD AUTO: 0.1 K/UL (ref 0–0.04)
IMM GRANULOCYTES NFR BLD AUTO: 1 % (ref 0–0.5)
LYMPHOCYTES # BLD: 2.5 K/UL (ref 0.8–3.5)
LYMPHOCYTES NFR BLD: 27 % (ref 12–49)
MCH RBC QN AUTO: 33.1 PG (ref 26–34)
MCHC RBC AUTO-ENTMCNC: 33.1 G/DL (ref 30–36.5)
MCV RBC AUTO: 100 FL (ref 80–99)
MONOCYTES # BLD: 0.6 K/UL (ref 0–1)
MONOCYTES NFR BLD: 6 % (ref 5–13)
NEUTS SEG # BLD: 5.8 K/UL (ref 1.8–8)
NEUTS SEG NFR BLD: 61 % (ref 32–75)
NRBC # BLD: 0 K/UL (ref 0–0.01)
NRBC BLD-RTO: 0 PER 100 WBC
PLATELET # BLD AUTO: 275 K/UL (ref 150–400)
PMV BLD AUTO: 11 FL (ref 8.9–12.9)
POTASSIUM SERPL-SCNC: 4.4 MMOL/L (ref 3.5–5.1)
RBC # BLD AUTO: 3.11 M/UL (ref 3.8–5.2)
SERVICE CMNT-IMP: NORMAL
SODIUM SERPL-SCNC: 137 MMOL/L (ref 136–145)
WBC # BLD AUTO: 9.5 K/UL (ref 3.6–11)

## 2023-01-30 PROCEDURE — 97116 GAIT TRAINING THERAPY: CPT

## 2023-01-30 PROCEDURE — 85025 COMPLETE CBC W/AUTO DIFF WBC: CPT

## 2023-01-30 PROCEDURE — 80048 BASIC METABOLIC PNL TOTAL CA: CPT

## 2023-01-30 PROCEDURE — 94761 N-INVAS EAR/PLS OXIMETRY MLT: CPT

## 2023-01-30 PROCEDURE — 65270000046 HC RM TELEMETRY

## 2023-01-30 PROCEDURE — 74011250637 HC RX REV CODE- 250/637: Performed by: INTERNAL MEDICINE

## 2023-01-30 PROCEDURE — 82962 GLUCOSE BLOOD TEST: CPT

## 2023-01-30 PROCEDURE — 74011250637 HC RX REV CODE- 250/637: Performed by: COLON & RECTAL SURGERY

## 2023-01-30 PROCEDURE — 97530 THERAPEUTIC ACTIVITIES: CPT

## 2023-01-30 PROCEDURE — 97535 SELF CARE MNGMENT TRAINING: CPT

## 2023-01-30 PROCEDURE — 36415 COLL VENOUS BLD VENIPUNCTURE: CPT

## 2023-01-30 PROCEDURE — 74011000250 HC RX REV CODE- 250: Performed by: COLON & RECTAL SURGERY

## 2023-01-30 PROCEDURE — 97110 THERAPEUTIC EXERCISES: CPT

## 2023-01-30 RX ADMIN — Medication 100 MG: at 08:54

## 2023-01-30 RX ADMIN — MICONAZOLE NITRATE 2 % TOPICAL POWDER: at 08:55

## 2023-01-30 RX ADMIN — ACETAMINOPHEN 1000 MG: 500 TABLET ORAL at 21:20

## 2023-01-30 RX ADMIN — FOLIC ACID TAB 400 MCG 0.4 MG: 400 TAB at 08:54

## 2023-01-30 RX ADMIN — TROSPIUM CHLORIDE 20 MG: 20 TABLET, FILM COATED ORAL at 17:28

## 2023-01-30 RX ADMIN — LEVOTHYROXINE SODIUM 125 MCG: 0.12 TABLET ORAL at 06:38

## 2023-01-30 RX ADMIN — ACETAMINOPHEN 1000 MG: 500 TABLET ORAL at 08:54

## 2023-01-30 RX ADMIN — SODIUM CHLORIDE, PRESERVATIVE FREE 10 ML: 5 INJECTION INTRAVENOUS at 21:21

## 2023-01-30 RX ADMIN — TRAMADOL HYDROCHLORIDE 50 MG: 50 TABLET ORAL at 11:32

## 2023-01-30 RX ADMIN — TROSPIUM CHLORIDE 20 MG: 20 TABLET, FILM COATED ORAL at 06:38

## 2023-01-30 RX ADMIN — MICONAZOLE NITRATE 2 % TOPICAL POWDER: at 17:28

## 2023-01-30 RX ADMIN — METOPROLOL SUCCINATE 50 MG: 50 TABLET, EXTENDED RELEASE ORAL at 08:54

## 2023-01-30 RX ADMIN — SODIUM CHLORIDE, PRESERVATIVE FREE 10 ML: 5 INJECTION INTRAVENOUS at 13:39

## 2023-01-30 RX ADMIN — PANTOPRAZOLE SODIUM 40 MG: 40 TABLET, DELAYED RELEASE ORAL at 06:39

## 2023-01-30 RX ADMIN — SODIUM CHLORIDE, PRESERVATIVE FREE 10 ML: 5 INJECTION INTRAVENOUS at 06:40

## 2023-01-30 NOTE — PROGRESS NOTES
0730: Bedside shift change report given to Javadjono Granados, RN (oncoming nurse) by Elsa Delacruz RN (offgoing nurse). Report included the following information SBAR, Kardex, ED Summary, Procedure Summary, Intake/Output, MAR, Recent Results, Med Rec Status, and Cardiac Rhythm NSR .    0840: Received in report from previous RN they held patient's blood thinners yesterday due to blood in the stool per GI. Paged Dr. Chavira Imus office in regards to AM blood in stool and AM scheduled lovenox. Verbal order received from Dr. Flores Mcdonald to hold lovenox.

## 2023-01-30 NOTE — PROGRESS NOTES
CRS  Loose stools have firmed up overnight - no issues this morning.       PE  Abd soft, ND, NT  Incision w dressing that remains intact    PLAN  - soft diet  - hold on immodium

## 2023-01-30 NOTE — PROGRESS NOTES
Problem: Self Care Deficits Care Plan (Adult)  Goal: *Acute Goals and Plan of Care (Insert Text)  Description: FUNCTIONAL STATUS PRIOR TO ADMISSION: Patient was modified independent using a rollator (downstairs) and rolling walker (upstairs) for functional mobility. Patient required minimal assistance for basic and instrumental ADLs, mostly for LB dressing. Pt reports hard year with 30+ pound weight loss. HOME SUPPORT: The patient lived with her sister and brother-in-law. Occupational Therapy Goals  Initiated 1/20/2023  1. Patient will perform grooming with supervision/set-up within 7 day(s). 2.  Patient will perform upper body dressing and bathing with supervision/set-up within 7 day(s). 3.  Patient will perform lower body dressing with moderate assistance using AE PRN within 7 day(s). 4.  Patient will perform toilet transfers with minimal assistance/contact guard assist within 7 day(s). 5.  Patient will perform all aspects of toileting with minimal assistance/contact guard assist within 7 day(s). 6.  Patient will participate in upper extremity therapeutic exercise/activities with independence for 10 minutes within 7 day(s). 7.  Patient will utilize energy conservation techniques during functional activities with verbal cues within 7 day(s). Outcome: Progressing Towards Goal   OCCUPATIONAL THERAPY TREATMENT/WEEKLY RE-ASSESSMENT  Patient: Evelio Vizcarra (61 y.o. female)  Date: 1/30/2023  Diagn[facility]] <principal problem not specified>  Procedure(s) (LRB):  REVERSAL LOOP ILEOSTOMY, VENTRAL HERNIA REPAIR, LYSIS OF ADHESIONS (N/A) 13 Days Post-Op  Precautions: Fall, Skin (abdominal binder when out of bed)  Chart, occupational therapy assessment, plan of care, and goals were reviewed. ASSESSMENT  Patient continues with skilled OT services and is progressing towards goals. Ms. Allyssa Georges was received in bed agreeable to minimal activity d/t fatigue and incontinence.   She was seen today for weekly re-assessment. Progress has been slow, steady since initial evaluation currently requiring decreased assistance and tolerating mildly increased activity. Patient performed bed mobility and multiple sit to stand transfers using rolling walker. She engaged in ADLs with fair tolerance; Increased assistance needed for LB and standing ADLs. Education provided regarding UE/LE exercises, adaptive ADL techniques, and safe transfer techniques. Patient would benefit from continued skilled OT to progress towards goals and improve overall independence. Current Level of Function Impacting Discharge (ADLs): Functional mobility: CGA-Min A; UB ADLs: supervision/setup-Min A; LB ADLs: Max A           PLAN :  Goals have been updated based on progression since last assessment. Patient continues to benefit from skilled intervention to address the above impairments. Continue to follow patient 5 times a week to address goals. Recommend with staff:   Recommend patient be OOB to chair as frequently as tolerated; Goal of 3x/day for all meals for 60 minutes at a time. For toileting needs, recommend transfers to/from [facility]. Encourage patient involvement in personal care as able. Encourage exercises frequently throughout the day. Recommend next OT session: Continue towards set OT goals.     Recommendation for discharge: (in order for the patient to meet his/her long term goals)  Therapy up to 5 days/week in SNF setting    This discharge recommendation:  Has been made in collaboration with the attending provider and/or case management    IF patient discharges home will need the following DME: none       SUBJECTIVE:   Patient agreeable to OT tx/    OBJECTIVE DATA SUMMARY:   Cognitive/Behavioral Status:  Neurologic State: Alert  Orientation Level: Oriented X4  Cognition: Follows commands  Perception: Appears intact  Perseveration: No perseveration noted  Safety/Judgement: Awareness of environment    Functional Mobility and Transfers for ADLs:  Bed Mobility:  Rolling: Contact guard assistance  Supine to Sit: Contact guard assistance  Scooting: Contact guard assistance    Transfers:  Sit to Stand: Minimum assistance  Bed to Chair: Minimum assistance    Balance:  Sitting: Impaired; Without support  Sitting - Static: Good (unsupported)  Sitting - Dynamic: Fair (occasional)  Standing: Impaired; With support  Standing - Static: Good  Standing - Dynamic : Fair    ADL Intervention:  Grooming  Grooming Assistance: Supervision;Set-up    Upper Body Bathing  Bathing Assistance: Set-up; Supervision    Lower Body Bathing  Bathing Assistance: Maximum assistance    Upper Body Dressing Assistance  Dressing Assistance: Minimum assistance    Lower Body Dressing Assistance  Dressing Assistance: Maximum assistance    Toileting  Toileting Assistance: Maximum assistance    Cognitive Retraining  Safety/Judgement: Awareness of environment    Activity Tolerance:   Good and Fair    After treatment patient left in no apparent distress:   Supine in bed, Call bell within reach, and Bed / chair alarm activated    COMMUNICATION/COLLABORATION:   The patients plan of care was discussed with: Physical therapist, Registered nurse, and patient .      KACY Ty/L  Time Calculation: 26 mins

## 2023-01-30 NOTE — PROGRESS NOTES
Comprehensive Nutrition Assessment    Type and Reason for Visit: Reassess    Nutrition Recommendations/Plan:   Continue Low Fiber diet  Pt declined ONS    Obtain new measured weight -      Malnutrition Assessment:  Malnutrition Status:  Mild malnutrition (01/25/23 1106)    Context:  Acute illness     Findings of the 6 clinical characteristics of malnutrition:   Energy Intake:  Mild decrease in energy intake (specify) (TPN @63)  Weight Loss:  No significant weight loss     Body Fat Loss:  No significant body fat loss,     Muscle Mass Loss:  Mild muscle mass loss, Clavicles (pectoralis & deltoids), Scapula (trapezius)  Fluid Accumulation:  No significant fluid accumulation,     Strength:  Not performed     Nutrition Assessment:      1/30:  Diet advanced to Low Fiber diet - PO intake reported as ~30% for lunch and breakfast. Obtained preferences. Pt has 2 Ensure Clear in room from earlier in admission, pt doesn't really like them but will drink them \"sometimes\" but does not want any more. Encouraged protein intake. Pt reports \"this is how things went last time I had this surgery. \" Appetite is fair. No reported n/v, no pain or issues when eating. Monitor bowel status. Obtain new measured weight - wt was 217 lbs on 1/25, currently documented as 304 lbs, likely inaccurate. 1/25: Follow up. Patient on TPN @63 mL/hr. Discussed during IDRs. Plan is to remove NGT today and advance TPN to goal rate 83 mL/hr. K+ and Mg WDL. Phos low - being repleted. Patient reported feeling stronger. Had sips of coffee with creamer this morning with no nausea, vomiting or diarrhea. Last BM was 2 days ago. Triglycerides checked 1/24, WDL @136. Okay to provide lipids. Edema worsened, now 1+ in BLE. TPN at goal rate 83 mL/hour with lipids 2x/week provides 1557 kcal (76% needs); 100 g protein (100% needs).       Nutrition Related Findings:      Wound Type: Surgical incision (abd)  Last Bowel Movement Date: 01/29/23  Stool Appearance: Watery, Bloody  Abdominal Assessment: Obese  Appetite: Fair  Bowel Sounds: Active   Edema:LLE: 1+ (1/29/2023  8:00 PM)  RLE: 1+ (1/29/2023  8:00 PM)      Nutr. Labs:  Lab Results   Component Value Date/Time    GFR est AA >60 08/29/2022 04:52 AM    GFR est non-AA >60 08/29/2022 04:52 AM    Creatinine 0.62 01/30/2023 02:26 AM    BUN 12 01/30/2023 02:26 AM    Sodium 137 01/30/2023 02:26 AM    Potassium 4.4 01/30/2023 02:26 AM    Chloride 101 01/30/2023 02:26 AM    CO2 29 01/30/2023 02:26 AM       Lab Results   Component Value Date/Time    Glucose 90 01/30/2023 02:26 AM    Glucose (POC) 100 01/30/2023 10:44 AM       Lab Results   Component Value Date/Time    Hemoglobin A1c 5.2 01/06/2023 02:19 PM     Lab Results   Component Value Date/Time    Calcium 8.9 01/30/2023 02:26 AM    Phosphorus 2.7 01/27/2023 03:55 AM     Magnesium   Date Value Ref Range Status   01/27/2023 2.0 1.6 - 2.4 mg/dL Final   01/26/2023 2.0 1.6 - 2.4 mg/dL Final   01/25/2023 2.2 1.6 - 2.4 mg/dL Final   01/24/2023 1.9 1.6 - 2.4 mg/dL Final   01/23/2023 1.9 1.6 - 2.4 mg/dL Final     Lab Results   Component Value Date/Time    Cholesterol, total 193 12/10/2020 02:41 PM    HDL Cholesterol 36 12/10/2020 02:41 PM    LDL, calculated 136 (H) 12/10/2020 02:41 PM    VLDL, calculated 21 12/10/2020 02:41 PM    Triglyceride 136 01/24/2023 04:31 AM    CHOL/HDL Ratio 5.4 (H) 12/10/2020 02:41 PM     Nutr.  Meds:  Current Facility-Administered Medications   Medication Dose Route Frequency    levothyroxine (SYNTHROID) tablet 125 mcg  125 mcg Oral ACB    insulin lispro (HUMALOG) injection   SubCUTAneous AC&HS    miconazole (MICOTIN) 2 % powder   Topical BID    acetaminophen (TYLENOL) tablet 1,000 mg  1,000 mg Oral TID    enoxaparin (LOVENOX) injection 30 mg  30 mg SubCUTAneous Q12H    lidocaine 4 % patch 2 Patch  2 Patch TransDERmal Q24H    metoprolol succinate (TOPROL-XL) XL tablet 50 mg  50 mg Oral QAM    folic acid tablet 0.4 mg  0.4 mg Oral DAILY    sodium chloride (NS) flush 5-40 mL  5-40 mL IntraVENous Q8H    pantoprazole (PROTONIX) tablet 40 mg  40 mg Oral ACB    thiamine mononitrate (B-1) tablet 100 mg  100 mg Oral DAILY    trospium (SANCTURA) tablet 20 mg  20 mg Oral ACB&D         Current Nutrition Intake & Therapies:  Average Meal Intake: NPO  Average Supplement Intake: NPO  ADULT DIET Regular; Low Fiber    Anthropometric Measures:  Height: 5' 2.99\" (160 cm)  Ideal Body Weight (IBW): 115 lbs (52 kg)     Current Body Wt:  98.7 kg (217 lb 9.5 oz), 189.2 % IBW. Standing scale  Current BMI (kg/m2): 38.6      Weight Adjustment: No adjustment           BMI Category: Obese class 2 (BMI 35.0-39. 9)    Estimated Daily Nutrient Needs:  Energy Requirements Based On: Formula  Weight Used for Energy Requirements: Admission (98.7kg)  Energy (kcal/day): 2040 (MSJ x 1.3 x 1.1)  Weight Used for Protein Requirements: Admission  Protein (g/day):  (1-1.2g/kg)  Method Used for Fluid Requirements: 1 ml/kcal  Fluid (ml/day): 2040 (1ml/kcal)    Nutrition Diagnosis:   Increased nutrient needs related to acute injury/trauma, inadequate protein-energy intake as evidenced by wounds  Inadequate oral intake related to inadequate protein-energy intake as evidenced by NPO or clear liquid status due to medical condition    Nutrition Interventions:   Food and/or Nutrient Delivery: Continue parenteral nutrition, Start oral diet  Nutrition Education/Counseling: No recommendations at this time  Coordination of Nutrition Care: Continue to monitor while inpatient, Interdisciplinary rounds  Plan of Care discussed with: IDR team and patient    Goals:  Previous Goal Met: Goal(s) achieved  Goals:  Tolerate nutrition support at goal rate, by next RD assessment, Initiate PO diet       Nutrition Monitoring and Evaluation:   Behavioral-Environmental Outcomes: None identified  Food/Nutrient Intake Outcomes: Parenteral nutrition intake/tolerance  Physical Signs/Symptoms Outcomes: Biochemical data, Weight    Discharge Planning:     Too soon to determine      [facility] [place] office: [phone]
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
## 2023-01-30 NOTE — PROGRESS NOTES
CM follow up:    Met with patient at bedside. Patient improving and advancing diet as tolerated. Follow up on SNF referrals, no bed available at [address]. Request sent to [facility], and [facility] to evaluate for admission, await responses.     Taya Cohen RN, MSN/Care manager  [phone]

## 2023-01-31 LAB
COVID-19 RAPID TEST, COVR: NOT DETECTED
GLUCOSE BLD STRIP.AUTO-MCNC: 109 MG/DL (ref 65–117)
GLUCOSE BLD STRIP.AUTO-MCNC: 79 MG/DL (ref 65–117)
SERVICE CMNT-IMP: NORMAL
SERVICE CMNT-IMP: NORMAL
SOURCE, COVRS: NORMAL

## 2023-01-31 PROCEDURE — 74011000250 HC RX REV CODE- 250: Performed by: INTERNAL MEDICINE

## 2023-01-31 PROCEDURE — 74011000250 HC RX REV CODE- 250: Performed by: COLON & RECTAL SURGERY

## 2023-01-31 PROCEDURE — 74011250637 HC RX REV CODE- 250/637: Performed by: COLON & RECTAL SURGERY

## 2023-01-31 PROCEDURE — 97530 THERAPEUTIC ACTIVITIES: CPT

## 2023-01-31 PROCEDURE — 74011250637 HC RX REV CODE- 250/637: Performed by: INTERNAL MEDICINE

## 2023-01-31 PROCEDURE — 65270000046 HC RM TELEMETRY

## 2023-01-31 PROCEDURE — 74011250636 HC RX REV CODE- 250/636: Performed by: COLON & RECTAL SURGERY

## 2023-01-31 PROCEDURE — 82962 GLUCOSE BLOOD TEST: CPT

## 2023-01-31 PROCEDURE — 87635 SARS-COV-2 COVID-19 AMP PRB: CPT

## 2023-01-31 PROCEDURE — 97116 GAIT TRAINING THERAPY: CPT

## 2023-01-31 RX ORDER — HYDROCORTISONE 25 MG/G
CREAM TOPICAL 4 TIMES DAILY
Status: DISCONTINUED | OUTPATIENT
Start: 2023-01-31 | End: 2023-02-01 | Stop reason: HOSPADM

## 2023-01-31 RX ADMIN — LEVOTHYROXINE SODIUM 125 MCG: 0.12 TABLET ORAL at 07:53

## 2023-01-31 RX ADMIN — SODIUM CHLORIDE, PRESERVATIVE FREE 10 ML: 5 INJECTION INTRAVENOUS at 07:53

## 2023-01-31 RX ADMIN — ENOXAPARIN SODIUM 30 MG: 100 INJECTION SUBCUTANEOUS at 08:27

## 2023-01-31 RX ADMIN — PANTOPRAZOLE SODIUM 40 MG: 40 TABLET, DELAYED RELEASE ORAL at 07:53

## 2023-01-31 RX ADMIN — TROSPIUM CHLORIDE 20 MG: 20 TABLET, FILM COATED ORAL at 15:56

## 2023-01-31 RX ADMIN — SODIUM CHLORIDE, PRESERVATIVE FREE 10 ML: 5 INJECTION INTRAVENOUS at 22:04

## 2023-01-31 RX ADMIN — ACETAMINOPHEN 1000 MG: 500 TABLET ORAL at 22:03

## 2023-01-31 RX ADMIN — MICONAZOLE NITRATE 2 % TOPICAL POWDER: at 18:37

## 2023-01-31 RX ADMIN — MICONAZOLE NITRATE 2 % TOPICAL POWDER: at 10:38

## 2023-01-31 RX ADMIN — METOPROLOL SUCCINATE 50 MG: 50 TABLET, EXTENDED RELEASE ORAL at 08:27

## 2023-01-31 RX ADMIN — ACETAMINOPHEN 1000 MG: 500 TABLET ORAL at 15:56

## 2023-01-31 RX ADMIN — ENOXAPARIN SODIUM 30 MG: 100 INJECTION SUBCUTANEOUS at 22:03

## 2023-01-31 RX ADMIN — HYDROCORTISONE: 25 CREAM TOPICAL at 22:04

## 2023-01-31 RX ADMIN — HYDROCORTISONE: 25 CREAM TOPICAL at 18:37

## 2023-01-31 RX ADMIN — HYDROCORTISONE 1 G: 25 CREAM TOPICAL at 11:52

## 2023-01-31 RX ADMIN — FOLIC ACID TAB 400 MCG 0.4 MG: 400 TAB at 08:27

## 2023-01-31 RX ADMIN — TROSPIUM CHLORIDE 20 MG: 20 TABLET, FILM COATED ORAL at 07:53

## 2023-01-31 RX ADMIN — Medication 100 MG: at 08:29

## 2023-01-31 RX ADMIN — HYDROCORTISONE: 25 CREAM TOPICAL at 16:06

## 2023-01-31 RX ADMIN — ACETAMINOPHEN 1000 MG: 500 TABLET ORAL at 08:27

## 2023-01-31 NOTE — PROGRESS NOTES
Medicare pt has received, reviewed, and signed 2nd IM letter informing them of their right to appeal the discharge. Signed copy has been placed on pt bedside chart.   Vicki Bran CMS

## 2023-01-31 NOTE — PROGRESS NOTES
This patient was assisted with Intentional Toileting every 2 hours during this shift as appropriate. Documentation of ambulation and output reflected on Flowsheet as appropriate. Purposeful hourly rounding was completed using AIDET and 5Ps. Outcomes of PHR documented as they occurred. Bed alarm in use as appropriate.   Dual Suction and ambubag in place.     -Brenda Bravo, PCT

## 2023-01-31 NOTE — PROGRESS NOTES
CRS  Pt without complaints. Denies fullness or abd pressure. Still requiring assist to be oob. Flowsheet reviewed  Abd: soft, nt- no obvious bulge, no obvious hernia  Dressing: intact, clean    Plan:  Dispo planning. May need to add imodium. Hold off for now. Bleeding noted would be c/w hemorrhoidal due to frequency of stooling.  Recheck labs tomorrow

## 2023-01-31 NOTE — PROGRESS NOTES
CM follow up:    1415 - Voice message left with Clement Hood, nurse for Dr. Jeanna Mccarthy re: patient has been accepted at [facility] for SNF care, discharge order needed. 1324 - Patient ACCEPTED by [facility] for SNF care. Voice message left with Rula at Dr. Charlie Tubbs office about SNF acceptance, requested return call. Updated clinicals sent to SNF referrals including [facility], [facility], [facility] [address], and [facility]. Await medical clearance for discharge. Transition of Care Plan  Monitor patient status and response to treatment. Patient continues to require medical management. CM needs: SNF placement  SNF referrals placed, follow up today. Open to [facility], may need resumed post SNF care. Await medical clearance for discharge. CM to monitor progress and recommendations.     Halina Mcqueen, RN, MSN/Care manager  [phone]

## 2023-01-31 NOTE — PROGRESS NOTES
0800    Bedside and Verbal shift change report given to Dale Reyes (oncoming nurse) by Mikala Lane (offgoing nurse). Report included the following information SBAR and Kardex.        17:45   Called report to Ravi Menjivar at [facility]

## 2023-01-31 NOTE — WOUND CARE
Wound Consult:  follow up Visit. patient awake, alert, incontinent of loose brown stool, care given  Assessment:  RLQ ileostomy takedown site-3x3x3.5 with tunneling at 3 oclock to 9cm. moist Red/pink, with large amount of serosanguinous drainage drained when started to measure depth of wound. no odor,no pain. Cleansed with Vashe and lightly packed with vashe moistened rolled gauze and dry dressing . Midline incision with small opening at distal end 0.4x0. 4x0.4cm- yellow drainage, pink at base, moist.    Wound Recommendations:  Continue current treatment  We will continue to reassess  and as needed.   [facility], Wound / [address] [facility] Office [phone]

## 2023-01-31 NOTE — PROGRESS NOTES
Discharge order received. Transport request sent to [facility] via [address], await response. ACCEPTED by [facility] for transport.  at 1915 today. Forms attached to [address] referral and placed on front of chart. Nurse Saleem Pickett informed.     Mani Barber, RN, MSN/Care manager  [phone]

## 2023-01-31 NOTE — PROGRESS NOTES
Problem: Mobility Impaired (Adult and Pediatric)  Goal: *Acute Goals and Plan of Care (Insert Text)  Description: FUNCTIONAL STATUS PRIOR TO ADMISSION: Pt was ambulatory for household distances using rollator, performed bed mobility and transfers without assistance. HOME SUPPORT PRIOR TO ADMISSION: The patient lived with sister and brother-in-law who assist as needed. Physical Therapy Goals  Initiated 1/20/2023  1. Patient will move from supine to sit and sit to supine  in bed with minimal assistance/contact guard assist within 7 day(s). 2.  Patient will transfer from bed to chair and chair to bed with minimal assistance/contact guard assist using the least restrictive device within 7 day(s). 3.  Patient will perform sit to stand with minimal assistance/contact guard assist  4/4  trials within 7 day(s). 4.  Patient will ambulate with minimal assistance/contact guard assist for 75 feet with the least restrictive device within 7 day(s). Physical Therapy Goals  Revised 1/30/2023  1. Patient will move from supine to sit and sit to supine , scoot up and down, and roll side to side in bed with independence within 7 day(s). 2.  Patient will transfer from bed to chair and chair to bed with modified independence using the least restrictive device within 7 day(s). 3.  Patient will perform sit to stand with modified independence within 7 day(s). 4.  Patient will ambulate with modified independence for 75 feet with the least restrictive device within 7 day(s).                Outcome: Progressing Towards Goal  PHYSICAL THERAPY TREATMENT  Patient: Rhett Singh (74 y.o. female)  Date: 1/31/2023  Diagnosis: [facility] [Z93.2] <principal problem not specified>  Procedure(s) (LRB):  REVERSAL LOOP ILEOSTOMY, VENTRAL HERNIA REPAIR, LYSIS OF ADHESIONS (N/A) 14 Days Post-Op  Precautions: Fall, Skin (abdominal binder when out of bed)  Chart, physical therapy assessment, plan of care and goals were reviewed. ASSESSMENT  Patient continues with skilled PT services and is progressing towards goals. Patient this afternoon with good participation and tolerance to physical therapy session emphasizing gait training and seated ADLs in restroom per patient request. Patient tolerates increased ambulation distance this afternoon compared to previous sessions, 15ft x 2 with seated rest break with Travis and RW support. She performs seated therapeutic activities in the restroom without breaks including brushing her hair, brushing her teeth and washing her face with set up. Patient emotionally labile during session but reluctant to explain why to this PT. She is in no apparent distress at conclusion of session and reports feeling better. Alerted RN. Continue to recommend SNF level rehab upon d/c. Current Level of Function Impacting Discharge (mobility/balance): Travis ambulation with RW    Other factors to consider for discharge: none additional         PLAN :  Patient continues to benefit from skilled intervention to address the above impairments. Continue treatment per established plan of care. to address goals. Recommendation for discharge: (in order for the patient to meet his/her long term goals)  Therapy up to 5 days/week in SNF setting    This discharge recommendation:  Has been made in collaboration with the attending provider and/or case management    IF patient discharges home will need the following DME: to be determined (TBD)       SUBJECTIVE:   Patient stated I'm just in one of those moods.  re: asking patient why she is upset     OBJECTIVE DATA SUMMARY:   Patient received supine in bed and was agreeable to participate in PT session. Patient was cleared by nursing to participate in PT session.      Critical Behavior:  Neurologic State: Alert  Orientation Level: Oriented X4  Cognition: Follows commands  Safety/Judgement: Awareness of environment  Functional Mobility Training:  Bed Mobility:     Supine to Sit: Contact guard assistance; Additional time     Scooting: Contact guard assistance        Transfers:  Sit to Stand: Minimum assistance; Moderate assistance  Stand to Sit: Contact guard assistance        Bed to Chair: Contact guard assistance;Minimum assistance                    Balance:  Sitting: Impaired; Without support  Sitting - Static: Good (unsupported)  Sitting - Dynamic: Fair (occasional)  Standing: Impaired; With support  Standing - Static: Good  Standing - Dynamic : Fair;Constant support  Ambulation/Gait Training:  Distance (ft): 15 Feet (ft) (x2)  Assistive Device: Gait belt;Walker, rolling  Ambulation - Level of Assistance: Contact guard assistance;Minimal assistance        Gait Abnormalities: Trunk sway increased;Decreased step clearance        Base of Support: Widened     Speed/Yina: Pace decreased (<100 feet/min); Shuffled  Step Length: Right shortened;Left shortened          Therapeutic Exercises:     Pain Rating:  Patient without reports of pain during therapy      Activity Tolerance:   Good and tolerates ADLs without rest breaks    After treatment patient left in no apparent distress:   Sitting in chair, Heels elevated for pressure relief, Call bell within reach, and Bed / chair alarm activated    COMMUNICATION/COLLABORATION:   The patients plan of care was discussed with: Occupational therapist, Registered nurse, and Case management.      Savannah Velarde PT, DPT   Time Calculation: 25 mins

## 2023-01-31 NOTE — PROGRESS NOTES
Transition of Care Plan to SNF/Rehab    SNF/Rehab Transition:  Patient has been accepted to [facility] and meets criteria for admission. Patient will transported by [facility] and expected to leave at 1700. Communication to Patient/Family:  Met with patient, patient notified her sister Neetu Cárdenas, and and they are agreeable to the transition plan. Communication to SNF/Rehab:  Bedside RN, Angelika Tan, has been notified to update the transition plan to the facility and call report (phone number [phone], \"[phone]\"). Room 403. Discharge information has been updated on the AVS.     Discharge instructions to be attached to [facility] referral.    Nursing Please include all hard scripts for controlled substances, med rec and dc summary, and AVS in packet. Reviewed and confirmed with facility, [facility], can manage the patient care needs for the following:     SNF/Rehab Transition:  Patient to follow-up with [facility].   PCP/Specialist: as scheduled    Reviewed and confirmed with facility, [facility] they can manage the patient care needs for the following:     Stephon Jeffries with (X) only those applicable:    Medication:  [x]  Medications will be available at the facility  []  IV Antibiotics   []  Controlled Substance - hard copy to be sent with patient   []  Weekly Labs   Documents:  [] Hard RX  [] MAR  [] Kardex  [x] AVS  []Transfer Summary  []Discharge   Equipment:  []  CPAP/BiPAP  []  Wound Vacuum  []  Forte or Urinary Device  []  PICC/Central Line  []  Nebulizer  []  Ventilator   Treatment:  []Isolation (for MRSA, VRE, etc.)  []Surgical Drain Management  []Tracheostomy Care  []Dressing Changes  []Dialysis with transportation and chair time  []PEG Care  []Oxygen  []Daily Weights for Heart Failure   Dietary:  []Any diet limitations  []Tube Feedings   []Total Parenteral Management (TPN)   Eligible for Medicaid Long Term Services and Supports  Yes:  [] Eligible for medical assistance or will become eligible within 180 days and UAI completed. [] Provider/Patient and/or support system has requested screening. [] UAI copy provided to patient or responsible party,.  [] UAI unavailable at discharge will send once processed to SNF provider. [] UAI unavailable at discharged mailed to patient  No:   [] Private pay and is not financially eligible for Medicaid within the next 180 days. [] Reside out-of-state.   [] A residents of a state owned/operated facility that is licensed  by [address] [employer] [facility] or [facility]  [] Enrollment in [facility] hospice services  [] [address] [place] [address]  [] Patient /Family declines to have screening completed or provide financial information for screening     Financial Resources:  Medicaid    [] Initiated and application pending   [] Full coverage     Advanced Care Plan:  []Surrogate Decision Maker of Care  []POA  []Communicated Code Status \"Full\")    Other     Caryl Parirsh RN, MSN/Care manager  [phone]

## 2023-01-31 NOTE — PROGRESS NOTES
1930 Bedside and Verbal shift change report given to [address] [place] [address] (oncoming nurse) by Clyde MARRERO (offgoing nurse). Report included the following information SBAR, Kardex, and MAR. This patient was assisted with Intentional Toileting every 2 hours during this shift as appropriate. Documentation of ambulation and output reflected on Flowsheet as appropriate. Purposeful hourly rounding was completed using AIDET and 5Ps. Outcomes of PHR documented as they occurred. Bed alarm in use as appropriate. Dual Suction and ambubag in place. Problem: Falls - Risk of  Goal: *Absence of Falls  Description: Document Alfred Santiago Fall Risk and appropriate interventions in the flowsheet. Outcome: Progressing Towards Goal  Note: Fall Risk Interventions:  Mobility Interventions: Bed/chair exit alarm, Patient to call before getting OOB, Utilize walker, cane, or other assistive device  Medication Interventions: Bed/chair exit alarm, Patient to call before getting OOB, Teach patient to arise slowly    Elimination Interventions: Bed/chair exit alarm, Call light in reach, Patient to call for help with toileting needs, Stay With Me (per policy)       6795 Bedside and Verbal shift change report given to Juan Hancock (oncoming nurse) by Xuan Owens RN (offgoing nurse). Report included the following information SBAR, Kardex, and MAR.

## 2023-01-31 NOTE — PROGRESS NOTES
Discharge instructions, post op care included, were reviewed with patient. All questions were answered. Care Plans and Education were resolved. Patient will be discharged to [facility] and will be transported via [facility] later today. Primary nurse updated. PICC line, PIV site and Telemetry monitor will be removed prior to discharge.

## 2023-02-01 ENCOUNTER — TELEPHONE (OUTPATIENT)
Dept: INTERNAL MEDICINE CLINIC | Age: 73
End: 2023-02-01

## 2023-02-01 VITALS
BODY MASS INDEX: 51.91 KG/M2 | DIASTOLIC BLOOD PRESSURE: 66 MMHG | HEIGHT: 63 IN | OXYGEN SATURATION: 93 % | HEART RATE: 78 BPM | SYSTOLIC BLOOD PRESSURE: 152 MMHG | WEIGHT: 293 LBS | RESPIRATION RATE: 18 BRPM | TEMPERATURE: 98.7 F

## 2023-02-01 LAB
GLUCOSE BLD STRIP.AUTO-MCNC: 92 MG/DL (ref 65–117)
SERVICE CMNT-IMP: NORMAL

## 2023-02-01 PROCEDURE — 82962 GLUCOSE BLOOD TEST: CPT

## 2023-02-01 PROCEDURE — 74011250636 HC RX REV CODE- 250/636: Performed by: COLON & RECTAL SURGERY

## 2023-02-01 PROCEDURE — 74011250637 HC RX REV CODE- 250/637: Performed by: INTERNAL MEDICINE

## 2023-02-01 PROCEDURE — 74011250637 HC RX REV CODE- 250/637: Performed by: COLON & RECTAL SURGERY

## 2023-02-01 RX ADMIN — ACETAMINOPHEN 1000 MG: 500 TABLET ORAL at 09:06

## 2023-02-01 RX ADMIN — METOPROLOL SUCCINATE 50 MG: 50 TABLET, EXTENDED RELEASE ORAL at 08:39

## 2023-02-01 RX ADMIN — PANTOPRAZOLE SODIUM 40 MG: 40 TABLET, DELAYED RELEASE ORAL at 08:39

## 2023-02-01 RX ADMIN — LEVOTHYROXINE SODIUM 125 MCG: 0.12 TABLET ORAL at 08:39

## 2023-02-01 RX ADMIN — ENOXAPARIN SODIUM 30 MG: 100 INJECTION SUBCUTANEOUS at 09:07

## 2023-02-01 RX ADMIN — FOLIC ACID TAB 400 MCG 0.4 MG: 400 TAB at 09:07

## 2023-02-01 RX ADMIN — Medication 100 MG: at 09:06

## 2023-02-01 RX ADMIN — TROSPIUM CHLORIDE 20 MG: 20 TABLET, FILM COATED ORAL at 08:39

## 2023-02-01 RX ADMIN — HYDROCORTISONE: 25 CREAM TOPICAL at 09:08

## 2023-02-01 NOTE — PROGRESS NOTES
Patient was schedule to be discharge 1/31 at 1915 via [facility] but got pushed back to 2230 on 1/31. [facility] do not take admissions after 2100 so patient will be transporter to the facility on 02/01/23 in the Am. All her IV's access where removed prior shift, reached out to NP to see if ok to leave patient without acces for tonight patient is stable and vitals signs are within normal limits will keep monitoring the patient During the night. 02/01/23 0626 patient is schedule to be discharge this morning around 0930, Spoke with [facility] transportation and their ETA will be at 0930. Patient was notified. Patient alert oriented and vitals within normal limits.

## 2023-02-01 NOTE — PROGRESS NOTES
2000: spoke with Hao Wong regarding pt admission tonight. Per facility, they do not take admissions after 9pm. Called and spoke with [facility] whose current ETA is 5545. Informed [facility] that pt cannot be accepted at facility if she leaves this late. [facility] to push back transport to tomorrow AM and will provide an updated ETA around 4am.     0445: [facility] ETA of 0915.

## 2023-02-01 NOTE — PROGRESS NOTES
0700 Bedside and Verbal shift change report given to Corey Castillo (oncoming nurse) by Katey Ashraf RN (offgoing nurse). Report included the following information SBAR, Kardex, ED Summary, Intake/Output, MAR, Recent Results, and Cardiac Rhythm NSR . This patient was assisted with Intentional Toileting every 2 hours during this shift as appropriate. Documentation of ambulation and output reflected on Flowsheet as appropriate. Purposeful hourly rounding was completed using AIDET and 5Ps. Outcomes of PHR documented as they occurred. Bed alarm in use as appropriate. Dual Suction and ambubag in place. 0830 Pt refusing AM labs, states,  \"I'm leaving today I will refuse for today\". Will continue to monitor. 9790 Report given to [facility] transport, plans to take Pt to [address] [facility] SNF/Rehab. All questions answered. Discharge paperwork reviewed with Pt and signed/placed in chart. Updated Drake Fisher with ETA.

## 2023-02-01 NOTE — PROGRESS NOTES
CM follow up:    Spoke with [facility] via phone. Confirmed 0915 .     Marry Anderson, RN, MSN/Care manager  [phone]

## 2023-02-01 NOTE — TELEPHONE ENCOUNTER
Patients immunization record faxed to [facility] w/ [facility] per request (F) [phone] Chante contact [phone] for continuity of care.

## 2023-02-01 NOTE — TELEPHONE ENCOUNTER
Stevan Bruno w/ [facility] request a copy of patient immunization records faxed to (c) [phone] Chante contact [phone] if you have any questions-thank you

## 2023-02-27 ENCOUNTER — TELEPHONE (OUTPATIENT)
Dept: INTERNAL MEDICINE CLINIC | Age: 73
End: 2023-02-27

## 2023-02-27 NOTE — TELEPHONE ENCOUNTER
----- Message from Felice Paget sent at 2/27/2023  3:31 PM EST -----  Subject: Hospital Follow Up    QUESTIONS  What hospital was the Patient Discharged from? [address] [place] [address]  Date of Discharge? 2023-02-01  Discharge Location? [address] [place] [address]  Reason for hospitalization as patient stated? hernia repair  What question does the patient have, if applicable?   ---------------------------------------------------------------------------  --------------  CALL BACK INFO  What is the best way for the office to contact you? OK to leave message on   voicemail  Preferred Call Back Phone Number? [phone]  ---------------------------------------------------------------------------  --------------  SCRIPT ANSWERS  Relationship to Patient? Other  Representative Name? Sister  Additional information verified (besides Name and Date of Birth)?  Phone   Number

## 2023-02-27 NOTE — TELEPHONE ENCOUNTER
Spoke with Keya/Sister (HIPPA VERIFIED) and patient is scheduled for a hospital/rehab [facility] from S/P 1. Loop ileostomy reversal. 2.  Recent history of small bowel obstruction, hernia repair.  Scheduled for 3/17/23 at 11:20 AM

## 2023-03-17 ENCOUNTER — OFFICE VISIT (OUTPATIENT)
Dept: INTERNAL MEDICINE CLINIC | Age: 73
End: 2023-03-17
Payer: MEDICARE

## 2023-03-17 VITALS
OXYGEN SATURATION: 97 % | WEIGHT: 208 LBS | DIASTOLIC BLOOD PRESSURE: 69 MMHG | TEMPERATURE: 97.9 F | HEIGHT: 63 IN | BODY MASS INDEX: 36.86 KG/M2 | SYSTOLIC BLOOD PRESSURE: 116 MMHG | HEART RATE: 85 BPM | RESPIRATION RATE: 16 BRPM

## 2023-03-17 DIAGNOSIS — N32.1 COLOVESICAL FISTULA: Primary | ICD-10-CM

## 2023-03-17 DIAGNOSIS — Z98.890 HISTORY OF CLOSURE OF ILEOSTOMY: ICD-10-CM

## 2023-03-17 DIAGNOSIS — I10 BENIGN ESSENTIAL HTN: ICD-10-CM

## 2023-03-17 DIAGNOSIS — E03.9 ACQUIRED HYPOTHYROIDISM: ICD-10-CM

## 2023-03-17 DIAGNOSIS — N39.0 RECURRENT UTI: ICD-10-CM

## 2023-03-17 DIAGNOSIS — E66.01 SEVERE OBESITY (BMI 35.0-39.9) WITH COMORBIDITY (HCC): ICD-10-CM

## 2023-03-17 PROCEDURE — G0463 HOSPITAL OUTPT CLINIC VISIT: HCPCS | Performed by: INTERNAL MEDICINE

## 2023-03-17 RX ORDER — ONDANSETRON 4 MG/1
4 TABLET, FILM COATED ORAL
COMMUNITY

## 2023-03-17 RX ORDER — CEPHALEXIN 500 MG/1
CAPSULE ORAL
COMMUNITY
Start: 2023-03-13

## 2023-03-17 NOTE — PROGRESS NOTES
HISTORY OF PRESENT ILLNESS    Chief Complaint   Patient presents with    Hospital Follow Up     Hernia repair 1.4.23 - [address] [place] [address] - discharged 3.3.23 - currently taking antibiotics for UTI. Presents for follow-up  She is with her sister, Dameon Walsh.    She is in a wheelchair today. She is recovering from prolonged illness, hospitalization, rehab. She was hospitalized from January 17 through February 1 for reversal of loop ileostomy. She required surgery for this as well as lysis of adhesions and small bowel resection. Ventral hernia repair was also performed. Complicated by postoperative leukocytosis and tachycardia with unclear source. Delayed return of bowel function required NG tube and TPN. He was discharged from the hospital on February 1 and went to rehab. She is now living at home with her sister since March 3. Overall, doing much better over the past couple of weeks. Developed severe dysuria 3/13/23. [facility] came. Started keflex 3/13/23. S/s are mostly improved on Keflex. Culture results pending. Denies significant flank pain, abdominal pain or current nausea vomiting. Saw Dr. Edwin Nunez and colorectal specialist yesterday . Stoma is 3 mm now. No packing required. Scant drainage at times, resolved. Home health is coming. Working on cognitive function. She is able to stand up and stand unassisted. Ambulating short distances with the rollator    Lost weight secondary to illness. Was 252 lb last year 3/13/23. Wt Readings from Last 3 Encounters:   03/17/23 208 lb (94.3 kg)   01/06/23 220 lb 4.8 oz (99.9 kg)   Appetite is somewhat decreased. Food aversion is improving some. NG tube was present. Gags some. Taking zofran w meals. Taking diclofenac for knee pain daily.     3.    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), COVID-19 (09/2021), Diverticulosis, Fatty liver, GERD (gastroesophageal reflux disease), Grief reaction, Head injury (12/23/2017), History of depression, History of hydronephrosis (12/14/2022), History of panic attacks, History of vascular access device (08/23/2022), History of vascular access device (01/23/2023), Hypothyroidism, Impaired gait, Morbid obesity ([facility] [address] Ocular migraine, Osteopenia (10/2018), PONV (postoperative nausea and vomiting), Postconcussion syndrome (02/2018), Psychiatric disorder, Right knee pain, SBO (small bowel obstruction) ([facility] [address] (12/14/2022), Slow to wake up after anesthesia, TBI (traumatic brain injury), Thickened endometrium (09/29/2019), and Uterine mass (2019). has a past surgical history that includes hx tonsil and adenoidectomy (1955); hx colonoscopy (11/24/2009); colonoscopy (N/A, 09/19/2018); hx colposcopy (11/26/2018); hx tubal ligation (1984); hx dilation and curettage (02/2019); hx cholecystectomy (1991); hx cataract removal (Bilateral); hx knee arthroscopy (Right, 1978); hx haroldo and bso (10/13/2019); hx meniscus repair (Right); hx colonoscopy (08/15/2022); hx ileostomy (Left, 08/16/2022); ir change internal ureteral stent rt (08/2022); hx hernia repair (01/04/2023); hx ileostomy (01/04/2023); and hx lysis of adhesions (01/04/2023). reports that she quit smoking about 13 years ago. Her smoking use included cigarettes. She has a 5.00 pack-year smoking history. She has never used smokeless tobacco. She reports that she does not currently use alcohol after a past usage of about 1.0 standard drink per week. She reports that she does not use drugs. family history includes Dementia in her mother; Diabetes in her mother and sister; Heart Attack in her sister;  Heart Disease in her sister; Hypertension in her brother and brother; No Known Problems in her daughter; OSTEOARTHRITIS in her sister; Other in her mother; Panic disorder in her brother, brother, and sister; Thyroid Disease in her mother and sister. Physical Exam   Nursing note and vitals reviewed. Blood pressure 116/69, pulse 85, temperature 97.9 °F (36.6 °C), temperature source Oral, resp. rate 16, height 5' 2.99\" (1.6 m), weight 208 lb (94.3 kg), SpO2 97 %. Constitutional:  No distress. Sitting in wheelchair. Eyes: Conjunctivae are normal.   Ears:  Hearing grossly intact  Cardiovascular: Normal rate. regular rhythm, no murmurs or gallops  Mild bilateral edema  Pulmonary/Chest: Effort normal.   CTAB  Musculoskeletal: moves all 4 extremities   Neurological: Alert and oriented to person, place, and time. Skin: No visible rash noted. Psychiatric: Pleasant    Assessment and Plan    Diagnoses and all orders for this visit:    1. Colovesical fistula  2. History of closure of ileostomy  Status post repair and then reversal of the ileostomy. Followed by urology and colorectal specialist.  Stella Banuelos to be successful repair. Seeing urology Dr. Deandre Latham on 4/17/23,  and Dr. Symone Pollack Colorectal 4/202    3. Recurrent UTI  Currently taking Keflex for another UTI, diagnosed by [facility]. After this visit, I received culture results and her urine culture grew E. coli and Citrobacter, both > 100,000 cells/ml. The E. coli is sensitive to all antibiotics. The Citrobacter is sensitive to most antibiotics, but resistant to ampicillin, Augmentin, amoxicillin, cefazolin, cefoxitin. Patient states that [facility] called in nitrofurantoin after this visit. I do have question why she is having another polymicrobial urinary tract infection that is symptomatic, but hopefully this is simply due to ongoing recovery from her fistula repair. Certainly would need to consider another fistula study if UTIs recur. Following up with urology as above.     4. Benign essential HTN  Blood pressure is reasonably well controlled on no current medications. Monitor. 5. Severe obesity (BMI 35.0-39. 9) with comorbidity [facility])  The patient is asked to make an attempt to improve diet and exercise patterns to aid in medical management of this problem. 6. Acquired hypothyroidism  well-controlled on levothyroxine 125 mcg daily. Continue. Lab Results   Component Value Date/Time    TSH 2.41 01/19/2023 03:28 PM    T4, Free 1.5 02/21/2022 09:26 AM       lab results and schedule of future lab studies reviewed with patient  reviewed medications and side effects in detail    Return to clinic for further evaluation if new symptoms develop        Current Outpatient Medications   Medication Sig    cephALEXin (KEFLEX) 500 mg capsule TAKE 1 CAPSULE BY MOUTH EVERY 6 HOURS FOR 7 DAYS    levothyroxine (SYNTHROID) 125 mcg tablet Take 1 Tablet by mouth Daily (before breakfast). Omeprazole delayed release (PRILOSEC D/R) 20 mg tablet Take 20 mg by mouth two (2) times a day. diclofenac EC (VOLTAREN) 75 mg EC tablet Take 1 Tablet by mouth daily. diclofenac (VOLTAREN) 1 % gel Apply  to affected area as needed for Pain. acetaminophen (TYLENOL) 500 mg tablet Take  by mouth every six (6) hours as needed for Pain. folic acid (FOLVITE) 1 mg tablet Take  by mouth daily. thiamine HCL (B-1) 100 mg tablet Take  by mouth daily. multivitamin (ONE A DAY) tablet Take 1 Tablet by mouth in the morning. vibegron (Gemtesa) 75 mg tab Take  by mouth Every morning. metoprolol succinate (TOPROL-XL) 50 mg XL tablet Take 50 mg by mouth Every morning. ondansetron hcl (ZOFRAN) 4 mg tablet Take 4 mg by mouth two (2) times daily as needed for Nausea or Vomiting. No current facility-administered medications for this visit.

## 2023-04-04 ENCOUNTER — TRANSCRIBE ORDER (OUTPATIENT)
Dept: SCHEDULING | Age: 73
End: 2023-04-04

## 2023-04-14 ENCOUNTER — HOSPITAL ENCOUNTER (OUTPATIENT)
Dept: CT IMAGING | Age: 73
Discharge: HOME | End: 2023-04-14
Attending: STUDENT IN AN ORGANIZED HEALTH CARE EDUCATION/TRAINING PROGRAM
Payer: MEDICARE

## 2023-04-14 ENCOUNTER — HOSPITAL ENCOUNTER (OUTPATIENT)
Dept: NUCLEAR MEDICINE | Age: 73
Discharge: HOME | End: 2023-04-14
Attending: STUDENT IN AN ORGANIZED HEALTH CARE EDUCATION/TRAINING PROGRAM
Payer: MEDICARE

## 2023-04-14 DIAGNOSIS — N39.0 URINARY TRACT INFECTION, SITE NOT SPECIFIED: ICD-10-CM

## 2023-04-14 DIAGNOSIS — N20.1 URETERAL CALCULUS: ICD-10-CM

## 2023-04-14 PROCEDURE — 78708 K FLOW/FUNCT IMAGE W/DRUG: CPT

## 2023-04-14 PROCEDURE — 74011250636 HC RX REV CODE- 250/636: Performed by: STUDENT IN AN ORGANIZED HEALTH CARE EDUCATION/TRAINING PROGRAM

## 2023-04-14 PROCEDURE — 74011000636 HC RX REV CODE- 636: Performed by: STUDENT IN AN ORGANIZED HEALTH CARE EDUCATION/TRAINING PROGRAM

## 2023-04-14 PROCEDURE — 74178 CT ABD&PLV WO CNTR FLWD CNTR: CPT

## 2023-04-14 RX ORDER — FUROSEMIDE 10 MG/ML
40 INJECTION INTRAMUSCULAR; INTRAVENOUS
Status: COMPLETED | OUTPATIENT
Start: 2023-04-14 | End: 2023-04-14

## 2023-04-14 RX ORDER — BETIATIDE 1 MG/1
10 INJECTION, POWDER, LYOPHILIZED, FOR SOLUTION INTRAVENOUS
Status: COMPLETED | OUTPATIENT
Start: 2023-04-14 | End: 2023-04-14

## 2023-04-14 RX ADMIN — IOPAMIDOL 100 ML: 612 INJECTION, SOLUTION INTRAVENOUS at 08:50

## 2023-04-14 RX ADMIN — BETIATIDE 10 MILLICURIE: 1 INJECTION, POWDER, LYOPHILIZED, FOR SOLUTION INTRAVENOUS at 07:40

## 2023-04-14 RX ADMIN — FUROSEMIDE 40 MG: 40 INJECTION, SOLUTION INTRAMUSCULAR; INTRAVENOUS at 07:45

## 2023-04-21 ENCOUNTER — APPOINTMENT (OUTPATIENT)
Dept: INTERVENTIONAL RADIOLOGY/VASCULAR | Age: 73
End: 2023-04-21
Attending: RADIOLOGY
Payer: MEDICARE

## 2023-04-21 ENCOUNTER — APPOINTMENT (OUTPATIENT)
Dept: ULTRASOUND IMAGING | Age: 73
End: 2023-04-21
Attending: RADIOLOGY
Payer: MEDICARE

## 2023-04-21 ENCOUNTER — APPOINTMENT (OUTPATIENT)
Dept: GENERAL RADIOLOGY | Age: 73
End: 2023-04-21
Attending: RADIOLOGY
Payer: MEDICARE

## 2023-04-21 ENCOUNTER — HOSPITAL ENCOUNTER (OUTPATIENT)
Dept: INTERVENTIONAL RADIOLOGY/VASCULAR | Age: 73
Discharge: HOME | End: 2023-04-21
Attending: STUDENT IN AN ORGANIZED HEALTH CARE EDUCATION/TRAINING PROGRAM | Admitting: RADIOLOGY
Payer: MEDICARE

## 2023-04-21 VITALS
TEMPERATURE: 97.8 F | DIASTOLIC BLOOD PRESSURE: 66 MMHG | BODY MASS INDEX: 38.02 KG/M2 | HEIGHT: 63 IN | SYSTOLIC BLOOD PRESSURE: 145 MMHG | HEART RATE: 72 BPM | WEIGHT: 214.6 LBS | OXYGEN SATURATION: 92 % | RESPIRATION RATE: 18 BRPM

## 2023-04-21 DIAGNOSIS — N13.39 OTHER HYDRONEPHROSIS: ICD-10-CM

## 2023-04-21 PROCEDURE — 77030002986 HC SUT PROL J&J -A

## 2023-04-21 PROCEDURE — 50431 NJX PX NFROSGRM &/URTRGRM: CPT

## 2023-04-21 PROCEDURE — 74018 RADEX ABDOMEN 1 VIEW: CPT

## 2023-04-21 PROCEDURE — C1769 GUIDE WIRE: HCPCS

## 2023-04-21 PROCEDURE — 77030004561 HC CATH ANGI DX COBRA ANGI -B

## 2023-04-21 PROCEDURE — C1729 CATH, DRAINAGE: HCPCS

## 2023-04-21 PROCEDURE — C1894 INTRO/SHEATH, NON-LASER: HCPCS

## 2023-04-21 PROCEDURE — 74011000250 HC RX REV CODE- 250: Performed by: RADIOLOGY

## 2023-04-21 PROCEDURE — 50432 PLMT NEPHROSTOMY CATHETER: CPT

## 2023-04-21 PROCEDURE — 76775 US EXAM ABDO BACK WALL LIM: CPT

## 2023-04-21 PROCEDURE — 77030020795 HC BG DRN WSTE MRTM -C

## 2023-04-21 PROCEDURE — 74011250636 HC RX REV CODE- 250/636: Performed by: RADIOLOGY

## 2023-04-21 PROCEDURE — 77030003666 HC NDL SPINAL BD -A

## 2023-04-21 PROCEDURE — 99153 MOD SED SAME PHYS/QHP EA: CPT

## 2023-04-21 PROCEDURE — 50435 EXCHANGE NEPHROSTOMY CATH: CPT

## 2023-04-21 PROCEDURE — 99152 MOD SED SAME PHYS/QHP 5/>YRS: CPT

## 2023-04-21 PROCEDURE — 74011000636 HC RX REV CODE- 636: Performed by: RADIOLOGY

## 2023-04-21 PROCEDURE — 77030002996 HC SUT SLK J&J -A

## 2023-04-21 RX ORDER — FENTANYL CITRATE 50 UG/ML
25-200 INJECTION, SOLUTION INTRAMUSCULAR; INTRAVENOUS
Status: DISCONTINUED | OUTPATIENT
Start: 2023-04-21 | End: 2023-04-21 | Stop reason: HOSPADM

## 2023-04-21 RX ORDER — CEFAZOLIN SODIUM 1 G/3ML
2 INJECTION, POWDER, FOR SOLUTION INTRAMUSCULAR; INTRAVENOUS ONCE
Status: COMPLETED | OUTPATIENT
Start: 2023-04-21 | End: 2023-04-21

## 2023-04-21 RX ORDER — KETOROLAC TROMETHAMINE 30 MG/ML
15 INJECTION, SOLUTION INTRAMUSCULAR; INTRAVENOUS
Status: COMPLETED | OUTPATIENT
Start: 2023-04-21 | End: 2023-04-21

## 2023-04-21 RX ORDER — LIDOCAINE HYDROCHLORIDE 10 MG/ML
5-15 INJECTION, SOLUTION EPIDURAL; INFILTRATION; INTRACAUDAL; PERINEURAL ONCE
Status: COMPLETED | OUTPATIENT
Start: 2023-04-21 | End: 2023-04-21

## 2023-04-21 RX ORDER — DIPHENHYDRAMINE HYDROCHLORIDE 50 MG/ML
25-50 INJECTION, SOLUTION INTRAMUSCULAR; INTRAVENOUS ONCE
Status: COMPLETED | OUTPATIENT
Start: 2023-04-21 | End: 2023-04-21

## 2023-04-21 RX ORDER — MIDAZOLAM HYDROCHLORIDE 1 MG/ML
.5-1 INJECTION, SOLUTION INTRAMUSCULAR; INTRAVENOUS
Status: DISCONTINUED | OUTPATIENT
Start: 2023-04-21 | End: 2023-04-21 | Stop reason: HOSPADM

## 2023-04-21 RX ORDER — LIDOCAINE HYDROCHLORIDE 10 MG/ML
10-30 INJECTION INFILTRATION; PERINEURAL
Status: DISCONTINUED | OUTPATIENT
Start: 2023-04-21 | End: 2023-04-21 | Stop reason: HOSPADM

## 2023-04-21 RX ORDER — HYDROMORPHONE HYDROCHLORIDE 1 MG/ML
1 INJECTION, SOLUTION INTRAMUSCULAR; INTRAVENOUS; SUBCUTANEOUS ONCE
Status: COMPLETED | OUTPATIENT
Start: 2023-04-21 | End: 2023-04-21

## 2023-04-21 RX ADMIN — DIPHENHYDRAMINE HYDROCHLORIDE 50 MG: 50 INJECTION, SOLUTION INTRAMUSCULAR; INTRAVENOUS at 15:38

## 2023-04-21 RX ADMIN — FENTANYL CITRATE 50 MCG: 0.05 INJECTION, SOLUTION INTRAMUSCULAR; INTRAVENOUS at 12:14

## 2023-04-21 RX ADMIN — IOPAMIDOL 20 ML: 612 INJECTION, SOLUTION INTRAVENOUS at 15:38

## 2023-04-21 RX ADMIN — MIDAZOLAM 0.5 MG: 1 INJECTION INTRAMUSCULAR; INTRAVENOUS at 12:22

## 2023-04-21 RX ADMIN — MIDAZOLAM 0.5 MG: 1 INJECTION INTRAMUSCULAR; INTRAVENOUS at 12:39

## 2023-04-21 RX ADMIN — FENTANYL CITRATE 25 MCG: 0.05 INJECTION, SOLUTION INTRAMUSCULAR; INTRAVENOUS at 12:39

## 2023-04-21 RX ADMIN — MIDAZOLAM 0.5 MG: 1 INJECTION INTRAMUSCULAR; INTRAVENOUS at 12:16

## 2023-04-21 RX ADMIN — IOPAMIDOL 35 ML: 612 INJECTION, SOLUTION INTRAVENOUS at 13:18

## 2023-04-21 RX ADMIN — KETOROLAC TROMETHAMINE 15 MG: 30 INJECTION, SOLUTION INTRAMUSCULAR; INTRAVENOUS at 16:00

## 2023-04-21 RX ADMIN — FENTANYL CITRATE 50 MCG: 0.05 INJECTION, SOLUTION INTRAMUSCULAR; INTRAVENOUS at 15:38

## 2023-04-21 RX ADMIN — FENTANYL CITRATE 25 MCG: 0.05 INJECTION, SOLUTION INTRAMUSCULAR; INTRAVENOUS at 12:26

## 2023-04-21 RX ADMIN — FENTANYL CITRATE 25 MCG: 0.05 INJECTION, SOLUTION INTRAMUSCULAR; INTRAVENOUS at 12:43

## 2023-04-21 RX ADMIN — LIDOCAINE HYDROCHLORIDE 15 ML: 10 INJECTION, SOLUTION EPIDURAL; INFILTRATION; INTRACAUDAL; PERINEURAL at 12:22

## 2023-04-21 RX ADMIN — MIDAZOLAM 0.5 MG: 1 INJECTION INTRAMUSCULAR; INTRAVENOUS at 12:23

## 2023-04-21 RX ADMIN — FENTANYL CITRATE 25 MCG: 0.05 INJECTION, SOLUTION INTRAMUSCULAR; INTRAVENOUS at 12:22

## 2023-04-21 RX ADMIN — HYDROMORPHONE HYDROCHLORIDE 1 MG: 1 INJECTION, SOLUTION INTRAMUSCULAR; INTRAVENOUS; SUBCUTANEOUS at 16:27

## 2023-04-21 RX ADMIN — MIDAZOLAM 0.5 MG: 1 INJECTION INTRAMUSCULAR; INTRAVENOUS at 12:26

## 2023-04-21 RX ADMIN — CEFAZOLIN 2 G: 1 INJECTION, POWDER, FOR SOLUTION INTRAMUSCULAR; INTRAVENOUS at 15:32

## 2023-04-21 RX ADMIN — CEFAZOLIN 2 G: 1 INJECTION, POWDER, FOR SOLUTION INTRAMUSCULAR; INTRAVENOUS at 12:05

## 2023-04-21 RX ADMIN — FENTANYL CITRATE 25 MCG: 0.05 INJECTION, SOLUTION INTRAMUSCULAR; INTRAVENOUS at 13:09

## 2023-04-21 RX ADMIN — MIDAZOLAM 1 MG: 1 INJECTION INTRAMUSCULAR; INTRAVENOUS at 12:54

## 2023-04-21 RX ADMIN — MIDAZOLAM 0.5 MG: 1 INJECTION INTRAMUSCULAR; INTRAVENOUS at 12:43

## 2023-04-21 RX ADMIN — FENTANYL CITRATE 25 MCG: 0.05 INJECTION, SOLUTION INTRAMUSCULAR; INTRAVENOUS at 12:49

## 2023-04-21 RX ADMIN — FENTANYL CITRATE 25 MCG: 0.05 INJECTION, SOLUTION INTRAMUSCULAR; INTRAVENOUS at 12:23

## 2023-04-21 RX ADMIN — MIDAZOLAM 1 MG: 1 INJECTION INTRAMUSCULAR; INTRAVENOUS at 12:12

## 2023-04-21 NOTE — DISCHARGE INSTRUCTIONS
[address]  Department of Interventional Radiology  [facility]  [phone] Office  [phone] Fax    Nephrostomy Tube Discharge Instructions    General Information:   A nephrostomy is a tube inserted through your skin and into the kidney. The purpose of the tube is to drain urine outside of the body. This is done in the event fo a block or a damaged ureter. Most of these tubes are usually changed every 2-3 months. However, some patients may need to have their tubes changed more often. Home Care Instructions: You can resume your regular diet. Do not drink alcohol, drive, or make any important legal decisions in the next 24 hours. Do not lift anything heavier than a gallon of milk or do anything strenuous for the next 24 hours. You should not shower for 24 hours. You should not bathe or swim while you have this drain in place. Your doctor may arrange for home health to visit you to help take care of the tube. You should clean the tube and change the dressing at least every 48 hours and more as needed. Keep the dressing clean and dry. Keep up with how much drainage you get from the tube and report this to your doctor. Call If:   You should call your Physician and/or the Radiology Nurse if you have any bleeding other than a small spot on your bandage. Call if you have any signs of infection, fever, or increased pain at the site of the tube. Call if you should have leakage around the tube, a change in the appearance of the urine draining from the tube, increased pain in the lower back, or no drainage from the tube for 12 hours. Call if the tube has pulled back, or has been pulled out. Follow-Up Instructions:  Please see your ordering doctor as he/she has requested.     Patient Signature:  Date: 4/21/2023  Discharging Nurse: Lien Rizzo    Post Sedation: Care Instructions  Overview  Sedation is the use of medicine to help you feel relaxed and comfortable during a procedure. The medicine is usually given in a vein (by IV). There are different levels of sedation. They range from being awake but relaxed to being completely unconscious. Which level you have will depend on the procedure and your needs. You will be watched closely by a doctor or nurse during sedation. Common side effects from sedation include:  Feeling sleepy or tired. (Your doctors and nurses will make sure you aren't too sleepy to go home.)  Feeling dizzy or unsteady. How can you care for yourself at home? Activity    Don't do anything that requires attention to detail until you recover. This includes going to work or school, making important decisions, and signing any legal documents. It takes time for the medicine effects to completely wear off. For at least 24 hours, do not drive or operate any machinery. When you get home, make sure to rest until the anesthesia has worn off. Some people will feel drowsy or dizzy for up to a few hours after leaving the hospital.     Take your time and walk slowly. Sudden changes in position may cause nausea. Rest when you feel tired. Getting enough sleep will help you recover. If you have sleep apnea and you have a CPAP machine, be sure to use it. Diet    Don't drink alcohol for 24 hours. If your stomach is upset, try clear liquids and bland, low-fat foods like plain toast or rice. Drink plenty of fluids (unless your doctor tells you not to). When should you call for help? Call 911 anytime you think you may need emergency care. For example, call if:    You have trouble breathing. You passed out (lost consciousness). Call your doctor now or seek immediate medical care if:    You have nausea or vomiting that gets worse or won't stop. You have a fever. You have a new or worse headache. The medicine is not wearing off and you can't think clearly.

## 2023-04-21 NOTE — PROGRESS NOTES
DOSE ADJUSTMENT MADE PER P/T PROTOCOL     PREVIOUS ORDER:  Toradol 30 mg NOW IVP     Estimated Creatinine Clearance: 78.4 mL/min (by C-G formula based on SCr of 0.62 mg/dL). No results for input(s): BUN, PLT, INR, PLTEXT, INREXT in the last 72 hours.     No lab exists for component: CREATININE     NEW AGE ADJUSTED ORDER: Toradol 15 mg IVP NOW     Verner Elder [facility]  MS LIO.Ph 4/21/2023 3:37 PM

## 2023-04-21 NOTE — PROGRESS NOTES
TRANSFER - OUT REPORT:    Verbal report given to Kailey Shabazz being transferred to [facility] for routine progression of care       Report consisted of patient's Situation, Background, Assessment and   Recommendations(SBAR). Information from the following report(s) Procedure Summary was reviewed with the receiving nurse. Opportunity for questions and clarification was provided.

## 2023-04-21 NOTE — PROGRESS NOTES
TRANSFER - IN REPORT:    Verbal report received from [facility] on Brandi Sully  being received from [facility] for ordered procedure      Report consisted of patients Situation, Background, Assessment and   Recommendations(SBAR). Information from the following report(s) SBAR was reviewed with the receiving nurse. Opportunity for questions and clarification was provided. Assessment completed upon patients arrival to unit and care assumed.

## 2023-04-21 NOTE — ROUTINE PROCESS
10:28 AM  Patient brought back from waiting area. ID band and allergies confirmed. Consents signed. 11:57 AM  TRANSFER - OUT REPORT:    Verbal report given to Tim Lares RN (name) on Jessica Sherman  being transferred to [facility] (unit) for ordered procedure       Report consisted of patients Situation, Background, Assessment and   Recommendations(SBAR). Information from the following report(s) SBAR was reviewed with the receiving nurse. Lines:   Peripheral IV 04/21/23 Anterior; Left Forearm (Active)   Site Assessment Clean, dry, & intact 04/21/23 1052   Phlebitis Assessment 0 04/21/23 1052   Dressing Status Clean, dry, & intact 04/21/23 1052   Dressing Type Transparent 04/21/23 1052   Hub Color/Line Status Blue 04/21/23 1052        Opportunity for questions and clarification was provided. Patient transported with:   Registered Nurse    1:25 PM  TRANSFER - IN REPORT:    Verbal report received from [facility] (name) on Jessica Sherman  being received from [facility] (unit) for routine progression of care      Report consisted of patients Situation, Background, Assessment and   Recommendations(SBAR). Information from the following report(s) SBAR was reviewed with the receiving nurse. Opportunity for questions and clarification was provided. Assessment completed upon patients arrival to unit and care assumed. 2:10 PM  Discharge instructions reviewed with patient and family. Voiced understanding. Patient given copy of discharge instructions to take home. 3:24 PM  TRANSFER - OUT REPORT:    Verbal report given to Marcus RN (name) on Jessica Sherman  being transferred to [facility] (unit) for ordered procedure       Report consisted of patients Situation, Background, Assessment and   Recommendations(SBAR). Information from the following report(s) SBAR was reviewed with the receiving nurse. Lines:   Peripheral IV 04/21/23 Anterior; Left Forearm (Active)   Site Assessment Clean, dry, & intact 04/21/23 1052   Phlebitis Assessment 0 04/21/23 1052   Dressing Status Clean, dry, & intact 04/21/23 1052   Dressing Type Transparent 04/21/23 1052   Hub Color/Line Status Blue 04/21/23 1052        Opportunity for questions and clarification was provided.       Patient transported with:   Registered Nurse

## 2023-04-21 NOTE — PROGRESS NOTES
3:46 PM  TRANSFER - IN REPORT:    Verbal report received from ed(name) on Yuliana Parents  being received from [facility](unit) for routine post - op      Report consisted of patients Situation, Background, Assessment and   Recommendations(SBAR). Information from the following report(s) Procedure Summary was reviewed with the receiving nurse. Opportunity for questions and clarification was provided. Assessment completed upon patients arrival to unit and care assumed. 3:46 PM  Per Dr. Watson Christie if pts bp is stable, okay to give IV dilaudid before discharge for pain. 4:35 PM  Discharge paperwork reviewed with pt and family with primary nurse. Pt ambulated, right back site clean, dry, and intact with no bleeding or hematoma noted. 4:43 PM  Pt discharged via wheelchair with sister. Personal belongings with patient upon discharge.

## 2023-04-21 NOTE — PROGRESS NOTES
TRANSFER - IN REPORT:    Verbal report received from Tay Sherman  [facility]      Report consisted of patients Situation, Background, Assessment and   Recommendations(SBAR). Information from the following report(s) SBAR was reviewed with the receiving nurse. Opportunity for questions and clarification was provided. Assessment completed upon patients arrival to unit and care assumed.

## 2023-04-21 NOTE — PROGRESS NOTES
Progress Note  Date:2023       Room:CATH/  Patient Name:Inna Ro     YOB: 1950     Age:72 y.o. Subjective    Subjective   Review of Systems  Objective         Vitals Last 24 Hours:  TEMPERATURE:  Temp  Av.1 °F (36.7 °C)  Min: 98.1 °F (36.7 °C)  Max: 98.1 °F (36.7 °C)  RESPIRATIONS RANGE: Resp  Av  Min: 17  Max: 17  PULSE OXIMETRY RANGE: SpO2  Av %  Min: 97 %  Max: 97 %  PULSE RANGE: Pulse  Av  Min: 75  Max: 75  BLOOD PRESSURE RANGE: Systolic (98YEB), TFS:606 , Min:147 , XGM:998   ; Diastolic (78LCN), RCE:79, Min:61, Max:61    I/O (24Hr): No intake or output data in the 24 hours ending 23 1200  Objective  Labs/Imaging/Diagnostics    Labs:  CBC:No results for input(s): WBC, RBC, HGB, HCT, MCV, RDW, PLT, HGBEXT, HCTEXT, PLTEXT in the last 72 hours. CHEMISTRIES:No results for input(s): NA, K, CL, CO2, BUN, CA, PHOS, MG in the last 72 hours. No lab exists for component: CREATININE, GLUCOSEPT/INR:No results for input(s): INR, INREXT in the last 72 hours. No lab exists for component: PROTIME  APTT:No results for input(s): APTT in the last 72 hours. LIVER PROFILE:No results for input(s): AST, ALT in the last 72 hours. No lab exists for component: LEVY TovarPHOS  Lab Results   Component Value Date/Time    ALT (SGPT) 39 2023 03:56 AM    AST (SGOT) 24 2023 03:56 AM    Alk. phosphatase 89 2023 03:56 AM    Bilirubin, total 0.7 2023 03:56 AM       Imaging Last 24 Hours:  No results found. Assessment//Plan   Active Problems:    * No active hospital problems.  *    Assessment & Plan    Electronically signed by Holley Arboleda MD on 2023 at 12:00 PM

## 2023-04-21 NOTE — PROGRESS NOTES
TRANSFER - OUT REPORT:    Verbal report given to CLPO on [facility]       Report consisted of patient's Situation, Background, Assessment and   Recommendations(SBAR). Information from the following report(s) SBAR was reviewed with the receiving nurse. Opportunity for questions and clarification was provided.       Patient transported with:   Registered Nurse

## 2023-05-08 ENCOUNTER — TRANSCRIBE ORDERS (OUTPATIENT)
Facility: HOSPITAL | Age: 73
End: 2023-05-08

## 2023-05-08 DIAGNOSIS — N13.30 HYDRONEPHROSIS, UNSPECIFIED HYDRONEPHROSIS TYPE: Primary | ICD-10-CM

## 2023-05-10 ENCOUNTER — HOSPITAL ENCOUNTER (OUTPATIENT)
Facility: HOSPITAL | Age: 73
Discharge: HOME | End: 2023-05-13
Payer: MEDICARE

## 2023-05-10 DIAGNOSIS — N13.30 HYDRONEPHROSIS, UNSPECIFIED HYDRONEPHROSIS TYPE: ICD-10-CM

## 2023-05-10 PROCEDURE — 78708 K FLOW/FUNCT IMAGE W/DRUG: CPT

## 2023-05-10 PROCEDURE — 6360000002 HC RX W HCPCS: Performed by: UROLOGY

## 2023-05-10 RX ORDER — FUROSEMIDE 10 MG/ML
20 INJECTION INTRAMUSCULAR; INTRAVENOUS ONCE
Status: COMPLETED | OUTPATIENT
Start: 2023-05-10 | End: 2023-05-10

## 2023-05-10 RX ADMIN — FUROSEMIDE 20 MG: 10 INJECTION, SOLUTION INTRAMUSCULAR; INTRAVENOUS at 13:19

## 2023-05-10 RX ADMIN — Medication 10.3 MILLICURIE: at 13:13

## 2023-05-15 ENCOUNTER — APPOINTMENT (OUTPATIENT)
Facility: HOSPITAL | Age: 73
End: 2023-05-15
Payer: MEDICARE

## 2023-05-15 ENCOUNTER — HOSPITAL ENCOUNTER (OUTPATIENT)
Facility: HOSPITAL | Age: 73
Setting detail: OBSERVATION
Discharge: HOME HEALTH | End: 2023-05-16
Attending: EMERGENCY MEDICINE | Admitting: INTERNAL MEDICINE
Payer: MEDICARE

## 2023-05-15 DIAGNOSIS — Z93.6 NEPHROSTOMY STATUS (HCC): ICD-10-CM

## 2023-05-15 DIAGNOSIS — N17.9 AKI (ACUTE KIDNEY INJURY) (HCC): Primary | ICD-10-CM

## 2023-05-15 PROBLEM — R79.89 ELEVATED SERUM CREATININE: Status: ACTIVE | Noted: 2023-05-15

## 2023-05-15 LAB
ANION GAP SERPL CALC-SCNC: 2 MMOL/L (ref 5–15)
APPEARANCE UR: ABNORMAL
BACTERIA URNS QL MICRO: ABNORMAL /HPF
BASOPHILS # BLD: 0 K/UL (ref 0–0.1)
BASOPHILS NFR BLD: 0 % (ref 0–1)
BILIRUB UR QL: NEGATIVE
BUN SERPL-MCNC: 18 MG/DL (ref 6–20)
BUN/CREAT SERPL: 15 (ref 12–20)
CALCIUM SERPL-MCNC: 9.8 MG/DL (ref 8.5–10.1)
CHLORIDE SERPL-SCNC: 108 MMOL/L (ref 97–108)
CO2 SERPL-SCNC: 27 MMOL/L (ref 21–32)
COLOR UR: ABNORMAL
CREAT SERPL-MCNC: 1.2 MG/DL (ref 0.55–1.02)
DIFFERENTIAL METHOD BLD: NORMAL
EOSINOPHIL # BLD: 0.3 K/UL (ref 0–0.4)
EOSINOPHIL NFR BLD: 4 % (ref 0–7)
EPITH CASTS URNS QL MICRO: ABNORMAL /LPF
ERYTHROCYTE [DISTWIDTH] IN BLOOD BY AUTOMATED COUNT: 14.4 % (ref 11.5–14.5)
GLUCOSE SERPL-MCNC: 127 MG/DL (ref 65–100)
GLUCOSE UR STRIP.AUTO-MCNC: NEGATIVE MG/DL
HCT VFR BLD AUTO: 37.3 % (ref 35–47)
HGB BLD-MCNC: 12.2 G/DL (ref 11.5–16)
HGB UR QL STRIP: NEGATIVE
IMM GRANULOCYTES # BLD AUTO: 0 K/UL (ref 0–0.04)
IMM GRANULOCYTES NFR BLD AUTO: 0 % (ref 0–0.5)
KETONES UR QL STRIP.AUTO: NEGATIVE MG/DL
LEUKOCYTE ESTERASE UR QL STRIP.AUTO: ABNORMAL
LYMPHOCYTES # BLD: 1.9 K/UL (ref 0.8–3.5)
LYMPHOCYTES NFR BLD: 26 % (ref 12–49)
MCH RBC QN AUTO: 30.3 PG (ref 26–34)
MCHC RBC AUTO-ENTMCNC: 32.7 G/DL (ref 30–36.5)
MCV RBC AUTO: 92.8 FL (ref 80–99)
MONOCYTES # BLD: 0.4 K/UL (ref 0–1)
MONOCYTES NFR BLD: 6 % (ref 5–13)
NEUTS SEG # BLD: 4.6 K/UL (ref 1.8–8)
NEUTS SEG NFR BLD: 64 % (ref 32–75)
NITRITE UR QL STRIP.AUTO: NEGATIVE
NRBC # BLD: 0 K/UL (ref 0–0.01)
NRBC BLD-RTO: 0 PER 100 WBC
PH UR STRIP: 5 (ref 5–8)
PLATELET # BLD AUTO: 284 K/UL (ref 150–400)
PMV BLD AUTO: 11.1 FL (ref 8.9–12.9)
POTASSIUM SERPL-SCNC: 4.4 MMOL/L (ref 3.5–5.1)
PROT UR STRIP-MCNC: NEGATIVE MG/DL
RBC # BLD AUTO: 4.02 M/UL (ref 3.8–5.2)
RBC #/AREA URNS HPF: ABNORMAL /HPF (ref 0–5)
SODIUM SERPL-SCNC: 137 MMOL/L (ref 136–145)
SP GR UR REFRACTOMETRY: 1.02 (ref 1–1.03)
URINE CULTURE IF INDICATED: ABNORMAL
UROBILINOGEN UR QL STRIP.AUTO: 0.2 EU/DL (ref 0.2–1)
WBC # BLD AUTO: 7.3 K/UL (ref 3.6–11)
WBC URNS QL MICRO: ABNORMAL /HPF (ref 0–4)

## 2023-05-15 PROCEDURE — 80048 BASIC METABOLIC PNL TOTAL CA: CPT

## 2023-05-15 PROCEDURE — 85025 COMPLETE CBC W/AUTO DIFF WBC: CPT

## 2023-05-15 PROCEDURE — 81001 URINALYSIS AUTO W/SCOPE: CPT

## 2023-05-15 PROCEDURE — 87086 URINE CULTURE/COLONY COUNT: CPT

## 2023-05-15 PROCEDURE — 74176 CT ABD & PELVIS W/O CONTRAST: CPT

## 2023-05-15 PROCEDURE — 36415 COLL VENOUS BLD VENIPUNCTURE: CPT

## 2023-05-15 PROCEDURE — G0378 HOSPITAL OBSERVATION PER HR: HCPCS

## 2023-05-15 PROCEDURE — 2500000003 HC RX 250 WO HCPCS: Performed by: EMERGENCY MEDICINE

## 2023-05-15 PROCEDURE — 99285 EMERGENCY DEPT VISIT HI MDM: CPT

## 2023-05-15 RX ORDER — OXYCODONE HYDROCHLORIDE 5 MG/1
5 TABLET ORAL EVERY 4 HOURS PRN
Status: DISCONTINUED | OUTPATIENT
Start: 2023-05-15 | End: 2023-05-16 | Stop reason: HOSPADM

## 2023-05-15 RX ORDER — SODIUM CHLORIDE 0.9 % (FLUSH) 0.9 %
5-40 SYRINGE (ML) INJECTION EVERY 12 HOURS SCHEDULED
Status: DISCONTINUED | OUTPATIENT
Start: 2023-05-15 | End: 2023-05-16 | Stop reason: HOSPADM

## 2023-05-15 RX ORDER — ENOXAPARIN SODIUM 100 MG/ML
40 INJECTION SUBCUTANEOUS DAILY
Status: DISCONTINUED | OUTPATIENT
Start: 2023-05-15 | End: 2023-05-16 | Stop reason: HOSPADM

## 2023-05-15 RX ORDER — ACETAMINOPHEN 325 MG/1
650 TABLET ORAL EVERY 4 HOURS PRN
Status: DISCONTINUED | OUTPATIENT
Start: 2023-05-15 | End: 2023-05-16 | Stop reason: HOSPADM

## 2023-05-15 RX ORDER — ONDANSETRON 4 MG/1
4 TABLET, ORALLY DISINTEGRATING ORAL EVERY 8 HOURS PRN
Status: DISCONTINUED | OUTPATIENT
Start: 2023-05-15 | End: 2023-05-16 | Stop reason: HOSPADM

## 2023-05-15 RX ORDER — SODIUM CHLORIDE, SODIUM LACTATE, POTASSIUM CHLORIDE, CALCIUM CHLORIDE 600; 310; 30; 20 MG/100ML; MG/100ML; MG/100ML; MG/100ML
INJECTION, SOLUTION INTRAVENOUS CONTINUOUS
Status: DISCONTINUED | OUTPATIENT
Start: 2023-05-15 | End: 2023-05-16 | Stop reason: HOSPADM

## 2023-05-15 RX ORDER — ONDANSETRON 2 MG/ML
4 INJECTION INTRAMUSCULAR; INTRAVENOUS EVERY 6 HOURS PRN
Status: DISCONTINUED | OUTPATIENT
Start: 2023-05-15 | End: 2023-05-16 | Stop reason: HOSPADM

## 2023-05-15 RX ORDER — POLYETHYLENE GLYCOL 3350 17 G/17G
17 POWDER, FOR SOLUTION ORAL DAILY PRN
Status: DISCONTINUED | OUTPATIENT
Start: 2023-05-15 | End: 2023-05-16 | Stop reason: HOSPADM

## 2023-05-15 RX ORDER — LIDOCAINE HYDROCHLORIDE 10 MG/ML
2 INJECTION, SOLUTION EPIDURAL; INFILTRATION; INTRACAUDAL; PERINEURAL
Status: COMPLETED | OUTPATIENT
Start: 2023-05-15 | End: 2023-05-15

## 2023-05-15 RX ORDER — SODIUM CHLORIDE 0.9 % (FLUSH) 0.9 %
5-40 SYRINGE (ML) INJECTION PRN
Status: DISCONTINUED | OUTPATIENT
Start: 2023-05-15 | End: 2023-05-16 | Stop reason: HOSPADM

## 2023-05-15 RX ORDER — SODIUM CHLORIDE 9 MG/ML
INJECTION, SOLUTION INTRAVENOUS PRN
Status: DISCONTINUED | OUTPATIENT
Start: 2023-05-15 | End: 2023-05-16 | Stop reason: HOSPADM

## 2023-05-15 RX ORDER — PANTOPRAZOLE SODIUM 40 MG/1
40 TABLET, DELAYED RELEASE ORAL
Status: DISCONTINUED | OUTPATIENT
Start: 2023-05-16 | End: 2023-05-16 | Stop reason: HOSPADM

## 2023-05-15 RX ADMIN — LIDOCAINE HYDROCHLORIDE 2 ML: 10 INJECTION, SOLUTION EPIDURAL; INFILTRATION; INTRACAUDAL; PERINEURAL at 18:33

## 2023-05-15 SDOH — ECONOMIC STABILITY: TRANSPORTATION INSECURITY
IN THE PAST 12 MONTHS, HAS LACK OF TRANSPORTATION KEPT YOU FROM MEETINGS, WORK, OR FROM GETTING THINGS NEEDED FOR DAILY LIVING?: NO

## 2023-05-15 SDOH — ECONOMIC STABILITY: FOOD INSECURITY: WITHIN THE PAST 12 MONTHS, THE FOOD YOU BOUGHT JUST DIDN'T LAST AND YOU DIDN'T HAVE MONEY TO GET MORE.: NEVER TRUE

## 2023-05-15 SDOH — ECONOMIC STABILITY: FOOD INSECURITY: WITHIN THE PAST 12 MONTHS, YOU WORRIED THAT YOUR FOOD WOULD RUN OUT BEFORE YOU GOT MONEY TO BUY MORE.: NEVER TRUE

## 2023-05-15 SDOH — ECONOMIC STABILITY: HOUSING INSECURITY
IN THE LAST 12 MONTHS, WAS THERE A TIME WHEN YOU DID NOT HAVE A STEADY PLACE TO SLEEP OR SLEPT IN A SHELTER (INCLUDING NOW)?: NO

## 2023-05-15 SDOH — ECONOMIC STABILITY: INCOME INSECURITY: HOW HARD IS IT FOR YOU TO PAY FOR THE VERY BASICS LIKE FOOD, HOUSING, MEDICAL CARE, AND HEATING?: SOMEWHAT HARD

## 2023-05-15 ASSESSMENT — PAIN - FUNCTIONAL ASSESSMENT: PAIN_FUNCTIONAL_ASSESSMENT: NONE - DENIES PAIN

## 2023-05-15 NOTE — ED TRIAGE NOTES
Pt reports 4/21/23 she had a nephrostomy tube placed. Pt states she started having back pain 2 days ago. Pt believes the tube has come loose.  Pt states she also noticed less output
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
## 2023-05-16 ENCOUNTER — APPOINTMENT (OUTPATIENT)
Facility: HOSPITAL | Age: 73
End: 2023-05-16
Payer: MEDICARE

## 2023-05-16 VITALS
RESPIRATION RATE: 16 BRPM | TEMPERATURE: 97.6 F | OXYGEN SATURATION: 93 % | HEIGHT: 63 IN | HEART RATE: 83 BPM | SYSTOLIC BLOOD PRESSURE: 149 MMHG | DIASTOLIC BLOOD PRESSURE: 73 MMHG | WEIGHT: 217.8 LBS | BODY MASS INDEX: 38.59 KG/M2

## 2023-05-16 PROBLEM — Z87.448 HISTORY OF HYDRONEPHROSIS: Status: ACTIVE | Noted: 2023-05-16

## 2023-05-16 PROBLEM — N32.1 COLOVESICAL FISTULA: Status: ACTIVE | Noted: 2022-08-16

## 2023-05-16 PROBLEM — K21.9 GERD (GASTROESOPHAGEAL REFLUX DISEASE): Status: ACTIVE | Noted: 2023-05-16

## 2023-05-16 PROBLEM — T83.098A MALFUNCTION OF NEPHROSTOMY TUBE (HCC): Status: ACTIVE | Noted: 2023-05-16

## 2023-05-16 LAB
ANION GAP SERPL CALC-SCNC: 0 MMOL/L (ref 5–15)
APPEARANCE UR: CLEAR
BACTERIA URNS QL MICRO: NEGATIVE /HPF
BASOPHILS # BLD: 0 K/UL (ref 0–0.1)
BASOPHILS NFR BLD: 0 % (ref 0–1)
BILIRUB UR QL: NEGATIVE
BUN SERPL-MCNC: 17 MG/DL (ref 6–20)
BUN/CREAT SERPL: 14 (ref 12–20)
CALCIUM SERPL-MCNC: 9.4 MG/DL (ref 8.5–10.1)
CHLORIDE SERPL-SCNC: 109 MMOL/L (ref 97–108)
CO2 SERPL-SCNC: 28 MMOL/L (ref 21–32)
COLOR UR: ABNORMAL
CREAT SERPL-MCNC: 1.21 MG/DL (ref 0.55–1.02)
DIFFERENTIAL METHOD BLD: NORMAL
ECHO BSA: 2.08 M2
EOSINOPHIL # BLD: 0.4 K/UL (ref 0–0.4)
EOSINOPHIL NFR BLD: 4 % (ref 0–7)
EPITH CASTS URNS QL MICRO: ABNORMAL /LPF
ERYTHROCYTE [DISTWIDTH] IN BLOOD BY AUTOMATED COUNT: 14.2 % (ref 11.5–14.5)
GLUCOSE SERPL-MCNC: 104 MG/DL (ref 65–100)
GLUCOSE UR STRIP.AUTO-MCNC: NEGATIVE MG/DL
HCT VFR BLD AUTO: 35.6 % (ref 35–47)
HGB BLD-MCNC: 11.7 G/DL (ref 11.5–16)
HGB UR QL STRIP: NEGATIVE
HYALINE CASTS URNS QL MICRO: ABNORMAL /LPF (ref 0–2)
IMM GRANULOCYTES # BLD AUTO: 0 K/UL (ref 0–0.04)
IMM GRANULOCYTES NFR BLD AUTO: 0 % (ref 0–0.5)
KETONES UR QL STRIP.AUTO: NEGATIVE MG/DL
LEUKOCYTE ESTERASE UR QL STRIP.AUTO: NEGATIVE
LYMPHOCYTES # BLD: 3 K/UL (ref 0.8–3.5)
LYMPHOCYTES NFR BLD: 31 % (ref 12–49)
MCH RBC QN AUTO: 30.5 PG (ref 26–34)
MCHC RBC AUTO-ENTMCNC: 32.9 G/DL (ref 30–36.5)
MCV RBC AUTO: 92.7 FL (ref 80–99)
MONOCYTES # BLD: 0.6 K/UL (ref 0–1)
MONOCYTES NFR BLD: 7 % (ref 5–13)
NEUTS SEG # BLD: 5.4 K/UL (ref 1.8–8)
NEUTS SEG NFR BLD: 58 % (ref 32–75)
NITRITE UR QL STRIP.AUTO: NEGATIVE
NRBC # BLD: 0 K/UL (ref 0–0.01)
NRBC BLD-RTO: 0 PER 100 WBC
PH UR STRIP: 5 (ref 5–8)
PLATELET # BLD AUTO: 248 K/UL (ref 150–400)
PMV BLD AUTO: 10.8 FL (ref 8.9–12.9)
POTASSIUM SERPL-SCNC: 3.9 MMOL/L (ref 3.5–5.1)
PROT UR STRIP-MCNC: NEGATIVE MG/DL
RBC # BLD AUTO: 3.84 M/UL (ref 3.8–5.2)
RBC #/AREA URNS HPF: ABNORMAL /HPF (ref 0–5)
SODIUM SERPL-SCNC: 137 MMOL/L (ref 136–145)
SP GR UR REFRACTOMETRY: 1 (ref 1–1.03)
URINE CULTURE IF INDICATED: ABNORMAL
UROBILINOGEN UR QL STRIP.AUTO: 0.2 EU/DL (ref 0.2–1)
WBC # BLD AUTO: 9.5 K/UL (ref 3.6–11)
WBC URNS QL MICRO: ABNORMAL /HPF (ref 0–4)

## 2023-05-16 PROCEDURE — 6360000004 HC RX CONTRAST MEDICATION: Performed by: STUDENT IN AN ORGANIZED HEALTH CARE EDUCATION/TRAINING PROGRAM

## 2023-05-16 PROCEDURE — 85025 COMPLETE CBC W/AUTO DIFF WBC: CPT

## 2023-05-16 PROCEDURE — 36415 COLL VENOUS BLD VENIPUNCTURE: CPT

## 2023-05-16 PROCEDURE — 81001 URINALYSIS AUTO W/SCOPE: CPT

## 2023-05-16 PROCEDURE — G0378 HOSPITAL OBSERVATION PER HR: HCPCS

## 2023-05-16 PROCEDURE — 6360000002 HC RX W HCPCS: Performed by: STUDENT IN AN ORGANIZED HEALTH CARE EDUCATION/TRAINING PROGRAM

## 2023-05-16 PROCEDURE — C1729 CATH, DRAINAGE: HCPCS

## 2023-05-16 PROCEDURE — 50431 NJX PX NFROSGRM &/URTRGRM: CPT

## 2023-05-16 PROCEDURE — C1769 GUIDE WIRE: HCPCS

## 2023-05-16 PROCEDURE — 6370000000 HC RX 637 (ALT 250 FOR IP): Performed by: INTERNAL MEDICINE

## 2023-05-16 PROCEDURE — 80048 BASIC METABOLIC PNL TOTAL CA: CPT

## 2023-05-16 PROCEDURE — 2580000003 HC RX 258: Performed by: INTERNAL MEDICINE

## 2023-05-16 PROCEDURE — C1713 ANCHOR/SCREW BN/BN,TIS/BN: HCPCS

## 2023-05-16 PROCEDURE — 2709999900 HC NON-CHARGEABLE SUPPLY

## 2023-05-16 PROCEDURE — 94761 N-INVAS EAR/PLS OXIMETRY MLT: CPT

## 2023-05-16 RX ORDER — FENTANYL CITRATE 50 UG/ML
INJECTION, SOLUTION INTRAMUSCULAR; INTRAVENOUS PRN
Status: COMPLETED | OUTPATIENT
Start: 2023-05-16 | End: 2023-05-16

## 2023-05-16 RX ORDER — LEVOTHYROXINE SODIUM 0.12 MG/1
125 TABLET ORAL
Status: DISCONTINUED | OUTPATIENT
Start: 2023-05-16 | End: 2023-05-16 | Stop reason: HOSPADM

## 2023-05-16 RX ORDER — METOPROLOL TARTRATE 50 MG/1
50 TABLET, FILM COATED ORAL
COMMUNITY
Start: 2022-10-20

## 2023-05-16 RX ORDER — MULTIVIT-MIN/FA/LYCOPEN/LUTEIN .4-300-25
1 TABLET ORAL DAILY
COMMUNITY

## 2023-05-16 RX ORDER — VIBEGRON 75 MG/1
75 TABLET, FILM COATED ORAL
COMMUNITY
Start: 2022-10-20

## 2023-05-16 RX ORDER — DICLOFENAC SODIUM 75 MG/1
75 TABLET, DELAYED RELEASE ORAL
COMMUNITY
Start: 2023-03-04

## 2023-05-16 RX ORDER — OMEPRAZOLE 40 MG/1
40 CAPSULE, DELAYED RELEASE ORAL
COMMUNITY

## 2023-05-16 RX ORDER — LEVOTHYROXINE SODIUM 0.12 MG/1
125 TABLET ORAL
COMMUNITY
Start: 2022-10-20

## 2023-05-16 RX ADMIN — IOHEXOL 15 ML: 300 INJECTION, SOLUTION INTRAVENOUS at 10:29

## 2023-05-16 RX ADMIN — OXYCODONE HYDROCHLORIDE 5 MG: 5 TABLET ORAL at 03:38

## 2023-05-16 RX ADMIN — PANTOPRAZOLE SODIUM 40 MG: 40 TABLET, DELAYED RELEASE ORAL at 06:29

## 2023-05-16 RX ADMIN — OXYCODONE HYDROCHLORIDE 5 MG: 5 TABLET ORAL at 09:30

## 2023-05-16 RX ADMIN — LEVOTHYROXINE SODIUM 125 MCG: 0.12 TABLET ORAL at 06:29

## 2023-05-16 RX ADMIN — SODIUM CHLORIDE, PRESERVATIVE FREE 10 ML: 5 INJECTION INTRAVENOUS at 02:11

## 2023-05-16 RX ADMIN — FENTANYL CITRATE 50 MCG: 50 INJECTION, SOLUTION INTRAMUSCULAR; INTRAVENOUS at 10:18

## 2023-05-16 RX ADMIN — SODIUM CHLORIDE, POTASSIUM CHLORIDE, SODIUM LACTATE AND CALCIUM CHLORIDE: 600; 310; 30; 20 INJECTION, SOLUTION INTRAVENOUS at 02:10

## 2023-05-16 RX ADMIN — FENTANYL CITRATE 50 MCG: 50 INJECTION, SOLUTION INTRAMUSCULAR; INTRAVENOUS at 10:23

## 2023-05-16 ASSESSMENT — PAIN DESCRIPTION - LOCATION
LOCATION: BACK
LOCATION: FLANK

## 2023-05-16 ASSESSMENT — PAIN SCALES - GENERAL
PAINLEVEL_OUTOF10: 4
PAINLEVEL_OUTOF10: 5
PAINLEVEL_OUTOF10: 2
PAINLEVEL_OUTOF10: 10

## 2023-05-16 ASSESSMENT — PAIN DESCRIPTION - DESCRIPTORS: DESCRIPTORS: STABBING

## 2023-05-16 NOTE — CONSULTS
New Urology Consult Note    Patient: Jordi Horn MRN: [id number]  SSN: [id number]    YOB: 1950  Age: 74 y.o. Sex: female            Plan:     Attempted to see pt in person, down in IR suite for procedure. MAKI in the setting of PCN   -Nephrostogram ordered last night, R PCN being exchanged this AM by IR. -She is scheduled to FU OP with VU Monday. Will place details in DC section of chart. OK to DC from uro perspective. Discussed with Dr. Josue Thayer. Thank you for this consult. Please contact [facility] with any further questions/concerns. History of Present Illness:       Jordi Horn is seen in consultation for reasons noted above at the request of Willi Romero MD. Admitted to hospital for Elevated serum creatinine    This is a 74 y.o. female known to  with hx of vaginitis, OAD, s/p colectomy and repair of colovesical fistula complicated by right hydro. She underwent a fx right ureteroscopy with balloon dilation of distal right ureter in 11/22. She had a Mag-3 lasix renal scan in April revealing split renal function of 91% on the left and 9% on the right. R PCN placed 4/21/23, resolving back pain. Presented to ER with complaints of PCN movement/dislodging. MAKI noted on arrival, Cr 1.2, no leukocytosis. UA sent from established PCN for culture. CT abd/pelv performed, revealing   right-sided percutaneous nephrostomy tube terminates in superior pole centrall, no renal pelvocaliectasis or ureterectasis demonstrated, status post cholecystectomy, status post hysterectomy. Distal ileal and rectosigmoid anastomoses again shown. Urology on call Josue Thayer was contacted and recommended nephrostogram. Unable to perform overnight, resulting in her admission for the testing this AM.    Known to , last seen in office 5/8/23 by Dr. Coy Dill. Full note as below:   \"Ms. Nani Clement is a pleasant 40-year-old female with a history of right hydronephrosis.  She is s/p

## 2023-05-16 NOTE — PLAN OF CARE
Problem: Discharge Planning  Goal: Discharge to home or other facility with appropriate resources  5/16/2023 0748 by Percy Leigh RN  Outcome: Progressing  5/16/2023 0748 by Percy Leigh RN  Outcome: Progressing

## 2023-05-16 NOTE — DISCHARGE SUMMARY
[address]  [address] [facility], [address] [place] [address]  Tel: [phone]    Hospital Medicine Discharge Summary    Patient ID:    Kimberlee Can  Age:              74 y.o.    : 1950  MRN:             [id number]     PCP: Antonio Lopez MD     Date of Admission: 5/15/2023    Date of Discharge:  2023    Discharge Diagnoses:  Principal Problem:    Malfunction of nephrostomy tube ([facility]    HTN (hypertension), benign    Hypothyroidism    Colovesical fistula    Elevated serum creatinine    GERD (gastroesophageal reflux disease)    BMI 38.0-38.9,adult    History of hydronephrosis    Reason for admission:    Elevated serum creatinine [R79.89]  Nephrostomy status ([facility] [Z93.6]  MAKI (acute kidney injury) [facility]) [N17.9]    Hospital Course:     Ms. Dilan Santiago is a 74 y.o. admitted to [facility] and treated for a malfunctioning nephrostomy tube. CT scan abdomen and pelvis done showed a right-sided percutaneous nephrostomy tube terminates in superior pole centrally. No renal pelvocaliectasis or ureterectasis demonstrated 3. Status post cholecystectomy. Status post hysterectomy. Distal ileal and rectosigmoid Anastomoses. Seen by IR and her nephrostomy tube was replaced and is functioning well. Reviewed by Urology who advised follow up as scheduled in the clinic. She was discharged home in stable condition. Resume home medications. Most recent labs:  Recent Labs     05/15/23  1542 23  0645   WBC 7.3 9.5   HGB 12.2 11.7   HCT 37.3 35.6    248     Recent Labs     05/15/23  1542 23  0645    137   K 4.4 3.9    109*   CO2 27 28   BUN 18 17     No components found for: [place]    Discharge Exam:  BP (!) 149/73 Comment: RN notified  Pulse 83   Temp 97.6 °F (36.4 °C) (Oral)   Resp 16   Ht 1.6 m (5' 3\")   Wt 98.8 kg (217 lb 12.8 oz)   SpO2 93%   BMI 38.58 kg/m²      Patient has been seen and examined.     General: well

## 2023-05-16 NOTE — DISCHARGE INSTRUCTIONS
Hospital Medicine DISCHARGE INSTRUCTIONS    NAME: Kurt Pugh   :  1950   MRN:  [id number]     Date:     2023    ADMIT DATE: 5/15/2023     DISCHARGE DATE: 2023     PRINCIPAL ADMITTING DIAGNOSIS:  Elevated serum creatinine [R79.89]  Nephrostomy status (HCC) [Z93.6]  MAKI (acute kidney injury) ([facility] [N17.9]    Discharge Diagnoses:  Nephrostomy tube malfunction    MEDICATIONS:    It is important that medications are taken exactly as they are prescribed on the discharge medication instructions and keep them your  in the bottles provided by the pharmacist.   Keep a list of the medication names, dosages, and times to be taken at all times. Do not take other medications without consulting your doctor. Recommended diet:  regular diet    Recommended activity: activity as tolerated    Post discharge care:    Notify follow up health care provider or return to the emergency department if you cannot get hold of your doctor if you feel worse or experience symptoms similar to those that brought you to hospital    [place] [address] [address] [phone]    Follow up on 2023  Dr. Tiago Darden at 1240PM. Please arrive 15 mins early to register    Grupo Dougherty MD  [address]  Internal Medicine [facility] [address] [phone]    Go to  As needed       Information obtained by :  I understand that if any problems occur once I am at home I am to contact my physician and I understand and acknowledge receipt of the instructions indicated above.                                                                                                                                            Physician's or R.N.'s Signature                                                                  Date/Time                                                                                                                                              Patient or

## 2023-05-16 NOTE — PLAN OF CARE
Problem: Discharge Planning  Goal: Discharge to home or other facility with appropriate resources  5/16/2023 0748 by Zeferino Lua RN  Outcome: Progressing  5/16/2023 0748 by Zeferino Lua RN  Outcome: Progressing     Problem: Pain  Goal: Verbalizes/displays adequate comfort level or baseline comfort level  5/16/2023 0748 by Zeferino Lua RN  Outcome: Progressing  5/16/2023 0748 by Zeferino Lua RN  Outcome: Progressing     Problem: Skin/Tissue Integrity  Goal: Absence of new skin breakdown  Description: 1. Monitor for areas of redness and/or skin breakdown  2. Assess vascular access sites hourly  3. Every 4-6 hours minimum:  Change oxygen saturation probe site  4. Every 4-6 hours:  If on nasal continuous positive airway pressure, respiratory therapy assess nares and determine need for appliance change or resting period.   Outcome: Progressing     Problem: Safety - Adult  Goal: Free from fall injury  Outcome: Progressing

## 2023-05-16 NOTE — ED NOTES
TRANSFER - OUT REPORT:    Verbal report given to [facility] on Shruthi Esteban  being transferred to [phone] for routine progression of patient care       Report consisted of patient's Situation, Background, Assessment and   Recommendations(SBAR). Information from the following report(s) Nurse Handoff Report, ED Encounter Summary, ED SBAR, Adult Overview, Intake/Output, MAR, and Recent Results was reviewed with the receiving nurse. [place] Assessment: No data recorded  Lines:   Peripheral IV 05/15/23 Right Antecubital (Active)        Opportunity for questions and clarification was provided.       Patient transported with:  Ramon Chu RN  05/16/23 2967

## 2023-05-16 NOTE — CARE COORDINATION
05/16/23 1150   Service Assessment   Patient Orientation Alert and Oriented   Cognition Alert   History Provided By Patient   Primary [facility]  Family Members   Patient's Healthcare Decision Maker is: Named in [facility]  (Daughter, Javier Hernandez)   Prior Functional Level Independent in ADLs/IADLs   Current Functional Level Independent in ADLs/IADLs   Can patient return to prior living arrangement Yes   Family able to assist with home care needs: Yes   Social/Functional History   Lives With Family  (Sister and brother-in-law)   Type of [facility] Two level  (Has a stair lift)   Home Access Ramped entrance   [address] Cane;Walker, rolling;Rollator  (bedside commode, tub transfer bench)   [facility]   (Uses her rollator or rolling walker prn)   Transfer Assistance Independent   Active  Yes   Discharge Planning   Type of [address]  (Patient is open to [facility] for skilled nursing; orders have been sent in [employer] for resumption of care.)   Patient expects to be discharged to: House   History of falls?  0   Services At/After Discharge   Confirm Follow Up Transport Family  (Sister)

## 2023-05-16 NOTE — H&P
0.9 % injection 5-40 mL  5-40 mL IntraVENous PRN    0.9 % sodium chloride infusion   IntraVENous PRN    oxyCODONE (ROXICODONE) immediate release tablet 5 mg  5 mg Oral Q4H PRN    enoxaparin (LOVENOX) injection 40 mg  40 mg SubCUTAneous Daily    ondansetron (ZOFRAN-ODT) disintegrating tablet 4 mg  4 mg Oral Q8H PRN    Or    ondansetron (ZOFRAN) injection 4 mg  4 mg IntraVENous Q6H PRN    polyethylene glycol (GLYCOLAX) packet 17 g  17 g Oral Daily PRN    acetaminophen (TYLENOL) tablet 650 mg  650 mg Oral Q4H PRN    pantoprazole (PROTONIX) tablet 40 mg  40 mg Oral BID AC        Physical Exam:  Blood pressure 139/70, pulse 72, temperature 97.5 °F (36.4 °C), temperature source Oral, resp. rate 18, height 1.6 m (5' 3\"), weight 98.8 kg (217 lb 12.8 oz), SpO2 94 %. No acute distress  Good peripheral perfusion  Nonlabored respirations  Abdomen nondistended    Alerts:      Laboratory:      Recent Labs     05/16/23  0645   HGB 11.7   HCT 35.6   WBC 9.5      BUN 17   K 3.9         Plan of Care/Planned Procedure:  Risks, benefits, and alternatives discussed with the appropriate decision-maker, who agreed to proceed. Proceeding with conscious sedation using IV fentanyl and versed.      Helga Jordan MD
appropriately  Psych:  Alert with good insight. Oriented to person, place, and time  R sided nephrostomy tube in place      Labs:    Recent Labs     05/15/23  1542   WBC 7.3   HGB 12.2   HCT 37.3        Recent Labs     05/15/23  1542      K 4.4      CO2 27   BUN 18     No components found for: GLPOC  No results for input(s): PH, PCO2, PO2, HCO3, FIO2 in the last 72 hours. No results for input(s): INR in the last 72 hours. Assessment/Plan:     Elevated Cr - mild.  Baseline appears to be 0.6   -start IVF's   -nephrogram in the AM   -urology consulted   -strict I's and O's   -hold voltaren     GERD   -on PPI BID     Hypothyroidism   -on levothyroxine; continue     Bacteruria - with moderate epi's on collection   -repeat UA as suspect colonization, contamination rather than infection as NO WBC COUNT, fevers, etc.     Elevated BP   -?not on meds PTA; monitor in house     Surrogate decision maker: Sister     Total time spent with patient: [phone] [address] discussed with: Patient and Family    Discussed:  Care Plan    Prophylaxis:  Lovenox    Probable Disposition:  Home w/Family           ___________________________________________________    Attending Physician: Ariel Sanchez MD

## 2023-05-17 LAB
BACTERIA SPEC CULT: NORMAL
CC UR VC: NORMAL
SERVICE CMNT-IMP: NORMAL

## 2023-05-18 ENCOUNTER — OFFICE VISIT (OUTPATIENT)
Age: 73
End: 2023-05-18

## 2023-05-18 VITALS
TEMPERATURE: 97.9 F | OXYGEN SATURATION: 96 % | RESPIRATION RATE: 17 BRPM | HEART RATE: 71 BPM | WEIGHT: 216 LBS | SYSTOLIC BLOOD PRESSURE: 147 MMHG | BODY MASS INDEX: 38.27 KG/M2 | DIASTOLIC BLOOD PRESSURE: 78 MMHG | HEIGHT: 63 IN

## 2023-05-18 DIAGNOSIS — R79.89 ELEVATED SERUM CREATININE: ICD-10-CM

## 2023-05-18 DIAGNOSIS — Z09 HOSPITAL DISCHARGE FOLLOW-UP: ICD-10-CM

## 2023-05-18 DIAGNOSIS — N99.528 NEPHROSTOMY COMPLICATION (HCC): Primary | ICD-10-CM

## 2023-05-18 PROBLEM — Z93.2 ILEOSTOMY STATUS (HCC): Status: RESOLVED | Noted: 2023-01-17 | Resolved: 2023-05-18

## 2023-05-18 PROBLEM — T83.098A MALFUNCTION OF NEPHROSTOMY TUBE (HCC): Status: RESOLVED | Noted: 2023-05-16 | Resolved: 2023-05-18

## 2023-05-18 PROBLEM — N13.2 HYDRONEPHROSIS WITH RENAL AND URETERAL CALCULOUS OBSTRUCTION: Status: ACTIVE | Noted: 2022-10-18

## 2023-05-18 PROBLEM — F43.21 ADJUSTMENT DISORDER WITH DEPRESSED MOOD: Status: ACTIVE | Noted: 2018-01-22

## 2023-05-18 PROBLEM — Z87.448 HISTORY OF HYDRONEPHROSIS: Status: RESOLVED | Noted: 2023-05-16 | Resolved: 2023-05-18

## 2023-05-18 PROBLEM — F43.21 ADJUSTMENT DISORDER WITH DEPRESSED MOOD: Status: RESOLVED | Noted: 2018-01-22 | Resolved: 2023-05-18

## 2023-05-18 PROBLEM — Z87.440 PERSONAL HISTORY OF URINARY (TRACT) INFECTIONS: Status: ACTIVE | Noted: 2023-05-03

## 2023-05-18 SDOH — ECONOMIC STABILITY: TRANSPORTATION INSECURITY
IN THE PAST 12 MONTHS, HAS THE LACK OF TRANSPORTATION KEPT YOU FROM MEDICAL APPOINTMENTS OR FROM GETTING MEDICATIONS?: NO

## 2023-05-18 SDOH — SOCIAL STABILITY: SOCIAL NETWORK: HOW OFTEN DO YOU ATTEND CHURCH OR RELIGIOUS SERVICES?: NEVER

## 2023-05-18 SDOH — ECONOMIC STABILITY: FOOD INSECURITY: WITHIN THE PAST 12 MONTHS, YOU WORRIED THAT YOUR FOOD WOULD RUN OUT BEFORE YOU GOT MONEY TO BUY MORE.: NEVER TRUE

## 2023-05-18 SDOH — HEALTH STABILITY: PHYSICAL HEALTH: ON AVERAGE, HOW MANY DAYS PER WEEK DO YOU ENGAGE IN MODERATE TO STRENUOUS EXERCISE (LIKE A BRISK WALK)?: 0 DAYS

## 2023-05-18 SDOH — HEALTH STABILITY: PHYSICAL HEALTH: ON AVERAGE, HOW MANY MINUTES DO YOU ENGAGE IN EXERCISE AT THIS LEVEL?: 0 MIN

## 2023-05-18 SDOH — SOCIAL STABILITY: SOCIAL INSECURITY: WITHIN THE LAST YEAR, HAVE YOU BEEN AFRAID OF YOUR PARTNER OR EX-PARTNER?: NO

## 2023-05-18 SDOH — SOCIAL STABILITY: SOCIAL NETWORK: HOW OFTEN DO YOU ATTENT MEETINGS OF THE CLUB OR ORGANIZATION YOU BELONG TO?: NEVER

## 2023-05-18 SDOH — ECONOMIC STABILITY: FOOD INSECURITY: WITHIN THE PAST 12 MONTHS, THE FOOD YOU BOUGHT JUST DIDN'T LAST AND YOU DIDN'T HAVE MONEY TO GET MORE.: NEVER TRUE

## 2023-05-18 SDOH — ECONOMIC STABILITY: INCOME INSECURITY: IN THE LAST 12 MONTHS, WAS THERE A TIME WHEN YOU WERE NOT ABLE TO PAY THE MORTGAGE OR RENT ON TIME?: NO

## 2023-05-18 SDOH — SOCIAL STABILITY: SOCIAL INSECURITY
WITHIN THE LAST YEAR, HAVE YOU BEEN KICKED, HIT, SLAPPED, OR OTHERWISE PHYSICALLY HURT BY YOUR PARTNER OR EX-PARTNER?: NO

## 2023-05-18 SDOH — SOCIAL STABILITY: SOCIAL NETWORK: ARE YOU MARRIED, WIDOWED, DIVORCED, SEPARATED, NEVER MARRIED, OR LIVING WITH A PARTNER?: WIDOWED

## 2023-05-18 SDOH — HEALTH STABILITY: MENTAL HEALTH: HOW MANY STANDARD DRINKS CONTAINING ALCOHOL DO YOU HAVE ON A TYPICAL DAY?: 1 OR 2

## 2023-05-18 SDOH — SOCIAL STABILITY: SOCIAL INSECURITY
WITHIN THE LAST YEAR, HAVE TO BEEN RAPED OR FORCED TO HAVE ANY KIND OF SEXUAL ACTIVITY BY YOUR PARTNER OR EX-PARTNER?: NO

## 2023-05-18 SDOH — SOCIAL STABILITY: SOCIAL NETWORK
DO YOU BELONG TO ANY CLUBS OR ORGANIZATIONS SUCH AS CHURCH GROUPS UNIONS, FRATERNAL OR ATHLETIC GROUPS, OR SCHOOL GROUPS?: NO

## 2023-05-18 SDOH — SOCIAL STABILITY: SOCIAL INSECURITY: WITHIN THE LAST YEAR, HAVE YOU BEEN HUMILIATED OR EMOTIONALLY ABUSED IN OTHER WAYS BY YOUR PARTNER OR EX-PARTNER?: NO

## 2023-05-18 SDOH — HEALTH STABILITY: MENTAL HEALTH: HOW OFTEN DO YOU HAVE A DRINK CONTAINING ALCOHOL?: 2-4 TIMES A MONTH

## 2023-05-18 SDOH — SOCIAL STABILITY: SOCIAL NETWORK
IN A TYPICAL WEEK, HOW MANY TIMES DO YOU TALK ON THE PHONE WITH FAMILY, FRIENDS, OR NEIGHBORS?: MORE THAN THREE TIMES A WEEK

## 2023-05-18 SDOH — ECONOMIC STABILITY: HOUSING INSECURITY: IN THE LAST 12 MONTHS, HOW MANY PLACES HAVE YOU LIVED?: 1

## 2023-05-18 SDOH — HEALTH STABILITY: MENTAL HEALTH
STRESS IS WHEN SOMEONE FEELS TENSE, NERVOUS, ANXIOUS, OR CAN'T SLEEP AT NIGHT BECAUSE THEIR MIND IS TROUBLED. HOW STRESSED ARE YOU?: ONLY A LITTLE

## 2023-05-18 SDOH — ECONOMIC STABILITY: INCOME INSECURITY: HOW HARD IS IT FOR YOU TO PAY FOR THE VERY BASICS LIKE FOOD, HOUSING, MEDICAL CARE, AND HEATING?: NOT HARD AT ALL

## 2023-05-18 SDOH — SOCIAL STABILITY: SOCIAL NETWORK: HOW OFTEN DO YOU GET TOGETHER WITH FRIENDS OR RELATIVES?: MORE THAN THREE TIMES A WEEK

## 2023-05-18 ASSESSMENT — PATIENT HEALTH QUESTIONNAIRE - PHQ9
1. LITTLE INTEREST OR PLEASURE IN DOING THINGS: 0
6. FEELING BAD ABOUT YOURSELF - OR THAT YOU ARE A FAILURE OR HAVE LET YOURSELF OR YOUR FAMILY DOWN: 0
2. FEELING DOWN, DEPRESSED OR HOPELESS: 0
9. THOUGHTS THAT YOU WOULD BE BETTER OFF DEAD, OR OF HURTING YOURSELF: 0
10. IF YOU CHECKED OFF ANY PROBLEMS, HOW DIFFICULT HAVE THESE PROBLEMS MADE IT FOR YOU TO DO YOUR WORK, TAKE CARE OF THINGS AT HOME, OR GET ALONG WITH OTHER PEOPLE: 0
8. MOVING OR SPEAKING SO SLOWLY THAT OTHER PEOPLE COULD HAVE NOTICED. OR THE OPPOSITE, BEING SO FIGETY OR RESTLESS THAT YOU HAVE BEEN MOVING AROUND A LOT MORE THAN USUAL: 0
7. TROUBLE CONCENTRATING ON THINGS, SUCH AS READING THE NEWSPAPER OR WATCHING TELEVISION: 0
5. POOR APPETITE OR OVEREATING: 0
SUM OF ALL RESPONSES TO PHQ QUESTIONS 1-9: 0
SUM OF ALL RESPONSES TO PHQ9 QUESTIONS 1 & 2: 0
SUM OF ALL RESPONSES TO PHQ QUESTIONS 1-9: 0
SUM OF ALL RESPONSES TO PHQ QUESTIONS 1-9: 0
4. FEELING TIRED OR HAVING LITTLE ENERGY: 0
3. TROUBLE FALLING OR STAYING ASLEEP: 0
SUM OF ALL RESPONSES TO PHQ QUESTIONS 1-9: 0

## 2023-05-18 NOTE — PROGRESS NOTES
HISTORY OF PRESENT ILLNESS    Chief Complaint   Patient presents with    Follow-Up from Hospital     [facility] - replaced nephrostomy tube       Presents for Transitional care management (TCM) visit s/p of hospital admission from 05/15 until 05/16/2023 at [facility] record and relevant lab results, test results and consult notes have personally been reviewed by me at this office visit. Medications reviewed and reconciled. Patient was hospitalized for  concern for dislodged nephrostomy. CT scan abdomen and pelvis done showed a right-sided percutaneous nephrostomy tube terminates in superior pole centrally. No renal pelvocaliectasis or ureterectasis demonstrated 3. Status post cholecystectomy. Status post hysterectomy. Distal ileal and rectosigmoid Anastomoses. Seen by IR and her nephrostomy tube was replaced and is functioning well. Labs showed mildly worsened renal fxn, no anemia. UA 5/15 w mixed abdias >100k. Repeat UA 5/16/23 no wbc, rbc    Cr 0.62 1/27/23  Lab Results   Component Value Date    CREATININE 1.21 (H) 05/16/2023    CREATININE 1.20 (H) 05/15/2023    CREATININE 0.62 01/30/2023   Reviewed by Urology who advised follow up as scheduled in the clinic. She was discharged home in stable condition. Resume home medications  Taking Gemtesa for OAB    Today, presents with her sister, Lui Doherty.  She is wheeled in in a wheelchair. Patient reports mild discomfort as start of urination. Urinating 8x per day. Normal Bms. No flank pain, fever, chills  No longer sees Dr Torres Ward.     Follow up on 5/22/23 with Dr. Cesario Gabriel at 1240PM.  Will have robotic urologic procedure 6/27/23 w Dr. Cesario Gabriel at [facility].      Review of Systems   All other systems reviewed and are negative, except as noted in HPI    Past Medical and Surgical History   has a past medical history of Abnormal Pap smear of cervix, Adverse effect of anesthesia, Arthritis, Benign essential HTN, Chronic kidney disease,

## 2023-05-20 RX ORDER — LANOLIN ALCOHOL/MO/W.PET/CERES
CREAM (GRAM) TOPICAL DAILY
COMMUNITY

## 2023-05-20 RX ORDER — LEVOTHYROXINE SODIUM 0.12 MG/1
125 TABLET ORAL
COMMUNITY
Start: 2023-01-16

## 2023-05-20 RX ORDER — DICLOFENAC SODIUM 75 MG/1
75 TABLET, DELAYED RELEASE ORAL DAILY PRN
COMMUNITY
Start: 2023-03-26

## 2023-05-20 RX ORDER — FOLIC ACID 1 MG/1
TABLET ORAL DAILY
COMMUNITY

## 2023-05-20 RX ORDER — VIBEGRON 75 MG/1
TABLET, FILM COATED ORAL EVERY MORNING
COMMUNITY

## 2023-05-20 RX ORDER — OMEPRAZOLE 20 MG/1
20 TABLET, DELAYED RELEASE ORAL 2 TIMES DAILY
COMMUNITY

## 2023-05-20 RX ORDER — METOPROLOL SUCCINATE 50 MG/1
50 TABLET, EXTENDED RELEASE ORAL EVERY MORNING
COMMUNITY

## 2023-05-20 RX ORDER — ACETAMINOPHEN 500 MG
TABLET ORAL EVERY 6 HOURS PRN
COMMUNITY

## 2023-05-20 RX ORDER — ONDANSETRON 4 MG/1
4 TABLET, FILM COATED ORAL 2 TIMES DAILY PRN
COMMUNITY

## 2023-06-06 RX ORDER — OMEPRAZOLE 40 MG/1
40 CAPSULE, DELAYED RELEASE ORAL DAILY
Qty: 90 CAPSULE | Refills: 0 | Status: SHIPPED | OUTPATIENT
Start: 2023-06-06

## 2023-06-06 NOTE — TELEPHONE ENCOUNTER
Msg placed on rx sig to remind pt to call to make an appt to prevent a delay in future refill requests

## 2023-06-29 ENCOUNTER — PATIENT MESSAGE (OUTPATIENT)
Age: 73
End: 2023-06-29

## 2023-06-29 DIAGNOSIS — I10 ESSENTIAL (PRIMARY) HYPERTENSION: Primary | ICD-10-CM

## 2023-06-29 DIAGNOSIS — M17.10 UNILATERAL PRIMARY OSTEOARTHRITIS, UNSPECIFIED KNEE: ICD-10-CM

## 2023-06-29 RX ORDER — DICLOFENAC SODIUM 75 MG/1
TABLET, DELAYED RELEASE ORAL
Qty: 90 TABLET | Refills: 5 | Status: SHIPPED | OUTPATIENT
Start: 2023-06-29

## 2023-06-29 RX ORDER — METOPROLOL TARTRATE 50 MG/1
50 TABLET, FILM COATED ORAL
Qty: 90 TABLET | Refills: 0 | Status: SHIPPED | OUTPATIENT
Start: 2023-06-29

## 2023-07-14 ENCOUNTER — TRANSCRIBE ORDERS (OUTPATIENT)
Facility: HOSPITAL | Age: 73
End: 2023-07-14

## 2023-07-14 DIAGNOSIS — Z12.31 SCREENING MAMMOGRAM FOR HIGH-RISK PATIENT: Primary | ICD-10-CM

## 2023-07-26 ENCOUNTER — HOSPITAL ENCOUNTER (OUTPATIENT)
Facility: HOSPITAL | Age: 73
Discharge: HOME | End: 2023-07-29
Attending: INTERNAL MEDICINE
Payer: MEDICARE

## 2023-07-26 DIAGNOSIS — Z12.31 SCREENING MAMMOGRAM FOR HIGH-RISK PATIENT: ICD-10-CM

## 2023-07-26 PROCEDURE — 77067 SCR MAMMO BI INCL CAD: CPT

## 2023-08-08 ENCOUNTER — TELEPHONE (OUTPATIENT)
Age: 73
End: 2023-08-08

## 2023-08-08 NOTE — TELEPHONE ENCOUNTER
----- Message from Rohith Byrne sent at 8/8/2023  1:06 PM EDT -----  Subject: Hospital Follow Up    QUESTIONS  What hospital was the Patient Discharged from? [facility]  Date of Discharge? 2023-07-31  Discharge Location? Home  Reason for hospitalization as patient stated? Bladder surgery  What question does the patient have, if applicable? Pt needs to schedule   surgery follow up . Please contact Pt.  ---------------------------------------------------------------------------  --------------  CALL BACK INFO  What is the best way for the office to contact you? OK to leave message on   voicemail  Preferred Call Back Phone Number? [phone]  ---------------------------------------------------------------------------  --------------  SCRIPT ANSWERS  Relationship to Patient?  Self

## 2023-08-08 NOTE — TELEPHONE ENCOUNTER
Spoke with patient and she reports that she is S/P bladder surgery at [facility] discharged 7/31/23. and needs to schedule a hospital follow up. Scheduled for 8/14/23 at 1120 AM. Records requested. Grateful for the call.

## 2023-08-14 ENCOUNTER — OFFICE VISIT (OUTPATIENT)
Age: 73
End: 2023-08-14
Payer: MEDICARE

## 2023-08-14 VITALS
SYSTOLIC BLOOD PRESSURE: 129 MMHG | DIASTOLIC BLOOD PRESSURE: 67 MMHG | TEMPERATURE: 97.5 F | OXYGEN SATURATION: 94 % | RESPIRATION RATE: 14 BRPM | BODY MASS INDEX: 39.23 KG/M2 | HEIGHT: 63 IN | WEIGHT: 221.4 LBS | HEART RATE: 74 BPM

## 2023-08-14 DIAGNOSIS — Z74.09 IMPAIRED FUNCTIONAL MOBILITY, BALANCE, GAIT, AND ENDURANCE: ICD-10-CM

## 2023-08-14 DIAGNOSIS — M17.10 UNILATERAL PRIMARY OSTEOARTHRITIS, UNSPECIFIED KNEE: ICD-10-CM

## 2023-08-14 DIAGNOSIS — Z09 HOSPITAL DISCHARGE FOLLOW-UP: ICD-10-CM

## 2023-08-14 DIAGNOSIS — I10 HTN (HYPERTENSION), BENIGN: ICD-10-CM

## 2023-08-14 DIAGNOSIS — E61.1 IRON DEFICIENCY: ICD-10-CM

## 2023-08-14 DIAGNOSIS — L65.9 HAIR LOSS DISORDER: ICD-10-CM

## 2023-08-14 DIAGNOSIS — Z87.440 PERSONAL HISTORY OF URINARY (TRACT) INFECTIONS: Primary | ICD-10-CM

## 2023-08-14 DIAGNOSIS — E03.9 ACQUIRED HYPOTHYROIDISM: ICD-10-CM

## 2023-08-14 PROBLEM — K56.609 SBO (SMALL BOWEL OBSTRUCTION) (HCC): Status: RESOLVED | Noted: 2022-12-14 | Resolved: 2023-08-14

## 2023-08-14 PROBLEM — N12 PYELONEPHRITIS: Status: RESOLVED | Noted: 2022-10-17 | Resolved: 2023-08-14

## 2023-08-14 PROBLEM — R93.89 THICKENED ENDOMETRIUM: Status: RESOLVED | Noted: 2019-09-29 | Resolved: 2023-08-14

## 2023-08-14 PROBLEM — N13.2 HYDRONEPHROSIS WITH RENAL AND URETERAL CALCULOUS OBSTRUCTION: Status: RESOLVED | Noted: 2022-10-18 | Resolved: 2023-08-14

## 2023-08-14 PROBLEM — N13.30 HYDRONEPHROSIS: Status: RESOLVED | Noted: 2022-10-18 | Resolved: 2023-08-14

## 2023-08-14 LAB
ANION GAP SERPL CALC-SCNC: 10 MMOL/L (ref 5–15)
BUN SERPL-MCNC: 18 MG/DL (ref 6–20)
BUN/CREAT SERPL: 18 (ref 12–20)
CALCIUM SERPL-MCNC: 9.4 MG/DL (ref 8.5–10.1)
CHLORIDE SERPL-SCNC: 108 MMOL/L (ref 97–108)
CO2 SERPL-SCNC: 20 MMOL/L (ref 21–32)
CREAT SERPL-MCNC: 1 MG/DL (ref 0.55–1.02)
ERYTHROCYTE [DISTWIDTH] IN BLOOD BY AUTOMATED COUNT: 13.3 % (ref 11.5–14.5)
FERRITIN SERPL-MCNC: 42 NG/ML (ref 26–388)
GLUCOSE SERPL-MCNC: 109 MG/DL (ref 65–100)
HCT VFR BLD AUTO: 42.3 % (ref 35–47)
HGB BLD-MCNC: 13.4 G/DL (ref 11.5–16)
IRON SATN MFR SERPL: 30 % (ref 20–50)
IRON SERPL-MCNC: 111 UG/DL (ref 35–150)
MCH RBC QN AUTO: 31.1 PG (ref 26–34)
MCHC RBC AUTO-ENTMCNC: 31.7 G/DL (ref 30–36.5)
MCV RBC AUTO: 98.1 FL (ref 80–99)
NRBC # BLD: 0.02 K/UL (ref 0–0.01)
NRBC BLD-RTO: 0.2 PER 100 WBC
PLATELET # BLD AUTO: 254 K/UL (ref 150–400)
PMV BLD AUTO: 11.6 FL (ref 8.9–12.9)
POTASSIUM SERPL-SCNC: 4.8 MMOL/L (ref 3.5–5.1)
RBC # BLD AUTO: 4.31 M/UL (ref 3.8–5.2)
SODIUM SERPL-SCNC: 138 MMOL/L (ref 136–145)
TIBC SERPL-MCNC: 369 UG/DL (ref 250–450)
TSH SERPL DL<=0.05 MIU/L-ACNC: 1.75 UIU/ML (ref 0.36–3.74)
WBC # BLD AUTO: 8.3 K/UL (ref 3.6–11)

## 2023-08-14 PROCEDURE — 99214 OFFICE O/P EST MOD 30 MIN: CPT | Performed by: INTERNAL MEDICINE

## 2023-08-14 PROCEDURE — 1123F ACP DISCUSS/DSCN MKR DOCD: CPT | Performed by: INTERNAL MEDICINE

## 2023-08-14 PROCEDURE — 1036F TOBACCO NON-USER: CPT | Performed by: INTERNAL MEDICINE

## 2023-08-14 PROCEDURE — G8399 PT W/DXA RESULTS DOCUMENT: HCPCS | Performed by: INTERNAL MEDICINE

## 2023-08-14 PROCEDURE — 3017F COLORECTAL CA SCREEN DOC REV: CPT | Performed by: INTERNAL MEDICINE

## 2023-08-14 PROCEDURE — G8417 CALC BMI ABV UP PARAM F/U: HCPCS | Performed by: INTERNAL MEDICINE

## 2023-08-14 PROCEDURE — 3074F SYST BP LT 130 MM HG: CPT | Performed by: INTERNAL MEDICINE

## 2023-08-14 PROCEDURE — G8428 CUR MEDS NOT DOCUMENT: HCPCS | Performed by: INTERNAL MEDICINE

## 2023-08-14 PROCEDURE — 1090F PRES/ABSN URINE INCON ASSESS: CPT | Performed by: INTERNAL MEDICINE

## 2023-08-14 PROCEDURE — 1111F DSCHRG MED/CURRENT MED MERGE: CPT | Performed by: INTERNAL MEDICINE

## 2023-08-14 PROCEDURE — 3078F DIAST BP <80 MM HG: CPT | Performed by: INTERNAL MEDICINE

## 2023-08-14 ASSESSMENT — PATIENT HEALTH QUESTIONNAIRE - PHQ9
SUM OF ALL RESPONSES TO PHQ QUESTIONS 1-9: 0
2. FEELING DOWN, DEPRESSED OR HOPELESS: 0
1. LITTLE INTEREST OR PLEASURE IN DOING THINGS: 0
SUM OF ALL RESPONSES TO PHQ QUESTIONS 1-9: 0
SUM OF ALL RESPONSES TO PHQ9 QUESTIONS 1 & 2: 0
SUM OF ALL RESPONSES TO PHQ QUESTIONS 1-9: 0
SUM OF ALL RESPONSES TO PHQ QUESTIONS 1-9: 0

## 2023-08-14 NOTE — PROGRESS NOTES
HISTORY OF PRESENT ILLNESS    Chief Complaint   Patient presents with    Follow-up     After surgery     Is accompanied by her sister, Manohar Blanton for follow-up urologic procedure for formed July 31, 2023 by Dr. Luiz Peres with [facility]. She had a cystoscopy ureteroscopy with right ureteral stent removal along with right retrograde pyelogram and fluoroscopy. She tolerated procedure well. Was found to have a patent right ureter without evidence of extravasation on repeat imaging. Denies dysuria, hematuria or frequency. Has pad for occasional dribbling. .  Josefina IbarraVishal Was told her follow-up urinalysis was normal at urology office recently. Follow-up CT scan w contrast 11/2023. She is driving locally. .  Uses cane to walk due to R knee pain. Would like to start PT/OT for gait and balance. Will see Dr. Albaro Rodriguez for chronic foot pains. .      Hair loss. It is diffuse and has been ongoing for the past 6 months or so. Fatigue. Taking a nap daily. Hypertension  Hypertension ROS: taking medications as instructed, no medication side effects noted, no TIA's, no chest pain on exertion, no dyspnea on exertion, no swelling of ankles     reports that she quit smoking about 13 years ago. Her smoking use included cigarettes. She has a 2.50 pack-year smoking history. She has never used smokeless tobacco.    reports current alcohol use of about 4.0 standard drinks per week. BP Readings from Last 2 Encounters:   08/14/23 129/67   05/18/23 (!) 147/78       Hypothyroidism follow-up  Reports fatigue  denies heat/cold intolerance, bowel/skin changes or CVS symptoms, losing hair, feeling excessive energy, tremulousness, palpitations. Thyroid medication has been unchanged since last medication check and labs.    Lab Results   Component Value Date/Time    TSH 2.41 01/19/2023 03:28 PM     Wt Readings from Last 3 Encounters:   08/14/23 221 lb 6.4 oz (100.4 kg)   05/18/23 216 lb (98 kg)   05/16/23 217

## 2023-09-05 RX ORDER — OMEPRAZOLE 40 MG/1
CAPSULE, DELAYED RELEASE ORAL
Qty: 90 CAPSULE | Refills: 0 | Status: SHIPPED | OUTPATIENT
Start: 2023-09-05

## 2023-09-15 DIAGNOSIS — I10 ESSENTIAL (PRIMARY) HYPERTENSION: ICD-10-CM

## 2023-09-15 RX ORDER — METOPROLOL TARTRATE 50 MG/1
50 TABLET, FILM COATED ORAL
Qty: 90 TABLET | Refills: 1 | Status: SHIPPED | OUTPATIENT
Start: 2023-09-15

## 2023-09-28 DIAGNOSIS — E03.9 HYPOTHYROIDISM, UNSPECIFIED: ICD-10-CM

## 2023-09-28 RX ORDER — LEVOTHYROXINE SODIUM 0.12 MG/1
125 TABLET ORAL
Qty: 90 TABLET | Refills: 1 | Status: SHIPPED | OUTPATIENT
Start: 2023-09-28

## 2023-10-02 ENCOUNTER — HOSPITAL ENCOUNTER (OUTPATIENT)
Facility: HOSPITAL | Age: 73
Setting detail: RECURRING SERIES
Discharge: HOME | End: 2023-10-05
Payer: MEDICARE

## 2023-10-02 PROCEDURE — 97110 THERAPEUTIC EXERCISES: CPT | Performed by: PHYSICAL THERAPIST

## 2023-10-02 PROCEDURE — 97162 PT EVAL MOD COMPLEX 30 MIN: CPT | Performed by: PHYSICAL THERAPIST

## 2023-10-02 PROCEDURE — 97161 PT EVAL LOW COMPLEX 20 MIN: CPT | Performed by: PHYSICAL THERAPIST

## 2023-10-02 NOTE — THERAPY EVALUATION
Standing Balance:    Static: 30 s EC    Rhomberg 12 s EC   Sharpened Rhomberg 3 s EO    Functional Mobility          Transfers:       Sit-Stand: Pt able to transfer sit <> stand without UE assist      Optional Tests:       6MWT 567 feet, verbal cues not to lean on rollator walker with UE, multiple standing rest breaks required    Objective/Functional Outcome Measure: Balance  FOTO Score: 43  FOTO score = an established functional score where 100 = no disability      50 min [x]Eval - untimed                        Therapeutic Procedures: Tx Min Billable or 1:1 Min (if diff from Tx Min) Procedure, Rationale, Specifics   10  26857 Therapeutic Exercise (timed):  increase ROM, strength, coordination, balance, and proprioception to improve patient's ability to progress to PLOF and address remaining functional goals. (see flow sheet as applicable)    Details if applicable:     10     Total Total         [x]  Patient Education billed concurrently with other procedures   [x] Review HEP    [] Progressed/Changed HEP, detail:    [] Other detail:         Pain Level at end of session (0-10 scale): 0    Plan of Care / Statement of Necessity for Physical Therapy Services     Assessment / key information:  The patient presents with imbalance and decreased endurance secondary to multiple hospitalizations over the past 2 years and sedentary lifestyle. She ambulates up to 465 feet during 6MWT using RW. Pt limited by arm pain and fatigue. She demonstrates poor balance as indicated by in ability to hold tandem stance > 3 seconds or NBOS EC stance >10 seconds. The patient scored a 43% on the FOTO survey indicating increased fear of falling. The patient would benefit from outpatient PT 2x/week for up to 12 weeks to restore strength, balance and endurance.     Evaluation Complexity:  History:  HIGH Complexity :3+ comorbidities / personal factors will impact the outcome/ POC ; Examination:  HIGH Complexity : 4+ Standardized tests and

## 2023-10-05 ENCOUNTER — HOSPITAL ENCOUNTER (OUTPATIENT)
Facility: HOSPITAL | Age: 73
Setting detail: RECURRING SERIES
Discharge: HOME | End: 2023-10-08
Payer: MEDICARE

## 2023-10-05 PROCEDURE — 97110 THERAPEUTIC EXERCISES: CPT

## 2023-10-05 PROCEDURE — 97112 NEUROMUSCULAR REEDUCATION: CPT

## 2023-10-05 NOTE — PROGRESS NOTES
PHYSICAL THERAPY - MEDICARE DAILY TREATMENT NOTE (updated 3/23)      Date: 10/5/2023          Patient Name:  Lorraine Jon :  1950   Medical   Diagnosis:  Impaired functional mobility, balance, gait, and endurance [Z74.09]  Unilateral primary osteoarthritis, unspecified knee [M17.10] Treatment Diagnosis:  M25.561  RIGHT KNEE PAIN  and M62.81  GENERAL MUSCLE WEAKNESS and R26.89   Abnormalities of gait and mobility    Referral Source:  Javier Contreras MD Insurance:   Payor: Loren Dacosta / Plan: MEDICARE PART A AND B / Product Type: *No Product type* /                     Patient  verified yes     Visit #   Current  / Total 2 Med Nec   Time   In / Out 3:10 pm 3:50 pm   Total Treatment Time 40   Total Timed Codes 40   1:1 Treatment Time 40      MC BC Totals Reminder:  bill using total billable   min of TIMED therapeutic procedures and modalities. 8-22 min = 1 unit; 23-37 min = 2 units; 38-52 min = 3 units; 53-67 min = 4 units; 68-82 min = 5 units            SUBJECTIVE    Pain Level (0-10 scale): 3-4/10 with activity    Any medication changes, allergies to medications, adverse drug reactions, diagnosis change, or new procedure performed?: [x] No    [] Yes (see summary sheet for update)  Medications: Verified on Patient Summary List    Subjective functional status/changes:     Pt reports she felt good following her IE and that all HEP is going well. Pt reports no pain when stationary but with activity her knee pain increases. OBJECTIVE      Therapeutic Procedures: Tx Min Billable or 1:1 Min (if diff from Tx Min) Procedure, Rationale, Specifics   30  96699 Therapeutic Exercise (timed):  increase ROM, strength, coordination, balance, and proprioception to improve patient's ability to progress to PLOF and address remaining functional goals.  (see flow sheet as applicable)     Details if applicable:     10  91971 Neuromuscular Re-Education (timed):  improve balance, coordination, kinesthetic sense, posture,

## 2023-10-17 ENCOUNTER — HOSPITAL ENCOUNTER (OUTPATIENT)
Facility: HOSPITAL | Age: 73
Setting detail: RECURRING SERIES
Discharge: HOME | End: 2023-10-20
Payer: MEDICARE

## 2023-10-17 PROCEDURE — 97112 NEUROMUSCULAR REEDUCATION: CPT | Performed by: PHYSICAL THERAPIST

## 2023-10-17 PROCEDURE — 97110 THERAPEUTIC EXERCISES: CPT | Performed by: PHYSICAL THERAPIST

## 2023-10-17 NOTE — PROGRESS NOTES
PHYSICAL THERAPY - MEDICARE DAILY TREATMENT NOTE (updated 3/23)      Date: 10/17/2023          Patient Name:  Candy Vail :  1950   Medical   Diagnosis:  Impaired functional mobility, balance, gait, and endurance [Z74.09]  Unilateral primary osteoarthritis, unspecified knee [M17.10] Treatment Diagnosis:  M25.561  RIGHT KNEE PAIN  and M62.81  GENERAL MUSCLE WEAKNESS and R26.89   Abnormalities of gait and mobility    Referral Source:  Manan Philip MD Insurance:   Payor: Irena Connors / Plan: MEDICARE PART A AND B / Product Type: *No Product type* /                     Patient  verified yes     Visit #   Current  / Total 3 Med Nec   Time   In / Out 12:10 pm 1255 pm   Total Treatment Time 45   Total Timed Codes 45   1:1 Treatment Time 39      MC BC Totals Reminder:  bill using total billable   min of TIMED therapeutic procedures and modalities. 8-22 min = 1 unit; 23-37 min = 2 units; 38-52 min = 3 units; 53-67 min = 4 units; 68-82 min = 5 units            SUBJECTIVE    Pain Level (0-10 scale): 0/10    Any medication changes, allergies to medications, adverse drug reactions, diagnosis change, or new procedure performed?: [x] No    [] Yes (see summary sheet for update)  Medications: Verified on Patient Summary List    Subjective functional status/changes:     Pt reports she had a head cold and that set her back a little bit    OBJECTIVE      Therapeutic Procedures: Tx Min Billable or 1:1 Min (if diff from Tx Min) Procedure, Rationale, Specifics   35  35257 Therapeutic Exercise (timed):  increase ROM, strength, coordination, balance, and proprioception to improve patient's ability to progress to PLOF and address remaining functional goals.  (see flow sheet as applicable)     Details if applicable:     10  08841 Neuromuscular Re-Education (timed):  improve balance, coordination, kinesthetic sense, posture, core stability and proprioception to improve patient's ability to develop conscious control of

## 2023-10-19 ENCOUNTER — HOSPITAL ENCOUNTER (OUTPATIENT)
Facility: HOSPITAL | Age: 73
Setting detail: RECURRING SERIES
Discharge: HOME | End: 2023-10-22
Payer: MEDICARE

## 2023-10-19 PROCEDURE — 97110 THERAPEUTIC EXERCISES: CPT | Performed by: PHYSICAL THERAPIST

## 2023-10-19 PROCEDURE — 97112 NEUROMUSCULAR REEDUCATION: CPT | Performed by: PHYSICAL THERAPIST

## 2023-10-19 NOTE — PROGRESS NOTES
improvement in function  PLAN  Yes  Continue plan of care  Re-Cert Due: 80/82/8216  [x]  Upgrade activities as tolerated  []  Discharge due to :  []  Other:      Mary Jaimes, PT       10/19/2023       12:00 PM

## 2023-10-24 ENCOUNTER — HOSPITAL ENCOUNTER (OUTPATIENT)
Facility: HOSPITAL | Age: 73
Setting detail: RECURRING SERIES
Discharge: HOME | End: 2023-10-27
Payer: MEDICARE

## 2023-10-24 PROCEDURE — 97110 THERAPEUTIC EXERCISES: CPT

## 2023-10-24 PROCEDURE — 97112 NEUROMUSCULAR REEDUCATION: CPT

## 2023-10-24 NOTE — PROGRESS NOTES
PHYSICAL THERAPY - MEDICARE DAILY TREATMENT NOTE (updated 3/23)      Date: 10/24/2023          Patient Name:  Abner Rowan :  1950   Medical   Diagnosis:  Impaired functional mobility, balance, gait, and endurance [Z74.09]  Unilateral primary osteoarthritis, unspecified knee [M17.10] Treatment Diagnosis:  M25.561  RIGHT KNEE PAIN  and M62.81  GENERAL MUSCLE WEAKNESS and R26.89   Abnormalities of gait and mobility    Referral Source:  Heather Jones MD Insurance:   Payor: Ned Dan / Plan: MEDICARE PART A AND B / Product Type: *No Product type* /                     Patient  verified yes     Visit #   Current  / Total 5 Med Nec   Time   In / Out 1:30 PM 2:15 PM   Total Treatment Time 45   Total Timed Codes 45   1:1 Treatment Time 40      MC BC Totals Reminder:  bill using total billable   min of TIMED therapeutic procedures and modalities. 8-22 min = 1 unit; 23-37 min = 2 units; 38-52 min = 3 units; 53-67 min = 4 units; 68-82 min = 5 units            SUBJECTIVE    Pain Level (0-10 scale): 0/10    Any medication changes, allergies to medications, adverse drug reactions, diagnosis change, or new procedure performed?: [x] No    [] Yes (see summary sheet for update)  Medications: Verified on Patient Summary List    Subjective functional status/changes:     Pt reports felt okay following her last visit. Pt reports since yesterday she has been experiencing spells of light headedness and has been monitoring her O2 levels. Pt reports she has an MD follow-up on 23. OBJECTIVE      Therapeutic Procedures: Tx Min Billable or 1:1 Min (if diff from Tx Min) Procedure, Rationale, Specifics   35 79 [id number] Therapeutic Exercise (timed):  increase ROM, strength, coordination, balance, and proprioception to improve patient's ability to progress to PLOF and address remaining functional goals.  (see flow sheet as applicable)     Details if applicable:     10 10 35314 Neuromuscular Re-Education (timed):  improve

## 2023-10-26 ENCOUNTER — HOSPITAL ENCOUNTER (OUTPATIENT)
Facility: HOSPITAL | Age: 73
Setting detail: RECURRING SERIES
Discharge: HOME | End: 2023-10-29
Payer: MEDICARE

## 2023-10-26 PROCEDURE — 97110 THERAPEUTIC EXERCISES: CPT

## 2023-10-26 NOTE — PROGRESS NOTES
PHYSICAL THERAPY - MEDICARE DAILY TREATMENT NOTE (updated 3/23)      Date: 10/26/2023          Patient Name:  Jose Armando Neff :  1950   Medical   Diagnosis:  Impaired functional mobility, balance, gait, and endurance [Z74.09]  Unilateral primary osteoarthritis, unspecified knee [M17.10] Treatment Diagnosis:  M25.561  RIGHT KNEE PAIN  and M62.81  GENERAL MUSCLE WEAKNESS and R26.89   Abnormalities of gait and mobility    Referral Source:  Demetrio Ordonez MD Insurance:   Payor: [facility] / Plan: MEDICARE PART A AND B / Product Type: *No Product type* /                     Patient  verified yes     Visit #   Current  / Total 6 Med Nec   Time   In / Out 3:15 PM 3:55 PM   Total Treatment Time 40   Total Timed Codes 40   1:1 Treatment Time 40      MC BC Totals Reminder:  bill using total billable   min of TIMED therapeutic procedures and modalities. 8-22 min = 1 unit; 23-37 min = 2 units; 38-52 min = 3 units; 53-67 min = 4 units; 68-82 min = 5 units            SUBJECTIVE    Pain Level (0-10 scale): 0/10    Any medication changes, allergies to medications, adverse drug reactions, diagnosis change, or new procedure performed?: [x] No    [] Yes (see summary sheet for update)  Medications: Verified on Patient Summary List    Subjective functional status/changes:     Pt reports no significant changes since her last visit. Still experiencing intermittent lightheadedness when stationary. MD follow-up on 23. OBJECTIVE      Therapeutic Procedures: Tx Min Billable or 1:1 Min (if diff from Tx Min) Procedure, Rationale, Specifics   40  27691 Therapeutic Exercise (timed):  increase ROM, strength, coordination, balance, and proprioception to improve patient's ability to progress to PLOF and address remaining functional goals.  (see flow sheet as applicable)     Details if applicable:       84916 Neuromuscular Re-Education (timed):  improve balance, coordination, kinesthetic sense, posture, core stability and

## 2023-10-31 ENCOUNTER — HOSPITAL ENCOUNTER (OUTPATIENT)
Facility: HOSPITAL | Age: 73
Setting detail: RECURRING SERIES
Discharge: HOME | End: 2023-11-03
Payer: MEDICARE

## 2023-10-31 PROCEDURE — 97110 THERAPEUTIC EXERCISES: CPT

## 2023-10-31 NOTE — PROGRESS NOTES
PHYSICAL THERAPY - MEDICARE DAILY TREATMENT NOTE (updated 3/23)      Date: 10/31/2023          Patient Name:  Suhail Leong :  1950   Medical   Diagnosis:  Impaired functional mobility, balance, gait, and endurance [Z74.09]  Unilateral primary osteoarthritis, unspecified knee [M17.10] Treatment Diagnosis:  M25.561  RIGHT KNEE PAIN  and M62.81  GENERAL MUSCLE WEAKNESS and R26.89   Abnormalities of gait and mobility    Referral Source:  Vickie Paniagua MD Insurance:   Payor: Dionicio Min / Plan: MEDICARE PART A AND B / Product Type: *No Product type* /                     Patient  verified yes     Visit #   Current  / Total 7 Med Nec   Time   In / Out 1:30 PM 2:15 PM   Total Treatment Time 40   Total Timed Codes 40   1:1 Treatment Time 40       BC Totals Reminder:  bill using total billable   min of TIMED therapeutic procedures and modalities. 8-22 min = 1 unit; 23-37 min = 2 units; 38-52 min = 3 units; 53-67 min = 4 units; 68-82 min = 5 units            SUBJECTIVE    Pain Level (0-10 scale): 0/10    Any medication changes, allergies to medications, adverse drug reactions, diagnosis change, or new procedure performed?: [x] No    [] Yes (see summary sheet for update)  Medications: Verified on Patient Summary List    Subjective functional status/changes:     Pt reports continued lightheadedness when stationary and with increased activity. MD follow-up on 23. OBJECTIVE      Therapeutic Procedures: Tx Min Billable or 1:1 Min (if diff from Tx Min) Procedure, Rationale, Specifics   40  48692 Therapeutic Exercise (timed):  increase ROM, strength, coordination, balance, and proprioception to improve patient's ability to progress to PLOF and address remaining functional goals.  (see flow sheet as applicable)     Details if applicable:       93385 Neuromuscular Re-Education (timed):  improve balance, coordination, kinesthetic sense, posture, core stability and proprioception to improve patient's

## 2023-11-03 NOTE — PROGRESS NOTES
Physical Therapy at [facility],   a part of [address] [place] [address]  Phone: [phone]  Fax: [phone]  PHYSICAL THERAPY PROGRESS NOTE  Patient Name:  Gloria Crawley :  1950   Treatment/Medical Diagnosis: Impaired functional mobility, balance, gait, and endurance [Z74.09]  Unilateral primary osteoarthritis, unspecified knee [M17.10]   Referral Source:  Mehul Larry MD     Date of Initial Visit:  10/2/23 Attended Visits:  7 Missed Visits:  -     SUMMARY OF TREATMENT/ASSESSMENT:  Pt has completed 7 skilled therapy appointments with focus on therapeutic exercise and neuromuscular reeducation. At this time the pt has met 2/3 STGs and 1/3 LTGs. Unable to fully complete reassessment due to onset of lightheadedness and SOB when testing 6 minute walk test. Over the past 3 appointments, pt has experienced increased frequency of acute presyncope while resting in a seated position and with standing activities. Advised pt to follow-up with PCP. Will plan to continue skilled PT services once pt is cleared by MD to resume outpatient PT. CURRENT STATUS    Short Term Goals: To be accomplished in 10 treatments. 1) The patient will be independent with introductory HEP - MET  2) The patient will improve NBOS with EC 30 seconds without LOB to indicate improved safety in the shower - MET  3) The patient will ambulate 400 feet without needing a rest break to improve mobility in her home - NOT MET (250 ft)  Long Term Goals: To be accomplished in 24 treatments.   1) The patient will ambulate 600 feet or more during 6MWT to improve community mobility - NT  2) The patient will demonstrate ability to hold tandem stance >10 seconds to indicate decreased risk of falls - NT  3) The patient will improve FOTO survey to a 50% or greater to indicate a significant improvement in function - MET        RECOMMENDATIONS  Hold PT until pt has been

## 2023-11-06 ENCOUNTER — OFFICE VISIT (OUTPATIENT)
Age: 73
End: 2023-11-06
Payer: MEDICARE

## 2023-11-06 VITALS
WEIGHT: 231 LBS | TEMPERATURE: 97.9 F | OXYGEN SATURATION: 93 % | RESPIRATION RATE: 18 BRPM | HEIGHT: 63 IN | BODY MASS INDEX: 40.93 KG/M2 | HEART RATE: 62 BPM | DIASTOLIC BLOOD PRESSURE: 78 MMHG | SYSTOLIC BLOOD PRESSURE: 122 MMHG

## 2023-11-06 DIAGNOSIS — I49.8 OTHER CARDIAC ARRHYTHMIA: ICD-10-CM

## 2023-11-06 DIAGNOSIS — R42 EPISODES OF VERTIGO OCCURRING INFREQUENTLY: Primary | ICD-10-CM

## 2023-11-06 DIAGNOSIS — D50.9 IRON DEFICIENCY ANEMIA, UNSPECIFIED IRON DEFICIENCY ANEMIA TYPE: ICD-10-CM

## 2023-11-06 DIAGNOSIS — I10 HTN (HYPERTENSION), BENIGN: ICD-10-CM

## 2023-11-06 DIAGNOSIS — E03.9 ACQUIRED HYPOTHYROIDISM: ICD-10-CM

## 2023-11-06 DIAGNOSIS — R42 DIZZY SPELLS: ICD-10-CM

## 2023-11-06 PROCEDURE — G8484 FLU IMMUNIZE NO ADMIN: HCPCS | Performed by: INTERNAL MEDICINE

## 2023-11-06 PROCEDURE — 1036F TOBACCO NON-USER: CPT | Performed by: INTERNAL MEDICINE

## 2023-11-06 PROCEDURE — 1123F ACP DISCUSS/DSCN MKR DOCD: CPT | Performed by: INTERNAL MEDICINE

## 2023-11-06 PROCEDURE — G8417 CALC BMI ABV UP PARAM F/U: HCPCS | Performed by: INTERNAL MEDICINE

## 2023-11-06 PROCEDURE — 3017F COLORECTAL CA SCREEN DOC REV: CPT | Performed by: INTERNAL MEDICINE

## 2023-11-06 PROCEDURE — 99214 OFFICE O/P EST MOD 30 MIN: CPT | Performed by: INTERNAL MEDICINE

## 2023-11-06 PROCEDURE — 93010 ELECTROCARDIOGRAM REPORT: CPT | Performed by: INTERNAL MEDICINE

## 2023-11-06 PROCEDURE — 1090F PRES/ABSN URINE INCON ASSESS: CPT | Performed by: INTERNAL MEDICINE

## 2023-11-06 PROCEDURE — 3078F DIAST BP <80 MM HG: CPT | Performed by: INTERNAL MEDICINE

## 2023-11-06 PROCEDURE — G8428 CUR MEDS NOT DOCUMENT: HCPCS | Performed by: INTERNAL MEDICINE

## 2023-11-06 PROCEDURE — G8399 PT W/DXA RESULTS DOCUMENT: HCPCS | Performed by: INTERNAL MEDICINE

## 2023-11-06 PROCEDURE — 93005 ELECTROCARDIOGRAM TRACING: CPT | Performed by: INTERNAL MEDICINE

## 2023-11-06 PROCEDURE — 3074F SYST BP LT 130 MM HG: CPT | Performed by: INTERNAL MEDICINE

## 2023-11-07 ENCOUNTER — TELEPHONE (OUTPATIENT)
Age: 73
End: 2023-11-07

## 2023-11-07 LAB
ERYTHROCYTE [DISTWIDTH] IN BLOOD BY AUTOMATED COUNT: 12.3 % (ref 11.5–14.5)
FERRITIN SERPL-MCNC: 35 NG/ML (ref 26–388)
HCT VFR BLD AUTO: 41.8 % (ref 35–47)
HGB BLD-MCNC: 13.4 G/DL (ref 11.5–16)
IRON SATN MFR SERPL: 28 % (ref 20–50)
IRON SERPL-MCNC: 103 UG/DL (ref 35–150)
MAGNESIUM SERPL-MCNC: 2.2 MG/DL (ref 1.6–2.4)
MCH RBC QN AUTO: 31.6 PG (ref 26–34)
MCHC RBC AUTO-ENTMCNC: 32.1 G/DL (ref 30–36.5)
MCV RBC AUTO: 98.6 FL (ref 80–99)
NRBC # BLD: 0 K/UL (ref 0–0.01)
NRBC BLD-RTO: 0 PER 100 WBC
PLATELET # BLD AUTO: 292 K/UL (ref 150–400)
PMV BLD AUTO: 11.4 FL (ref 8.9–12.9)
RBC # BLD AUTO: 4.24 M/UL (ref 3.8–5.2)
TIBC SERPL-MCNC: 363 UG/DL (ref 250–450)
TSH SERPL DL<=0.05 MIU/L-ACNC: 0.24 UIU/ML (ref 0.36–3.74)
WBC # BLD AUTO: 7.6 K/UL (ref 3.6–11)

## 2023-11-07 NOTE — TELEPHONE ENCOUNTER
Referral for Dr. Joni Keenan (Cardiologist) and last office note, labs and EKG faxed over to his office [facility] ([place].

## 2023-11-09 NOTE — RESULT ENCOUNTER NOTE
Results reviewed.  Please refer to MyChart comments.    Brandi, please make sure she sees my dose change below  decrease levothyroxine to 125 mcg 6 days/week.  Do not take it 1 day/week, perhaps Sundays?.  Repeat in 2-3 months

## 2023-11-21 ENCOUNTER — TRANSCRIBE ORDERS (OUTPATIENT)
Facility: HOSPITAL | Age: 73
End: 2023-11-21

## 2023-11-21 DIAGNOSIS — N13.30 HYDRONEPHROSIS, RIGHT: Primary | ICD-10-CM

## 2023-11-24 ENCOUNTER — TELEPHONE (OUTPATIENT)
Age: 73
End: 2023-11-24

## 2023-11-24 NOTE — TELEPHONE ENCOUNTER
----- Message from Peggy Briones sent at 11/24/2023  2:32 PM EST -----  Subject: Message to Provider    QUESTIONS  Information for Provider? Pt of [facility] [place] was referred to urgent care   location in [place] this morning. Pt states she is at the location and   practice doors are wide open however no one is there. Requested return   call to discuss possible appt with NP or available provider as may need   antibiotics.   ---------------------------------------------------------------------------  --------------  Swapna Rausch INFO  [phone]; OK to leave message on voicemail  ---------------------------------------------------------------------------  --------------  SCRIPT ANSWERS  Relationship to Patient?  Self

## 2023-11-24 NOTE — TELEPHONE ENCOUNTER
See note below from [facility]. Called patient and verified name and date of birth. Pt reports sinusitis symptoms. Reports having all but RSV vaccine. Pt thinks it is probably a head cold. No cough, fever, chest pain, shortness of breath. Taking vicks vaop rub, ibuprofen, tylenol, mucinex. If you continue to experience symptoms such as cough, cold, head cold, nasal drainage, sinusitis, low grade fever, runny nose, etc... If those symptoms continue, it would be good to get tested for covid, strep, or flu for further evaluation at an urgent care. Encourage hydration, OTC medications to treat symptoms as indicated, and observation for any changes. Call or return to clinic prn if these symptoms worsen or fail to improve as anticipated.       Signed By: Sebas Hauser Clinical Care Technician    11/24/23  4:43 PM

## 2023-11-25 ENCOUNTER — OFFICE VISIT (OUTPATIENT)
Age: 73
End: 2023-11-25

## 2023-11-25 VITALS
TEMPERATURE: 98.4 F | WEIGHT: 232.2 LBS | HEART RATE: 75 BPM | SYSTOLIC BLOOD PRESSURE: 114 MMHG | OXYGEN SATURATION: 95 % | DIASTOLIC BLOOD PRESSURE: 76 MMHG | BODY MASS INDEX: 41.13 KG/M2

## 2023-11-25 DIAGNOSIS — J06.9 UPPER RESPIRATORY TRACT INFECTION, UNSPECIFIED TYPE: ICD-10-CM

## 2023-11-25 DIAGNOSIS — R31.9 URINARY TRACT INFECTION WITH HEMATURIA, SITE UNSPECIFIED: Primary | ICD-10-CM

## 2023-11-25 DIAGNOSIS — N39.0 URINARY TRACT INFECTION WITH HEMATURIA, SITE UNSPECIFIED: Primary | ICD-10-CM

## 2023-11-25 LAB
BILIRUBIN, URINE, POC: NEGATIVE
BLOOD URINE, POC: ABNORMAL
GLUCOSE URINE, POC: NEGATIVE
KETONES, URINE, POC: NEGATIVE
LEUKOCYTE ESTERASE, URINE, POC: ABNORMAL
NITRITE, URINE, POC: POSITIVE
PH, URINE, POC: 5 (ref 4.6–8)
PROTEIN,URINE, POC: 100
SPECIFIC GRAVITY, URINE, POC: 1.02 (ref 1–1.03)
URINALYSIS CLARITY, POC: ABNORMAL
URINALYSIS COLOR, POC: YELLOW
UROBILINOGEN, POC: ABNORMAL

## 2023-11-25 RX ORDER — CEPHALEXIN 500 MG/1
500 CAPSULE ORAL 2 TIMES DAILY
Qty: 14 CAPSULE | Refills: 0 | Status: SHIPPED | OUTPATIENT
Start: 2023-11-25 | End: 2023-12-02

## 2023-11-25 ASSESSMENT — ENCOUNTER SYMPTOMS
COUGH: 1
SHORTNESS OF BREATH: 0
SORE THROAT: 0
ABDOMINAL PAIN: 0
CHEST TIGHTNESS: 0
RHINORRHEA: 0

## 2023-11-25 NOTE — PROGRESS NOTES
Subjective: The patient/guardian gave verbal consent to treat. Chief Complaint   Patient presents with    Congestion     Congestion, sinus infection and UTI       Daniel Quiroz is a 74 y.o. female presenting to clinic today with URI symptoms for about a week. States that yesterday she also noticed some urinary discomfort. Endorses cough and postnasal drip. Denies fever, states she is tolerating PO normally. Denies abdominal pain or flank pain. Has had multiple kidney surgeries and reports diminished kidney function. No other complaints today. History provided by:  Patient  History limited by: nothing.  used: No          Review of Systems   Constitutional:  Negative for appetite change, chills and fever. HENT:  Positive for congestion and postnasal drip. Negative for rhinorrhea and sore throat. Respiratory:  Positive for cough. Negative for chest tightness and shortness of breath. Cardiovascular:  Negative for chest pain. Gastrointestinal:  Negative for abdominal pain. Genitourinary:  Positive for dysuria, frequency and urgency. Negative for flank pain. Review of Systems - negative except as listed above    Reviewed PmHx, RxHx, FmHx, SocHx, AllgHx and updated in chart. Family History   Problem Relation Age of Onset    Diabetes Mother     Dementia Mother     Other Mother         DIVERTICULITIS    Thyroid Disease Mother     Arthritis Mother     Osteoarthritis Sister     Diabetes Sister     Heart Disease Sister     Thyroid Disease Sister     Panic Disorder Sister     Heart Attack Sister     Hypertension Brother     Panic Disorder Brother     Hypertension Brother     Panic Disorder Brother     No Known Problems Daughter     Anesth Problems Neg Hx        Past Medical History:   Diagnosis Date    Abnormal Pap smear of cervix 11/2018    ASCUS.   s/p DOROTHY 9/2019    Adverse effect of anesthesia     DIFFICULTY AWAKENING X 1    Arthritis     osteoarthritis-knees

## 2023-11-25 NOTE — PATIENT INSTRUCTIONS
Follow up with your urologist and PCP  Go to the Emergency Department immediately with any acute symptoms.

## 2023-11-26 LAB
BACTERIA SPEC CULT: ABNORMAL
CC UR VC: ABNORMAL
SERVICE CMNT-IMP: ABNORMAL

## 2023-11-28 LAB
BACTERIA SPEC CULT: ABNORMAL
CC UR VC: ABNORMAL
SERVICE CMNT-IMP: ABNORMAL

## 2023-11-29 ENCOUNTER — HOSPITAL ENCOUNTER (OUTPATIENT)
Facility: HOSPITAL | Age: 73
Discharge: HOME | End: 2023-12-02
Attending: STUDENT IN AN ORGANIZED HEALTH CARE EDUCATION/TRAINING PROGRAM
Payer: MEDICARE

## 2023-11-29 DIAGNOSIS — K21.9 GASTROESOPHAGEAL REFLUX DISEASE, UNSPECIFIED WHETHER ESOPHAGITIS PRESENT: Primary | ICD-10-CM

## 2023-11-29 DIAGNOSIS — N13.30 HYDRONEPHROSIS, RIGHT: ICD-10-CM

## 2023-11-29 PROCEDURE — 3430000000 HC RX DIAGNOSTIC RADIOPHARMACEUTICAL: Performed by: STUDENT IN AN ORGANIZED HEALTH CARE EDUCATION/TRAINING PROGRAM

## 2023-11-29 PROCEDURE — 78708 K FLOW/FUNCT IMAGE W/DRUG: CPT

## 2023-11-29 PROCEDURE — A9562 TC99M MERTIATIDE: HCPCS | Performed by: STUDENT IN AN ORGANIZED HEALTH CARE EDUCATION/TRAINING PROGRAM

## 2023-11-29 PROCEDURE — 6360000002 HC RX W HCPCS: Performed by: STUDENT IN AN ORGANIZED HEALTH CARE EDUCATION/TRAINING PROGRAM

## 2023-11-29 RX ORDER — FUROSEMIDE 10 MG/ML
40 INJECTION INTRAMUSCULAR; INTRAVENOUS
Status: COMPLETED | OUTPATIENT
Start: 2023-11-29 | End: 2023-11-29

## 2023-11-29 RX ORDER — OMEPRAZOLE 40 MG/1
CAPSULE, DELAYED RELEASE ORAL
Qty: 90 CAPSULE | Refills: 0 | Status: SHIPPED | OUTPATIENT
Start: 2023-11-29

## 2023-11-29 RX ADMIN — FUROSEMIDE 20 MG: 10 INJECTION, SOLUTION INTRAMUSCULAR; INTRAVENOUS at 08:40

## 2023-11-29 RX ADMIN — TECHNESCAN TC 99M MERTIATIDE 10 MILLICURIE: 1 INJECTION, POWDER, LYOPHILIZED, FOR SOLUTION INTRAVENOUS at 08:35

## 2023-12-07 DIAGNOSIS — J01.90 ACUTE NON-RECURRENT SINUSITIS, UNSPECIFIED LOCATION: Primary | ICD-10-CM

## 2023-12-07 RX ORDER — AMOXICILLIN AND CLAVULANATE POTASSIUM 875; 125 MG/1; MG/1
1 TABLET, FILM COATED ORAL 2 TIMES DAILY
Qty: 20 TABLET | Refills: 0 | Status: SHIPPED | OUTPATIENT
Start: 2023-12-07 | End: 2023-12-17

## 2023-12-07 RX ORDER — PREDNISONE 20 MG/1
TABLET ORAL
Qty: 18 TABLET | Refills: 0 | Status: SHIPPED | OUTPATIENT
Start: 2023-12-07

## 2024-01-04 SDOH — HEALTH STABILITY: PHYSICAL HEALTH: ON AVERAGE, HOW MANY DAYS PER WEEK DO YOU ENGAGE IN MODERATE TO STRENUOUS EXERCISE (LIKE A BRISK WALK)?: 0 DAYS

## 2024-01-04 ASSESSMENT — LIFESTYLE VARIABLES
HOW OFTEN DURING THE LAST YEAR HAVE YOU NEEDED AN ALCOHOLIC DRINK FIRST THING IN THE MORNING TO GET YOURSELF GOING AFTER A NIGHT OF HEAVY DRINKING: 0
HOW OFTEN DURING THE LAST YEAR HAVE YOU BEEN UNABLE TO REMEMBER WHAT HAPPENED THE NIGHT BEFORE BECAUSE YOU HAD BEEN DRINKING: 0
HAS A RELATIVE, FRIEND, DOCTOR, OR ANOTHER HEALTH PROFESSIONAL EXPRESSED CONCERN ABOUT YOUR DRINKING OR SUGGESTED YOU CUT DOWN: NO
HAS A RELATIVE, FRIEND, DOCTOR, OR ANOTHER HEALTH PROFESSIONAL EXPRESSED CONCERN ABOUT YOUR DRINKING OR SUGGESTED YOU CUT DOWN: 0
HAVE YOU OR SOMEONE ELSE BEEN INJURED AS A RESULT OF YOUR DRINKING: NO
HOW OFTEN DURING THE LAST YEAR HAVE YOU FAILED TO DO WHAT WAS NORMALLY EXPECTED FROM YOU BECAUSE OF DRINKING: NEVER
HOW OFTEN DURING THE LAST YEAR HAVE YOU HAD A FEELING OF GUILT OR REMORSE AFTER DRINKING: NEVER
HOW OFTEN DURING THE LAST YEAR HAVE YOU FOUND THAT YOU WERE NOT ABLE TO STOP DRINKING ONCE YOU HAD STARTED: 0
HOW OFTEN DURING THE LAST YEAR HAVE YOU BEEN UNABLE TO REMEMBER WHAT HAPPENED THE NIGHT BEFORE BECAUSE YOU HAD BEEN DRINKING: NEVER
HOW MANY STANDARD DRINKS CONTAINING ALCOHOL DO YOU HAVE ON A TYPICAL DAY: 1
HOW OFTEN DURING THE LAST YEAR HAVE YOU FOUND THAT YOU WERE NOT ABLE TO STOP DRINKING ONCE YOU HAD STARTED: NEVER
HOW MANY STANDARD DRINKS CONTAINING ALCOHOL DO YOU HAVE ON A TYPICAL DAY: 1 OR 2
HOW OFTEN DURING THE LAST YEAR HAVE YOU NEEDED AN ALCOHOLIC DRINK FIRST THING IN THE MORNING TO GET YOURSELF GOING AFTER A NIGHT OF HEAVY DRINKING: NEVER
HAVE YOU OR SOMEONE ELSE BEEN INJURED AS A RESULT OF YOUR DRINKING: 0
HOW OFTEN DO YOU HAVE A DRINK CONTAINING ALCOHOL: 4 OR MORE TIMES A WEEK
HOW OFTEN DURING THE LAST YEAR HAVE YOU FAILED TO DO WHAT WAS NORMALLY EXPECTED FROM YOU BECAUSE OF DRINKING: 0
HOW OFTEN DO YOU HAVE SIX OR MORE DRINKS ON ONE OCCASION: 1
HOW OFTEN DURING THE LAST YEAR HAVE YOU HAD A FEELING OF GUILT OR REMORSE AFTER DRINKING: 0
HOW OFTEN DO YOU HAVE A DRINK CONTAINING ALCOHOL: 5

## 2024-01-04 ASSESSMENT — PATIENT HEALTH QUESTIONNAIRE - PHQ9
SUM OF ALL RESPONSES TO PHQ QUESTIONS 1-9: 1
1. LITTLE INTEREST OR PLEASURE IN DOING THINGS: 0
2. FEELING DOWN, DEPRESSED OR HOPELESS: 1
SUM OF ALL RESPONSES TO PHQ QUESTIONS 1-9: 1
SUM OF ALL RESPONSES TO PHQ9 QUESTIONS 1 & 2: 1

## 2024-01-12 ENCOUNTER — OFFICE VISIT (OUTPATIENT)
Age: 74
End: 2024-01-12
Payer: MEDICARE

## 2024-01-12 VITALS
DIASTOLIC BLOOD PRESSURE: 66 MMHG | TEMPERATURE: 98 F | BODY MASS INDEX: 42.13 KG/M2 | HEIGHT: 63 IN | SYSTOLIC BLOOD PRESSURE: 139 MMHG | OXYGEN SATURATION: 95 % | HEART RATE: 89 BPM | RESPIRATION RATE: 19 BRPM | WEIGHT: 237.8 LBS

## 2024-01-12 DIAGNOSIS — R41.9 UNSP SYMPTOMS AND SIGNS W COGNITIVE FUNCTIONS AND AWARENESS: ICD-10-CM

## 2024-01-12 DIAGNOSIS — R07.89 CHEST PRESSURE: ICD-10-CM

## 2024-01-12 DIAGNOSIS — I10 HTN (HYPERTENSION), BENIGN: ICD-10-CM

## 2024-01-12 DIAGNOSIS — K21.00 GASTROESOPHAGEAL REFLUX DISEASE WITH ESOPHAGITIS WITHOUT HEMORRHAGE: ICD-10-CM

## 2024-01-12 DIAGNOSIS — I47.10 SVT (SUPRAVENTRICULAR TACHYCARDIA): ICD-10-CM

## 2024-01-12 DIAGNOSIS — E66.01 OBESITY, CLASS III, BMI 40-49.9 (MORBID OBESITY) (HCC): ICD-10-CM

## 2024-01-12 DIAGNOSIS — R53.83 OTHER FATIGUE: ICD-10-CM

## 2024-01-12 DIAGNOSIS — Z00.00 MEDICARE ANNUAL WELLNESS VISIT, SUBSEQUENT: Primary | ICD-10-CM

## 2024-01-12 DIAGNOSIS — E03.9 ACQUIRED HYPOTHYROIDISM: ICD-10-CM

## 2024-01-12 PROCEDURE — 99214 OFFICE O/P EST MOD 30 MIN: CPT | Performed by: INTERNAL MEDICINE

## 2024-01-12 RX ORDER — LEVOTHYROXINE SODIUM 0.12 MG/1
TABLET ORAL
Qty: 90 TABLET | Refills: 1 | Status: SHIPPED
Start: 2024-01-12
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
## 2024-01-12 NOTE — PROGRESS NOTES
Activity, Diet, and Weight:  On average, how many days per week do you engage in moderate to strenuous exercise (like a brisk walk)?: 0 days       Do you eat balanced/healthy meals regularly?: Yes    Body mass index is 42.12 kg/m². (!) Abnormal      Inactivity Interventions:  Recommendations: patient agrees to increase physical activity as follows: walking   Obesity Interventions:  low carbohydrate diet            Dentist Screen:  Have you seen the dentist within the past year?: (!) No    Intervention:  Advised to schedule with their dentist                      Objective   Vitals:    01/12/24 1441   BP: 139/66   Site: Left Upper Arm   Position: Sitting   Cuff Size: Medium Adult   Pulse: 89   Resp: 19   Temp: 98 °F (36.7 °C)   TempSrc: Oral   SpO2: 95%   Weight: 107.9 kg (237 lb 12.8 oz)   Height: 1.6 m (5' 3\")      Body mass index is 42.12 kg/m².        General Appearance: alert and oriented to person, place and time, well developed and well- nourished, in no acute distress  Skin: warm and dry, no rash or erythema  Head: normocephalic and atraumatic  Eyes: pupils equal, round, and reactive to light, extraocular eye movements intact, conjunctivae normal  ENT: tympanic membrane, external ear and ear canal normal bilaterally, nose without deformity, nasal mucosa and turbinates normal without polyps  Neck: supple and non-tender without mass, no thyromegaly or thyroid nodules, no cervical lymphadenopathy  Pulmonary/Chest: clear to auscultation bilaterally- no wheezes, rales or rhonchi, normal air movement, no respiratory distress  Cardiovascular: normal rate, regular rhythm, normal S1 and S2, no murmurs, rubs, clicks, or gallops, distal pulses intact, no carotid bruits  Abdomen: soft, non-tender, non-distended, normal bowel sounds, no masses or organomegaly  Extremities: no cyanosis, clubbing or edema  Musculoskeletal: normal range of motion, no joint swelling, deformity or tenderness  Neurologic: reflexes normal and

## 2024-01-18 DIAGNOSIS — I10 HTN (HYPERTENSION), BENIGN: ICD-10-CM

## 2024-01-18 DIAGNOSIS — R41.9 UNSP SYMPTOMS AND SIGNS W COGNITIVE FUNCTIONS AND AWARENESS: ICD-10-CM

## 2024-01-18 DIAGNOSIS — E03.9 ACQUIRED HYPOTHYROIDISM: ICD-10-CM

## 2024-01-18 DIAGNOSIS — R53.83 OTHER FATIGUE: ICD-10-CM

## 2024-01-19 LAB
ALBUMIN SERPL-MCNC: 3.6 G/DL (ref 3.5–5)
ALBUMIN/GLOB SERPL: 1.1 (ref 1.1–2.2)
ALP SERPL-CCNC: 138 U/L (ref 45–117)
ALT SERPL-CCNC: 22 U/L (ref 12–78)
ANION GAP SERPL CALC-SCNC: 5 MMOL/L (ref 5–15)
AST SERPL-CCNC: 17 U/L (ref 15–37)
BILIRUB SERPL-MCNC: 0.5 MG/DL (ref 0.2–1)
BUN SERPL-MCNC: 20 MG/DL (ref 6–20)
BUN/CREAT SERPL: 19 (ref 12–20)
CALCIUM SERPL-MCNC: 10.3 MG/DL (ref 8.5–10.1)
CHLORIDE SERPL-SCNC: 108 MMOL/L (ref 97–108)
CHOLEST SERPL-MCNC: 210 MG/DL
CO2 SERPL-SCNC: 28 MMOL/L (ref 21–32)
CREAT SERPL-MCNC: 1.05 MG/DL (ref 0.55–1.02)
ERYTHROCYTE [DISTWIDTH] IN BLOOD BY AUTOMATED COUNT: 13 % (ref 11.5–14.5)
GLOBULIN SER CALC-MCNC: 3.4 G/DL (ref 2–4)
GLUCOSE SERPL-MCNC: 102 MG/DL (ref 65–100)
HCT VFR BLD AUTO: 42.5 % (ref 35–47)
HDLC SERPL-MCNC: 25 MG/DL
HDLC SERPL: 8.4 (ref 0–5)
HGB BLD-MCNC: 13.8 G/DL (ref 11.5–16)
LDLC SERPL CALC-MCNC: 125.4 MG/DL (ref 0–100)
MCH RBC QN AUTO: 32.2 PG (ref 26–34)
MCHC RBC AUTO-ENTMCNC: 32.5 G/DL (ref 30–36.5)
MCV RBC AUTO: 99.1 FL (ref 80–99)
NRBC # BLD: 0 K/UL (ref 0–0.01)
NRBC BLD-RTO: 0 PER 100 WBC
PLATELET # BLD AUTO: 274 K/UL (ref 150–400)
PMV BLD AUTO: 11.9 FL (ref 8.9–12.9)
POTASSIUM SERPL-SCNC: 4.4 MMOL/L (ref 3.5–5.1)
PROT SERPL-MCNC: 7 G/DL (ref 6.4–8.2)
RBC # BLD AUTO: 4.29 M/UL (ref 3.8–5.2)
SODIUM SERPL-SCNC: 141 MMOL/L (ref 136–145)
T4 FREE SERPL-MCNC: 1.2 NG/DL (ref 0.8–1.5)
TRIGL SERPL-MCNC: 298 MG/DL
TSH SERPL DL<=0.05 MIU/L-ACNC: 2.04 UIU/ML (ref 0.36–3.74)
VIT B12 SERPL-MCNC: 515 PG/ML (ref 193–986)
VLDLC SERPL CALC-MCNC: 59.6 MG/DL
WBC # BLD AUTO: 10.5 K/UL (ref 3.6–11)

## 2024-02-08 DIAGNOSIS — R41.89 SPELL OF ALTERED COGNITION: Primary | ICD-10-CM

## 2024-02-12 DIAGNOSIS — R56.9 SEIZURE (HCC): Primary | ICD-10-CM

## 2024-02-12 DIAGNOSIS — R11.0 NAUSEA: ICD-10-CM

## 2024-02-12 DIAGNOSIS — G44.52 HEADACHE, NEW DAILY PERSISTENT (NDPH): ICD-10-CM

## 2024-02-12 DIAGNOSIS — R42 DIZZINESS: ICD-10-CM

## 2024-02-13 ENCOUNTER — TELEPHONE (OUTPATIENT)
Age: 74
End: 2024-02-13

## 2024-02-13 ENCOUNTER — OFFICE VISIT (OUTPATIENT)
Age: 74
End: 2024-02-13
Payer: MEDICARE

## 2024-02-13 VITALS
WEIGHT: 240 LBS | HEIGHT: 63 IN | BODY MASS INDEX: 42.52 KG/M2 | HEART RATE: 76 BPM | TEMPERATURE: 97.1 F | OXYGEN SATURATION: 96 % | RESPIRATION RATE: 20 BRPM

## 2024-02-13 DIAGNOSIS — H53.9 VISUAL AURA: ICD-10-CM

## 2024-02-13 DIAGNOSIS — R29.818 TRANSIENT NEUROLOGICAL SYMPTOMS: Primary | ICD-10-CM

## 2024-02-13 DIAGNOSIS — R11.0 NAUSEA: ICD-10-CM

## 2024-02-13 DIAGNOSIS — G44.86 CERVICOGENIC HEADACHE: ICD-10-CM

## 2024-02-13 PROCEDURE — 1036F TOBACCO NON-USER: CPT | Performed by: PSYCHIATRY & NEUROLOGY

## 2024-02-13 PROCEDURE — 99205 OFFICE O/P NEW HI 60 MIN: CPT | Performed by: PSYCHIATRY & NEUROLOGY

## 2024-02-13 PROCEDURE — G8484 FLU IMMUNIZE NO ADMIN: HCPCS | Performed by: PSYCHIATRY & NEUROLOGY

## 2024-02-13 PROCEDURE — G8417 CALC BMI ABV UP PARAM F/U: HCPCS | Performed by: PSYCHIATRY & NEUROLOGY

## 2024-02-13 PROCEDURE — G8399 PT W/DXA RESULTS DOCUMENT: HCPCS | Performed by: PSYCHIATRY & NEUROLOGY

## 2024-02-13 PROCEDURE — 1123F ACP DISCUSS/DSCN MKR DOCD: CPT | Performed by: PSYCHIATRY & NEUROLOGY

## 2024-02-13 PROCEDURE — 1090F PRES/ABSN URINE INCON ASSESS: CPT | Performed by: PSYCHIATRY & NEUROLOGY

## 2024-02-13 PROCEDURE — G8428 CUR MEDS NOT DOCUMENT: HCPCS | Performed by: PSYCHIATRY & NEUROLOGY

## 2024-02-13 PROCEDURE — 3017F COLORECTAL CA SCREEN DOC REV: CPT | Performed by: PSYCHIATRY & NEUROLOGY

## 2024-02-13 RX ORDER — NICOTINE POLACRILEX 2 MG
1 GUM BUCCAL DAILY
COMMUNITY
Start: 2023-09-05

## 2024-02-13 RX ORDER — LORAZEPAM 1 MG/1
TABLET ORAL
Qty: 2 TABLET | Refills: 0 | Status: SHIPPED | OUTPATIENT
Start: 2024-02-13 | End: 2024-03-13

## 2024-02-13 NOTE — TELEPHONE ENCOUNTER
Spoke with Randi/Dtr (HIPAA VEIFIED) and she confirms appt with Neurology  at 1:00 PM today. Grateful for the call. Grateful for the call.
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
## 2024-02-13 NOTE — TELEPHONE ENCOUNTER
----- Message from Bryan Logan MD sent at 2/13/2024  8:36 AM EST -----  Regarding: RE: New patient    Brandi,   Please call her and let her know that Dr. Baird in neurology can see her today at 1:00.    Please \"reply all\" to confirm that she can make it .     Thank you, Santana!    ----- Message -----  From: Santana Baird Jr., MD  Sent: 2/13/2024   8:19 AM EST  To: Bryan Logan MD  Subject: RE: New patient                                  I can see here at 1 pm today at [facility]. If your clinic can call her to confirm.    ----- Message -----  From: Bryan Logan MD  Sent: 2/12/2024   6:13 PM EST  To: Santana Baird Jr., MD; #  Subject: New patient                                      Sho,   I am sorry to bother you, but I was wondering if someone in your clinic could see this patient sooner than later.  She is having subacute seizure-like episodes.  I have ordered a brain MRI now.  Thank you so much!  Bryan Logan

## 2024-02-15 ENCOUNTER — HOSPITAL ENCOUNTER (OUTPATIENT)
Facility: HOSPITAL | Age: 74
Discharge: HOME | End: 2024-02-15
Attending: INTERNAL MEDICINE
Payer: MEDICARE

## 2024-02-15 VITALS — WEIGHT: 240 LBS | BODY MASS INDEX: 42.51 KG/M2

## 2024-02-15 DIAGNOSIS — R11.0 NAUSEA: ICD-10-CM

## 2024-02-15 DIAGNOSIS — R56.9 SEIZURE (HCC): ICD-10-CM

## 2024-02-15 DIAGNOSIS — R42 DIZZINESS: ICD-10-CM

## 2024-02-15 DIAGNOSIS — G44.52 HEADACHE, NEW DAILY PERSISTENT (NDPH): ICD-10-CM

## 2024-02-15 PROCEDURE — 70553 MRI BRAIN STEM W/O & W/DYE: CPT

## 2024-02-15 PROCEDURE — A9579 GAD-BASE MR CONTRAST NOS,1ML: HCPCS | Performed by: RADIOLOGY

## 2024-02-15 PROCEDURE — 6360000004 HC RX CONTRAST MEDICATION: Performed by: RADIOLOGY

## 2024-02-15 RX ADMIN — GADOTERIDOL 20 ML: 279.3 INJECTION, SOLUTION INTRAVENOUS at 11:33

## 2024-02-17 DIAGNOSIS — K21.9 GASTROESOPHAGEAL REFLUX DISEASE, UNSPECIFIED WHETHER ESOPHAGITIS PRESENT: ICD-10-CM

## 2024-02-17 DIAGNOSIS — I10 ESSENTIAL (PRIMARY) HYPERTENSION: ICD-10-CM

## 2024-02-19 RX ORDER — METOPROLOL TARTRATE 50 MG/1
50 TABLET, FILM COATED ORAL
Qty: 90 TABLET | Refills: 1 | Status: SHIPPED | OUTPATIENT
Start: 2024-02-19

## 2024-02-19 RX ORDER — OMEPRAZOLE 40 MG/1
CAPSULE, DELAYED RELEASE ORAL
Qty: 90 CAPSULE | Refills: 0 | Status: SHIPPED | OUTPATIENT
Start: 2024-02-19

## 2024-02-22 ENCOUNTER — PROCEDURE VISIT (OUTPATIENT)
Age: 74
End: 2024-02-22
Payer: MEDICARE

## 2024-02-22 DIAGNOSIS — R29.818 TRANSIENT NEUROLOGICAL SYMPTOMS: Primary | ICD-10-CM

## 2024-02-22 PROCEDURE — 95816 EEG AWAKE AND DROWSY: CPT | Performed by: PSYCHIATRY & NEUROLOGY

## 2024-02-27 ENCOUNTER — TELEPHONE (OUTPATIENT)
Age: 74
End: 2024-02-27

## 2024-02-27 NOTE — PROCEDURES
[facility]   Electroencephalogram Report    Procedure ID: [id number] Procedure Date: 2/22/2024   Patient Name: Ayleen Karimi YOB: 1950   Procedure Type: Routine Medical Record No: [id number]     An EEG is requested in this 73-year old lady to evaluate for epileptiform abnormality.  Medication said to include Synthroid, Prilosec, Lopressor and Voltaren.    This tracing is obtained during the awake, and drowsy states.  Technically, the tracing is degraded by electrode artifact and technologist notes difficulty in prepping the scalp secondary to patient hypersensitivity.    Through the discernible portions there are very brief intermittent runs of posteriorly dominant and symmetric low to medium amplitude 8-9 cps activities.  Lower voltage activities are seen symmetrically over the anterior head regions.    Hyperventilation is attempted but discontinued due to patient tolerance    Intermittent photic stimulation attempted but discontinued early secondary to patient tolerance    During drowsiness, the background rhythms attenuate and replaced with diffuse symmetric theta range activities    Interpretation  This EEG recorded during the awake and drowsy states limited by inadequate prep fails to demonstrate any overt epileptiform abnormality.              Marizol Triplett MD

## 2024-02-27 NOTE — TELEPHONE ENCOUNTER
Called patient,  verified, and Dr. Baird would like to schedule an appt to discuss test results. Can come to [facility] 2024 at 8:40am or 3pm. Patient preferred 3pm. Appt has been scheduled for 2024 at 3pm and arrive by 2:45pm at [address] [facility] [address] [place] [address]. Patient verbalized understanding.

## 2024-02-27 NOTE — TELEPHONE ENCOUNTER
----- Message from Santana Baird Jr., MD sent at 2/27/2024 11:19 AM EST -----  Regarding: follow up  See if patient can see my for follow up after her testings at [facility] at 8:40 am or 3 pm.     [Very Good] : ~his/her~ current health as very good [Good] : ~his/her~  mood as  good [Fully functional (bathing, dressing, toileting, transferring, walking, feeding)] : Fully functional (bathing, dressing, toileting, transferring, walking, feeding) [Fully functional (using the telephone, shopping, preparing meals, housekeeping, doing laundry, using] : Fully functional and needs no help or supervision to perform IADLs (using the telephone, shopping, preparing meals, housekeeping, doing laundry, using transportation, managing medications and managing finances) [With Patient/Caregiver] : With Patient/Caregiver [Name: ___] : Health Care Proxy's Name: [unfilled]  [Relationship: ___] : Relationship: [unfilled] [FreeTextEntry1] : cpe

## 2024-02-29 ENCOUNTER — OFFICE VISIT (OUTPATIENT)
Age: 74
End: 2024-02-29
Payer: MEDICARE

## 2024-02-29 VITALS
RESPIRATION RATE: 18 BRPM | WEIGHT: 240 LBS | SYSTOLIC BLOOD PRESSURE: 136 MMHG | OXYGEN SATURATION: 97 % | BODY MASS INDEX: 42.52 KG/M2 | DIASTOLIC BLOOD PRESSURE: 76 MMHG | HEIGHT: 63 IN | TEMPERATURE: 97.1 F | HEART RATE: 79 BPM

## 2024-02-29 DIAGNOSIS — H53.9 VISUAL AURA: ICD-10-CM

## 2024-02-29 DIAGNOSIS — R29.818 TRANSIENT NEUROLOGICAL SYMPTOMS: Primary | ICD-10-CM

## 2024-02-29 DIAGNOSIS — G44.86 CERVICOGENIC HEADACHE: ICD-10-CM

## 2024-02-29 PROCEDURE — G8399 PT W/DXA RESULTS DOCUMENT: HCPCS | Performed by: PSYCHIATRY & NEUROLOGY

## 2024-02-29 PROCEDURE — G8427 DOCREV CUR MEDS BY ELIG CLIN: HCPCS | Performed by: PSYCHIATRY & NEUROLOGY

## 2024-02-29 PROCEDURE — 3075F SYST BP GE 130 - 139MM HG: CPT | Performed by: PSYCHIATRY & NEUROLOGY

## 2024-02-29 PROCEDURE — 3078F DIAST BP <80 MM HG: CPT | Performed by: PSYCHIATRY & NEUROLOGY

## 2024-02-29 PROCEDURE — 1123F ACP DISCUSS/DSCN MKR DOCD: CPT | Performed by: PSYCHIATRY & NEUROLOGY

## 2024-02-29 PROCEDURE — G8417 CALC BMI ABV UP PARAM F/U: HCPCS | Performed by: PSYCHIATRY & NEUROLOGY

## 2024-02-29 PROCEDURE — 99214 OFFICE O/P EST MOD 30 MIN: CPT | Performed by: PSYCHIATRY & NEUROLOGY

## 2024-02-29 PROCEDURE — 3017F COLORECTAL CA SCREEN DOC REV: CPT | Performed by: PSYCHIATRY & NEUROLOGY

## 2024-02-29 PROCEDURE — 1090F PRES/ABSN URINE INCON ASSESS: CPT | Performed by: PSYCHIATRY & NEUROLOGY

## 2024-02-29 PROCEDURE — G8484 FLU IMMUNIZE NO ADMIN: HCPCS | Performed by: PSYCHIATRY & NEUROLOGY

## 2024-02-29 PROCEDURE — 1036F TOBACCO NON-USER: CPT | Performed by: PSYCHIATRY & NEUROLOGY

## 2024-02-29 ASSESSMENT — PATIENT HEALTH QUESTIONNAIRE - PHQ9
SUM OF ALL RESPONSES TO PHQ QUESTIONS 1-9: 0
SUM OF ALL RESPONSES TO PHQ QUESTIONS 1-9: 0
2. FEELING DOWN, DEPRESSED OR HOPELESS: 0
1. LITTLE INTEREST OR PLEASURE IN DOING THINGS: 0
SUM OF ALL RESPONSES TO PHQ QUESTIONS 1-9: 0
SUM OF ALL RESPONSES TO PHQ QUESTIONS 1-9: 0
SUM OF ALL RESPONSES TO PHQ9 QUESTIONS 1 & 2: 0
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
## 2024-03-20 ENCOUNTER — TELEPHONE (OUTPATIENT)
Age: 74
End: 2024-03-20

## 2024-03-20 NOTE — TELEPHONE ENCOUNTER
Patient is requesting to  disc from her MRI / Doppler / EEG  or is there a way to send the discs to Dr. Bari Velazquez at [facility]?    [facility]- [phone]

## 2024-03-20 NOTE — TELEPHONE ENCOUNTER
Spoke with patient,  verified, and informed patient that she would have to go to a [facility] to get a disc made of her MRI. Patient stated she would like her EEG and carotid doppler results faxed to Dr. Bari Velazquez at [facility] at [phone]. Informed patient would fax those results. Patient verbalized understanding.

## 2024-03-27 DIAGNOSIS — Z78.0 ASYMPTOMATIC MENOPAUSAL STATE: Primary | ICD-10-CM

## 2024-04-02 ENCOUNTER — HOSPITAL ENCOUNTER (OUTPATIENT)
Facility: HOSPITAL | Age: 74
Discharge: HOME | End: 2024-04-05
Attending: INTERNAL MEDICINE
Payer: MEDICARE

## 2024-04-02 DIAGNOSIS — Z78.0 ASYMPTOMATIC MENOPAUSAL STATE: ICD-10-CM

## 2024-04-02 PROCEDURE — 77080 DXA BONE DENSITY AXIAL: CPT

## 2024-05-02 ENCOUNTER — OFFICE VISIT (OUTPATIENT)
Age: 74
End: 2024-05-02
Payer: MEDICARE

## 2024-05-02 VITALS
TEMPERATURE: 98.1 F | SYSTOLIC BLOOD PRESSURE: 138 MMHG | HEIGHT: 63 IN | OXYGEN SATURATION: 94 % | DIASTOLIC BLOOD PRESSURE: 84 MMHG | RESPIRATION RATE: 16 BRPM | WEIGHT: 246.8 LBS | HEART RATE: 79 BPM | BODY MASS INDEX: 43.73 KG/M2

## 2024-05-02 DIAGNOSIS — G89.29 CHRONIC MIDLINE LOW BACK PAIN WITHOUT SCIATICA: ICD-10-CM

## 2024-05-02 DIAGNOSIS — M54.50 CHRONIC MIDLINE LOW BACK PAIN WITHOUT SCIATICA: ICD-10-CM

## 2024-05-02 DIAGNOSIS — Z87.440 PERSONAL HISTORY OF URINARY (TRACT) INFECTIONS: ICD-10-CM

## 2024-05-02 DIAGNOSIS — F40.243 FEAR OF FLYING: ICD-10-CM

## 2024-05-02 DIAGNOSIS — I10 HTN (HYPERTENSION), BENIGN: ICD-10-CM

## 2024-05-02 DIAGNOSIS — E03.8 OTHER SPECIFIED HYPOTHYROIDISM: ICD-10-CM

## 2024-05-02 DIAGNOSIS — Z74.09 IMPAIRED FUNCTIONAL MOBILITY, BALANCE, GAIT, AND ENDURANCE: Primary | ICD-10-CM

## 2024-05-02 DIAGNOSIS — E66.01 OBESITY, CLASS III, BMI 40-49.9 (MORBID OBESITY) (HCC): ICD-10-CM

## 2024-05-02 DIAGNOSIS — N32.81 OVERACTIVE BLADDER: ICD-10-CM

## 2024-05-02 PROCEDURE — 3075F SYST BP GE 130 - 139MM HG: CPT | Performed by: INTERNAL MEDICINE

## 2024-05-02 PROCEDURE — 3017F COLORECTAL CA SCREEN DOC REV: CPT | Performed by: INTERNAL MEDICINE

## 2024-05-02 PROCEDURE — 3079F DIAST BP 80-89 MM HG: CPT | Performed by: INTERNAL MEDICINE

## 2024-05-02 PROCEDURE — 1036F TOBACCO NON-USER: CPT | Performed by: INTERNAL MEDICINE

## 2024-05-02 PROCEDURE — 1090F PRES/ABSN URINE INCON ASSESS: CPT | Performed by: INTERNAL MEDICINE

## 2024-05-02 PROCEDURE — 99214 OFFICE O/P EST MOD 30 MIN: CPT | Performed by: INTERNAL MEDICINE

## 2024-05-02 PROCEDURE — G8417 CALC BMI ABV UP PARAM F/U: HCPCS | Performed by: INTERNAL MEDICINE

## 2024-05-02 PROCEDURE — G8427 DOCREV CUR MEDS BY ELIG CLIN: HCPCS | Performed by: INTERNAL MEDICINE

## 2024-05-02 PROCEDURE — 1123F ACP DISCUSS/DSCN MKR DOCD: CPT | Performed by: INTERNAL MEDICINE

## 2024-05-02 PROCEDURE — G8399 PT W/DXA RESULTS DOCUMENT: HCPCS | Performed by: INTERNAL MEDICINE

## 2024-05-02 RX ORDER — LORAZEPAM 1 MG/1
1 TABLET ORAL EVERY 6 HOURS PRN
Qty: 9 TABLET | Refills: 0 | Status: SHIPPED | OUTPATIENT
Start: 2024-05-02 | End: 2024-05-05

## 2024-05-02 RX ORDER — DICLOFENAC SODIUM 75 MG/1
75 TABLET, DELAYED RELEASE ORAL 2 TIMES DAILY PRN
Qty: 60 TABLET | Refills: 1 | Status: SHIPPED | OUTPATIENT
Start: 2024-05-02

## 2024-05-02 RX ORDER — ESTRADIOL 0.1 MG/G
2 CREAM VAGINAL
COMMUNITY

## 2024-05-02 RX ORDER — DICLOFENAC SODIUM 75 MG/1
75 TABLET, DELAYED RELEASE ORAL DAILY PRN
COMMUNITY
End: 2024-05-02 | Stop reason: ALTCHOICE

## 2024-05-02 NOTE — PROGRESS NOTES
Decreased 11/2023    vibegron (GEMTESA) 75 MG TABS tablet Take by mouth every morning    Multiple Vitamins-Minerals (CENTRUM SILVER ADULT 50+) TABS Take 1 tablet by mouth daily     No current facility-administered medications for this visit.

## 2024-05-03 DIAGNOSIS — K21.9 GASTROESOPHAGEAL REFLUX DISEASE, UNSPECIFIED WHETHER ESOPHAGITIS PRESENT: ICD-10-CM

## 2024-05-03 DIAGNOSIS — E03.8 OTHER SPECIFIED HYPOTHYROIDISM: ICD-10-CM

## 2024-05-03 DIAGNOSIS — I10 HTN (HYPERTENSION), BENIGN: ICD-10-CM

## 2024-05-03 DIAGNOSIS — Z87.440 PERSONAL HISTORY OF URINARY (TRACT) INFECTIONS: ICD-10-CM

## 2024-05-03 RX ORDER — OMEPRAZOLE 40 MG/1
CAPSULE, DELAYED RELEASE ORAL
Qty: 90 CAPSULE | Refills: 0 | Status: SHIPPED | OUTPATIENT
Start: 2024-05-03

## 2024-05-05 LAB
ALBUMIN SERPL-MCNC: 3.6 G/DL (ref 3.5–5)
ALBUMIN/GLOB SERPL: 1.1 (ref 1.1–2.2)
ALP SERPL-CCNC: 139 U/L (ref 45–117)
ALT SERPL-CCNC: 23 U/L (ref 12–78)
ANION GAP SERPL CALC-SCNC: 5 MMOL/L (ref 5–15)
APPEARANCE UR: CLEAR
AST SERPL-CCNC: 16 U/L (ref 15–37)
BACTERIA URNS QL MICRO: NEGATIVE /HPF
BILIRUB SERPL-MCNC: 0.5 MG/DL (ref 0.2–1)
BILIRUB UR QL: NEGATIVE
BUN SERPL-MCNC: 17 MG/DL (ref 6–20)
BUN/CREAT SERPL: 15 (ref 12–20)
CALCIUM SERPL-MCNC: 9.9 MG/DL (ref 8.5–10.1)
CHLORIDE SERPL-SCNC: 105 MMOL/L (ref 97–108)
CO2 SERPL-SCNC: 29 MMOL/L (ref 21–32)
COLOR UR: ABNORMAL
CREAT SERPL-MCNC: 1.15 MG/DL (ref 0.55–1.02)
EPITH CASTS URNS QL MICRO: ABNORMAL /LPF
ERYTHROCYTE [DISTWIDTH] IN BLOOD BY AUTOMATED COUNT: 12.6 % (ref 11.5–14.5)
GLOBULIN SER CALC-MCNC: 3.4 G/DL (ref 2–4)
GLUCOSE SERPL-MCNC: 99 MG/DL (ref 65–100)
GLUCOSE UR STRIP.AUTO-MCNC: NEGATIVE MG/DL
HCT VFR BLD AUTO: 42.2 % (ref 35–47)
HGB BLD-MCNC: 13.6 G/DL (ref 11.5–16)
HGB UR QL STRIP: NEGATIVE
HYALINE CASTS URNS QL MICRO: ABNORMAL /LPF (ref 0–5)
KETONES UR QL STRIP.AUTO: NEGATIVE MG/DL
LEUKOCYTE ESTERASE UR QL STRIP.AUTO: NEGATIVE
MCH RBC QN AUTO: 31.9 PG (ref 26–34)
MCHC RBC AUTO-ENTMCNC: 32.2 G/DL (ref 30–36.5)
MCV RBC AUTO: 98.8 FL (ref 80–99)
NITRITE UR QL STRIP.AUTO: NEGATIVE
NRBC # BLD: 0 K/UL (ref 0–0.01)
NRBC BLD-RTO: 0 PER 100 WBC
PH UR STRIP: 6.5 (ref 5–8)
PLATELET # BLD AUTO: 284 K/UL (ref 150–400)
PMV BLD AUTO: 11.9 FL (ref 8.9–12.9)
POTASSIUM SERPL-SCNC: 4.5 MMOL/L (ref 3.5–5.1)
PROT SERPL-MCNC: 7 G/DL (ref 6.4–8.2)
PROT UR STRIP-MCNC: NEGATIVE MG/DL
RBC # BLD AUTO: 4.27 M/UL (ref 3.8–5.2)
RBC #/AREA URNS HPF: ABNORMAL /HPF (ref 0–5)
SODIUM SERPL-SCNC: 139 MMOL/L (ref 136–145)
SP GR UR REFRACTOMETRY: 1.01 (ref 1–1.03)
SPECIMEN HOLD: NORMAL
T4 FREE SERPL-MCNC: 1.1 NG/DL (ref 0.8–1.5)
TSH SERPL DL<=0.05 MIU/L-ACNC: 5.29 UIU/ML (ref 0.36–3.74)
UROBILINOGEN UR QL STRIP.AUTO: 0.2 EU/DL (ref 0.2–1)
WBC # BLD AUTO: 11.3 K/UL (ref 3.6–11)
WBC URNS QL MICRO: ABNORMAL /HPF (ref 0–4)

## 2024-05-07 RX ORDER — LEVOTHYROXINE SODIUM 0.12 MG/1
125 TABLET ORAL DAILY
Qty: 90 TABLET | Refills: 1
Start: 2024-05-07

## 2024-06-04 ENCOUNTER — OFFICE VISIT (OUTPATIENT)
Age: 74
End: 2024-06-04

## 2024-06-04 VITALS
OXYGEN SATURATION: 93 % | WEIGHT: 240 LBS | BODY MASS INDEX: 42.52 KG/M2 | HEIGHT: 63 IN | RESPIRATION RATE: 18 BRPM | TEMPERATURE: 98.5 F | HEART RATE: 73 BPM | DIASTOLIC BLOOD PRESSURE: 69 MMHG | SYSTOLIC BLOOD PRESSURE: 114 MMHG

## 2024-06-04 DIAGNOSIS — U07.1 COVID-19: Primary | ICD-10-CM

## 2024-06-04 DIAGNOSIS — R09.81 NOSE CONGESTION: ICD-10-CM

## 2024-06-04 LAB
Lab: ABNORMAL
PERFORMING INSTRUMENT: ABNORMAL
QC PASS/FAIL: ABNORMAL
SARS-COV-2, POC: DETECTED

## 2024-06-04 ASSESSMENT — ENCOUNTER SYMPTOMS
COUGH: 1
GASTROINTESTINAL NEGATIVE: 1
EYES NEGATIVE: 1
ALLERGIC/IMMUNOLOGIC NEGATIVE: 1
RHINORRHEA: 1
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
## 2024-06-04 NOTE — PATIENT INSTRUCTIONS
Thank you for choosing [facility]!    Gargle with warm salt water. This helps reduce swelling and relieve discomfort. Gargle once an hour with 1 teaspoon of salt mixed in 8 fluid ounces of warm water. Increase fluid intake. Start Saline nasal spray & sinus rinses. Take an over-the-counter pain medicine, such as acetaminophen (Tylenol), ibuprofen (Advil, Motrin),  to reduce fever and relieve body aches. Read and follow all instructions on the label.Use a humidifier in your room. Start OTC non drowsy antihistamine.   Go to ED if symptoms worsens

## 2024-06-22 DIAGNOSIS — M54.50 CHRONIC MIDLINE LOW BACK PAIN WITHOUT SCIATICA: ICD-10-CM

## 2024-06-22 DIAGNOSIS — G89.29 CHRONIC MIDLINE LOW BACK PAIN WITHOUT SCIATICA: ICD-10-CM

## 2024-06-24 RX ORDER — DICLOFENAC SODIUM 75 MG/1
75 TABLET, DELAYED RELEASE ORAL 2 TIMES DAILY
Qty: 60 TABLET | Refills: 1 | Status: SHIPPED | OUTPATIENT
Start: 2024-06-24

## 2024-07-15 SDOH — ECONOMIC STABILITY: FOOD INSECURITY: WITHIN THE PAST 12 MONTHS, YOU WORRIED THAT YOUR FOOD WOULD RUN OUT BEFORE YOU GOT MONEY TO BUY MORE.: NEVER TRUE

## 2024-07-15 SDOH — ECONOMIC STABILITY: FOOD INSECURITY: WITHIN THE PAST 12 MONTHS, THE FOOD YOU BOUGHT JUST DIDN'T LAST AND YOU DIDN'T HAVE MONEY TO GET MORE.: NEVER TRUE

## 2024-07-15 SDOH — ECONOMIC STABILITY: INCOME INSECURITY: HOW HARD IS IT FOR YOU TO PAY FOR THE VERY BASICS LIKE FOOD, HOUSING, MEDICAL CARE, AND HEATING?: NOT HARD AT ALL

## 2024-07-18 ENCOUNTER — OFFICE VISIT (OUTPATIENT)
Age: 74
End: 2024-07-18
Payer: MEDICARE

## 2024-07-18 VITALS
HEIGHT: 63 IN | OXYGEN SATURATION: 92 % | DIASTOLIC BLOOD PRESSURE: 61 MMHG | WEIGHT: 250 LBS | SYSTOLIC BLOOD PRESSURE: 141 MMHG | HEART RATE: 69 BPM | BODY MASS INDEX: 44.3 KG/M2 | TEMPERATURE: 97.8 F | RESPIRATION RATE: 20 BRPM

## 2024-07-18 DIAGNOSIS — M54.41 ACUTE RIGHT-SIDED LOW BACK PAIN WITH RIGHT-SIDED SCIATICA: Primary | ICD-10-CM

## 2024-07-18 DIAGNOSIS — M54.16 LUMBAR RADICULOPATHY: ICD-10-CM

## 2024-07-18 PROCEDURE — 1123F ACP DISCUSS/DSCN MKR DOCD: CPT | Performed by: INTERNAL MEDICINE

## 2024-07-18 PROCEDURE — 1036F TOBACCO NON-USER: CPT | Performed by: INTERNAL MEDICINE

## 2024-07-18 PROCEDURE — G8417 CALC BMI ABV UP PARAM F/U: HCPCS | Performed by: INTERNAL MEDICINE

## 2024-07-18 PROCEDURE — 3017F COLORECTAL CA SCREEN DOC REV: CPT | Performed by: INTERNAL MEDICINE

## 2024-07-18 PROCEDURE — 99214 OFFICE O/P EST MOD 30 MIN: CPT | Performed by: INTERNAL MEDICINE

## 2024-07-18 PROCEDURE — G8427 DOCREV CUR MEDS BY ELIG CLIN: HCPCS | Performed by: INTERNAL MEDICINE

## 2024-07-18 PROCEDURE — 1090F PRES/ABSN URINE INCON ASSESS: CPT | Performed by: INTERNAL MEDICINE

## 2024-07-18 PROCEDURE — G8399 PT W/DXA RESULTS DOCUMENT: HCPCS | Performed by: INTERNAL MEDICINE

## 2024-07-18 PROCEDURE — 3078F DIAST BP <80 MM HG: CPT | Performed by: INTERNAL MEDICINE

## 2024-07-18 PROCEDURE — 3077F SYST BP >= 140 MM HG: CPT | Performed by: INTERNAL MEDICINE

## 2024-07-18 RX ORDER — HYDROCODONE BITARTRATE AND ACETAMINOPHEN 5; 325 MG/1; MG/1
1 TABLET ORAL EVERY 6 HOURS PRN
Qty: 28 TABLET | Refills: 0 | Status: SHIPPED | OUTPATIENT
Start: 2024-07-18 | End: 2024-07-19 | Stop reason: ALTCHOICE

## 2024-07-18 RX ORDER — PREDNISONE 20 MG/1
TABLET ORAL
Qty: 18 TABLET | Refills: 0 | Status: SHIPPED | OUTPATIENT
Start: 2024-07-18

## 2024-07-18 RX ORDER — LORAZEPAM 1 MG/1
TABLET ORAL
Qty: 2 TABLET | Refills: 0 | Status: SHIPPED | OUTPATIENT
Start: 2024-07-18 | End: 2024-07-20

## 2024-07-18 ASSESSMENT — PATIENT HEALTH QUESTIONNAIRE - PHQ9
SUM OF ALL RESPONSES TO PHQ QUESTIONS 1-9: 0
2. FEELING DOWN, DEPRESSED OR HOPELESS: NOT AT ALL
1. LITTLE INTEREST OR PLEASURE IN DOING THINGS: NOT AT ALL
SUM OF ALL RESPONSES TO PHQ QUESTIONS 1-9: 0
SUM OF ALL RESPONSES TO PHQ9 QUESTIONS 1 & 2: 0

## 2024-07-18 NOTE — PROGRESS NOTES
Identified pt with two pt identifiers(name and ). Reviewed record in preparation for visit and have obtained necessary documentation. All patient medications has been reviewed.  Chief Complaint   Patient presents with    right hip pain       Vitals:    24 1501   BP: (!) 141/61   Pulse: 69   Resp: 20   Temp: 97.8 °F (36.6 °C)   SpO2: 92%                   Coordination of Care Questionnaire:   1) Have you been to an emergency room, urgent care, or hospitalized since your last visit?   yes       2. Have seen or consulted any other health care provider since your last visit? no        Patient is accompanied by sister I have received verbal consent from Ayleen Karimi to discuss any/all medical information while they are present in the room.  
TAKE 1 TABLET BY MOUTH TWICE A DAY AS NEEDED FOR PAIN    levothyroxine (SYNTHROID) 125 MCG tablet Take 1 tablet by mouth Daily Increased 5/7/24    omeprazole (PRILOSEC) 40 MG delayed release capsule TAKE 1 CAPSULE BY MOUTH EVERY DAY    estradiol (ESTRACE) 0.1 MG/GM vaginal cream Place 2 g vaginally every 48 hours    metoprolol tartrate (LOPRESSOR) 50 MG tablet TAKE 1 TABLET BY MOUTH DAILY (WITH BREAKFAST) YEARLY APPT NEEDED PLEASE SCHEDULE    Biotin 1 MG CAPS Take 1 capsule by mouth daily    vibegron (GEMTESA) 75 MG TABS tablet Take by mouth every morning    Multiple Vitamins-Minerals (CENTRUM SILVER ADULT 50+) TABS Take 1 tablet by mouth daily     No current facility-administered medications for this visit.        reports that she quit smoking about 14 years ago. Her smoking use included cigarettes. She started smoking about 24 years ago. She has a 2.5 pack-year smoking history. She has never used smokeless tobacco. She reports current alcohol use of about 4.0 standard drinks of alcohol per week. She reports that she does not currently use drugs after having used the following drugs: Marijuana (Weed).    family history includes Arthritis in her mother; Dementia in her mother; Diabetes in her mother and sister; Heart Attack in her sister; Heart Disease in her sister; Hypertension in her brother and brother; Migraines in her mother and sister; No Known Problems in her daughter; Osteoarthritis in her sister; Other in her mother; Panic Disorder in her brother, brother, and sister; Thyroid Disease in her mother and sister.      Physical Exam   Nursing note and vitals reviewed.  Blood pressure (!) 141/61, pulse 69, temperature 97.8 °F (36.6 °C), resp. rate 20, height 1.6 m (5' 3\"), weight 113.4 kg (250 lb), SpO2 92 %.  Constitutional: Sitting in wheelchair.  Favoring left buttock.  Appears somewhat uncomfortable.  Eyes: Conjunctivae are normal.  HEENT:  No LAD or thyromegaly   Cardiovascular: Normal rate.  regular

## 2024-07-19 ENCOUNTER — HOSPITAL ENCOUNTER (OUTPATIENT)
Facility: HOSPITAL | Age: 74
End: 2024-07-19
Attending: INTERNAL MEDICINE
Payer: MEDICARE

## 2024-07-19 DIAGNOSIS — M54.41 ACUTE RIGHT-SIDED LOW BACK PAIN WITH RIGHT-SIDED SCIATICA: ICD-10-CM

## 2024-07-19 DIAGNOSIS — M54.16 LUMBAR RADICULOPATHY: ICD-10-CM

## 2024-07-19 DIAGNOSIS — M54.41 ACUTE RIGHT-SIDED LOW BACK PAIN WITH RIGHT-SIDED SCIATICA: Primary | ICD-10-CM

## 2024-07-19 PROCEDURE — 72148 MRI LUMBAR SPINE W/O DYE: CPT

## 2024-07-19 RX ORDER — OXYCODONE HYDROCHLORIDE AND ACETAMINOPHEN 5; 325 MG/1; MG/1
1 TABLET ORAL EVERY 6 HOURS PRN
Qty: 28 TABLET | Refills: 0 | Status: SHIPPED | OUTPATIENT
Start: 2024-07-19 | End: 2024-07-26

## 2024-07-22 DIAGNOSIS — M54.16 LUMBAR RADICULOPATHY: ICD-10-CM

## 2024-07-22 DIAGNOSIS — M54.41 ACUTE RIGHT-SIDED LOW BACK PAIN WITH RIGHT-SIDED SCIATICA: ICD-10-CM

## 2024-07-22 RX ORDER — OXYCODONE HYDROCHLORIDE AND ACETAMINOPHEN 5; 325 MG/1; MG/1
1 TABLET ORAL EVERY 6 HOURS PRN
Qty: 28 TABLET | Refills: 0 | OUTPATIENT
Start: 2024-07-22 | End: 2024-07-29

## 2024-07-30 ENCOUNTER — TRANSCRIBE ORDERS (OUTPATIENT)
Dept: SCHEDULING | Age: 74
End: 2024-07-30

## 2024-07-30 DIAGNOSIS — N13.30 HYDRONEPHROSIS, UNSPECIFIED HYDRONEPHROSIS TYPE: Primary | ICD-10-CM

## 2024-08-08 DIAGNOSIS — K21.9 GASTROESOPHAGEAL REFLUX DISEASE, UNSPECIFIED WHETHER ESOPHAGITIS PRESENT: ICD-10-CM

## 2024-08-08 RX ORDER — OMEPRAZOLE 40 MG/1
CAPSULE, DELAYED RELEASE ORAL
Qty: 90 CAPSULE | Refills: 0 | Status: SHIPPED | OUTPATIENT
Start: 2024-08-08

## 2024-08-08 NOTE — TELEPHONE ENCOUNTER
PCP: Bryan Logan MD    Last appt: 7/18/2024   Future Appointments   Date Time Provider Department Center   8/9/2024  8:00 AM [facility] RM 1 [facility]       Requested Prescriptions     Pending Prescriptions Disp Refills    omeprazole (PRILOSEC) 40 MG delayed release capsule [Pharmacy Med Name: OMEPRAZOLE DR 40 MG CAPSULE] 90 capsule 0     Sig: TAKE 1 CAPSULE BY MOUTH EVERY DAY       Marlene \"Albert\" LJ Limon

## 2024-08-09 ENCOUNTER — HOSPITAL ENCOUNTER (OUTPATIENT)
Facility: HOSPITAL | Age: 74
End: 2024-08-09
Attending: UROLOGY
Payer: MEDICARE

## 2024-08-09 DIAGNOSIS — N13.30 HYDRONEPHROSIS, UNSPECIFIED HYDRONEPHROSIS TYPE: ICD-10-CM

## 2024-08-09 PROCEDURE — A9562 TC99M MERTIATIDE: HCPCS | Performed by: UROLOGY

## 2024-08-09 PROCEDURE — 3430000000 HC RX DIAGNOSTIC RADIOPHARMACEUTICAL: Performed by: UROLOGY

## 2024-08-09 PROCEDURE — 78708 K FLOW/FUNCT IMAGE W/DRUG: CPT

## 2024-08-09 PROCEDURE — 6360000002 HC RX W HCPCS: Performed by: UROLOGY

## 2024-08-09 RX ORDER — FUROSEMIDE 10 MG/ML
40 INJECTION INTRAMUSCULAR; INTRAVENOUS ONCE
Status: COMPLETED | OUTPATIENT
Start: 2024-08-09 | End: 2024-08-09

## 2024-08-09 RX ADMIN — FUROSEMIDE 20 MG: 10 INJECTION, SOLUTION INTRAMUSCULAR; INTRAVENOUS at 09:05

## 2024-08-09 RX ADMIN — TECHNESCAN TC 99M MERTIATIDE 10 MILLICURIE: 1 INJECTION, POWDER, LYOPHILIZED, FOR SOLUTION INTRAVENOUS at 08:20

## 2024-08-11 ENCOUNTER — OFFICE VISIT (OUTPATIENT)
Age: 74
End: 2024-08-11

## 2024-08-11 VITALS
HEART RATE: 79 BPM | BODY MASS INDEX: 44.3 KG/M2 | HEIGHT: 63 IN | OXYGEN SATURATION: 95 % | WEIGHT: 250 LBS | DIASTOLIC BLOOD PRESSURE: 59 MMHG | TEMPERATURE: 97.9 F | SYSTOLIC BLOOD PRESSURE: 124 MMHG | RESPIRATION RATE: 16 BRPM

## 2024-08-11 DIAGNOSIS — R39.9 UTI SYMPTOMS: ICD-10-CM

## 2024-08-11 DIAGNOSIS — N30.01 ACUTE CYSTITIS WITH HEMATURIA: Primary | ICD-10-CM

## 2024-08-11 LAB
BILIRUBIN, URINE, POC: NEGATIVE
BLOOD URINE, POC: ABNORMAL
KETONES, URINE, POC: NEGATIVE
LEUKOCYTE ESTERASE, URINE, POC: ABNORMAL
NITRITE, URINE, POC: POSITIVE
PH, URINE, POC: 6.5 (ref 4.6–8)
PROTEIN,URINE, POC: ABNORMAL
SPECIFIC GRAVITY, URINE, POC: 1.01 (ref 1–1.03)
URINALYSIS CLARITY, POC: ABNORMAL
URINALYSIS COLOR, POC: ABNORMAL
UROBILINOGEN, POC: ABNORMAL

## 2024-08-11 RX ORDER — AMOXICILLIN AND CLAVULANATE POTASSIUM 875; 125 MG/1; MG/1
1 TABLET, FILM COATED ORAL 2 TIMES DAILY
Qty: 14 TABLET | Refills: 0 | Status: SHIPPED | OUTPATIENT
Start: 2024-08-11 | End: 2024-08-11 | Stop reason: SINTOL

## 2024-08-11 RX ORDER — CIPROFLOXACIN 250 MG/1
250 TABLET, FILM COATED ORAL 2 TIMES DAILY
Qty: 10 TABLET | Refills: 0 | Status: SHIPPED | OUTPATIENT
Start: 2024-08-11 | End: 2024-08-16

## 2024-08-15 DIAGNOSIS — M54.50 CHRONIC MIDLINE LOW BACK PAIN WITHOUT SCIATICA: ICD-10-CM

## 2024-08-15 DIAGNOSIS — G89.29 CHRONIC MIDLINE LOW BACK PAIN WITHOUT SCIATICA: ICD-10-CM

## 2024-08-15 RX ORDER — DICLOFENAC SODIUM 75 MG/1
75 TABLET, DELAYED RELEASE ORAL DAILY PRN
Qty: 90 TABLET | Refills: 5 | Status: SHIPPED | OUTPATIENT
Start: 2024-08-15

## 2024-08-15 NOTE — TELEPHONE ENCOUNTER
PCP: Bryan Logan MD    Last appt: 7/18/2024   No future appointments.    Requested Prescriptions     Pending Prescriptions Disp Refills    diclofenac (VOLTAREN) 75 MG EC tablet [Pharmacy Med Name: DICLOFENAC SOD EC 75 MG TAB] 90 tablet 5     Sig: TAKE 1 TABLET BY MOUTH EVERY DAY AS NEEDED FOR PAIN     Rx in pending for provider review and approval.    Marlene \"Albert\" LJ Limon

## 2024-08-17 DIAGNOSIS — I10 ESSENTIAL (PRIMARY) HYPERTENSION: ICD-10-CM

## 2024-08-19 RX ORDER — METOPROLOL TARTRATE 50 MG/1
50 TABLET, FILM COATED ORAL
Qty: 90 TABLET | Refills: 1 | Status: SHIPPED | OUTPATIENT
Start: 2024-08-19

## 2024-08-19 NOTE — TELEPHONE ENCOUNTER
PCP: Bryan Logan MD    Last appt: 7/18/2024   No future appointments.    Requested Prescriptions     Pending Prescriptions Disp Refills    metoprolol tartrate (LOPRESSOR) 50 MG tablet [Pharmacy Med Name: METOPROLOL TARTRATE 50 MG TAB] 90 tablet 1     Sig: TAKE 1 TABLET BY MOUTH DAILY (WITH BREAKFAST) YEARLY APPT NEEDED PLEASE SCHEDULE       Marlene \"Albert\" LJ Limon

## 2024-08-29 DIAGNOSIS — M54.41 ACUTE RIGHT-SIDED LOW BACK PAIN WITH RIGHT-SIDED SCIATICA: ICD-10-CM

## 2024-08-29 DIAGNOSIS — M54.16 LUMBAR RADICULOPATHY: ICD-10-CM

## 2024-08-29 RX ORDER — OXYCODONE AND ACETAMINOPHEN 5; 325 MG/1; MG/1
1 TABLET ORAL EVERY 6 HOURS PRN
Qty: 28 TABLET | Refills: 0 | Status: SHIPPED | OUTPATIENT
Start: 2024-08-29 | End: 2024-09-05

## 2024-09-06 DIAGNOSIS — E03.9 ACQUIRED HYPOTHYROIDISM: Primary | ICD-10-CM

## 2024-09-06 RX ORDER — LEVOTHYROXINE SODIUM 125 UG/1
125 TABLET ORAL DAILY
Qty: 90 TABLET | Refills: 1 | Status: SHIPPED | OUTPATIENT
Start: 2024-09-06

## 2024-10-04 RX ORDER — CIPROFLOXACIN 250 MG/1
250 TABLET, FILM COATED ORAL 2 TIMES DAILY
Qty: 10 TABLET | Refills: 0 | Status: SHIPPED | OUTPATIENT
Start: 2024-10-04 | End: 2024-10-09

## 2024-10-09 ENCOUNTER — OFFICE VISIT (OUTPATIENT)
Age: 74
End: 2024-10-09
Payer: MEDICARE

## 2024-10-09 VITALS
RESPIRATION RATE: 19 BRPM | OXYGEN SATURATION: 94 % | DIASTOLIC BLOOD PRESSURE: 84 MMHG | HEART RATE: 71 BPM | HEIGHT: 63 IN | WEIGHT: 254.2 LBS | BODY MASS INDEX: 45.04 KG/M2 | SYSTOLIC BLOOD PRESSURE: 141 MMHG | TEMPERATURE: 97.8 F

## 2024-10-09 DIAGNOSIS — I10 HTN (HYPERTENSION), BENIGN: ICD-10-CM

## 2024-10-09 DIAGNOSIS — E66.813 CLASS 3 SEVERE OBESITY WITH SERIOUS COMORBIDITY AND BODY MASS INDEX (BMI) OF 45.0 TO 49.9 IN ADULT, UNSPECIFIED OBESITY TYPE: ICD-10-CM

## 2024-10-09 DIAGNOSIS — M25.551 HIP PAIN, BILATERAL: ICD-10-CM

## 2024-10-09 DIAGNOSIS — M54.50 CHRONIC MIDLINE LOW BACK PAIN WITHOUT SCIATICA: ICD-10-CM

## 2024-10-09 DIAGNOSIS — R29.898 DECREASED GRIP STRENGTH: ICD-10-CM

## 2024-10-09 DIAGNOSIS — M16.0 PRIMARY OSTEOARTHRITIS OF BOTH HIPS: ICD-10-CM

## 2024-10-09 DIAGNOSIS — M25.552 HIP PAIN, BILATERAL: ICD-10-CM

## 2024-10-09 DIAGNOSIS — E03.9 ACQUIRED HYPOTHYROIDISM: ICD-10-CM

## 2024-10-09 DIAGNOSIS — G89.29 CHRONIC MIDLINE LOW BACK PAIN WITHOUT SCIATICA: ICD-10-CM

## 2024-10-09 DIAGNOSIS — R26.89 IMPAIRED GAIT AND MOBILITY: Primary | ICD-10-CM

## 2024-10-09 DIAGNOSIS — E66.01 CLASS 3 SEVERE OBESITY WITH SERIOUS COMORBIDITY AND BODY MASS INDEX (BMI) OF 45.0 TO 49.9 IN ADULT, UNSPECIFIED OBESITY TYPE: ICD-10-CM

## 2024-10-09 PROCEDURE — G8399 PT W/DXA RESULTS DOCUMENT: HCPCS | Performed by: INTERNAL MEDICINE

## 2024-10-09 PROCEDURE — G8417 CALC BMI ABV UP PARAM F/U: HCPCS | Performed by: INTERNAL MEDICINE

## 2024-10-09 PROCEDURE — 3017F COLORECTAL CA SCREEN DOC REV: CPT | Performed by: INTERNAL MEDICINE

## 2024-10-09 PROCEDURE — 3077F SYST BP >= 140 MM HG: CPT | Performed by: INTERNAL MEDICINE

## 2024-10-09 PROCEDURE — 99214 OFFICE O/P EST MOD 30 MIN: CPT | Performed by: INTERNAL MEDICINE

## 2024-10-09 PROCEDURE — 1123F ACP DISCUSS/DSCN MKR DOCD: CPT | Performed by: INTERNAL MEDICINE

## 2024-10-09 PROCEDURE — G8428 CUR MEDS NOT DOCUMENT: HCPCS | Performed by: INTERNAL MEDICINE

## 2024-10-09 PROCEDURE — 1090F PRES/ABSN URINE INCON ASSESS: CPT | Performed by: INTERNAL MEDICINE

## 2024-10-09 PROCEDURE — 1036F TOBACCO NON-USER: CPT | Performed by: INTERNAL MEDICINE

## 2024-10-09 PROCEDURE — 3079F DIAST BP 80-89 MM HG: CPT | Performed by: INTERNAL MEDICINE

## 2024-10-09 PROCEDURE — G8484 FLU IMMUNIZE NO ADMIN: HCPCS | Performed by: INTERNAL MEDICINE

## 2024-10-09 RX ORDER — OXYCODONE AND ACETAMINOPHEN 5; 325 MG/1; MG/1
1 TABLET ORAL DAILY
Qty: 30 TABLET | Refills: 0
Start: 2024-10-09 | End: 2024-11-08

## 2024-10-09 RX ORDER — DICLOFENAC SODIUM 75 MG/1
75 TABLET, DELAYED RELEASE ORAL 2 TIMES DAILY PRN
COMMUNITY
Start: 2024-10-09

## 2024-10-09 RX ORDER — METOPROLOL TARTRATE 50 MG
50 TABLET ORAL
Qty: 90 TABLET | Refills: 1
Start: 2024-10-09

## 2024-10-09 NOTE — PROGRESS NOTES
\"Have you been to the ER, urgent care clinic since your last visit?  Hospitalized since your last visit?\"    NO    “Have you seen or consulted any other health care providers outside our system since your last visit?”    YES - When: approximately 2 months ago.  Where and Why: [facility]             
DAY    estradiol (ESTRACE) 0.1 MG/GM vaginal cream Place 2 g vaginally every 48 hours    Biotin 1 MG CAPS Take 1 capsule by mouth daily    vibegron (GEMTESA) 75 MG TABS tablet Take by mouth every morning    Multiple Vitamins-Minerals (CENTRUM SILVER ADULT 50+) TABS Take 1 tablet by mouth daily     No current facility-administered medications for this visit.

## 2024-10-12 LAB
ALBUMIN SERPL-MCNC: 3.8 G/DL (ref 3.8–4.8)
ALP SERPL-CCNC: 119 IU/L (ref 44–121)
ALT SERPL-CCNC: 20 IU/L (ref 0–32)
AST SERPL-CCNC: 21 IU/L (ref 0–40)
BILIRUB SERPL-MCNC: 0.4 MG/DL (ref 0–1.2)
BUN SERPL-MCNC: 15 MG/DL (ref 8–27)
BUN/CREAT SERPL: 13 (ref 12–28)
CALCIUM SERPL-MCNC: 9.6 MG/DL (ref 8.7–10.3)
CHLORIDE SERPL-SCNC: 99 MMOL/L (ref 96–106)
CO2 SERPL-SCNC: 23 MMOL/L (ref 20–29)
CREAT SERPL-MCNC: 1.13 MG/DL (ref 0.57–1)
EGFRCR SERPLBLD CKD-EPI 2021: 51 ML/MIN/1.73
ERYTHROCYTE [DISTWIDTH] IN BLOOD BY AUTOMATED COUNT: 12.7 % (ref 11.7–15.4)
GLOBULIN SER CALC-MCNC: 2.3 G/DL (ref 1.5–4.5)
GLUCOSE SERPL-MCNC: 139 MG/DL (ref 70–99)
HCT VFR BLD AUTO: 41.1 % (ref 34–46.6)
HGB BLD-MCNC: 13.2 G/DL (ref 11.1–15.9)
MCH RBC QN AUTO: 31.7 PG (ref 26.6–33)
MCHC RBC AUTO-ENTMCNC: 32.1 G/DL (ref 31.5–35.7)
MCV RBC AUTO: 99 FL (ref 79–97)
PLATELET # BLD AUTO: 251 X10E3/UL (ref 150–450)
POTASSIUM SERPL-SCNC: 4.4 MMOL/L (ref 3.5–5.2)
PROT SERPL-MCNC: 6.1 G/DL (ref 6–8.5)
RBC # BLD AUTO: 4.17 X10E6/UL (ref 3.77–5.28)
REPORT: NORMAL
SODIUM SERPL-SCNC: 139 MMOL/L (ref 134–144)
T4 FREE SERPL-MCNC: 1.36 NG/DL (ref 0.82–1.77)
TSH SERPL DL<=0.005 MIU/L-ACNC: 3.29 UIU/ML (ref 0.45–4.5)
WBC # BLD AUTO: 8.2 X10E3/UL (ref 3.4–10.8)

## 2024-10-16 DIAGNOSIS — M25.551 HIP PAIN, BILATERAL: ICD-10-CM

## 2024-10-16 DIAGNOSIS — G89.29 CHRONIC MIDLINE LOW BACK PAIN WITHOUT SCIATICA: ICD-10-CM

## 2024-10-16 DIAGNOSIS — M54.50 CHRONIC MIDLINE LOW BACK PAIN WITHOUT SCIATICA: ICD-10-CM

## 2024-10-16 DIAGNOSIS — M16.0 PRIMARY OSTEOARTHRITIS OF BOTH HIPS: ICD-10-CM

## 2024-10-16 DIAGNOSIS — M25.552 HIP PAIN, BILATERAL: ICD-10-CM

## 2024-10-16 RX ORDER — OXYCODONE AND ACETAMINOPHEN 5; 325 MG/1; MG/1
1 TABLET ORAL EVERY 8 HOURS PRN
Qty: 90 TABLET | Refills: 0 | Status: SHIPPED | OUTPATIENT
Start: 2024-10-16 | End: 2024-11-15

## 2024-10-28 DIAGNOSIS — M54.50 CHRONIC MIDLINE LOW BACK PAIN WITHOUT SCIATICA: ICD-10-CM

## 2024-10-28 DIAGNOSIS — G89.29 CHRONIC MIDLINE LOW BACK PAIN WITHOUT SCIATICA: ICD-10-CM

## 2024-10-28 DIAGNOSIS — M16.0 PRIMARY OSTEOARTHRITIS OF BOTH HIPS: ICD-10-CM

## 2024-10-28 DIAGNOSIS — M25.551 HIP PAIN, BILATERAL: ICD-10-CM

## 2024-10-28 DIAGNOSIS — M25.552 HIP PAIN, BILATERAL: ICD-10-CM

## 2024-10-28 RX ORDER — OXYCODONE AND ACETAMINOPHEN 5; 325 MG/1; MG/1
1 TABLET ORAL EVERY 6 HOURS PRN
Qty: 28 TABLET | Refills: 0 | Status: SHIPPED | OUTPATIENT
Start: 2024-10-28 | End: 2024-11-04

## 2024-10-30 DIAGNOSIS — K21.9 GASTROESOPHAGEAL REFLUX DISEASE, UNSPECIFIED WHETHER ESOPHAGITIS PRESENT: ICD-10-CM

## 2024-10-30 RX ORDER — OMEPRAZOLE 40 MG/1
CAPSULE, DELAYED RELEASE ORAL
Qty: 90 CAPSULE | Refills: 0 | Status: SHIPPED | OUTPATIENT
Start: 2024-10-30

## 2024-11-01 ENCOUNTER — TELEPHONE (OUTPATIENT)
Age: 74
End: 2024-11-01

## 2024-11-13 ENCOUNTER — PATIENT MESSAGE (OUTPATIENT)
Age: 74
End: 2024-11-13

## 2024-11-13 ENCOUNTER — OFFICE VISIT (OUTPATIENT)
Age: 74
End: 2024-11-13
Payer: MEDICARE

## 2024-11-13 VITALS
DIASTOLIC BLOOD PRESSURE: 84 MMHG | WEIGHT: 250 LBS | HEIGHT: 63 IN | HEART RATE: 76 BPM | RESPIRATION RATE: 14 BRPM | SYSTOLIC BLOOD PRESSURE: 138 MMHG | BODY MASS INDEX: 44.3 KG/M2 | OXYGEN SATURATION: 93 %

## 2024-11-13 DIAGNOSIS — N30.00 ACUTE CYSTITIS WITHOUT HEMATURIA: ICD-10-CM

## 2024-11-13 DIAGNOSIS — N30.00 ACUTE CYSTITIS WITHOUT HEMATURIA: Primary | ICD-10-CM

## 2024-11-13 DIAGNOSIS — Z13.89 SCREENING FOR BLOOD OR PROTEIN IN URINE: ICD-10-CM

## 2024-11-13 LAB
BILIRUBIN, URINE, POC: ABNORMAL
BLOOD URINE, POC: ABNORMAL
COMMENT:: NORMAL
GLUCOSE URINE, POC: 250
KETONES, URINE, POC: ABNORMAL
LEUKOCYTE ESTERASE, URINE, POC: ABNORMAL
NITRITE, URINE, POC: POSITIVE
PH, URINE, POC: 5 (ref 4.6–8)
PROTEIN,URINE, POC: ABNORMAL
SPECIFIC GRAVITY, URINE, POC: 1.02 (ref 1–1.03)
SPECIMEN HOLD: NORMAL
URINALYSIS CLARITY, POC: CLEAR
URINALYSIS COLOR, POC: ABNORMAL
UROBILINOGEN, POC: ABNORMAL

## 2024-11-13 PROCEDURE — 99213 OFFICE O/P EST LOW 20 MIN: CPT | Performed by: INTERNAL MEDICINE

## 2024-11-13 PROCEDURE — 81003 URINALYSIS AUTO W/O SCOPE: CPT | Performed by: INTERNAL MEDICINE

## 2024-11-13 RX ORDER — CIPROFLOXACIN 500 MG/1
500 TABLET, FILM COATED ORAL 2 TIMES DAILY
Qty: 6 TABLET | Refills: 0 | Status: SHIPPED | OUTPATIENT
Start: 2024-11-13 | End: 2024-11-16

## 2024-11-13 RX ORDER — TRAMADOL HYDROCHLORIDE 50 MG/1
50 TABLET ORAL EVERY 12 HOURS PRN
COMMUNITY
Start: 2024-11-08 | End: 2024-11-18

## 2024-11-13 ASSESSMENT — PATIENT HEALTH QUESTIONNAIRE - PHQ9
SUM OF ALL RESPONSES TO PHQ QUESTIONS 1-9: 2
SUM OF ALL RESPONSES TO PHQ QUESTIONS 1-9: 2
2. FEELING DOWN, DEPRESSED OR HOPELESS: SEVERAL DAYS
SUM OF ALL RESPONSES TO PHQ QUESTIONS 1-9: 2
SUM OF ALL RESPONSES TO PHQ QUESTIONS 1-9: 2
SUM OF ALL RESPONSES TO PHQ9 QUESTIONS 1 & 2: 2
1. LITTLE INTEREST OR PLEASURE IN DOING THINGS: SEVERAL DAYS

## 2024-11-13 NOTE — PROGRESS NOTES
A/P:    1. Acute cystitis without hematuria  Will treat empirically with ciprofloxacin, pending urine culture results.  - Culture, Urine; Future  - ciprofloxacin (CIPRO) 500 MG tablet; Take 1 tablet by mouth 2 times daily for 3 days  Dispense: 6 tablet; Refill: 0    2. Screening for blood or protein in urine  - AMB POC URINALYSIS DIP STICK AUTO W/O MICRO       Follow up:  prn         An electronic signature was used to authenticate this note.    --Simona Wilkins MD         HPI: Ayleen Karimi is here for an acute visit.      UTI: Ms. Reece has had symptoms of possible infection since yesterday. The symptoms are burning with urination. She denies fever, chills, nausea, vomiting. Her last UTI was a Klebsiella UTI in August.  She has been using Azo, so her urine is orange today.    She will be seeing rheumatology in a week, as her pain has been evaluated by orthopedics and pain management. Currently she is taking tramadol, was given this yesterday to take with acetaminophen.       Current Outpatient Medications:     traMADol (ULTRAM) 50 MG tablet, Take 1 tablet by mouth every 6 hours as needed. Max Daily Amount: 200 mg, Disp: , Rfl:     omeprazole (PRILOSEC) 40 MG delayed release capsule, TAKE 1 CAPSULE BY MOUTH EVERY DAY, Disp: 90 capsule, Rfl: 0    diclofenac (VOLTAREN) 75 MG EC tablet, Take 1 tablet by mouth 2 times daily as needed for Pain (NSAID.  Do not take with ibuprofen, advil, aleve, naproxyn), Disp: , Rfl:     metoprolol tartrate (LOPRESSOR) 50 MG tablet, Take 1 tablet by mouth daily (with breakfast), Disp: 90 tablet, Rfl: 1    levothyroxine (SYNTHROID) 125 MCG tablet, Take 1 tablet by mouth Daily Increased 5/7/24, Disp: 90 tablet, Rfl: 1    estradiol (ESTRACE) 0.1 MG/GM vaginal cream, Place 2 g vaginally every 48 hours, Disp: , Rfl:     Biotin 1 MG CAPS, Take 1 capsule by mouth daily, Disp: , Rfl:     vibegron (GEMTESA) 75 MG TABS tablet, Take by mouth every morning, Disp: , Rfl:     Multiple

## 2024-11-18 RX ORDER — CIPROFLOXACIN 500 MG/1
500 TABLET, FILM COATED ORAL 2 TIMES DAILY
Qty: 14 TABLET | Refills: 0 | Status: SHIPPED | OUTPATIENT
Start: 2024-11-18 | End: 2024-11-25

## 2024-12-10 ENCOUNTER — TELEPHONE (OUTPATIENT)
Facility: CLINIC | Age: 74
End: 2024-12-10

## 2024-12-10 ENCOUNTER — OFFICE VISIT (OUTPATIENT)
Age: 74
End: 2024-12-10

## 2024-12-10 VITALS
SYSTOLIC BLOOD PRESSURE: 137 MMHG | WEIGHT: 250 LBS | OXYGEN SATURATION: 95 % | RESPIRATION RATE: 16 BRPM | HEART RATE: 71 BPM | BODY MASS INDEX: 44.3 KG/M2 | DIASTOLIC BLOOD PRESSURE: 68 MMHG | TEMPERATURE: 97.9 F | HEIGHT: 63 IN

## 2024-12-10 DIAGNOSIS — R03.0 ELEVATED BLOOD PRESSURE READING: ICD-10-CM

## 2024-12-10 DIAGNOSIS — R30.0 DYSURIA: ICD-10-CM

## 2024-12-10 DIAGNOSIS — N30.01 ACUTE CYSTITIS WITH HEMATURIA: Primary | ICD-10-CM

## 2024-12-10 LAB
BILIRUBIN, URINE, POC: NEGATIVE
BLOOD URINE, POC: NORMAL
GLUCOSE URINE, POC: NEGATIVE
KETONES, URINE, POC: NEGATIVE
LEUKOCYTE ESTERASE, URINE, POC: NORMAL
NITRITE, URINE, POC: NEGATIVE
PH, URINE, POC: 6 (ref 4.6–8)
PROTEIN,URINE, POC: NORMAL
SPECIFIC GRAVITY, URINE, POC: 1.03 (ref 1–1.03)
URINALYSIS CLARITY, POC: NORMAL
URINALYSIS COLOR, POC: YELLOW
UROBILINOGEN, POC: NORMAL MG/DL

## 2024-12-10 RX ORDER — NITROFURANTOIN 25; 75 MG/1; MG/1
100 CAPSULE ORAL 2 TIMES DAILY
Qty: 14 CAPSULE | Refills: 0 | Status: SHIPPED | OUTPATIENT
Start: 2024-12-10 | End: 2024-12-17

## 2024-12-10 RX ORDER — TRAMADOL HYDROCHLORIDE 50 MG/1
TABLET ORAL
COMMUNITY
Start: 2024-11-12

## 2024-12-10 NOTE — PROGRESS NOTES
above plan.       An electronic signature was used to authenticate this note.  -- Rivka Steiner, TODD - NP

## 2024-12-10 NOTE — PATIENT INSTRUCTIONS
[email or web address] and enter V843 to learn more about \"Low Sodium Diet (2,000 Milligram): Care Instructions.\"  Current as of: September 20, 2023  Content Version: 14.2  © 2024 [email or web address] [facility].   Care instructions adapted under license by [employer]. If you have questions about a medical condition or this instruction, always ask your healthcare professional. [employer] disclaims any warranty or liability for your use of this information.

## 2024-12-10 NOTE — TELEPHONE ENCOUNTER
Patient was calling to speak with nurse regarding another UTI - patient did not allow me to make appointment tomorrow morning and wanted me to send message instead as she stated provider would call her in medication instead of being seen     Patient is already taking Tramadol and Tylenol and states this is not touching her pain

## 2024-12-12 ASSESSMENT — ENCOUNTER SYMPTOMS
ALLERGIC/IMMUNOLOGIC NEGATIVE: 1
GASTROINTESTINAL NEGATIVE: 1
EYES NEGATIVE: 1
RESPIRATORY NEGATIVE: 1

## 2024-12-22 ENCOUNTER — OFFICE VISIT (OUTPATIENT)
Age: 74
End: 2024-12-22

## 2024-12-22 VITALS
BODY MASS INDEX: 43.2 KG/M2 | RESPIRATION RATE: 17 BRPM | HEART RATE: 73 BPM | HEIGHT: 63 IN | TEMPERATURE: 97.5 F | OXYGEN SATURATION: 96 % | DIASTOLIC BLOOD PRESSURE: 76 MMHG | SYSTOLIC BLOOD PRESSURE: 125 MMHG | WEIGHT: 243.8 LBS

## 2024-12-22 DIAGNOSIS — R30.0 DYSURIA: Primary | ICD-10-CM

## 2024-12-22 LAB
BILIRUBIN, URINE, POC: NEGATIVE
BLOOD URINE, POC: NEGATIVE
GLUCOSE URINE, POC: NEGATIVE
KETONES, URINE, POC: NEGATIVE
LEUKOCYTE ESTERASE, URINE, POC: ABNORMAL
NITRITE, URINE, POC: NEGATIVE
PH, URINE, POC: 5 (ref 4.6–8)
PROTEIN,URINE, POC: ABNORMAL
SPECIFIC GRAVITY, URINE, POC: 1.02 (ref 1–1.03)
URINALYSIS CLARITY, POC: CLEAR
URINALYSIS COLOR, POC: YELLOW
UROBILINOGEN, POC: ABNORMAL MG/DL

## 2024-12-22 RX ORDER — PHENAZOPYRIDINE HYDROCHLORIDE 200 MG/1
200 TABLET, FILM COATED ORAL 3 TIMES DAILY PRN
Qty: 9 TABLET | Refills: 0 | Status: SHIPPED | OUTPATIENT
Start: 2024-12-22 | End: 2024-12-25

## 2024-12-24 ENCOUNTER — TELEPHONE (OUTPATIENT)
Age: 74
End: 2024-12-24

## 2024-12-24 LAB
BACTERIA SPEC CULT: NORMAL
CC UR VC: NORMAL
SERVICE CMNT-IMP: NORMAL

## 2024-12-26 ENCOUNTER — TELEPHONE (OUTPATIENT)
Age: 74
End: 2024-12-26

## 2024-12-26 DIAGNOSIS — R30.0 DYSURIA: Primary | ICD-10-CM

## 2024-12-26 RX ORDER — PHENAZOPYRIDINE HYDROCHLORIDE 100 MG/1
100 TABLET, FILM COATED ORAL 3 TIMES DAILY PRN
Qty: 21 TABLET | Refills: 0 | Status: SHIPPED | OUTPATIENT
Start: 2024-12-26 | End: 2025-01-02

## 2024-12-26 NOTE — TELEPHONE ENCOUNTER
Called in patient pyridium for dysuria. She will need to seek care in the nearest emergency room if her pain is more significant than Tylenol, ibuprofen or pyridium covers.

## 2025-01-08 NOTE — BRIEF OP NOTE
Brief Postoperative Note    Patient: Rachana Reyes  YOB: 1950  MRN: [id number]    Date of Procedure: 1/17/2023     Pre-Op Diagnosis: 1) ILEOSTOMY STATUS, desires reversal, 2) recent history of sbo    Post-Op Diagnosis:   1) same, 2) restrictive band tethering jejunum in pelvis    Procedure(s):  1) lysis of adhesions, 2) reversal loop ileostomy with sb resection x1 (resection of ileostomy), 3) ventral hernia repair with [place] [employer] mesh    Surgeon(s):  Vamsi Euceda MD    Surgical Assistant: Surg Asst-1: Marilou Peña    Anesthesia: General     Estimated Blood Loss (mL): 96IG     Complications: None    Specimens:   ID Type Source Tests Collected by Time Destination   1 : ILEUM Preservative Ileum  Vamsi Euceda MD 1/17/2023 0914 Pathology   2 : UMBILICAL HERNIA Preservative Umbilicus  Vamsi Euceda MD 1/17/2023 8384 Pathology        Implants: * No implants in log *    Drains: * No LDAs found *    Findings: adhesive band to jejunum in pelvis; large umbilical/ventral hernia    Electronically Signed by Jhon Holder MD on 1/17/2023 at 11:11 AM
[Negative] : Genitourinary
[Negative] : Genitourinary

## 2025-01-24 DIAGNOSIS — K21.9 GASTROESOPHAGEAL REFLUX DISEASE, UNSPECIFIED WHETHER ESOPHAGITIS PRESENT: ICD-10-CM

## 2025-01-24 RX ORDER — OMEPRAZOLE 40 MG/1
CAPSULE, DELAYED RELEASE ORAL
Qty: 90 CAPSULE | Refills: 0 | Status: SHIPPED | OUTPATIENT
Start: 2025-01-24

## 2025-02-12 DIAGNOSIS — I10 HTN (HYPERTENSION), BENIGN: ICD-10-CM

## 2025-02-12 RX ORDER — METOPROLOL TARTRATE 50 MG
TABLET ORAL
Qty: 90 TABLET | Refills: 1 | OUTPATIENT
Start: 2025-02-12

## 2025-02-12 NOTE — TELEPHONE ENCOUNTER
PCP: Bryan Logan MD    Last Appointment: Visit date not found    Future Appointments   Date Time Provider Department Center   5/23/2025  1:40 PM Bryan Logan MD [facility] DEP       Requested Prescriptions     Pending Prescriptions Disp Refills    metoprolol tartrate (LOPRESSOR) 50 MG tablet [Pharmacy Med Name: METOPROLOL TARTRATE 50 MG TAB] 90 tablet 1     Sig: TAKE 1 TABLET BY MOUTH DAILY (WITH BREAKFAST) YEARLY APPT NEEDED PLEASE SCHEDULE       LAST ORDERED: 10/9/24 for 90 tabs and 1 refill. Refill request to soon. Refill cancelled.      Marlene \"Albert\" LJ Limon

## 2025-02-15 ENCOUNTER — PATIENT MESSAGE (OUTPATIENT)
Facility: CLINIC | Age: 75
End: 2025-02-15

## 2025-02-15 DIAGNOSIS — I10 HTN (HYPERTENSION), BENIGN: ICD-10-CM

## 2025-02-17 RX ORDER — BUSPIRONE HYDROCHLORIDE 7.5 MG/1
7.5 TABLET ORAL 2 TIMES DAILY
Qty: 60 TABLET | Refills: 0 | Status: SHIPPED | OUTPATIENT
Start: 2025-02-17 | End: 2025-03-19

## 2025-02-18 RX ORDER — METOPROLOL TARTRATE 50 MG
50 TABLET ORAL
Qty: 90 TABLET | Refills: 1 | Status: SHIPPED | OUTPATIENT
Start: 2025-02-18

## 2025-02-25 DIAGNOSIS — E03.9 ACQUIRED HYPOTHYROIDISM: ICD-10-CM

## 2025-02-25 RX ORDER — LEVOTHYROXINE SODIUM 125 UG/1
125 TABLET ORAL DAILY
Qty: 90 TABLET | Refills: 1 | Status: SHIPPED | OUTPATIENT
Start: 2025-02-25

## 2025-03-11 ENCOUNTER — PATIENT MESSAGE (OUTPATIENT)
Facility: CLINIC | Age: 75
End: 2025-03-11

## 2025-03-11 RX ORDER — BUSPIRONE HYDROCHLORIDE 7.5 MG/1
7.5 TABLET ORAL 2 TIMES DAILY
Qty: 180 TABLET | Refills: 1 | OUTPATIENT
Start: 2025-03-11

## 2025-03-16 RX ORDER — BUSPIRONE HYDROCHLORIDE 7.5 MG/1
7.5 TABLET ORAL 2 TIMES DAILY
Qty: 60 TABLET | Refills: 0 | Status: SHIPPED | OUTPATIENT
Start: 2025-03-16

## 2025-03-31 ENCOUNTER — TRANSCRIBE ORDERS (OUTPATIENT)
Facility: HOSPITAL | Age: 75
End: 2025-03-31

## 2025-03-31 DIAGNOSIS — R44.1 VISUAL HALLUCINATION: Primary | ICD-10-CM

## 2025-04-08 ENCOUNTER — HOSPITAL ENCOUNTER (OUTPATIENT)
Facility: HOSPITAL | Age: 75
Discharge: HOME | End: 2025-04-11
Attending: STUDENT IN AN ORGANIZED HEALTH CARE EDUCATION/TRAINING PROGRAM
Payer: MEDICARE

## 2025-04-08 DIAGNOSIS — R44.1 VISUAL HALLUCINATION: ICD-10-CM

## 2025-04-08 PROCEDURE — 70551 MRI BRAIN STEM W/O DYE: CPT

## 2025-04-09 ENCOUNTER — APPOINTMENT (OUTPATIENT)
Facility: HOSPITAL | Age: 75
DRG: 065 | End: 2025-04-09
Payer: MEDICARE

## 2025-04-09 ENCOUNTER — HOSPITAL ENCOUNTER (INPATIENT)
Facility: HOSPITAL | Age: 75
LOS: 4 days | Discharge: OTHER INSTITUTION | DRG: 065 | End: 2025-04-13
Attending: STUDENT IN AN ORGANIZED HEALTH CARE EDUCATION/TRAINING PROGRAM | Admitting: HOSPITALIST
Payer: MEDICARE

## 2025-04-09 DIAGNOSIS — I63.9 ACUTE ISCHEMIC STROKE (HCC): Primary | ICD-10-CM

## 2025-04-09 LAB
ALBUMIN SERPL-MCNC: 3.4 G/DL (ref 3.5–5)
ALBUMIN/GLOB SERPL: 0.9 (ref 1.1–2.2)
ALP SERPL-CCNC: 144 U/L (ref 45–117)
ALT SERPL-CCNC: 24 U/L (ref 12–78)
ANION GAP SERPL CALC-SCNC: 5 MMOL/L (ref 2–12)
ANION GAP SERPL CALC-SCNC: 6 MMOL/L (ref 2–12)
AST SERPL-CCNC: 18 U/L (ref 15–37)
BASOPHILS # BLD: 0.02 K/UL (ref 0–0.1)
BASOPHILS NFR BLD: 0.2 % (ref 0–1)
BILIRUB SERPL-MCNC: 0.6 MG/DL (ref 0.2–1)
BUN SERPL-MCNC: 15 MG/DL (ref 6–20)
BUN SERPL-MCNC: 16 MG/DL (ref 6–20)
BUN/CREAT SERPL: 12 (ref 12–20)
BUN/CREAT SERPL: 12 (ref 12–20)
CALCIUM SERPL-MCNC: 10 MG/DL (ref 8.5–10.1)
CALCIUM SERPL-MCNC: 9.7 MG/DL (ref 8.5–10.1)
CHLORIDE SERPL-SCNC: 106 MMOL/L (ref 97–108)
CHLORIDE SERPL-SCNC: 107 MMOL/L (ref 97–108)
CO2 SERPL-SCNC: 27 MMOL/L (ref 21–32)
CO2 SERPL-SCNC: 28 MMOL/L (ref 21–32)
COMMENT:: NORMAL
CREAT SERPL-MCNC: 1.27 MG/DL (ref 0.55–1.02)
CREAT SERPL-MCNC: 1.3 MG/DL (ref 0.55–1.02)
DIFFERENTIAL METHOD BLD: NORMAL
EOSINOPHIL # BLD: 0.28 K/UL (ref 0–0.4)
EOSINOPHIL NFR BLD: 2.9 % (ref 0–7)
ERYTHROCYTE [DISTWIDTH] IN BLOOD BY AUTOMATED COUNT: 12.6 % (ref 11.5–14.5)
GLOBULIN SER CALC-MCNC: 4 G/DL (ref 2–4)
GLUCOSE SERPL-MCNC: 102 MG/DL (ref 65–100)
GLUCOSE SERPL-MCNC: 104 MG/DL (ref 65–100)
HCT VFR BLD AUTO: 40.3 % (ref 35–47)
HGB BLD-MCNC: 13.5 G/DL (ref 11.5–16)
IMM GRANULOCYTES # BLD AUTO: 0.01 K/UL (ref 0–0.04)
IMM GRANULOCYTES NFR BLD AUTO: 0.1 % (ref 0–0.5)
INR PPP: 1 (ref 0.9–1.1)
LYMPHOCYTES # BLD: 2.67 K/UL (ref 0.8–3.5)
LYMPHOCYTES NFR BLD: 28.1 % (ref 12–49)
MCH RBC QN AUTO: 31.6 PG (ref 26–34)
MCHC RBC AUTO-ENTMCNC: 33.5 G/DL (ref 30–36.5)
MCV RBC AUTO: 94.4 FL (ref 80–99)
MONOCYTES # BLD: 0.61 K/UL (ref 0–1)
MONOCYTES NFR BLD: 6.4 % (ref 5–13)
NEUTS SEG # BLD: 5.91 K/UL (ref 1.8–8)
NEUTS SEG NFR BLD: 62.3 % (ref 32–75)
NRBC # BLD: 0 K/UL (ref 0–0.01)
NRBC BLD-RTO: 0 PER 100 WBC
PLATELET # BLD AUTO: 297 K/UL (ref 150–400)
PMV BLD AUTO: 11.3 FL (ref 8.9–12.9)
POTASSIUM SERPL-SCNC: 4.5 MMOL/L (ref 3.5–5.1)
POTASSIUM SERPL-SCNC: 4.7 MMOL/L (ref 3.5–5.1)
PROT SERPL-MCNC: 7.4 G/DL (ref 6.4–8.2)
PROTHROMBIN TIME: 10.8 SEC (ref 9.2–11.2)
RBC # BLD AUTO: 4.27 M/UL (ref 3.8–5.2)
SODIUM SERPL-SCNC: 139 MMOL/L (ref 136–145)
SODIUM SERPL-SCNC: 140 MMOL/L (ref 136–145)
SPECIMEN HOLD: NORMAL
WBC # BLD AUTO: 9.5 K/UL (ref 3.6–11)

## 2025-04-09 PROCEDURE — 99285 EMERGENCY DEPT VISIT HI MDM: CPT

## 2025-04-09 PROCEDURE — 70498 CT ANGIOGRAPHY NECK: CPT

## 2025-04-09 PROCEDURE — 6360000004 HC RX CONTRAST MEDICATION: Performed by: STUDENT IN AN ORGANIZED HEALTH CARE EDUCATION/TRAINING PROGRAM

## 2025-04-09 PROCEDURE — 2500000003 HC RX 250 WO HCPCS: Performed by: HOSPITALIST

## 2025-04-09 PROCEDURE — 93005 ELECTROCARDIOGRAM TRACING: CPT | Performed by: STUDENT IN AN ORGANIZED HEALTH CARE EDUCATION/TRAINING PROGRAM

## 2025-04-09 PROCEDURE — 2060000000 HC ICU INTERMEDIATE R&B

## 2025-04-09 PROCEDURE — 36415 COLL VENOUS BLD VENIPUNCTURE: CPT

## 2025-04-09 PROCEDURE — 85025 COMPLETE CBC W/AUTO DIFF WBC: CPT

## 2025-04-09 PROCEDURE — 80053 COMPREHEN METABOLIC PANEL: CPT

## 2025-04-09 PROCEDURE — 6370000000 HC RX 637 (ALT 250 FOR IP)

## 2025-04-09 PROCEDURE — 6370000000 HC RX 637 (ALT 250 FOR IP): Performed by: HOSPITALIST

## 2025-04-09 PROCEDURE — 6360000002 HC RX W HCPCS: Performed by: HOSPITALIST

## 2025-04-09 PROCEDURE — 85610 PROTHROMBIN TIME: CPT

## 2025-04-09 PROCEDURE — 94761 N-INVAS EAR/PLS OXIMETRY MLT: CPT

## 2025-04-09 RX ORDER — TRAMADOL HYDROCHLORIDE 50 MG/1
50 TABLET ORAL EVERY 6 HOURS PRN
Status: DISCONTINUED | OUTPATIENT
Start: 2025-04-09 | End: 2025-04-13 | Stop reason: HOSPADM

## 2025-04-09 RX ORDER — ONDANSETRON 2 MG/ML
4 INJECTION INTRAMUSCULAR; INTRAVENOUS EVERY 6 HOURS PRN
Status: DISCONTINUED | OUTPATIENT
Start: 2025-04-09 | End: 2025-04-13 | Stop reason: HOSPADM

## 2025-04-09 RX ORDER — ASPIRIN 81 MG/1
TABLET, CHEWABLE ORAL
Status: COMPLETED
Start: 2025-04-09 | End: 2025-04-09

## 2025-04-09 RX ORDER — ASPIRIN 300 MG/1
300 SUPPOSITORY RECTAL DAILY
Status: DISCONTINUED | OUTPATIENT
Start: 2025-04-09 | End: 2025-04-09

## 2025-04-09 RX ORDER — IOPAMIDOL 755 MG/ML
100 INJECTION, SOLUTION INTRAVASCULAR
Status: COMPLETED | OUTPATIENT
Start: 2025-04-09 | End: 2025-04-09

## 2025-04-09 RX ORDER — SODIUM CHLORIDE 9 MG/ML
INJECTION, SOLUTION INTRAVENOUS PRN
Status: DISCONTINUED | OUTPATIENT
Start: 2025-04-09 | End: 2025-04-13 | Stop reason: HOSPADM

## 2025-04-09 RX ORDER — ASPIRIN 81 MG/1
81 TABLET, CHEWABLE ORAL ONCE
Status: COMPLETED | OUTPATIENT
Start: 2025-04-09 | End: 2025-04-09

## 2025-04-09 RX ORDER — ONDANSETRON 4 MG/1
4 TABLET, ORALLY DISINTEGRATING ORAL EVERY 8 HOURS PRN
Status: DISCONTINUED | OUTPATIENT
Start: 2025-04-09 | End: 2025-04-13 | Stop reason: HOSPADM

## 2025-04-09 RX ORDER — ASPIRIN 81 MG/1
81 TABLET, CHEWABLE ORAL DAILY
Status: DISCONTINUED | OUTPATIENT
Start: 2025-04-09 | End: 2025-04-09

## 2025-04-09 RX ORDER — SODIUM CHLORIDE 0.9 % (FLUSH) 0.9 %
5-40 SYRINGE (ML) INJECTION EVERY 12 HOURS SCHEDULED
Status: DISCONTINUED | OUTPATIENT
Start: 2025-04-09 | End: 2025-04-13 | Stop reason: HOSPADM

## 2025-04-09 RX ORDER — BUSPIRONE HYDROCHLORIDE 15 MG/1
7.5 TABLET ORAL 2 TIMES DAILY
Status: DISCONTINUED | OUTPATIENT
Start: 2025-04-09 | End: 2025-04-13 | Stop reason: HOSPADM

## 2025-04-09 RX ORDER — ASPIRIN 300 MG/1
300 SUPPOSITORY RECTAL DAILY
Status: DISCONTINUED | OUTPATIENT
Start: 2025-04-10 | End: 2025-04-13 | Stop reason: HOSPADM

## 2025-04-09 RX ORDER — METOPROLOL TARTRATE 50 MG
50 TABLET ORAL
Status: DISCONTINUED | OUTPATIENT
Start: 2025-04-10 | End: 2025-04-13 | Stop reason: HOSPADM

## 2025-04-09 RX ORDER — POLYETHYLENE GLYCOL 3350 17 G/17G
17 POWDER, FOR SOLUTION ORAL DAILY PRN
Status: DISCONTINUED | OUTPATIENT
Start: 2025-04-09 | End: 2025-04-13 | Stop reason: HOSPADM

## 2025-04-09 RX ORDER — ENOXAPARIN SODIUM 100 MG/ML
40 INJECTION SUBCUTANEOUS
Status: DISCONTINUED | OUTPATIENT
Start: 2025-04-09 | End: 2025-04-11

## 2025-04-09 RX ORDER — SODIUM CHLORIDE 0.9 % (FLUSH) 0.9 %
5-40 SYRINGE (ML) INJECTION PRN
Status: DISCONTINUED | OUTPATIENT
Start: 2025-04-09 | End: 2025-04-13 | Stop reason: HOSPADM

## 2025-04-09 RX ORDER — PANTOPRAZOLE SODIUM 40 MG/1
40 TABLET, DELAYED RELEASE ORAL
Status: DISCONTINUED | OUTPATIENT
Start: 2025-04-10 | End: 2025-04-13 | Stop reason: HOSPADM

## 2025-04-09 RX ORDER — ATORVASTATIN CALCIUM 20 MG/1
80 TABLET, FILM COATED ORAL NIGHTLY
Status: DISCONTINUED | OUTPATIENT
Start: 2025-04-09 | End: 2025-04-13 | Stop reason: HOSPADM

## 2025-04-09 RX ORDER — IOPAMIDOL 755 MG/ML
INJECTION, SOLUTION INTRAVASCULAR
Status: DISPENSED
Start: 2025-04-09 | End: 2025-04-10

## 2025-04-09 RX ORDER — ASPIRIN 81 MG/1
81 TABLET, CHEWABLE ORAL DAILY
Status: DISCONTINUED | OUTPATIENT
Start: 2025-04-10 | End: 2025-04-13 | Stop reason: HOSPADM

## 2025-04-09 RX ADMIN — TRAMADOL HYDROCHLORIDE 50 MG: 50 TABLET, COATED ORAL at 17:58

## 2025-04-09 RX ADMIN — ASPIRIN 81 MG: 81 TABLET, CHEWABLE ORAL at 16:47

## 2025-04-09 RX ADMIN — IOPAMIDOL 100 ML: 755 INJECTION, SOLUTION INTRAVENOUS at 15:30

## 2025-04-09 RX ADMIN — BUSPIRONE HYDROCHLORIDE 7.5 MG: 15 TABLET ORAL at 21:16

## 2025-04-09 RX ADMIN — ATORVASTATIN CALCIUM 80 MG: 20 TABLET, FILM COATED ORAL at 21:16

## 2025-04-09 RX ADMIN — TRAMADOL HYDROCHLORIDE 50 MG: 50 TABLET, COATED ORAL at 23:43

## 2025-04-09 RX ADMIN — ENOXAPARIN SODIUM 40 MG: 100 INJECTION SUBCUTANEOUS at 21:16

## 2025-04-09 RX ADMIN — SODIUM CHLORIDE, PRESERVATIVE FREE 10 ML: 5 INJECTION INTRAVENOUS at 21:22

## 2025-04-09 ASSESSMENT — PAIN DESCRIPTION - LOCATION: LOCATION: ARM;MOUTH

## 2025-04-09 ASSESSMENT — PAIN - FUNCTIONAL ASSESSMENT: PAIN_FUNCTIONAL_ASSESSMENT: ACTIVITIES ARE NOT PREVENTED

## 2025-04-09 ASSESSMENT — LIFESTYLE VARIABLES
HOW OFTEN DO YOU HAVE A DRINK CONTAINING ALCOHOL: 2-3 TIMES A WEEK
HOW MANY STANDARD DRINKS CONTAINING ALCOHOL DO YOU HAVE ON A TYPICAL DAY: 1 OR 2

## 2025-04-09 ASSESSMENT — PAIN DESCRIPTION - DESCRIPTORS: DESCRIPTORS: SORE

## 2025-04-09 ASSESSMENT — PAIN DESCRIPTION - ORIENTATION: ORIENTATION: RIGHT

## 2025-04-09 ASSESSMENT — PAIN SCALES - GENERAL: PAINLEVEL_OUTOF10: 4

## 2025-04-09 NOTE — ED PROVIDER NOTES
[facility] [place] [facility]  EMERGENCY DEPARTMENT ENCOUNTER      Pt Name: Ayleen Karimi  MRN: [id number]  Birthdate 1950  Date of evaluation: 4/9/2025  Provider: Ja Miranda DO    CHIEF COMPLAINT       Chief Complaint   Patient presents with    Abnormal MRI         HISTORY OF PRESENT ILLNESS   (Location/Symptom, Timing/Onset, Context/Setting, Quality, Duration, Modifying Factors, Severity)  Note limiting factors.   Patient is a 74-year-old female history of fibromyalgia, hypertension, CKD, GERD, anxiety, TBI presenting today with an abnormal MRI.  For about a month now she has had visual changes.  She has difficulty describing them exactly but describes seeing circular structures and also describes hallucinations at times.  Denies diplopia, sometimes cannot see certain objects.  She has had no other symptoms from a neurologic perspective such as headache, neck pain, focal weakness/numbness, dizziness however had an MRI yesterday ordered by her ophthalmologist which was abnormal and showed stroke.  She is not on blood thinners.  She was sent here for further evaluation.    Per chart review MRI showed  Small to moderate foci of acute infarction predominantly cortically based right  lateral occipital lobe, right medial occipital lobe, right parietal and right  frontal lobe.              Review of External Medical Records:     Nursing Notes were reviewed.    REVIEW OF SYSTEMS    (2-9 systems for level 4, 10 or more for level 5)     Review of Systems   Eyes:  Positive for visual disturbance.       Except as noted above the remainder of the review of systems was reviewed and negative.       PAST MEDICAL HISTORY     Past Medical History:   Diagnosis Date    Abnormal Pap smear of cervix 11/2018    ASCUS.  s/p DOROTHY 9/2019    Adverse effect of anesthesia     DIFFICULTY AWAKENING X 1    Arthritis     osteoarthritis-knees    Benign essential HTN     Chronic kidney disease 08/01/2022

## 2025-04-09 NOTE — ED NOTES
Neuro Quick Assessment: Patient reports she has been having vision problems for the last year- reports she got an outpatient MRI and was told to come to the ED for further evaluation.    Patient denies any new symptoms and denies any acute complaints. Discussed case with ED providers- No Code Stroke at this time.

## 2025-04-09 NOTE — ED TRIAGE NOTES
Pt arrives with complaint of vision changes for weeks. Pt had a MRI yesterday here and they called her today and told her to go to the ER for possible stroke.

## 2025-04-09 NOTE — H&P
Hospitalist Admission Note      NAME:  Ayleen Karimi   :  1950   MRN:  [id number]     Date/Time:  2025 3:34 PM    Patient PCP: Bryan Logan MD    ________________________________________________________________________    Given the patient's current clinical presentation, I have a high level of concern for decompensation if discharged from the emergency department.  Complex decision making was performed, which includes reviewing the patient's available past medical records, laboratory results, and x-ray films.       My assessment of this patient's clinical condition and my plan of care is as follows.    Assessment / Plan:  Patient is a 74-year-old female with a history of hypertension, fibromyalgia, anxiety and depression, GERD comes to the hospital with acute CVA.  Patient admitted for further stroke workup.    Acute CVA  Patient had an MRI done yesterday which showed small to moderate focus of acute infarction predominantly cortically based right lateral occipital lobe, right middle hospital lobe, right parietal and right frontal lobe.  Probably embolic phenomena.  CTA head and neck.  Neurology consult  Echocardiogram  Lipid panel, hemoglobin A1c  Aspirin and statins.    2.  Anxiety and depression  Continue buspirone    3.  Hypothyroidism  Continue Synthroid 125 mcg daily.    5.  Hypertension  Continue metoprolol    6.  GERD  Continue PPI             I have personally reviewed the radiographs, laboratory data in Epic and decisions and statements above are based partially on this personal interpretation.    Code Status: Full Code  DVT Prophylaxis: Lovenox  GI Prophylaxis: PPI    Plan of care discussed with the patient and her sister at bedside.     Subjective:   CHIEF COMPLAINT: \"Abnormal MRI\"    HISTORY OF PRESENT ILLNESS:     Ayleen is a very pleasant 74-year-old female with a history of fibromyalgia, hypertension, GERD, anxiety, TBI, CKD comes to the hospital with abnormal MRI.  Patient is

## 2025-04-09 NOTE — PROGRESS NOTES
1700 TRANSFER - IN REPORT:    Verbal report received from LEYLA Sanford on Ayleen Karimi  being received from ED for routine progression of patient care      Report consisted of patient's Situation, Background, Assessment and   Recommendations(SBAR).     Information from the following report(s) Nurse Handoff Report, Recent Results, Med Rec Status, and Cardiac Rhythm NSR  was reviewed with the receiving nurse.    Opportunity for questions and clarification was provided.      Assessment completed upon patient's arrival to unit and care assumed.

## 2025-04-10 ENCOUNTER — TELEPHONE (OUTPATIENT)
Facility: HOSPITAL | Age: 75
End: 2025-04-10

## 2025-04-10 ENCOUNTER — APPOINTMENT (OUTPATIENT)
Facility: HOSPITAL | Age: 75
DRG: 065 | End: 2025-04-10
Attending: HOSPITALIST
Payer: MEDICARE

## 2025-04-10 ENCOUNTER — APPOINTMENT (OUTPATIENT)
Facility: HOSPITAL | Age: 75
DRG: 065 | End: 2025-04-10
Attending: SPECIALIST
Payer: MEDICARE

## 2025-04-10 ENCOUNTER — APPOINTMENT (OUTPATIENT)
Dept: VASCULAR SURGERY | Facility: HOSPITAL | Age: 75
DRG: 065 | End: 2025-04-10
Payer: MEDICARE

## 2025-04-10 LAB
CHOLEST SERPL-MCNC: 164 MG/DL
ECHO AO ARCH DIAM: 2.7 CM
ECHO AV AREA PEAK VELOCITY: 2.3 CM2
ECHO AV AREA VTI: 2.2 CM2
ECHO AV AREA/BSA PEAK VELOCITY: 1.1 CM2/M2
ECHO AV AREA/BSA VTI: 1 CM2/M2
ECHO AV MEAN GRADIENT: 6 MMHG
ECHO AV MEAN VELOCITY: 1.1 M/S
ECHO AV PEAK GRADIENT: 10 MMHG
ECHO AV PEAK VELOCITY: 1.6 M/S
ECHO AV VELOCITY RATIO: 0.75
ECHO AV VTI: 39.3 CM
ECHO BSA: 2.21 M2
ECHO LA VOL A-L A2C: 51 ML (ref 22–52)
ECHO LA VOL A-L A4C: 46 ML (ref 22–52)
ECHO LA VOL BP: 47 ML (ref 22–52)
ECHO LA VOL MOD A2C: 49 ML (ref 22–52)
ECHO LA VOL MOD A4C: 44 ML (ref 22–52)
ECHO LA VOL/BSA BIPLANE: 22 ML/M2 (ref 16–34)
ECHO LA VOLUME AREA LENGTH: 50 ML
ECHO LA VOLUME INDEX A-L A2C: 24 ML/M2 (ref 16–34)
ECHO LA VOLUME INDEX A-L A4C: 22 ML/M2 (ref 16–34)
ECHO LA VOLUME INDEX AREA LENGTH: 24 ML/M2 (ref 16–34)
ECHO LA VOLUME INDEX MOD A2C: 23 ML/M2 (ref 16–34)
ECHO LA VOLUME INDEX MOD A4C: 21 ML/M2 (ref 16–34)
ECHO LV E' LATERAL VELOCITY: 8.94 CM/S
ECHO LV E' SEPTAL VELOCITY: 6.1 CM/S
ECHO LV EDV A2C: 76 ML
ECHO LV EDV A4C: 71 ML
ECHO LV EDV BP: 74 ML (ref 56–104)
ECHO LV EDV INDEX A4C: 34 ML/M2
ECHO LV EDV INDEX BP: 35 ML/M2
ECHO LV EDV NDEX A2C: 36 ML/M2
ECHO LV EF PHYSICIAN: 65 %
ECHO LV EJECTION FRACTION A2C: 71 %
ECHO LV EJECTION FRACTION A4C: 73 %
ECHO LV ESV A2C: 22 ML
ECHO LV ESV A4C: 19 ML
ECHO LV ESV BP: 21 ML (ref 19–49)
ECHO LV ESV INDEX A2C: 10 ML/M2
ECHO LV ESV INDEX A4C: 9 ML/M2
ECHO LV ESV INDEX BP: 10 ML/M2
ECHO LV FRACTIONAL SHORTENING: 33 % (ref 28–44)
ECHO LV INTERNAL DIMENSION DIASTOLE INDEX: 2 CM/M2
ECHO LV INTERNAL DIMENSION DIASTOLIC: 4.2 CM (ref 3.9–5.3)
ECHO LV INTERNAL DIMENSION SYSTOLIC INDEX: 1.33 CM/M2
ECHO LV INTERNAL DIMENSION SYSTOLIC: 2.8 CM
ECHO LV IVSD: 0.7 CM (ref 0.6–0.9)
ECHO LV MASS 2D: 85.1 G (ref 67–162)
ECHO LV MASS INDEX 2D: 40.5 G/M2 (ref 43–95)
ECHO LV POSTERIOR WALL DIASTOLIC: 0.7 CM (ref 0.6–0.9)
ECHO LV RELATIVE WALL THICKNESS RATIO: 0.33
ECHO LVOT AREA: 3.1 CM2
ECHO LVOT AV VTI INDEX: 0.74
ECHO LVOT DIAM: 2 CM
ECHO LVOT MEAN GRADIENT: 3 MMHG
ECHO LVOT PEAK GRADIENT: 6 MMHG
ECHO LVOT PEAK VELOCITY: 1.2 M/S
ECHO LVOT STROKE VOLUME INDEX: 43.4 ML/M2
ECHO LVOT SV: 91.1 ML
ECHO LVOT VTI: 29 CM
ECHO MV A VELOCITY: 0.94 M/S
ECHO MV E DECELERATION TIME (DT): 286.6 MS
ECHO MV E VELOCITY: 0.94 M/S
ECHO MV E/A RATIO: 1
ECHO MV E/E' LATERAL: 10.51
ECHO MV E/E' RATIO (AVERAGED): 12.96
ECHO MV E/E' SEPTAL: 15.41
ECHO RV FREE WALL PEAK S': 9.4 CM/S
ECHO RV INTERNAL DIMENSION: 3.9 CM
ECHO RV TAPSE: 2 CM (ref 1.7–?)
EKG ATRIAL RATE: 68 BPM
EKG DIAGNOSIS: NORMAL
EKG P AXIS: 45 DEGREES
EKG P-R INTERVAL: 200 MS
EKG Q-T INTERVAL: 394 MS
EKG QRS DURATION: 90 MS
EKG QTC CALCULATION (BAZETT): 418 MS
EKG R AXIS: 48 DEGREES
EKG T AXIS: 59 DEGREES
EKG VENTRICULAR RATE: 68 BPM
ERYTHROCYTE [DISTWIDTH] IN BLOOD BY AUTOMATED COUNT: 12.7 % (ref 11.5–14.5)
EST. AVERAGE GLUCOSE BLD GHB EST-MCNC: 114 MG/DL
HBA1C MFR BLD: 5.6 % (ref 4–5.6)
HCT VFR BLD AUTO: 38.6 % (ref 35–47)
HDLC SERPL-MCNC: 25 MG/DL
HDLC SERPL: 6.6 (ref 0–5)
HGB BLD-MCNC: 12.8 G/DL (ref 11.5–16)
LDLC SERPL CALC-MCNC: 97.8 MG/DL (ref 0–100)
MCH RBC QN AUTO: 31.8 PG (ref 26–34)
MCHC RBC AUTO-ENTMCNC: 33.2 G/DL (ref 30–36.5)
MCV RBC AUTO: 96 FL (ref 80–99)
NRBC # BLD: 0 K/UL (ref 0–0.01)
NRBC BLD-RTO: 0 PER 100 WBC
PLATELET # BLD AUTO: 166 K/UL (ref 150–400)
PMV BLD AUTO: 12 FL (ref 8.9–12.9)
RBC # BLD AUTO: 4.02 M/UL (ref 3.8–5.2)
TRIGL SERPL-MCNC: 206 MG/DL
VLDLC SERPL CALC-MCNC: 41.2 MG/DL
WBC # BLD AUTO: 8 K/UL (ref 3.6–11)

## 2025-04-10 PROCEDURE — 80061 LIPID PANEL: CPT

## 2025-04-10 PROCEDURE — 2500000003 HC RX 250 WO HCPCS: Performed by: HOSPITALIST

## 2025-04-10 PROCEDURE — 6370000000 HC RX 637 (ALT 250 FOR IP): Performed by: HOSPITALIST

## 2025-04-10 PROCEDURE — 97162 PT EVAL MOD COMPLEX 30 MIN: CPT

## 2025-04-10 PROCEDURE — 6370000000 HC RX 637 (ALT 250 FOR IP): Performed by: NURSE PRACTITIONER

## 2025-04-10 PROCEDURE — 85027 COMPLETE CBC AUTOMATED: CPT

## 2025-04-10 PROCEDURE — 83036 HEMOGLOBIN GLYCOSYLATED A1C: CPT

## 2025-04-10 PROCEDURE — 97165 OT EVAL LOW COMPLEX 30 MIN: CPT

## 2025-04-10 PROCEDURE — 97116 GAIT TRAINING THERAPY: CPT

## 2025-04-10 PROCEDURE — 99223 1ST HOSP IP/OBS HIGH 75: CPT | Performed by: NURSE PRACTITIONER

## 2025-04-10 PROCEDURE — 94761 N-INVAS EAR/PLS OXIMETRY MLT: CPT

## 2025-04-10 PROCEDURE — 97112 NEUROMUSCULAR REEDUCATION: CPT

## 2025-04-10 PROCEDURE — 93010 ELECTROCARDIOGRAM REPORT: CPT | Performed by: SPECIALIST

## 2025-04-10 PROCEDURE — C8929 TTE W OR WO FOL WCON,DOPPLER: HCPCS

## 2025-04-10 PROCEDURE — 93306 TTE W/DOPPLER COMPLETE: CPT | Performed by: SPECIALIST

## 2025-04-10 PROCEDURE — 97535 SELF CARE MNGMENT TRAINING: CPT

## 2025-04-10 PROCEDURE — 6360000004 HC RX CONTRAST MEDICATION: Performed by: INTERNAL MEDICINE

## 2025-04-10 PROCEDURE — 2060000000 HC ICU INTERMEDIATE R&B

## 2025-04-10 PROCEDURE — 6360000002 HC RX W HCPCS: Performed by: HOSPITALIST

## 2025-04-10 RX ORDER — CLOPIDOGREL BISULFATE 75 MG/1
75 TABLET ORAL DAILY
Status: DISCONTINUED | OUTPATIENT
Start: 2025-04-10 | End: 2025-04-13 | Stop reason: HOSPADM

## 2025-04-10 RX ADMIN — METOPROLOL TARTRATE 50 MG: 50 TABLET, FILM COATED ORAL at 08:19

## 2025-04-10 RX ADMIN — BUSPIRONE HYDROCHLORIDE 7.5 MG: 15 TABLET ORAL at 08:19

## 2025-04-10 RX ADMIN — LEVOTHYROXINE SODIUM 125 MCG: 0.03 TABLET ORAL at 06:39

## 2025-04-10 RX ADMIN — TRAMADOL HYDROCHLORIDE 50 MG: 50 TABLET, COATED ORAL at 06:39

## 2025-04-10 RX ADMIN — CLOPIDOGREL BISULFATE 75 MG: 75 TABLET, FILM COATED ORAL at 10:28

## 2025-04-10 RX ADMIN — BUSPIRONE HYDROCHLORIDE 7.5 MG: 15 TABLET ORAL at 20:58

## 2025-04-10 RX ADMIN — ATORVASTATIN CALCIUM 80 MG: 20 TABLET, FILM COATED ORAL at 20:58

## 2025-04-10 RX ADMIN — SODIUM CHLORIDE, PRESERVATIVE FREE 10 ML: 5 INJECTION INTRAVENOUS at 08:19

## 2025-04-10 RX ADMIN — PANTOPRAZOLE SODIUM 40 MG: 40 TABLET, DELAYED RELEASE ORAL at 06:39

## 2025-04-10 RX ADMIN — TRAMADOL HYDROCHLORIDE 50 MG: 50 TABLET, COATED ORAL at 20:58

## 2025-04-10 RX ADMIN — SODIUM CHLORIDE, PRESERVATIVE FREE 10 ML: 5 INJECTION INTRAVENOUS at 20:58

## 2025-04-10 RX ADMIN — SULFUR HEXAFLUORIDE 2 ML: KIT at 10:34

## 2025-04-10 RX ADMIN — ENOXAPARIN SODIUM 40 MG: 100 INJECTION SUBCUTANEOUS at 21:00

## 2025-04-10 RX ADMIN — ASPIRIN 81 MG: 81 TABLET, CHEWABLE ORAL at 08:19

## 2025-04-10 ASSESSMENT — PAIN SCALES - GENERAL
PAINLEVEL_OUTOF10: 6
PAINLEVEL_OUTOF10: 1
PAINLEVEL_OUTOF10: 5
PAINLEVEL_OUTOF10: 3

## 2025-04-10 ASSESSMENT — PAIN DESCRIPTION - DESCRIPTORS
DESCRIPTORS: ACHING;SORE
DESCRIPTORS: ACHING;SORE

## 2025-04-10 ASSESSMENT — PAIN - FUNCTIONAL ASSESSMENT
PAIN_FUNCTIONAL_ASSESSMENT: ACTIVITIES ARE NOT PREVENTED
PAIN_FUNCTIONAL_ASSESSMENT: ACTIVITIES ARE NOT PREVENTED

## 2025-04-10 ASSESSMENT — PAIN DESCRIPTION - ORIENTATION
ORIENTATION: RIGHT;LOWER
ORIENTATION: RIGHT;LOWER

## 2025-04-10 NOTE — PROGRESS NOTES
0700 : Bedside and Verbal shift change report given to Alem RN (oncoming nurse) by Shyanne RN (offgoing nurse). Report included the following information Nurse Handoff Report, Recent Results, Cardiac Rhythm NSR, and Event Log.       1900 : Bedside and Verbal shift change report given to Shyanne RN (oncoming nurse) by Alem RN (offgoing nurse). Report included the following information Nurse Handoff Report, Recent Results, Cardiac Rhythm NSR, Neuro Assessment, and Event Log.

## 2025-04-10 NOTE — CONSULTS
stenosis and atherosclerosis  Neurochecks:  Every 4 hours  BP Goal: Less than 160  Telemetry for at least 24 hours  Follow-up in [facility] with Dr. Little for 2 mm A1 aneurysm    Stroke work up  A1C: 5.6, continue current management  LDL: 97.8, start high intensity statin  TTE:    Carotid vascular imaging: No stenosis on CTA    Risk factors for stroke include: hyperlipidemia, hypertension, migraines, obesity, physical inactivity, and smoking  Discussed with patient    Discussed signs/symptoms of stroke and when to call 911  Discussed [place] driving laws and that patient cannot drive for 6 months      Thank you for allowing the Neurology Service the pleasure of participating in the care of your patient.  No additional inpatient neurologic workup at this time.  Patient to follow-up in [facility] and [facility], both clinics will call patient with an appointment.     Physical Exam    Patient Vitals for the past 12 hrs:   Temp Pulse Resp BP SpO2   04/10/25 0819 -- 75 -- (!) 145/60 --   04/10/25 0727 97.7 °F (36.5 °C) 75 18 (!) 145/60 97 %   04/10/25 0639 -- -- 18 -- --   04/10/25 0301 97.9 °F (36.6 °C) 68 16 (!) 142/73 98 %   04/10/25 0013 -- -- 16 -- --   04/09/25 2343 -- -- 18 -- --   04/09/25 2300 -- 65 -- -- --   04/09/25 2215 97.9 °F (36.6 °C) 64 18 (!) 139/56 97 %       NEUROLOGICAL EXAM:         General:  Alert, anxious, cooperative, no acute distress  Mental Status: Oriented to time, place and person. Fully attentive. No aphasia. Full fund of knowledge. Normal recent and remote memory.      Cranial Nerves:   Visual Fields: Significant lateral visual field deficits in both eyes, worse on the left. EOM: no nystagmus. Facial movements:  symmetric, no ptosis. Facial sensation:  intact to LT on both sides.      Language:  no dysarthria, no aphasia. Tongue: midline.              Sensory:   LT and Temp intact in all extremities            Cerebellar:  No resting, no

## 2025-04-10 NOTE — CARE COORDINATION
Care Management Initial Assessment  4/10/2025 1:43 PM  If patient is discharged prior to next notation, then this note serves as note for discharge by case management.    Reason for Admission:   Acute ischemic stroke (HCC) [I63.9]  Acute cerebrovascular accident (CVA) (HCC) [I63.9]         Patient Admission Status: Inpatient  Date Admitted to INP: 4/9  RUR: Readmission Risk Score: 11.2    Hospitalization in the last 30 days (Readmission):  No        Advance Care Planning:  Code Status: Full Code  Primary Healthcare Decision Maker: (P) Named in Scanned ACP Document  Primary Decision Maker: Randi Rocha - Other - [phone]    Secondary Decision Maker: LianeNena - Child - [phone]   Advance Directive: has an advanced directive - a copy has been provided     __________________________________________________________________________  Assessment:      04/10/25 1340   Service Assessment   Patient Orientation Alert and Oriented   Cognition Alert   History Provided By Patient   Primary Caregiver Self   Support Systems Family Members   Patient's Healthcare Decision Maker is: Named in Scanned ACP Document   Prior Functional Level Independent in ADLs/IADLs   Discharge Planning   Type of Residence Acute Rehab   Living Arrangements Family Members   Patient expects to be discharged to: Acute rehab   Condition of Participation: Discharge Planning   The Patient and/or Patient Representative was provided with a Choice of Provider? Patient   The Patient and/Or Patient Representative agree with the Discharge Plan? Yes   Freedom of Choice list was provided with basic dialogue that supports the patient's individualized plan of care/goals, treatment preferences, and shares the quality data associated with the providers?  Yes     Comments: Patient with low readmission risk score of 11%. Please consult CM for any discharge planning needs that may arise. Therapy recs for IPR, patient +CVA. CM met with patient and sister Randi

## 2025-04-10 NOTE — PROGRESS NOTES
Hospitalist Progress Note      NAME: Ayleen Karimi   :  1950  MRM:  [id number]    Date/Time: 4/10/2025  5:55 PM    Ayleen is a very pleasant 74-year-old female with a history of fibromyalgia, hypertension, GERD, anxiety, TBI, CKD comes to the hospital with abnormal MRI.  Patient is complaining of some visual difficulties for almost 1 month but it got worse for last couple of weeks.  She is also complaining of some hallucinations.  She describes the visual difficulty as seeing circular structures.  She denies any double vision.  She cannot see certain objects sometimes.  Patient is also more forgetful and she had a fall couple of days back also.  She denies any focal weakness or numbness.  Patient had an MRI done yesterday which is positive for stroke.  Patient was told to come to the ER for further evaluation and management.  Patient denies any fever or chills.  No nausea vomiting diarrhea constipation.  When she arrived to the ER her blood pressure was 138/88, pulse 71, respiratory rate 16, temperature 98.1  Blood work showed a sodium 140, potassium 4.7, creatinine 1.27, glucose 104.  WBC 9.5, hemoglobin 13.5, platelet count 297.  MRI of the brain showed infarctions may be embolic in etiology.  Patient has small to moderate foci of acute infarction predominantly cortically based right lateral occipital lobe, right medial occipital lobe, right periatrial and right frontal lobe.        Subjective:     Chief Complaint: pt. Seen this am, sister at bedside  Feels better, denies HA. No focal weakness. Visual difficulties unchanged.      ROS:  (bold if positive, if negative)    Tolerating PT  Tolerating Diet          Objective:       Vitals:          Last 24hrs VS reviewed since prior progress note. Most recent are:    Vitals:    04/10/25 1531   BP: (!) 147/62   Pulse: 67   Resp: 16   Temp: 97.5 °F (36.4 °C)   SpO2: 98%     SpO2 Readings from Last 6 Encounters:   04/10/25 98%   24 96%   12/10/24 95%

## 2025-04-10 NOTE — TELEPHONE ENCOUNTER
Pt needs a hospital follow up appointment  Provider: Andreea Ramirez NP  When: 8 to 12 weeks  Diagnosis/reason for follow up: Stroke

## 2025-04-10 NOTE — TELEPHONE ENCOUNTER
Pt needs a hospital follow up appointment  Provider:  Dr. Little  When: Next available, nonurgent  Diagnosis/reason for follow up: 2 mm A2 aneurysm

## 2025-04-11 LAB
ANION GAP SERPL CALC-SCNC: 8 MMOL/L (ref 2–12)
BASOPHILS # BLD: 0.03 K/UL (ref 0–0.1)
BASOPHILS NFR BLD: 0.3 % (ref 0–1)
BUN SERPL-MCNC: 15 MG/DL (ref 6–20)
BUN/CREAT SERPL: 14 (ref 12–20)
CALCIUM SERPL-MCNC: 9.6 MG/DL (ref 8.5–10.1)
CHLORIDE SERPL-SCNC: 106 MMOL/L (ref 97–108)
CO2 SERPL-SCNC: 25 MMOL/L (ref 21–32)
CREAT SERPL-MCNC: 1.07 MG/DL (ref 0.55–1.02)
DIFFERENTIAL METHOD BLD: NORMAL
EOSINOPHIL # BLD: 0.35 K/UL (ref 0–0.4)
EOSINOPHIL NFR BLD: 4 % (ref 0–7)
ERYTHROCYTE [DISTWIDTH] IN BLOOD BY AUTOMATED COUNT: 12.5 % (ref 11.5–14.5)
ERYTHROCYTE [SEDIMENTATION RATE] IN BLOOD: 44 MM/HR (ref 0–30)
GLUCOSE SERPL-MCNC: 106 MG/DL (ref 65–100)
HCT VFR BLD AUTO: 39.7 % (ref 35–47)
HGB BLD-MCNC: 13 G/DL (ref 11.5–16)
IMM GRANULOCYTES # BLD AUTO: 0.02 K/UL (ref 0–0.04)
IMM GRANULOCYTES NFR BLD AUTO: 0.2 % (ref 0–0.5)
LYMPHOCYTES # BLD: 2.66 K/UL (ref 0.8–3.5)
LYMPHOCYTES NFR BLD: 30.1 % (ref 12–49)
MCH RBC QN AUTO: 31.3 PG (ref 26–34)
MCHC RBC AUTO-ENTMCNC: 32.7 G/DL (ref 30–36.5)
MCV RBC AUTO: 95.4 FL (ref 80–99)
MONOCYTES # BLD: 0.63 K/UL (ref 0–1)
MONOCYTES NFR BLD: 7.1 % (ref 5–13)
NEUTS SEG # BLD: 5.15 K/UL (ref 1.8–8)
NEUTS SEG NFR BLD: 58.3 % (ref 32–75)
NRBC # BLD: 0 K/UL (ref 0–0.01)
NRBC BLD-RTO: 0 PER 100 WBC
PLATELET # BLD AUTO: 235 K/UL (ref 150–400)
PMV BLD AUTO: 11.4 FL (ref 8.9–12.9)
POTASSIUM SERPL-SCNC: 4.5 MMOL/L (ref 3.5–5.1)
RBC # BLD AUTO: 4.16 M/UL (ref 3.8–5.2)
SODIUM SERPL-SCNC: 139 MMOL/L (ref 136–145)
WBC # BLD AUTO: 8.8 K/UL (ref 3.6–11)

## 2025-04-11 PROCEDURE — 6370000000 HC RX 637 (ALT 250 FOR IP): Performed by: NURSE PRACTITIONER

## 2025-04-11 PROCEDURE — 85025 COMPLETE CBC W/AUTO DIFF WBC: CPT

## 2025-04-11 PROCEDURE — 2500000003 HC RX 250 WO HCPCS: Performed by: HOSPITALIST

## 2025-04-11 PROCEDURE — 2060000000 HC ICU INTERMEDIATE R&B

## 2025-04-11 PROCEDURE — 6370000000 HC RX 637 (ALT 250 FOR IP): Performed by: INTERNAL MEDICINE

## 2025-04-11 PROCEDURE — 85652 RBC SED RATE AUTOMATED: CPT

## 2025-04-11 PROCEDURE — 80048 BASIC METABOLIC PNL TOTAL CA: CPT

## 2025-04-11 PROCEDURE — 6370000000 HC RX 637 (ALT 250 FOR IP): Performed by: HOSPITALIST

## 2025-04-11 PROCEDURE — 6360000002 HC RX W HCPCS: Performed by: INTERNAL MEDICINE

## 2025-04-11 PROCEDURE — 94761 N-INVAS EAR/PLS OXIMETRY MLT: CPT

## 2025-04-11 PROCEDURE — 97530 THERAPEUTIC ACTIVITIES: CPT

## 2025-04-11 PROCEDURE — 36415 COLL VENOUS BLD VENIPUNCTURE: CPT

## 2025-04-11 PROCEDURE — 97116 GAIT TRAINING THERAPY: CPT

## 2025-04-11 PROCEDURE — 97112 NEUROMUSCULAR REEDUCATION: CPT

## 2025-04-11 RX ORDER — OXYCODONE HYDROCHLORIDE 5 MG/1
2.5 TABLET ORAL EVERY 6 HOURS PRN
Refills: 0 | Status: DISCONTINUED | OUTPATIENT
Start: 2025-04-11 | End: 2025-04-13 | Stop reason: HOSPADM

## 2025-04-11 RX ORDER — ENOXAPARIN SODIUM 100 MG/ML
30 INJECTION SUBCUTANEOUS 2 TIMES DAILY
Status: DISCONTINUED | OUTPATIENT
Start: 2025-04-11 | End: 2025-04-13 | Stop reason: HOSPADM

## 2025-04-11 RX ADMIN — SODIUM CHLORIDE, PRESERVATIVE FREE 10 ML: 5 INJECTION INTRAVENOUS at 20:46

## 2025-04-11 RX ADMIN — TRAMADOL HYDROCHLORIDE 50 MG: 50 TABLET, COATED ORAL at 12:51

## 2025-04-11 RX ADMIN — OXYCODONE HYDROCHLORIDE 2.5 MG: 5 TABLET ORAL at 18:01

## 2025-04-11 RX ADMIN — TRAMADOL HYDROCHLORIDE 50 MG: 50 TABLET, COATED ORAL at 20:44

## 2025-04-11 RX ADMIN — CLOPIDOGREL BISULFATE 75 MG: 75 TABLET, FILM COATED ORAL at 10:06

## 2025-04-11 RX ADMIN — TRAMADOL HYDROCHLORIDE 50 MG: 50 TABLET, COATED ORAL at 05:14

## 2025-04-11 RX ADMIN — ASPIRIN 81 MG: 81 TABLET, CHEWABLE ORAL at 10:07

## 2025-04-11 RX ADMIN — PANTOPRAZOLE SODIUM 40 MG: 40 TABLET, DELAYED RELEASE ORAL at 05:14

## 2025-04-11 RX ADMIN — ENOXAPARIN SODIUM 30 MG: 100 INJECTION SUBCUTANEOUS at 20:45

## 2025-04-11 RX ADMIN — BUSPIRONE HYDROCHLORIDE 7.5 MG: 15 TABLET ORAL at 20:44

## 2025-04-11 RX ADMIN — METOPROLOL TARTRATE 50 MG: 50 TABLET, FILM COATED ORAL at 10:06

## 2025-04-11 RX ADMIN — LEVOTHYROXINE SODIUM 125 MCG: 0.03 TABLET ORAL at 05:14

## 2025-04-11 RX ADMIN — ATORVASTATIN CALCIUM 80 MG: 20 TABLET, FILM COATED ORAL at 20:44

## 2025-04-11 RX ADMIN — BUSPIRONE HYDROCHLORIDE 7.5 MG: 15 TABLET ORAL at 10:06

## 2025-04-11 ASSESSMENT — PAIN DESCRIPTION - LOCATION
LOCATION: MOUTH
LOCATION: FACE
LOCATION: MOUTH
LOCATION: FACE
LOCATION: MOUTH

## 2025-04-11 ASSESSMENT — PAIN DESCRIPTION - ORIENTATION
ORIENTATION: RIGHT
ORIENTATION: UPPER
ORIENTATION: UPPER
ORIENTATION: RIGHT
ORIENTATION: RIGHT
ORIENTATION: MID

## 2025-04-11 ASSESSMENT — PAIN DESCRIPTION - DESCRIPTORS
DESCRIPTORS: ACHING

## 2025-04-11 ASSESSMENT — PAIN SCALES - GENERAL
PAINLEVEL_OUTOF10: 2
PAINLEVEL_OUTOF10: 4
PAINLEVEL_OUTOF10: 6
PAINLEVEL_OUTOF10: 8
PAINLEVEL_OUTOF10: 2
PAINLEVEL_OUTOF10: 4
PAINLEVEL_OUTOF10: 6
PAINLEVEL_OUTOF10: 7

## 2025-04-11 NOTE — CARE COORDINATION
Care Management Progress Note    Reason for Admission:   Acute ischemic stroke (HCC) [I63.9]  Acute cerebrovascular accident (CVA) (HCC) [I63.9]         Patient Admission Status: Inpatient  RUR:   Hospitalization in the last 30 days (Readmission):  No        Transition of care plan:  Medical - ongoing medical management per IDR  DC plan - patient accepted at [facility], bed may be available over weekend. [facility] weekend liaison is Ashlyn at [phone], weekend CM to follow up on bed.  If SNF or IPR:  Date FOC offered:   Accepting facility: [facility]  Date authorization started with reference number:   Date authorization received and expires:   Discharge plan communicated with patient and/or discharge caregiver: Yes    Date 1st IMM letter given:   Outpatient follow-up.  Transport at discharge:  TBD

## 2025-04-11 NOTE — PROGRESS NOTES
Asked to return to bedside for family concern of possible giant cell arteritis.  Sister at bedside tells me that their mother had giant cell arteritis and it presented as pain on the side of the face and jaw.  Ms. Karimi tells me that she has a remote history of migraine but has not had one in years and that this morning she woke up with a slight headache on the right side of her face.  Ms. Karimi does have reduced/blurred peripheral vision due to her stroke, she tells me that it is unchanged from yesterday.  With palpation, patient endorses tenderness in both temples.  Low suspicion for giant cell arteritis however will check sed rate.  Dr. Dang takes over the service this afternoon, please reach out to him with questions or concerns.

## 2025-04-11 NOTE — PROGRESS NOTES
Hospitalist Progress Note      NAME: Ayleen Karimi   :  1950  MRM:  [id number]    Date/Time: 2025  12:02 PM    Ayleen is a very pleasant 74-year-old female with a history of fibromyalgia, hypertension, GERD, anxiety, TBI, CKD comes to the hospital with abnormal MRI.  Patient is complaining of some visual difficulties for almost 1 month but it got worse for last couple of weeks.  She is also complaining of some hallucinations.  She describes the visual difficulty as seeing circular structures.  She denies any double vision.  She cannot see certain objects sometimes.  Patient is also more forgetful and she had a fall couple of days back also.  She denies any focal weakness or numbness.  Patient had an MRI done yesterday which is positive for stroke.  Patient was told to come to the ER for further evaluation and management.  Patient denies any fever or chills.  No nausea vomiting diarrhea constipation.  When she arrived to the ER her blood pressure was 138/88, pulse 71, respiratory rate 16, temperature 98.1  Blood work showed a sodium 140, potassium 4.7, creatinine 1.27, glucose 104.  WBC 9.5, hemoglobin 13.5, platelet count 297.  MRI of the brain showed infarctions may be embolic in etiology.  Patient has small to moderate foci of acute infarction predominantly cortically based right lateral occipital lobe, right medial occipital lobe, right periatrial and right frontal lobe.        Subjective:     Chief Complaint: pt. Seen this am, sister at bedside  She reports intermittent jaw pain and b/l HA, that has occurred in the past. Vision changes from the past month are unchanged.   Sister reports hx of giant cell arteritis in their mother.   Discussed with family that symptoms likely due to CVA, but will discuss with neuro, will likely benefit from checking ESR     ROS:  (bold if positive, if negative)  BLURRY VISION, HEADACHE, JAW PAIN   Tolerating PT  Tolerating Diet          Objective:       Vitals:

## 2025-04-11 NOTE — PROGRESS NOTES
[facility] Lovenox Dosing 04/11/25  Lovenox dose change per protocol  Physician: Dr Armstrong    [facility] Protocol  Enoxaparin prophylaxis dosing (medically ill, surgical patients)   Patient Weight (kg)     50 and below 51 - 100 101 - 150 151 - 174 175 or greater         Estimated CrCl  (ml/min) 30 or greater   30 mg SUBQ daily   40 mg SUBQ daily (or 30 mg BID for ortho) 30 mg SUBQ BID  40 mg SUBQ BID 60mg SUBQ BID      15-29 UFH 5000 units SUBQ BID   30 mg SUBQ daily 30 mg SUBQ daily 40 mg SUBQ daily   60 mg SUBQ daily      Less than 15 or Dialysis UFH 5000 units SUBQ BID   UFH 5000 units SUBQ TID UFH 7500 units SUBQ TID     Estimated Creatinine Clearance: 53 mL/min (A) (based on SCr of 1.07 mg/dL (H)).  Current dose: Lovenox 40 mg SC daily  Recommendation: Lovenox 30 mg SC q12hr    Thank you  CANDY CHANDLER, [facility]  [phone]

## 2025-04-12 PROCEDURE — 6360000002 HC RX W HCPCS: Performed by: INTERNAL MEDICINE

## 2025-04-12 PROCEDURE — 2500000003 HC RX 250 WO HCPCS: Performed by: HOSPITALIST

## 2025-04-12 PROCEDURE — 94761 N-INVAS EAR/PLS OXIMETRY MLT: CPT

## 2025-04-12 PROCEDURE — 6370000000 HC RX 637 (ALT 250 FOR IP): Performed by: HOSPITALIST

## 2025-04-12 PROCEDURE — 6370000000 HC RX 637 (ALT 250 FOR IP): Performed by: NURSE PRACTITIONER

## 2025-04-12 PROCEDURE — 6370000000 HC RX 637 (ALT 250 FOR IP): Performed by: STUDENT IN AN ORGANIZED HEALTH CARE EDUCATION/TRAINING PROGRAM

## 2025-04-12 PROCEDURE — 99233 SBSQ HOSP IP/OBS HIGH 50: CPT | Performed by: PSYCHIATRY & NEUROLOGY

## 2025-04-12 PROCEDURE — 97535 SELF CARE MNGMENT TRAINING: CPT

## 2025-04-12 PROCEDURE — 2060000000 HC ICU INTERMEDIATE R&B

## 2025-04-12 RX ORDER — LOSARTAN POTASSIUM 25 MG/1
25 TABLET ORAL DAILY
Status: DISCONTINUED | OUTPATIENT
Start: 2025-04-12 | End: 2025-04-13 | Stop reason: HOSPADM

## 2025-04-12 RX ADMIN — ATORVASTATIN CALCIUM 80 MG: 20 TABLET, FILM COATED ORAL at 22:08

## 2025-04-12 RX ADMIN — BUSPIRONE HYDROCHLORIDE 7.5 MG: 15 TABLET ORAL at 10:21

## 2025-04-12 RX ADMIN — LEVOTHYROXINE SODIUM 125 MCG: 0.03 TABLET ORAL at 06:38

## 2025-04-12 RX ADMIN — SODIUM CHLORIDE, PRESERVATIVE FREE 10 ML: 5 INJECTION INTRAVENOUS at 10:22

## 2025-04-12 RX ADMIN — ASPIRIN 81 MG: 81 TABLET, CHEWABLE ORAL at 10:21

## 2025-04-12 RX ADMIN — ENOXAPARIN SODIUM 30 MG: 100 INJECTION SUBCUTANEOUS at 22:10

## 2025-04-12 RX ADMIN — METOPROLOL TARTRATE 50 MG: 50 TABLET, FILM COATED ORAL at 10:26

## 2025-04-12 RX ADMIN — CLOPIDOGREL BISULFATE 75 MG: 75 TABLET, FILM COATED ORAL at 10:21

## 2025-04-12 RX ADMIN — TRAMADOL HYDROCHLORIDE 50 MG: 50 TABLET, COATED ORAL at 06:38

## 2025-04-12 RX ADMIN — ENOXAPARIN SODIUM 30 MG: 100 INJECTION SUBCUTANEOUS at 10:21

## 2025-04-12 RX ADMIN — TRAMADOL HYDROCHLORIDE 50 MG: 50 TABLET, COATED ORAL at 22:07

## 2025-04-12 RX ADMIN — BUSPIRONE HYDROCHLORIDE 7.5 MG: 15 TABLET ORAL at 22:08

## 2025-04-12 RX ADMIN — LOSARTAN POTASSIUM 25 MG: 25 TABLET, FILM COATED ORAL at 10:26

## 2025-04-12 RX ADMIN — PANTOPRAZOLE SODIUM 40 MG: 40 TABLET, DELAYED RELEASE ORAL at 06:38

## 2025-04-12 ASSESSMENT — PAIN SCALES - GENERAL
PAINLEVEL_OUTOF10: 5
PAINLEVEL_OUTOF10: 3
PAINLEVEL_OUTOF10: 0

## 2025-04-12 ASSESSMENT — PAIN DESCRIPTION - ORIENTATION: ORIENTATION: POSTERIOR

## 2025-04-12 ASSESSMENT — PAIN DESCRIPTION - LOCATION: LOCATION: NECK

## 2025-04-12 ASSESSMENT — PAIN DESCRIPTION - DESCRIPTORS: DESCRIPTORS: TIGHTNESS;SORE

## 2025-04-12 NOTE — CARE COORDINATION
1:33 PM  04/12/25    CM called [facility] liacecelia Goldberg ([phone]) to check on bed availability- she stated that no beds available today, she will check on openings for tomorrow (Sunday 4/13) and let CM know via [facility].    CM called pt's sister Randi ([phone]) to provide update, she verbalized understanding.    LUZMARIA will continue to follow.    TODD Martinez

## 2025-04-12 NOTE — PROGRESS NOTES
Hospitalist Progress Note      NAME: Ayleen Karimi   :  1950  MRM:  [id number]    Date/Time: 2025  11:41 AM    Ayleen is a very pleasant 74-year-old female with a history of fibromyalgia, hypertension, GERD, anxiety, TBI, CKD comes to the hospital with abnormal MRI.  Patient is complaining of some visual difficulties for almost 1 month but it got worse for last couple of weeks.  She is also complaining of some hallucinations.  She describes the visual difficulty as seeing circular structures.  She denies any double vision.  She cannot see certain objects sometimes.  Patient is also more forgetful and she had a fall couple of days back also.  She denies any focal weakness or numbness.  Patient had an MRI done yesterday which is positive for stroke.  Patient was told to come to the ER for further evaluation and management.  Patient denies any fever or chills.  No nausea vomiting diarrhea constipation.  When she arrived to the ER her blood pressure was 138/88, pulse 71, respiratory rate 16, temperature 98.1  Blood work showed a sodium 140, potassium 4.7, creatinine 1.27, glucose 104.  WBC 9.5, hemoglobin 13.5, platelet count 297.  MRI of the brain showed infarctions may be embolic in etiology.  Patient has small to moderate foci of acute infarction predominantly cortically based right lateral occipital lobe, right medial occipital lobe, right periatrial and right frontal lobe.        Subjective:     Chief Complaint: pt. Seen this am, sister at bedside  Patient complaining of left lower extremity numbness and slight dizziness.  Vision improving.  No other neurologic symptoms at this time.  Discussed with the sister over the phone.            Objective:       Vitals:          Last 24hrs VS reviewed since prior progress note. Most recent are:    Vitals:    25 1138   BP: (!) 155/72   Pulse: 81   Resp:    Temp:    SpO2:      SpO2 Readings from Last 6 Encounters:   25 94%   24 96%

## 2025-04-12 NOTE — PROGRESS NOTES
INPATIENT NEUROLOGY FOLLOW-UP NOTE    NAME:  Ayleen Karimi  MRN:  [id number]  : 1950      ADMIT DATE:   2025  2:26 PM    REASON FOR FOLLOW UP: Multi-focal, small to moderate, right hemispheric strokes (right  lateral occipital lobe, right medial occipital lobe, right parietal and right frontal lobe, per MRI report)     See Inpatient Neurology Consult dated 4- and subsequent Progress Note by ZULY Costa for details    ===========    25  Asked by Dr Yates/ Attending Hospitalist to review patient's complaint of left leg numbness and dizziness today, in setting of the abnormal MRI showing recent strokes, and mildly elevated ESR.  RN progress note from today at 1046 describes the followin: Pt called out reporting LUE numbness from L wrist to L elbow. She states this is \"new\" on this admission but that she sometimes gets this same feeling from her fibromyalgia. Dr. Yates made aware.     I was contacted by Dr Armstrong/ patient's attending hospitalist yesterday regarding the mildly elevated ESR in setting of patient's report of some left side neck/ jaw, possibly intraoral type pain, and patient's Sister reporting that there was a family history (patient's mother) of Giant Cell Arteritis.  I d/w Dr Armstrong that the visual disturbance is clearly attributed to the recent occipital strokes so that argues against GCA, and separately, I do not believe GCA is hereditary, however recommend patient seen a Rheumatologist as outpatient to evaluate for this concern.      Extensive discussion with patient and Sister (bedside).     Question of mild elevated ESR, family hx of Giant Cell Arteritis, and patient having episodic sharp shooting pain in right cheek.  D/w them that mild elevated ESR is non-specific, that vision change is attributed to acute stroke (so would not be attributed to GCA), and that I'm unaware of any hereditary linkage with respect to GCA.  Due to low suspicion of GCA and finding of

## 2025-04-12 NOTE — PROGRESS NOTES
1030: Pt called out reporting LUE numbness from L wrist to L elbow. She states this is \"new\" on this admission but that she sometimes gets this same feeling from her fibromyalgia. Dr. Yates made aware.

## 2025-04-13 VITALS
TEMPERATURE: 98.1 F | BODY MASS INDEX: 43.05 KG/M2 | SYSTOLIC BLOOD PRESSURE: 146 MMHG | OXYGEN SATURATION: 92 % | WEIGHT: 243 LBS | HEART RATE: 76 BPM | HEIGHT: 63 IN | DIASTOLIC BLOOD PRESSURE: 63 MMHG | RESPIRATION RATE: 16 BRPM

## 2025-04-13 PROCEDURE — 6370000000 HC RX 637 (ALT 250 FOR IP): Performed by: STUDENT IN AN ORGANIZED HEALTH CARE EDUCATION/TRAINING PROGRAM

## 2025-04-13 PROCEDURE — 2500000003 HC RX 250 WO HCPCS: Performed by: HOSPITALIST

## 2025-04-13 PROCEDURE — 6360000002 HC RX W HCPCS: Performed by: INTERNAL MEDICINE

## 2025-04-13 PROCEDURE — 94761 N-INVAS EAR/PLS OXIMETRY MLT: CPT

## 2025-04-13 PROCEDURE — 6370000000 HC RX 637 (ALT 250 FOR IP): Performed by: HOSPITALIST

## 2025-04-13 PROCEDURE — 6370000000 HC RX 637 (ALT 250 FOR IP): Performed by: NURSE PRACTITIONER

## 2025-04-13 RX ORDER — ATORVASTATIN CALCIUM 80 MG/1
80 TABLET, FILM COATED ORAL NIGHTLY
Qty: 60 TABLET | Refills: 0 | Status: SHIPPED | OUTPATIENT
Start: 2025-04-13 | End: 2025-06-12

## 2025-04-13 RX ORDER — CLOPIDOGREL BISULFATE 75 MG/1
75 TABLET ORAL DAILY
Qty: 60 TABLET | Refills: 0 | Status: SHIPPED | OUTPATIENT
Start: 2025-04-13 | End: 2025-06-12

## 2025-04-13 RX ORDER — LOSARTAN POTASSIUM 25 MG/1
25 TABLET ORAL DAILY
Qty: 30 TABLET | Refills: 0 | Status: SHIPPED | OUTPATIENT
Start: 2025-04-13 | End: 2025-05-13

## 2025-04-13 RX ORDER — ASPIRIN 81 MG/1
81 TABLET, CHEWABLE ORAL DAILY
Qty: 60 TABLET | Refills: 0 | Status: SHIPPED | OUTPATIENT
Start: 2025-04-13 | End: 2025-06-12

## 2025-04-13 RX ADMIN — ASPIRIN 81 MG: 81 TABLET, CHEWABLE ORAL at 10:41

## 2025-04-13 RX ADMIN — LOSARTAN POTASSIUM 25 MG: 25 TABLET, FILM COATED ORAL at 10:41

## 2025-04-13 RX ADMIN — ENOXAPARIN SODIUM 30 MG: 100 INJECTION SUBCUTANEOUS at 10:41

## 2025-04-13 RX ADMIN — PANTOPRAZOLE SODIUM 40 MG: 40 TABLET, DELAYED RELEASE ORAL at 06:46

## 2025-04-13 RX ADMIN — CLOPIDOGREL BISULFATE 75 MG: 75 TABLET, FILM COATED ORAL at 10:40

## 2025-04-13 RX ADMIN — BUSPIRONE HYDROCHLORIDE 7.5 MG: 15 TABLET ORAL at 10:41

## 2025-04-13 RX ADMIN — SODIUM CHLORIDE, PRESERVATIVE FREE 10 ML: 5 INJECTION INTRAVENOUS at 10:43

## 2025-04-13 RX ADMIN — METOPROLOL TARTRATE 50 MG: 50 TABLET, FILM COATED ORAL at 10:45

## 2025-04-13 RX ADMIN — LEVOTHYROXINE SODIUM 125 MCG: 0.03 TABLET ORAL at 06:47

## 2025-04-13 NOTE — DISCHARGE SUMMARY
Hospitalist Discharge Summary     Patient ID:  Ayleen Karimi  [id number]  74 y.o.  1950    Admit date: 4/9/2025    Discharge date and time: 4/13/2025    Admission Diagnoses: Acute ischemic stroke (HCC) [I63.9]  Acute cerebrovascular accident (CVA) (HCC) [I63.9]    Discharge Diagnoses:    Principal Problem:    Acute cerebrovascular accident (CVA) (HCC)  Resolved Problems:    * No resolved hospital problems. *         Hospital Course:       HPI: Ayleen is a very pleasant 74-year-old female with a history of fibromyalgia, hypertension, GERD, anxiety, TBI, CKD comes to the hospital with abnormal MRI.  Patient is complaining of some visual difficulties for almost 1 month but it got worse for last couple of weeks.  She is also complaining of some hallucinations.  She describes the visual difficulty as seeing circular structures.  She denies any double vision.  She cannot see certain objects sometimes.  Patient is also more forgetful and she had a fall couple of days back also.  She denies any focal weakness or numbness.  Patient had an MRI done yesterday which is positive for stroke.  Patient was told to come to the ER for further evaluation and management.  Patient denies any fever or chills.  No nausea vomiting diarrhea constipation.  When she arrived to the ER her blood pressure was 138/88, pulse 71, respiratory rate 16, temperature 98.1. Blood work showed a sodium 140, potassium 4.7, creatinine 1.27, glucose 104.  WBC 9.5, hemoglobin 13.5, platelet count 297. MRI of the brain showed infarctions may be embolic in etiology.  Patient has small to moderate foci of acute infarction predominantly cortically based right lateral occipital lobe, right medial occipital lobe, right periatrial and right frontal lobe.        Hospital progress:   Patient is a 74-year-old female with a history of hypertension, fibromyalgia, anxiety and depression, GERD comes to the hospital with acute CVA.  Patient admitted for further

## 2025-04-13 NOTE — PROGRESS NOTES
TRANSFER - OUT REPORT:    Verbal report given to Zhanna MAYER  on Ayleen Karimi  being transferred to [facility]  Rm 2140 for routine progression of patient care       Report consisted of patient's Situation, Background, Assessment and   Recommendations(SBAR).     Information from the following report(s) Nurse Handoff Report was reviewed with the receiving nurse.              Opportunity for questions and clarification was provided.      Patient transported with:  Per case management, Pt to be transferred by Pt's sister, Randi

## 2025-04-13 NOTE — PROGRESS NOTES
Stroke Education provided to patient and the following topics were discussed    1. Patients personal risk factors for stroke are hypercoagulable state and hypertension    2. Warning signs of Stroke:        * Sudden numbness or weakness of the face, arm or leg, especially on one side of          The body            * Sudden confusion, trouble speaking or understanding        * Sudden trouble seeing in one or both eyes        * Sudden trouble walking, dizziness, loss of balance or coordination        * Sudden severe headache with no known cause      3. Importance of activation Emergency Medical Services ( 9-1-1 ) immediately if experience any warning signs of stroke.    4. Be sure and schedule a follow-up appointment with your primary care doctor or any specialists as instructed.     5. You must take medicine every day to treat your risk factors for stroke.  Be sure to take your medicines exactly as your doctor tells you: no more, no less.  Know what your medicines are for , what they do.  Anti-thrombotics /anticoagulants can help prevent strokes.  You are taking the following medicine(s)  Aspirin, Plavix, atorvastatin      6.  Smoking and second-hand smoke greatly increase your risk of stroke, cardiovascular disease and death. Smoking history never    7. Information provided was Stroke Handouts or Verbal Education    8. Documentation of teaching completed in Patient Education Activity and on Care Plan with teaching response noted?  yes

## 2025-04-13 NOTE — CARE COORDINATION
CM follow up:    Per Ashlyn Puckett, liaison, patient accepted at [facility] and bed available today, Dr. Yates notified.  Accepting physician is Dr. Haylie Bertrand. Patient's sister Randi notified [phone], agreeable with plan and will transport patient after 1pm today.  Nurse to call report to [phone].    Shavon Malloy RN, MSN/Care manager

## 2025-04-13 NOTE — PLAN OF CARE
Problem: Chronic Conditions and Co-morbidities  Goal: Patient's chronic conditions and co-morbidity symptoms are monitored and maintained or improved  Outcome: Progressing     Problem: Discharge Planning  Goal: Discharge to home or other facility with appropriate resources  Outcome: Progressing     Problem: Safety - Adult  Goal: Free from fall injury  Outcome: Progressing     Problem: ABCDS Injury Assessment  Goal: Absence of physical injury  Outcome: Progressing     Problem: Pain  Goal: Verbalizes/displays adequate comfort level or baseline comfort level  Outcome: Progressing     
  Problem: Chronic Conditions and Co-morbidities  Goal: Patient's chronic conditions and co-morbidity symptoms are monitored and maintained or improved  Outcome: Progressing  Flowsheets (Taken 4/12/2025 1940)  Care Plan - Patient's Chronic Conditions and Co-Morbidity Symptoms are Monitored and Maintained or Improved: Monitor and assess patient's chronic conditions and comorbid symptoms for stability, deterioration, or improvement     Problem: Discharge Planning  Goal: Discharge to home or other facility with appropriate resources  Outcome: Progressing  Flowsheets (Taken 4/12/2025 1940)  Discharge to home or other facility with appropriate resources: Identify barriers to discharge with patient and caregiver     Problem: Safety - Adult  Goal: Free from fall injury  Outcome: Progressing     Problem: ABCDS Injury Assessment  Goal: Absence of physical injury  Outcome: Progressing     Problem: Occupational Therapy - Adult  Goal: By Discharge: Performs self-care activities at highest level of function for planned discharge setting.  See evaluation for individualized goals.  Description: FUNCTIONAL STATUS PRIOR TO ADMISSION:  Patient is normally MOD I for ADLs and mobility with use of rollator and SPC as needed. She reports having multiple pieces of equipment to use as needed. Retired and drives. Reports one recent fall ~2-3 weeks ago. Has been going to OP PT for balance for past ~2 months. Reports started dropping things and having increased difficulty leading to admission.    , Prior Level of Assist for ADLs: Independent, Prior Level of Assist for Homemaking: Independent,  , Prior Level of Assist for Transfers: Independent,       HOME SUPPORT: Patient lived with her sister and brother-in-law, all who are retired. Has a 2 story home with a ramped entrance and a stair lift to second floor of the home.    Occupational Therapy Goals:  Initiated 4/10/2025  1.  Patient will perform grooming, standing at sink for 2 minutes, with 
  Problem: Occupational Therapy - Adult  Goal: By Discharge: Performs self-care activities at highest level of function for planned discharge setting.  See evaluation for individualized goals.  Description: FUNCTIONAL STATUS PRIOR TO ADMISSION:  Patient is normally MOD I for ADLs and mobility with use of rollator and SPC as needed. She reports having multiple pieces of equipment to use as needed. Retired and drives. Reports one recent fall ~2-3 weeks ago. Has been going to OP PT for balance for past ~2 months. Reports started dropping things and having increased difficulty leading to admission.    , Prior Level of Assist for ADLs: Independent, Prior Level of Assist for Homemaking: Independent,  , Prior Level of Assist for Transfers: Independent,       HOME SUPPORT: Patient lived with her sister and brother-in-law, all who are retired. Has a 2 story home with a ramped entrance and a stair lift to second floor of the home.    Occupational Therapy Goals:  Initiated 4/10/2025  1.  Patient will perform grooming, standing at sink for 2 minutes, with Modified [place] within 7 day(s).  2.  Patient will perform lower body dressing with Modified [place] within 7 day(s).  3.  Patient will perform bathing with Supervision within 7 day(s).  4.  Patient will perform toilet transfers with Modified [place]  within 7 day(s).  5.  Patient will perform all aspects of toileting with Modified [place] within 7 day(s).  6.  Patient will utilize low vision techniques during functional activities, including head turning to L, without cues within 7 day(s).  Outcome: Progressing     OCCUPATIONAL THERAPY EVALUATION    Patient: Ayleen Karimi (74 y.o. female)  Date: 4/10/2025  Primary Diagnosis: Acute ischemic stroke (HCC) [I63.9]  Acute cerebrovascular accident (CVA) (HCC) [I63.9]         Precautions:                    ASSESSMENT :  The patient is limited by decreased functional mobility, independence in ADLs, cognition, 
  Problem: Occupational Therapy - Adult  Goal: By Discharge: Performs self-care activities at highest level of function for planned discharge setting.  See evaluation for individualized goals.  Description: FUNCTIONAL STATUS PRIOR TO ADMISSION:  Patient is normally MOD I for ADLs and mobility with use of rollator and SPC as needed. She reports having multiple pieces of equipment to use as needed. Retired and drives. Reports one recent fall ~2-3 weeks ago. Has been going to OP PT for balance for past ~2 months. Reports started dropping things and having increased difficulty leading to admission.    , Prior Level of Assist for ADLs: Independent, Prior Level of Assist for Homemaking: Independent,  , Prior Level of Assist for Transfers: Independent,       HOME SUPPORT: Patient lived with her sister and brother-in-law, all who are retired. Has a 2 story home with a ramped entrance and a stair lift to second floor of the home.    Occupational Therapy Goals:  Initiated 4/10/2025  1.  Patient will perform grooming, standing at sink for 2 minutes, with Modified [place] within 7 day(s).  2.  Patient will perform lower body dressing with Modified [place] within 7 day(s).  3.  Patient will perform bathing with Supervision within 7 day(s).  4.  Patient will perform toilet transfers with Modified [place]  within 7 day(s).  5.  Patient will perform all aspects of toileting with Modified [place] within 7 day(s).  6.  Patient will utilize low vision techniques during functional activities, including head turning to L, without cues within 7 day(s).  Outcome: Progressing   OCCUPATIONAL THERAPY TREATMENT  Patient: Ayleen Karimi (74 y.o. female)  Date: 4/12/2025  Primary Diagnosis: Acute ischemic stroke (HCC) [I63.9]  Acute cerebrovascular accident (CVA) (HCC) [I63.9]       Precautions: Fall Risk                Chart, occupational therapy assessment, plan of care, and goals were reviewed.    ASSESSMENT  Patient 
  Problem: Physical Therapy - Adult  Goal: By Discharge: Performs mobility at highest level of function for planned discharge setting.  See evaluation for individualized goals.  Description: FUNCTIONAL STATUS PRIOR TO ADMISSION: Patient was modified independent using a rolling walker for functional mobility.  She had increased balance and weakness deficits x1 month,  just started outpatient Physical Therapy.    HOME SUPPORT PRIOR TO ADMISSION: The patient lived with sister but did not require assistance.    Physical Therapy Goals  Initiated 4/10/2025  1.  Patient will move from supine to sit and sit to supine in bed with supervision/set-up within 7 day(s).    2.  Patient will perform sit to stand with supervision/set-up within 7 day(s).  3.  Patient will transfer from bed to chair and chair to bed with supervision/set-up using the least restrictive device within 7 day(s).  4.  Patient will ambulate with supervision/set-up for 150 feet with the least restrictive device within 7 day(s).   5.  Patient will ascend/descend 4 stairs with 2 handrail(s) with supervision/set-up within 7 day(s).   Outcome: Progressing   PHYSICAL THERAPY EVALUATION    Patient: Ayleen Karimi (74 y.o. female)  Date: 4/10/2025  Primary Diagnosis: Acute ischemic stroke (HCC) [I63.9]  Acute cerebrovascular accident (CVA) (HCC) [I63.9]       Precautions: Restrictions/Precautions  Restrictions/Precautions: Fall Risk            ASSESSMENT :   DEFICITS/IMPAIRMENTS:   The patient is limited by decreased functional mobility, strength, balance following admission for vision changes and increased weakness and balance deficits.  Patient had an outpatient MRI and then presented to ED which showed a Right occipital, parietal and frontal lobe infarct.  Patient has a history of fibromyalgia, HTN, anxiety, and CKD.    Based on the impairments listed above patient is presenting with a homonymous hemianopia.  Patient has strength in bilateral LE >3/5 and 
  Problem: Physical Therapy - Adult  Goal: By Discharge: Performs mobility at highest level of function for planned discharge setting.  See evaluation for individualized goals.  Description: FUNCTIONAL STATUS PRIOR TO ADMISSION: Patient was modified independent using a rolling walker for functional mobility.  She had increased balance and weakness deficits x1 month,  just started outpatient Physical Therapy.    HOME SUPPORT PRIOR TO ADMISSION: The patient lived with sister but did not require assistance.    Physical Therapy Goals  Initiated 4/10/2025  1.  Patient will move from supine to sit and sit to supine in bed with supervision/set-up within 7 day(s).    2.  Patient will perform sit to stand with supervision/set-up within 7 day(s).  3.  Patient will transfer from bed to chair and chair to bed with supervision/set-up using the least restrictive device within 7 day(s).  4.  Patient will ambulate with supervision/set-up for 150 feet with the least restrictive device within 7 day(s).   5.  Patient will ascend/descend 4 stairs with 2 handrail(s) with supervision/set-up within 7 day(s).   Outcome: Progressing   PHYSICAL THERAPY TREATMENT    Patient: Ayleen Karimi (74 y.o. female)  Date: 4/11/2025  Diagnosis: Acute ischemic stroke (HCC) [I63.9]  Acute cerebrovascular accident (CVA) (HCC) [I63.9] Acute cerebrovascular accident (CVA) (HCC)      Precautions: Restrictions/Precautions  Restrictions/Precautions: Fall Risk            ASSESSMENT:  Patient continues to benefit from skilled PT services and is progressing towards goals. Pt tolerated session well, but continues be limited by impaired vision, L inattention, decreased functional mobility, impaired balance and gait, increased risk for recurrent falls and dependency. Pt required CGA-intermittent min A during functional transfers and gait training with RW. Pt with increased forward flexed trunk/head and frequent standing rest breaks due to reports of fatigue, but 
continues to benefit from skilled OT services and is progressing towards goals. Patient less anxious this session and better able to attend and participate in tasks but still requiring intermittent redirection to task at hand. Supportive sister present this session and appropriately assists this OT with further vision testing (pt has difficulty keeping her head still or keeping hand over an eye during testing). Ongoing vision deficit noted, with L hemianopsia noted to L eye and partially to R eye (inconsistent results on R eye), poor convergence, and difficulty tracking to L side. She has slow saccades and participates in exercises to target same (using marker and pen as targets). Educated on lighthouse technique with emphasis on head turning to L side as opposed to just attempting to scan. She still has difficulty finding objects on L side and benefits from cues. Reading affected as well and educated on placing stick-y note with bright color as an anchor in order to read all words. Pt with ongoing additional deficits related to coordination, balance, cognition, vision, and overall safety and independence with mobility and self-care. Continue to recommend IPR at discharge.        PLAN :  Patient continues to benefit from skilled intervention to address the above impairments.  Continue treatment per established plan of care to address goals.    Recommend with staff: OOB to chair for all meals; mobility to bathroom for toileting; ADLs as needed    Recommend next OT session: continue towards established OT goals    Recommendation for discharge: (in order for the patient to meet his/her long term goals):   High intensity/comprehensive skilled occupational therapy in a multidisciplinary setting as patient is working towards tolerating up to 3 hours of therapy/day 5-7x/week    Other factors to consider for discharge:  acute CVA with vision deficits; very supportive sister; motivated; fall risk    IF patient discharges

## 2025-04-14 RX ORDER — BUSPIRONE HYDROCHLORIDE 7.5 MG/1
7.5 TABLET ORAL 2 TIMES DAILY
Qty: 60 TABLET | Refills: 2 | Status: SHIPPED | OUTPATIENT
Start: 2025-04-14

## 2025-04-16 NOTE — TELEPHONE ENCOUNTER
Reached out to the patient to schedule a hospital follow up for Stroke. LVM to call or schedule.     If the patient calls back please schedule within 8 to 12 weeks.

## 2025-04-17 LAB — ECHO BSA: 2.21 M2

## 2025-04-21 ENCOUNTER — PATIENT MESSAGE (OUTPATIENT)
Facility: CLINIC | Age: 75
End: 2025-04-21

## 2025-04-22 NOTE — TELEPHONE ENCOUNTER
Reached out and spoke with the patient and she asked me to reach out to her sister (EC). I spoke with the patient EC and was able to schedule a Stroke hospital follow up with Andreea in June.

## 2025-04-30 ENCOUNTER — OFFICE VISIT (OUTPATIENT)
Age: 75
End: 2025-04-30

## 2025-04-30 VITALS
TEMPERATURE: 97.1 F | DIASTOLIC BLOOD PRESSURE: 62 MMHG | HEART RATE: 54 BPM | OXYGEN SATURATION: 95 % | SYSTOLIC BLOOD PRESSURE: 114 MMHG | HEIGHT: 63 IN | BODY MASS INDEX: 43.05 KG/M2

## 2025-04-30 DIAGNOSIS — I67.1 CEREBRAL ANEURYSM: Primary | ICD-10-CM

## 2025-04-30 RX ORDER — METOPROLOL SUCCINATE 50 MG/1
50 TABLET, EXTENDED RELEASE ORAL DAILY
COMMUNITY
End: 2025-05-01 | Stop reason: ALTCHOICE

## 2025-04-30 RX ORDER — SENNOSIDES 8.6 MG/1
1 TABLET ORAL DAILY
COMMUNITY

## 2025-04-30 RX ORDER — TRIAMCINOLONE ACETONIDE 1 MG/G
CREAM TOPICAL DAILY
COMMUNITY

## 2025-04-30 NOTE — PATIENT INSTRUCTIONS
Follow up phone call in 1 year after imaging is completed.  See attached paperwork to schedule imaging.

## 2025-04-30 NOTE — PROGRESS NOTES
New patient referred by [facility] presenting with Cerebral aneurysm and s/p CVA.  Sister at visit.  Patient reports continued episodes of memory loss and vision problems.  Arrived today in wheelchair.  Reports she uses a walker or rolator home.  No new problems reported.

## 2025-05-01 ENCOUNTER — OFFICE VISIT (OUTPATIENT)
Facility: CLINIC | Age: 75
End: 2025-05-01
Payer: MEDICARE

## 2025-05-01 VITALS
SYSTOLIC BLOOD PRESSURE: 110 MMHG | DIASTOLIC BLOOD PRESSURE: 70 MMHG | BODY MASS INDEX: 42.63 KG/M2 | HEART RATE: 70 BPM | TEMPERATURE: 97 F | WEIGHT: 240.6 LBS | HEIGHT: 63 IN | OXYGEN SATURATION: 95 %

## 2025-05-01 DIAGNOSIS — Z86.73 HISTORY OF STROKE: ICD-10-CM

## 2025-05-01 DIAGNOSIS — R26.9 IMPAIRED GAIT: ICD-10-CM

## 2025-05-01 DIAGNOSIS — R53.1 LEFT-SIDED WEAKNESS: ICD-10-CM

## 2025-05-01 DIAGNOSIS — I10 HTN (HYPERTENSION), BENIGN: ICD-10-CM

## 2025-05-01 DIAGNOSIS — E03.9 ACQUIRED HYPOTHYROIDISM: ICD-10-CM

## 2025-05-01 DIAGNOSIS — N39.0 RECURRENT UTI (URINARY TRACT INFECTION): ICD-10-CM

## 2025-05-01 DIAGNOSIS — H53.452 PERIPHERAL VISION LOSS, LEFT: Primary | ICD-10-CM

## 2025-05-01 DIAGNOSIS — F41.9 ANXIETY: ICD-10-CM

## 2025-05-01 DIAGNOSIS — J30.9 ALLERGIC RHINITIS, UNSPECIFIED SEASONALITY, UNSPECIFIED TRIGGER: ICD-10-CM

## 2025-05-01 DIAGNOSIS — I67.1 CEREBRAL ANEURYSM: ICD-10-CM

## 2025-05-01 DIAGNOSIS — K21.9 GASTROESOPHAGEAL REFLUX DISEASE WITHOUT ESOPHAGITIS: ICD-10-CM

## 2025-05-01 PROBLEM — I63.9 ACUTE CEREBROVASCULAR ACCIDENT (CVA) (HCC): Status: RESOLVED | Noted: 2025-04-09 | Resolved: 2025-05-01

## 2025-05-01 PROBLEM — R56.9 SEIZURE (HCC): Status: ACTIVE | Noted: 2025-05-01

## 2025-05-01 PROBLEM — R56.9 SEIZURE (HCC): Status: RESOLVED | Noted: 2025-05-01 | Resolved: 2025-05-01

## 2025-05-01 PROCEDURE — 3074F SYST BP LT 130 MM HG: CPT | Performed by: INTERNAL MEDICINE

## 2025-05-01 PROCEDURE — G8427 DOCREV CUR MEDS BY ELIG CLIN: HCPCS | Performed by: INTERNAL MEDICINE

## 2025-05-01 PROCEDURE — 1160F RVW MEDS BY RX/DR IN RCRD: CPT | Performed by: INTERNAL MEDICINE

## 2025-05-01 PROCEDURE — G8417 CALC BMI ABV UP PARAM F/U: HCPCS | Performed by: INTERNAL MEDICINE

## 2025-05-01 PROCEDURE — 99215 OFFICE O/P EST HI 40 MIN: CPT | Performed by: INTERNAL MEDICINE

## 2025-05-01 PROCEDURE — G8399 PT W/DXA RESULTS DOCUMENT: HCPCS | Performed by: INTERNAL MEDICINE

## 2025-05-01 PROCEDURE — 1036F TOBACCO NON-USER: CPT | Performed by: INTERNAL MEDICINE

## 2025-05-01 PROCEDURE — 1159F MED LIST DOCD IN RCRD: CPT | Performed by: INTERNAL MEDICINE

## 2025-05-01 PROCEDURE — 1123F ACP DISCUSS/DSCN MKR DOCD: CPT | Performed by: INTERNAL MEDICINE

## 2025-05-01 PROCEDURE — 1090F PRES/ABSN URINE INCON ASSESS: CPT | Performed by: INTERNAL MEDICINE

## 2025-05-01 PROCEDURE — 1111F DSCHRG MED/CURRENT MED MERGE: CPT | Performed by: INTERNAL MEDICINE

## 2025-05-01 PROCEDURE — 3078F DIAST BP <80 MM HG: CPT | Performed by: INTERNAL MEDICINE

## 2025-05-01 PROCEDURE — 1126F AMNT PAIN NOTED NONE PRSNT: CPT | Performed by: INTERNAL MEDICINE

## 2025-05-01 RX ORDER — BUSPIRONE HYDROCHLORIDE 10 MG/1
10 TABLET ORAL 2 TIMES DAILY
Qty: 60 TABLET | Refills: 0 | Status: SHIPPED | OUTPATIENT
Start: 2025-05-01 | End: 2025-05-31

## 2025-05-01 RX ORDER — LORATADINE 10 MG/1
10 TABLET ORAL DAILY
Qty: 90 TABLET | Refills: 1
Start: 2025-05-01

## 2025-05-01 RX ORDER — METOPROLOL SUCCINATE 50 MG/1
50 TABLET, EXTENDED RELEASE ORAL DAILY
Qty: 90 TABLET | Refills: 1 | Status: SHIPPED | OUTPATIENT
Start: 2025-05-01

## 2025-05-01 NOTE — PATIENT INSTRUCTIONS
Current Outpatient Medications   Medication Sig    metoprolol succinate (TOPROL XL) 50 MG extended release tablet Take 1 tablet by mouth daily    senna (SENOKOT) 8.6 MG tablet Take 1 tablet by mouth daily    triamcinolone (KENALOG) 0.1 % cream Apply topically daily Apply topically 2 times daily.    MYRBETRIQ 25 MG TB24 Take 1 tablet by mouth daily    trimethoprim (TRIMPEX) 100 MG tablet Take 1 tablet by mouth daily    busPIRone (BUSPAR) 7.5 MG tablet TAKE 1 TABLET BY MOUTH TWICE A DAY    aspirin 81 MG chewable tablet Take 1 tablet by mouth daily    atorvastatin (LIPITOR) 80 MG tablet Take 1 tablet by mouth nightly    clopidogrel (PLAVIX) 75 MG tablet Take 1 tablet by mouth daily    levothyroxine (SYNTHROID) 125 MCG tablet TAKE 1 TABLET BY MOUTH DAILY INCREASED 5/7/24    omeprazole (PRILOSEC) 40 MG delayed release capsule TAKE 1 CAPSULE BY MOUTH EVERY DAY    traMADol (ULTRAM) 50 MG tablet TAKE 1 TABLET BY MOUTH TWICE A DAY AS NEEDED FOR PAIN FOR 30 DAYS    acetaminophen (TYLENOL) 325 MG tablet Take 2 tablets by mouth every 6 hours as needed for Pain     No current facility-administered medications for this visit.

## 2025-05-02 NOTE — PROGRESS NOTES
Identified pt with two pt identifiers(name and ). Reviewed record in preparation for visit and have obtained necessary documentation. All patient medications has been reviewed.  Chief Complaint   Patient presents with    Follow-Up from Hospital     *Immunizations updated if available*    Health Maintenance Due   Topic    COVID-19 Vaccine ( season)    Annual Wellness Visit (Medicare)      Health Maintenance Review: Patient reminded of \"due or due soon\" health maintenance. I have asked the patient to contact his/her primary care provider (PCP) for follow-up on his/her health maintenance.    Wt Readings from Last 3 Encounters:   25 109.1 kg (240 lb 9.6 oz)   04/10/25 110.2 kg (243 lb)   24 110.6 kg (243 lb 12.8 oz)     Temp Readings from Last 3 Encounters:   25 97 °F (36.1 °C)   25 97.1 °F (36.2 °C) (Temporal)   25 98.1 °F (36.7 °C) (Oral)     BP Readings from Last 3 Encounters:   25 110/70   25 114/62   25 (!) 146/63     Pulse Readings from Last 3 Encounters:   25 70   25 54   25 76       1. \"Have you been to the ER, urgent care clinic since your last visit?  Hospitalized since your last visit?\"  Yes, for stroke    2. \"Have you seen or consulted any other health care providers outside of the [facility] since your last visit?\"  [facility]       3. For patients aged 45-75: Has the patient had a colonoscopy / FIT/ Cologuard? NA - based on age    If the patient is female:    4. For patients aged 40-74: Has the patient had a mammogram within the past 2 years? NA - based on age or sex    5. For patients aged 21-65: Has the patient had a pap smear? NA - based on age or sex    Patient is accompanied by sister I have received verbal consent from Ayleen Karimi to discuss any/all medical information while they are present in the room.   
and vomiting), Postconcussion syndrome, Psychiatric disorder, Right knee pain, SBO (small bowel obstruction) (HCC), Slow to wake up after anesthesia, TBI (traumatic brain injury) (HCC), Thickened endometrium, and Uterine mass.     has a past surgical history that includes Total abdominal hysterectomy w/ bilateral salpingoophorectomy (10/13/2019); knee surgery (Right); Colonoscopy (08/15/2022); Jejunostomy (Left, 08/16/2022); IR REMOVE REPLACE URETERAL STENT PERC (08/2022); hernia repair (01/04/2023); Jejunostomy (01/04/2023); lysis of adhesions (01/04/2023); IR GUIDED NEPHROSTOMY CATH PLACEMENT RIGHT (4/21/2023); Knee arthroscopy (Right, 1978); Cataract removal (Bilateral); Cholecystectomy (1991); Dilation and curettage of uterus (02/2019); Tubal ligation (1984); Colposcopy (11/26/2018); Colonoscopy (N/A, 09/19/2018); Tonsillectomy and adenoidectomy (1955); Colonoscopy (11/24/2009); IR GUIDED NEPHROSTOMY CATH PLACEMENT RIGHT (04/21/2023); Small intestine surgery (08/01/2022); eye surgery (01/01/2010); Coronary artery bypass graft; and Hysterectomy, vaginal (01/01/2019).     reports that she quit smoking about 15 years ago. Her smoking use included cigarettes. She started smoking about 25 years ago. She has a 2.5 pack-year smoking history. She has never used smokeless tobacco. She reports current alcohol use of about 4.0 standard drinks of alcohol per week. She reports that she does not currently use drugs after having used the following drugs: Marijuana (Weed).    family history includes Arthritis in her mother; Dementia in her mother; Diabetes in her mother and sister; Heart Attack in her father and sister; Heart Disease in her sister; Hypertension in her brother and brother; Migraines in her mother and sister; No Known Problems in her daughter; Osteoarthritis in her sister; Panic Disorder in her brother, brother, and sister; Stroke in her father; Thyroid Disease in her mother and sister.    Physical Exam   Nursing

## 2025-05-08 ENCOUNTER — PATIENT MESSAGE (OUTPATIENT)
Facility: CLINIC | Age: 75
End: 2025-05-08

## 2025-05-16 ENCOUNTER — TELEPHONE (OUTPATIENT)
Facility: CLINIC | Age: 75
End: 2025-05-16

## 2025-05-16 SDOH — HEALTH STABILITY: PHYSICAL HEALTH: ON AVERAGE, HOW MANY DAYS PER WEEK DO YOU ENGAGE IN MODERATE TO STRENUOUS EXERCISE (LIKE A BRISK WALK)?: 2 DAYS

## 2025-05-16 SDOH — HEALTH STABILITY: PHYSICAL HEALTH: ON AVERAGE, HOW MANY MINUTES DO YOU ENGAGE IN EXERCISE AT THIS LEVEL?: 60 MIN

## 2025-05-16 ASSESSMENT — PATIENT HEALTH QUESTIONNAIRE - PHQ9
1. LITTLE INTEREST OR PLEASURE IN DOING THINGS: SEVERAL DAYS
SUM OF ALL RESPONSES TO PHQ QUESTIONS 1-9: 2
2. FEELING DOWN, DEPRESSED OR HOPELESS: SEVERAL DAYS

## 2025-05-16 ASSESSMENT — LIFESTYLE VARIABLES
HOW OFTEN DO YOU HAVE A DRINK CONTAINING ALCOHOL: 2
HOW MANY STANDARD DRINKS CONTAINING ALCOHOL DO YOU HAVE ON A TYPICAL DAY: 1
HOW OFTEN DO YOU HAVE A DRINK CONTAINING ALCOHOL: MONTHLY OR LESS
HOW MANY STANDARD DRINKS CONTAINING ALCOHOL DO YOU HAVE ON A TYPICAL DAY: 1 OR 2
HOW OFTEN DO YOU HAVE SIX OR MORE DRINKS ON ONE OCCASION: 1

## 2025-05-19 ENCOUNTER — RESULTS FOLLOW-UP (OUTPATIENT)
Facility: CLINIC | Age: 75
End: 2025-05-19

## 2025-05-19 DIAGNOSIS — Z86.73 HISTORY OF STROKE: ICD-10-CM

## 2025-05-19 DIAGNOSIS — E03.9 ACQUIRED HYPOTHYROIDISM: ICD-10-CM

## 2025-05-19 LAB — P2Y12 PLT RESPONSE: 192 PRU (ref 194–418)

## 2025-05-19 NOTE — RESULT ENCOUNTER NOTE
Results reviewed.  Please refer to "[employer] Fast [employer] Tablet [employer] App"hart comments.

## 2025-05-20 LAB
ALBUMIN SERPL-MCNC: 4.1 G/DL (ref 3.8–4.8)
ALP SERPL-CCNC: 159 IU/L (ref 44–121)
ALT SERPL-CCNC: 9 IU/L (ref 0–32)
AST SERPL-CCNC: 14 IU/L (ref 0–40)
BASOPHILS # BLD AUTO: 0 X10E3/UL (ref 0–0.2)
BASOPHILS NFR BLD AUTO: 0 %
BILIRUB SERPL-MCNC: 0.4 MG/DL (ref 0–1.2)
BUN SERPL-MCNC: 17 MG/DL (ref 8–27)
BUN/CREAT SERPL: 15 (ref 12–28)
CALCIUM SERPL-MCNC: 9.8 MG/DL (ref 8.7–10.3)
CHLORIDE SERPL-SCNC: 103 MMOL/L (ref 96–106)
CHOLEST SERPL-MCNC: 103 MG/DL (ref 100–199)
CO2 SERPL-SCNC: 22 MMOL/L (ref 20–29)
CREAT SERPL-MCNC: 1.1 MG/DL (ref 0.57–1)
EGFRCR SERPLBLD CKD-EPI 2021: 52 ML/MIN/1.73
EOSINOPHIL # BLD AUTO: 0.3 X10E3/UL (ref 0–0.4)
EOSINOPHIL NFR BLD AUTO: 3 %
ERYTHROCYTE [DISTWIDTH] IN BLOOD BY AUTOMATED COUNT: 12.1 % (ref 11.7–15.4)
GLOBULIN SER CALC-MCNC: 2.4 G/DL (ref 1.5–4.5)
GLUCOSE SERPL-MCNC: 163 MG/DL (ref 70–99)
HCT VFR BLD AUTO: 38.3 % (ref 34–46.6)
HDLC SERPL-MCNC: 25 MG/DL
HGB BLD-MCNC: 12.4 G/DL (ref 11.1–15.9)
IMM GRANULOCYTES # BLD AUTO: 0 X10E3/UL (ref 0–0.1)
IMM GRANULOCYTES NFR BLD AUTO: 0 %
IMP & REVIEW OF LAB RESULTS: NORMAL
LDLC SERPL CALC-MCNC: 53 MG/DL (ref 0–99)
LYMPHOCYTES # BLD AUTO: 1.9 X10E3/UL (ref 0.7–3.1)
LYMPHOCYTES NFR BLD AUTO: 21 %
MCH RBC QN AUTO: 30.4 PG (ref 26.6–33)
MCHC RBC AUTO-ENTMCNC: 32.4 G/DL (ref 31.5–35.7)
MCV RBC AUTO: 94 FL (ref 79–97)
MONOCYTES # BLD AUTO: 0.5 X10E3/UL (ref 0.1–0.9)
MONOCYTES NFR BLD AUTO: 6 %
NEUTROPHILS # BLD AUTO: 6.1 X10E3/UL (ref 1.4–7)
NEUTROPHILS NFR BLD AUTO: 70 %
PLATELET # BLD AUTO: 289 X10E3/UL (ref 150–450)
POTASSIUM SERPL-SCNC: 4 MMOL/L (ref 3.5–5.2)
PROT SERPL-MCNC: 6.5 G/DL (ref 6–8.5)
RBC # BLD AUTO: 4.08 X10E6/UL (ref 3.77–5.28)
REPORT: NORMAL
REPORT: NORMAL
SODIUM SERPL-SCNC: 140 MMOL/L (ref 134–144)
T4 FREE SERPL-MCNC: 1.72 NG/DL (ref 0.82–1.77)
TRIGL SERPL-MCNC: 140 MG/DL (ref 0–149)
TSH SERPL DL<=0.005 MIU/L-ACNC: 0.26 UIU/ML (ref 0.45–4.5)
VLDLC SERPL CALC-MCNC: 25 MG/DL (ref 5–40)
WBC # BLD AUTO: 8.8 X10E3/UL (ref 3.4–10.8)

## 2025-05-20 RX ORDER — LEVOTHYROXINE SODIUM 125 UG/1
TABLET ORAL
Qty: 90 TABLET | Refills: 1
Start: 2025-05-20

## 2025-05-20 NOTE — RESULT ENCOUNTER NOTE
Results reviewed.  Please refer to MyChart comments.   Thyroid is borderline overactive.  Take 1/2 Levothyroxine (Synthroid) pill on Sundays only.

## 2025-05-23 ENCOUNTER — OFFICE VISIT (OUTPATIENT)
Facility: CLINIC | Age: 75
End: 2025-05-23
Payer: MEDICARE

## 2025-05-23 VITALS
WEIGHT: 238.2 LBS | BODY MASS INDEX: 42.21 KG/M2 | DIASTOLIC BLOOD PRESSURE: 68 MMHG | HEART RATE: 67 BPM | OXYGEN SATURATION: 97 % | HEIGHT: 63 IN | SYSTOLIC BLOOD PRESSURE: 120 MMHG | RESPIRATION RATE: 16 BRPM | TEMPERATURE: 97.8 F

## 2025-05-23 DIAGNOSIS — Z00.00 MEDICARE ANNUAL WELLNESS VISIT, SUBSEQUENT: Primary | ICD-10-CM

## 2025-05-23 DIAGNOSIS — I10 HTN (HYPERTENSION), BENIGN: ICD-10-CM

## 2025-05-23 DIAGNOSIS — E66.813 OBESITY, CLASS 3 (HCC): ICD-10-CM

## 2025-05-23 DIAGNOSIS — Z86.73 HISTORY OF STROKE: ICD-10-CM

## 2025-05-23 DIAGNOSIS — E03.9 ACQUIRED HYPOTHYROIDISM: ICD-10-CM

## 2025-05-23 DIAGNOSIS — N39.0 RECURRENT UTI (URINARY TRACT INFECTION): ICD-10-CM

## 2025-05-23 LAB
BILIRUBIN, URINE, POC: NORMAL
BLOOD URINE, POC: NORMAL
GLUCOSE URINE, POC: NEGATIVE
KETONES, URINE, POC: NORMAL
LEUKOCYTE ESTERASE, URINE, POC: NORMAL
NITRITE, URINE, POC: POSITIVE
PH, URINE, POC: 6 (ref 4.6–8)
PROTEIN,URINE, POC: 100
SPECIFIC GRAVITY, URINE, POC: 1.02 (ref 1–1.03)
URINALYSIS CLARITY, POC: NORMAL
URINALYSIS COLOR, POC: YELLOW
UROBILINOGEN, POC: NORMAL

## 2025-05-23 PROCEDURE — PBSHW AMB POC URINALYSIS DIP STICK AUTO W/O MICRO: Performed by: INTERNAL MEDICINE

## 2025-05-23 PROCEDURE — 81003 URINALYSIS AUTO W/O SCOPE: CPT | Performed by: INTERNAL MEDICINE

## 2025-05-23 PROCEDURE — 3074F SYST BP LT 130 MM HG: CPT | Performed by: INTERNAL MEDICINE

## 2025-05-23 PROCEDURE — G0439 PPPS, SUBSEQ VISIT: HCPCS | Performed by: INTERNAL MEDICINE

## 2025-05-23 PROCEDURE — 1125F AMNT PAIN NOTED PAIN PRSNT: CPT | Performed by: INTERNAL MEDICINE

## 2025-05-23 PROCEDURE — 1159F MED LIST DOCD IN RCRD: CPT | Performed by: INTERNAL MEDICINE

## 2025-05-23 PROCEDURE — G8417 CALC BMI ABV UP PARAM F/U: HCPCS | Performed by: INTERNAL MEDICINE

## 2025-05-23 PROCEDURE — 1160F RVW MEDS BY RX/DR IN RCRD: CPT | Performed by: INTERNAL MEDICINE

## 2025-05-23 PROCEDURE — 1090F PRES/ABSN URINE INCON ASSESS: CPT | Performed by: INTERNAL MEDICINE

## 2025-05-23 PROCEDURE — 3078F DIAST BP <80 MM HG: CPT | Performed by: INTERNAL MEDICINE

## 2025-05-23 PROCEDURE — 99213 OFFICE O/P EST LOW 20 MIN: CPT | Performed by: INTERNAL MEDICINE

## 2025-05-23 PROCEDURE — G8427 DOCREV CUR MEDS BY ELIG CLIN: HCPCS | Performed by: INTERNAL MEDICINE

## 2025-05-23 PROCEDURE — 1036F TOBACCO NON-USER: CPT | Performed by: INTERNAL MEDICINE

## 2025-05-23 PROCEDURE — 1123F ACP DISCUSS/DSCN MKR DOCD: CPT | Performed by: INTERNAL MEDICINE

## 2025-05-23 PROCEDURE — G8399 PT W/DXA RESULTS DOCUMENT: HCPCS | Performed by: INTERNAL MEDICINE

## 2025-05-23 RX ORDER — ATORVASTATIN CALCIUM 80 MG/1
80 TABLET, FILM COATED ORAL NIGHTLY
Qty: 90 TABLET | Refills: 1 | Status: SHIPPED | OUTPATIENT
Start: 2025-05-23 | End: 2025-11-19

## 2025-05-23 RX ORDER — MIRABEGRON 50 MG/1
50 TABLET, FILM COATED, EXTENDED RELEASE ORAL DAILY
Qty: 30 TABLET | Refills: 5
Start: 2025-05-23

## 2025-05-23 RX ORDER — CIPROFLOXACIN 500 MG/1
500 TABLET, FILM COATED ORAL 2 TIMES DAILY
Qty: 14 TABLET | Refills: 0 | Status: SHIPPED | OUTPATIENT
Start: 2025-05-23 | End: 2025-05-30

## 2025-05-23 NOTE — PATIENT INSTRUCTIONS
health conditions, and trying to get a healthy amount of sleep.  Follow-up care is a key part of your treatment and safety. Be sure to make and go to all appointments, and call your doctor if you are having problems. It's also a good idea to know your test results and keep a list of the medicines you take.  How can you care for yourself at home?  Diet    Use less salt when you cook and eat. This helps lower your blood pressure. Taste food before salting. Add only a little salt when you think you need it. With time, your taste buds will adjust to less salt.     Eat fewer snack items, fast foods, canned soups, and other high-salt, high-fat, processed foods.     Read food labels and try to avoid saturated and trans fats. They increase your risk of heart disease by raising cholesterol levels.     Limit the amount of solid fat--butter, margarine, and shortening--you eat. Use olive, peanut, or canola oil when you cook. Bake, broil, and steam foods instead of frying them.     Eat a variety of fruit and vegetables every day. Dark green, deep orange, red, or yellow fruits and vegetables are especially good for you. Examples include spinach, carrots, peaches, and berries.     Foods high in fiber can reduce your cholesterol and provide important vitamins and minerals. High-fiber foods include whole-grain cereals and breads, oatmeal, beans, brown rice, citrus fruits, and apples.     Eat lean proteins. Heart-healthy proteins include seafood, lean meats and poultry, eggs, beans, peas, nuts, seeds, and soy products.     Limit drinks and foods with added sugar. These include candy, desserts, and soda pop.   Heart-healthy lifestyle    If your doctor recommends it, get more exercise. For many people, walking is a good choice. Or you may want to swim, bike, or do other activities. Bit by bit, increase the time you're active every day. Try for at least 30 minutes on most days of the week.     Try to quit or cut back on using tobacco

## 2025-05-23 NOTE — PROGRESS NOTES
Identified pt with two pt identifiers(name and ). Reviewed record in preparation for visit and have obtained necessary documentation. All patient medications has been reviewed.  Chief Complaint   Patient presents with    Medicare AWV     *Immunization updated if available*     Health Maintenance Due   Topic    Annual Wellness Visit (Medicare)      Health Maintenance Review: Patient reminded of \"due or due soon\" health maintenance. I have asked the patient to contact his/her primary care provider (PCP) for follow-up on his/her health maintenance.    Wt Readings from Last 3 Encounters:   25 108 kg (238 lb 3.2 oz)   25 109.1 kg (240 lb 9.6 oz)   04/10/25 110.2 kg (243 lb)     Temp Readings from Last 3 Encounters:   25 97.8 °F (36.6 °C)   25 97 °F (36.1 °C)   25 97.1 °F (36.2 °C) (Temporal)     BP Readings from Last 3 Encounters:   25 120/68   25 110/70   25 114/62     Pulse Readings from Last 3 Encounters:   25 67   25 70   25 54       1. \"Have you been to the ER, urgent care clinic since your last visit?  Hospitalized since your last visit?\" No    2. \"Have you seen or consulted any other health care providers outside of the [facility] since your last visit?\" No     3. For patients aged 45-75: Has the patient had a colonoscopy / FIT/ Cologuard? NA - based on age    If the patient is female:    4. For patients aged 40-74: Has the patient had a mammogram within the past 2 years? NA - based on age or sex    5. For patients aged 21-65: Has the patient had a pap smear? NA - based on age or sex    Patient is accompanied by sister I have received verbal consent from Ayleen Karimi to discuss any/all medical information while they are present in the room.   
    Allergies   Allergen Reactions    Sulfa Antibiotics Other (See Comments) and Hives     unknown  Not allergic at all...\"yeast infection\"     Adhesive Tape Hives and Rash     Prior to Visit Medications    Medication Sig Taking? Authorizing Provider   MYRBETRIQ 50 MG TB24 Take 50 mg by mouth daily Yes Bryan Logan MD   atorvastatin (LIPITOR) 80 MG tablet Take 1 tablet by mouth nightly Yes Bryan Logan MD   ciprofloxacin (CIPRO) 500 MG tablet Take 1 tablet by mouth 2 times daily for 7 days Yes Bryan Logan MD   levothyroxine (SYNTHROID) 125 MCG tablet Take 1 pill 6 days per week and 1/2 pill on Sundays.  Decreased 5/20/25 Yes Bryan Logan MD   lidocaine (LIDODERM) 5 % Place 1 patch onto the skin daily 12 hours on, 12 hours off. Yes Bryan Logan MD   metoprolol succinate (TOPROL XL) 50 MG extended release tablet Take 1 tablet by mouth daily Yes Bryan Logan MD   busPIRone (BUSPAR) 10 MG tablet Take 1 tablet by mouth 2 times daily Increased 5/1/25 Yes Bryan Logan MD   loratadine (CLARITIN) 10 MG tablet Take 1 tablet by mouth daily Yes Bryan Logan MD   senna (SENOKOT) 8.6 MG tablet Take 1 tablet by mouth daily Yes Ramon Dinh MD   triamcinolone (KENALOG) 0.1 % cream Apply topically daily Apply topically 2 times daily. Yes Ramon Dinh MD   acetaminophen (TYLENOL) 325 MG tablet Take 2 tablets by mouth every 6 hours as needed for Pain Yes Ramon Dinh MD   trimethoprim (TRIMPEX) 100 MG tablet Take 1 tablet by mouth daily Yes Ramon Dinh MD   aspirin 81 MG chewable tablet Take 1 tablet by mouth daily Yes Zander Yates MD   clopidogrel (PLAVIX) 75 MG tablet Take 1 tablet by mouth daily Yes Zander Yates MD   omeprazole (PRILOSEC) 40 MG delayed release capsule TAKE 1 CAPSULE BY MOUTH EVERY DAY Yes Bryna Logan MD   traMADol (ULTRAM) 50 MG tablet TAKE 1 TABLET BY MOUTH TWICE A DAY AS NEEDED FOR PAIN FOR 30 DAYS Yes Ramon Dinh MD       [facility] [place] (Including outside

## 2025-05-24 DIAGNOSIS — N39.0 RECURRENT UTI (URINARY TRACT INFECTION): ICD-10-CM

## 2025-05-26 ENCOUNTER — RESULTS FOLLOW-UP (OUTPATIENT)
Facility: CLINIC | Age: 75
End: 2025-05-26

## 2025-05-26 DIAGNOSIS — F41.9 ANXIETY: ICD-10-CM

## 2025-05-26 LAB
BACTERIA SPEC CULT: ABNORMAL
CC UR VC: ABNORMAL
SERVICE CMNT-IMP: ABNORMAL

## 2025-05-27 RX ORDER — BUSPIRONE HYDROCHLORIDE 10 MG/1
10 TABLET ORAL 2 TIMES DAILY
Qty: 60 TABLET | Refills: 0 | Status: SHIPPED | OUTPATIENT
Start: 2025-05-27 | End: 2025-06-26

## 2025-06-10 DIAGNOSIS — M54.16 LUMBAR RADICULOPATHY: ICD-10-CM

## 2025-06-10 DIAGNOSIS — M25.551 HIP PAIN, BILATERAL: ICD-10-CM

## 2025-06-10 DIAGNOSIS — M25.552 HIP PAIN, BILATERAL: ICD-10-CM

## 2025-06-10 RX ORDER — LIDOCAINE 50 MG/G
PATCH TOPICAL
Qty: 30 PATCH | Refills: 0 | Status: SHIPPED | OUTPATIENT
Start: 2025-06-10

## 2025-06-17 NOTE — PROGRESS NOTES
This encounter is being administratively closed due to prolonged inactivity and lack of documentation or action by the treating provider. No clinical documentation or updates have been entered since the initial date of encounter.  Closure is for administrative tracking purposes only and does not reflect the completion of clinical care. Provider may reopen or create a new encounter if documentation becomes available.

## 2025-06-22 DIAGNOSIS — F41.9 ANXIETY: ICD-10-CM

## 2025-06-22 RX ORDER — BUSPIRONE HYDROCHLORIDE 10 MG/1
10 TABLET ORAL 2 TIMES DAILY
Qty: 180 TABLET | Refills: 1 | Status: SHIPPED | OUTPATIENT
Start: 2025-06-22 | End: 2025-09-20

## 2025-06-26 DIAGNOSIS — K21.9 GASTROESOPHAGEAL REFLUX DISEASE, UNSPECIFIED WHETHER ESOPHAGITIS PRESENT: ICD-10-CM

## 2025-06-26 RX ORDER — OMEPRAZOLE 40 MG/1
CAPSULE, DELAYED RELEASE ORAL DAILY
Qty: 90 CAPSULE | Refills: 0 | Status: SHIPPED | OUTPATIENT
Start: 2025-06-26

## 2025-07-09 NOTE — PROGRESS NOTES
[facility]  [facility]  [address]  [address]  [address]  [facility]  [address]  [phone] (phone)   [phone] (fax)  Hospital Follow-up / Tele-health Visit      Date:  07/10/25     Name:  AYLEEN KARIMI  :  1950  MRN:  [id number]     PCP:  Bryan Logan MD    Ayleen Karimi is a 74 y.o. female who was seen by synchronous (real-time) audio-video technology on 7/10/2025 for Follow-Up from Hospital and Stroke    Assessment & Plan:   1. History of stroke  Assessment & Plan:  Patient with hypertension, fibromyalgia, anxiety depression, GERD who was admitted to the hospital -2025 due to symptoms concerning for stroke due to visual changes. Patient was evaluated by her eye doctor who ordered an MRI.  The MRI was found to have strokes.  On exam patient was noted to have a left visual field defect and mild weakness of the left arm (4+/5) compared to the right arm (5/5),and  left forearm numbness.    Patient was evaluated by Dr. Dang with neurology, please see his note dated 2025.  She has CTA head findings of multifocal, moderate to severe intracranial atherosclerotic changes.  Due to the intracranial vasculopathy he recommended dual antiplatelet therapy (aspirin 81 mg, Plavix 75 mg) indefinitely.    -CTA head and neck no large vessel occlusion.  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  Questionable tiny anterior communicating aneurysm.  Moderate cervical spondylosis.  -MRI brain small to moderate foci of acute infarction predominantly cortically based right lateral occipital lobe, right medial occipital lobe, right parietal and right frontal lobe.  No associated hemorrhage or midline shift.  -TTE: Ejection fraction 65%.  Left ventricular size is normal, normal wall

## 2025-07-10 ENCOUNTER — TELEMEDICINE (OUTPATIENT)
Age: 75
End: 2025-07-10
Payer: MEDICARE

## 2025-07-10 DIAGNOSIS — M54.16 LUMBAR RADICULOPATHY: ICD-10-CM

## 2025-07-10 DIAGNOSIS — Z86.73 HISTORY OF STROKE: Primary | ICD-10-CM

## 2025-07-10 DIAGNOSIS — I65.21 RIGHT CAVERNOUS CAROTID STENOSIS: ICD-10-CM

## 2025-07-10 DIAGNOSIS — M25.552 HIP PAIN, BILATERAL: ICD-10-CM

## 2025-07-10 DIAGNOSIS — M25.551 HIP PAIN, BILATERAL: ICD-10-CM

## 2025-07-10 PROCEDURE — 1160F RVW MEDS BY RX/DR IN RCRD: CPT | Performed by: NURSE PRACTITIONER

## 2025-07-10 PROCEDURE — 99215 OFFICE O/P EST HI 40 MIN: CPT | Performed by: NURSE PRACTITIONER

## 2025-07-10 PROCEDURE — 1090F PRES/ABSN URINE INCON ASSESS: CPT | Performed by: NURSE PRACTITIONER

## 2025-07-10 PROCEDURE — G8417 CALC BMI ABV UP PARAM F/U: HCPCS | Performed by: NURSE PRACTITIONER

## 2025-07-10 PROCEDURE — 1123F ACP DISCUSS/DSCN MKR DOCD: CPT | Performed by: NURSE PRACTITIONER

## 2025-07-10 PROCEDURE — G8427 DOCREV CUR MEDS BY ELIG CLIN: HCPCS | Performed by: NURSE PRACTITIONER

## 2025-07-10 PROCEDURE — 1036F TOBACCO NON-USER: CPT | Performed by: NURSE PRACTITIONER

## 2025-07-10 PROCEDURE — G8399 PT W/DXA RESULTS DOCUMENT: HCPCS | Performed by: NURSE PRACTITIONER

## 2025-07-10 PROCEDURE — 1159F MED LIST DOCD IN RCRD: CPT | Performed by: NURSE PRACTITIONER

## 2025-07-10 RX ORDER — LIDOCAINE 50 MG/G
PATCH TOPICAL
Qty: 30 PATCH | Refills: 0 | Status: SHIPPED | OUTPATIENT
Start: 2025-07-10

## 2025-07-10 NOTE — ASSESSMENT & PLAN NOTE
-CTA head and neck no large vessel occlusion. 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 Questionable tiny anterior communicating aneurysm. Moderate cervical spondylosis.     Continue aspirin 81 mg, Plavix 75 mg indefinitely  Lipitor 80 mg nightly for secondary stroke prevention  Will refer to vascular surgery for evaluation and management/treatment options for her right cavernous ICA stenosis.  
working with PT and OT    We discussed follow-up.  Will schedule follow-up in 6 to 8 months time to assess for recovery from stroke.  Patient may require neuropsych testing.

## 2025-07-21 ENCOUNTER — HOSPITAL ENCOUNTER (OUTPATIENT)
Facility: HOSPITAL | Age: 75
Discharge: HOME | End: 2025-07-24
Attending: INTERNAL MEDICINE
Payer: MEDICARE

## 2025-07-21 VITALS — BODY MASS INDEX: 42.16 KG/M2 | WEIGHT: 238 LBS

## 2025-07-21 DIAGNOSIS — Z12.31 SCREENING MAMMOGRAM FOR BREAST CANCER: ICD-10-CM

## 2025-07-21 PROCEDURE — 77067 SCR MAMMO BI INCL CAD: CPT

## 2025-08-19 DIAGNOSIS — E03.9 ACQUIRED HYPOTHYROIDISM: ICD-10-CM

## 2025-08-19 RX ORDER — LEVOTHYROXINE SODIUM 125 UG/1
TABLET ORAL
Qty: 90 TABLET | Refills: 1 | Status: SHIPPED | OUTPATIENT
Start: 2025-08-19 | End: 2025-08-19 | Stop reason: SDUPTHER

## 2025-08-19 RX ORDER — LEVOTHYROXINE SODIUM 125 UG/1
TABLET ORAL
Qty: 90 TABLET | Refills: 1 | Status: SHIPPED | OUTPATIENT
Start: 2025-08-19

## 2025-08-30 ENCOUNTER — OFFICE VISIT (OUTPATIENT)
Age: 75
End: 2025-08-30

## 2025-08-30 VITALS
TEMPERATURE: 98.1 F | OXYGEN SATURATION: 95 % | DIASTOLIC BLOOD PRESSURE: 74 MMHG | RESPIRATION RATE: 16 BRPM | HEART RATE: 76 BPM | SYSTOLIC BLOOD PRESSURE: 118 MMHG | WEIGHT: 233 LBS | BODY MASS INDEX: 41.29 KG/M2 | HEIGHT: 63 IN

## 2025-08-30 DIAGNOSIS — R39.9 UTI SYMPTOMS: Primary | ICD-10-CM

## 2025-08-30 DIAGNOSIS — R03.0 ELEVATED BLOOD PRESSURE READING: ICD-10-CM

## 2025-08-30 DIAGNOSIS — N30.01 ACUTE CYSTITIS WITH HEMATURIA: ICD-10-CM

## 2025-08-30 LAB
BILIRUBIN, URINE, POC: NEGATIVE
BLOOD URINE, POC: ABNORMAL
GLUCOSE URINE, POC: NEGATIVE
KETONES, URINE, POC: NEGATIVE
LEUKOCYTE ESTERASE, URINE, POC: ABNORMAL
NITRITE, URINE, POC: NEGATIVE
PH, URINE, POC: 5.5 (ref 4.6–8)
PROTEIN,URINE, POC: NEGATIVE
SPECIFIC GRAVITY, URINE, POC: 1 (ref 1–1.03)
URINALYSIS CLARITY, POC: ABNORMAL
URINALYSIS COLOR, POC: YELLOW
UROBILINOGEN, POC: NORMAL

## 2025-08-30 RX ORDER — VIBEGRON 75 MG/1
75 TABLET, FILM COATED ORAL DAILY
COMMUNITY
Start: 2025-07-24 | End: 2025-08-30

## 2025-08-30 RX ORDER — CEPHALEXIN 500 MG/1
500 CAPSULE ORAL 3 TIMES DAILY
Qty: 21 CAPSULE | Refills: 0 | Status: SHIPPED | OUTPATIENT
Start: 2025-08-30 | End: 2025-09-06

## 2025-08-30 ASSESSMENT — ENCOUNTER SYMPTOMS
ALLERGIC/IMMUNOLOGIC NEGATIVE: 1
EYES NEGATIVE: 1
RESPIRATORY NEGATIVE: 1
GASTROINTESTINAL NEGATIVE: 1

## 2025-08-31 LAB
BACTERIA SPEC CULT: ABNORMAL
CC UR VC: ABNORMAL
COMMENT:: NORMAL
SERVICE CMNT-IMP: ABNORMAL
SPECIMEN HOLD: NORMAL

## 2025-09-02 LAB
BACTERIA SPEC CULT: ABNORMAL
BACTERIA SPEC CULT: ABNORMAL
CC UR VC: ABNORMAL
SERVICE CMNT-IMP: ABNORMAL

## (undated) DEVICE — BAG BELONG PT PERS CLEAR HANDL

## (undated) DEVICE — SOLUTION IRRIG 1000ML 0.9% SOD CHL USP POUR PLAS BTL

## (undated) DEVICE — STERILE POLYISOPRENE POWDER-FREE SURGICAL GLOVES: Brand: PROTEXIS

## (undated) DEVICE — SUT MONOCRYL PLUS UD 4-0 --

## (undated) DEVICE — APPLICATOR BNDG 1MM ADH PREMIERPRO EXOFIN

## (undated) DEVICE — SUTURE ABSORBABLE ANTIBACT 1-0 CT-1 24 IN STRATAFIX PDS + SXPP1A443

## (undated) DEVICE — KIT COLON W/ 1.1OZ LUB AND 2 END

## (undated) DEVICE — ARM DRAPE

## (undated) DEVICE — HYPODERMIC SAFETY NEEDLE: Brand: MAGELLAN

## (undated) DEVICE — DRAPE,REIN 53X77,STERILE: Brand: MEDLINE

## (undated) DEVICE — TRAY PREP DRY W/ PREM GLV 2 APPL 6 SPNG 2 UNDPD 1 OVERWRAP

## (undated) DEVICE — STERILE POLYISOPRENE POWDER-FREE SURGICAL GLOVES WITH EMOLLIENT COATING: Brand: PROTEXIS

## (undated) DEVICE — COLON CLOSING PACK: Brand: MEDLINE INDUSTRIES, INC.

## (undated) DEVICE — TELFA NON-ADHERENT ABSORBENT DRESSING: Brand: TELFA

## (undated) DEVICE — PACK,BASIC,SIRUS,V: Brand: MEDLINE

## (undated) DEVICE — X-RAY SPONGES,16 PLY: Brand: DERMACEA

## (undated) DEVICE — CATHETER,FOLEY,SILI-ELAST,LTX,20FR,10ML: Brand: MEDLINE

## (undated) DEVICE — TROCAR ENDOSCP L100MM DIA5MM BLDELSS STBL SL OBT RADLUC

## (undated) DEVICE — BLADELESS OBTURATOR: Brand: WECK VISTA

## (undated) DEVICE — Z INACTIVE USE 2527070 DRAPE SURG W40XL44IN UNDERBUTTOCK SMS POLYPR W/ PCH BK DISP

## (undated) DEVICE — CATH IV AUTOGRD BC BLU 22GA 25 -- INSYTE

## (undated) DEVICE — SUTURE VCRL SZ 2-0 L27IN ABSRB UD L26MM CT-2 1/2 CIR J269H

## (undated) DEVICE — 3M™ IOBAN™ 2 ANTIMICROBIAL INCISE DRAPE 6650EZ: Brand: IOBAN™ 2

## (undated) DEVICE — JELLY,LUBE,STERILE,FLIP TOP,TUBE,4-OZ: Brand: MEDLINE

## (undated) DEVICE — STRAP,POSITIONING,KNEE/BODY,FOAM,4X60": Brand: MEDLINE

## (undated) DEVICE — COVER,MAYO STAND,STERILE: Brand: MEDLINE

## (undated) DEVICE — SYR 20ML LL STRL LF --

## (undated) DEVICE — SEALER TISS L20CM DIA13MM ADV BPLR L CRV JAW OPN APPRCH

## (undated) DEVICE — ACCESS PLATFORM FOR MINIMALLY INVASIVE SURGERY.: Brand: GELPORT® LAPAROSCOPIC  SYSTEM

## (undated) DEVICE — OPEN-END URETERAL CATHETER: Brand: COOK

## (undated) DEVICE — TOWEL,OR,DSP,ST,BLUE,STD,4/PK,20PK/CS: Brand: MEDLINE

## (undated) DEVICE — DEVICE TISS RETRV 3MMX25.25IN -- MYOSURE

## (undated) DEVICE — SUTURE PDS II SZ 0 L36IN ABSRB VLT L40MM CT 1/2 CIR Z358T

## (undated) DEVICE — SURGICAL PROCEDURE PACK TISS 3X5 IN ABSORBABLE SEPRAFILM

## (undated) DEVICE — BINDER ABD M/L 12X46-62IN --

## (undated) DEVICE — REDUCER CANN ENDOWRIST 12-8MM -- DA VINCI XI - SNGL USE

## (undated) DEVICE — SET ADMIN 16ML TBNG L100IN 2 Y INJ SITE IV PIGGY BK DISP

## (undated) DEVICE — DRAPE,LITHOTOMY,STERILE: Brand: MEDLINE

## (undated) DEVICE — BANDAGE,GAUZE,BULKEE II,2.25"X3YD,STRL: Brand: MEDLINE

## (undated) DEVICE — CONTAINER,SPECIMEN,4OZ,OR STRL: Brand: MEDLINE

## (undated) DEVICE — BLANKET WRM AD PREM MISTRAL-AIR

## (undated) DEVICE — SUT SLK 3-0 30IN SH BLK --

## (undated) DEVICE — SOLUTION IRRIG 3000ML 0.9% SOD CHL FLX CONT 0797208] ICU MEDICAL INC]

## (undated) DEVICE — TOTAL TRAY, 16FR 10ML SIL FOLEY, URN: Brand: MEDLINE

## (undated) DEVICE — SYR 5ML 1/5 GRAD LL NSAF LF --

## (undated) DEVICE — SPONGE GZ W4XL4IN COT 12 PLY TYP VII WVN C FLD DSGN STERILE

## (undated) DEVICE — REM POLYHESIVE ADULT PATIENT RETURN ELECTRODE: Brand: VALLEYLAB

## (undated) DEVICE — ROCKER SWITCH PENCIL BLADE ELECTRODE, HOLSTER: Brand: EDGE

## (undated) DEVICE — SOLUTION IRRIG 1000ML STRL H2O USP PLAS POUR BTL

## (undated) DEVICE — Device: Brand: SENSURA MIO

## (undated) DEVICE — SET SEALS HYSTEROSCOPE DISP -- MYOSURE  EA=10

## (undated) DEVICE — BLADE ELECTRODE: Brand: EDGE

## (undated) DEVICE — VISUALIZATION SYSTEM: Brand: CLEARIFY

## (undated) DEVICE — Y-TYPE TUR/BLADDER IRRIGATION SET, REGULATING CLAMP

## (undated) DEVICE — GLOVE ORANGE PI 7   MSG9070

## (undated) DEVICE — GOWN,PREVENTION PLUS,XLN/2XL,ST,22/CS: Brand: MEDLINE

## (undated) DEVICE — LARGE, DISPOSABLE ALEXIS O C-SECTION PROTECTOR - RETRACTOR: Brand: ALEXIS ® O C-SECTION PROTECTOR - RETRACTOR

## (undated) DEVICE — STAPLER INT L75MM CUT LN L73MM STPL LN L77MM BLU B FRM 8

## (undated) DEVICE — KIT SURG PREP POVIDONE IOD PRESATURATED PAINT WET FOR UNIV

## (undated) DEVICE — YANKAUER,POOLE TIP,STERILE,50/CS: Brand: MEDLINE

## (undated) DEVICE — DRAPE FLD WRM W44XL66IN C6L FOR INTRATEMP SYS THERMABASIN

## (undated) DEVICE — AIRSEAL 8 MM ACCESS PORT AND LOW PROFILE OBTURATOR WITH BLADELESS OPTICAL TIP, 120 MM LENGTH: Brand: AIRSEAL

## (undated) DEVICE — 1200 GUARD II KIT W/5MM TUBE W/O VAC TUBE: Brand: GUARDIAN

## (undated) DEVICE — AGENT HEMSTAT 3GM PURIFIED PLNT STARCH PWD ABSRB ARISTA AH

## (undated) DEVICE — GUIDEWIRE ENDOSCP L150CM DIA0.035IN TIP L15CM BENT PTFE

## (undated) DEVICE — HEX-LOCKING BLADE ELECTRODE: Brand: EDGE

## (undated) DEVICE — VCARE MEDIUM, UTERINE MANIPULATOR, VAGINAL-CERVICAL-AHLUWALIA'S-RETRACTOR-ELEVATOR: Brand: VCARE

## (undated) DEVICE — PACK,AURORA,LAVH: Brand: MEDLINE

## (undated) DEVICE — SYR 10ML LUER LOK 1/5ML GRAD --

## (undated) DEVICE — SPONGE LAP 18X18IN STRL -- 5/PK

## (undated) DEVICE — SOLIDIFIER MEDC 1200ML -- CONVERT TO 356117

## (undated) DEVICE — CYSTO-SFMC: Brand: MEDLINE INDUSTRIES, INC.

## (undated) DEVICE — INFECTION CONTROL KIT SYS

## (undated) DEVICE — KENDALL SCD EXPRESS SLEEVES, KNEE LENGTH, MEDIUM: Brand: KENDALL SCD

## (undated) DEVICE — SOLUTION IRRIG 3000ML 0.9% SOD CHL USP UROMATIC PLAS CONT

## (undated) DEVICE — TUBING, SUCTION, 1/4" X 12', STRAIGHT: Brand: MEDLINE

## (undated) DEVICE — STAPLER 45 RELOAD WHITE: Brand: ENDOWRIST

## (undated) DEVICE — LEGGINGS: Brand: CONVERTORS

## (undated) DEVICE — 3M™ TEGADERM™ TRANSPARENT FILM DRESSING FRAME STYLE, 1626W, 4 IN X 4-3/4 IN (10 CM X 12 CM), 50/CT 4CT/CASE: Brand: 3M™ TEGADERM™

## (undated) DEVICE — GOWN,SIRUS,NONRNF,SETINSLV,2XL,18/CS: Brand: MEDLINE

## (undated) DEVICE — DERMABOND SKIN ADH 0.7ML -- DERMABOND ADVANCED 12/BX

## (undated) DEVICE — NEEDLE HYPO 22GA L1.5IN BLK S STL HUB POLYPR SHLD REG BVL

## (undated) DEVICE — LAPAROTOMY-SFMC: Brand: MEDLINE INDUSTRIES, INC.

## (undated) DEVICE — PAD SANIT NPKN 4IN GRD

## (undated) DEVICE — STERILE NEOPRENE POWDER-FREE SURGICAL GLOVES WITH NITRILE COATING: Brand: PROTEXIS

## (undated) DEVICE — NDL FLTR TIP 5 MIC 18GX1.5IN --

## (undated) DEVICE — STERILE-Z MAYO STAND COVERS CLEAR POLYETHYLENE STERILE UNIVERSAL FIT 20 PER CASE: Brand: STERILE-Z

## (undated) DEVICE — HANDLE LT SNAP ON ULT DURABLE LENS FOR TRUMPF ALC DISPOSABLE

## (undated) DEVICE — ADULT SPO2 SENSOR: Brand: NELLCOR

## (undated) DEVICE — SUT VCRL + 0 36IN UR6 VIO --

## (undated) DEVICE — STAPLER 45 RELOAD: Brand: ENDOWRIST

## (undated) DEVICE — SYR 50ML LR LCK 1ML GRAD NSAF --

## (undated) DEVICE — TRI-LUMEN FILTERED TUBE SET WITH ACTIVATED CHARCOAL FILTER: Brand: AIRSEAL

## (undated) DEVICE — SUTURE MCRYL + SZ 4-0 L27IN ABSRB UD L19MM PS-2 3/8 CIR MCP426H

## (undated) DEVICE — MARKER SKIN GENTIAN VLT FN TIP W/ 6IN RUL L RESVR MED GRD

## (undated) DEVICE — DEVON™ KNEE AND BODY STRAP 60" X 3" (1.5 M X 7.6 CM): Brand: DEVON

## (undated) DEVICE — 4-PORT MANIFOLD: Brand: NEPTUNE 2

## (undated) DEVICE — CATH URETH INTMIT ROB 16FR FUN -- CONVERT TO ITEM 179520

## (undated) DEVICE — TUBE ST FLD CTRL AQUILEX INFLO --

## (undated) DEVICE — 3M™ IOBAN™ 2 ANTIMICROBIAL INCISE DRAPE 6651EZ: Brand: IOBAN™ 2

## (undated) DEVICE — ELECTRO LUBE IS A SINGLE PATIENT USE DEVICE THAT IS INTENDED TO BE USED ON ELECTROSURGICAL ELECTRODES TO REDUCE STICKING.: Brand: KEY SURGICAL ELECTRO LUBE

## (undated) DEVICE — YANKAUER,BULB TIP,W/O VENT,RIGID,STERILE: Brand: MEDLINE

## (undated) DEVICE — Device

## (undated) DEVICE — GDWIRE UROL STR 150CM FLX TP -- BX/5 SENSOR

## (undated) DEVICE — SMARTGOWN SURGICAL GOWN, 3XL, LONG: Brand: CONVERTORS

## (undated) DEVICE — 3M™ MEDIPORE™ H SOFT CLOTH TAPE SHORT ROLL TAPE 6INCHES X 2 YARDS 16 ROLLS/CASE 2866S: Brand: 3M™ MEDIPORE™

## (undated) DEVICE — SURGICAL PROCEDURE PACK BASIN MAJ SET CUST NO CAUT

## (undated) DEVICE — ROBOTIC GYN-SFMC: Brand: MEDLINE INDUSTRIES, INC.

## (undated) DEVICE — PAD POSITIONING WNG STD KIT W/BODY STRP LF DISP

## (undated) DEVICE — SUT SLK 2-0SH 30IN BLK --

## (undated) DEVICE — SOLUTION IRRIGATION H2O 0797305] ICU MEDICAL INC]

## (undated) DEVICE — CANISTER, RIGID, 3000CC: Brand: MEDLINE INDUSTRIES, INC.

## (undated) DEVICE — BASIN EMSIS 16OZ GRAPHITE PLAS KID SHP MOLD GRAD FOR ORAL

## (undated) DEVICE — CYSTO/BLADDER IRRIGATION SET, REGULATING CLAMP

## (undated) DEVICE — NEEDLE SPNL 22GA L3.5IN BLK HUB S STL REG WALL FIT STYL W/

## (undated) DEVICE — SUT ETHLN 2-0 18IN FS BLK --

## (undated) DEVICE — SUTURE VCRL SZ 3-0 L27IN ABSRB UD L26MM SH 1/2 CIR J416H

## (undated) DEVICE — CUFF RMFG BP INF SZ 11 DISP -- LAWSON OEM ITEM 238915

## (undated) DEVICE — SUTURE VCRL SZ 3-0 L18IN ABSRB UD L26MM SH 1/2 CIR J864D

## (undated) DEVICE — RELOAD STPL L75MM OPN H3.8MM CLS 1.5MM WIRE DIA0.2MM REG

## (undated) DEVICE — SUTURE V-LOC 180 SZ 0 L9IN ABSRB GRN GS-21 L37MM 1/2 CIR VLOCL0346

## (undated) DEVICE — COVER LT HNDL PLAS RIG 1 PER PK

## (undated) DEVICE — SEAL UNIV 5-8MM DISP BX/10 -- DA VINCI XI - SNGL USE

## (undated) DEVICE — STAPLER INT DIA29MM CLS STPL H1.5-2.2MM OPN LEG L5.2MM 26

## (undated) DEVICE — SOLUTION SCRB 2OZ 10% POVIDONE IOD ANTIMIC BTL

## (undated) DEVICE — SEAL

## (undated) DEVICE — STICK SPONGE PRESAT PVP PAINT STERILE

## (undated) DEVICE — NDL PRT INJ NSAF BLNT 18GX1.5 --

## (undated) DEVICE — SUTURE STRATAFIX SYMMETRIC SZ 1 L18IN ABSRB VLT CT1 L36CM SXPP1A404

## (undated) DEVICE — GLOVE ORANGE PI 8 1/2   MSG9085

## (undated) DEVICE — MARKER SKN RUL VIO STRL

## (undated) DEVICE — TIP COVER ACCESSORY

## (undated) DEVICE — SOLUTION IRRIG 1000ML H2O STRL BLT

## (undated) DEVICE — GARMENT,MEDLINE,DVT,INT,CALF,MED, GEN2: Brand: MEDLINE

## (undated) DEVICE — LIGHT HANDLE: Brand: DEVON

## (undated) DEVICE — INTENDED FOR TISSUE SEPARATION, AND OTHER PROCEDURES THAT REQUIRE A SHARP SURGICAL BLADE TO PUNCTURE OR CUT.: Brand: BARD-PARKER ® CARBON RIB-BACK BLADES

## (undated) DEVICE — SEALER TISS L35CM DIA5MM CRV JAW ARTC ADV BPLR ENSEAL G2

## (undated) DEVICE — TOWEL SURG W17XL27IN STD BLU COT NONFENESTRATED PREWASHED

## (undated) DEVICE — (D)PREP SKN CHLRAPRP APPL 26ML -- CONVERT TO ITEM 371833

## (undated) DEVICE — PACK,LITHOTOMY,PK I: Brand: MEDLINE

## (undated) DEVICE — SKIN MARKER,REGULAR TIP WITH RULER AND LABELS: Brand: DEVON

## (undated) DEVICE — SUTURE PERMAHAND SZ 0 L30IN NONABSORBABLE BLK SILK BRAID A306H

## (undated) DEVICE — COVER MPLR TIP CRV SCIS ACC DA VINCI

## (undated) DEVICE — CATHETER,FOLEY,100%SILICONE,18FR,10ML,LF: Brand: MEDLINE

## (undated) DEVICE — BANDAGE COBAN 4 IN COMPR W4INXL5YD FOAM COHESIVE QUIK STK SELF ADH SFT

## (undated) DEVICE — SUTURE PDS II SZ 0 L60IN ABSRB VLT L48MM CTX 1/2 CIR Z990G

## (undated) DEVICE — TOWEL OR BL STR 4/PK -- MEDICHOICE - MEDLINE

## (undated) DEVICE — TUBE ST FLD AQUILEX OUTFLO --

## (undated) DEVICE — SEALANT TISS 10 CC FIBRIN VISTASEAL

## (undated) DEVICE — SURGICAL PROCEDURE PACK GYN LAPAROSCOPY CUST SMH LF

## (undated) DEVICE — CLICKLINE SCISSORS INSERT: Brand: CLICKLINE

## (undated) DEVICE — KENDALL RADIOLUCENT FOAM MONITORING ELECTRODE -RECTANGULAR SHAPE: Brand: KENDALL

## (undated) DEVICE — SUTURE PERMAHAND SZ 3-0 L18IN NONABSORBABLE BLK L26MM SH C013D

## (undated) DEVICE — TAPE,CLOTH/SILK,CURAD,3"X10YD,LF,40/CS: Brand: CURAD

## (undated) DEVICE — SUTURE MCRYL SZ 4-0 L27IN ABSRB UD L19MM PS-2 1/2 CIR PRIM Y426H

## (undated) DEVICE — TUBING, SUCTION, 1/4" X 10', STRAIGHT: Brand: MEDLINE

## (undated) DEVICE — SOLUTION IV 1000ML 0.9% SOD CHL

## (undated) DEVICE — REMOVER TAPE PD ADH RESIDUE CONTAIN ALC BX NULL

## (undated) DEVICE — NEEDLE HYPO 22GA L1.5IN BLK POLYPR HUB S STL REG BVL STR

## (undated) DEVICE — TOTAL TRAY, DB, 100% SILI FOLEY, 16FR 10: Brand: MEDLINE

## (undated) DEVICE — SUT PROL 2-0 30IN SH BLU --

## (undated) DEVICE — STAPLER SHEATH: Brand: ENDOWRIST

## (undated) DEVICE — GOWN,SIRUS,NONRNF,SETINSLV,XL,20/CS: Brand: MEDLINE

## (undated) DEVICE — DRAINBAG,ANTI-REFLUX TOWER,L/F,2000ML,LL: Brand: MEDLINE

## (undated) DEVICE — APPLICATOR LAP 45CM FLX 2 VISTASEAL

## (undated) DEVICE — VESSEL SEALER XI EXTENDED DISP -- VESSEL

## (undated) DEVICE — SYR 3ML LL TIP 1/10ML GRAD --

## (undated) DEVICE — DISPOSABLE GRASPER CARTRIDGE: Brand: DIRECT DRIVE REPOSABLE GRASPERS

## (undated) DEVICE — ACCESS PLATFORM FOR MINIMALLY INVASIVE SURGERY: Brand: GELPOINT®  MINI ADVANCED ACCESS PLATFORM

## (undated) DEVICE — PREP PAD BNS: Brand: CONVERTORS

## (undated) DEVICE — PAD,SANITARY,11 IN,MAXI,N-STRL,IND WRAP: Brand: MEDLINE

## (undated) DEVICE — SUTURE SZ 0 27IN 5/8 CIR UR-6  TAPER PT VIOLET ABSRB VICRYL J603H